# Patient Record
Sex: FEMALE | Race: WHITE | NOT HISPANIC OR LATINO | Employment: OTHER | URBAN - METROPOLITAN AREA
[De-identification: names, ages, dates, MRNs, and addresses within clinical notes are randomized per-mention and may not be internally consistent; named-entity substitution may affect disease eponyms.]

---

## 2014-05-16 LAB — PAP-ABSTRACT: NORMAL

## 2017-04-10 ENCOUNTER — TRANSFERRED RECORDS (OUTPATIENT)
Dept: HEALTH INFORMATION MANAGEMENT | Facility: CLINIC | Age: 72
End: 2017-04-10

## 2018-02-22 ENCOUNTER — TRANSFERRED RECORDS (OUTPATIENT)
Dept: HEALTH INFORMATION MANAGEMENT | Facility: CLINIC | Age: 73
End: 2018-02-22

## 2018-02-22 LAB
CHOLEST SERPL-MCNC: 182 MG/DL (ref 100–199)
CREAT SERPL-MCNC: 1.25 MG/DL (ref 0.57–1.11)
GFR SERPL CREATININE-BSD FRML MDRD: 42 ML/MIN/1.73M2
GLUCOSE SERPL-MCNC: 155 MG/DL (ref 65–100)
HDLC SERPL-MCNC: 51 MG/DL
LDLC SERPL CALC-MCNC: 100 MG/DL
NONHDLC SERPL-MCNC: 131 MG/DL
POTASSIUM SERPL-SCNC: 3.4 MMOL/L (ref 3.5–5)
TRIGL SERPL-MCNC: 157 MG/DL

## 2018-03-12 ENCOUNTER — TELEPHONE (OUTPATIENT)
Dept: FAMILY MEDICINE | Facility: CLINIC | Age: 73
End: 2018-03-12

## 2018-03-12 NOTE — TELEPHONE ENCOUNTER
Pt is calling requesting to be seen by a MD at [facility]. Did schedule pt with Diana MAYORGA 3/16/18.  Pt said her Provider at [facility] didn't want to listen to her. Pt has had 4 strokes, 4 hip surgery's ( last hip surgery was 2010) on Warfarin for Clots. Pt's last clot was 2016.   Jessica [place] [facility]

## 2018-03-16 ENCOUNTER — OFFICE VISIT (OUTPATIENT)
Dept: FAMILY MEDICINE | Facility: CLINIC | Age: 73
End: 2018-03-16
Payer: COMMERCIAL

## 2018-03-16 VITALS
HEIGHT: 66 IN | TEMPERATURE: 97 F | BODY MASS INDEX: 24.91 KG/M2 | OXYGEN SATURATION: 99 % | DIASTOLIC BLOOD PRESSURE: 78 MMHG | HEART RATE: 70 BPM | RESPIRATION RATE: 14 BRPM | SYSTOLIC BLOOD PRESSURE: 160 MMHG | WEIGHT: 155 LBS

## 2018-03-16 DIAGNOSIS — R09.89 BRUIT OF RIGHT CAROTID ARTERY: ICD-10-CM

## 2018-03-16 DIAGNOSIS — I10 HTN, GOAL BELOW 140/90: ICD-10-CM

## 2018-03-16 DIAGNOSIS — I63.20 CEREBROVASCULAR ACCIDENT (CVA) DUE TO OCCLUSION OF PRECEREBRAL ARTERY (H): ICD-10-CM

## 2018-03-16 DIAGNOSIS — J44.1 COPD EXACERBATION (H): Primary | ICD-10-CM

## 2018-03-16 DIAGNOSIS — J01.90 ACUTE SINUSITIS WITH SYMPTOMS > 10 DAYS: ICD-10-CM

## 2018-03-16 LAB
ALBUMIN SERPL-MCNC: 3.9 G/DL (ref 3.4–5)
ALP SERPL-CCNC: 56 U/L (ref 40–150)
ALT SERPL W P-5'-P-CCNC: 31 U/L (ref 0–50)
ANION GAP SERPL CALCULATED.3IONS-SCNC: 9 MMOL/L (ref 3–14)
AST SERPL W P-5'-P-CCNC: 28 U/L (ref 0–45)
BASOPHILS # BLD AUTO: 0 10E9/L (ref 0–0.2)
BASOPHILS NFR BLD AUTO: 0.3 %
BILIRUB SERPL-MCNC: 0.4 MG/DL (ref 0.2–1.3)
BUN SERPL-MCNC: 19 MG/DL (ref 7–30)
CALCIUM SERPL-MCNC: 8.9 MG/DL (ref 8.5–10.1)
CHLORIDE SERPL-SCNC: 99 MMOL/L (ref 94–109)
CHOLEST SERPL-MCNC: 135 MG/DL
CO2 SERPL-SCNC: 26 MMOL/L (ref 20–32)
CREAT SERPL-MCNC: 0.96 MG/DL (ref 0.52–1.04)
DIFFERENTIAL METHOD BLD: NORMAL
EOSINOPHIL # BLD AUTO: 0.1 10E9/L (ref 0–0.7)
EOSINOPHIL NFR BLD AUTO: 1.1 %
ERYTHROCYTE [DISTWIDTH] IN BLOOD BY AUTOMATED COUNT: 12.7 % (ref 10–15)
GFR SERPL CREATININE-BSD FRML MDRD: 57 ML/MIN/1.7M2
GLUCOSE SERPL-MCNC: 91 MG/DL (ref 70–99)
HCT VFR BLD AUTO: 39.5 % (ref 35–47)
HDLC SERPL-MCNC: 53 MG/DL
HGB BLD-MCNC: 13.6 G/DL (ref 11.7–15.7)
INR PPP: 2.03 (ref 0.86–1.14)
LDLC SERPL CALC-MCNC: 54 MG/DL
LYMPHOCYTES # BLD AUTO: 2.1 10E9/L (ref 0.8–5.3)
LYMPHOCYTES NFR BLD AUTO: 28.2 %
MCH RBC QN AUTO: 31.1 PG (ref 26.5–33)
MCHC RBC AUTO-ENTMCNC: 34.4 G/DL (ref 31.5–36.5)
MCV RBC AUTO: 90 FL (ref 78–100)
MONOCYTES # BLD AUTO: 0.7 10E9/L (ref 0–1.3)
MONOCYTES NFR BLD AUTO: 8.8 %
NEUTROPHILS # BLD AUTO: 4.6 10E9/L (ref 1.6–8.3)
NEUTROPHILS NFR BLD AUTO: 61.6 %
NONHDLC SERPL-MCNC: 82 MG/DL
PLATELET # BLD AUTO: 249 10E9/L (ref 150–450)
POTASSIUM SERPL-SCNC: 3.7 MMOL/L (ref 3.4–5.3)
PROT SERPL-MCNC: 7.3 G/DL (ref 6.8–8.8)
RBC # BLD AUTO: 4.38 10E12/L (ref 3.8–5.2)
SODIUM SERPL-SCNC: 134 MMOL/L (ref 133–144)
TRIGL SERPL-MCNC: 142 MG/DL
TSH SERPL DL<=0.005 MIU/L-ACNC: 0.92 MU/L (ref 0.4–4)
WBC # BLD AUTO: 7.4 10E9/L (ref 4–11)

## 2018-03-16 PROCEDURE — 99204 OFFICE O/P NEW MOD 45 MIN: CPT | Performed by: NURSE PRACTITIONER

## 2018-03-16 PROCEDURE — 85025 COMPLETE CBC W/AUTO DIFF WBC: CPT | Performed by: NURSE PRACTITIONER

## 2018-03-16 PROCEDURE — 80053 COMPREHEN METABOLIC PANEL: CPT | Performed by: NURSE PRACTITIONER

## 2018-03-16 PROCEDURE — 84443 ASSAY THYROID STIM HORMONE: CPT | Performed by: NURSE PRACTITIONER

## 2018-03-16 PROCEDURE — 36415 COLL VENOUS BLD VENIPUNCTURE: CPT | Performed by: NURSE PRACTITIONER

## 2018-03-16 PROCEDURE — 80061 LIPID PANEL: CPT | Performed by: NURSE PRACTITIONER

## 2018-03-16 PROCEDURE — 85610 PROTHROMBIN TIME: CPT | Performed by: NURSE PRACTITIONER

## 2018-03-16 RX ORDER — ATORVASTATIN CALCIUM 40 MG/1
40 TABLET, FILM COATED ORAL
COMMUNITY
Start: 2018-02-22 | End: 2018-09-07

## 2018-03-16 RX ORDER — LANOLIN ALCOHOL/MO/W.PET/CERES
1000 CREAM (GRAM) TOPICAL
COMMUNITY
Start: 2017-04-10 | End: 2019-04-04

## 2018-03-16 RX ORDER — AMOXICILLIN 500 MG/1
500 CAPSULE ORAL
COMMUNITY
Start: 2016-09-09 | End: 2018-03-16

## 2018-03-16 RX ORDER — HYDROCHLOROTHIAZIDE 25 MG/1
12.5 TABLET ORAL
COMMUNITY
Start: 2018-02-25 | End: 2018-11-29

## 2018-03-16 RX ORDER — METOPROLOL SUCCINATE 100 MG/1
100 TABLET, EXTENDED RELEASE ORAL DAILY
Qty: 90 TABLET | Refills: 1 | Status: SHIPPED | OUTPATIENT
Start: 2018-03-16 | End: 2018-05-01 | Stop reason: DRUGHIGH

## 2018-03-16 RX ORDER — LANOLIN ALCOHOL/MO/W.PET/CERES
100 CREAM (GRAM) TOPICAL
COMMUNITY
Start: 2016-01-27 | End: 2018-09-07

## 2018-03-16 RX ORDER — METOPROLOL SUCCINATE 50 MG/1
50 TABLET, EXTENDED RELEASE ORAL
COMMUNITY
Start: 2018-02-22 | End: 2018-03-16 | Stop reason: DRUGHIGH

## 2018-03-16 RX ORDER — DIPHENOXYLATE HYDROCHLORIDE AND ATROPINE SULFATE 2.5; .025 MG/1; MG/1
1 TABLET ORAL
COMMUNITY
Start: 2016-11-22 | End: 2019-04-04

## 2018-03-16 RX ORDER — WARFARIN SODIUM 5 MG/1
TABLET ORAL
COMMUNITY
Start: 2018-01-25 | End: 2018-05-16

## 2018-03-16 ASSESSMENT — ANXIETY QUESTIONNAIRES
3. WORRYING TOO MUCH ABOUT DIFFERENT THINGS: NOT AT ALL
IF YOU CHECKED OFF ANY PROBLEMS ON THIS QUESTIONNAIRE, HOW DIFFICULT HAVE THESE PROBLEMS MADE IT FOR YOU TO DO YOUR WORK, TAKE CARE OF THINGS AT HOME, OR GET ALONG WITH OTHER PEOPLE: NOT DIFFICULT AT ALL
5. BEING SO RESTLESS THAT IT IS HARD TO SIT STILL: NOT AT ALL
6. BECOMING EASILY ANNOYED OR IRRITABLE: NOT AT ALL
GAD7 TOTAL SCORE: 0
1. FEELING NERVOUS, ANXIOUS, OR ON EDGE: NOT AT ALL
7. FEELING AFRAID AS IF SOMETHING AWFUL MIGHT HAPPEN: NOT AT ALL
2. NOT BEING ABLE TO STOP OR CONTROL WORRYING: NOT AT ALL

## 2018-03-16 ASSESSMENT — PATIENT HEALTH QUESTIONNAIRE - PHQ9: 5. POOR APPETITE OR OVEREATING: NOT AT ALL

## 2018-03-16 NOTE — MR AVS SNAPSHOT
After Visit Summary   3/16/2018    Bebe Alfonso    MRN: [id number]           Patient Information     Date Of Birth          1945        Visit Information        Provider Department      3/16/2018 9:00 AM Diana Ely APRN [facility]        Today's Diagnoses     COPD exacerbation (H)    -  1    HTN, goal below 140/90        Cerebrovascular accident (CVA) due to occlusion of precerebral artery (H)        Bruit of right carotid artery        Acute sinusitis with symptoms > 10 days          Care Instructions    Increase Toprol  mg daily  Start augmentin 875 mg twice daily for 10 days  Do additional INR next week and have them call me if concerns.   PFT after infection clears to check lung function  US of the neck arteries to look at the right carotid artery due to bruit.           Chronic Lung Disease: Preventing Lung Infections  Chronic lung diseases include chronic obstructive pulmonary disease (COPD), which includes chronic bronchitis and emphysema. Other chronic lung diseases include pulmonary fibrosis, sarcoidosis, and other conditions. When you have chronic lung diseases, it's very important to protect yourself from respiratory infections, like colds, the flu, and lung infections. Infections may cause your lung condition to worsen. Although you can't completely avoid them, there are things you can do to lessen the chance of infections.    Take precautions  Taking the following precautions can help you avoid illness:    Remember to keep your hands away from your nose and mouth. Germs on your hands get into your respiratory system this way.    Wash your hands often. When you wash them:    Use soap and warm water.    Rub your hands together well for at least 20 seconds.    Make sure to rinse them well.    Dry your hands on clean towels or air-dry them.    Use hand  containing alcohol, if you are unable to wash your hands. Use the  after  touching doorknobs, handles, and supermarket carts, for example, since lots of people touch them. Then wash your hands as soon as you can.    To help prevent the flu, get a flu vaccination every year. This may be given at your healthcare provider's office, a drugstore, or pharmacy, or at work. Get your flu shot as soon as the vaccines are available in your area. This is usually around September each year.    To help prevent pneumococcal pneumonia, get pneumonia vaccinations. Talk with your healthcare provider about which pneumococcal vaccinations you need.    Try to stay away from people with respiratory infections, such as colds or the flu. Stay away from crowded places, like shopping centers or movie theatres during cold and flu season.    If you smoke, think about quitting. In addition to causing or worsening many lung conditions, the lung damage from smoking increases your risk of infections. Stay away from others who smoke, too. This is also harmful and increases your chance of infections.  Date Last Reviewed: 4/14/2016 2000-2017 [employer]. [address] [place] [address]. All rights reserved. This information is not intended as a substitute for professional medical care. Always follow your healthcare professional's instructions.        Uncontrolled High Blood Pressure (Established)    Your blood pressure was unusually high today. This can occur if you ve missed doses of your blood pressure medicine. Or it can happen if you are taking other medicines. These include some asthma inhalers, decongestants, diet pills, and street drugs like cocaine and amphetamine.  Other causes include:    Weight gain    More salt in your diet    Smoking    Caffeine  Your blood pressure can also rise if you are emotionally upset or in intense pain. It may go back to normal after a period of rest.  A blood pressure reading is made up of 2 numbers. There is a top number over a bottom number. The top number  is the systolic pressure. The bottom number is the diastolic pressure. A normal blood pressure is a systolic pressure of less than 120 over a diastolic pressure of less than 80. High blood pressure (hypertension) is when the top number is 140 or higher. Or it is when the bottom number is 90 or higher. You will see your blood pressure readings written together. For example, a person with a systolic pressure of 118 and a diastolic pressure of 78 will have 118/78 written in the medical record. To be high blood pressure, the numbers must be higher when tested over a period of time. The blood pressures between normal and hypertension are called prehypertension. Prehypertension is a warning sign. The information gives you a chance to make lifestyle changes (weight loss, more exercise) that can keep your blood pressure from going higher.  Home care  It s important to take steps to lower your blood pressure. If you are taking blood pressure medicine, the guidelines below may help you need less or no medicines in the future.    Begin a weight-loss program if you are overweight.    Cut back on the amount of salt in your diet:    Avoid high-salt foods like olives, pickles, smoked meats, and salted potato chips.    Don t add salt to your food at the table.    Use only small amounts of salt when cooking.    Begin an exercise program. Talk with your health care provider about what exercise program is best for you. It doesn t have to be difficult. Even brisk walking for 20 minutes 3 times a week is a good form of exercise.    Avoid medicines that stimulates the heart. This includes many over-the-counter cold and sinus decongestant pills and sprays, as well as diet pills. Check the warnings about hypertension on the label. Before purchasing any over-the-counter medicines or supplements, always ask the pharmacist about the product's potential interaction with your high blood pressure and your medicines.    Stimulants such as  amphetamine or cocaine could be lethal for someone with hypertension. Never take these.    Limit how much caffeine you drink. Or switch to noncaffeinated beverages.    Stop smoking. If you are a long-time smoker, this can be hard. Enroll in a stop-smoking program to make it more likely that you will succeed. Talk with your provider about ways to quit.    Learn how to handle stress better. This is an important part of any program to lower blood pressure. Learn ways to relax. These include meditation, yoga, and biofeedback.    If medicines were prescribed, take them exactly as directed. Missing doses may cause your blood pressure to get out of control.    If you miss a dose or doses of your medicines, check with your healthcare provider or pharmacist about what to do.    Consider buying an automatic blood pressure machine. Your provider may recommend a certain type. You can get one of these at most pharmacies. Measure your blood pressure twice a day, in the morning, and in the late afternoon. Keep a written record of your home blood pressure readings and take the record to your medical appointments.  Here are some additional guidelines on home blood pressure monitoring from the American Heart Association.    Don't smoke or drink coffee for 30 minutes    Go to the bathroom before the test.    Relax for 5 minutes before taking the measurement.    Sit correctly. Be sure your back is supported. Don't sit on a couch or soft chair. Uncross your feet and place them flat on the floor. Place your arm on a solid, flat surface like a table with the upper arm at heart level. Make certain the middle of the cuff is directly above the eye of the elbow. Check the monitor's instruction manual for an illustration.    Take multiple readings. When you measure, take 2 or 3 readings one minute apart and record all of the results.    Take your blood pressure at the same time every day, or as your healthcare provider recommends.    Record  the date, time, and blood pressure reading.    Take the record with you to your next appointment. If your blood pressure monitor has a built-in memory, simply take the monitor with you to your next appointment.    Call your provider if you have several high readings. Don't be frightened by a single high reading, but if you get several high readings, check in with your healthcare provider.    Note: When blood pressure reaches a systolic (top number) of 180 or higher or a diastolic (bottom number) of 110 or higher, emergency medical treatment is required. Call your healthcare provider immediately.  Follow-up care  Regular visits to your own healthcare provider for blood pressure and medicine checks are an important part of your care. Make a follow-up appointment as directed. Bring the record of your home blood pressure readings to the appointment.  When to seek medical advice  Call your healthcare provider right away if any of these occur:    Blood pressure reaches a systolic (top number) of 180 or higher or diastolic (bottom number) of 110 or higher, emergency medical treatment is required.    Chest, arm, shoulder, neck, or upper back pain    Shortness of breath    Severe headache    Throbbing or rushing sound in the ears    Nosebleed    Extreme drowsiness, confusion, or fainting    Dizziness or dizziness with spinning sensation (vertigo)    Weakness in an arm or leg or on one side of the face    Trouble speaking or seeing   Date Last Reviewed: 1/1/2017 2000-2017 [employer]. [address] [place], [place] [address]. All rights reserved. This information is not intended as a substitute for professional medical care. Always follow your healthcare professional's instructions.                Follow-ups after your visit        Future tests that were ordered for you today     Open Future Orders        Priority Expected Expires Ordered    US Carotid Bilateral Routine  3/16/2019 3/16/2018    General PFT  "Lab (Please always keep checked) Routine  3/16/2019 3/16/2018    Pulmonary Function Test Routine  3/16/2019 3/16/2018            Who to contact     If you have questions or need follow up information about today's clinic visit or your schedule please contact [facility] directly at [phone].  Normal or non-critical lab and imaging results will be communicated to you by MyChart, letter or phone within 4 business days after the clinic has received the results. If you do not hear from us within 7 days, please contact the clinic through [employer] or phone. If you have a critical or abnormal lab result, we will notify you by phone as soon as possible.  Submit refill requests through [employer] or call your pharmacy and they will forward the refill request to us. Please allow 3 business days for your refill to be completed.          Additional Information About Your Visit        MyChart Information     [employer] lets you send messages to your doctor, view your test results, renew your prescriptions, schedule appointments and more. To sign up, go to [email or web address] . Click on \"Log in\" on the left side of the screen, which will take you to the Welcome page. Then click on \"Sign up Now\" on the right side of the page.     You will be asked to enter the access code listed below, as well as some personal information. Please follow the directions to create your username and password.     Your access code is: [email or web address]  Expires: 2018 10:27 AM     Your access code will  in 90 days. If you need help or a new code, please call your [facility] clinic or [phone].        Care EveryWhere ID     This is your Care EveryWhere ID. This could be used by other organizations to access your [facility] medical records  [id number]        Your Vitals Were     Pulse Temperature Respirations Height Pulse Oximetry BMI (Body Mass Index)    70 97  F (36.1  C) (Tympanic) 14 5' 5.5\" (1.664 m) 99% 25.4 kg/m2       Blood " Pressure from Last 3 Encounters:   03/16/18 160/78    Weight from Last 3 Encounters:   03/16/18 155 lb (70.3 kg)              We Performed the Following     CBC with platelets differential     Comprehensive metabolic panel     INR     Lipid panel reflex to direct LDL Fasting     TSH with free T4 reflex          Today's Medication Changes          These changes are accurate as of 3/16/18 10:28 AM.  If you have any questions, ask your nurse or doctor.               Start taking these medicines.        Dose/Directions    amoxicillin-clavulanate 875-125 MG per tablet   Commonly known as:  AUGMENTIN   Used for:  Acute sinusitis with symptoms > 10 days, COPD exacerbation (H)   Started by:  Diana Ely APRN CNP        Dose:  1 tablet   Take 1 tablet by mouth 2 times daily   Quantity:  20 tablet   Refills:  0         These medicines have changed or have updated prescriptions.        Dose/Directions    metoprolol succinate 100 MG 24 hr tablet   Commonly known as:  TOPROL-XL   This may have changed:    - medication strength  - how much to take  - when to take this   Used for:  HTN, goal below 140/90   Changed by:  Diana Ely APRN CNP        Dose:  100 mg   Take 1 tablet (100 mg) by mouth daily   Quantity:  90 tablet   Refills:  1         Stop taking these medicines if you haven't already. Please contact your care team if you have questions.     amoxicillin 500 MG capsule   Commonly known as:  AMOXIL   Stopped by:  Diana Ely APRN CNP                Where to get your medicines      These medications were sent to [facility] [address] [place] [address] - [address] [facility]  [address] [facility] [address]     Phone:  [phone]     amoxicillin-clavulanate 875-125 MG per tablet    metoprolol succinate 100 MG 24 hr tablet                Primary Care Provider Office Phone # Fax #    SELWYN Dennis [phone]       [address] [place] [id number]         Equal Access to Services     [facility] : Hadii aad ku hadrolandoimchelle Teresitaanil, wadeclanda luqadaha, qaybta kaagustíncriss mckinney. So [facility] [phone].    ATENCIÓN: Si habla español, tiene a ruiz disposición servicios gratuitos de asistencia lingüística. Pabloame al [phone].    We comply with applicable federal civil rights laws and [place] laws. We do not discriminate on the basis of race, color, national origin, age, disability, sex, sexual orientation, or gender identity.            Thank you!     Thank you for choosing [facility]  for your care. Our goal is always to provide you with excellent care. Hearing back from our patients is one way we can continue to improve our services. Please take a few minutes to complete the written survey that you may receive in the mail after your visit with us. Thank you!             Your Updated Medication List - Protect others around you: Learn how to safely use, store and throw away your medicines at www.disposemymeds.org.          This list is accurate as of 3/16/18 10:28 AM.  Always use your most recent med list.                   Brand Name Dispense Instructions for use Diagnosis    amoxicillin-clavulanate 875-125 MG per tablet    AUGMENTIN    20 tablet    Take 1 tablet by mouth 2 times daily    Acute sinusitis with symptoms > 10 days, COPD exacerbation (H)       atorvastatin 40 MG tablet    LIPITOR     Take 40 mg by mouth        cyanocobalamin 1000 MCG tablet    vitamin  B-12     Take 1,000 mcg by mouth        hydrochlorothiazide 25 MG tablet    HYDRODIURIL     Take 12.5 mg by mouth        MAGNESIUM PO      Take 250 mg by mouth        metoprolol succinate 100 MG 24 hr tablet    TOPROL-XL    90 tablet    Take 1 tablet (100 mg) by mouth daily    HTN, goal below 140/90       MULTI-VITAMINS Tabs      Take 1 tablet by mouth        thiamine 100 MG tablet      Take 100 mg by mouth        warfarin 5 MG tablet    COUMADIN      Take by mouth 5 mg (5 mg x 1) on Sun, Thu; 7.5 mg (5 mg x 1.5) all other days or as directed by anticoagulation clinic RN
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
## 2018-03-16 NOTE — Clinical Note
Please abstract from care everywhere   Mammogram  Colon screening  Pap  And all other information for quality information   Thanks

## 2018-03-16 NOTE — PATIENT INSTRUCTIONS
Increase Toprol  mg daily  Start augmentin 875 mg twice daily for 10 days  Do additional INR next week and have them call me if concerns.   PFT after infection clears to check lung function  US of the neck arteries to look at the right carotid artery due to bruit.           Chronic Lung Disease: Preventing Lung Infections  Chronic lung diseases include chronic obstructive pulmonary disease (COPD), which includes chronic bronchitis and emphysema. Other chronic lung diseases include pulmonary fibrosis, sarcoidosis, and other conditions. When you have chronic lung diseases, it's very important to protect yourself from respiratory infections, like colds, the flu, and lung infections. Infections may cause your lung condition to worsen. Although you can't completely avoid them, there are things you can do to lessen the chance of infections.    Take precautions  Taking the following precautions can help you avoid illness:    Remember to keep your hands away from your nose and mouth. Germs on your hands get into your respiratory system this way.    Wash your hands often. When you wash them:    Use soap and warm water.    Rub your hands together well for at least 20 seconds.    Make sure to rinse them well.    Dry your hands on clean towels or air-dry them.    Use hand  containing alcohol, if you are unable to wash your hands. Use the  after touching doorknobs, handles, and supermarket carts, for example, since lots of people touch them. Then wash your hands as soon as you can.    To help prevent the flu, get a flu vaccination every year. This may be given at your healthcare provider's office, a drugstore, or pharmacy, or at work. Get your flu shot as soon as the vaccines are available in your area. This is usually around September each year.    To help prevent pneumococcal pneumonia, get pneumonia vaccinations. Talk with your healthcare provider about which pneumococcal vaccinations you need.    Try  to stay away from people with respiratory infections, such as colds or the flu. Stay away from crowded places, like shopping centers or movie theatres during cold and flu season.    If you smoke, think about quitting. In addition to causing or worsening many lung conditions, the lung damage from smoking increases your risk of infections. Stay away from others who smoke, too. This is also harmful and increases your chance of infections.  Date Last Reviewed: 4/14/2016 2000-2017 [employer]. [address] [place] [address], [place] [address]. All rights reserved. This information is not intended as a substitute for professional medical care. Always follow your healthcare professional's instructions.        Uncontrolled High Blood Pressure (Established)    Your blood pressure was unusually high today. This can occur if you ve missed doses of your blood pressure medicine. Or it can happen if you are taking other medicines. These include some asthma inhalers, decongestants, diet pills, and street drugs like cocaine and amphetamine.  Other causes include:    Weight gain    More salt in your diet    Smoking    Caffeine  Your blood pressure can also rise if you are emotionally upset or in intense pain. It may go back to normal after a period of rest.  A blood pressure reading is made up of 2 numbers. There is a top number over a bottom number. The top number is the systolic pressure. The bottom number is the diastolic pressure. A normal blood pressure is a systolic pressure of less than 120 over a diastolic pressure of less than 80. High blood pressure (hypertension) is when the top number is 140 or higher. Or it is when the bottom number is 90 or higher. You will see your blood pressure readings written together. For example, a person with a systolic pressure of 118 and a diastolic pressure of 78 will have 118/78 written in the medical record. To be high blood pressure, the numbers must be higher when tested over a  period of time. The blood pressures between normal and hypertension are called prehypertension. Prehypertension is a warning sign. The information gives you a chance to make lifestyle changes (weight loss, more exercise) that can keep your blood pressure from going higher.  Home care  It s important to take steps to lower your blood pressure. If you are taking blood pressure medicine, the guidelines below may help you need less or no medicines in the future.    Begin a weight-loss program if you are overweight.    Cut back on the amount of salt in your diet:    Avoid high-salt foods like olives, pickles, smoked meats, and salted potato chips.    Don t add salt to your food at the table.    Use only small amounts of salt when cooking.    Begin an exercise program. Talk with your health care provider about what exercise program is best for you. It doesn t have to be difficult. Even brisk walking for 20 minutes 3 times a week is a good form of exercise.    Avoid medicines that stimulates the heart. This includes many over-the-counter cold and sinus decongestant pills and sprays, as well as diet pills. Check the warnings about hypertension on the label. Before purchasing any over-the-counter medicines or supplements, always ask the pharmacist about the product's potential interaction with your high blood pressure and your medicines.    Stimulants such as amphetamine or cocaine could be lethal for someone with hypertension. Never take these.    Limit how much caffeine you drink. Or switch to noncaffeinated beverages.    Stop smoking. If you are a long-time smoker, this can be hard. Enroll in a stop-smoking program to make it more likely that you will succeed. Talk with your provider about ways to quit.    Learn how to handle stress better. This is an important part of any program to lower blood pressure. Learn ways to relax. These include meditation, yoga, and biofeedback.    If medicines were prescribed, take them  exactly as directed. Missing doses may cause your blood pressure to get out of control.    If you miss a dose or doses of your medicines, check with your healthcare provider or pharmacist about what to do.    Consider buying an automatic blood pressure machine. Your provider may recommend a certain type. You can get one of these at most pharmacies. Measure your blood pressure twice a day, in the morning, and in the late afternoon. Keep a written record of your home blood pressure readings and take the record to your medical appointments.  Here are some additional guidelines on home blood pressure monitoring from the American [employer] Association.    Don't smoke or drink coffee for 30 minutes    Go to the bathroom before the test.    Relax for 5 minutes before taking the measurement.    Sit correctly. Be sure your back is supported. Don't sit on a couch or soft chair. Uncross your feet and place them flat on the floor. Place your arm on a solid, flat surface like a table with the upper arm at heart level. Make certain the middle of the cuff is directly above the eye of the elbow. Check the monitor's instruction manual for an illustration.    Take multiple readings. When you measure, take 2 or 3 readings one minute apart and record all of the results.    Take your blood pressure at the same time every day, or as your healthcare provider recommends.    Record the date, time, and blood pressure reading.    Take the record with you to your next appointment. If your blood pressure monitor has a built-in memory, simply take the monitor with you to your next appointment.    Call your provider if you have several high readings. Don't be frightened by a single high reading, but if you get several high readings, check in with your healthcare provider.    Note: When blood pressure reaches a systolic (top number) of 180 or higher or a diastolic (bottom number) of 110 or higher, emergency medical treatment is required. Call your  healthcare provider immediately.  Follow-up care  Regular visits to your own healthcare provider for blood pressure and medicine checks are an important part of your care. Make a follow-up appointment as directed. Bring the record of your home blood pressure readings to the appointment.  When to seek medical advice  Call your healthcare provider right away if any of these occur:    Blood pressure reaches a systolic (top number) of 180 or higher or diastolic (bottom number) of 110 or higher, emergency medical treatment is required.    Chest, arm, shoulder, neck, or upper back pain    Shortness of breath    Severe headache    Throbbing or rushing sound in the ears    Nosebleed    Extreme drowsiness, confusion, or fainting    Dizziness or dizziness with spinning sensation (vertigo)    Weakness in an arm or leg or on one side of the face    Trouble speaking or seeing   Date Last Reviewed: 1/1/2017 2000-2017 [employer]. [address] [place] [address]. All rights reserved. This information is not intended as a substitute for professional medical care. Always follow your healthcare professional's instructions.

## 2018-03-17 ASSESSMENT — ANXIETY QUESTIONNAIRES: GAD7 TOTAL SCORE: 0

## 2018-03-17 ASSESSMENT — PATIENT HEALTH QUESTIONNAIRE - PHQ9: SUM OF ALL RESPONSES TO PHQ QUESTIONS 1-9: 0

## 2018-03-19 ENCOUNTER — RADIANT APPOINTMENT (OUTPATIENT)
Dept: ULTRASOUND IMAGING | Facility: CLINIC | Age: 73
End: 2018-03-19
Attending: NURSE PRACTITIONER
Payer: COMMERCIAL

## 2018-03-19 ENCOUNTER — TRANSFERRED RECORDS (OUTPATIENT)
Dept: HEALTH INFORMATION MANAGEMENT | Facility: CLINIC | Age: 73
End: 2018-03-19

## 2018-03-19 ENCOUNTER — MYC MEDICAL ADVICE (OUTPATIENT)
Dept: FAMILY MEDICINE | Facility: CLINIC | Age: 73
End: 2018-03-19

## 2018-03-19 DIAGNOSIS — R09.89 BRUIT OF RIGHT CAROTID ARTERY: ICD-10-CM

## 2018-03-19 DIAGNOSIS — Z79.01 ANTICOAGULATION MONITORING, INR RANGE 2-3: ICD-10-CM

## 2018-03-19 DIAGNOSIS — Z86.73 HISTORY OF CVA (CEREBROVASCULAR ACCIDENT): Primary | ICD-10-CM

## 2018-03-19 PROCEDURE — 93880 EXTRACRANIAL BILAT STUDY: CPT

## 2018-03-20 DIAGNOSIS — Z86.73 HISTORY OF CVA (CEREBROVASCULAR ACCIDENT): Primary | ICD-10-CM

## 2018-03-20 DIAGNOSIS — Z79.01 LONG TERM CURRENT USE OF ANTICOAGULANT THERAPY: ICD-10-CM

## 2018-03-27 ENCOUNTER — MYC MEDICAL ADVICE (OUTPATIENT)
Dept: FAMILY MEDICINE | Facility: CLINIC | Age: 73
End: 2018-03-27

## 2018-04-05 ENCOUNTER — ANTICOAGULATION THERAPY VISIT (OUTPATIENT)
Dept: ANTICOAGULATION | Facility: CLINIC | Age: 73
End: 2018-04-05
Payer: COMMERCIAL

## 2018-04-05 DIAGNOSIS — Z86.73 HISTORY OF CVA (CEREBROVASCULAR ACCIDENT): ICD-10-CM

## 2018-04-05 DIAGNOSIS — Z79.01 LONG TERM CURRENT USE OF ANTICOAGULANT THERAPY: ICD-10-CM

## 2018-04-05 LAB — INR POINT OF CARE: 2.6 (ref 0.86–1.14)

## 2018-04-05 PROCEDURE — 36416 COLLJ CAPILLARY BLOOD SPEC: CPT

## 2018-04-05 PROCEDURE — 85610 PROTHROMBIN TIME: CPT | Mod: QW

## 2018-04-05 PROCEDURE — 99207 ZZC NO CHARGE NURSE ONLY: CPT

## 2018-04-05 NOTE — MR AVS SNAPSHOT
Bebeileana Alfonso   4/5/2018 9:45 AM   Anticoagulation Therapy Visit    Description:  72 year old female   Provider:  [facility] COAGRACE   Department:  [facility]           INR as of 4/5/2018     Today's INR 2.6      Anticoagulation Summary as of 4/5/2018     INR goal 2.0-3.0   Today's INR 2.6   Full instructions 5 mg on Mon, Thu; 7.5 mg all other days   Next INR check 5/3/2018    Indications   History of CVA (cerebrovascular accident) [Z86.73]  Long term current use of anticoagulant therapy [Z79.01]         Your next Anticoagulation Clinic appointment(s)     May 03, 2018 10:15 AM CDT   [facility]    [address] [place] [id number] [phone]              Contact Numbers     Please call [phone] with any problems or questions regarding your therapy.    If you need to cancel and/or reschedule your appointment please call one of the following numbers:  [facility] [phone]  [facility] [phone]  [facility] [phone]  [facility] [phone]  [place] - [phone]            April 2018 Details    Sun Mon Tue Wed Thu Fri Sat     1               2               3               4               5      5 mg   See details      6      7.5 mg         7      7.5 mg           8      7.5 mg         9      5 mg         10      7.5 mg         11      7.5 mg         12      5 mg         13      7.5 mg         14      7.5 mg           15      7.5 mg         16      5 mg         17      7.5 mg         18      7.5 mg         19      5 mg         20      7.5 mg         21      7.5 mg           22      7.5 mg         23      5 mg         24      7.5 mg         25      7.5 mg         26      5 mg         27      7.5 mg         28      7.5 mg           29      7.5 mg         30      5 mg               Date Details   04/05 This INR check               How to take your warfarin dose     To take:  5 mg Take 1 of the  5 mg tablets.    To take:  7.5 mg Take 1.5 of the 5 mg tablets.           May 2018 Details    Sun Mon Tue Wed Thu Fri Sat       1      7.5 mg         2      7.5 mg         3            4               5                 6               7               8               9               10               11               12                 13               14               15               16               17               18               19                 20               21               22               23               24               25               26                 27               28               29               30               31                  Date Details   No additional details    Date of next INR:  5/3/2018         How to take your warfarin dose     To take:  5 mg Take 1 of the 5 mg tablets.    To take:  7.5 mg Take 1.5 of the 5 mg tablets.

## 2018-04-05 NOTE — PROGRESS NOTES
ANTICOAGULATION FOLLOW-UP CLINIC VISIT    Patient Name:  Bebe Alfonso  Date:  4/5/2018  Contact Type:  Face to Face    SUBJECTIVE:     Patient Findings     Positives Change in diet/appetite (had 1/2 cup of broccoli dish at [facility])    Comments Patient would like a new education session with [facility]. She says her INR was up and down with [facility] and she never received education on warfarin. She admits to not being consistent with greens so will try to avoid the foods high in vitamin K. No other changes or concerns.            OBJECTIVE    INR Protime   Date Value Ref Range Status   04/05/2018 2.6 (A) 0.86 - 1.14 Final       ASSESSMENT / PLAN  INR assessment THER    Recheck INR In: 4 WEEKS    INR Location Clinic      Anticoagulation Summary as of 4/5/2018     INR goal 2.0-3.0   Today's INR 2.6   Maintenance plan 5 mg (5 mg x 1) on Mon, Thu; 7.5 mg (5 mg x 1.5) all other days   Full instructions 5 mg on Mon, Thu; 7.5 mg all other days   Weekly total 47.5 mg   Plan last modified Natalie Sorto RN (4/5/2018)   Next INR check 5/3/2018   Target end date Indefinite    Indications   History of CVA (cerebrovascular accident) [Z86.73]  Long term current use of anticoagulant therapy [Z79.01]         Anticoagulation Episode Summary     INR check location     Preferred lab     Send INR reminders to [facility] [place]    Comments * This is an [facility] transfer. Not new to warfarin. SELWYN Gibson will be primary now. Pt states she has had 4 strokes (some were following hip surgeries).      Anticoagulation Care Providers     Provider Role Specialty Phone number    Diana Ely APRN [facility] [phone]            See the Encounter Report to view Anticoagulation Flowsheet and Dosing Calendar (Go to Encounters tab in chart review, and find the Anticoagulation Therapy Visit)        Natalie Sorto RN

## 2018-04-11 ENCOUNTER — HOSPITAL ENCOUNTER (OUTPATIENT)
Dept: RESPIRATORY THERAPY | Facility: CLINIC | Age: 73
Discharge: HOME | End: 2018-04-11
Attending: NURSE PRACTITIONER | Admitting: NURSE PRACTITIONER
Payer: MEDICARE

## 2018-04-11 DIAGNOSIS — J44.1 COPD EXACERBATION (H): ICD-10-CM

## 2018-04-11 PROCEDURE — 94729 DIFFUSING CAPACITY: CPT | Mod: 26 | Performed by: INTERNAL MEDICINE

## 2018-04-11 PROCEDURE — 94010 BREATHING CAPACITY TEST: CPT | Mod: 26 | Performed by: INTERNAL MEDICINE

## 2018-04-11 PROCEDURE — 94729 DIFFUSING CAPACITY: CPT

## 2018-04-11 PROCEDURE — 94375 RESPIRATORY FLOW VOLUME LOOP: CPT

## 2018-04-11 PROCEDURE — 94726 PLETHYSMOGRAPHY LUNG VOLUMES: CPT | Mod: 26 | Performed by: INTERNAL MEDICINE

## 2018-04-11 PROCEDURE — 94726 PLETHYSMOGRAPHY LUNG VOLUMES: CPT

## 2018-04-17 LAB
DLCOCOR-%PRED-PRE: 73 %
DLCOCOR-PRE: 15.56 ML/MIN/MMHG
DLCOUNC-%PRED-PRE: 73 %
DLCOUNC-PRE: 15.66 ML/MIN/MMHG
DLCOUNC-PRED: 21.17 ML/MIN/MMHG
ERV-%PRED-PRE: 58 %
ERV-PRE: 0.44 L
ERV-PRED: 0.76 L
EXPTIME-PRE: 7.58 SEC
FEF2575-%PRED-PRE: 43 %
FEF2575-PRE: 0.82 L/SEC
FEF2575-PRED: 1.9 L/SEC
FEFMAX-%PRED-PRE: 78 %
FEFMAX-PRE: 4.6 L/SEC
FEFMAX-PRED: 5.85 L/SEC
FEV1-%PRED-PRE: 63 %
FEV1-PRE: 1.48 L
FEV1FEV6-PRE: 67 %
FEV1FEV6-PRED: 79 %
FEV1FVC-PRE: 67 %
FEV1FVC-PRED: 76 %
FEV1SVC-PRE: 60 %
FEV1SVC-PRED: 70 %
FIFMAX-PRE: 3.83 L/SEC
FRCPLETH-%PRED-PRE: 159 %
FRCPLETH-PRE: 4.53 L
FRCPLETH-PRED: 2.83 L
FVC-%PRED-PRE: 73 %
FVC-PRE: 2.2 L
FVC-PRED: 3.01 L
IC-%PRED-PRE: 80 %
IC-PRE: 2.04 L
IC-PRED: 2.53 L
RVPLETH-%PRED-PRE: 187 %
RVPLETH-PRE: 4.08 L
RVPLETH-PRED: 2.18 L
TLCPLETH-%PRED-PRE: 124 %
TLCPLETH-PRE: 6.56 L
TLCPLETH-PRED: 5.27 L
VA-%PRED-PRE: 80 %
VA-PRE: 4.36 L
VC-%PRED-PRE: 75 %
VC-PRE: 2.48 L
VC-PRED: 3.29 L

## 2018-05-01 ENCOUNTER — OFFICE VISIT (OUTPATIENT)
Dept: FAMILY MEDICINE | Facility: CLINIC | Age: 73
End: 2018-05-01
Payer: COMMERCIAL

## 2018-05-01 VITALS
SYSTOLIC BLOOD PRESSURE: 160 MMHG | OXYGEN SATURATION: 97 % | WEIGHT: 153.4 LBS | BODY MASS INDEX: 24.65 KG/M2 | HEIGHT: 66 IN | HEART RATE: 84 BPM | DIASTOLIC BLOOD PRESSURE: 74 MMHG | TEMPERATURE: 97.5 F

## 2018-05-01 DIAGNOSIS — Z79.01 ANTICOAGULATION MONITORING, INR RANGE 2-3: ICD-10-CM

## 2018-05-01 DIAGNOSIS — E04.1 THYROID NODULE: ICD-10-CM

## 2018-05-01 DIAGNOSIS — J44.9 CHRONIC OBSTRUCTIVE PULMONARY DISEASE, UNSPECIFIED COPD TYPE (H): ICD-10-CM

## 2018-05-01 DIAGNOSIS — R09.89 CAROTID BRUIT, UNSPECIFIED LATERALITY: ICD-10-CM

## 2018-05-01 DIAGNOSIS — K21.00 GASTROESOPHAGEAL REFLUX DISEASE WITH ESOPHAGITIS: ICD-10-CM

## 2018-05-01 DIAGNOSIS — Z86.73 HISTORY OF CVA (CEREBROVASCULAR ACCIDENT): ICD-10-CM

## 2018-05-01 DIAGNOSIS — M62.830 BACK MUSCLE SPASM: Primary | ICD-10-CM

## 2018-05-01 DIAGNOSIS — Z79.01 LONG TERM CURRENT USE OF ANTICOAGULANT THERAPY: ICD-10-CM

## 2018-05-01 DIAGNOSIS — I67.9 CEREBROVASCULAR DISEASE: ICD-10-CM

## 2018-05-01 DIAGNOSIS — F33.1 MAJOR DEPRESSIVE DISORDER, RECURRENT EPISODE, MODERATE (H): ICD-10-CM

## 2018-05-01 DIAGNOSIS — M16.9 OSTEOARTHROSIS, HIP: ICD-10-CM

## 2018-05-01 PROCEDURE — 99214 OFFICE O/P EST MOD 30 MIN: CPT | Performed by: NURSE PRACTITIONER

## 2018-05-01 RX ORDER — METOPROLOL SUCCINATE 50 MG/1
50 TABLET, EXTENDED RELEASE ORAL DAILY
Qty: 90 TABLET | Refills: 1 | COMMUNITY
Start: 2018-05-01 | End: 2018-10-08

## 2018-05-01 RX ORDER — HYDROCODONE BITARTRATE AND ACETAMINOPHEN 5; 325 MG/1; MG/1
1 TABLET ORAL EVERY 6 HOURS PRN
Qty: 12 TABLET | Refills: 0 | Status: SHIPPED | OUTPATIENT
Start: 2018-05-01 | End: 2018-11-20

## 2018-05-01 RX ORDER — CYCLOBENZAPRINE HCL 10 MG
5-10 TABLET ORAL 3 TIMES DAILY PRN
Qty: 30 TABLET | Refills: 1 | Status: SHIPPED | OUTPATIENT
Start: 2018-05-01 | End: 2018-09-11

## 2018-05-01 NOTE — MR AVS SNAPSHOT
After Visit Summary   5/1/2018    Bebe Alfonso    MRN: [id number]           Patient Information     Date Of Birth          1945        Visit Information        Provider Department      5/1/2018 8:40 AM Diana Ely APRN [facility]        Today's Diagnoses     Back muscle spasm    -  1      Care Instructions      RICE     Rest an injury, elevate it, and use ice and compression as directed.   RICE stands for rest, ice, compression, and elevation. These can limit pain and swelling after an injury. RICE may be recommended to help treat fractures, sprains, strains, and bruises or bumps.   Home care  The following explain the details of RICE:    Rest. Limit the use of the injured body part. This helps prevent further damage to the body part and gives it time to heal. In some cases, you may need a sling, brace, splint, or cast to help keep the body part still until it has healed.    Ice. Applying ice right after an injury helps relieve pain and swelling. Wrap a bag of ice in a thin towel. Then, place it over the injured area. Do this for 10 to 15 minutes every 3 to 4 hours. Continue for the next 1 to 3 days or until your symptoms improve. Never put ice directly on your skin or ice an area longer than 15 minutes at a time.    Compression. Putting pressure on an injury helps reduce swelling and provides support. Wrap the injured area firmly with an elastic bandage/wrap. Make sure not to wrap the bandage too tightly or you will cut off blood flow to the injured area. If your bandage loosens, rewrap it.    Elevation. Keeping an injury raised above the level of your heart reduces swelling, pain, and throbbing. For instance, if you have a broken leg, it may help to rest your leg on several pillows when sitting or lying down. Try to keep the injured area elevated for at least 2 to 3 hours per day.  Follow-up care  Follow up with your healthcare provider, or as advised.  When  to seek medical advice  Call your healthcare provider right away if any of these occur:    Fever of 100.4 F (38 C) or higher, or as directed by your healthcare provider    Increased pain or swelling in the injured body part    Injured body part becomes cold, blue, numb, or tingly    Signs of infection. These include warmth in the skin, redness, drainage, or bad smell coming from the injured body part.  Date Last Reviewed: 1/18/2016 2000-2017 [employer]. [address] [place] [address]. All rights reserved. This information is not intended as a substitute for professional medical care. Always follow your healthcare professional's instructions.        Back Spasm (No Trauma)    Spasm of the back muscles can occur after a sudden forceful twisting or bending force (such as in a car accident), after a simple awkward movement, or after lifting something heavy with poor body positioning. In any case, muscle spasm adds to the pain. Sleeping in an awkward position or on a poor quality mattress can also cause this. Some people respond to emotional stress by tensing the muscles of their back.  Pain that continues may need further evaluation or other types of treatment such as physical therapy.  You don't always need X-rays for the initial evaluation of back pain, unless you had a physical injury such as from a car accident or fall. If your pain continues and doesn't respond to medical treatment, X-rays and other tests may then be done.   Home care    As soon as possible, start sitting or walking again to avoid problems from prolonged bed rest (muscle weakness, worsening back stiffness and pain, blood clots in the legs).    When in bed, try to find a position of comfort. A firm mattress is best. Try lying flat on your back with pillows under your knees. You can also try lying on your side with your knees bent up toward your chest and a pillow between your knees.    Avoid prolonged sitting, long car  rides, or travel. This puts more stress on the lower back than standing or walking.     During the first 24 to 72 hours after an injury or flare-up, apply an ice pack to the painful area for 20 minutes, then remove it for 20 minutes. Do this over a period of 60 to 90 minutes or several times a day. This will reduce swelling and pain. Always wrap ice packs in a thin towel.    You can start with ice, then switch to heat. Heat (hot shower, hot bath, or heating pad) reduces pain, and works well for muscle spasms. Apply heat to the painful area for 20 minutes, then remove it for 20 minutes. Do this over a period of 60 to 90 minutes or several times a day. Do not sleep on a heating pad as it can burn or damage skin.    Alternate ice and heat therapies.    Be aware of safe lifting methods and do not lift anything over 15 pounds until all the pain is gone.  Gentle stretching will help your back heal faster. Do this simple routine 2 to 3 times a day until your back is feeling better.    Lie on your back with your knees bent and both feet on the ground    Slowly raise your left knee to your chest as you flatten your lower back against the floor. Hold for 20 to 30 seconds.    Relax and repeat the exercise with your right knee.    Do 2 to 3 of these exercises for each leg.    Repeat, hugging both knees to your chest at the same time.    Do not bounce, but use a gentle pull.  Medicines  Talk to your doctor before using medicine, especially if you have other medical problems or are taking other medicines.  You may use acetaminophen or ibuprofen to control pain, unless your healthcare provider prescribed another pain medicine. If you have a chronic condition such as diabetes, liver or kidney disease, stomach ulcer, or gastrointestinal bleeding, or are taking blood thinners, talk with your healthcare provider before taking any medicines.  Be careful if you are given prescription pain medicine, narcotics, or medicine for muscle  spasm. They can cause drowsiness, affect your coordination, reflexes, or judgment. Do not drive or operate heavy machinery when taking these medicines. Take pain medicine only as prescribed by your healthcare provider.  Follow-up care  Follow up with your doctor, or as advised. Physical therapy or further tests may be needed.  If X-rays were taken, they may be reviewed by a radiologist. You will be notified of any new findings that may affect your care.  Call 911  Call 911 if any of these occur:    Trouble breathing    Confusion    Drowsiness or trouble awakening    Fainting or loss of consciousness    Rapid or very slow heart rate    Loss of bowel or bladder control  When to seek medical advice  Call your healthcare provider right away if any of these occur:    Pain becomes worse or spreads to your legs    Weakness or numbness in one or both legs    Numbness in the groin or genital area    Fever of 100.4 F (38 C) or higher, or as directed by your healthcare provider    Burning or pain when passing urine  Date Last Reviewed: 6/1/2016 2000-2017 [employer]. [address] [place] [address]. All rights reserved. This information is not intended as a substitute for professional medical care. Always follow your healthcare professional's instructions.                Follow-ups after your visit        Your next 10 appointments already scheduled     May 03, 2018 10:15 AM CDT   Anticoagulation Visit with NB ANTI COAG   [facility]    [address] [place] [id number] [phone]              Who to contact     If you have questions or need follow up information about today's clinic visit or your schedule please contact [facility] directly at [phone].  Normal or non-critical lab and imaging results will be communicated to you by MyChart, letter or phone within 4 business days after the clinic has received the  "results. If you do not hear from us within 7 days, please contact the clinic through [facility] or phone. If you have a critical or abnormal lab result, we will notify you by phone as soon as possible.  Submit refill requests through [facility] or call your pharmacy and they will forward the refill request to us. Please allow 3 business days for your refill to be completed.          Additional Information About Your Visit        [facility] Information     [facility] gives you secure access to your electronic health record. If you see a primary care provider, you can also send messages to your care team and make appointments. If you have questions, please call your primary care clinic.  If you do not have a primary care provider, please call [phone] and they will assist you.        Care EveryWhere ID     This is your Care EveryWhere ID. This could be used by other organizations to access your [place] medical records  [id number]        Your Vitals Were     Pulse Temperature Height Pulse Oximetry BMI (Body Mass Index)       84 97.5  F (36.4  C) (Tympanic) 5' 5.5\" (1.664 m) 97% 25.14 kg/m2        Blood Pressure from Last 3 Encounters:   05/01/18 160/74   03/16/18 160/78    Weight from Last 3 Encounters:   05/01/18 153 lb 6.4 oz (69.6 kg)   03/16/18 155 lb (70.3 kg)              Today, you had the following     No orders found for display         Today's Medication Changes          These changes are accurate as of 5/1/18  9:41 AM.  If you have any questions, ask your nurse or doctor.               Start taking these medicines.        Dose/Directions    cyclobenzaprine 10 MG tablet   Commonly known as:  FLEXERIL   Used for:  Back muscle spasm   Started by:  Diana Ely APRN CNP        Dose:  5-10 mg   Take 0.5-1 tablets (5-10 mg) by mouth 3 times daily as needed for muscle spasms   Quantity:  30 tablet   Refills:  1       HYDROcodone-acetaminophen 5-325 MG per tablet   Commonly known as:  NORCO   Used for:  Back " muscle spasm   Started by:  Diana Ely APRN CNP        Dose:  1 tablet   Take 1 tablet by mouth every 6 hours as needed for severe pain maximum 6 tablet(s) per day   Quantity:  12 tablet   Refills:  0         These medicines have changed or have updated prescriptions.        Dose/Directions    metoprolol succinate 50 MG 24 hr tablet   Commonly known as:  TOPROL-XL   This may have changed:  Another medication with the same name was removed. Continue taking this medication, and follow the directions you see here.   Changed by:  Diana Ely APRN CNP        Dose:  50 mg   Take 1 tablet (50 mg) by mouth daily   Quantity:  90 tablet   Refills:  1            Where to get your medicines      These medications were sent to [facility] [address] [place] [address] [address] [facility]  [address] [facility] [address]     Phone:  [phone]     cyclobenzaprine 10 MG tablet         Some of these will need a paper prescription and others can be bought over the counter.  Ask your nurse if you have questions.     Bring a paper prescription for each of these medications     HYDROcodone-acetaminophen 5-325 MG per tablet               Information about OPIOIDS     PRESCRIPTION OPIOIDS: WHAT YOU NEED TO KNOW   You have a prescription for an opioid (narcotic) pain medicine. Opioids can cause addiction. If you have a history of chemical dependency of any type, you are at a higher risk of becoming addicted to opioids. Only take this medicine after all other options have been tried. Take it for as short a time and as few doses as possible.     Do not:    Drive. If you drive while taking these medicines, you could be arrested for driving under the influence (DUI).    Operate heavy machinery    Do any other dangerous activities while taking these medicines.     Drink any alcohol while taking these medicines.      Take with any other medicines that contain acetaminophen. Read all labels carefully. Look  for the word  acetaminophen  or  Tylenol.  Ask your pharmacist if you have questions or are unsure.    Store your pills in a secure place, locked if possible. We will not replace any lost or stolen medicine. If you don t finish your medicine, please throw away (dispose) as directed by your pharmacist. The [place] Pollution [employer] has more information about safe disposal: https://www.pca.[facility].mn.us/living-green/managing-unwanted-medications    All opioids tend to cause constipation. Drink plenty of water and eat foods that have a lot of fiber, such as fruits, vegetables, prune juice, apple juice and high-fiber cereal. Take a laxative (Miralax, milk of magnesia, Colace, Senna) if you don t move your bowels at least every other day.          Primary Care Provider Office Phone # Fax #    Diana FloresSELWYN Galindo [facility] [phone]       [address] [place] [address]        Equal Access to Services     POLINA CUEVAS : Hadii jamari clifton hadasho Soomaali, waaxda luqadaha, qaybta kaalmada adeegyada, criss moya . So [facility] [phone].    ATENCIÓN: Si habla español, tiene a ruiz disposición servicios gratuitos de asistencia lingüística. Llame al [phone].    We comply with applicable federal civil rights laws and [place] laws. We do not discriminate on the basis of race, color, national origin, age, disability, sex, sexual orientation, or gender identity.            Thank you!     Thank you for choosing [facility]  for your care. Our goal is always to provide you with excellent care. Hearing back from our patients is one way we can continue to improve our services. Please take a few minutes to complete the written survey that you may receive in the mail after your visit with us. Thank you!             Your Updated Medication List - Protect others around you: Learn how to safely use, store and throw away your medicines at www.disposemymeds.org.           This list is accurate as of 5/1/18  9:41 AM.  Always use your most recent med list.                   Brand Name Dispense Instructions for use Diagnosis    atorvastatin 40 MG tablet    LIPITOR     Take 40 mg by mouth        cyanocobalamin 1000 MCG tablet    vitamin  B-12     Take 1,000 mcg by mouth        cyclobenzaprine 10 MG tablet    FLEXERIL    30 tablet    Take 0.5-1 tablets (5-10 mg) by mouth 3 times daily as needed for muscle spasms    Back muscle spasm       hydrochlorothiazide 25 MG tablet    HYDRODIURIL     Take 12.5 mg by mouth        HYDROcodone-acetaminophen 5-325 MG per tablet    NORCO    12 tablet    Take 1 tablet by mouth every 6 hours as needed for severe pain maximum 6 tablet(s) per day    Back muscle spasm       MAGNESIUM PO      Take 250 mg by mouth        metoprolol succinate 50 MG 24 hr tablet    TOPROL-XL    90 tablet    Take 1 tablet (50 mg) by mouth daily        MULTI-VITAMINS Tabs      Take 1 tablet by mouth        thiamine 100 MG tablet      Take 100 mg by mouth        warfarin 5 MG tablet    COUMADIN     Take by mouth 5 mg (5 mg x 1) on Sun, Thu; 7.5 mg (5 mg x 1.5) all other days or as directed by anticoagulation clinic RN

## 2018-05-01 NOTE — PATIENT INSTRUCTIONS
RICE     Rest an injury, elevate it, and use ice and compression as directed.   RICE stands for rest, ice, compression, and elevation. These can limit pain and swelling after an injury. RICE may be recommended to help treat fractures, sprains, strains, and bruises or bumps.   Home care  The following explain the details of RICE:    Rest. Limit the use of the injured body part. This helps prevent further damage to the body part and gives it time to heal. In some cases, you may need a sling, brace, splint, or cast to help keep the body part still until it has healed.    Ice. Applying ice right after an injury helps relieve pain and swelling. Wrap a bag of ice in a thin towel. Then, place it over the injured area. Do this for 10 to 15 minutes every 3 to 4 hours. Continue for the next 1 to 3 days or until your symptoms improve. Never put ice directly on your skin or ice an area longer than 15 minutes at a time.    Compression. Putting pressure on an injury helps reduce swelling and provides support. Wrap the injured area firmly with an elastic bandage/wrap. Make sure not to wrap the bandage too tightly or you will cut off blood flow to the injured area. If your bandage loosens, rewrap it.    Elevation. Keeping an injury raised above the level of your heart reduces swelling, pain, and throbbing. For instance, if you have a broken leg, it may help to rest your leg on several pillows when sitting or lying down. Try to keep the injured area elevated for at least 2 to 3 hours per day.  Follow-up care  Follow up with your healthcare provider, or as advised.  When to seek medical advice  Call your healthcare provider right away if any of these occur:    Fever of 100.4 F (38 C) or higher, or as directed by your healthcare provider    Increased pain or swelling in the injured body part    Injured body part becomes cold, blue, numb, or tingly    Signs of infection. These include warmth in the skin, redness, drainage, or bad  smell coming from the injured body part.  Date Last Reviewed: 1/18/2016 2000-2017 [employer]. [address] [facility], [place] [address]. All rights reserved. This information is not intended as a substitute for professional medical care. Always follow your healthcare professional's instructions.        Back Spasm (No Trauma)    Spasm of the back muscles can occur after a sudden forceful twisting or bending force (such as in a car accident), after a simple awkward movement, or after lifting something heavy with poor body positioning. In any case, muscle spasm adds to the pain. Sleeping in an awkward position or on a poor quality mattress can also cause this. Some people respond to emotional stress by tensing the muscles of their back.  Pain that continues may need further evaluation or other types of treatment such as physical therapy.  You don't always need X-rays for the initial evaluation of back pain, unless you had a physical injury such as from a car accident or fall. If your pain continues and doesn't respond to medical treatment, X-rays and other tests may then be done.   Home care    As soon as possible, start sitting or walking again to avoid problems from prolonged bed rest (muscle weakness, worsening back stiffness and pain, blood clots in the legs).    When in bed, try to find a position of comfort. A firm mattress is best. Try lying flat on your back with pillows under your knees. You can also try lying on your side with your knees bent up toward your chest and a pillow between your knees.    Avoid prolonged sitting, long car rides, or travel. This puts more stress on the lower back than standing or walking.     During the first 24 to 72 hours after an injury or flare-up, apply an ice pack to the painful area for 20 minutes, then remove it for 20 minutes. Do this over a period of 60 to 90 minutes or several times a day. This will reduce swelling and pain. Always wrap ice packs in a  thin towel.    You can start with ice, then switch to heat. Heat (hot shower, hot bath, or heating pad) reduces pain, and works well for muscle spasms. Apply heat to the painful area for 20 minutes, then remove it for 20 minutes. Do this over a period of 60 to 90 minutes or several times a day. Do not sleep on a heating pad as it can burn or damage skin.    Alternate ice and heat therapies.    Be aware of safe lifting methods and do not lift anything over 15 pounds until all the pain is gone.  Gentle stretching will help your back heal faster. Do this simple routine 2 to 3 times a day until your back is feeling better.    Lie on your back with your knees bent and both feet on the ground    Slowly raise your left knee to your chest as you flatten your lower back against the floor. Hold for 20 to 30 seconds.    Relax and repeat the exercise with your right knee.    Do 2 to 3 of these exercises for each leg.    Repeat, hugging both knees to your chest at the same time.    Do not bounce, but use a gentle pull.  Medicines  Talk to your doctor before using medicine, especially if you have other medical problems or are taking other medicines.  You may use acetaminophen or ibuprofen to control pain, unless your healthcare provider prescribed another pain medicine. If you have a chronic condition such as diabetes, liver or kidney disease, stomach ulcer, or gastrointestinal bleeding, or are taking blood thinners, talk with your healthcare provider before taking any medicines.  Be careful if you are given prescription pain medicine, narcotics, or medicine for muscle spasm. They can cause drowsiness, affect your coordination, reflexes, or judgment. Do not drive or operate heavy machinery when taking these medicines. Take pain medicine only as prescribed by your healthcare provider.  Follow-up care  Follow up with your doctor, or as advised. Physical therapy or further tests may be needed.  If X-rays were taken, they may be  reviewed by a radiologist. You will be notified of any new findings that may affect your care.  Call 911  Call 911 if any of these occur:    Trouble breathing    Confusion    Drowsiness or trouble awakening    Fainting or loss of consciousness    Rapid or very slow heart rate    Loss of bowel or bladder control  When to seek medical advice  Call your healthcare provider right away if any of these occur:    Pain becomes worse or spreads to your legs    Weakness or numbness in one or both legs    Numbness in the groin or genital area    Fever of 100.4 F (38 C) or higher, or as directed by your healthcare provider    Burning or pain when passing urine  Date Last Reviewed: 6/1/2016 2000-2017 [employer]. [address] [place] [address], [place] [address]. All rights reserved. This information is not intended as a substitute for professional medical care. Always follow your healthcare professional's instructions.

## 2018-05-01 NOTE — NURSING NOTE
"Chief Complaint   Patient presents with     Hypertension     Pt. wants to discuss changing the dose of Toprol.       Initial There were no vitals taken for this visit. Estimated body mass index is 25.4 kg/(m^2) as calculated from the following:    Height as of 3/16/18: 5' 5.5\" (1.664 m).    Weight as of 3/16/18: 155 lb (70.3 kg).  Medication Reconciliation: complete   Natalie Gonzalez CMA      "

## 2018-05-01 NOTE — PROGRESS NOTES
SUBJECTIVE:   Bebe Alfonso is a 72 year old female who presents to clinic today for the following health issues:    Hypertension Follow-up      Outpatient blood pressures are being checked at home.  Results are 160's.    Low Salt Diet: low salt      Amount of exercise or physical activity: None    Problems taking medications regularly: No    Medication side effects: lightheadedness and tiredness and pain on top of head    Diet: low salt    BP Readings from Last 3 Encounters:   05/01/18 160/74   03/16/18 160/78     Patients speech seems a little slurred.  She was having problems finding the right words, but did say she is extremely tired.  Worse when tired or excited.   Did not tolerate the metoprolol xl 100 mg - very tired with this.   Doing YOGA felt like she pulled muscles in her neck and right shoulder.     Past Medical History:   Diagnosis Date     Anticoagulation monitoring, INR range 2-3 10/22/2015     Benign essential hypertension 9/15/2016     Carotid bruit 11/12/2012    Overview:  12/6/10 Carotid US  Elevated velocities throughout the carotid arteries bilaterally. There is a focal area of increased velocity in the mid left internal carotid artery with a plaque in this region on the color flow images. This corresponds to 50 - 70 % diameter reduction. There is an area of elevated velocity in the left external carotid artery consistent with stenosis. There is no focal area of significant stenosis in the right carotid arteries in the neck. Plaque in the carotid arteries bilaterally.      Chronic obstructive pulmonary disease (H) 1/19/2016    The FVC, FEV1 and FEV1/FVC ratio are reduced.  The inspiratory flow rates are within normal limits.  The FVC is reduced relative to the SVC indicating air trapping.  While the TLC is within normal limits, the FRC, RV and RV/TLC ratio are increased.  The  diffusing capacity is mildly reduced. IMPRESSION: Mild-moderate Airflow Obstruction with air trapping  "____________________________________________M.DClarke Orellana  April 2018     Esophageal reflux 4/8/2008    Overview:  Omeprazole PRN, occasional symptoms such as chest burning and knife twisted to stomach.      History of CVA (cerebrovascular accident) 10/20/2015     Long term current use of anticoagulant therapy 3/20/2018     Major depressive disorder, recurrent episode, moderate (H) 10/29/2008    Overview:  She is not taking any medication at this time.     Osteoarthrosis, hip 10/19/2010    Overview:  Pt scheduled for right  total hip arthroplasty on 6/14/13 with Dr. Addison HEREDIA hip xray (11/2012) Severe degenerative changes seen of the right hip with near bone-on-bone appearance of the joint and several subchondral cysts forming.     Thyroid nodule 5/22/2013    Overview:  Thyroid US (2012) Stable nodule left lobe of thyroid TSH- (2/19/12) normal (2.26)         Problem list and histories reviewed & adjusted, as indicated.  Additional history: as documented    BP Readings from Last 3 Encounters:   05/01/18 160/74   03/16/18 160/78    Wt Readings from Last 3 Encounters:   05/01/18 153 lb 6.4 oz (69.6 kg)   03/16/18 155 lb (70.3 kg)                  Labs reviewed in EPIC    Reviewed and updated as needed this visit by clinical staff  Tobacco  Allergies  Meds  Med Hx  Surg Hx  Fam Hx  Soc Hx      Reviewed and updated as needed this visit by Provider         ROS:   ROS: 10 point ROS neg other than the symptoms noted above in the HPI.      OBJECTIVE:                                                    /74  Pulse 84  Temp 97.5  F (36.4  C) (Tympanic)  Ht 5' 5.5\" (1.664 m)  Wt 153 lb 6.4 oz (69.6 kg)  SpO2 97%  BMI 25.14 kg/m2  Body mass index is 25.14 kg/(m^2).   GENERAL: healthy, alert, well nourished, well hydrated, no distress  HENT: ear canals- normal; TMs- normal; Nose- normal; Mouth- no ulcers, no lesions  NECK: no tenderness, no adenopathy, no asymmetry, no masses, no stiffness; thyroid- normal to " palpation  RESP: lungs clear to auscultation - no rales, no rhonchi, no wheezes  CV: regular rates and rhythm, normal S1 S2, no S3 or S4 and no murmur, no click or rub -  ABDOMEN: soft, no tenderness, no  hepatosplenomegaly, no masses, normal bowel sounds  MS: extremities- no gross deformities noted, no edema   Pain with flexion extension of the cervical spine pain with palpation over the right trapezius    Diagnostic test results:  none      ASSESSMENT/PLAN:                                                    1. Back muscle spasm  Symptomatic care strategies are reviewed  - cyclobenzaprine (FLEXERIL) 10 MG tablet; Take 0.5-1 tablets (5-10 mg) by mouth 3 times daily as needed for muscle spasms  Dispense: 30 tablet; Refill: 1  - HYDROcodone-acetaminophen (NORCO) 5-325 MG per tablet; Take 1 tablet by mouth every 6 hours as needed for severe pain maximum 6 tablet(s) per day  Dispense: 12 tablet; Refill: 0  Will not plan to refill the opiate    2. Cerebrovascular disease  Continue warfarin.  Continue blood pressure control.  Continue lipid-lowering agent    3. Chronic obstructive pulmonary disease, unspecified COPD type (H)  Pulmonary function tests recommended    4. Gastroesophageal reflux disease with esophagitis  Trial omeprazole 20 mg daily over-the-counter.  2 extra strength Tums up to 4 times daily as needed.    5. Thyroid nodule  Thyroid ultrasound    6. Carotid bruit, unspecified laterality  Yearly carotid ultrasound    7. Anticoagulation monitoring, INR range 2-3  Follow-up with anticoagulation clinic    8. History of CVA (cerebrovascular accident)  Continue warfarin    9. Long term current use of anticoagulant therapy  Continue warfarin    10. Major depressive disorder, recurrent episode, moderate (H)  Stable declines suicidal or homicidal ideation    11. Osteoarthrosis, hip  Consider physical therapy.  Extra strength Tylenol up to 3500 mg total for the day that includes the 325 mg in the current Norco  prescription      Follow up with Provider - Call or return to the clinic with any worsening of symptoms or no resolution. Patient/Parent verbalized understanding and is in agreement. Medication side effects reviewed.   Current Outpatient Prescriptions   Medication Sig Dispense Refill     atorvastatin (LIPITOR) 40 MG tablet Take 40 mg by mouth       cyanocobalamin (VITAMIN  B-12) 1000 MCG tablet Take 1,000 mcg by mouth       cyclobenzaprine (FLEXERIL) 10 MG tablet Take 0.5-1 tablets (5-10 mg) by mouth 3 times daily as needed for muscle spasms 30 tablet 1     hydrochlorothiazide (HYDRODIURIL) 25 MG tablet Take 12.5 mg by mouth       HYDROcodone-acetaminophen (NORCO) 5-325 MG per tablet Take 1 tablet by mouth every 6 hours as needed for severe pain maximum 6 tablet(s) per day 12 tablet 0     MAGNESIUM PO Take 250 mg by mouth       metoprolol succinate (TOPROL-XL) 50 MG 24 hr tablet Take 1 tablet (50 mg) by mouth daily 90 tablet 1     Multiple Vitamin (MULTI-VITAMINS) TABS Take 1 tablet by mouth       thiamine 100 MG tablet Take 100 mg by mouth       warfarin (COUMADIN) 5 MG tablet Take 5 mg on Sun, Thu; 7.5 mg all other days or as directed by Anticoagulation Clinic 110 tablet 0        See Patient Instructions    [facility] declined.   Switch the dosing to bedtime   Diuretic in the morning and metoprolol XL at bedtime due to fatigue

## 2018-05-03 ENCOUNTER — ANTICOAGULATION THERAPY VISIT (OUTPATIENT)
Dept: ANTICOAGULATION | Facility: CLINIC | Age: 73
End: 2018-05-03
Payer: COMMERCIAL

## 2018-05-03 DIAGNOSIS — Z86.73 HISTORY OF CVA (CEREBROVASCULAR ACCIDENT): ICD-10-CM

## 2018-05-03 DIAGNOSIS — Z79.01 LONG TERM CURRENT USE OF ANTICOAGULANT THERAPY: ICD-10-CM

## 2018-05-03 LAB — INR POINT OF CARE: 2.3 (ref 0.86–1.14)

## 2018-05-03 PROCEDURE — 36416 COLLJ CAPILLARY BLOOD SPEC: CPT

## 2018-05-03 PROCEDURE — 85610 PROTHROMBIN TIME: CPT | Mod: QW

## 2018-05-03 NOTE — MR AVS SNAPSHOT
Bebe Kaden   5/3/2018 10:15 AM   Anticoagulation Therapy Visit    Description:  72 year old female   Provider:  [facility] ANTI COAGRACE   Department:  [facility]           INR as of 5/3/2018     Today's INR 2.3      Anticoagulation Summary as of 5/3/2018     INR goal 2.0-3.0   Today's INR 2.3   Full instructions 5 mg on Mon, Thu; 7.5 mg all other days   Next INR check 5/31/2018    Indications   History of CVA (cerebrovascular accident) [Z86.73]  Long term current use of anticoagulant therapy [Z79.01]         Your next Anticoagulation Clinic appointment(s)     May 31, 2018  9:45 AM CDT   Anticoagulation Visit with [facility] AN[facility] COAG   [facility] ([facility])    [address] [place] [id number] [phone]              Contact Numbers     Please call [phone] with any problems or questions regarding your therapy.    If you need to cancel and/or reschedule your appointment please call one of the following numbers:  [facility] [phone]  [facility] [phone]  [facility] [phone]  [facility] [phone]  [place] - [phone]            May 2018 Details    Sun Mon Tue Wed Thu Fri Sat       1               2               3      5 mg   See details      4      7.5 mg         5      7.5 mg           6      7.5 mg         7      5 mg         8      7.5 mg         9      7.5 mg         10      5 mg         11      7.5 mg         12      7.5 mg           13      7.5 mg         14      5 mg         15      7.5 mg         16      7.5 mg         17      5 mg         18      7.5 mg         19      7.5 mg           20      7.5 mg         21      5 mg         22      7.5 mg         23      7.5 mg         24      5 mg         25      7.5 mg         26      7.5 mg           27      7.5 mg         28      5 mg         29      7.5 mg         30      7.5 mg         31               Date Details   05/03 This INR check       Date of next INR:  5/31/2018         How to  take your warfarin dose     To take:  5 mg Take 1 of the 5 mg tablets.    To take:  7.5 mg Take 1.5 of the 5 mg tablets.

## 2018-05-03 NOTE — PROGRESS NOTES
Addendum: Writer faxed completed [facility] physician form and face sheet to [facility] with confirmation received today.    Natalie Sorto RN, BSN, PHN  5-  ANTICOAGULATION INITIAL CLINIC VISIT    Patient Name:  Bebe Alfonso  Date:  5/3/2018  Referred by: PCP  Contact Type:  Face to Face    SUBJECTIVE:  Coumadin education was completed today.  Topics covered include:  -Introduction to coumadin  -Proper Administration  -INR Testing (patient would like home meter)  -Sign/Symptoms of Bleeding  -Signs/Symptoms of Clot Formation or Stroke  -Dietary Intake of Vitamin K  -Drug Interactions  -Anticoagulation Identification (bracelet, necklace or wallet card)  -Future Surgery  -Effects of Alcohol, Tobacco, and Exercise on Coumadin    Coumadin Education Booklet and Coumadin Identification Wallet Card were given to the patient.       Patient Findings     Positives Change in medications (taking norco for pulled muscle)    Comments Patient would like to sign up for a home meter. She spends frias in [place]. Writer will send PCP [facility] home meter form to sign and return. Pt has been on warfarin for one year. Reviewed all new patient education material today per patient request. No changes in diet, activity level, or health. No missed doses of warfarin. Patient took dosing as prescribed. No signs of clots or bleeding concerns. Patient will continue maintenance warfarin dosing.            OBJECTIVE    INR Protime   Date Value Ref Range Status   05/03/2018 2.3 (A) 0.86 - 1.14 Final       ASSESSMENT / PLAN  INR assessment THER    Recheck INR In: 4 WEEKS    INR Location Clinic      Anticoagulation Summary as of 5/3/2018     INR goal 2.0-3.0   Today's INR 2.3   Maintenance plan 5 mg (5 mg x 1) on Mon, Thu; 7.5 mg (5 mg x 1.5) all other days   Full instructions 5 mg on Mon, Thu; 7.5 mg all other days   Weekly total 47.5 mg   No change documented Natalie Sorto, RN   Plan last modified Natalie Sorto RN (4/5/2018)   Next INR check  5/31/2018   Target end date Indefinite    Indications   History of CVA (cerebrovascular accident) [Z86.73]  Long term current use of anticoagulant therapy [Z79.01]         Anticoagulation Episode Summary     INR check location     Preferred lab     Send INR reminders to [facility] CLINIC POOL    Comments * This is an [facility] transfer. Not new to warfarin. SELWYN Gibson will be primary now. Pt states she has had 4 strokes (some were following hip surgeries).      Anticoagulation Care Providers     Provider Role Specialty Phone number    Diana Ely APRN [facility] [phone]            See the Encounter Report to view Anticoagulation Flowsheet and Dosing Calendar (Go to Encounters tab in chart review, and find the Anticoagulation Therapy Visit)    Dosage adjustment made based on physician directed care plan.    Natalie Sorto RN

## 2018-05-07 ENCOUNTER — TELEPHONE (OUTPATIENT)
Dept: FAMILY MEDICINE | Facility: CLINIC | Age: 73
End: 2018-05-07

## 2018-05-07 NOTE — TELEPHONE ENCOUNTER
Reason for Call:  Form, our goal is to have forms completed with 72 hours, however, some forms may require a visit or additional information.    Type of letter, form or note: [facility] INR Monitoring Form     Who is the form from?: Home care    Where did the form come from: form was faxed in    What clinic location was the form placed at?: [place]    Where the form was placed: [facility]      Call taken on 5/7/2018 at 12:40 PM by Jessica Hoffmann

## 2018-05-16 DIAGNOSIS — I63.9 CEREBROVASCULAR ACCIDENT (H): Primary | ICD-10-CM

## 2018-05-16 RX ORDER — WARFARIN SODIUM 5 MG/1
TABLET ORAL
Qty: 110 TABLET | Refills: 0 | Status: SHIPPED | OUTPATIENT
Start: 2018-05-16 | End: 2018-05-17

## 2018-05-17 ENCOUNTER — ANTICOAGULATION THERAPY VISIT (OUTPATIENT)
Dept: ANTICOAGULATION | Facility: CLINIC | Age: 73
End: 2018-05-17
Payer: COMMERCIAL

## 2018-05-17 DIAGNOSIS — I63.9 CEREBROVASCULAR ACCIDENT (H): ICD-10-CM

## 2018-05-17 DIAGNOSIS — Z86.73 HISTORY OF CVA (CEREBROVASCULAR ACCIDENT): ICD-10-CM

## 2018-05-17 DIAGNOSIS — Z79.01 LONG TERM CURRENT USE OF ANTICOAGULANT THERAPY: ICD-10-CM

## 2018-05-17 LAB — INR PPP: 2

## 2018-05-17 PROCEDURE — 99207 ZZC NO CHARGE NURSE ONLY: CPT

## 2018-05-17 RX ORDER — WARFARIN SODIUM 5 MG/1
TABLET ORAL
Qty: 110 TABLET | Refills: 0 | COMMUNITY
Start: 2018-05-17 | End: 2018-08-30

## 2018-05-17 NOTE — PROGRESS NOTES
ANTICOAGULATION FOLLOW-UP CLINIC VISIT    Patient Name:  Bebe Alfonso  Date:  5/17/2018  Contact Type:  Fax from [facility] / telephone    SUBJECTIVE:     Patient Findings     Positives No Problem Findings    Comments Patient receive her home meter today, everything went well and she feels comfortable using it. She will check her INR every 2 weeks on Thursdays. Patient agreed to continue same warfarin dose if her INR is in range, no need to call. Patient prefers we call her home phone and it is okay to leave a voicemail.     Writer verified her warfarin dose, no missed doses. No other changes noted with medications, diet, or activity.            OBJECTIVE    INR   Date Value Ref Range Status   05/17/2018 2.0  Final       ASSESSMENT / PLAN  No question data found.  Anticoagulation Summary as of 5/17/2018     INR goal 2.0-3.0   Today's INR 2.0   Maintenance plan 5 mg (5 mg x 1) on Sun, Thu; 7.5 mg (5 mg x 1.5) all other days   Full instructions 5 mg on Sun, Thu; 7.5 mg all other days   Weekly total 47.5 mg   Plan last modified Polina Ryan RN (5/17/2018)   Next INR check 5/31/2018   Priority INR   Target end date Indefinite    Indications   History of CVA (cerebrovascular accident) [Z86.73]  Long term current use of anticoagulant therapy [Z79.01]         Anticoagulation Episode Summary     INR check location     Preferred lab     Send INR reminders to [place] PHONE HETAL POOL    Comments * Pt states she has had 4 strokes (some after hip surgeries) // [facility] home meter, okay to call only if out of range, okay to leave  [phone]      Anticoagulation Care Providers     Provider Role Specialty Phone number    Diana Ely APRN CNP Responsible Family Practice [phone]            See the Encounter Report to view Anticoagulation Flowsheet and Dosing Calendar (Go to Encounters tab in chart review, and find the Anticoagulation Therapy Visit)        Polina Ryan RN, CACP

## 2018-05-17 NOTE — MR AVS SNAPSHOT
Bebe Alfonso   5/17/2018   Anticoagulation Therapy Visit    Description:  72 year old female   Provider:  Polina Ryan RN   Department:  [facility]           INR as of 5/17/2018     Today's INR 2.0      Anticoagulation Summary as of 5/17/2018     INR goal 2.0-3.0   Today's INR 2.0   Full instructions 5 mg on Sun, Thu; 7.5 mg all other days   Next INR check 5/31/2018    Indications   History of CVA (cerebrovascular accident) [Z86.73]  Long term current use of anticoagulant therapy [Z79.01]         May 2018 Details    Sun Mon Tue Wed Thu Fri Sat       1               2               3               4               5                 6               7               8               9               10               11               12                 13               14               15               16               17      5 mg   See details      18      7.5 mg         19      7.5 mg           20      5 mg         21      7.5 mg         22      7.5 mg         23      7.5 mg         24      5 mg         25      7.5 mg         26      7.5 mg           27      5 mg         28      7.5 mg         29      7.5 mg         30      7.5 mg         31               Date Details   05/17 This INR check       Date of next INR:  5/31/2018         How to take your warfarin dose     To take:  5 mg Take 1 of the 5 mg tablets.    To take:  7.5 mg Take 1.5 of the 5 mg tablets.

## 2018-05-31 ENCOUNTER — ANTICOAGULATION THERAPY VISIT (OUTPATIENT)
Dept: ANTICOAGULATION | Facility: CLINIC | Age: 73
End: 2018-05-31

## 2018-05-31 DIAGNOSIS — Z86.73 HISTORY OF CVA (CEREBROVASCULAR ACCIDENT): ICD-10-CM

## 2018-05-31 DIAGNOSIS — Z79.01 LONG TERM CURRENT USE OF ANTICOAGULANT THERAPY: ICD-10-CM

## 2018-05-31 LAB — INR PPP: 1.7

## 2018-05-31 PROCEDURE — 99207 ZZC NO CHARGE NURSE ONLY: CPT

## 2018-05-31 NOTE — PROGRESS NOTES
"  ANTICOAGULATION FOLLOW-UP CLINIC VISIT    Patient Name:  Bebe Alfonso  Date:  5/31/2018  Contact Type:  Fax from [facility] / telephone    SUBJECTIVE:     Patient Findings     Positives Change in diet/appetite (Had more greens (vitamin K rich foods) in the past few days. She ate \"a good amount of parsley\" yesterday)    Comments Writer asked patient if she planned on continuing to eat the same amount of greens, she stated no. She will take an increased dose of warfarin today only, then resume her usual dose. Recheck INR in 8 days.     No other changes in medications noted, denies missed warfarin doses. No signs/symptoms of venous thromboembolism or stroke.           OBJECTIVE    INR   Date Value Ref Range Status   05/31/2018 1.7  Final       ASSESSMENT / PLAN  INR assessment SUB      Anticoagulation Summary as of 5/31/2018     INR goal 2.0-3.0   Today's INR 1.7!   Warfarin maintenance plan 5 mg (5 mg x 1) on Sun, Thu; 7.5 mg (5 mg x 1.5) all other days   Full warfarin instructions 5/31: 12.5 mg; Otherwise 5 mg on Sun, Thu; 7.5 mg all other days   Weekly warfarin total 47.5 mg   Plan last modified Polina Ryan RN (5/17/2018)   Next INR check 6/8/2018   Priority INR   Target end date Indefinite    Indications   History of CVA (cerebrovascular accident) [Z86.73]  Long term current use of anticoagulant therapy [Z79.01]         Anticoagulation Episode Summary     INR check location     Preferred lab     Send INR reminders to [place] PHONE HETAL POOL    Comments * Pt states she has had 4 strokes (some after hip surgeries) // [facility] home meter, okay to call only if out of range, okay to leave  [phone]      Anticoagulation Care Providers     Provider Role Specialty Phone number    Diana Ely APRN CNP Responsible Family Practice [phone]            See the Encounter Report to view Anticoagulation Flowsheet and Dosing Calendar (Go to Encounters tab in chart review, and find the Anticoagulation " Therapy Visit)        Polina Ryan RN, CACP

## 2018-05-31 NOTE — MR AVS SNAPSHOT
Bebeileana Alfonso   5/31/2018   Anticoagulation Therapy Visit    Description:  72 year old female   Provider:  Polina Ryan RN   Department:  [facility]           INR as of 5/31/2018     Today's INR 1.7!      Anticoagulation Summary as of 5/31/2018     INR goal 2.0-3.0   Today's INR 1.7!   Full warfarin instructions 5/31: 12.5 mg; Otherwise 5 mg on Sun, Thu; 7.5 mg all other days   Next INR check 6/8/2018    Indications   History of CVA (cerebrovascular accident) [Z86.73]  Long term current use of anticoagulant therapy [Z79.01]         May 2018 Details    Sun Mon Tue Wed Thu Fri Sat       1               2               3               4               5                 6               7               8               9               10               11               12                 13               14               15               16               17               18               19                 20               21               22               23               24               25               26                 27               28               29               30               31      12.5 mg   See details         Date Details   05/31 This INR check               How to take your warfarin dose     To take:  12.5 mg Take 2.5 of the 5 mg tablets.           June 2018 Details    Sun Mon Tue Wed Thu Fri Sat          1      7.5 mg         2      7.5 mg           3      5 mg         4      7.5 mg         5      7.5 mg         6      7.5 mg         7      5 mg         8            9                 10               11               12               13               14               15               16                 17               18               19               20               21               22               23                 24               25               26               27               28               29               30                Date Details   No additional details    Date of  next INR:  6/8/2018         How to take your warfarin dose     To take:  5 mg Take 1 of the 5 mg tablets.    To take:  7.5 mg Take 1.5 of the 5 mg tablets.

## 2018-06-08 ENCOUNTER — ANTICOAGULATION THERAPY VISIT (OUTPATIENT)
Dept: ANTICOAGULATION | Facility: CLINIC | Age: 73
End: 2018-06-08
Payer: COMMERCIAL

## 2018-06-08 DIAGNOSIS — Z86.73 HISTORY OF CVA (CEREBROVASCULAR ACCIDENT): ICD-10-CM

## 2018-06-08 DIAGNOSIS — Z79.01 LONG TERM CURRENT USE OF ANTICOAGULANT THERAPY: ICD-10-CM

## 2018-06-08 LAB — INR PPP: 1.4

## 2018-06-08 PROCEDURE — 99207 ZZC NO CHARGE NURSE ONLY: CPT | Performed by: REGISTERED NURSE

## 2018-06-08 NOTE — MR AVS SNAPSHOT
Bebe Alfonso   6/8/2018   Anticoagulation Therapy Visit    Description:  72 year old female   Provider:  Luann Moise, RN   Department:  [facility]           INR as of 6/8/2018     Today's INR 1.4!      Anticoagulation Summary as of 6/8/2018     INR goal 2.0-3.0   Today's INR 1.4!   Full warfarin instructions 6/8: 15 mg; Otherwise 5 mg on Sun, Thu; 7.5 mg all other days   Next INR check 6/15/2018    Indications   History of CVA (cerebrovascular accident) [Z86.73]  Long term current use of anticoagulant therapy [Z79.01]         June 2018 Details    Sun Mon Tue Wed Thu Fri Sat          1               2                 3               4               5               6               7               8      15 mg   See details      9      7.5 mg           10      5 mg         11      7.5 mg         12      7.5 mg         13      7.5 mg         14      5 mg         15            16                 17               18               19               20               21               22               23                 24               25               26               27               28               29               30                Date Details   06/08 This INR check       Date of next INR:  6/15/2018         How to take your warfarin dose     To take:  5 mg Take 1 of the 5 mg tablets.    To take:  7.5 mg Take 1.5 of the 5 mg tablets.    To take:  15 mg Take 3 of the 5 mg tablets.

## 2018-06-08 NOTE — PROGRESS NOTES
ANTICOAGULATION FOLLOW-UP CLINIC VISIT    Patient Name:  Bebe Alfonso  Date:  6/8/2018  Contact Type:  Telephone/ Bebe    SUBJECTIVE:     Patient Findings     Positives Missed doses (Monday)    Comments Elif states she missed Monday's dose and she didn't notice until today when she checked her med box writer will have her take 15mg today then resume her current maintenance dose. Patient verbalizes understanding and agrees to plan. No further questions or concerns.           OBJECTIVE    INR   Date Value Ref Range Status   06/08/2018 1.4  Final       ASSESSMENT / PLAN  No question data found.  Anticoagulation Summary as of 6/8/2018     INR goal 2.0-3.0   Today's INR 1.4!   Warfarin maintenance plan 5 mg (5 mg x 1) on Sun, Thu; 7.5 mg (5 mg x 1.5) all other days   Full warfarin instructions 6/8: 15 mg; Otherwise 5 mg on Sun, Thu; 7.5 mg all other days   Weekly warfarin total 47.5 mg   Plan last modified Polina Ryan RN (5/17/2018)   Next INR check    Priority INR   Target end date Indefinite    Indications   History of CVA (cerebrovascular accident) [Z86.73]  Long term current use of anticoagulant therapy [Z79.01]         Anticoagulation Episode Summary     INR check location     Preferred lab     Send INR reminders to [place] PH[facility]TICO[facility] POOL    Comments * Pt states she has had 4 strokes (some after hip surgeries) // [facility] home meter, okay to call only if out of range, okay to leave  [phone]      Anticoagulation Care Providers     Provider Role Specialty Phone number    Diana Ely APRN CNP Responsible Family Practice [phone]            See the Encounter Report to view Anticoagulation Flowsheet and Dosing Calendar (Go to Encounters tab in chart review, and find the Anticoagulation Therapy Visit)        Luann Moise RN

## 2018-06-15 ENCOUNTER — ANTICOAGULATION THERAPY VISIT (OUTPATIENT)
Dept: ANTICOAGULATION | Facility: CLINIC | Age: 73
End: 2018-06-15
Payer: COMMERCIAL

## 2018-06-15 DIAGNOSIS — Z86.73 HISTORY OF CVA (CEREBROVASCULAR ACCIDENT): ICD-10-CM

## 2018-06-15 DIAGNOSIS — Z79.01 LONG TERM CURRENT USE OF ANTICOAGULANT THERAPY: ICD-10-CM

## 2018-06-15 LAB — INR PPP: 1.6

## 2018-06-15 PROCEDURE — 99207 ZZC NO CHARGE NURSE ONLY: CPT

## 2018-06-15 NOTE — MR AVS SNAPSHOT
Bebeileana Alfonso   6/15/2018   Anticoagulation Therapy Visit    Description:  72 year old female   Provider:  Deneen Pretty, RN   Department:  [facility]           INR as of 6/15/2018     Today's INR 1.6!      Anticoagulation Summary as of 6/15/2018     INR goal 2.0-3.0   Today's INR 1.6!   Full warfarin instructions 6/15: 15 mg; Otherwise 5 mg on Sun, Thu; 7.5 mg all other days   Next INR check 6/22/2018    Indications   History of CVA (cerebrovascular accident) [Z86.73]  Long term current use of anticoagulant therapy [Z79.01]         June 2018 Details    Sun Mon Tue Wed Thu Fri Sat          1               2                 3               4               5               6               7               8               9                 10               11               12               13               14               15      15 mg   See details      16      7.5 mg           17      5 mg         18      7.5 mg         19      7.5 mg         20      7.5 mg         21      5 mg         22            23                 24               25               26               27               28               29               30                Date Details   06/15 This INR check       Date of next INR:  6/22/2018         How to take your warfarin dose     To take:  5 mg Take 1 of the 5 mg tablets.    To take:  7.5 mg Take 1.5 of the 5 mg tablets.    To take:  15 mg Take 3 of the 5 mg tablets.

## 2018-06-15 NOTE — PROGRESS NOTES
ANTICOAGULATION FOLLOW-UP CLINIC VISIT    Patient Name:  Bebe Alfonso  Date:  6/15/2018  Contact Type:  Telephone/ Left detailed VM - INR remains low, left detailed VM as okayed per previous agreement. Confirmed last dosage, and if no differences or changes pt may resume that same dose again - 15 mg today and then resume maintenance dose for the rest of the week. Recheck in one week. Call w/ changes or concerns.    SUBJECTIVE:     Patient Findings     Comments INR remains low, left detailed VM as okayed per previous agreement. Confirmed last dosage, and if no differences or changes pt may resume that same dose again - 15 mg today and then resume maintenance dose for the rest of the week. Recheck in one week. Call w/ changes or concerns.           OBJECTIVE    INR   Date Value Ref Range Status   06/15/2018 1.6  Final       ASSESSMENT / PLAN  INR assessment SUB    Recheck INR In: 1 WEEK    INR Location Home [facility]R [facility]     Anticoagulation Summary as of 6/15/2018     INR goal 2.0-3.0   Today's INR 1.6!   Warfarin maintenance plan 5 mg (5 mg x 1) on Sun, Thu; 7.5 mg (5 mg x 1.5) all other days   Full warfarin instructions 6/15: 15 mg; Otherwise 5 mg on Sun, Thu; 7.5 mg all other days   Weekly warfarin total 47.5 mg   Plan last modified oPlina Ryan RN (5/17/2018)   Next INR check 6/22/2018   Priority INR   Target end date Indefinite    Indications   History of CVA (cerebrovascular accident) [Z86.73]  Long term current use of anticoagulant therapy [Z79.01]         Anticoagulation Episode Summary     INR check location     Preferred lab     Send INR reminders to [place] [facility]    Comments * Pt states she has had 4 strokes (some after hip surgeries) // [facility] home meter, okay to call only if out of range, okay to leave vm [phone]      Anticoagulation Care Providers     Provider Role Specialty Phone number    Diana Ely APRN CNP Responsible Family Practice [phone]            See the  Encounter Report to view Anticoagulation Flowsheet and Dosing Calendar (Go to Encounters tab in chart review, and find the Anticoagulation Therapy Visit)      Deneen Pretty RN

## 2018-06-19 ENCOUNTER — ANTICOAGULATION THERAPY VISIT (OUTPATIENT)
Dept: ANTICOAGULATION | Facility: CLINIC | Age: 73
End: 2018-06-19
Payer: COMMERCIAL

## 2018-06-19 DIAGNOSIS — Z79.01 LONG TERM CURRENT USE OF ANTICOAGULANT THERAPY: ICD-10-CM

## 2018-06-19 DIAGNOSIS — Z86.73 HISTORY OF CVA (CEREBROVASCULAR ACCIDENT): ICD-10-CM

## 2018-06-19 LAB — INR PPP: 2

## 2018-06-19 PROCEDURE — G0250 MD INR TEST REVIE INTER MGMT: HCPCS

## 2018-06-19 NOTE — MR AVS SNAPSHOT
Bebe Alfonso   6/19/2018   Anticoagulation Therapy Visit    Description:  72 year old female   Provider:  Soraya Ng RN   Department:  [facility]           INR as of 6/19/2018     Today's INR 2.0      Anticoagulation Summary as of 6/19/2018     INR goal 2.0-3.0   Today's INR 2.0   Full warfarin instructions 5 mg on Sun, Thu; 7.5 mg all other days   Next INR check 6/22/2018    Indications   History of CVA (cerebrovascular accident) [Z86.73]  Long term current use of anticoagulant therapy [Z79.01]         June 2018 Details    Sun Mon Tue Wed Thu Fri Sat          1               2                 3               4               5               6               7               8               9                 10               11               12               13               14               15               16                 17               18               19      7.5 mg   See details      20      7.5 mg         21      5 mg         22            23                 24               25               26               27               28               29               30                Date Details   06/19 This INR check       Date of next INR:  6/22/2018         How to take your warfarin dose     To take:  5 mg Take 1 of the 5 mg tablets.    To take:  7.5 mg Take 1.5 of the 5 mg tablets.

## 2018-06-19 NOTE — PROGRESS NOTES
ANTICOAGULATION FOLLOW-UP CLINIC VISIT    Patient Name:  Bebe Alfonso  Date:  6/19/2018  Contact Type:  Telephone: spoke with pt//FAX from [facility].    SUBJECTIVE:     Patient Findings     Positives Activity level change (Pt has increased activity, is walking about a half mile/day, )    Comments Pt reports she checked INR today with same strips she has been using the past couple weeks and it was low, so she opened a new bottle and INR was 2.0. She reported this to [facility] and they told her the strips could be bad and had her dispose of the bottle that seems bad and will send her new ones.      Pt denies changes in medications, diet or health, reports taking warfarin as directed. Writer instructed pt to continue on maintenance dose and recheck INR on Friday 6/22. Pt voiced understanding.             OBJECTIVE    INR   Date Value Ref Range Status   06/19/2018 2.0  Final       ASSESSMENT / PLAN  No question data found.  Anticoagulation Summary as of 6/19/2018     INR goal 2.0-3.0   Today's INR 2.0   Warfarin maintenance plan 5 mg (5 mg x 1) on Sun, Thu; 7.5 mg (5 mg x 1.5) all other days   Full warfarin instructions 5 mg on Sun, Thu; 7.5 mg all other days   Weekly warfarin total 47.5 mg   Plan last modified Polina Ryan RN (5/17/2018)   Next INR check 6/22/2018   Priority INR   Target end date Indefinite    Indications   History of CVA (cerebrovascular accident) [Z86.73]  Long term current use of anticoagulant therapy [Z79.01]         Anticoagulation Episode Summary     INR check location     Preferred lab     Send INR reminders to [place] [facility]    Comments * Pt states she has had 4 strokes (some after hip surgeries) // [facility] home meter, okay to call only if out of range, okay to leave  [phone]      Anticoagulation Care Providers     Provider Role Specialty Phone number    Diana Ely APRN CNP Responsible Family Practice [phone]            See the Encounter Report to view  Anticoagulation Flowsheet and Dosing Calendar (Go to Encounters tab in chart review, and find the Anticoagulation Therapy Visit)    Dosage adjustment made based on physician directed care plan.    Soraya Ng RN

## 2018-06-22 ENCOUNTER — ANTICOAGULATION THERAPY VISIT (OUTPATIENT)
Dept: ANTICOAGULATION | Facility: CLINIC | Age: 73
End: 2018-06-22
Payer: COMMERCIAL

## 2018-06-22 DIAGNOSIS — Z86.73 HISTORY OF CVA (CEREBROVASCULAR ACCIDENT): ICD-10-CM

## 2018-06-22 DIAGNOSIS — Z79.01 LONG TERM CURRENT USE OF ANTICOAGULANT THERAPY: ICD-10-CM

## 2018-06-22 LAB — INR PPP: 2

## 2018-06-22 PROCEDURE — 99207 ZZC NO CHARGE NURSE ONLY: CPT

## 2018-06-22 NOTE — PROGRESS NOTES
ANTICOAGULATION FOLLOW-UP CLINIC VISIT    Patient Name:  Bebe Alfonso  Date:  6/22/2018  Contact Type:  Per previous agreement, pt will continue maintenance warfarin plan when INR is Therapeutic. Fax received from [facility].    SUBJECTIVE:     Patient Findings     Positives No Problem Findings    Comments Per previous agreement, pt will continue maintenance warfarin plan when INR is Therapeutic. Fax received from [facility].           OBJECTIVE    INR   Date Value Ref Range Status   06/22/2018 2.0  Final       ASSESSMENT / PLAN  INR assessment THER    Recheck INR In: 1 WEEK    INR Location Home [facility]   Billed home INR No      Anticoagulation Summary as of 6/22/2018     INR goal 2.0-3.0   Today's INR 2.0   Warfarin maintenance plan 5 mg (5 mg x 1) on Sun, Thu; 7.5 mg (5 mg x 1.5) all other days   Full warfarin instructions 5 mg on Sun, Thu; 7.5 mg all other days   Weekly warfarin total 47.5 mg   No change documented Deneen Pretty RN   Plan last modified Polina Ryan RN (5/17/2018)   Next INR check 6/29/2018   Priority INR   Target end date Indefinite    Indications   History of CVA (cerebrovascular accident) [Z86.73]  Long term current use of anticoagulant therapy [Z79.01]         Anticoagulation Episode Summary     INR check location     Preferred lab     Send INR reminders to [place] [facility]    Comments * Pt states she has had 4 strokes (some after hip surgeries) // [facility] home meter, okay to call only if out of range, okay to leave  [phone]      Anticoagulation Care Providers     Provider Role Specialty Phone number    Diana Ely APRN CNP Responsible Family Practice [phone]            See the Encounter Report to view Anticoagulation Flowsheet and Dosing Calendar (Go to Encounters tab in chart review, and find the Anticoagulation Therapy Visit)        Deneen Pretty, YAW

## 2018-06-22 NOTE — MR AVS SNAPSHOT
Bebe Alfonso   6/22/2018   Anticoagulation Therapy Visit    Description:  72 year old female   Provider:  Deneen Pretty, RN   Department:  [facility]           INR as of 6/22/2018     Today's INR 2.0      Anticoagulation Summary as of 6/22/2018     INR goal 2.0-3.0   Today's INR 2.0   Full warfarin instructions 5 mg on Sun, Thu; 7.5 mg all other days   Next INR check 6/29/2018    Indications   History of CVA (cerebrovascular accident) [Z86.73]  Long term current use of anticoagulant therapy [Z79.01]         Your next Anticoagulation Clinic appointment(s)     Jul 02, 2018  9:15 AM CDT   Anticoagulation [facility] with [facility]TI [facility]AG   [facility]    [address] [place] [id number] [phone]              June 2018 Details    Sun Mon Tue Wed Thu Fri Sat          1               2                 3               4               5               6               7               8               9                 10               11               12               13               14               15               16                 17               18               19               20               21               22      7.5 mg   See details      23      7.5 mg           24      5 mg         25      7.5 mg         26      7.5 mg         27      7.5 mg         28      5 mg         29            30                Date Details   06/22 This INR check       Date of next INR:  6/29/2018         How to take your warfarin dose     To take:  5 mg Take 1 of the 5 mg tablets.    To take:  7.5 mg Take 1.5 of the 5 mg tablets.

## 2018-07-02 ENCOUNTER — ANTICOAGULATION THERAPY VISIT (OUTPATIENT)
Dept: ANTICOAGULATION | Facility: CLINIC | Age: 73
End: 2018-07-02
Payer: COMMERCIAL

## 2018-07-02 DIAGNOSIS — Z86.73 HISTORY OF CVA (CEREBROVASCULAR ACCIDENT): ICD-10-CM

## 2018-07-02 DIAGNOSIS — Z79.01 LONG TERM CURRENT USE OF ANTICOAGULANT THERAPY: ICD-10-CM

## 2018-07-02 LAB — INR POINT OF CARE: 1.8 (ref 0.86–1.14)

## 2018-07-02 PROCEDURE — 99207 ZZC NO CHARGE NURSE ONLY: CPT

## 2018-07-02 PROCEDURE — 85610 PROTHROMBIN TIME: CPT | Mod: QW

## 2018-07-02 PROCEDURE — 36416 COLLJ CAPILLARY BLOOD SPEC: CPT

## 2018-07-02 NOTE — PROGRESS NOTES
ANTICOAGULATION FOLLOW-UP CLINIC VISIT    Patient Name:  Bebe Alfonso  Date:  7/2/2018  Contact Type:  Face to Face    SUBJECTIVE:     Patient Findings     Positives Unexplained INR or factor level change    Comments Patient denies any missed doses. She denies any changes in health, diet, medication, or activity. She does state she is frustrated with her home meter and is going to send it back and return to the clinic for her INR's. I increased her maintenance dose over the next 2 weeks from 47.5 to 50 mg for a 5.3% increase. Recheck in 2 weeks.            OBJECTIVE    INR Protime   Date Value Ref Range Status   07/02/2018 1.8 (A) 0.86 - 1.14 Final       ASSESSMENT / PLAN  INR assessment SUB    Recheck INR In: 2 WEEKS    INR Location Clinic      Anticoagulation Summary as of 7/2/2018     INR goal 2.0-3.0   Today's INR 1.8!   Warfarin maintenance plan 5 mg (5 mg x 1) on Sun, Thu; 7.5 mg (5 mg x 1.5) all other days   Full warfarin instructions 7/5: 7.5 mg; 7/12: 7.5 mg; Otherwise 5 mg on Sun, Thu; 7.5 mg all other days   Weekly warfarin total 47.5 mg   Plan last modified Polina Ryan RN (5/17/2018)   Next INR check 7/16/2018   Priority INR   Target end date Indefinite    Indications   History of CVA (cerebrovascular accident) [Z86.73]  Long term current use of anticoagulant therapy [Z79.01]         Anticoagulation Episode Summary     INR check location     Preferred lab     Send INR reminders to [place] [facility]    Comments * Pt states she has had 4 strokes (some after hip surgeries) // [facility] home meter, okay to call only if out of range, okay to leave  [phone]      Anticoagulation Care Providers     Provider Role Specialty Phone number    Diana Ely APRN [facility] [phone]            See the Encounter Report to view Anticoagulation Flowsheet and Dosing Calendar (Go to Encounters tab in chart review, and find the Anticoagulation Therapy  Visit)        Justin Naidu RN

## 2018-07-02 NOTE — MR AVS SNAPSHOT
Bebe Kaden   7/2/2018 9:15 AM   Anticoagulation Therapy Visit    Description:  72 year old female   Provider:  [facility] ANTI [facility]AG   Department:  [facility]           INR as of 7/2/2018     Today's INR 1.8!      Anticoagulation Summary as of 7/2/2018     INR goal 2.0-3.0   Today's INR 1.8!   Full warfarin instructions 7/5: 7.5 mg; 7/12: 7.5 mg; Otherwise 5 mg on Sun, Thu; 7.5 mg all other days   Next INR check 7/16/2018    Indications   History of CVA (cerebrovascular accident) [Z86.73]  Long term current use of anticoagulant therapy [Z79.01]         Your next Anticoagulation Clinic appointment(s)     Jul 16, 2018  8:45 AM CDT   [facility] with [facility]   [facility]    [address] [place] [address] [phone]              Contact Numbers     Please call [phone] with any problems or questions regarding your therapy.    If you need to cancel and/or reschedule your appointment please call one of the following numbers:  [facility] - [phone]  [place] - [phone]  [facility] [phone]  [facility] [phone]  [place] - [phone]            July 2018 Details    Sun Mon Tue Wed Thu Fri Sat     1               2      7.5 mg   See details      3      7.5 mg         4      7.5 mg         5      7.5 mg         6      7.5 mg         7      7.5 mg           8      5 mg         9      7.5 mg         10      7.5 mg         11      7.5 mg         12      7.5 mg         13      7.5 mg         14      7.5 mg           15      5 mg         16            17               18               19               20               21                 22               23               24               25               26               27               28                 29               30               31                    Date Details   07/02 This INR check       Date of next INR:  7/16/2018         How to take your warfarin dose      To take:  5 mg Take 1 of the 5 mg tablets.    To take:  7.5 mg Take 1.5 of the 5 mg tablets.

## 2018-07-16 ENCOUNTER — ANTICOAGULATION THERAPY VISIT (OUTPATIENT)
Dept: ANTICOAGULATION | Facility: CLINIC | Age: 73
End: 2018-07-16
Payer: COMMERCIAL

## 2018-07-16 DIAGNOSIS — Z86.73 HISTORY OF CVA (CEREBROVASCULAR ACCIDENT): ICD-10-CM

## 2018-07-16 DIAGNOSIS — Z79.01 LONG TERM CURRENT USE OF ANTICOAGULANT THERAPY: ICD-10-CM

## 2018-07-16 LAB — INR POINT OF CARE: 1.8 (ref 0.86–1.14)

## 2018-07-16 PROCEDURE — 85610 PROTHROMBIN TIME: CPT | Mod: QW

## 2018-07-16 PROCEDURE — 99207 ZZC NO CHARGE NURSE ONLY: CPT

## 2018-07-16 PROCEDURE — 36416 COLLJ CAPILLARY BLOOD SPEC: CPT

## 2018-07-16 NOTE — MR AVS SNAPSHOT
Bebe Kaden   7/16/2018 8:45 AM   Anticoagulation Therapy Visit    Description:  72 year old female   Provider:  NB ANTI COAG   Department:  [facility]           INR as of 7/16/2018     Today's INR 1.8!      Anticoagulation Summary as of 7/16/2018     INR goal 2.0-3.0   Today's INR 1.8!   Full warfarin instructions 7/16: 10 mg; Otherwise 7.5 mg every day   Next INR check 7/30/2018    Indications   History of CVA (cerebrovascular accident) [Z86.73]  Long term current use of anticoagulant therapy [Z79.01]         Your next Anticoagulation Clinic appointment(s)     Jul 30, 2018  1:00 PM CDT   Anticoagulation Visit with NB ANTI COAG   [facility]    [address] [place] [address] [phone]              Contact Numbers     Please call [phone] with any problems or questions regarding your therapy.    If you need to cancel and/or reschedule your appointment please call one of the following numbers:  [facility] [phone]  [facility] [phone]  [facility] [phone]  [place] - [phone]  [place] - [phone]            July 2018 Details    Sun Mon Tue Wed Thu Fri Sat     1               2               3               4               5               6               7                 8               9               10               11               12               13               14                 15               16      10 mg   See details      17      7.5 mg         18      7.5 mg         19      7.5 mg         20      7.5 mg         21      7.5 mg           22      7.5 mg         23      7.5 mg         24      7.5 mg         25      7.5 mg         26      7.5 mg         27      7.5 mg         28      7.5 mg           29      7.5 mg         30            31                    Date Details   07/16 This INR check       Date of next INR:  7/30/2018         How to take your warfarin dose     To take:  7.5 mg Take 1.5 of  the 5 mg tablets.    To take:  10 mg Take 2 of the 5 mg tablets.

## 2018-07-16 NOTE — PROGRESS NOTES
ANTICOAGULATION FOLLOW-UP CLINIC VISIT    Patient Name:  Bebe Alfonso  Date:  7/16/2018  Contact Type:  Face to Face    SUBJECTIVE:     Patient Findings     Positives Unexplained INR or factor level change    Comments Patient denies missed doses of warfarin. Her warfarin dose was increased by 5.3% her last visit and her INR remains sub-therapeutic. Writer increased her warfarin dose another 5.2% today and instructed her to take 10mg. She will recheck in two weeks. Patient verbalizes understanding and agrees to plan. No further questions or concerns.             OBJECTIVE    INR Protime   Date Value Ref Range Status   07/16/2018 1.8 (A) 0.86 - 1.14 Final       ASSESSMENT / PLAN  INR assessment SUB    Recheck INR In: 2 WEEKS    INR Location Clinic      Anticoagulation Summary as of 7/16/2018     INR goal 2.0-3.0   Today's INR 1.8!   Warfarin maintenance plan 7.5 mg (5 mg x 1.5) every day   Full warfarin instructions 7/16: 10 mg; Otherwise 7.5 mg every day   Weekly warfarin total 52.5 mg   Plan last modified Luann Moise RN (7/16/2018)   Next INR check 7/30/2018   Priority INR   Target end date Indefinite    Indications   History of CVA (cerebrovascular accident) [Z86.73]  Long term current use of anticoagulant therapy [Z79.01]         Anticoagulation Episode Summary     INR check location     Preferred lab     Send INR reminders to [place] [facility]    Comments * Pt states she has had 4 strokes (some after hip surgeries) // [facility] home meter, okay to call only if out of range, okay to leave  [phone]      Anticoagulation Care Providers     Provider Role Specialty Phone number    Diana Ely APRN CNP Responsible Family Practice [phone]            See the Encounter Report to view Anticoagulation Flowsheet and Dosing Calendar (Go to Encounters tab in chart review, and find the Anticoagulation Therapy Visit)        Luann Moise RN

## 2018-07-17 DIAGNOSIS — I63.9 CEREBROVASCULAR ACCIDENT (H): ICD-10-CM

## 2018-07-17 RX ORDER — WARFARIN SODIUM 5 MG/1
TABLET ORAL
Qty: 110 TABLET | Refills: 0 | Status: SHIPPED | OUTPATIENT
Start: 2018-07-17 | End: 2018-08-30

## 2018-07-17 NOTE — TELEPHONE ENCOUNTER
"Requested Prescriptions   Pending Prescriptions Disp Refills     warfarin (COUMADIN) 5 MG tablet [Pharmacy Med Name: WARFARIN 5MG        TAB] 110 tablet 0     Sig: TAKE 5MG (1 TABLET) MONDAY AND THURSDAY AND 7.5 MG  (1 & 1/2 TABLET) THE REST OF THE WEEK OR AS DIRECTED    Vitamin K Antagonists Failed    7/17/2018 11:04 AM       Failed - INR is within goal in the past 6 weeks    Confirm INR is within goal in the past 6 weeks.     Recent Labs   Lab Test 07/16/18   INR  1.8*                      Passed - Recent (12 mo) or future (30 days) visit within the authorizing provider's specialty    Patient had office visit in the last 12 months or has a visit in the next 30 days with authorizing provider or within the authorizing provider's specialty.  See \"Patient Info\" tab in inbasket, or \"Choose Columns\" in Meds & Orders section of the refill encounter.           Passed - Patient is 18 years of age or older       Passed - Patient is not pregnant       Passed - No positive pregnancy on file in past 12 months        Last Written Prescription Date:  5/17/18  Last Fill Quantity: 110,  # refills: 0   Last office visit: 5/1/2018 with prescribing provider:     Future Office Visit:      "

## 2018-07-30 ENCOUNTER — ANTICOAGULATION THERAPY VISIT (OUTPATIENT)
Dept: ANTICOAGULATION | Facility: CLINIC | Age: 73
End: 2018-07-30
Payer: COMMERCIAL

## 2018-07-30 DIAGNOSIS — Z79.01 LONG TERM CURRENT USE OF ANTICOAGULANT THERAPY: ICD-10-CM

## 2018-07-30 DIAGNOSIS — Z86.73 HISTORY OF CVA (CEREBROVASCULAR ACCIDENT): ICD-10-CM

## 2018-07-30 LAB — INR POINT OF CARE: 2.4 (ref 0.86–1.14)

## 2018-07-30 PROCEDURE — 36416 COLLJ CAPILLARY BLOOD SPEC: CPT

## 2018-07-30 PROCEDURE — 85610 PROTHROMBIN TIME: CPT | Mod: QW

## 2018-07-30 PROCEDURE — 99207 ZZC NO CHARGE NURSE ONLY: CPT

## 2018-07-30 NOTE — MR AVS SNAPSHOT
Bebe Kaden   7/30/2018 1:00 PM   Anticoagulation Therapy Visit    Description:  72 year old female   Provider:  [facility] ANTI COAG   Department:  [facility]           INR as of 7/30/2018     Today's INR 2.4      Anticoagulation Summary as of 7/30/2018     INR goal 2.0-3.0   Today's INR 2.4   Full warfarin instructions 7.5 mg every day   Next INR check 8/30/2018    Indications   History of CVA (cerebrovascular accident) [Z86.73]  Long term current use of anticoagulant therapy [Z79.01]         Your next Anticoagulation Clinic appointment(s)     Aug 30, 2018  8:45 AM CDT   Anticoagulation Visit with NB ANTI COAG   [facility]    [address] [place] [address] [phone]              Contact Numbers     Please call [phone] with any problems or questions regarding your therapy.    If you need to cancel and/or reschedule your appointment please call one of the following numbers:  [facility] [phone]  [facility] [phone]  [facility] [phone]  [facility] [phone]  [place] - [phone]            July 2018 Details    Sun Mon Tue Wed Thu Fri Sat     1               2               3               4               5               6               7                 8               9               10               11               12               13               14                 15               16               17               18               19               20               21                 22               23               24               25               26               27               28                 29               30      7.5 mg   See details      31      7.5 mg              Date Details   07/30 This INR check               How to take your warfarin dose     To take:  7.5 mg Take 1.5 of the 5 mg tablets.           August 2018 Details    Sun Mon Tue Wed Thu Fri Sat        1      7.5 mg         2      7.5 mg          3      7.5 mg         4      7.5 mg           5      7.5 mg         6      7.5 mg         7      7.5 mg         8      7.5 mg         9      7.5 mg         10      7.5 mg         11      7.5 mg           12      7.5 mg         13      7.5 mg         14      7.5 mg         15      7.5 mg         16      7.5 mg         17      7.5 mg         18      7.5 mg           19      7.5 mg         20      7.5 mg         21      7.5 mg         22      7.5 mg         23      7.5 mg         24      7.5 mg         25      7.5 mg           26      7.5 mg         27      7.5 mg         28      7.5 mg         29      7.5 mg         30            31                 Date Details   No additional details    Date of next INR:  8/30/2018         How to take your warfarin dose     To take:  7.5 mg Take 1.5 of the 5 mg tablets.

## 2018-07-30 NOTE — PROGRESS NOTES
ANTICOAGULATION FOLLOW-UP CLINIC VISIT    Patient Name:  Bebe Alfonso  Date:  7/30/2018  Contact Type:  Face to Face    SUBJECTIVE:     Patient Findings     Positives No Problem Findings    Comments Pt sent home meter back to [facility], she didn't like doing it herself. Patient denies missed warfarin doses as well as changes to diet, activity or medications.   Patient advised to continue the same warfarin maintenance dose, INR therapeutic.   Patient verbalizes understanding and agrees to plan. No further questions or concerns.             OBJECTIVE    INR Protime   Date Value Ref Range Status   07/30/2018 2.4 (A) 0.86 - 1.14 Final       ASSESSMENT / PLAN  INR assessment THER    Recheck INR In: 4 WEEKS    INR Location Clinic      Anticoagulation Summary as of 7/30/2018     INR goal 2.0-3.0   Today's INR 2.4   Warfarin maintenance plan 7.5 mg (5 mg x 1.5) every day   Full warfarin instructions 7.5 mg every day   Weekly warfarin total 52.5 mg   Plan last modified Luann Moise RN (7/16/2018)   Next INR check 8/30/2018   Priority INR   Target end date Indefinite    Indications   History of CVA (cerebrovascular accident) [Z86.73]  Long term current use of anticoagulant therapy [Z79.01]         Anticoagulation Episode Summary     INR check location     Preferred lab     Send INR reminders to [place] [facility]    Comments * Pt states she has had 4 strokes (some after hip surgeries)       Anticoagulation Care Providers     Provider Role Specialty Phone number    Diana Ely APRN [facility] [phone]            See the Encounter Report to view Anticoagulation Flowsheet and Dosing Calendar (Go to Encounters tab in chart review, and find the Anticoagulation Therapy Visit)        Luann Moise RN

## 2018-08-30 ENCOUNTER — ANTICOAGULATION THERAPY VISIT (OUTPATIENT)
Dept: ANTICOAGULATION | Facility: CLINIC | Age: 73
End: 2018-08-30
Payer: COMMERCIAL

## 2018-08-30 DIAGNOSIS — Z79.01 LONG TERM CURRENT USE OF ANTICOAGULANT THERAPY: ICD-10-CM

## 2018-08-30 DIAGNOSIS — Z86.73 HISTORY OF CVA (CEREBROVASCULAR ACCIDENT): ICD-10-CM

## 2018-08-30 LAB — INR POINT OF CARE: 2.6 (ref 0.86–1.14)

## 2018-08-30 PROCEDURE — 85610 PROTHROMBIN TIME: CPT | Mod: QW

## 2018-08-30 PROCEDURE — 36416 COLLJ CAPILLARY BLOOD SPEC: CPT

## 2018-08-30 PROCEDURE — 99207 ZZC NO CHARGE NURSE ONLY: CPT

## 2018-08-30 RX ORDER — WARFARIN SODIUM 5 MG/1
TABLET ORAL
Qty: 110 TABLET | Refills: 0 | COMMUNITY
Start: 2018-08-30 | End: 2018-10-25

## 2018-08-30 NOTE — MR AVS SNAPSHOT
Bebe Kaden   8/30/2018 8:45 AM   Anticoagulation Therapy Visit    Description:  72 year old female   Provider:  NB ANTI COAG   Department:  [facility]           INR as of 8/30/2018     Today's INR 2.6      Anticoagulation Summary as of 8/30/2018     INR goal 2.0-3.0   Today's INR 2.6   Full warfarin instructions 7.5 mg every day   Next INR check 9/27/2018    Indications   History of CVA (cerebrovascular accident) [Z86.73]  Long term current use of anticoagulant therapy [Z79.01]         Your next Anticoagulation Clinic appointment(s)     Sep 20, 2018 10:45 AM CDT   [facility] with [facility]   [facility]    [address] [place] [id number] [phone]              Contact Numbers     Please call [phone] with any problems or questions regarding your therapy.    If you need to cancel and/or reschedule your appointment please call one of the following numbers:  [facility] [phone]  [facility] [phone]  [facility] [phone]  \[facility] [place] [facility]\"" [phone]  [place] - [phone]            August 2018 Details    Sun Mon Tue Wed Thu Fri Sat        1               2               3               4                 5               6               7               8               9               10               11                 12               13               14               15               16               17               18                 19               20               21               22               23               24               25                 26               27               28               29               30      7.5 mg   See details      31      7.5 mg           Date Details   08/30 This INR check               How to take your warfarin dose     To take:  7.5 mg Take 1.5 of the 5 mg tablets.           September 2018 Details    Sun Mon Tue Wed Thu Fri Sat           1      7.5 mg           2       7.5 mg         3      7.5 mg         4      7.5 mg         5      7.5 mg         6      7.5 mg         7      7.5 mg         8      7.5 mg           9      7.5 mg         10      7.5 mg         11      7.5 mg         12      7.5 mg         13      7.5 mg         14      7.5 mg         15      7.5 mg           16      7.5 mg         17      7.5 mg         18      7.5 mg         19      7.5 mg         20      7.5 mg         21      7.5 mg         22      7.5 mg           23      7.5 mg         24      7.5 mg         25      7.5 mg         26      7.5 mg         27            28               29                 30                      Date Details   No additional details    Date of next INR:  9/27/2018         How to take your warfarin dose     To take:  7.5 mg Take 1.5 of the 5 mg tablets.

## 2018-08-30 NOTE — PROGRESS NOTES
ANTICOAGULATION FOLLOW-UP CLINIC VISIT    Patient Name:  Bebe Alfonso  Date:  8/30/2018  Contact Type:  Face to Face    SUBJECTIVE:     Patient Findings     Positives No Problem Findings    Comments No changes in diet, activity level, medications (including over the counter), or health. No missed doses of warfarin. Patient took dosing as prescribed. No signs of clots or bleeding concerns. Patient will continue maintenance warfarin dosing.    Patient will be traveling to [place] by car at the end of Sept. She will try to check INR before leaving and [facility] can give her car exercise instructions.            OBJECTIVE    INR Protime   Date Value Ref Range Status   08/30/2018 2.6 (A) 0.86 - 1.14 Final       ASSESSMENT / PLAN  INR assessment THER    Recheck INR In: 3 WEEKS    INR Location Clinic      Anticoagulation Summary as of 8/30/2018     INR goal 2.0-3.0   Today's INR 2.6   Warfarin maintenance plan 7.5 mg (5 mg x 1.5) every day   Full warfarin instructions 7.5 mg every day   Weekly warfarin total 52.5 mg   No change documented Natalie Sorto RN   Plan last modified Luann Moise RN (7/16/2018)   Next INR check 9/27/2018   Priority INR   Target end date Indefinite    Indications   History of CVA (cerebrovascular accident) [Z86.73]  Long term current use of anticoagulant therapy [Z79.01]         Anticoagulation Episode Summary     INR check location     Preferred lab     Send INR reminders to [facility]    Comments * Pt states she has had 4 strokes (some after hip surgeries)       Anticoagulation Care Providers     Provider Role Specialty Phone number    Diana Ely APRN [facility] [phone]            See the Encounter Report to view Anticoagulation Flowsheet and Dosing Calendar (Go to Encounters tab in chart review, and find the Anticoagulation Therapy Visit)        Natalie Sorto RN

## 2018-09-07 ENCOUNTER — RADIANT APPOINTMENT (OUTPATIENT)
Dept: GENERAL RADIOLOGY | Facility: CLINIC | Age: 73
End: 2018-09-07
Attending: NURSE PRACTITIONER
Payer: COMMERCIAL

## 2018-09-07 ENCOUNTER — OFFICE VISIT (OUTPATIENT)
Dept: FAMILY MEDICINE | Facility: CLINIC | Age: 73
End: 2018-09-07
Payer: COMMERCIAL

## 2018-09-07 VITALS
RESPIRATION RATE: 20 BRPM | SYSTOLIC BLOOD PRESSURE: 150 MMHG | HEART RATE: 76 BPM | WEIGHT: 148 LBS | BODY MASS INDEX: 24.25 KG/M2 | DIASTOLIC BLOOD PRESSURE: 68 MMHG | TEMPERATURE: 98.3 F

## 2018-09-07 DIAGNOSIS — M54.2 CERVICALGIA: ICD-10-CM

## 2018-09-07 DIAGNOSIS — G89.29 CHRONIC BILATERAL THORACIC BACK PAIN: ICD-10-CM

## 2018-09-07 DIAGNOSIS — S29.019D STRAIN OF THORACIC REGION, SUBSEQUENT ENCOUNTER: ICD-10-CM

## 2018-09-07 DIAGNOSIS — G89.29 CHRONIC BILATERAL LOW BACK PAIN WITHOUT SCIATICA: ICD-10-CM

## 2018-09-07 DIAGNOSIS — S29.019D STRAIN OF THORACIC REGION, SUBSEQUENT ENCOUNTER: Primary | ICD-10-CM

## 2018-09-07 DIAGNOSIS — M54.50 CHRONIC BILATERAL LOW BACK PAIN WITHOUT SCIATICA: ICD-10-CM

## 2018-09-07 DIAGNOSIS — M54.6 CHRONIC BILATERAL THORACIC BACK PAIN: ICD-10-CM

## 2018-09-07 DIAGNOSIS — E78.5 HYPERLIPIDEMIA LDL GOAL <70: ICD-10-CM

## 2018-09-07 DIAGNOSIS — E51.9 THIAMINE DEFICIENCY: ICD-10-CM

## 2018-09-07 PROCEDURE — 72040 X-RAY EXAM NECK SPINE 2-3 VW: CPT | Mod: FY

## 2018-09-07 PROCEDURE — 99214 OFFICE O/P EST MOD 30 MIN: CPT | Performed by: NURSE PRACTITIONER

## 2018-09-07 PROCEDURE — 72100 X-RAY EXAM L-S SPINE 2/3 VWS: CPT | Mod: FY

## 2018-09-07 PROCEDURE — 72070 X-RAY EXAM THORAC SPINE 2VWS: CPT | Mod: FY

## 2018-09-07 RX ORDER — ATORVASTATIN CALCIUM 40 MG/1
40 TABLET, FILM COATED ORAL DAILY
Qty: 90 TABLET | Refills: 3 | Status: SHIPPED | OUTPATIENT
Start: 2018-09-07 | End: 2019-03-18

## 2018-09-07 RX ORDER — ASCORBIC ACID 250 MG
250 TABLET,CHEWABLE ORAL DAILY
COMMUNITY
End: 2019-04-04

## 2018-09-07 ASSESSMENT — PAIN SCALES - GENERAL: PAINLEVEL: MILD PAIN (2)

## 2018-09-07 ASSESSMENT — ANXIETY QUESTIONNAIRES
2. NOT BEING ABLE TO STOP OR CONTROL WORRYING: SEVERAL DAYS
1. FEELING NERVOUS, ANXIOUS, OR ON EDGE: SEVERAL DAYS
3. WORRYING TOO MUCH ABOUT DIFFERENT THINGS: SEVERAL DAYS
IF YOU CHECKED OFF ANY PROBLEMS ON THIS QUESTIONNAIRE, HOW DIFFICULT HAVE THESE PROBLEMS MADE IT FOR YOU TO DO YOUR WORK, TAKE CARE OF THINGS AT HOME, OR GET ALONG WITH OTHER PEOPLE: NOT DIFFICULT AT ALL
6. BECOMING EASILY ANNOYED OR IRRITABLE: NOT AT ALL
5. BEING SO RESTLESS THAT IT IS HARD TO SIT STILL: NOT AT ALL
GAD7 TOTAL SCORE: 4
7. FEELING AFRAID AS IF SOMETHING AWFUL MIGHT HAPPEN: NOT AT ALL

## 2018-09-07 ASSESSMENT — PATIENT HEALTH QUESTIONNAIRE - PHQ9: 5. POOR APPETITE OR OVEREATING: SEVERAL DAYS

## 2018-09-07 NOTE — NURSING NOTE
"Chief Complaint   Patient presents with     Back Pain       Initial /68 (BP Location: Right arm, Patient Position: Sitting, Cuff Size: Adult Regular)  Pulse 76  Temp 98.3  F (36.8  C) (Tympanic)  Resp 20  Wt 148 lb (67.1 kg)  BMI 24.25 kg/m2 Estimated body mass index is 24.25 kg/(m^2) as calculated from the following:    Height as of 5/1/18: 5' 5.5\" (1.664 m).    Weight as of this encounter: 148 lb (67.1 kg).      Health Maintenance that is potentially due pending provider review:  Colonoscopy sent to Abstracting    n/a    Is there anyone who you would like to be able to receive your results? No  If yes have patient fill out FARZANEH    Dottie Anaya MA Student      "

## 2018-09-07 NOTE — PROGRESS NOTES
SUBJECTIVE:   Bebe Alfonso is a 72 year old female who presents to clinic today for the following health issues:      Back Pain       Duration: May 2018        Specific cause: Pt. States she thinks it has to do with her posture. Would like to try different stretches and PT type exercises to help relieve the pain.    Description:   Location of pain: upper back both  Character of pain: sharp, burning and fullness  Pain radiation:Upper arms  New numbness or weakness in legs, not attributed to pain:  States has muscle weakness    Intensity: Depends on day, sometimes mild, sometimes severe    History:   Pain interferes with job: Not applicable,   History of back problems: no prior back problems  Sees a specialist for back pain:  No  Therapies tried without relief: chiropractor and stretch    Alleviating factors:   Improved by: massage      Precipitating factors:  Worsened by: Nothing      CVA last year some residual speech and balance issues.   Smoking again  Quit for 3 yrs.   Patch gave blisters. Gum hard on her teeth.   Not ready yet to quit again.      -------------------------------------    Problem list and histories reviewed & adjusted, as indicated.  Additional history: as documented    Labs reviewed in EPIC    Reviewed and updated as needed this visit by clinical staff       Reviewed and updated as needed this visit by Provider         ROS:   ROS: 10 point ROS neg other than the symptoms noted above in the HPI.      OBJECTIVE:                                                    /68 (BP Location: Right arm, Patient Position: Sitting, Cuff Size: Adult Regular)  Pulse 76  Temp 98.3  F (36.8  C) (Tympanic)  Resp 20  Wt 148 lb (67.1 kg)  BMI 24.25 kg/m2  Body mass index is 24.25 kg/(m^2).   GENERAL: healthy, alert, well nourished, well hydrated, no distress  HENT: ear canals- normal; TMs- normal; Nose- normal; Mouth- no ulcers, no lesions  NECK: no tenderness, no adenopathy, no asymmetry, no masses, no  stiffness; thyroid- normal to palpation  RESP: distant breath sounds   CV: regular rates and rhythm, normal S1 S2, no S3 or S4 and no murmur, no click or rub -  ABDOMEN: soft, no tenderness, no  hepatosplenomegaly, no masses, normal bowel sounds  MS: extremities- no gross deformities noted, no edema  BACK: no CVA tenderness, no paralumbar tenderness mid thoracic tenderness. Limited range of motion to C spine due to discomfort with rotation and flexion. Trapezius tenderness left and mid thoracic spine.   CMS to UE intact. Radial pulses 2+     Diagnostic test results:  Results for orders placed or performed in visit on 08/30/18   INR point of care   Result Value Ref Range    INR Protime 2.6 (A) 0.86 - 1.14          ASSESSMENT/PLAN:                                                    1. Strain of thoracic region, subsequent encounter  Rest, Ice topical agents. Make appt to see PT  - XR Thoracic Spine 2 Views; Future  - PHYSICAL THERAPY REFERRAL    2. Cervicalgia  Films today.   Begin PT  - XR Cervical Spine 2/3 Views; Future  - PHYSICAL THERAPY REFERRAL    3. Hyperlipidemia LDL goal <70  LDL Cholesterol Calculated   Date Value Ref Range Status   03/16/2018 54 <100 mg/dL Final     Comment:     Desirable:       <100 mg/dl     Continue statin  - atorvastatin (LIPITOR) 40 MG tablet; Take 1 tablet (40 mg) by mouth daily  Dispense: 90 tablet; Refill: 3    4. Chronic bilateral thoracic back pain  Smoker menopausal   - DX Hip/Pelvis/Spine; Future    5. Chronic bilateral low back pain without sciatica  - XR Lumbar Spine 2/3 Views; Future    6. Thiamine deficiency  - Vitamin B1 whole blood; Future      Follow up with Provider - Call or return to the clinic with any worsening of symptoms or no resolution. Patient/Parent verbalized understanding and is in agreement. Medication side effects reviewed.   Current Outpatient Prescriptions   Medication Sig Dispense Refill     ascorbic acid (VITAMIN C) 250 MG CHEW chewable tablet Take 250  mg by mouth daily       atorvastatin (LIPITOR) 40 MG tablet Take 1 tablet (40 mg) by mouth daily 90 tablet 3     cyanocobalamin (VITAMIN  B-12) 1000 MCG tablet Take 1,000 mcg by mouth       hydrochlorothiazide (HYDRODIURIL) 25 MG tablet Take 12.5 mg by mouth       HYDROcodone-acetaminophen (NORCO) 5-325 MG per tablet Take 1 tablet by mouth every 6 hours as needed for severe pain maximum 6 tablet(s) per day 12 tablet 0     MAGNESIUM PO Take 250 mg by mouth       metoprolol succinate (TOPROL-XL) 50 MG 24 hr tablet Take 1 tablet (50 mg) by mouth daily 90 tablet 1     Multiple Vitamin (MULTI-VITAMINS) TABS Take 1 tablet by mouth       thiamine 100 MG tablet Take 1 tablet (100 mg) by mouth daily       warfarin (COUMADIN) 5 MG tablet 7.5 mg daily or as instructed by anticoagulation clinic 110 tablet 0        See Patient Instructions    SELWYN Dennis [facility]

## 2018-09-07 NOTE — PATIENT INSTRUCTIONS
General Neck and Back Pain    Both neck and back pain are usually caused by injury to the muscles or ligaments of the spine. Sometimes the disks that separate each bone of the spine may cause pain by pressing on a nearby nerve. Back and neck pain may appear after a sudden twisting or bending force (such as in a car accident), or sometimes after a simple awkward movement. In either case, muscle spasm is often present and adds to the pain.  Acute neck and back pain usually gets better in 1 to 2 weeks. Pain related to disk disease, arthritis in the spinal joints or spinal stenosis (narrowing of the spinal canal) can become chronic and last for months or years.  Back and neck pain are common problems. Most people feel better in 1 or 2 weeks, and most of the rest in 1 to 2 months. Most people can remain active.  People have and describe pain differently.    Pain can be sharp, stabbing, shooting, aching, cramping, or burning    Movement, standing, bending, lifting, sitting, or walking may worsen the pain    Pain can be localized to one spot or area, or it can be more generalized    Pain can spread or radiate upwards, downwards, to the front, or go down your arms    Muscle spasm may occur.  Most of the time mechanical problems with the muscles or spine cause the pain. it is usually caused by an injury, whether known or not, to the muscles or ligaments. While illnesses can cause back pain, it is usually not caused by a serious illness. Pain is usually related to physical activity, whether sports, exercise, work, or normal activity. Sometimes it can occur without an identifiable cause. This can happen simply by stretching or moving wrong, without noting pain at the time. Other causes include:    Overexertion, lifting, pushing, pulling incorrectly or too aggressively.    Sudden twisting, bending or stretching from an accident (car or fall), or accidental movement.    Poor posture    Poor conditioning, lack of regular  exercise    Spinal disc disease or arthritis    Stress    Pregnancy, or illness like appendicitis, bladder or kidney infection, pelvic infections   Home care    For neck pain: Use a comfortable pillow that supports the head and keeps the spine in a neutral position. The position of the head should not be tilted forward or backward.    When in bed, try to find a position of comfort. A firm mattress is best. Try lying flat on your back with pillows under your knees. You can also try lying on your side with your knees bent up towards your chest and a pillow between your knees.    At first, do not try to stretch out the sore spots. If there is a strain, it is not like the good soreness you get after exercising without an injury. In this case, stretching may make it worse.    Don't sit for long periods, as in long car rides or other travel. This puts more stress on the lower back than standing or walking.    During the first 24 to 72 hours after an injury, apply an ice pack to the painful area for 20 minutes and then remove it for 20 minutes over a period of 60 to 90 minutes or several times a day.     You can alternate ice and heat therapies. Talk with your healthcare provider about the best treatment for your back or neck pain. As a safety precaution, do not use a heating pad at bedtime. Sleeping with a heating pad can lead to skin burns or tissue damage.    Therapeutic massage can help relax the back and neck muscles without stretching them.    Be aware of safe lifting methods and do not lift anything over 15 pounds until all the pain is gone.  Medicines  Talk to your healthcare provider before using medicine, especially if you have other medical problems or are taking other medicines.    You may use over-the-counter medicine to control pain, unless another pain medicine was prescribed. If you have chronic conditions like diabetes, liver or kidney disease, stomach ulcers,  gastrointestinal bleeding, or are taking  blood thinner medicines.    Be careful if you are given pain medicines, narcotics, or medicine for muscle spasm. They can cause drowsiness, and can affect your coordination, reflexes, and judgment. Do not drive or operate heavy machinery.  Follow-up care  Follow up with your healthcare provider, or as advised. Physical therapy or further tests may be needed.  If X-rays were taken, you will be notified of any new findings that may affect your care.  Call 911  Call 911 if any of the following occur:    Trouble breathing    Confusion    Very drowsy or trouble awakening    Fainting or loss of consciousness    Rapid or very slow heart rate    Loss of bowel or bladder control  When to seek medical advice  Call your healthcare provider right away if any of these occur:    Pain becomes worse or spreads into your arms or legs    Weakness, numbness or pain in one or both arms or legs    Numbness in the groin area    Difficulty walking    Fever of 100.4 F (38 C) or higher, or as directed by your healthcare provider  Date Last Reviewed: 7/1/2016 2000-2017 [employer]. [address] [place] [address]. All rights reserved. This information is not intended as a substitute for professional medical care. Always follow your healthcare professional's instructions.        Back Care Tips    Caring for your back  These are things you can do to prevent a recurrence of acute back pain and to reduce symptoms from chronic back pain:    Maintain a healthy weight. If you are overweight, losing weight will help most types of back pain.    Exercise is an important part of recovery from most types of back pain. The muscles behind and in front of the spine support the back. This means strengthening both the back muscles and the abdominal muscles will provide better support for your spine.     Swimming and brisk walking are good overall exercises to improve your fitness level.    Practice safe lifting methods (below).     Practice good posture when sitting, standing and walking. Avoid prolonged sitting. This puts more stress on the lower back than standing or walking.    Wear quality shoes with sufficient arch support. Foot and ankle alignment can affect back symptoms. Women should avoid wearing high heels.    Therapeutic massage can help relax the back muscles without stretching them.    During the first 24 to 72 hours after an acute injury or flare-up of chronic back pain, apply an ice pack to the painful area for 20 minutes and then remove it for 20 minutes, over a period of 60 to 90 minutes, or several times a day. As a safety precaution, do not use a heating pad at bedtime. Sleeping on a heating pad can lead to skin burns or tissue damage.    You can alternate ice and heat therapies.  Medicines  Talk to your healthcare provider before using medicines, especially if you have other medical problems or are taking other medicines.    You may use acetaminophen or ibuprofen to control pain, unless your healthcare provider prescribed other pain medicine. If you have chronic conditions like diabetes, liver or kidney disease, stomach ulcers, or gastrointestinal bleeding, or are taking blood thinners, talk with your healthcare provider before taking any medicines.    Be careful if you are given prescription pain medicines, narcotics, or medicine for muscle spasm. They can cause drowsiness, affect your coordination, reflexes, and judgment. Do not drive or operate heavy machinery while taking these types of medicines. Take prescription pain medicine only as prescribed by your healthcare provider.  Lumbar stretch  Here is a simple stretching exercise that will help relax muscle spasm and keep your back more limber. If exercise makes your back pain worse, don t do it.    Lie on your back with your knees bent and both feet on the ground.    Slowly raise your left knee to your chest as you flatten your lower back against the floor. Hold for 5  seconds.    Relax and repeat the exercise with your right knee.    Do 10 of these exercises for each leg.  Safe lifting method    Don t bend over at the waist to lift an object off the floor.  Instead, bend your knees and hips in a squat.     Keep your back and head upright    Hold the object close to your body, directly in front of you.    Straighten your legs to lift the object.     Lower the object to the floor in the reverse fashion.    If you must slide something across the floor, push it.  Posture tips  Sitting  Sit in chairs with straight backs or low-back support. Keep your knees lower than your hips, with your feet flat on the floor.  When driving, sit up straight. Adjust the seat forward so you are not leaning toward the steering wheel.  A small pillow or rolled towel behind your lower back may help if you are driving long distances.   Standing  When standing for long periods, shift most of your weight to one leg at a time. Alternate legs every few minutes.   Sleeping  The best way to sleep is on your side with your knees bent. Put a low pillow under your head to support your neck in a neutral spine position. Avoid thick pillows that bend your neck to one side. Put a pillow between your legs to further relax your lower back. If you sleep on your back, put pillows under your knees to support your legs in a slightly flexed position. Use a firm mattress. If your mattress sags, replace it, or use a 1/2-inch plywood board under the mattress to add support.  Follow-up care  Follow up with your healthcare provider, or as advised.  If X-rays, a CT scan or an MRI scan were taken, they will be reviewed by a radiologist. You will be notified of any new findings that may affect your care.  Call 911  Call 911 if any of the following occur:    Trouble breathing    Confusion    Very drowsy    Fainting or loss of consciousness    Rapid or very slow heart rate    Loss of  bowel or bladder control  When to seek medical  advice  Call your healthcare provider right away if any of the following occur:    Pain becomes worse or spreads to your arms or legs    Weakness or numbness in one or both arms or legs    Numbness in the groin area  Date Last Reviewed: 6/1/2016 2000-2017 [employer]. [address] [place] [address]. All rights reserved. This information is not intended as a substitute for professional medical care. Always follow your healthcare professional's instructions.        Back Safety: Basics of Good Posture  Good posture helps protect you from injury. It also increases your comfort. Aim for good posture throughout the day.    Check your posture  The human body works best when it is properly aligned. To improve your standing posture, follow these steps:    Take a moment to close your eyes and feel your body. Then breathe deeply and relax your shoulders, hips, and knees.    Now, from the very top of your head, lift up just a bit. Think of a line linking your ears, shoulders, hips, and ankles. Adjust your body to follow the line. You may need to relax your hips and tuck your buttocks under a bit.    Next, take a look at yourself in a mirror. Is one ear, shoulder, or hip higher than the other? They should be level.  Check how you sit  When you sit properly, pressure on your back is reduced. Try these steps:    Sit so that the curve of your lower back fits easily against the chair. Keep your gaze level.    Support your feet. They should be flat on the floor or on a footrest. Your knees should be level with your hips.    Adjust the chair height as needed. Sit so your forearms are level with the work surface.  Proper posture helps  When your back is aligned, it s more likely to stay safe throughout the day.    Standing in place. Rest one foot on a stool or low box to ease pressure on your lower back. Switch feet often. If you can, adjust the height of your work surface so your neck and shoulders aren t  under strain.    Driving. Sit close enough to the steering wheel to keep your knees slightly bent. For comfort, your knees should be level with your hips or just a bit lower. Sit as straight as you can. The curve of your lower back should be fully supported.    Walking. Stand tall and walk with your head up. Let your arms swing while you walk. This helps relax muscles. Wear shoes that fit and support your feet. If you will be standing or walking for a long time, don t wear high heels.    Sitting and sleeping. Choose your furniture with care. Make sure it s not causing or increasing your back pain. Chairs should allow for comfortable, correct sitting posture. Use pillows for added support if needed. Your bed should support your back s natural curves without being too hard or too soft.  Date Last Reviewed: 10/18/2015    9068-9655 [employer]. [address] [place] [address]. All rights reserved. This information is not intended as a substitute for professional medical care. Always follow your healthcare professional's instructions.        Relieving Tension in Your Back  Being relaxed helps keep your mind healthy and your back ready to move. Take short breaks often. Walk around. Stretch. Switch tasks. Also give the following a try.  Make time to relax. Start by setting aside 5 minutes daily.   Deep breathing    Deep breathing is a simple way to reduce stress. You can do it almost any time you need to relax.    Inhale slowly through your nose. Let your lungs and stomach expand.    Hold your breath for 2 to 3 seconds.    Exhale slowly through your mouth until your lungs feel empty. Repeat 3 to 4 times.  Relieve tension  Muscle tension can create tender spots called trigger points. The tips below may help relieve muscle tension.    Press the trigger point if you can reach it. If not, lie on a soft tennis ball, or ask a friend to press the spot. Use steady pressure for 10 to 15 seconds. Breathe  deeply. Repeat a few times.    Massage trigger points with ice for 2 to 5 minutes. Press lightly at first. Slowly increase firmness.  Date Last Reviewed: 10/18/2015    6222-3311 [employer]. [address] [place] [address], [place] [address]. All rights reserved. This information is not intended as a substitute for professional medical care. Always follow your healthcare professional's instructions.        Understanding Your Cholesterol Numbers  The higher your blood cholesterol, the greater your risk for heart attack (acute myocardial infarction), cardiovascular disease, or stroke. High cholesterol can cause any artery in your body to become narrow. That s why you need to know your cholesterol level. If it s high, you can take steps to bring it down. Eating the right foods and getting enough exercise can help. Some people also need medicine to control their cholesterol. Your healthcare provider can help you get started on a plan to control your cholesterol.        Blood flows easily when arteries are clear.      Less blood flows when cholesterol builds up in artery walls.   Checking your cholesterol  Your cholesterol is checked with a simple blood test. The results tell you how much cholesterol you have in your blood. Get checked as often as your healthcare provider suggests. If you have diabetes or high cholesterol, you may need your blood tested as often as every 3 months. As you work to lower your cholesterol, your numbers will change slowly. But they will change. Be patient and stay on track. Discuss your numbers with your healthcare provider.   Your total cholesterol number  A blood test will give you a number for the total amount of cholesterol in your blood. The higher this number, the more likely it is that cholesterol will build up in your blood vessels. Even if your cholesterol is just slightly high, you are at increased risk for health problems.  My total cholesterol is: ________________  Your lipid  numbers  Total cholesterol is just one part of the story. Cholesterol is made up of different kinds of fats (lipids). If your total cholesterol is high, knowing your lipid profile is important. The 2 most important lipids are HDL and LDL. Lipids are checked after you have fasted. This means you don t eat for a certain amount of time before the test is done. Along with cholesterol, triglyceride can also lead to blocked arteries. Triglycerides are another type of fat. Knowing your HDL, LDL, and triglyceride numbers, as well as your total cholesterol gives you a more complete picture of your cholesterol level:    HDL is called the  good  cholesterol. It moves out of the bloodstream and does not block your blood vessels. HDL levels are affected by how much you exercise and what you eat.My HDL cholesterol is: ________________    LDL is called the  bad  cholesterol. This is because it can stick to your artery walls and block blood flow. LDL levels are most affected by what you eat and by your genes.My LDL cholesterol is: ________________    Triglyceride is a type of fat the body uses to store energy. Too much triglyceride can increase your risk for heart disease. Triglyceride levels should be under 150.My triglyceride is:  ________________  Based on your other health issues and risk factors, your healthcare provider may have specific goals for treating your cholesterol. You may need to use different kinds of medicines that work on the different types of cholesterol. Work with your provider to know what your goals of treatment are. Stick with your treatment plan to reach the goals you set.  High-risk groups  Some people may need to take medicines called statins to control their cholesterol. This is in addition to eating a healthy diet and getting regular exercise.  Statins can help you stay healthy. They can also help prevent a heart attack or stroke. You may need to take a statin if you are in one of these groups:     Adults who have had a heart attack or stroke. Or adults who have had peripheral vascular disease, a ministroke (transient ischemic attack), or stable or unstable angina. This group also includes people who have had a procedure to restore blood flow through a blocked artery. These procedures include percutaneous coronary intervention, angioplasty, stent, and open-heart bypass surgery.    Adults who have diabetes. Or adults who are at higher risk of having a heart attack or stroke and have an LDL cholesterol level of 70 to 189 mg/dL    Adults who are 21 years old or older and have an LDL cholesterol level of 190 mg/dL or higher.  If you are in a high-risk group, talk with your healthcare provider about your treatment goals. Make sure you understand why these goals are important, based on your own health history and your family history of heart disease or high cholesterol.  Make a plan to have regular cholesterol checks. You may need to fast before getting this test. Also ask your provider about any side effects your medicines may cause. Let your provider know about any side effects you have. You may need to take more than one medicine to reach the cholesterol goals that you and your provider decide on.  Date Last Reviewed: 10/1/2016    1692-0352 [employer]. [address] [place] [address]. All rights reserved. This information is not intended as a substitute for professional medical care. Always follow your healthcare professional's instructions.        Reach and Hold Exercise       Do this exercise on your hands and knees. Keep your knees under your hips and your hands under your shoulders. Keep your spine in a neutral position (not arched or sagging). Keep your ears in line with your shoulders. Hold for a few seconds before starting the exercise:  1. Tighten your belly muscles and raise one arm straight in front of you, palm down. Hold for 5 seconds, then lower. Repeat 5 times.  2. Do the  exercise again, this time lifting your arm to the side. Repeat 5 times.  3. Do the exercise again, this time lifting your arm backward, palm up. Repeat 5 times.  Switch sides and do each exercise with the other arm.  Date Last Reviewed: 11/1/2017 2000-2017 [employer]. [address] [place] [address]. All rights reserved. This information is not intended as a substitute for professional medical care. Always follow your healthcare professional's instructions.        Shoulder and Upper Back Stretch  To start, stand tall with your ears, shoulders, and hips in line. Your feet should be slightly apart, positioned just under your hips. Focus your eyes directly in front of you.  this position for a few seconds before starting your exercise. This helps increase your awareness of proper posture.          Reach overhead and slightly back with both arms. Keep your shoulders and neck aligned and your elbows behind your shoulders:    With your palms facing the ceiling, turn your fingers inward.    Take a deep breath. Breathe out, and lower your elbows toward your buttocks. Hold for 5 seconds, then return to starting position.    Repeat 3 times.  Date Last Reviewed: 11/1/2017 2000-2017 [employer]. [address] [place] [address]. All rights reserved. This information is not intended as a substitute for professional medical care. Always follow your healthcare professional's instructions.        Shoulder Clock Exercise    To start, stand tall with your ears, shoulders, and hips in line. Your feet should be slightly apart, positioned just under your hips. Focus your eyes directly in front of you.  this position for a few seconds before starting your exercise. This helps increase your awareness of proper posture.    Imagine that your right shoulder is the center of a clock. With the outer point of your shoulder, roll it around to slowly trace the outer edge of the  clock.    Move clockwise first, then counterclockwise.    Repeat 3 to 5 times. Switch shoulders.   Date Last Reviewed: 10/2/2015    9208-1330 [employer]. [address] [place] [address]. All rights reserved. This information is not intended as a substitute for professional medical care. Always follow your healthcare professional's instructions.        Shoulder Shrug Exercise    To start, sit in a chair with your feet flat on the floor. Shift your weight slightly forward to avoid rounding your back. Relax. Keep your ears, shoulders, and hips aligned:    Raise both of your shoulders as high as you can, as if you were trying to touch them to your ears. Keep your head and neck still and relaxed.    Hold for a count of 10. Release.    Repeat 5 times.  For your safety, check with your healthcare provider before starting an exercise program.   Date Last Reviewed: 11/1/2017 2000-2017 [employer]. [address] [place] [address]. All rights reserved. This information is not intended as a substitute for professional medical care. Always follow your healthcare professional's instructions.        Back Exercises: Seated Rotation    To start, sit in a chair with your feet flat on the floor. Shift your weight slightly forward to avoid rounding your back. Relax, and keep your ears, shoulders, and hips aligned:    Fold your arms and elbows just below shoulder height.    Turn from the waist with hips forward. Turn your head last. Do not push through the pain.    Hold for a count of 10 to 30. Return to starting position.    Repeat 3 to 5 times on one side. Then switch sides.  Date Last Reviewed: 10/11/2015    [id number] [employer]. [address] [place] [address]. All rights reserved. This information is not intended as a substitute for professional medical care. Always follow your healthcare professional's instructions.        Common Spine and Disk Problems   The most common serious back problems happen when disks tear, bulge, or rupture. In such cases, an injured disk can no longer cushion the vertebrae and absorb shock. As a result, the rest of your spine may also weaken. This can lead to pain, stiffness, and other symptoms.     Torn annulus      Contained herniated disk      Extruded herniated disk     Torn annulus. A sudden movement may cause a tiny tear in an annulus. Nearby ligaments may stretch.    Contained herniated disk. As a disk wears out, the nucleus may bulge into the annulus and press on nerves.    Extruded herniated disk. When a disk ruptures, its nucleus can squeeze out and irritate a nerve.     Arthritis      Instability      Spondylolisthesis     Arthritis. As disks wear out over time, bone spurs form. These growths can irritate nerves and inflame facets.    Instability. As a disk stretches, the vertebrae slip back and forth. This can put pressure on the annulus.    Spondylolisthesis. Listhesis is a condition in which one vertebra has moved forward or backward, in relation to the one above or below it. This causes a crack (stress fracture) in the areas that link the vertebrae together. This may put pressure on the annulus, stretch the disk, and irritate nerves.  Date Last Reviewed: 10/18/2015    4105-5374 [employer]. [address] [place] [address], [place] [address]. All rights reserved. This information is not intended as a substitute for professional medical care. Always follow your healthcare professional's instructions.        Anatomy of a Normal Spine  The spinal column is a stack of bones (vertebrae) that are  by soft pads of tissue (disks). Each of these bones has a canal that runs top to bottom. Together these canals form a tunnel called the spinal canal. Running through this canal is a bundle of nerves and nerve cells called the spinal cord. These nerves carry signals between the brain and body. The spinal cord is surrounded by  the cerebrospinal fluid and protective layers called meninges, just like the brain.     The spine has three natural curves: the cervical, the thoracic, and the lumbar.   The parts of the spine  The spine is made up of the following parts:    The vertebrae (not including the sacrum and coccyx) are the 24 bones that connect like puzzle pieces to make up the spine.    The lamina of each vertebra forms the back of the spinal canal.    A foramen is a small opening. This is where a nerve, on each side of the spinal cord, leaves the spinal canal.    The transverse process is the wing of bone on either side of each vertebra.    The spinous process is the back part of each vertebra you can feel through your skin.    A disk lies between each of the vertebrae and acts as a cushion.     Two vertebrae with a disk between them   Date Last Reviewed: 10/5/2015    [id number] [employer]. [address] [place] [id number]. All rights reserved. This information is not intended as a substitute for professional medical care. Always follow your healthcare professional's instructions.

## 2018-09-07 NOTE — MR AVS SNAPSHOT
After Visit Summary   9/7/2018    Bebe Alfonso    MRN: [id number]           Patient Information     Date Of Birth          1945        Visit Information        Provider Department      9/7/2018 9:00 AM Diana Ely APRN [facility]        Today's Diagnoses     Strain of thoracic region, subsequent encounter    -  1    Cervicalgia        Hyperlipidemia LDL goal <70        Chronic bilateral thoracic back pain        Chronic bilateral low back pain without sciatica          Care Instructions      General Neck and Back Pain    Both neck and back pain are usually caused by injury to the muscles or ligaments of the spine. Sometimes the disks that separate each bone of the spine may cause pain by pressing on a nearby nerve. Back and neck pain may appear after a sudden twisting or bending force (such as in a car accident), or sometimes after a simple awkward movement. In either case, muscle spasm is often present and adds to the pain.  Acute neck and back pain usually gets better in 1 to 2 weeks. Pain related to disk disease, arthritis in the spinal joints or spinal stenosis (narrowing of the spinal canal) can become chronic and last for months or years.  Back and neck pain are common problems. Most people feel better in 1 or 2 weeks, and most of the rest in 1 to 2 months. Most people can remain active.  People have and describe pain differently.    Pain can be sharp, stabbing, shooting, aching, cramping, or burning    Movement, standing, bending, lifting, sitting, or walking may worsen the pain    Pain can be localized to one spot or area, or it can be more generalized    Pain can spread or radiate upwards, downwards, to the front, or go down your arms    Muscle spasm may occur.  Most of the time mechanical problems with the muscles or spine cause the pain. it is usually caused by an injury, whether known or not, to the muscles or ligaments. While illnesses can cause  back pain, it is usually not caused by a serious illness. Pain is usually related to physical activity, whether sports, exercise, work, or normal activity. Sometimes it can occur without an identifiable cause. This can happen simply by stretching or moving wrong, without noting pain at the time. Other causes include:    Overexertion, lifting, pushing, pulling incorrectly or too aggressively.    Sudden twisting, bending or stretching from an accident (car or fall), or accidental movement.    Poor posture    Poor conditioning, lack of regular exercise    Spinal disc disease or arthritis    Stress    Pregnancy, or illness like appendicitis, bladder or kidney infection, pelvic infections   Home care    For neck pain: Use a comfortable pillow that supports the head and keeps the spine in a neutral position. The position of the head should not be tilted forward or backward.    When in bed, try to find a position of comfort. A firm mattress is best. Try lying flat on your back with pillows under your knees. You can also try lying on your side with your knees bent up towards your chest and a pillow between your knees.    At first, do not try to stretch out the sore spots. If there is a strain, it is not like the good soreness you get after exercising without an injury. In this case, stretching may make it worse.    Don't sit for long periods, as in long car rides or other travel. This puts more stress on the lower back than standing or walking.    During the first 24 to 72 hours after an injury, apply an ice pack to the painful area for 20 minutes and then remove it for 20 minutes over a period of 60 to 90 minutes or several times a day.     You can alternate ice and heat therapies. Talk with your healthcare provider about the best treatment for your back or neck pain. As a safety precaution, do not use a heating pad at bedtime. Sleeping with a heating pad can lead to skin burns or tissue damage.    Therapeutic massage can  help relax the back and neck muscles without stretching them.    Be aware of safe lifting methods and do not lift anything over 15 pounds until all the pain is gone.  Medicines  Talk to your healthcare provider before using medicine, especially if you have other medical problems or are taking other medicines.    You may use over-the-counter medicine to control pain, unless another pain medicine was prescribed. If you have chronic conditions like diabetes, liver or kidney disease, stomach ulcers,  gastrointestinal bleeding, or are taking blood thinner medicines.    Be careful if you are given pain medicines, narcotics, or medicine for muscle spasm. They can cause drowsiness, and can affect your coordination, reflexes, and judgment. Do not drive or operate heavy machinery.  Follow-up care  Follow up with your healthcare provider, or as advised. Physical therapy or further tests may be needed.  If X-rays were taken, you will be notified of any new findings that may affect your care.  Call 911  Call 911 if any of the following occur:    Trouble breathing    Confusion    Very drowsy or trouble awakening    Fainting or loss of consciousness    Rapid or very slow heart rate    Loss of bowel or bladder control  When to seek medical advice  Call your healthcare provider right away if any of these occur:    Pain becomes worse or spreads into your arms or legs    Weakness, numbness or pain in one or both arms or legs    Numbness in the groin area    Difficulty walking    Fever of 100.4 F (38 C) or higher, or as directed by your healthcare provider  Date Last Reviewed: 7/1/2016 2000-2017 [employer]. [address] [place] [address]. All rights reserved. This information is not intended as a substitute for professional medical care. Always follow your healthcare professional's instructions.        Back Care Tips    Caring for your back  These are things you can do to prevent a recurrence of acute back  pain and to reduce symptoms from chronic back pain:    Maintain a healthy weight. If you are overweight, losing weight will help most types of back pain.    Exercise is an important part of recovery from most types of back pain. The muscles behind and in front of the spine support the back. This means strengthening both the back muscles and the abdominal muscles will provide better support for your spine.     Swimming and brisk walking are good overall exercises to improve your fitness level.    Practice safe lifting methods (below).    Practice good posture when sitting, standing and walking. Avoid prolonged sitting. This puts more stress on the lower back than standing or walking.    Wear quality shoes with sufficient arch support. Foot and ankle alignment can affect back symptoms. Women should avoid wearing high heels.    Therapeutic massage can help relax the back muscles without stretching them.    During the first 24 to 72 hours after an acute injury or flare-up of chronic back pain, apply an ice pack to the painful area for 20 minutes and then remove it for 20 minutes, over a period of 60 to 90 minutes, or several times a day. As a safety precaution, do not use a heating pad at bedtime. Sleeping on a heating pad can lead to skin burns or tissue damage.    You can alternate ice and heat therapies.  Medicines  Talk to your healthcare provider before using medicines, especially if you have other medical problems or are taking other medicines.    You may use acetaminophen or ibuprofen to control pain, unless your healthcare provider prescribed other pain medicine. If you have chronic conditions like diabetes, liver or kidney disease, stomach ulcers, or gastrointestinal bleeding, or are taking blood thinners, talk with your healthcare provider before taking any medicines.    Be careful if you are given prescription pain medicines, narcotics, or medicine for muscle spasm. They can cause drowsiness, affect your  coordination, reflexes, and judgment. Do not drive or operate heavy machinery while taking these types of medicines. Take prescription pain medicine only as prescribed by your healthcare provider.  Lumbar stretch  Here is a simple stretching exercise that will help relax muscle spasm and keep your back more limber. If exercise makes your back pain worse, don t do it.    Lie on your back with your knees bent and both feet on the ground.    Slowly raise your left knee to your chest as you flatten your lower back against the floor. Hold for 5 seconds.    Relax and repeat the exercise with your right knee.    Do 10 of these exercises for each leg.  Safe lifting method    Don t bend over at the waist to lift an object off the floor.  Instead, bend your knees and hips in a squat.     Keep your back and head upright    Hold the object close to your body, directly in front of you.    Straighten your legs to lift the object.     Lower the object to the floor in the reverse fashion.    If you must slide something across the floor, push it.  Posture tips  Sitting  Sit in chairs with straight backs or low-back support. Keep your knees lower than your hips, with your feet flat on the floor.  When driving, sit up straight. Adjust the seat forward so you are not leaning toward the steering wheel.  A small pillow or rolled towel behind your lower back may help if you are driving long distances.   Standing  When standing for long periods, shift most of your weight to one leg at a time. Alternate legs every few minutes.   Sleeping  The best way to sleep is on your side with your knees bent. Put a low pillow under your head to support your neck in a neutral spine position. Avoid thick pillows that bend your neck to one side. Put a pillow between your legs to further relax your lower back. If you sleep on your back, put pillows under your knees to support your legs in a slightly flexed position. Use a firm mattress. If your mattress  sags, replace it, or use a 1/2-inch plywood board under the mattress to add support.  Follow-up care  Follow up with your healthcare provider, or as advised.  If X-rays, a CT scan or an MRI scan were taken, they will be reviewed by a radiologist. You will be notified of any new findings that may affect your care.  Call 911  Call 911 if any of the following occur:    Trouble breathing    Confusion    Very drowsy    Fainting or loss of consciousness    Rapid or very slow heart rate    Loss of  bowel or bladder control  When to seek medical advice  Call your healthcare provider right away if any of the following occur:    Pain becomes worse or spreads to your arms or legs    Weakness or numbness in one or both arms or legs    Numbness in the groin area  Date Last Reviewed: 6/1/2016 2000-2017 [employer]. [address] [place], [place] [id number]. All rights reserved. This information is not intended as a substitute for professional medical care. Always follow your healthcare professional's instructions.        Back Safety: Basics of Good Posture  Good posture helps protect you from injury. It also increases your comfort. Aim for good posture throughout the day.    Check your posture  The human body works best when it is properly aligned. To improve your standing posture, follow these steps:    Take a moment to close your eyes and feel your body. Then breathe deeply and relax your shoulders, hips, and knees.    Now, from the very top of your head, lift up just a bit. Think of a line linking your ears, shoulders, hips, and ankles. Adjust your body to follow the line. You may need to relax your hips and tuck your buttocks under a bit.    Next, take a look at yourself in a mirror. Is one ear, shoulder, or hip higher than the other? They should be level.  Check how you sit  When you sit properly, pressure on your back is reduced. Try these steps:    Sit so that the curve of your lower back fits easily  against the chair. Keep your gaze level.    Support your feet. They should be flat on the floor or on a footrest. Your knees should be level with your hips.    Adjust the chair height as needed. Sit so your forearms are level with the work surface.  Proper posture helps  When your back is aligned, it s more likely to stay safe throughout the day.    Standing in place. Rest one foot on a stool or low box to ease pressure on your lower back. Switch feet often. If you can, adjust the height of your work surface so your neck and shoulders aren t under strain.    Driving. Sit close enough to the steering wheel to keep your knees slightly bent. For comfort, your knees should be level with your hips or just a bit lower. Sit as straight as you can. The curve of your lower back should be fully supported.    Walking. Stand tall and walk with your head up. Let your arms swing while you walk. This helps relax muscles. Wear shoes that fit and support your feet. If you will be standing or walking for a long time, don t wear high heels.    Sitting and sleeping. Choose your furniture with care. Make sure it s not causing or increasing your back pain. Chairs should allow for comfortable, correct sitting posture. Use pillows for added support if needed. Your bed should support your back s natural curves without being too hard or too soft.  Date Last Reviewed: 10/18/2015    0387-9588 [employer]. [address] [place] [address]. All rights reserved. This information is not intended as a substitute for professional medical care. Always follow your healthcare professional's instructions.        Relieving Tension in Your Back  Being relaxed helps keep your mind healthy and your back ready to move. Take short breaks often. Walk around. Stretch. Switch tasks. Also give the following a try.  Make time to relax. Start by setting aside 5 minutes daily.   Deep breathing    Deep breathing is a simple way to reduce  stress. You can do it almost any time you need to relax.    Inhale slowly through your nose. Let your lungs and stomach expand.    Hold your breath for 2 to 3 seconds.    Exhale slowly through your mouth until your lungs feel empty. Repeat 3 to 4 times.  Relieve tension  Muscle tension can create tender spots called trigger points. The tips below may help relieve muscle tension.    Press the trigger point if you can reach it. If not, lie on a soft tennis ball, or ask a friend to press the spot. Use steady pressure for 10 to 15 seconds. Breathe deeply. Repeat a few times.    Massage trigger points with ice for 2 to 5 minutes. Press lightly at first. Slowly increase firmness.  Date Last Reviewed: 10/18/2015    7246-4060 [employer]. [address] [place] [address], [place] [address]. All rights reserved. This information is not intended as a substitute for professional medical care. Always follow your healthcare professional's instructions.        Understanding Your Cholesterol Numbers  The higher your blood cholesterol, the greater your risk for heart attack (acute myocardial infarction), cardiovascular disease, or stroke. High cholesterol can cause any artery in your body to become narrow. That s why you need to know your cholesterol level. If it s high, you can take steps to bring it down. Eating the right foods and getting enough exercise can help. Some people also need medicine to control their cholesterol. Your healthcare provider can help you get started on a plan to control your cholesterol.        Blood flows easily when arteries are clear.      Less blood flows when cholesterol builds up in artery walls.   Checking your cholesterol  Your cholesterol is checked with a simple blood test. The results tell you how much cholesterol you have in your blood. Get checked as often as your healthcare provider suggests. If you have diabetes or high cholesterol, you may need your blood tested as often as every 3  months. As you work to lower your cholesterol, your numbers will change slowly. But they will change. Be patient and stay on track. Discuss your numbers with your healthcare provider.   Your total cholesterol number  A blood test will give you a number for the total amount of cholesterol in your blood. The higher this number, the more likely it is that cholesterol will build up in your blood vessels. Even if your cholesterol is just slightly high, you are at increased risk for health problems.  My total cholesterol is: ________________  Your lipid numbers  Total cholesterol is just one part of the story. Cholesterol is made up of different kinds of fats (lipids). If your total cholesterol is high, knowing your lipid profile is important. The 2 most important lipids are HDL and LDL. Lipids are checked after you have fasted. This means you don t eat for a certain amount of time before the test is done. Along with cholesterol, triglyceride can also lead to blocked arteries. Triglycerides are another type of fat. Knowing your HDL, LDL, and triglyceride numbers, as well as your total cholesterol gives you a more complete picture of your cholesterol level:    HDL is called the  good  cholesterol. It moves out of the bloodstream and does not block your blood vessels. HDL levels are affected by how much you exercise and what you eat.My HDL cholesterol is: ________________    LDL is called the  bad  cholesterol. This is because it can stick to your artery walls and block blood flow. LDL levels are most affected by what you eat and by your genes.My LDL cholesterol is: ________________    Triglyceride is a type of fat the body uses to store energy. Too much triglyceride can increase your risk for heart disease. Triglyceride levels should be under 150.My triglyceride is:  ________________  Based on your other health issues and risk factors, your healthcare provider may have specific goals for treating your cholesterol. You may  need to use different kinds of medicines that work on the different types of cholesterol. Work with your provider to know what your goals of treatment are. Stick with your treatment plan to reach the goals you set.  High-risk groups  Some people may need to take medicines called statins to control their cholesterol. This is in addition to eating a healthy diet and getting regular exercise.  Statins can help you stay healthy. They can also help prevent a heart attack or stroke. You may need to take a statin if you are in one of these groups:    Adults who have had a heart attack or stroke. Or adults who have had peripheral vascular disease, a ministroke (transient ischemic attack), or stable or unstable angina. This group also includes people who have had a procedure to restore blood flow through a blocked artery. These procedures include percutaneous coronary intervention, angioplasty, stent, and open-heart bypass surgery.    Adults who have diabetes. Or adults who are at higher risk of having a heart attack or stroke and have an LDL cholesterol level of 70 to 189 mg/dL    Adults who are 21 years old or older and have an LDL cholesterol level of 190 mg/dL or higher.  If you are in a high-risk group, talk with your healthcare provider about your treatment goals. Make sure you understand why these goals are important, based on your own health history and your family history of heart disease or high cholesterol.  Make a plan to have regular cholesterol checks. You may need to fast before getting this test. Also ask your provider about any side effects your medicines may cause. Let your provider know about any side effects you have. You may need to take more than one medicine to reach the cholesterol goals that you and your provider decide on.  Date Last Reviewed: 10/1/2016    0974-4644 [employer]. [address] [place] [address], [place] [address]. All rights reserved. This information is not intended as a  substitute for professional medical care. Always follow your healthcare professional's instructions.        Reach and Hold Exercise       Do this exercise on your hands and knees. Keep your knees under your hips and your hands under your shoulders. Keep your spine in a neutral position (not arched or sagging). Keep your ears in line with your shoulders. Hold for a few seconds before starting the exercise:  1. Tighten your belly muscles and raise one arm straight in front of you, palm down. Hold for 5 seconds, then lower. Repeat 5 times.  2. Do the exercise again, this time lifting your arm to the side. Repeat 5 times.  3. Do the exercise again, this time lifting your arm backward, palm up. Repeat 5 times.  Switch sides and do each exercise with the other arm.  Date Last Reviewed: 11/1/2017 2000-2017 [employer]. [address] [place] [address]. All rights reserved. This information is not intended as a substitute for professional medical care. Always follow your healthcare professional's instructions.        Shoulder and Upper Back Stretch  To start, stand tall with your ears, shoulders, and hips in line. Your feet should be slightly apart, positioned just under your hips. Focus your eyes directly in front of you.  this position for a few seconds before starting your exercise. This helps increase your awareness of proper posture.          Reach overhead and slightly back with both arms. Keep your shoulders and neck aligned and your elbows behind your shoulders:    With your palms facing the ceiling, turn your fingers inward.    Take a deep breath. Breathe out, and lower your elbows toward your buttocks. Hold for 5 seconds, then return to starting position.    Repeat 3 times.  Date Last Reviewed: 11/1/2017 2000-2017 [employer]. [address] [place] [address]. All rights reserved. This information is not intended as a substitute for professional medical  care. Always follow your healthcare professional's instructions.        Shoulder Clock Exercise    To start, stand tall with your ears, shoulders, and hips in line. Your feet should be slightly apart, positioned just under your hips. Focus your eyes directly in front of you.  this position for a few seconds before starting your exercise. This helps increase your awareness of proper posture.    Imagine that your right shoulder is the center of a clock. With the outer point of your shoulder, roll it around to slowly trace the outer edge of the clock.    Move clockwise first, then counterclockwise.    Repeat 3 to 5 times. Switch shoulders.   Date Last Reviewed: 10/2/2015    [id number] [employer]. [address] [place] [address]. All rights reserved. This information is not intended as a substitute for professional medical care. Always follow your healthcare professional's instructions.        Shoulder Shrug Exercise    To start, sit in a chair with your feet flat on the floor. Shift your weight slightly forward to avoid rounding your back. Relax. Keep your ears, shoulders, and hips aligned:    Raise both of your shoulders as high as you can, as if you were trying to touch them to your ears. Keep your head and neck still and relaxed.    Hold for a count of 10. Release.    Repeat 5 times.  For your safety, check with your healthcare provider before starting an exercise program.   Date Last Reviewed: 11/1/2017 2000-2017 [employer]. [address] [place] [address]. All rights reserved. This information is not intended as a substitute for professional medical care. Always follow your healthcare professional's instructions.        Back Exercises: Seated Rotation    To start, sit in a chair with your feet flat on the floor. Shift your weight slightly forward to avoid rounding your back. Relax, and keep your ears, shoulders, and hips aligned:    Fold your arms and  elbows just below shoulder height.    Turn from the waist with hips forward. Turn your head last. Do not push through the pain.    Hold for a count of 10 to 30. Return to starting position.    Repeat 3 to 5 times on one side. Then switch sides.  Date Last Reviewed: 10/11/2015    [id number] [employer]. [address] [place] [address]. All rights reserved. This information is not intended as a substitute for professional medical care. Always follow your healthcare professional's instructions.        Common Spine and Disk Problems  The most common serious back problems happen when disks tear, bulge, or rupture. In such cases, an injured disk can no longer cushion the vertebrae and absorb shock. As a result, the rest of your spine may also weaken. This can lead to pain, stiffness, and other symptoms.     Torn annulus      Contained herniated disk      Extruded herniated disk     Torn annulus. A sudden movement may cause a tiny tear in an annulus. Nearby ligaments may stretch.    Contained herniated disk. As a disk wears out, the nucleus may bulge into the annulus and press on nerves.    Extruded herniated disk. When a disk ruptures, its nucleus can squeeze out and irritate a nerve.     Arthritis      Instability      Spondylolisthesis     Arthritis. As disks wear out over time, bone spurs form. These growths can irritate nerves and inflame facets.    Instability. As a disk stretches, the vertebrae slip back and forth. This can put pressure on the annulus.    Spondylolisthesis. Listhesis is a condition in which one vertebra has moved forward or backward, in relation to the one above or below it. This causes a crack (stress fracture) in the areas that link the vertebrae together. This may put pressure on the annulus, stretch the disk, and irritate nerves.  Date Last Reviewed: 10/18/2015    1802-4041 [employer]. [address] [place] [address]69. All rights reserved. This  information is not intended as a substitute for professional medical care. Always follow your healthcare professional's instructions.        Anatomy of a Normal Spine  The spinal column is a stack of bones (vertebrae) that are  by soft pads of tissue (disks). Each of these bones has a canal that runs top to bottom. Together these canals form a tunnel called the spinal canal. Running through this canal is a bundle of nerves and nerve cells called the spinal cord. These nerves carry signals between the brain and body. The spinal cord is surrounded by the cerebrospinal fluid and protective layers called meninges, just like the brain.     The spine has three natural curves: the cervical, the thoracic, and the lumbar.   The parts of the spine  The spine is made up of the following parts:    The vertebrae (not including the sacrum and coccyx) are the 24 bones that connect like puzzle pieces to make up the spine.    The lamina of each vertebra forms the back of the spinal canal.    A foramen is a small opening. This is where a nerve, on each side of the spinal cord, leaves the spinal canal.    The transverse process is the wing of bone on either side of each vertebra.    The spinous process is the back part of each vertebra you can feel through your skin.    A disk lies between each of the vertebrae and acts as a cushion.     Two vertebrae with a disk between them   Date Last Reviewed: 10/5/2015    6130-1706 [employer]. [address] [place] [address] 7[address]65. All rights reserved. This information is not intended as a substitute for professional medical care. Always follow your healthcare professional's instructions.                Follow-ups after your visit        Additional Services     PHYSICAL THERAPY REFERRAL       *This order will print in the [facility] Central Scheduling Office*    [facility] provides Physical Therapy evaluation and treatment  and many specialty services across the [facility] system.  If requesting a specialty program, please choose from the list below.    Call one number to schedule at any [facility] location   [phone].    Treatment: Evaluation & Treatment  Special Instructions/Modalities:   Special Programs: None    Please be aware that coverage of these services is subject to the terms and limitations of your health insurance plan.  Call member services at your health plan with any benefit or coverage questions.      **Note to Provider** To refer patients to therapy outside of the location list, change the order class to External Referral in the order composer.                  Your next 10 appointments already scheduled     Sep 20, 2018 10:45 AM CDT   Anticoagulation Visit with NB ANTI COAG   [facility]    [address] [place] [id number] [phone]              Future tests that were ordered for you today     Open Future Orders        Priority Expected Expires Ordered    DX Hip/Pelvis/Spine Routine  9/7/2019 9/7/2018    XR Lumbar Spine 2/3 Views Routine 9/7/2018 9/7/2019 9/7/2018    XR Cervical Spine 2/3 Views Routine 9/7/2018 9/7/2019 9/7/2018    XR Thoracic Spine 2 Views Routine 9/7/2018 9/7/2019 9/7/2018            Who to contact     If you have questions or need follow up information about today's clinic visit or your schedule please contact [facility] directly at [phone].  Normal or non-critical lab and imaging results will be communicated to you by MyChart, letter or phone within 4 business days after the clinic has received the results. If you do not hear from us within 7 days, please contact the clinic through MyChart or phone. If you have a critical or abnormal lab result, we will notify you by phone as soon as possible.  Submit refill requests through [employer] or call your pharmacy and they will forward the  refill request to us. Please allow 3 business days for your refill to be completed.          Additional Information About Your Visit        Bi[employer] Information     [employer] Mobile App gives you secure access to your electronic health record. If you see a primary care provider, you can also send messages to your care team and make appointments. If you have questions, please call your primary care clinic.  If you do not have a primary care provider, please call [phone] and they will assist you.        Care EveryWhere ID     This is your Care EveryWhere ID. This could be used by other organizations to access your [facility] medical records  [id number]        Your Vitals Were     Pulse Temperature Respirations BMI (Body Mass Index)          76 98.3  F (36.8  C) (Tympanic) 20 24.25 kg/m2         Blood Pressure from Last 3 Encounters:   09/07/18 150/68   05/01/18 160/74   03/16/18 160/78    Weight from Last 3 Encounters:   09/07/18 148 lb (67.1 kg)   05/01/18 153 lb 6.4 oz (69.6 kg)   03/16/18 155 lb (70.3 kg)              We Performed the Following     PHYSICAL THERAPY REFERRAL          Today's Medication Changes          These changes are accurate as of 9/7/18 10:11 AM.  If you have any questions, ask your nurse or doctor.               These medicines have changed or have updated prescriptions.        Dose/Directions    atorvastatin 40 MG tablet   Commonly known as:  LIPITOR   This may have changed:  when to take this   Used for:  Hyperlipidemia LDL goal <70   Changed by:  Diana Ely APRN CNP        Dose:  40 mg   Take 1 tablet (40 mg) by mouth daily   Quantity:  90 tablet   Refills:  3         Stop taking these medicines if you haven't already. Please contact your care team if you have questions.     thiamine 100 MG tablet   Stopped by:  Diana Ely APRN CNP                Where to get your medicines      These medications were sent to [facility] [address] [place] [address]   [address] [facility] [address]     Phone:  [phone]     atorvastatin 40 MG tablet                Primary Care Provider Office Phone # Fax #    Diana Ely, SELWYN [facility] [phone] [address] [place] [address]        Equal Access to Services     POLINA CUEVAS : Hadii jamari ku hadasho Soomaali, waaxda luqadaha, qaybta kaalmada adeegyada, waxay chinain hayaan erika blancaboni arcos. So [facility] [phone].    ATENCIÓN: Si habla español, tiene a ruiz disposición servicios gratuitos de asistencia lingüística. [facility] [phone].    We comply with applicable federal civil rights laws and [place] laws. We do not discriminate on the basis of race, color, national origin, age, disability, sex, sexual orientation, or gender identity.            Thank you!     Thank you for choosing [facility]  for your care. Our goal is always to provide you with excellent care. Hearing back from our patients is one way we can continue to improve our services. Please take a few minutes to complete the written survey that you may receive in the mail after your visit with us. Thank you!             Your Updated Medication List - Protect others around you: Learn how to safely use, store and throw away your medicines at www.disposemymeds.org.          This list is accurate as of 9/7/18 10:11 AM.  Always use your most recent med list.                   Brand Name Dispense Instructions for use Diagnosis    ascorbic acid 250 MG Chew chewable tablet    vitamin C     Take 250 mg by mouth daily        atorvastatin 40 MG tablet    LIPITOR    90 tablet    Take 1 tablet (40 mg) by mouth daily    Hyperlipidemia LDL goal <70       cyanocobalamin 1000 MCG tablet    vitamin  B-12     Take 1,000 mcg by mouth        cyclobenzaprine 10 MG tablet    FLEXERIL    30 tablet    Take 0.5-1 tablets (5-10 mg) by mouth 3 times daily as needed for muscle spasms    Back muscle spasm       hydrochlorothiazide 25  MG tablet    HYDRODIURIL     Take 12.5 mg by mouth        HYDROcodone-acetaminophen 5-325 MG per tablet    NORCO    12 tablet    Take 1 tablet by mouth every 6 hours as needed for severe pain maximum 6 tablet(s) per day    Back muscle spasm       MAGNESIUM PO      Take 250 mg by mouth        metoprolol succinate 50 MG 24 hr tablet    TOPROL-XL    90 tablet    Take 1 tablet (50 mg) by mouth daily        MULTI-VITAMINS Tabs      Take 1 tablet by mouth        warfarin 5 MG tablet    COUMADIN    110 tablet    7.5 mg daily or as instructed by anticoagulation clinic

## 2018-09-08 ASSESSMENT — ANXIETY QUESTIONNAIRES: GAD7 TOTAL SCORE: 4

## 2018-09-08 ASSESSMENT — PATIENT HEALTH QUESTIONNAIRE - PHQ9: SUM OF ALL RESPONSES TO PHQ QUESTIONS 1-9: 2

## 2018-09-11 ENCOUNTER — HOSPITAL ENCOUNTER (OUTPATIENT)
Dept: PHYSICAL THERAPY | Facility: CLINIC | Age: 73
Setting detail: THERAPIES SERIES
End: 2018-09-11
Attending: NURSE PRACTITIONER
Payer: MEDICARE

## 2018-09-11 ENCOUNTER — TELEPHONE (OUTPATIENT)
Dept: FAMILY MEDICINE | Facility: CLINIC | Age: 73
End: 2018-09-11

## 2018-09-11 PROCEDURE — 97110 THERAPEUTIC EXERCISES: CPT | Mod: GP | Performed by: PHYSICAL MEDICINE & REHABILITATION

## 2018-09-11 PROCEDURE — 97161 PT EVAL LOW COMPLEX 20 MIN: CPT | Mod: GP | Performed by: PHYSICAL MEDICINE & REHABILITATION

## 2018-09-11 PROCEDURE — 97140 MANUAL THERAPY 1/> REGIONS: CPT | Mod: GP | Performed by: PHYSICAL MEDICINE & REHABILITATION

## 2018-09-11 PROCEDURE — G8981 BODY POS CURRENT STATUS: HCPCS | Mod: GP,CJ | Performed by: PHYSICAL MEDICINE & REHABILITATION

## 2018-09-11 PROCEDURE — G8982 BODY POS GOAL STATUS: HCPCS | Mod: GP,CI | Performed by: PHYSICAL MEDICINE & REHABILITATION

## 2018-09-11 PROCEDURE — 40000718 ZZHC STATISTIC PT DEPARTMENT ORTHO VISIT: Performed by: PHYSICAL MEDICINE & REHABILITATION

## 2018-09-11 RX ORDER — LANOLIN ALCOHOL/MO/W.PET/CERES
100 CREAM (GRAM) TOPICAL DAILY
COMMUNITY
Start: 2018-09-11 | End: 2019-04-04

## 2018-09-11 NOTE — PROGRESS NOTES
" 09/11/18 1400   General Information   Type of Visit Initial OP Ortho PT Evaluation   Start of Care Date 09/11/18   Referring Physician Diana Ely APRN CNP   Patient/Family Goals Statement decrease pain in B shoulders and neck, weakness in B UE   Orders Evaluate and Treat   Date of Order 09/07/18   Insurance Type Medicare;Other   Insurance Comments/Visits Authorized MEDICARE FOR HB SUPP;  MEDICA PRIME: G-Codes are required, NO IONTOPHORESIS, NO GROUP THERAPY COVERAGE   Medical Diagnosis Strain of thoracic region, subsequent encounter; Cervicalgia   Surgical/Medical history reviewed Yes   Precautions/Limitations no known precautions/limitations   General Information Comments PMHx per pt report: 4 strokes, high blood pressure, smoking   Body Part(s)   Body Part(s) Cervical Spine   Presentation and Etiology   Pertinent history of current problem (include personal factors and/or comorbidities that impact the POC) Per CNP via EPIC: \"Started May 2018. Pt states she thinks it has to do with her posture. Would like to try different stretches and PT type exercises to help relieve the pain. CVA last year. Smoking again.\" Pt notes to PT pain started about a week and a half ago. Pain in primarily in low neck/upper back but can also span down into low back. Pt notes she has had stiff neck since grade school.   Impairments A. Pain;E. Decreased flexibility   Functional Limitations perform activities of daily living   Symptom Location low neck/upper back primarily but does span down into low back and B shoulder    How/Where did it occur Other  (while performing yoga at home)   Onset date of current episode/exacerbation 08/31/18  (week and a half ago)   Chronicity New   Pain rating (0-10 point scale) Best (/10);Worst (/10)   Best (/10) 2   Worst (/10) 10   Pain quality B. Dull;C. Aching;D. Burning   Frequency of pain/symptoms A. Constant   Pain/symptoms are: The same all the time  (Pt says pain varies day vs. night) " "  Pain/symptoms exacerbated by M. Other   Pain exacerbation comment Pt notes pain \"just comes up\"; no precurssor   Pain/symptoms eased by I. OTC medication(s)   Progression of symptoms since onset: Unchanged   Current / Previous Interventions   Diagnostic Tests: X-ray   X-ray Results Results   X-ray results XR cervical spine 9/7/2018: Slight reversal of the normal cervical lordosis centered at C4. Anterior posterior alignment of the cervical spine appears to be within normal limits. No significant prevertebral soft tissue swelling or loss of vertebral body height. Marked degenerative changes throughout the cervical spine with loss of disc height and degenerative endplate changes particularly at C4-C5, C5-C6, and C6-C7. Marked facet arthropathy throughout the cervical spine best seen on the frontal view. XR thoracic spine 9/7/2018: Mild multilevel degenerative disc disease. Vertebral body heights maintained. No listhesis. Pedicles are intact. XR lumbar spine 9/7/2018: Alignment of the lumbar spine is within normal limits. Indeterminate lucent lesion within the superior aspect of the L3 vertebral body best seen on the lateral view. Consider further evaluation with lumbar spine MRI. No significant loss of vertebral body height. Mild loss of intervertebral disc space with degenerative endplate changes throughout the lumbar spine. Osteophytic spurring anteriorly and on the right at the L2-L3 level. There appears to be facet arthropathy in the lower lumbar spine. Marked arterial atherosclerotic calcifications.   Prior Level of Function   Prior Level of Function-Mobility independent   Prior Level of Function-ADLs independent   Current Level of Function   Current Community Support Family/friend caregiver   Patient role/employment history F. Retired   [facility]   Home/community accessibility stairs up and downstairs   Current equipment-Gait/Locomotion None   Fall Risk Screen   Fall screen completed by " PT   Have you fallen 2 or more times in the past year? No   Have you fallen and had an injury in the past year? No   Is patient a fall risk? No   Functional Scales   Functional Scales Other   Other Scales  NDI: score=3 (pt notes answers to questions on NDI apply to prior stroke sx or allergies, don't apply to current condition, therefore minimal application of NDI)   Cervical Spine   Integumentary  unremarkable   Posture B rounded shoulders, forwared head. Pt looks at floor while amb   Cervical Flexion ROM chin to chest   Cervical Extension ROM 20* (pt reports tightness felt in posterior neck)   Cervical Right Side Bending ROM 25*    Cervical Left Side Bending ROM 20* (pain in upper thoracic spine on R)   Cervical Right Rotation ROM 48*    Cervical Left Rotation ROM 22* (pain in upper thoracic spine on R)   Thoracic Flexion ROM wnl   Thoracic Extension ROM wfl   Thoracic Right Side Bending ROM wfl   Thoracic Left Sidebending ROM  wfl   Thoracic Right Rotation wfl   Thoracic Left Rotation wfl   Shoulder AROM Screen All motions WNL, pt reports feeling stiffness with shoulder elevation   Shoulder Shrug (C2-C4) Strength 5/5 B   Shoulder Abd (C5) Strength 5/5 B (min pain on R UE)   Shoulder Add (C7) Strength 5/5 B   Shoulder ER (C5, C6) Strength 4+/5 B   Shoulder IR (C5, C6) Strength 4+/5 B   Elbow Flexion (C5, C6) Strength 5/5 B   Elbow Extension (C7) Strength 5/5 B   Wrist Extension (C6) Strength 5/5 B   Wrist Flexion (C7) Strength 5/5 B   Thumb Abd (C8) Strength 5/5 B   5th Finger Add (T1) Strength 5/5 B   Upper Trapezius Flexibility limited B   Pectoralis Minor Flexibility limited B   Cervical Flexibility Comments tight SCM B   Vertebral Artery Test normal    Alar Ligament Test normal   Transverse Ligament Test normal   Spurling Test positive on L   Cervical Distraction Test no change in sx   Neck Flexor Endurance Test (normal 39 sec) poor, unable   Neer Impingement Test neg   [place] Impingement Test neg    Relocation Test neg   Sulcus Sign neg   Segmental Mobility-Cervical limited segmental mobility along all cervical spine in upglide and downglide (2/6 hypomobility dr apple scale of mobility)   Segmental Mobility-Thoracic limited P/A mobility throughout thoracic spine (2/6 dr apple scale of mobility)   Palpation no pain with mod palpation   Dermatome/Sensory Testing decreased sensation with dull touch along dermatomes C5-T1 on R UE compared to L UE   Neurological Testing Comments Negative ULTT B   Thoracic Outlet Syndrome TOS Tests   Elsi' positive on R   Planned Therapy Interventions   Planned Therapy Interventions IADL retraining;ADL retraining;balance training;gait training;joint mobilization;manual therapy;motor coordination training;neuromuscular re-education;ROM;strengthening;stretching   Planned Modality Interventions   Planned Modality Interventions Biofeedback;Ultrasound;Traction;TENS;Shortwave diathermy;Cryotherapy;Electrical stimulation;Hot packs   Clinical Impression   Criteria for Skilled Therapeutic Interventions Met yes, treatment indicated   PT Diagnosis Neck pain   Influenced by the following impairments decreased ROM, decreased strength, impaired posture, pain   Functional limitations due to impairments lifting, carrying   Clinical Presentation Stable/Uncomplicated   Clinical Presentation Rationale PLOF, high motivation, stable comorbidies   Clinical Decision Making (Complexity) Low complexity   Therapy Frequency 2 times/Week   Predicted Duration of Therapy Intervention (days/wks) 8 weeks   Risk & Benefits of therapy have been explained Yes   Patient, Family & other staff in agreement with plan of care Yes   Clinical Impression Comments Pt is a 72 y.o. female who presented to the PT clinic today with a rehab diagnosis neck pain as evidenced by decreased ROM, decreased strength, impaired posture and pain. Pt is appropriate for skilled PT to address previously listed impairements and return pt to  carrying and lifting with less pain.   Education Assessment   Preferred Learning Style Listening;Reading;Demonstration;Pictures/video   Barriers to Learning No barriers   ORTHO GOALS   PT Ortho Eval Goals 2;1;3   Ortho Goal 1   Goal Identifier 1   Goal Description Pt will be able to lift and carry 10# without increase in sx to decrease pain with ADLs.   Target Date 10/23/18   Ortho Goal 2   Goal Identifier 2   Goal Description Pt will be able sit throughout therapy session with good posture and without cues in order to decrease pain.   Target Date 11/06/18   Ortho Goal 3   Goal Identifier 3   Goal Description Pt will be independent with HEP in order to self manage symptoms.   Target Date 11/06/18   Total Evaluation Time   Total Evaluation Time 35   Therapy Certification   Certification date from 09/11/18   Certification date to 11/06/18   Medical Diagnosis Strain of thoracic region, subsequent encounter; Cervicalgia       Please contact me with any questions or concerns.    Thank you for your referral,     Rina Jauregui, PT, DPT  Physical Therapist  [facility] - [address] [address] [phone]

## 2018-09-11 NOTE — PROGRESS NOTES
[facility]          OUTPATIENT PHYSICAL THERAPY ORTHOPEDIC EVALUATION  PLAN OF TREATMENT FOR OUTPATIENT REHABILITATION  (COMPLETE FOR INITIAL CLAIMS ONLY)  Patient's Last Name, First Name, M.I.  YOB: 1945  Bebe Alfonso       Provider s Name:  [facility]   Medical Record No.  [id number]   Start of Care Date:  09/11/18   Onset Date:  08/31/18 (week and a half ago)   Type:     _X__PT   ___OT   ___SLP Medical Diagnosis:  Strain of thoracic region, subsequent encounter; Cervicalgia     PT Diagnosis:  Neck pain   Visits from SOC:  1      _________________________________________________________________________________  Plan of Treatment/Functional Goals:  IADL retraining, ADL retraining, balance training, gait training, joint mobilization, manual therapy, motor coordination training, neuromuscular re-education, ROM, strengthening, stretching     Biofeedback, Ultrasound, Traction, TENS, Shortwave diathermy, Cryotherapy, Electrical stimulation, Hot packs     Goals  Goal Identifier: 1  Goal Description: Pt will be able to lift and carry 10# without increase in sx to decrease pain with ADLs.  Target Date: 10/23/18    Goal Identifier: 2  Goal Description: Pt will be able sit throughout therapy session with good posture and without cues in order to decrease pain.  Target Date: 11/06/18    Goal Identifier: 3  Goal Description: Pt will be independent with HEP in order to self manage symptoms.  Target Date: 11/06/18       Therapy Frequency:  2 times/Week  Predicted Duration of Therapy Intervention:  8 weeks    Rina Jauregui PT                 I CERTIFY THE NEED FOR THESE SERVICES FURNISHED UNDER        THIS PLAN OF TREATMENT AND WHILE UNDER MY CARE     (Physician co-signature of this document indicates review and certification of the therapy plan).                       Certification Date From:  09/11/18   Certification Date To:  11/06/18    Referring Provider:   Diana Ely, SELWYN CNP    Initial Assessment        See Epic Evaluation Start of Care Date: 09/11/18

## 2018-09-11 NOTE — TELEPHONE ENCOUNTER
Reason for Call:  Other     Detailed comments: Patient was looking thru her AVS and has a question regarding some of her medications    Phone Number Patient can be reached at: Home number on file [phone] (home)    Best Time:     Can we leave a detailed message on this number? YES    Call taken on 9/11/2018 at 11:46 AM by Esthela Mendoza

## 2018-09-12 NOTE — TELEPHONE ENCOUNTER
Discussed this with Diana and it is OK to continue with the thiamine if she likes.   I talked with her and she does not want to take it  Humaira Bryan RN

## 2018-09-17 ENCOUNTER — HOSPITAL ENCOUNTER (OUTPATIENT)
Dept: PHYSICAL THERAPY | Facility: CLINIC | Age: 73
Setting detail: THERAPIES SERIES
End: 2018-09-17
Attending: RADIOLOGY
Payer: MEDICARE

## 2018-09-17 PROCEDURE — 97110 THERAPEUTIC EXERCISES: CPT | Mod: GP | Performed by: PHYSICAL MEDICINE & REHABILITATION

## 2018-09-17 PROCEDURE — 40000718 ZZHC STATISTIC PT DEPARTMENT ORTHO VISIT: Performed by: PHYSICAL MEDICINE & REHABILITATION

## 2018-09-17 PROCEDURE — 97140 MANUAL THERAPY 1/> REGIONS: CPT | Mod: GP | Performed by: PHYSICAL MEDICINE & REHABILITATION

## 2018-09-20 ENCOUNTER — ANTICOAGULATION THERAPY VISIT (OUTPATIENT)
Dept: ANTICOAGULATION | Facility: CLINIC | Age: 73
End: 2018-09-20
Payer: COMMERCIAL

## 2018-09-20 DIAGNOSIS — Z79.01 LONG TERM CURRENT USE OF ANTICOAGULANT THERAPY: ICD-10-CM

## 2018-09-20 DIAGNOSIS — Z86.73 HISTORY OF CVA (CEREBROVASCULAR ACCIDENT): ICD-10-CM

## 2018-09-20 LAB — INR POINT OF CARE: 2.3 (ref 0.86–1.14)

## 2018-09-20 PROCEDURE — 85610 PROTHROMBIN TIME: CPT | Mod: QW

## 2018-09-20 PROCEDURE — 99207 ZZC NO CHARGE NURSE ONLY: CPT

## 2018-09-20 PROCEDURE — 36416 COLLJ CAPILLARY BLOOD SPEC: CPT

## 2018-09-20 NOTE — MR AVS SNAPSHOT
Bebe Alfonso   9/20/2018 10:45 AM   Anticoagulation Therapy Visit    Description:  72 year old female   Provider:  NB ANTI COAG   Department:  [facility]           INR as of 9/20/2018     Today's INR 2.3      Anticoagulation Summary as of 9/20/2018     INR goal 2.0-3.0   Today's INR 2.3   Full warfarin instructions 7.5 mg every day   Next INR check 10/25/2018    Indications   History of CVA (cerebrovascular accident) [Z86.73]  Long term current use of anticoagulant therapy [Z79.01]         Instructions    Keep your body moving when traveling by airplane or vehicle.    Seated Exercises:    Ankle Circles: Lift your feet off the floor and twirl your feet as if you re drawing circles with your toes. Continue this for 15 seconds, then reverse direction. Repeat as desired.    Foot Pumps: Keep your heels on the floor and lift the front of your feet toward you as high as possible. Hold for a second or two, then flatten your feet and lift your heels as high as possible, keeping the balls of your feet on the floor. Continue for 30 seconds, and repeat as desired.    Knee Lifts: Keeping your leg bent, lift your knee up to your chest. Bring back to normal position and repeat with your other leg. Repeat 20 to 30 times for each leg.      General Tips:    If you have an opportunity to move around the cabin, walk to the restroom and back.    Drink plenty of fluids, preferably water, to avoid dehydration.    Walk for 30 minutes before boarding the plane.               Your next Anticoagulation Clinic appointment(s)     Oct 25, 2018  9:00 AM CDT   Anticoagulation Visit with NB ANTI COAG   [facility] ([facility])    [address] [place] [address] [phone]              Contact Numbers     Please call [phone] with any problems or questions regarding your therapy.    If you need to cancel and/or reschedule your appointment please call one of the following  numbers:  [facility] - [phone]  [place] - [phone]  [place] - [phone]  [place] - [phone]  [place] - [phone]            September 2018 Details    Sun Mon Tue Wed Thu Fri Sat           1                 2               3               4               5               6               7               8                 9               10               11               12               13               14               15                 16               17               18               19               20      7.5 mg   See details      21      7.5 mg         22      7.5 mg           23      7.5 mg         24      7.5 mg         25      7.5 mg         26      7.5 mg         27      7.5 mg         28      7.5 mg         29      7.5 mg           30      7.5 mg                Date Details   09/20 This INR check               How to take your warfarin dose     To take:  7.5 mg Take 1.5 of the 5 mg tablets.           October 2018 Details    Sun Mon Tue Wed Thu Fri Sat      1      7.5 mg         2      7.5 mg         3      7.5 mg         4      7.5 mg         5      7.5 mg         6      7.5 mg           7      7.5 mg         8      7.5 mg         9      7.5 mg         10      7.5 mg         11      7.5 mg         12      7.5 mg         13      7.5 mg           14      7.5 mg         15      7.5 mg         16      7.5 mg         17      7.5 mg         18      7.5 mg         19      7.5 mg         20      7.5 mg           21      7.5 mg         22      7.5 mg         23      7.5 mg         24      7.5 mg         25            26               27                 28               29               30               31                   Date Details   No additional details    Date of next INR:  10/25/2018         How to take your warfarin dose     To take:  7.5 mg Take 1.5 of the 5 mg tablets.

## 2018-09-20 NOTE — PATIENT INSTRUCTIONS
Keep your body moving when traveling by airplane or vehicle.    Seated Exercises:    Ankle Circles: Lift your feet off the floor and twirl your feet as if you re drawing circles with your toes. Continue this for 15 seconds, then reverse direction. Repeat as desired.    Foot Pumps: Keep your heels on the floor and lift the front of your feet toward you as high as possible. Hold for a second or two, then flatten your feet and lift your heels as high as possible, keeping the balls of your feet on the floor. Continue for 30 seconds, and repeat as desired.    Knee Lifts: Keeping your leg bent, lift your knee up to your chest. Bring back to normal position and repeat with your other leg. Repeat 20 to 30 times for each leg.      General Tips:    If you have an opportunity to move around the cabin, walk to the restroom and back.    Drink plenty of fluids, preferably water, to avoid dehydration.    Walk for 30 minutes before boarding the plane.

## 2018-09-20 NOTE — PROGRESS NOTES
ANTICOAGULATION FOLLOW-UP CLINIC VISIT    Patient Name:  Bebe Alfonso  Date:  9/20/2018  Contact Type:  Face to Face    SUBJECTIVE:     Patient Findings     Positives No Problem Findings    Comments No changes in diet, activity level, medications (including over the counter), or health. No missed doses of warfarin. Patient took dosing as prescribed. No signs of clots or bleeding concerns. Patient will continue maintenance warfarin dosing.    Patient is traveling to [place] this Saturday. Writer printed travel exercises and gave her a list of foods high in vitamin K.            OBJECTIVE    INR Protime   Date Value Ref Range Status   09/20/2018 2.3 (A) 0.86 - 1.14 Final       ASSESSMENT / PLAN  INR assessment THER    Recheck INR In: 5 WEEKS    INR Location Clinic      Anticoagulation Summary as of 9/20/2018     INR goal 2.0-3.0   Today's INR 2.3   Warfarin maintenance plan 7.5 mg (5 mg x 1.5) every day   Full warfarin instructions 7.5 mg every day   Weekly warfarin total 52.5 mg   No change documented Natalie Sorto RN   Plan last modified Luann Moise RN (7/16/2018)   Next INR check 10/25/2018   Priority INR   Target end date Indefinite    Indications   History of CVA (cerebrovascular accident) [Z86.73]  Long term current use of anticoagulant therapy [Z79.01]         Anticoagulation Episode Summary     INR check location     Preferred lab     Send INR reminders to [facility]    Comments * Pt states she has had 4 strokes (some after hip surgeries)       Anticoagulation Care Providers     Provider Role Specialty Phone number    Diana Ely APRN [facility] [phone]            See the Encounter Report to view Anticoagulation Flowsheet and Dosing Calendar (Go to Encounters tab in chart review, and find the Anticoagulation Therapy Visit)        Natalie Sorto RN

## 2018-10-05 ENCOUNTER — HOSPITAL ENCOUNTER (OUTPATIENT)
Dept: PHYSICAL THERAPY | Facility: CLINIC | Age: 73
Setting detail: THERAPIES SERIES
End: 2018-10-05
Attending: NURSE PRACTITIONER
Payer: MEDICARE

## 2018-10-05 PROCEDURE — 97110 THERAPEUTIC EXERCISES: CPT | Mod: GP | Performed by: PHYSICAL MEDICINE & REHABILITATION

## 2018-10-05 PROCEDURE — 40000718 ZZHC STATISTIC PT DEPARTMENT ORTHO VISIT: Performed by: PHYSICAL MEDICINE & REHABILITATION

## 2018-10-05 PROCEDURE — 97140 MANUAL THERAPY 1/> REGIONS: CPT | Mod: GP | Performed by: PHYSICAL MEDICINE & REHABILITATION

## 2018-10-06 ENCOUNTER — TRANSFERRED RECORDS (OUTPATIENT)
Dept: HEALTH INFORMATION MANAGEMENT | Facility: CLINIC | Age: 73
End: 2018-10-06

## 2018-10-07 ENCOUNTER — NURSE TRIAGE (OUTPATIENT)
Dept: NURSING | Facility: CLINIC | Age: 73
End: 2018-10-07

## 2018-10-07 DIAGNOSIS — I63.9 CEREBROVASCULAR ACCIDENT (H): ICD-10-CM

## 2018-10-07 NOTE — TELEPHONE ENCOUNTER
"Caller requesting  Refill of coumadin; has enough for a week; switched from [facility] to [facility] since last refill; concerned that bottle be labelledwith current dosage   Advised that  Coumadin directions vary and often is  ordered  \"as directed\"   Advised that  Provider will follow usual protocol when ordering   Advised to have pharmacy contact new clinic with refill request   Advised to keep written instructions for use of coumadin as directed by anticoag team   Understands and will comply   Joelle Mina RN  FNA    Reason for Disposition    Caller requesting a NON-URGENT new prescription or refill and triager unable to refill per unit policy    Protocols used: MEDICATION QUESTION CALL-ADULT-    "

## 2018-10-08 DIAGNOSIS — I10 BENIGN ESSENTIAL HYPERTENSION: Primary | ICD-10-CM

## 2018-10-08 RX ORDER — WARFARIN SODIUM 5 MG/1
TABLET ORAL
Qty: 110 TABLET | Refills: 0 | Status: SHIPPED | OUTPATIENT
Start: 2018-10-08 | End: 2018-10-25

## 2018-10-08 RX ORDER — METOPROLOL SUCCINATE 50 MG/1
50 TABLET, EXTENDED RELEASE ORAL DAILY
Qty: 90 TABLET | Refills: 0 | Status: SHIPPED | OUTPATIENT
Start: 2018-10-08 | End: 2018-11-29

## 2018-10-08 NOTE — TELEPHONE ENCOUNTER
"Requested Prescriptions   Pending Prescriptions Disp Refills     warfarin (COUMADIN) 5 MG tablet [Pharmacy Med Name: WARFARIN 5MG        TAB] 110 tablet 0     Sig: TAKE 5MG (1 TABLET) MONDAY AND THURSDAY. TAKE 7.5MG (1 AND 1/2 TABLETS) THE REST OF THE WEEK OR AS DIRECTED    Vitamin K Antagonists Failed    10/7/2018 10:14 AM       Failed - INR is within goal in the past 6 weeks    Confirm INR is within goal in the past 6 weeks.     Recent Labs   Lab Test 09/20/18   INR  2.3*                      Passed - Recent (12 mo) or future (30 days) visit within the authorizing provider's specialty    Patient had office visit in the last 12 months or has a visit in the next 30 days with authorizing provider or within the authorizing provider's specialty.  See \"Patient Info\" tab in inbasket, or \"Choose Columns\" in Meds & Orders section of the refill encounter.           Passed - Patient is 18 years of age or older       Passed - Patient is not pregnant       Passed - No positive pregnancy on file in past 12 months        Last Written Prescription Date:  8/30/18  Last Fill Quantity: 110,  # refills: 0   Last office visit: 9/7/2018 with prescribing provider:     Future Office Visit:      "

## 2018-10-08 NOTE — TELEPHONE ENCOUNTER
"Requested Prescriptions   Pending Prescriptions Disp Refills     metoprolol succinate (TOPROL-XL) 50 MG 24 hr tablet 90 tablet 1     Sig: Take 1 tablet (50 mg) by mouth daily    Beta-Blockers Protocol Failed    10/8/2018  1:58 PM       Failed - Blood pressure under 140/90 in past 12 months    BP Readings from Last 3 Encounters:   09/07/18 150/68   05/01/18 160/74   03/16/18 160/78                Passed - Patient is age 6 or older       Passed - Recent (12 mo) or future (30 days) visit within the authorizing provider's specialty    Patient had office visit in the last 12 months or has a visit in the next 30 days with authorizing provider or within the authorizing provider's specialty.  See \"Patient Info\" tab in inbasket, or \"Choose Columns\" in Meds & Orders section of the refill encounter.          Last Written Prescription Date:  05/01/2018  Last Fill Quantity: 90,  # refills: 1   Last office visit: 9/7/2018 with prescribing provider:  [facility]   Future Office Visit:        "

## 2018-10-12 ENCOUNTER — HOSPITAL ENCOUNTER (OUTPATIENT)
Dept: PHYSICAL THERAPY | Facility: CLINIC | Age: 73
Setting detail: THERAPIES SERIES
End: 2018-10-12
Attending: NURSE PRACTITIONER
Payer: MEDICARE

## 2018-10-12 PROCEDURE — 97140 MANUAL THERAPY 1/> REGIONS: CPT | Mod: GP | Performed by: PHYSICAL MEDICINE & REHABILITATION

## 2018-10-12 PROCEDURE — 97110 THERAPEUTIC EXERCISES: CPT | Mod: GP | Performed by: PHYSICAL MEDICINE & REHABILITATION

## 2018-10-12 PROCEDURE — 40000718 ZZHC STATISTIC PT DEPARTMENT ORTHO VISIT: Performed by: PHYSICAL MEDICINE & REHABILITATION

## 2018-10-19 ENCOUNTER — HOSPITAL ENCOUNTER (OUTPATIENT)
Dept: PHYSICAL THERAPY | Facility: CLINIC | Age: 73
Setting detail: THERAPIES SERIES
End: 2018-10-19
Attending: NURSE PRACTITIONER
Payer: MEDICARE

## 2018-10-19 PROCEDURE — 40000718 ZZHC STATISTIC PT DEPARTMENT ORTHO VISIT: Performed by: PHYSICAL MEDICINE & REHABILITATION

## 2018-10-19 PROCEDURE — 97110 THERAPEUTIC EXERCISES: CPT | Mod: GP | Performed by: PHYSICAL MEDICINE & REHABILITATION

## 2018-10-19 PROCEDURE — G8982 BODY POS GOAL STATUS: HCPCS | Mod: GP,CI | Performed by: PHYSICAL MEDICINE & REHABILITATION

## 2018-10-19 PROCEDURE — G8983 BODY POS D/C STATUS: HCPCS | Mod: GP,CI | Performed by: PHYSICAL MEDICINE & REHABILITATION

## 2018-10-25 ENCOUNTER — ANTICOAGULATION THERAPY VISIT (OUTPATIENT)
Dept: ANTICOAGULATION | Facility: CLINIC | Age: 73
End: 2018-10-25
Payer: COMMERCIAL

## 2018-10-25 DIAGNOSIS — Z86.73 HISTORY OF CVA (CEREBROVASCULAR ACCIDENT): ICD-10-CM

## 2018-10-25 DIAGNOSIS — Z79.01 LONG TERM CURRENT USE OF ANTICOAGULANT THERAPY: ICD-10-CM

## 2018-10-25 LAB — INR POINT OF CARE: 2.3 (ref 0.86–1.14)

## 2018-10-25 PROCEDURE — 99207 ZZC NO CHARGE NURSE ONLY: CPT

## 2018-10-25 PROCEDURE — 36416 COLLJ CAPILLARY BLOOD SPEC: CPT

## 2018-10-25 PROCEDURE — 85610 PROTHROMBIN TIME: CPT | Mod: QW

## 2018-10-25 RX ORDER — WARFARIN SODIUM 5 MG/1
TABLET ORAL
Qty: 110 TABLET | Refills: 0 | COMMUNITY
Start: 2018-10-25 | End: 2018-12-20

## 2018-10-25 NOTE — MR AVS SNAPSHOT
Bebe Kaden   10/25/2018 9:00 AM   Anticoagulation Therapy Visit    Description:  72 year old female   Provider:  NB ANTI COAG   Department:  [facility]           INR as of 10/25/2018     Today's INR 2.3      Anticoagulation Summary as of 10/25/2018     INR goal 2.0-3.0   Today's INR 2.3   Full warfarin instructions 7.5 mg every day   Next INR check 11/29/2018    Indications   History of CVA (cerebrovascular accident) [Z86.73]  Long term current use of anticoagulant therapy [Z79.01]         Your next Anticoagulation Clinic appointment(s)     Nov 29, 2018 10:30 AM CST   Anticoagulation Visit with NB ANTI COAG   [facility]    [address] [place] [id number] [phone]              Contact Numbers     Please call [phone] with any problems or questions regarding your therapy.    If you need to cancel and/or reschedule your appointment please call one of the following numbers:  [facility] [phone]  [facility] [phone]  [place] - [phone]  [facility] [phone]  [place] - [phone]            October 2018 Details    Sun Mon Tue Wed Thu Fri Sat      1               2               3               4               5               6                 7               8               9               10               11               12               13                 14               15               16               17               18               19               20                 21               22               23               24               25      7.5 mg   See details      26      7.5 mg         27      7.5 mg           28      7.5 mg         29      7.5 mg         30      7.5 mg         31      7.5 mg             Date Details   10/25 This INR check               How to take your warfarin dose     To take:  7.5 mg Take 1.5 of the 5 mg tablets.           November 2018 Details    Sun Mon Tue Wed Thu Fri Sat          1      7.5 mg         2      7.5 mg         3      7.5 mg           4      7.5 mg         5      7.5 mg         6      7.5 mg         7      7.5 mg         8      7.5 mg         9      7.5 mg         10      7.5 mg           11      7.5 mg         12      7.5 mg         13      7.5 mg         14      7.5 mg         15      7.5 mg         16      7.5 mg         17      7.5 mg           18      7.5 mg         19      7.5 mg         20      7.5 mg         21      7.5 mg         22      7.5 mg         23      7.5 mg         24      7.5 mg           25      7.5 mg         26      7.5 mg         27      7.5 mg         28      7.5 mg         29            30                 Date Details   No additional details    Date of next INR:  11/29/2018         How to take your warfarin dose     To take:  7.5 mg Take 1.5 of the 5 mg tablets.

## 2018-10-25 NOTE — PROGRESS NOTES
ANTICOAGULATION FOLLOW-UP CLINIC VISIT    Patient Name:  Bebe Alfonso  Date:  10/25/2018  Contact Type:  Face to Face    SUBJECTIVE:     Patient Findings     Positives Other complaints (has post nasal drip and a productive cough), OTC meds (taking tylenol for allergy type symptoms)    Comments Patient has a productive cough today which she states she gets with seasonal changes. She also complains of a post nasal drip.     No changes in diet, activity level, medications (including over the counter), or health. No missed doses of warfarin. Patient took dosing as prescribed. No signs of clots or bleeding concerns. Patient will continue maintenance warfarin dosing.             OBJECTIVE    INR Protime   Date Value Ref Range Status   10/25/2018 2.3 (A) 0.86 - 1.14 Final       ASSESSMENT / PLAN  INR assessment THER    Recheck INR In: 5 WEEKS    INR Location Clinic      Anticoagulation Summary as of 10/25/2018     INR goal 2.0-3.0   Today's INR 2.3   Warfarin maintenance plan 7.5 mg (5 mg x 1.5) every day   Full warfarin instructions 7.5 mg every day   Weekly warfarin total 52.5 mg   No change documented Natalie Sorto, RN   Plan last modified Luann Moise, RN (7/16/2018)   Next INR check 11/29/2018   Priority INR   Target end date Indefinite    Indications   History of CVA (cerebrovascular accident) [Z86.73]  Long term current use of anticoagulant therapy [Z79.01]         Anticoagulation Episode Summary     INR check location     Preferred lab     Send INR reminders to [facility] [place]    Comments * Pt states she has had 4 strokes (some after hip surgeries)       Anticoagulation Care Providers     Provider Role Specialty Phone number    Diana Ely APRN [facility] [phone]            See the Encounter Report to view Anticoagulation Flowsheet and Dosing Calendar (Go to Encounters tab in chart review, and find the Anticoagulation Therapy Visit)        Natalie Sorto  RN

## 2018-11-20 ENCOUNTER — OFFICE VISIT (OUTPATIENT)
Dept: FAMILY MEDICINE | Facility: CLINIC | Age: 73
End: 2018-11-20
Payer: COMMERCIAL

## 2018-11-20 ENCOUNTER — ANTICOAGULATION THERAPY VISIT (OUTPATIENT)
Dept: ANTICOAGULATION | Facility: CLINIC | Age: 73
End: 2018-11-20

## 2018-11-20 VITALS
DIASTOLIC BLOOD PRESSURE: 80 MMHG | BODY MASS INDEX: 23.76 KG/M2 | WEIGHT: 145 LBS | RESPIRATION RATE: 14 BRPM | SYSTOLIC BLOOD PRESSURE: 148 MMHG | TEMPERATURE: 97.3 F | HEART RATE: 72 BPM | OXYGEN SATURATION: 96 %

## 2018-11-20 DIAGNOSIS — J44.1 COPD EXACERBATION (H): Primary | ICD-10-CM

## 2018-11-20 DIAGNOSIS — Z79.01 LONG TERM CURRENT USE OF ANTICOAGULANT THERAPY: ICD-10-CM

## 2018-11-20 DIAGNOSIS — Z11.59 NEED FOR HEPATITIS C SCREENING TEST: ICD-10-CM

## 2018-11-20 DIAGNOSIS — Z79.01 ANTICOAGULATION MONITORING, INR RANGE 2-3: ICD-10-CM

## 2018-11-20 DIAGNOSIS — Z86.73 HISTORY OF CVA (CEREBROVASCULAR ACCIDENT): ICD-10-CM

## 2018-11-20 DIAGNOSIS — Z71.6 ENCOUNTER FOR SMOKING CESSATION COUNSELING: ICD-10-CM

## 2018-11-20 LAB
ANION GAP SERPL CALCULATED.3IONS-SCNC: 9 MMOL/L (ref 3–14)
BASOPHILS # BLD AUTO: 0 10E9/L (ref 0–0.2)
BASOPHILS NFR BLD AUTO: 0.3 %
BUN SERPL-MCNC: 28 MG/DL (ref 7–30)
CALCIUM SERPL-MCNC: 9.4 MG/DL (ref 8.5–10.1)
CHLORIDE SERPL-SCNC: 99 MMOL/L (ref 94–109)
CO2 SERPL-SCNC: 27 MMOL/L (ref 20–32)
CREAT SERPL-MCNC: 1.11 MG/DL (ref 0.52–1.04)
DIFFERENTIAL METHOD BLD: NORMAL
EOSINOPHIL # BLD AUTO: 0.1 10E9/L (ref 0–0.7)
EOSINOPHIL NFR BLD AUTO: 0.8 %
ERYTHROCYTE [DISTWIDTH] IN BLOOD BY AUTOMATED COUNT: 12.6 % (ref 10–15)
GFR SERPL CREATININE-BSD FRML MDRD: 48 ML/MIN/1.7M2
GLUCOSE SERPL-MCNC: 92 MG/DL (ref 70–99)
HCT VFR BLD AUTO: 41.3 % (ref 35–47)
HGB BLD-MCNC: 14 G/DL (ref 11.7–15.7)
INR PPP: 2.19 (ref 0.86–1.14)
LYMPHOCYTES # BLD AUTO: 2.4 10E9/L (ref 0.8–5.3)
LYMPHOCYTES NFR BLD AUTO: 23.6 %
MCH RBC QN AUTO: 31 PG (ref 26.5–33)
MCHC RBC AUTO-ENTMCNC: 33.9 G/DL (ref 31.5–36.5)
MCV RBC AUTO: 91 FL (ref 78–100)
MONOCYTES # BLD AUTO: 0.8 10E9/L (ref 0–1.3)
MONOCYTES NFR BLD AUTO: 7.8 %
NEUTROPHILS # BLD AUTO: 7 10E9/L (ref 1.6–8.3)
NEUTROPHILS NFR BLD AUTO: 67.5 %
PLATELET # BLD AUTO: 250 10E9/L (ref 150–450)
POTASSIUM SERPL-SCNC: 3.5 MMOL/L (ref 3.4–5.3)
RBC # BLD AUTO: 4.52 10E12/L (ref 3.8–5.2)
SODIUM SERPL-SCNC: 135 MMOL/L (ref 133–144)
WBC # BLD AUTO: 10.4 10E9/L (ref 4–11)

## 2018-11-20 PROCEDURE — 99207 ZZC NO CHARGE NURSE ONLY: CPT

## 2018-11-20 PROCEDURE — 85610 PROTHROMBIN TIME: CPT | Performed by: NURSE PRACTITIONER

## 2018-11-20 PROCEDURE — 36415 COLL VENOUS BLD VENIPUNCTURE: CPT | Performed by: NURSE PRACTITIONER

## 2018-11-20 PROCEDURE — 85025 COMPLETE CBC W/AUTO DIFF WBC: CPT | Performed by: NURSE PRACTITIONER

## 2018-11-20 PROCEDURE — 80048 BASIC METABOLIC PNL TOTAL CA: CPT | Performed by: NURSE PRACTITIONER

## 2018-11-20 PROCEDURE — 99214 OFFICE O/P EST MOD 30 MIN: CPT | Performed by: NURSE PRACTITIONER

## 2018-11-20 RX ORDER — BUPROPION HYDROCHLORIDE 150 MG/1
TABLET, EXTENDED RELEASE ORAL
Qty: 60 TABLET | Refills: 2 | Status: SHIPPED | OUTPATIENT
Start: 2018-11-20 | End: 2019-03-22 | Stop reason: SINTOL

## 2018-11-20 RX ORDER — PREDNISONE 20 MG/1
40 TABLET ORAL DAILY
Qty: 10 TABLET | Refills: 0 | Status: SHIPPED | OUTPATIENT
Start: 2018-11-20 | End: 2019-03-22

## 2018-11-20 RX ORDER — DOXYCYCLINE 100 MG/1
100 CAPSULE ORAL 2 TIMES DAILY
Qty: 20 CAPSULE | Refills: 0 | Status: SHIPPED | OUTPATIENT
Start: 2018-11-20 | End: 2018-11-29

## 2018-11-20 ASSESSMENT — PAIN SCALES - GENERAL: PAINLEVEL: NO PAIN (0)

## 2018-11-20 NOTE — MR AVS SNAPSHOT
Bebe Alfonso   11/20/2018   Anticoagulation Therapy Visit    Description:  73 year old female   Provider:  Guillermina Sandoval, RN   Department:  [place] [facility]           INR as of 11/20/2018     Today's INR 2.19      Anticoagulation Summary as of 11/20/2018     INR goal 2.0-3.0   Today's INR 2.19   Full warfarin instructions 7.5 mg every day   Next INR check 11/29/2018    Indications   History of CVA (cerebrovascular accident) [Z86.73]  Long term current use of anticoagulant therapy [Z79.01]         Your next Anticoagulation Clinic appointment(s)     Nov 29, 2018 10:30 AM CST   Anticoagulation Visit with NB ANTI COAG   [facility]    [address] [place] [id number]   [phone]              November 2018 Details    Sun Mon Tue Wed Thu Fri Sat         1               2               3                 4               5               6               7               8               9               10                 11               12               13               14               15               16               17                 18               19               20      7.5 mg   See details      21      7.5 mg         22      7.5 mg         23      7.5 mg         24      7.5 mg           25      7.5 mg         26      7.5 mg         27      7.5 mg         28      7.5 mg         29            30                 Date Details   11/20 This INR check       Date of next INR:  11/29/2018         How to take your warfarin dose     To take:  7.5 mg Take 1.5 of the 5 mg tablets.

## 2018-11-20 NOTE — PROGRESS NOTES
ANTICOAGULATION FOLLOW-UP CLINIC VISIT    Patient Name:  Bebe Alfonso  Date:  11/20/2018  Contact Type:  Face to Face    SUBJECTIVE:     Patient Findings     Positives No Problem Findings    Comments Patient started new medications prednisone and doxycycline, will need closer monitoring while taking.  No changes in medications, diet, or activity. No concerns with bleeding or bruising. Took warfarin as prescribed.   Continue maintenance warfarin dose. Will recheck INR in 1 week.   Patient verbalizes understanding and agrees with plan. No further questions at this time.              OBJECTIVE    INR   Date Value Ref Range Status   11/20/2018 2.19 (H) 0.86 - 1.14 Final       ASSESSMENT / PLAN  INR assessment THER    Recheck INR In: 1 WEEK    INR Location Outside lab      Anticoagulation Summary as of 11/20/2018     INR goal 2.0-3.0   Today's INR 2.19   Warfarin maintenance plan 7.5 mg (5 mg x 1.5) every day   Full warfarin instructions 7.5 mg every day   Weekly warfarin total 52.5 mg   No change documented Guillermina Sandoval RN   Plan last modified Luann Moise RN (7/16/2018)   Next INR check 11/29/2018   Priority INR   Target end date Indefinite    Indications   History of CVA (cerebrovascular accident) [Z86.73]  Long term current use of anticoagulant therapy [Z79.01]         Anticoagulation Episode Summary     INR check location     Preferred lab     Send INR reminders to [facility]    Comments * Pt states she has had 4 strokes (some after hip surgeries)       Anticoagulation Care Providers     Provider Role Specialty Phone number    Diana Ely APRN CNP Responsible Family Practice [phone]            See the Encounter Report to view Anticoagulation Flowsheet and Dosing Calendar (Go to Encounters tab in chart review, and find the Anticoagulation Therapy Visit)        Guillermina Sandoval, YWA

## 2018-11-20 NOTE — MR AVS SNAPSHOT
After Visit Summary   11/20/2018    Bebe Alfonso    MRN: [id number]           Patient Information     Date Of Birth          1945        Visit Information        Provider Department      11/20/2018 10:20 AM Diaan Ely APRN [facility]        Today's Diagnoses     COPD exacerbation (H)    -  1    Anticoagulation monitoring, INR range 2-3        Need for hepatitis C screening test        Encounter for smoking cessation counseling          Care Instructions      Discharge Instructions: COPD  You have been diagnosed with chronic obstructive pulmonary disease (COPD). This is a name given to a group of diseases that limit the flow of air in and out of your lungs. This makes it harder to breathe. With COPD, you are also more likely to get lung infections. COPD includes chronic bronchitis and emphysema. COPD is most often caused by heavy, long-term cigarette smoking.  Home care  Quit smoking    If you smoke, quit. It is the best thing you can do for your COPD and your overall health.    Join a stop-smoking program. There are even telephone, text message, and Internet programs to help you quit.    Ask your healthcare provider about medicines or other methods to help you quit.    Ask family members to quit smoking as well.    Don't allow people to smoke in your home, in your car, or when they are around you.  Protect yourself from infection    Wash your hands often. Do your best to keep your hands away from your face. Most germs are spread from your hands to your mouth.    Get a flu shot every year. Also ask your provider about pneumonia vaccines.    Avoid crowds. It's especially important to do this in the winter when more people have colds and flu.    To stay healthy, get enough sleep, exercise regularly, and eat a balanced diet. You should:  ? Get about 8 hours of sleep every night.  ? Try to exercise for at least 30 minutes on most days.  ? Have healthy foods  including fruits and vegetables, 100% whole grains, lean meats and fish, and low-fat dairy products. Try to stay away from foods high in fats and sugar.  Take your medicines  Take your medicines exactly as directed. Don't skip doses.  Manage your stress  Stress can make COPD worse. Use this stress management technique:    Find a quiet place and sit or lie in a comfortable position.    Close your eyes and perform breathing exercises for several minutes. Ask your provider about the best way to breathe.  Pulmonary rehabilitation    Pulmonary rehab can help you feel better. These programs include exercise, breathing techniques, information about COPD, counseling, and help for smokers.    Ask your provider or your local hospital about programs in your area.  When to call your healthcare provider  Call your provider immediately if you have any of the following:    Shortness of breath, wheezing, or coughing    Increased mucus    Yellow, green, bloody, or smelly mucus    Fever or chills    Tightness in your chest that does not go away with rest or medicine    An irregular heartbeat or a feeling that your heart is beating very fast    Swollen ankles   Date Last Reviewed: 5/1/2016 2000-2018 [employer]. [address] [place] [address]. All rights reserved. This information is not intended as a substitute for professional medical care. Always follow your healthcare professional's instructions.        COPD Flare    You have had a flare-up of your COPD.  COPD, or chronic obstructive pulmonary disease, is a common lung disease. It causes your airways to become irritated and narrower. This makes it harder for you to breathe. Emphysema and chronic bronchitis are both types of COPD. This is a chronic condition, which means you always have it. Sometimes it gets worse. When this happens, it is called a flare-up.  Symptoms of COPD  People with COPD may have symptoms most of the time. In a flare-up, your  symptoms get worse. These symptoms may mean you are having a flare-up:    Shortness of breath, shallow or rapid breathing, or wheezing that gets worse    Lung infection    Cough that gets worse    More mucus, thicker mucus or mucus of a different color    Tiredness, decreased energy, or trouble doing your usual activities    Fever    Chest tightness    Your symptoms don t get better even when you use your usual medicines, inhalers, and nebulizer    Trouble talking    You feel confused  Causes of flare-ups  Unfortunately, a flare-up can happen even though you did everything right, and you followed your doctor s instructions. Some causes of flare-ups are:    Smoking or secondhand smoke    Colds, the flu, or respiratory infections    Air pollution    Sudden change in the weather    Dust, irritating chemicals, or strong fumes    Not taking your medicines as prescribed  Home care  Here are some things you can do at home to treat a flare-up:    Try not to panic. This makes it harder to breathe, and keeps you from doing the right things.    Don t smoke or be around others who are smoking.    Try to drink more fluids than usual during a flare-up, unless your doctor has told you not to because of heart and kidney problems. More fluids can help loosen the mucus.    Use your inhalers and nebulizer, if you have one, as you have been told to.    If you were given antibiotics, take them until they are used up or your doctor tells you to stop. It s important to finish the antibiotics, even though you feel better. This will make sure the infection has cleared.    If you were given prednisone or another steroid, finish it even if you feel better.  Preventing a flare-up  Even though flare-ups happen, the best way to treat one is to prevent it before it starts. Here are some pointers:    Don t smoke or be around others who are smoking.    Take your medicines as you have been told.    Talk with your doctor about getting a flu shot  every year. Also find out if you need a pneumonia shot.    If there is a weather advisory warning to stay indoors, try to stay inside when possible.    Try to eat healthy and get plenty of sleep.    Try to avoid things that usually set you off, like dust, chemical fumes, hairsprays, or strong perfumes.  Follow-up care  Follow up with your healthcare provider, or as advised.  If a culture was done, you will be told if your treatment needs to be changed. You can call as directed for the results.  If X-rays were done, you will be notified of any new findings that may affect your care.  Call 911  Call 911 if any of these occur:    You have trouble breathing    You feel confused or it s difficult to wake you up    You faint or lose consciousness    You have a rapid heart rate    You have new pain in your chest, arm, shoulder, neck or upper back  When to seek medical advice  Call your healthcare provider right away if any of these occur:    Wheezing or shortness of breath gets worse    You need to use your inhalers more often than usual without relief    Fever of 100.4 F (38 C) or higher, or as directed by your healthcare provider    Coughing up lots of dark-colored or bloody mucus (sputum)    Chest pain with each breath    You do not start to get better within 24 hours    Swelling of your ankles gets worse    Dizziness or weakness  Date Last Reviewed: 9/1/2016 2000-2018 [employer]. [address] [place] [address]. All rights reserved. This information is not intended as a substitute for professional medical care. Always follow your healthcare professional's instructions.        Good Nutrition for Chronic Lung Disease    Good nutrition helps keep you healthy. Your weight and the foods you eat relate directly to how much energy you have. But shortness of breath during meals can keep you from getting the nutrition your body needs. A dietitian or other healthcare provider can work with you to set  up a healthy meal plan that includes foods you like.  What you should eat  Try to maintain a balanced diet that includes a variety of these foods:    Protein, such as meat, beans, and soy products, helps build muscle mass.    Dairy products, such as milk, cheese, and yogurt, help keep bones and teeth strong. Dairy is also high in protein.    Fruits and vegetables give you the vitamins you need to stay healthy.    Breads and starches (carbs) help you sustain energy. Carbs that are also high in fiber, such as whole-grain breads, may have longer-lasting effects than other carbs.    Fluids keep you hydrated. Drinking fluids may also thin mucus. It s good to drink 6 to 8 glasses of water a day (unless told otherwise by your health care provider).  If you re having trouble eating  The stomach sits right under the diaphragm (a muscle that helps you breathe). A full stomach makes it harder for the diaphragm to move down. This can make breathing more difficult, causing shortness of breath during and after meals. These tips can help:    Eat smaller meals throughout the day. This way your stomach doesn t get as full and your lungs have more room to expand.    Chew slowly with your mouth closed. This helps you avoid swallowing air.    Try to avoid or limit foods that cause gas. Gas makes the stomach swell and press on the diaphragm. These foods can include onions and cabbage. Not all foods have the same effects on all people. Keep track of the ones that cause problems for you.  Vitamins and supplements  If you re not getting enough vitamins and other nutrients, you may be told to take them in pill form. Supplement drinks can also help you get the nutrients you need without getting too full. Make sure to talk to your healthcare provider before trying any over-the-counter vitamins or supplements.  If you have acid reflux  A lot of people with chronic lung disease have problems with acid reflux. This can cause symptoms such as  coughing, heartburn, and upset stomach. Here are some things you can do:    Limit foods that increase acid in the stomach. These include spicy foods, caffeinated drinks, and alcohol.    Avoid lying flat for 2 hours after eating. At night, prop yourself up on pillows.    Talk to your healthcare provider or a dietitian about developing a special diet to avoid acid reflux. Also ask your healthcare provider about medicines that may help.  Date Last Reviewed: 5/1/2016 2000-2018 [employer]. [address] [place] [id number]. All rights reserved. This information is not intended as a substitute for professional medical care. Always follow your healthcare professional's instructions.        Exercising with Chronic Lung Disease: Using the Dyspnea Scale    The Dyspnea Scale measures shortness of breath. While you exercise, think about how short of breath you feel. Notice how hard you are working to breathe. Then pick the number and words on the scale that best reflects how you feel at your current level of effort. For instance, if your shortness of breath is very slight, you re at level 1. If you feel severely short of breath, you re at level 5. If you can t breathe at all, you re at level 10. Use the Dyspnea Scale to help pace your workout. Unless your healthcare provider or pulmonary rehab team advises otherwise, try to keep your effort level around 3 to 5 on the scale.  Signs of overexertion  Stop if you notice any of the signs below during exercise. If you re at the pulmonary rehabilitation facility, tell a team member how you re feeling. If you re exercising on your own, call your pulmonary rehab team or your healthcare provider. Stay alert for these signs:    Unusual or increasing shortness of breath    Chest pain or discomfort    Burning, tightness, heaviness, or pressure in your chest    Unusual pain in your shoulders, arm, neck, jaw, or back    A racing or skipping  heartbeat    Lightheadedness, dizziness, or nausea    Feeling much more tired than usual    Unusual joint pain  Dyspnea Scale adapted by permission from American Association of Cardiovascular and Pulmonary Rehabilitation, 2004, Guidelines for Cardiac Rehabilitation and Secondary Prevention Programs, 4th ed. ([place], [place]: [employer]), 81.        Checking your heart rate  You may be told to monitor your heart rate during exercise. Press two fingers (not your thumb) on the inside of your wrist. Count the number of beats you feel for 10 seconds. Multiply the number of beats by 6. This is your heart rate (the number of times your heart beats each minute). You ll be told what the rate should be when you exercise. This number is your target heart rate.  My target heart rate: ___________________   Date Last Reviewed: 5/1/2016 2000-2018 [employer]. [address] [place] [address]. All rights reserved. This information is not intended as a substitute for professional medical care. Always follow your healthcare professional's instructions.                Follow-ups after your visit        Your next 10 appointments already scheduled     Nov 29, 2018 10:30 AM CST   Anticoagulation Visit with NB ANTI COAG   [facility]    [address] [place] [id number] [phone]              Future tests that were ordered for you today     Open Future Orders        Priority Expected Expires Ordered    **Hepatitis C Screen Reflex to RNA FUTURE anytime Routine 11/20/2018 11/20/2019 11/20/2018    INR Routine  11/20/2019 11/20/2018            Who to contact     If you have questions or need follow up information about today's clinic visit or your schedule please contact [facility] directly at [phone].  Normal or non-critical lab and imaging results will be communicated to you by MyChart, letter or phone within 4 business  days after the clinic has received the results. If you do not hear from us within 7 days, please contact the clinic through [facility] [employer] or phone. If you have a critical or abnormal lab result, we will notify you by phone as soon as possible.  Submit refill requests through [employer] or call your pharmacy and they will forward the refill request to us. Please allow 3 business days for your refill to be completed.          Additional Information About Your Visit        [employer] Information     [facility] [employer] gives you secure access to your electronic health record. If you see a primary care provider, you can also send messages to your care team and make appointments. If you have questions, please call your primary care clinic.  If you do not have a primary care provider, please call [phone] and they will assist you.        Care EveryWhere ID     This is your Care EveryWhere ID. This could be used by other organizations to access your [facility] medical records  [id number]        Your Vitals Were     Pulse Temperature Respirations Pulse Oximetry BMI (Body Mass Index)       72 97.3  F (36.3  C) (Tympanic) 14 96% 23.76 kg/m2        Blood Pressure from Last 3 Encounters:   11/20/18 148/80   09/07/18 150/68   05/01/18 160/74    Weight from Last 3 Encounters:   11/20/18 145 lb (65.8 kg)   09/07/18 148 lb (67.1 kg)   05/01/18 153 lb 6.4 oz (69.6 kg)              We Performed the Following     Basic metabolic panel  (Ca, Cl, CO2, Creat, Gluc, K, Na, BUN)     CBC with platelets differential     INR          Today's Medication Changes          These changes are accurate as of 11/20/18 11:07 AM.  If you have any questions, ask your nurse or doctor.               Start taking these medicines.        Dose/Directions    buPROPion 150 MG 12 hr tablet   Commonly known as:  WELLBUTRIN SR   Used for:  Encounter for smoking cessation counseling   Started by:  Diana Ely APRN CNP        Take 1 tablet once daily and increase to 1  tablet twice daily after 4 to 7 days   Quantity:  60 tablet   Refills:  2       doxycycline 100 MG capsule   Commonly known as:  VIBRAMYCIN   Used for:  COPD exacerbation (H)   Started by:  Diana Ely APRN CNP        Dose:  100 mg   Take 1 capsule (100 mg) by mouth 2 times daily   Quantity:  20 capsule   Refills:  0       predniSONE 20 MG tablet   Commonly known as:  DELTASONE   Used for:  COPD exacerbation (H)   Started by:  Diana Ely APRN CNP        Dose:  40 mg   Take 2 tablets (40 mg) by mouth daily for 5 days Start 11/21/2018   Quantity:  10 tablet   Refills:  0         Stop taking these medicines if you haven't already. Please contact your care team if you have questions.     HYDROcodone-acetaminophen 5-325 MG per tablet   Commonly known as:  NORCO   Stopped by:  Diana Ely APRN CNP                Where to get your medicines      These medications were sent to [facility] [address] [place] [address] [address] [facility]  [address] [facility] [address]     Phone:  [phone]     buPROPion 150 MG 12 hr tablet    doxycycline 100 MG capsule    predniSONE 20 MG tablet                Primary Care Provider Office Phone # Fax #    SELWYN Dennis [phone]       [address] [place] [address]        Equal Access to Services     POLINA CUEVAS : Hadii jamari clifton hadasho Soomaali, waaxda luqadaha, qaybta kaalmada adeegyada, criss arcos. So [facility] [phone].    ATENCIÓN: Si habla español, tiene a ruiz disposición servicios gratuitos de asistencia lingüística. Llame al [phone].    We comply with applicable federal civil rights laws and [place] laws. We do not discriminate on the basis of race, color, national origin, age, disability, sex, sexual orientation, or gender identity.            Thank you!     Thank you for choosing [facility]  for your care. Our goal is always to provide you  with excellent care. Hearing back from our patients is one way we can continue to improve our services. Please take a few minutes to complete the written survey that you may receive in the mail after your visit with us. Thank you!             Your Updated Medication List - Protect others around you: Learn how to safely use, store and throw away your medicines at www.disposemymeds.org.          This list is accurate as of 11/20/18 11:07 AM.  Always use your most recent med list.                   Brand Name Dispense Instructions for use Diagnosis    ascorbic acid 250 MG Chew chewable tablet    vitamin C     Take 250 mg by mouth daily        atorvastatin 40 MG tablet    LIPITOR    90 tablet    Take 1 tablet (40 mg) by mouth daily    Hyperlipidemia LDL goal <70       buPROPion 150 MG 12 hr tablet    WELLBUTRIN SR    60 tablet    Take 1 tablet once daily and increase to 1 tablet twice daily after 4 to 7 days    Encounter for smoking cessation counseling       cyanocobalamin 1000 MCG tablet    vitamin  B-12     Take 1,000 mcg by mouth        doxycycline 100 MG capsule    VIBRAMYCIN    20 capsule    Take 1 capsule (100 mg) by mouth 2 times daily    COPD exacerbation (H)       hydrochlorothiazide 25 MG tablet    HYDRODIURIL     Take 12.5 mg by mouth        MAGNESIUM PO      Take 250 mg by mouth        metoprolol succinate 50 MG 24 hr tablet    TOPROL-XL    90 tablet    Take 1 tablet (50 mg) by mouth daily DUE FOR BLOOD PRESSURE RECHECK. NO FURTHER REFILLS    Benign essential hypertension       MULTI-VITAMINS Tabs      Take 1 tablet by mouth        predniSONE 20 MG tablet    DELTASONE    10 tablet    Take 2 tablets (40 mg) by mouth daily for 5 days Start 11/21/2018    COPD exacerbation (H)       thiamine 100 MG tablet      Take 1 tablet (100 mg) by mouth daily        warfarin 5 MG tablet    COUMADIN    110 tablet    7.5 mg daily OR AS DIRECTED

## 2018-11-20 NOTE — PROGRESS NOTES
SUBJECTIVE:   Bebe Alfonso is a 73 year old female who presents to clinic today for the following health issues:      RESPIRATORY SYMPTOMS      Duration: 5 weeks     Description  nasal congestion, rhinorrhea, facial pain/pressure, cough, chills, headache and fatigue/malaise    Severity: moderate    Accompanying signs and symptoms: None    History (predisposing factors):  tobacco abuse and exposure to smoke    Precipitating or alleviating factors: None    Therapies tried and outcome:  rest and fluids oral decongestant acetaminophen      Strain of thoracic region, subsequent encounter [S29.019D]  - Primary       Cervicalgia [M54.2]         Physical Therapy went well was feeling good in the neck with the therapy  Would like to quit smoking  Had problems with name brand Zyban in the past but has taken the generic and has had no problems with this in the past.  Taking coumadin.   Will do INR today and repeat on Friday.   No previous Hep C testing done. Ok to do this today.         -------------------------------------    Problem list and histories reviewed & adjusted, as indicated.  Additional history: as documented    BP Readings from Last 3 Encounters:   11/20/18 148/80   09/07/18 150/68   05/01/18 160/74    Wt Readings from Last 3 Encounters:   11/20/18 145 lb (65.8 kg)   09/07/18 148 lb (67.1 kg)   05/01/18 153 lb 6.4 oz (69.6 kg)                  Labs reviewed in EPIC    Reviewed and updated as needed this visit by clinical staff  Allergies  Meds       Reviewed and updated as needed this visit by Provider         ROS:   ROS: 10 point ROS neg other than the symptoms noted above in the HPI.      OBJECTIVE:                                                    /80  Pulse 72  Temp 97.3  F (36.3  C) (Tympanic)  Resp 14  Wt 145 lb (65.8 kg)  SpO2 96%  BMI 23.76 kg/m2  Body mass index is 23.76 kg/(m^2).   GENERAL: healthy, alert, well nourished, well hydrated, mild cough.  Difficulty with word finding.   This is not changed today this is from her previous stroke  HENT: ear canals- normal; TMs- normal; Nose-red boggy and engorged mouth- no ulcers, no lesions  NECK: no tenderness, no adenopathy, no asymmetry, no masses, no stiffness; thyroid- normal to palpation  RESP: lungs wheeze left lower distant breath sounds.  CV: regular rates and rhythm, normal S1 S2, no S3 or S4 and no murmur, no click or rub -  ABDOMEN: soft, no tenderness, no  hepatosplenomegaly, no masses, normal bowel sounds    Diagnostic test results:  Results for orders placed or performed in visit on 11/20/18   INR   Result Value Ref Range    INR 2.19 (H) 0.86 - 1.14   CBC with platelets differential   Result Value Ref Range    WBC 10.4 4.0 - 11.0 10e9/L    RBC Count 4.52 3.8 - 5.2 10e12/L    Hemoglobin 14.0 11.7 - 15.7 g/dL    Hematocrit 41.3 35.0 - 47.0 %    MCV 91 78 - 100 fl    MCH 31.0 26.5 - 33.0 pg    MCHC 33.9 31.5 - 36.5 g/dL    RDW 12.6 10.0 - 15.0 %    Platelet Count 250 150 - 450 10e9/L    % Neutrophils 67.5 %    % Lymphocytes 23.6 %    % Monocytes 7.8 %    % Eosinophils 0.8 %    % Basophils 0.3 %    Absolute Neutrophil 7.0 1.6 - 8.3 10e9/L    Absolute Lymphocytes 2.4 0.8 - 5.3 10e9/L    Absolute Monocytes 0.8 0.0 - 1.3 10e9/L    Absolute Eosinophils 0.1 0.0 - 0.7 10e9/L    Absolute Basophils 0.0 0.0 - 0.2 10e9/L    Diff Method Automated Method         ASSESSMENT/PLAN:                                                    1. COPD exacerbation (H)  Exacerbation in a smoker.  Begin oral antibiotics and steroids  - doxycycline (VIBRAMYCIN) 100 MG capsule; Take 1 capsule (100 mg) by mouth 2 times daily  Dispense: 20 capsule; Refill: 0  - predniSONE (DELTASONE) 20 MG tablet; Take 2 tablets (40 mg) by mouth daily for 5 days Start 11/21/2018  Dispense: 10 tablet; Refill: 0  - CBC with platelets differential  - Basic metabolic panel  (Ca, Cl, CO2, Creat, Gluc, K, Na, BUN)    2. Anticoagulation monitoring, INR range 2-3  Today and again next week  -  INR; Future  - INR    3. Need for hepatitis C screening test  Labs done today  - **Hepatitis C Screen Reflex to RNA FUTURE anytime; Future    4. Encounter for smoking cessation counseling  Smoking cessation strategies reviewed  Restart Wellbutrin  - buPROPion (WELLBUTRIN SR) 150 MG 12 hr tablet; Take 1 tablet once daily and increase to 1 tablet twice daily after 4 to 7 days  Dispense: 60 tablet; Refill: 2      Follow up with Provider -2 weeks as needed  Call or return to the clinic with any worsening of symptoms or no resolution. Patient/Parent verbalized understanding and is in agreement. Medication side effects reviewed.   Current Outpatient Prescriptions   Medication Sig Dispense Refill     ascorbic acid (VITAMIN C) 250 MG CHEW chewable tablet Take 250 mg by mouth daily       atorvastatin (LIPITOR) 40 MG tablet Take 1 tablet (40 mg) by mouth daily 90 tablet 3     buPROPion (WELLBUTRIN SR) 150 MG 12 hr tablet Take 1 tablet once daily and increase to 1 tablet twice daily after 4 to 7 days 60 tablet 2     cyanocobalamin (VITAMIN  B-12) 1000 MCG tablet Take 1,000 mcg by mouth       doxycycline (VIBRAMYCIN) 100 MG capsule Take 1 capsule (100 mg) by mouth 2 times daily 20 capsule 0     hydrochlorothiazide (HYDRODIURIL) 25 MG tablet Take 12.5 mg by mouth       MAGNESIUM PO Take 250 mg by mouth       metoprolol succinate (TOPROL-XL) 50 MG 24 hr tablet Take 1 tablet (50 mg) by mouth daily DUE FOR BLOOD PRESSURE RECHECK. NO FURTHER REFILLS 90 tablet 0     Multiple Vitamin (MULTI-VITAMINS) TABS Take 1 tablet by mouth       predniSONE (DELTASONE) 20 MG tablet Take 2 tablets (40 mg) by mouth daily for 5 days Start 11/21/2018 10 tablet 0     thiamine 100 MG tablet Take 1 tablet (100 mg) by mouth daily       warfarin (COUMADIN) 5 MG tablet 7.5 mg daily OR AS DIRECTED 110 tablet 0       See Patient Instructions    SELWYN Dennis [facility]

## 2018-11-20 NOTE — PATIENT INSTRUCTIONS
Discharge Instructions: COPD  You have been diagnosed with chronic obstructive pulmonary disease (COPD). This is a name given to a group of diseases that limit the flow of air in and out of your lungs. This makes it harder to breathe. With COPD, you are also more likely to get lung infections. COPD includes chronic bronchitis and emphysema. COPD is most often caused by heavy, long-term cigarette smoking.  Home care  Quit smoking    If you smoke, quit. It is the best thing you can do for your COPD and your overall health.    Join a stop-smoking program. There are even telephone, text message, and Internet programs to help you quit.    Ask your healthcare provider about medicines or other methods to help you quit.    Ask family members to quit smoking as well.    Don't allow people to smoke in your home, in your car, or when they are around you.  Protect yourself from infection    Wash your hands often. Do your best to keep your hands away from your face. Most germs are spread from your hands to your mouth.    Get a flu shot every year. Also ask your provider about pneumonia vaccines.    Avoid crowds. It's especially important to do this in the winter when more people have colds and flu.    To stay healthy, get enough sleep, exercise regularly, and eat a balanced diet. You should:  ? Get about 8 hours of sleep every night.  ? Try to exercise for at least 30 minutes on most days.  ? Have healthy foods including fruits and vegetables, 100% whole grains, lean meats and fish, and low-fat dairy products. Try to stay away from foods high in fats and sugar.  Take your medicines  Take your medicines exactly as directed. Don't skip doses.  Manage your stress  Stress can make COPD worse. Use this stress management technique:    Find a quiet place and sit or lie in a comfortable position.    Close your eyes and perform breathing exercises for several minutes. Ask your provider about the best way to breathe.  Pulmonary  rehabilitation    Pulmonary rehab can help you feel better. These programs include exercise, breathing techniques, information about COPD, counseling, and help for smokers.    Ask your provider or your local hospital about programs in your area.  When to call your healthcare provider  Call your provider immediately if you have any of the following:    Shortness of breath, wheezing, or coughing    Increased mucus    Yellow, green, bloody, or smelly mucus    Fever or chills    Tightness in your chest that does not go away with rest or medicine    An irregular heartbeat or a feeling that your heart is beating very fast    Swollen ankles   Date Last Reviewed: 5/1/2016 2000-2018 [employer]. [address] [place] [address]. All rights reserved. This information is not intended as a substitute for professional medical care. Always follow your healthcare professional's instructions.        COPD Flare    You have had a flare-up of your COPD.  COPD, or chronic obstructive pulmonary disease, is a common lung disease. It causes your airways to become irritated and narrower. This makes it harder for you to breathe. Emphysema and chronic bronchitis are both types of COPD. This is a chronic condition, which means you always have it. Sometimes it gets worse. When this happens, it is called a flare-up.  Symptoms of COPD  People with COPD may have symptoms most of the time. In a flare-up, your symptoms get worse. These symptoms may mean you are having a flare-up:    Shortness of breath, shallow or rapid breathing, or wheezing that gets worse    Lung infection    Cough that gets worse    More mucus, thicker mucus or mucus of a different color    Tiredness, decreased energy, or trouble doing your usual activities    Fever    Chest tightness    Your symptoms don t get better even when you use your usual medicines, inhalers, and nebulizer    Trouble talking    You feel confused  Causes of  flare-ups  Unfortunately, a flare-up can happen even though you did everything right, and you followed your doctor s instructions. Some causes of flare-ups are:    Smoking or secondhand smoke    Colds, the flu, or respiratory infections    Air pollution    Sudden change in the weather    Dust, irritating chemicals, or strong fumes    Not taking your medicines as prescribed  Home care  Here are some things you can do at home to treat a flare-up:    Try not to panic. This makes it harder to breathe, and keeps you from doing the right things.    Don t smoke or be around others who are smoking.    Try to drink more fluids than usual during a flare-up, unless your doctor has told you not to because of heart and kidney problems. More fluids can help loosen the mucus.    Use your inhalers and nebulizer, if you have one, as you have been told to.    If you were given antibiotics, take them until they are used up or your doctor tells you to stop. It s important to finish the antibiotics, even though you feel better. This will make sure the infection has cleared.    If you were given prednisone or another steroid, finish it even if you feel better.  Preventing a flare-up  Even though flare-ups happen, the best way to treat one is to prevent it before it starts. Here are some pointers:    Don t smoke or be around others who are smoking.    Take your medicines as you have been told.    Talk with your doctor about getting a flu shot every year. Also find out if you need a pneumonia shot.    If there is a weather advisory warning to stay indoors, try to stay inside when possible.    Try to eat healthy and get plenty of sleep.    Try to avoid things that usually set you off, like dust, chemical fumes, hairsprays, or strong perfumes.  Follow-up care  Follow up with your healthcare provider, or as advised.  If a culture was done, you will be told if your treatment needs to be changed. You can call as directed for the results.  If  X-rays were done, you will be notified of any new findings that may affect your care.  Call 911  Call 911 if any of these occur:    You have trouble breathing    You feel confused or it s difficult to wake you up    You faint or lose consciousness    You have a rapid heart rate    You have new pain in your chest, arm, shoulder, neck or upper back  When to seek medical advice  Call your healthcare provider right away if any of these occur:    Wheezing or shortness of breath gets worse    You need to use your inhalers more often than usual without relief    Fever of 100.4 F (38 C) or higher, or as directed by your healthcare provider    Coughing up lots of dark-colored or bloody mucus (sputum)    Chest pain with each breath    You do not start to get better within 24 hours    Swelling of your ankles gets worse    Dizziness or weakness  Date Last Reviewed: 9/1/2016 2000-2018 [employer]. [address] [place] [address]. All rights reserved. This information is not intended as a substitute for professional medical care. Always follow your healthcare professional's instructions.        Good Nutrition for Chronic Lung Disease    Good nutrition helps keep you healthy. Your weight and the foods you eat relate directly to how much energy you have. But shortness of breath during meals can keep you from getting the nutrition your body needs. A dietitian or other healthcare provider can work with you to set up a healthy meal plan that includes foods you like.  What you should eat  Try to maintain a balanced diet that includes a variety of these foods:    Protein, such as meat, beans, and soy products, helps build muscle mass.    Dairy products, such as milk, cheese, and yogurt, help keep bones and teeth strong. Dairy is also high in protein.    Fruits and vegetables give you the vitamins you need to stay healthy.    Breads and starches (carbs) help you sustain energy. Carbs that are also high in  fiber, such as whole-grain breads, may have longer-lasting effects than other carbs.    Fluids keep you hydrated. Drinking fluids may also thin mucus. It s good to drink 6 to 8 glasses of water a day (unless told otherwise by your health care provider).  If you re having trouble eating  The stomach sits right under the diaphragm (a muscle that helps you breathe). A full stomach makes it harder for the diaphragm to move down. This can make breathing more difficult, causing shortness of breath during and after meals. These tips can help:    Eat smaller meals throughout the day. This way your stomach doesn t get as full and your lungs have more room to expand.    Chew slowly with your mouth closed. This helps you avoid swallowing air.    Try to avoid or limit foods that cause gas. Gas makes the stomach swell and press on the diaphragm. These foods can include onions and cabbage. Not all foods have the same effects on all people. Keep track of the ones that cause problems for you.  Vitamins and supplements  If you re not getting enough vitamins and other nutrients, you may be told to take them in pill form. Supplement drinks can also help you get the nutrients you need without getting too full. Make sure to talk to your healthcare provider before trying any over-the-counter vitamins or supplements.  If you have acid reflux  A lot of people with chronic lung disease have problems with acid reflux. This can cause symptoms such as coughing, heartburn, and upset stomach. Here are some things you can do:    Limit foods that increase acid in the stomach. These include spicy foods, caffeinated drinks, and alcohol.    Avoid lying flat for 2 hours after eating. At night, prop yourself up on pillows.    Talk to your healthcare provider or a dietitian about developing a special diet to avoid acid reflux. Also ask your healthcare provider about medicines that may help.  Date Last Reviewed: 5/1/2016 2000-2018 [facility]  [employer]. [address] [place] [address], [place] [address]. All rights reserved. This information is not intended as a substitute for professional medical care. Always follow your healthcare professional's instructions.        Exercising with Chronic Lung Disease: Using the Dyspnea Scale    The Dyspnea Scale measures shortness of breath. While you exercise, think about how short of breath you feel. Notice how hard you are working to breathe. Then pick the number and words on the scale that best reflects how you feel at your current level of effort. For instance, if your shortness of breath is very slight, you re at level 1. If you feel severely short of breath, you re at level 5. If you can t breathe at all, you re at level 10. Use the Dyspnea Scale to help pace your workout. Unless your healthcare provider or pulmonary rehab team advises otherwise, try to keep your effort level around 3 to 5 on the scale.  Signs of overexertion  Stop if you notice any of the signs below during exercise. If you re at the pulmonary rehabilitation facility, tell a team member how you re feeling. If you re exercising on your own, call your pulmonary rehab team or your healthcare provider. Stay alert for these signs:    Unusual or increasing shortness of breath    Chest pain or discomfort    Burning, tightness, heaviness, or pressure in your chest    Unusual pain in your shoulders, arm, neck, jaw, or back    A racing or skipping heartbeat    Lightheadedness, dizziness, or nausea    Feeling much more tired than usual    Unusual joint pain  Dyspnea Scale adapted by permission from American Association of Cardiovascular and Pulmonary Rehabilitation, 2004, Guidelines for Cardiac Rehabilitation and Secondary Prevention Programs, 4th ed. ([place], [place]: [employer]), 81.        Checking your heart rate  You may be told to monitor your heart rate during exercise. Press two fingers (not your thumb) on the inside of your wrist. Count the number  of beats you feel for 10 seconds. Multiply the number of beats by 6. This is your heart rate (the number of times your heart beats each minute). You ll be told what the rate should be when you exercise. This number is your target heart rate.  My target heart rate: ___________________   Date Last Reviewed: 5/1/2016 2000-2018 [employer]. [address] [place] [address], [place] [address]. All rights reserved. This information is not intended as a substitute for professional medical care. Always follow your healthcare professional's instructions.

## 2018-11-23 ENCOUNTER — ANTICOAGULATION THERAPY VISIT (OUTPATIENT)
Dept: ANTICOAGULATION | Facility: CLINIC | Age: 73
End: 2018-11-23

## 2018-11-23 DIAGNOSIS — Z79.01 LONG TERM CURRENT USE OF ANTICOAGULANT THERAPY: ICD-10-CM

## 2018-11-23 DIAGNOSIS — Z79.01 ANTICOAGULATION MONITORING, INR RANGE 2-3: ICD-10-CM

## 2018-11-23 DIAGNOSIS — Z86.73 HISTORY OF CVA (CEREBROVASCULAR ACCIDENT): ICD-10-CM

## 2018-11-23 DIAGNOSIS — E51.9 THIAMINE DEFICIENCY: ICD-10-CM

## 2018-11-23 DIAGNOSIS — Z11.59 NEED FOR HEPATITIS C SCREENING TEST: ICD-10-CM

## 2018-11-23 LAB
HCV AB SERPL QL IA: NONREACTIVE
INR PPP: 2.33 (ref 0.86–1.14)

## 2018-11-23 PROCEDURE — 84425 ASSAY OF VITAMIN B-1: CPT | Mod: 90 | Performed by: NURSE PRACTITIONER

## 2018-11-23 PROCEDURE — 86803 HEPATITIS C AB TEST: CPT | Performed by: NURSE PRACTITIONER

## 2018-11-23 PROCEDURE — 99000 SPECIMEN HANDLING OFFICE-LAB: CPT | Performed by: NURSE PRACTITIONER

## 2018-11-23 PROCEDURE — 36415 COLL VENOUS BLD VENIPUNCTURE: CPT | Performed by: NURSE PRACTITIONER

## 2018-11-23 PROCEDURE — 85610 PROTHROMBIN TIME: CPT | Performed by: NURSE PRACTITIONER

## 2018-11-23 NOTE — MR AVS SNAPSHOT
Bebe Alfonso   11/23/2018   Anticoagulation Therapy Visit    Description:  73 year old female   Provider:  Justin Naidu, RN   Department:  [facility]           INR as of 11/23/2018         The patient was not given dosing instructions in this encounter.      Anticoagulation Summary as of 11/23/2018         The patient was not given dosing instructions in this encounter.      Your next Anticoagulation Clinic appointment(s)     Nov 29, 2018 10:30 AM CST   [facility]agulation [facility] with [facility]   [facility]    [address] [place] [id number]   [phone]              November 2018 Details    Sun Mon Tue Wed Thu Fri Sat         1               2               3                 4               5               6               7               8               9               10                 11               12               13               14               15               16               17                 18               19               20      7.5 mg   See details      21      7.5 mg         22      7.5 mg         23      7.5 mg         24      7.5 mg           25      7.5 mg         26      7.5 mg         27      7.5 mg         28      7.5 mg         29            30                 Date Details   11/20 This INR check       Date of next INR:  11/29/2018         How to take your warfarin dose     To take:  7.5 mg Take 1.5 of the 5 mg tablets.

## 2018-11-23 NOTE — PROGRESS NOTES
11/23/18    Left  requesting call back to [facility] for INR and dosing instructions.     Justin Naidu RN, BSN, PHN  Anticoagulation Clinic   [phone]

## 2018-11-26 LAB — VIT B1 BLD-MCNC: 202 NMOL/L (ref 70–180)

## 2018-11-28 ENCOUNTER — TELEPHONE (OUTPATIENT)
Dept: FAMILY MEDICINE | Facility: CLINIC | Age: 73
End: 2018-11-28

## 2018-11-28 NOTE — TELEPHONE ENCOUNTER
Last Filed Vital Signs  - in this encounter  Vital Sign Reading Time Taken   Blood Pressure 182/70 11/28/2018 4:00 PM CST   Pulse 71 11/28/2018 4:00      Patients neurologist wants her on a better BP medication

## 2018-11-29 ENCOUNTER — OFFICE VISIT (OUTPATIENT)
Dept: FAMILY MEDICINE | Facility: CLINIC | Age: 73
End: 2018-11-29
Payer: COMMERCIAL

## 2018-11-29 ENCOUNTER — ANTICOAGULATION THERAPY VISIT (OUTPATIENT)
Dept: ANTICOAGULATION | Facility: CLINIC | Age: 73
End: 2018-11-29
Payer: COMMERCIAL

## 2018-11-29 VITALS
RESPIRATION RATE: 24 BRPM | HEART RATE: 87 BPM | OXYGEN SATURATION: 95 % | SYSTOLIC BLOOD PRESSURE: 166 MMHG | TEMPERATURE: 98.5 F | BODY MASS INDEX: 23.99 KG/M2 | DIASTOLIC BLOOD PRESSURE: 68 MMHG | WEIGHT: 146.4 LBS

## 2018-11-29 DIAGNOSIS — Z79.01 LONG TERM CURRENT USE OF ANTICOAGULANT THERAPY: ICD-10-CM

## 2018-11-29 DIAGNOSIS — Z86.73 HISTORY OF CVA (CEREBROVASCULAR ACCIDENT): ICD-10-CM

## 2018-11-29 DIAGNOSIS — J44.1 COPD EXACERBATION (H): ICD-10-CM

## 2018-11-29 DIAGNOSIS — I10 BENIGN ESSENTIAL HYPERTENSION: ICD-10-CM

## 2018-11-29 LAB — INR POINT OF CARE: 2.4 (ref 0.86–1.14)

## 2018-11-29 PROCEDURE — 99214 OFFICE O/P EST MOD 30 MIN: CPT | Performed by: NURSE PRACTITIONER

## 2018-11-29 PROCEDURE — 99207 ZZC NO CHARGE NURSE ONLY: CPT

## 2018-11-29 PROCEDURE — 36416 COLLJ CAPILLARY BLOOD SPEC: CPT

## 2018-11-29 PROCEDURE — 85610 PROTHROMBIN TIME: CPT | Mod: QW

## 2018-11-29 RX ORDER — DOXYCYCLINE 100 MG/1
100 CAPSULE ORAL 2 TIMES DAILY
Qty: 20 CAPSULE | Refills: 0 | Status: SHIPPED | OUTPATIENT
Start: 2018-11-29 | End: 2019-03-22

## 2018-11-29 RX ORDER — METOPROLOL SUCCINATE 50 MG/1
50 TABLET, EXTENDED RELEASE ORAL DAILY
Qty: 90 TABLET | Refills: 1 | Status: CANCELLED | OUTPATIENT
Start: 2018-11-29

## 2018-11-29 RX ORDER — HYDROCHLOROTHIAZIDE 25 MG/1
25 TABLET ORAL DAILY
Qty: 90 TABLET | Refills: 1 | Status: SHIPPED | OUTPATIENT
Start: 2018-11-29 | End: 2018-12-17

## 2018-11-29 RX ORDER — AMLODIPINE BESYLATE 5 MG/1
5 TABLET ORAL DAILY
Qty: 90 TABLET | Refills: 1 | Status: SHIPPED | OUTPATIENT
Start: 2018-11-29 | End: 2019-02-12

## 2018-11-29 ASSESSMENT — PAIN SCALES - GENERAL: PAINLEVEL: MILD PAIN (2)

## 2018-11-29 NOTE — MR AVS SNAPSHOT
After Visit Summary   11/29/2018    Bebe Alfonso    MRN: [id number]           Patient Information     Date Of Birth          1945        Visit Information        Provider Department      11/29/2018 11:40 AM Diana Ely APRN [facility]        Today's Diagnoses     Benign essential hypertension        COPD exacerbation (H)          Care Instructions    Stop metoprolol due to SE  Begin amlodipine 5 mg daily  Continue hydrochlorothiazide 25 mg daily.  Continue doxycycline for another 10 days.     Discharge Instructions: COPD  You have been diagnosed with chronic obstructive pulmonary disease (COPD). This is a name given to a group of diseases that limit the flow of air in and out of your lungs. This makes it harder to breathe. With COPD, you are also more likely to get lung infections. COPD includes chronic bronchitis and emphysema. COPD is most often caused by heavy, long-term cigarette smoking.  Home care  Quit smoking    If you smoke, quit. It is the best thing you can do for your COPD and your overall health.    Join a stop-smoking program. There are even telephone, text message, and Internet programs to help you quit.    Ask your healthcare provider about medicines or other methods to help you quit.    Ask family members to quit smoking as well.    Don't allow people to smoke in your home, in your car, or when they are around you.  Protect yourself from infection    Wash your hands often. Do your best to keep your hands away from your face. Most germs are spread from your hands to your mouth.    Get a flu shot every year. Also ask your provider about pneumonia vaccines.    Avoid crowds. It's especially important to do this in the winter when more people have colds and flu.    To stay healthy, get enough sleep, exercise regularly, and eat a balanced diet. You should:  ? Get about 8 hours of sleep every night.  ? Try to exercise for at least 30 minutes on most  days.  ? Have healthy foods including fruits and vegetables, 100% whole grains, lean meats and fish, and low-fat dairy products. Try to stay away from foods high in fats and sugar.  Take your medicines  Take your medicines exactly as directed. Don't skip doses.  Manage your stress  Stress can make COPD worse. Use this stress management technique:    Find a quiet place and sit or lie in a comfortable position.    Close your eyes and perform breathing exercises for several minutes. Ask your provider about the best way to breathe.  Pulmonary rehabilitation    Pulmonary rehab can help you feel better. These programs include exercise, breathing techniques, information about COPD, counseling, and help for smokers.    Ask your provider or your local hospital about programs in your area.  When to call your healthcare provider  Call your provider immediately if you have any of the following:    Shortness of breath, wheezing, or coughing    Increased mucus    Yellow, green, bloody, or smelly mucus    Fever or chills    Tightness in your chest that does not go away with rest or medicine    An irregular heartbeat or a feeling that your heart is beating very fast    Swollen ankles   Date Last Reviewed: 5/1/2016 2000-2018 [employer]. [address] [place] [address]. All rights reserved. This information is not intended as a substitute for professional medical care. Always follow your healthcare professional's instructions.        COPD Flare    You have had a flare-up of your COPD.  COPD, or chronic obstructive pulmonary disease, is a common lung disease. It causes your airways to become irritated and narrower. This makes it harder for you to breathe. Emphysema and chronic bronchitis are both types of COPD. This is a chronic condition, which means you always have it. Sometimes it gets worse. When this happens, it is called a flare-up.  Symptoms of COPD  People with COPD may have symptoms most of the  time. In a flare-up, your symptoms get worse. These symptoms may mean you are having a flare-up:    Shortness of breath, shallow or rapid breathing, or wheezing that gets worse    Lung infection    Cough that gets worse    More mucus, thicker mucus or mucus of a different color    Tiredness, decreased energy, or trouble doing your usual activities    Fever    Chest tightness    Your symptoms don t get better even when you use your usual medicines, inhalers, and nebulizer    Trouble talking    You feel confused  Causes of flare-ups  Unfortunately, a flare-up can happen even though you did everything right, and you followed your doctor s instructions. Some causes of flare-ups are:    Smoking or secondhand smoke    Colds, the flu, or respiratory infections    Air pollution    Sudden change in the weather    Dust, irritating chemicals, or strong fumes    Not taking your medicines as prescribed  Home care  Here are some things you can do at home to treat a flare-up:    Try not to panic. This makes it harder to breathe, and keeps you from doing the right things.    Don t smoke or be around others who are smoking.    Try to drink more fluids than usual during a flare-up, unless your doctor has told you not to because of heart and kidney problems. More fluids can help loosen the mucus.    Use your inhalers and nebulizer, if you have one, as you have been told to.    If you were given antibiotics, take them until they are used up or your doctor tells you to stop. It s important to finish the antibiotics, even though you feel better. This will make sure the infection has cleared.    If you were given prednisone or another steroid, finish it even if you feel better.  Preventing a flare-up  Even though flare-ups happen, the best way to treat one is to prevent it before it starts. Here are some pointers:    Don t smoke or be around others who are smoking.    Take your medicines as you have been told.    Talk with your doctor  about getting a flu shot every year. Also find out if you need a pneumonia shot.    If there is a weather advisory warning to stay indoors, try to stay inside when possible.    Try to eat healthy and get plenty of sleep.    Try to avoid things that usually set you off, like dust, chemical fumes, hairsprays, or strong perfumes.  Follow-up care  Follow up with your healthcare provider, or as advised.  If a culture was done, you will be told if your treatment needs to be changed. You can call as directed for the results.  If X-rays were done, you will be notified of any new findings that may affect your care.  Call 911  Call 911 if any of these occur:    You have trouble breathing    You feel confused or it s difficult to wake you up    You faint or lose consciousness    You have a rapid heart rate    You have new pain in your chest, arm, shoulder, neck or upper back  When to seek medical advice  Call your healthcare provider right away if any of these occur:    Wheezing or shortness of breath gets worse    You need to use your inhalers more often than usual without relief    Fever of 100.4 F (38 C) or higher, or as directed by your healthcare provider    Coughing up lots of dark-colored or bloody mucus (sputum)    Chest pain with each breath    You do not start to get better within 24 hours    Swelling of your ankles gets worse    Dizziness or weakness  Date Last Reviewed: 9/1/2016 2000-2018 [employer]. [address] [place] [address]. All rights reserved. This information is not intended as a substitute for professional medical care. Always follow your healthcare professional's instructions.        Uncontrolled High Blood Pressure (Established)    Your blood pressure was unusually high today. This can occur if you ve missed doses of your blood pressure medicine. Or it can happen if you are taking other medicines. These include some asthma inhalers, decongestants, diet pills, and street  drugs like cocaine and amphetamine.  Other causes include:    Weight gain    More salt in your diet    Smoking    Caffeine  Your blood pressure can also rise if you are emotionally upset or in intense pain. It may go back to normal after a period of rest.  Blood pressure measurements are given as 2 numbers. Systolic blood pressure is the upper number. This is the pressure when the heart contracts. Diastolic blood pressure is the lower number. This is the pressure when the heart relaxes between beats. You will see your blood pressure readings written together. For example, a person with a systolic pressure of 118 and a diastolic pressure of 78 will have 118/78 written in the medical record. To be high blood pressure, the numbers must be higher when tested over a period of time.  Blood pressure is categorized as normal, elevated, or stage 1 or stage 2 high blood pressure:    Normal blood pressure is systolic of less than 120 and diastolic of less than 80 (120/80)    Elevated blood pressure is systolic of 120 to 129 and diastolic less than 80    Stage 1 high blood pressure is systolic is 130 to 139 or diastolic between 80 to 89    Stage 2 high blood pressure is when systolic is 140 or higher or the diastolic is 90 or higher  Uncontrolled high blood pressure can cause serious health problems. It raises your risk for heart attack, stroke, and heart failure. In general, if you have high blood pressure, keeping your blood pressure below 130/80 mmHg may help prevent these problems. Your healthcare provider may prescribe medicine to help control blood pressure if lifestyle changes are not enough.  Home care  It s important to take steps to lower your blood pressure. If you are taking blood pressure medicine, the guidelines below may help you need less or no medicines in the future.    Start a weight-loss program if you are overweight.    Cut back on the amount of salt in your diet:  ? Avoid high-salt foods like olives,  pickles, smoked meats, and salted potato chips.  ? Don t add salt to your food at the table.  ? Use only small amounts of salt when cooking.    Start an exercise program. Talk with your healthcare provider about what exercise program is best for you. It doesn t have to be difficult. Even brisk walking for 20 minutes 3 times a week is a good form of exercise.    Avoid medicines that stimulates the heart. This includes many over-the-counter cold and sinus decongestant pills and sprays, as well as diet pills. Check the warnings about high blood pressure on the label. Before purchasing any over-the-counter medicines or supplements, always ask the pharmacist about the product's potential interaction with your high blood pressure and your medicines.    Stimulants such as amphetamine or cocaine could be lethal for someone with high blood pressure. Never take these.    Limit how much caffeine you drink. Or switch to noncaffeinated beverages.    Stop smoking. If you are a long-time smoker, this can be hard. Enroll in a stop-smoking program to make it more likely that you will succeed. Talk with your provider about ways to quit.    Learn how to handle stress better. This is an important part of any program to lower blood pressure. Learn ways to relax. These include meditation, yoga, and biofeedback.    If medicines were prescribed, take them exactly as directed. Missing doses may cause your blood pressure to get out of control.    If you miss a dose or doses of your medicines, check with your healthcare provider or pharmacist about what to do.    Consider buying an automatic blood pressure machine. Your provider may recommend a certain type. You can get one of these at most pharmacies. Measure your blood pressure twice a day, in the morning, and in the late afternoon. Keep a written record of your home blood pressure readings and take the record to your medical appointments.  Here are some additional guidelines on home blood  pressure monitoring from the American Heart Association.    Don't smoke or drink coffee for 30 minutes    Go to the bathroom before the test.    Relax for 5 minutes before taking the measurement.    Sit correctly. Be sure your back is supported. Don't sit on a couch or soft chair. Uncross your feet and place them flat on the floor. Place your arm on a solid, flat surface like a table with the upper arm at heart level. Make certain the middle of the cuff is directly above the bend of the elbow. Check the monitor's instruction manual for an illustration.    Take multiple readings. When you measure, take 2 or 3 readings one minute apart and record all of the results.    Take your blood pressure at the same time every day, or as your healthcare provider recommends.    Record the date, time, and blood pressure reading.    Take the record with you to your next appointment. If your blood pressure monitor has a built-in memory, simply take the monitor with you to your next appointment.    Call your provider if you have several high readings. Don't be frightened by a single high reading, but if you get several high readings, check in with your healthcare provider.    Note: When blood pressure reaches a systolic (top number) of 180 or higher or a diastolic (bottom number) of 110 or higher, emergency medical treatment is required. Call your healthcare provider immediately.  Follow-up care  Regular visits to your own healthcare provider for blood pressure and medicine checks are an important part of your care. Make a follow-up appointment as directed. Bring the record of your home blood pressure readings to the appointment.  When to seek medical advice  Call your healthcare provider right away if any of these occur:    Blood pressure reaches a systolic (top number) of 180 or higher or diastolic (bottom number) of 110 or higher, emergency medical treatment is required.    Chest, arm, shoulder, neck, or upper back  pain    Shortness of breath    Severe headache    Throbbing or rushing sound in the ears    Nosebleed    Extreme drowsiness, confusion, or fainting    Dizziness or dizziness with spinning sensation (vertigo)    Weakness in an arm or leg or on one side of the face    Trouble speaking or seeing   Date Last Reviewed: 1/1/2017 2000-2018 [employer]. [address] [place] [address]. All rights reserved. This information is not intended as a substitute for professional medical care. Always follow your healthcare professional's instructions.                Follow-ups after your visit        Your next 10 appointments already scheduled     Dec 20, 2018  9:45 AM CST   Anticoagulation Visit with NB ANTI COAG   [facility]    [address] [place] [id number] [phone]              Future tests that were ordered for you today     Open Standing Orders        Priority Remaining Interval Expires Ordered    INR Routine 52/52 11/29/2019 11/29/2018            Who to contact     If you have questions or need follow up information about today's clinic visit or your schedule please contact [facility] directly at [phone].  Normal or non-critical lab and imaging results will be communicated to you by [employer], letter or phone within 4 business days after the clinic has received the results. If you do not hear from us within 7 days, please contact the clinic through [employer] or phone. If you have a critical or abnormal lab result, we will notify you by phone as soon as possible.  Submit refill requests through [employer] or call your pharmacy and they will forward the refill request to us. Please allow 3 business days for your refill to be completed.          Additional Information About Your Visit        [employer] Information     [employer] gives you secure access to your electronic health record. If you see a primary care  provider, you can also send messages to your care team and make appointments. If you have questions, please call your primary care clinic.  If you do not have a primary care provider, please call [phone] and they will assist you.        Care EveryWhere ID     This is your Care EveryWhere ID. This could be used by other organizations to access your [facility] medical records  [id number]        Your Vitals Were     Pulse Temperature Respirations Pulse Oximetry Breastfeeding? BMI (Body Mass Index)    87 98.5  F (36.9  C) 24 95% No 23.99 kg/m2       Blood Pressure from Last 3 Encounters:   11/29/18 166/68   11/20/18 148/80   09/07/18 150/68    Weight from Last 3 Encounters:   11/29/18 146 lb 6.4 oz (66.4 kg)   11/20/18 145 lb (65.8 kg)   09/07/18 148 lb (67.1 kg)              Today, you had the following     No orders found for display         Today's Medication Changes          These changes are accurate as of 11/29/18 12:35 PM.  If you have any questions, ask your nurse or doctor.               Start taking these medicines.        Dose/Directions    amLODIPine 5 MG tablet   Commonly known as:  NORVASC   Used for:  COPD exacerbation (H)   Started by:  iDana Ely APRN CNP        Dose:  5 mg   Take 1 tablet (5 mg) by mouth daily   Quantity:  90 tablet   Refills:  1         These medicines have changed or have updated prescriptions.        Dose/Directions    hydrochlorothiazide 25 MG tablet   Commonly known as:  HYDRODIURIL   This may have changed:    - how much to take  - when to take this   Used for:  Benign essential hypertension   Changed by:  Diana Ely APRN CNP        Dose:  25 mg   Take 1 tablet (25 mg) by mouth daily   Quantity:  90 tablet   Refills:  1         Stop taking these medicines if you haven't already. Please contact your care team if you have questions.     metoprolol succinate ER 50 MG 24 hr tablet   Commonly known as:  TOPROL-XL   Stopped by:  Diana Ely  SELWYN CNP                Where to get your medicines      These medications were sent to [id number] - [place], [place] - [address]  [address], [facility] [address]     Phone:  [phone]     amLODIPine 5 MG tablet    doxycycline hyclate 100 MG capsule    hydrochlorothiazide 25 MG tablet                Primary Care Provider Office Phone # Fax #    Diana Ely, APRTRICIA KING [phone]       [address] [place] [address]        Equal Access to Services     POLINA CUEVAS : Hadii aad ku hadasho Soomaali, waaxda luqadaha, qaybta kaalmada adeegyada, waxay idiin hayaan adeeg kharash laharriett . So [facility] [phone].    ATENCIÓN: Si habla español, tiene a ruiz disposición servicios gratuitos de asistencia lingüística. [facility] [phone].    We comply with applicable federal civil rights laws and [place] laws. We do not discriminate on the basis of race, color, national origin, age, disability, sex, sexual orientation, or gender identity.            Thank you!     Thank you for choosing [facility]  for your care. Our goal is always to provide you with excellent care. Hearing back from our patients is one way we can continue to improve our services. Please take a few minutes to complete the written survey that you may receive in the mail after your visit with us. Thank you!             Your Updated Medication List - Protect others around you: Learn how to safely use, store and throw away your medicines at www.disposemymeds.org.          This list is accurate as of 11/29/18 12:35 PM.  Always use your most recent med list.                   Brand Name Dispense Instructions for use Diagnosis    amLODIPine 5 MG tablet    NORVASC    90 tablet    Take 1 tablet (5 mg) by mouth daily    COPD exacerbation (H)       ascorbic acid 250 MG Chew chewable tablet    vitamin C     Take 250 mg by mouth daily        atorvastatin 40 MG tablet    LIPITOR    90 tablet    Take  1 tablet (40 mg) by mouth daily    Hyperlipidemia LDL goal <70       buPROPion 150 MG 12 hr tablet    WELLBUTRIN SR    60 tablet    Take 1 tablet once daily and increase to 1 tablet twice daily after 4 to 7 days    Encounter for smoking cessation counseling       doxycycline hyclate 100 MG capsule    VIBRAMYCIN    20 capsule    Take 1 capsule (100 mg) by mouth 2 times daily    COPD exacerbation (H)       hydrochlorothiazide 25 MG tablet    HYDRODIURIL    90 tablet    Take 1 tablet (25 mg) by mouth daily    Benign essential hypertension       MAGNESIUM PO      Take 250 mg by mouth        MULTI-VITAMINS Tabs      Take 1 tablet by mouth        vitamin B-12 1000 MCG tablet    CYANOCOBALAMIN     Take 1,000 mcg by mouth        vitamin B1 100 MG tablet    THIAMINE     Take 1 tablet (100 mg) by mouth daily        warfarin 5 MG tablet    COUMADIN    110 tablet    7.5 mg daily OR AS DIRECTED

## 2018-11-29 NOTE — NURSING NOTE
"Chief Complaint   Patient presents with     Hypertension       Initial Breastfeeding? No Estimated body mass index is 23.76 kg/(m^2) as calculated from the following:    Height as of 5/1/18: 5' 5.5\" (1.664 m).    Weight as of 11/20/18: 145 lb (65.8 kg).    Patient presents to the clinic using No DME    Health Maintenance that is potentially due pending provider review:  NONE    n/a    Is there anyone who you would like to be able to receive your results? not asked  If yes have patient fill out FARZANEH    "

## 2018-11-29 NOTE — PATIENT INSTRUCTIONS
Stop metoprolol due to SE  Begin amlodipine 5 mg daily  Continue hydrochlorothiazide 25 mg daily.  Continue doxycycline for another 10 days.     Discharge Instructions: COPD  You have been diagnosed with chronic obstructive pulmonary disease (COPD). This is a name given to a group of diseases that limit the flow of air in and out of your lungs. This makes it harder to breathe. With COPD, you are also more likely to get lung infections. COPD includes chronic bronchitis and emphysema. COPD is most often caused by heavy, long-term cigarette smoking.  Home care  Quit smoking    If you smoke, quit. It is the best thing you can do for your COPD and your overall health.    Join a stop-smoking program. There are even telephone, text message, and Internet programs to help you quit.    Ask your healthcare provider about medicines or other methods to help you quit.    Ask family members to quit smoking as well.    Don't allow people to smoke in your home, in your car, or when they are around you.  Protect yourself from infection    Wash your hands often. Do your best to keep your hands away from your face. Most germs are spread from your hands to your mouth.    Get a flu shot every year. Also ask your provider about pneumonia vaccines.    Avoid crowds. It's especially important to do this in the winter when more people have colds and flu.    To stay healthy, get enough sleep, exercise regularly, and eat a balanced diet. You should:  ? Get about 8 hours of sleep every night.  ? Try to exercise for at least 30 minutes on most days.  ? Have healthy foods including fruits and vegetables, 100% whole grains, lean meats and fish, and low-fat dairy products. Try to stay away from foods high in fats and sugar.  Take your medicines  Take your medicines exactly as directed. Don't skip doses.  Manage your stress  Stress can make COPD worse. Use this stress management technique:    Find a quiet place and sit or lie in a comfortable  position.    Close your eyes and perform breathing exercises for several minutes. Ask your provider about the best way to breathe.  Pulmonary rehabilitation    Pulmonary rehab can help you feel better. These programs include exercise, breathing techniques, information about COPD, counseling, and help for smokers.    Ask your provider or your local hospital about programs in your area.  When to call your healthcare provider  Call your provider immediately if you have any of the following:    Shortness of breath, wheezing, or coughing    Increased mucus    Yellow, green, bloody, or smelly mucus    Fever or chills    Tightness in your chest that does not go away with rest or medicine    An irregular heartbeat or a feeling that your heart is beating very fast    Swollen ankles   Date Last Reviewed: 5/1/2016 2000-2018 [employer]. [address] [place] [address]. All rights reserved. This information is not intended as a substitute for professional medical care. Always follow your healthcare professional's instructions.        COPD Flare    You have had a flare-up of your COPD.  COPD, or chronic obstructive pulmonary disease, is a common lung disease. It causes your airways to become irritated and narrower. This makes it harder for you to breathe. Emphysema and chronic bronchitis are both types of COPD. This is a chronic condition, which means you always have it. Sometimes it gets worse. When this happens, it is called a flare-up.  Symptoms of COPD  People with COPD may have symptoms most of the time. In a flare-up, your symptoms get worse. These symptoms may mean you are having a flare-up:    Shortness of breath, shallow or rapid breathing, or wheezing that gets worse    Lung infection    Cough that gets worse    More mucus, thicker mucus or mucus of a different color    Tiredness, decreased energy, or trouble doing your usual activities    Fever    Chest tightness    Your symptoms don t get  better even when you use your usual medicines, inhalers, and nebulizer    Trouble talking    You feel confused  Causes of flare-ups  Unfortunately, a flare-up can happen even though you did everything right, and you followed your doctor s instructions. Some causes of flare-ups are:    Smoking or secondhand smoke    Colds, the flu, or respiratory infections    Air pollution    Sudden change in the weather    Dust, irritating chemicals, or strong fumes    Not taking your medicines as prescribed  Home care  Here are some things you can do at home to treat a flare-up:    Try not to panic. This makes it harder to breathe, and keeps you from doing the right things.    Don t smoke or be around others who are smoking.    Try to drink more fluids than usual during a flare-up, unless your doctor has told you not to because of heart and kidney problems. More fluids can help loosen the mucus.    Use your inhalers and nebulizer, if you have one, as you have been told to.    If you were given antibiotics, take them until they are used up or your doctor tells you to stop. It s important to finish the antibiotics, even though you feel better. This will make sure the infection has cleared.    If you were given prednisone or another steroid, finish it even if you feel better.  Preventing a flare-up  Even though flare-ups happen, the best way to treat one is to prevent it before it starts. Here are some pointers:    Don t smoke or be around others who are smoking.    Take your medicines as you have been told.    Talk with your doctor about getting a flu shot every year. Also find out if you need a pneumonia shot.    If there is a weather advisory warning to stay indoors, try to stay inside when possible.    Try to eat healthy and get plenty of sleep.    Try to avoid things that usually set you off, like dust, chemical fumes, hairsprays, or strong perfumes.  Follow-up care  Follow up with your healthcare provider, or as advised.  If a  culture was done, you will be told if your treatment needs to be changed. You can call as directed for the results.  If X-rays were done, you will be notified of any new findings that may affect your care.  Call 911  Call 911 if any of these occur:    You have trouble breathing    You feel confused or it s difficult to wake you up    You faint or lose consciousness    You have a rapid heart rate    You have new pain in your chest, arm, shoulder, neck or upper back  When to seek medical advice  Call your healthcare provider right away if any of these occur:    Wheezing or shortness of breath gets worse    You need to use your inhalers more often than usual without relief    Fever of 100.4 F (38 C) or higher, or as directed by your healthcare provider    Coughing up lots of dark-colored or bloody mucus (sputum)    Chest pain with each breath    You do not start to get better within 24 hours    Swelling of your ankles gets worse    Dizziness or weakness  Date Last Reviewed: 9/1/2016 2000-2018 [employer]. [address] [place] [address]. All rights reserved. This information is not intended as a substitute for professional medical care. Always follow your healthcare professional's instructions.        Uncontrolled High Blood Pressure (Established)    Your blood pressure was unusually high today. This can occur if you ve missed doses of your blood pressure medicine. Or it can happen if you are taking other medicines. These include some asthma inhalers, decongestants, diet pills, and street drugs like cocaine and amphetamine.  Other causes include:    Weight gain    More salt in your diet    Smoking    Caffeine  Your blood pressure can also rise if you are emotionally upset or in intense pain. It may go back to normal after a period of rest.  Blood pressure measurements are given as 2 numbers. Systolic blood pressure is the upper number. This is the pressure when the heart contracts. Diastolic  blood pressure is the lower number. This is the pressure when the heart relaxes between beats. You will see your blood pressure readings written together. For example, a person with a systolic pressure of 118 and a diastolic pressure of 78 will have 118/78 written in the medical record. To be high blood pressure, the numbers must be higher when tested over a period of time.  Blood pressure is categorized as normal, elevated, or stage 1 or stage 2 high blood pressure:    Normal blood pressure is systolic of less than 120 and diastolic of less than 80 (120/80)    Elevated blood pressure is systolic of 120 to 129 and diastolic less than 80    Stage 1 high blood pressure is systolic is 130 to 139 or diastolic between 80 to 89    Stage 2 high blood pressure is when systolic is 140 or higher or the diastolic is 90 or higher  Uncontrolled high blood pressure can cause serious health problems. It raises your risk for heart attack, stroke, and heart failure. In general, if you have high blood pressure, keeping your blood pressure below 130/80 mmHg may help prevent these problems. Your healthcare provider may prescribe medicine to help control blood pressure if lifestyle changes are not enough.  Home care  It s important to take steps to lower your blood pressure. If you are taking blood pressure medicine, the guidelines below may help you need less or no medicines in the future.    Start a weight-loss program if you are overweight.    Cut back on the amount of salt in your diet:  ? Avoid high-salt foods like olives, pickles, smoked meats, and salted potato chips.  ? Don t add salt to your food at the table.  ? Use only small amounts of salt when cooking.    Start an exercise program. Talk with your healthcare provider about what exercise program is best for you. It doesn t have to be difficult. Even brisk walking for 20 minutes 3 times a week is a good form of exercise.    Avoid medicines that stimulates the heart. This  includes many over-the-counter cold and sinus decongestant pills and sprays, as well as diet pills. Check the warnings about high blood pressure on the label. Before purchasing any over-the-counter medicines or supplements, always ask the pharmacist about the product's potential interaction with your high blood pressure and your medicines.    Stimulants such as amphetamine or cocaine could be lethal for someone with high blood pressure. Never take these.    Limit how much caffeine you drink. Or switch to noncaffeinated beverages.    Stop smoking. If you are a long-time smoker, this can be hard. Enroll in a stop-smoking program to make it more likely that you will succeed. Talk with your provider about ways to quit.    Learn how to handle stress better. This is an important part of any program to lower blood pressure. Learn ways to relax. These include meditation, yoga, and biofeedback.    If medicines were prescribed, take them exactly as directed. Missing doses may cause your blood pressure to get out of control.    If you miss a dose or doses of your medicines, check with your healthcare provider or pharmacist about what to do.    Consider buying an automatic blood pressure machine. Your provider may recommend a certain type. You can get one of these at most pharmacies. Measure your blood pressure twice a day, in the morning, and in the late afternoon. Keep a written record of your home blood pressure readings and take the record to your medical appointments.  Here are some additional guidelines on home blood pressure monitoring from the American [employer] Association.    Don't smoke or drink coffee for 30 minutes    Go to the bathroom before the test.    Relax for 5 minutes before taking the measurement.    Sit correctly. Be sure your back is supported. Don't sit on a couch or soft chair. Uncross your feet and place them flat on the floor. Place your arm on a solid, flat surface like a table with the upper arm at  heart level. Make certain the middle of the cuff is directly above the bend of the elbow. Check the monitor's instruction manual for an illustration.    Take multiple readings. When you measure, take 2 or 3 readings one minute apart and record all of the results.    Take your blood pressure at the same time every day, or as your healthcare provider recommends.    Record the date, time, and blood pressure reading.    Take the record with you to your next appointment. If your blood pressure monitor has a built-in memory, simply take the monitor with you to your next appointment.    Call your provider if you have several high readings. Don't be frightened by a single high reading, but if you get several high readings, check in with your healthcare provider.    Note: When blood pressure reaches a systolic (top number) of 180 or higher or a diastolic (bottom number) of 110 or higher, emergency medical treatment is required. Call your healthcare provider immediately.  Follow-up care  Regular visits to your own healthcare provider for blood pressure and medicine checks are an important part of your care. Make a follow-up appointment as directed. Bring the record of your home blood pressure readings to the appointment.  When to seek medical advice  Call your healthcare provider right away if any of these occur:    Blood pressure reaches a systolic (top number) of 180 or higher or diastolic (bottom number) of 110 or higher, emergency medical treatment is required.    Chest, arm, shoulder, neck, or upper back pain    Shortness of breath    Severe headache    Throbbing or rushing sound in the ears    Nosebleed    Extreme drowsiness, confusion, or fainting    Dizziness or dizziness with spinning sensation (vertigo)    Weakness in an arm or leg or on one side of the face    Trouble speaking or seeing   Date Last Reviewed: 1/1/2017 2000-2018 [email or web address]. [address] [place] [address], [place] [address]. All rights  reserved. This information is not intended as a substitute for professional medical care. Always follow your healthcare professional's instructions.

## 2018-11-29 NOTE — PROGRESS NOTES
ANTICOAGULATION FOLLOW-UP CLINIC VISIT    Patient Name:  Bebe Alfonso  Date:  11/29/2018  Contact Type:  Face to Face    SUBJECTIVE:     Patient Findings     Positives Antibiotic use or infection (on tail end of doxycycline for COPD)    Comments The patient is seeing PCP today to follow up on her hypertension. BP was quite elevated when she saw her neurologist recently per patient. She will be leaving for 3 months in [place] right after rinku. Pt was given a standing lab order to bring with her. She will need travel exercises for reducing clot risk printed at next visit.     No changes in diet, activity level, medications (including over the counter), or health. No missed doses of warfarin. Patient took dosing as prescribed. No signs of clots or bleeding concerns. Patient will continue maintenance warfarin dosing.             OBJECTIVE    INR Protime   Date Value Ref Range Status   11/29/2018 2.4 (A) 0.86 - 1.14 Final       ASSESSMENT / PLAN  INR assessment THER    Recheck INR In: 3 WEEKS    INR Location Clinic      Anticoagulation Summary as of 11/29/2018     INR goal 2.0-3.0   Today's INR 2.4   Warfarin maintenance plan 7.5 mg (5 mg x 1.5) every day   Full warfarin instructions 7.5 mg every day   Weekly warfarin total 52.5 mg   No change documented Natalie Sorto, RN   Plan last modified Luann Moise, RN (7/16/2018)   Next INR check 12/20/2018   Priority INR   Target end date Indefinite    Indications   History of CVA (cerebrovascular accident) [Z86.73]  Long term current use of anticoagulant therapy [Z79.01]         Anticoagulation Episode Summary     INR check location     Preferred lab     Send INR reminders to [facility]    Comments * Pt states she has had 4 strokes (some after hip surgeries)       Anticoagulation Care Providers     Provider Role Specialty Phone number    Diana Ely APRN CNP Responsible Family Practice [phone]            See the Encounter Report to view  Anticoagulation Flowsheet and Dosing Calendar (Go to Encounters tab in chart review, and find the Anticoagulation Therapy Visit)        Natalie Sorto RN

## 2018-11-29 NOTE — MR AVS SNAPSHOT
Bebe Kaden   11/29/2018 10:30 AM   Anticoagulation Therapy Visit    Description:  73 year old female   Provider:  [facility] ANTI COAG   Department:  [facility]           INR as of 11/29/2018     Today's INR 2.4      Anticoagulation Summary as of 11/29/2018     INR goal 2.0-3.0   Today's INR 2.4   Full warfarin instructions 7.5 mg every day   Next INR check 12/20/2018    Indications   History of CVA (cerebrovascular accident) [Z86.73]  Long term current use of anticoagulant therapy [Z79.01]         Your next Anticoagulation Clinic appointment(s)     Dec 20, 2018  9:45 AM CST   [facility]    [address] [place] [id number] [phone]              Contact Numbers     Please call [phone] with any problems or questions regarding your therapy.    If you need to cancel and/or reschedule your appointment please call one of the following numbers:  [facility] [phone]  [facility] [phone]  [facility] [phone]  [facility] [phone]  [place] - [phone]            November 2018 Details    Sun Mon Tue Wed Thu Fri Sat         1               2               3                 4               5               6               7               8               9               10                 11               12               13               14               15               16               17                 18               19               20               21               22               23               24                 25               26               27               28               29      7.5 mg   See details      30      7.5 mg           Date Details   11/29 This INR check               How to take your warfarin dose     To take:  7.5 mg Take 1.5 of the 5 mg tablets.           December 2018 Details    Sun Mon Tue Wed Thu Fri Sat           1      7.5 mg           2      7.5 mg          3      7.5 mg         4      7.5 mg         5      7.5 mg         6      7.5 mg         7      7.5 mg         8      7.5 mg           9      7.5 mg         10      7.5 mg         11      7.5 mg         12      7.5 mg         13      7.5 mg         14      7.5 mg         15      7.5 mg           16      7.5 mg         17      7.5 mg         18      7.5 mg         19      7.5 mg         20            21               22                 23               24               25               26               27               28               29                 30               31                     Date Details   No additional details    Date of next INR:  12/20/2018         How to take your warfarin dose     To take:  7.5 mg Take 1.5 of the 5 mg tablets.

## 2018-11-29 NOTE — PROGRESS NOTES
"  SUBJECTIVE:   Bebe Alfonso is a 73 year old female who presents to clinic today for the following health issues:  Chief Complaint   Patient presents with     Hypertension     Medication Reconciliation     did not take medication today         Hypertension Follow-up      Outpatient blood pressures are not being checked.    Low Salt Diet: not monitoring salt      Amount of exercise or physical activity: none, stretching per PT    Problems taking medications regularly: Yes,  Patient did not take medication today, because yesterday at neurologist BP was elevated and patient thinks needs different medication    Medication side effects: per patient feels not her self and feels \"ishy\" because of medication    Diet: regular (no restrictions)    Metoprolol did not feel good on that medication.     Stopped taking it today.     BP  Was really high even while she was on it.     Increase in the dose made the BP go higher.    hydrochlorothiazide taking 25 mg daily    No meds yet today.         Continues to smoke.  History of CVA with speech  Delay.  History of COPD.  Productive cough.      -------------------------------------    Problem list and histories reviewed & adjusted, as indicated.  Additional history: as documented    BP Readings from Last 3 Encounters:   11/29/18 166/68   11/20/18 148/80   09/07/18 150/68    Wt Readings from Last 3 Encounters:   11/29/18 146 lb 6.4 oz (66.4 kg)   11/20/18 145 lb (65.8 kg)   09/07/18 148 lb (67.1 kg)                  Labs reviewed in EPIC    Reviewed and updated as needed this visit by clinical staff  Tobacco  Allergies  Meds  Surg Hx       Reviewed and updated as needed this visit by Provider           ROS:  CONSTITUTIONAL: NEGATIVE for fever, chills, change in weight  ENT/MOUTH: NEGATIVE for ear, mouth and throat problems  RESP: ++ for significant cough or SOB  CV: NEGATIVE for chest pain, palpitations or peripheral edema   ROS: 10 point ROS neg other than the symptoms noted " above in the HPI.      OBJECTIVE:                                                    /68 (BP Location: Left arm)  Pulse 87  Temp 98.5  F (36.9  C)  Resp 24  Wt 146 lb 6.4 oz (66.4 kg)  SpO2 95%  Breastfeeding? No  BMI 23.99 kg/m2  Body mass index is 23.99 kg/(m^2).   GENERAL: alert, well nourished, well hydrated, no distress  HENT: ear canals- normal; TMs- normal; Nose- normal; Mouth- no ulcers, no lesions  NECK: no tenderness, no adenopathy, no asymmetry, no masses, no stiffness; thyroid- normal to palpation  RESP: lungs rhonchi anterior upper with expiratory wheezes  CV: regular rates and rhythm, normal S1 S2, no S3 or S4 and no murmur, no click or rub -  ABDOMEN: soft, no tenderness, no  hepatosplenomegaly, no masses, normal bowel sounds    Diagnostic test results:  Results for orders placed or performed in visit on 11/29/18   INR point of care   Result Value Ref Range    INR Protime 2.4 (A) 0.86 - 1.14        ASSESSMENT/PLAN:                                                    1. Benign essential hypertension  Better blood pressure control as needed.  DASH diet encouraged.  Smoking cessation encouraged.  Increase hydrochlorothiazide to 25 mg daily.  Discontinue metoprolol due to side effects.  Add in amlodipine 5 mg daily recheck blood pressure again in 2 weeks  - hydrochlorothiazide (HYDRODIURIL) 25 MG tablet; Take 1 tablet (25 mg) by mouth daily  Dispense: 90 tablet; Refill: 1    2. COPD exacerbation (H)  Recent exacerbation.  Continue oral antibiotic  - doxycycline hyclate (VIBRAMYCIN) 100 MG capsule; Take 1 capsule (100 mg) by mouth 2 times daily  Dispense: 20 capsule; Refill: 0      Follow up with Provider -recheck 2 weeks  Call or return to the clinic with any worsening of symptoms or no resolution. Patient/Parent verbalized understanding and is in agreement. Medication side effects reviewed.   Current Outpatient Prescriptions   Medication Sig Dispense Refill     amLODIPine (NORVASC) 5 MG  tablet Take 1 tablet (5 mg) by mouth daily 90 tablet 1     ascorbic acid (VITAMIN C) 250 MG CHEW chewable tablet Take 250 mg by mouth daily       atorvastatin (LIPITOR) 40 MG tablet Take 1 tablet (40 mg) by mouth daily 90 tablet 3     buPROPion (WELLBUTRIN SR) 150 MG 12 hr tablet Take 1 tablet once daily and increase to 1 tablet twice daily after 4 to 7 days 60 tablet 2     cyanocobalamin (VITAMIN  B-12) 1000 MCG tablet Take 1,000 mcg by mouth       doxycycline hyclate (VIBRAMYCIN) 100 MG capsule Take 1 capsule (100 mg) by mouth 2 times daily 20 capsule 0     hydrochlorothiazide (HYDRODIURIL) 25 MG tablet Take 1 tablet (25 mg) by mouth daily 90 tablet 1     MAGNESIUM PO Take 250 mg by mouth       Multiple Vitamin (MULTI-VITAMINS) TABS Take 1 tablet by mouth       thiamine 100 MG tablet Take 1 tablet (100 mg) by mouth daily       warfarin (COUMADIN) 5 MG tablet 7.5 mg daily OR AS DIRECTED 110 tablet 0       See Patient Instructions    SELWYN Dennis [facility]

## 2018-12-07 ENCOUNTER — E-VISIT (OUTPATIENT)
Dept: FAMILY MEDICINE | Facility: CLINIC | Age: 73
End: 2018-12-07
Payer: COMMERCIAL

## 2018-12-07 DIAGNOSIS — I10 BENIGN ESSENTIAL HYPERTENSION: Primary | ICD-10-CM

## 2018-12-07 PROCEDURE — 99207 ZZC NO BILLABLE SERVICE THIS VISIT: CPT | Performed by: NURSE PRACTITIONER

## 2018-12-17 ENCOUNTER — MYC REFILL (OUTPATIENT)
Dept: FAMILY MEDICINE | Facility: CLINIC | Age: 73
End: 2018-12-17

## 2018-12-17 DIAGNOSIS — I10 BENIGN ESSENTIAL HYPERTENSION: ICD-10-CM

## 2018-12-17 RX ORDER — HYDROCHLOROTHIAZIDE 25 MG/1
25 TABLET ORAL DAILY
Qty: 90 TABLET | Refills: 0 | Status: SHIPPED | OUTPATIENT
Start: 2018-12-17 | End: 2019-03-18

## 2018-12-17 NOTE — TELEPHONE ENCOUNTER
"Requested Prescriptions   Pending Prescriptions Disp Refills     hydrochlorothiazide (HYDRODIURIL) 25 MG tablet 90 tablet 1     Sig: Take 1 tablet (25 mg) by mouth daily    Diuretics (Including Combos) Protocol Failed - 12/17/2018  9:32 AM       Failed - Blood pressure under 140/90 in past 12 months    BP Readings from Last 3 Encounters:   11/29/18 166/68   11/20/18 148/80   09/07/18 150/68                Failed - Normal serum creatinine on file in past 12 months    Recent Labs   Lab Test 11/20/18  1109   CR 1.11*             Passed - Recent (12 mo) or future (30 days) visit within the authorizing provider's specialty    Patient had office visit in the last 12 months or has a visit in the next 30 days with authorizing provider or within the authorizing provider's specialty.  See \"Patient Info\" tab in inbasket, or \"Choose Columns\" in Meds & Orders section of the refill encounter.             Passed - Patient is age 18 or older       Passed - No active pregancy on record       Passed - Normal serum potassium on file in past 12 months    Recent Labs   Lab Test 11/20/18  1109   POTASSIUM 3.5                   Passed - Normal serum sodium on file in past 12 months    Recent Labs   Lab Test 11/20/18  1109                Passed - No positive pregnancy test in past 12 months        Last Written Prescription Date:  11/29/18  Last Fill Quantity: 90,  # refills: 1   Last office visit: 11/29/2018 with prescribing provider:  11/29/18 Diana SAMANO-CNP    Future Office Visit:      "

## 2018-12-18 PROBLEM — I63.20 CEREBROVASCULAR ACCIDENT (CVA) DUE TO OCCLUSION OF PRECEREBRAL ARTERY (H): Status: ACTIVE | Noted: 2018-12-18

## 2018-12-20 ENCOUNTER — ANTICOAGULATION THERAPY VISIT (OUTPATIENT)
Dept: ANTICOAGULATION | Facility: CLINIC | Age: 73
End: 2018-12-20
Payer: COMMERCIAL

## 2018-12-20 DIAGNOSIS — Z79.01 LONG TERM CURRENT USE OF ANTICOAGULANT THERAPY: ICD-10-CM

## 2018-12-20 DIAGNOSIS — Z86.73 HISTORY OF CVA (CEREBROVASCULAR ACCIDENT): ICD-10-CM

## 2018-12-20 LAB — INR POINT OF CARE: 2.1 (ref 0.86–1.14)

## 2018-12-20 PROCEDURE — 99207 ZZC NO CHARGE NURSE ONLY: CPT

## 2018-12-20 PROCEDURE — 36416 COLLJ CAPILLARY BLOOD SPEC: CPT

## 2018-12-20 PROCEDURE — 85610 PROTHROMBIN TIME: CPT | Mod: QW

## 2018-12-20 RX ORDER — WARFARIN SODIUM 5 MG/1
TABLET ORAL
Qty: 135 TABLET | Refills: 0 | Status: SHIPPED | OUTPATIENT
Start: 2018-12-20 | End: 2019-03-21

## 2018-12-20 NOTE — PATIENT INSTRUCTIONS
Keep your body moving when traveling by airplane or car.    Seated Exercises:    Ankle Circles: Lift your feet off the floor and twirl your feet as if you re drawing circles with your toes. Continue this for 15 seconds, then reverse direction. Repeat as desired.    Foot Pumps: Keep your heels on the floor and lift the front of your feet toward you as high as possible. Hold for a second or two, then flatten your feet and lift your heels as high as possible, keeping the balls of your feet on the floor. Continue for 30 seconds, and repeat as desired.    Knee Lifts: Keeping your leg bent, lift your knee up to your chest. Bring back to normal position and repeat with your other leg. Repeat 20 to 30 times for each leg.      General Tips:    If you have an opportunity to move around the cabin, walk to the restroom and back.    Drink plenty of fluids, preferably water, to avoid dehydration.    Walk for 30 minutes before boarding the plane.

## 2018-12-20 NOTE — PROGRESS NOTES
ANTICOAGULATION FOLLOW-UP CLINIC VISIT    Patient Name:  Bebe Alfonso  Date:  2018  Contact Type:  Face to Face    SUBJECTIVE:     Patient Findings     Positives:   Change in medications (Increase hydrochlorothiazide to 25 mg daily. Discontinue metoprolol. start amlodipine 5 mg daily)    Comments:   Pt will be leaving for [place] next week. She has a standing lab order already and writer printed travel exercises for the patient to use. She will be driving but plans to get out often to walk and use the bathroom. No other changes or concerns. Recheck in 6 weeks.            OBJECTIVE    INR Protime   Date Value Ref Range Status   2018 2.1 (A) 0.86 - 1.14 Final       ASSESSMENT / PLAN  INR assessment THER    Recheck INR In: 6 WEEKS    INR Location Clinic      Anticoagulation Summary  As of 2018    INR goal:   2.0-3.0   TTR:   76.2 % (8.6 mo)   INR used for dosin.1 (2018)   Warfarin maintenance plan:   7.5 mg (5 mg x 1.5) every day   Full warfarin instructions:   7.5 mg every day   Weekly warfarin total:   52.5 mg   No change documented:   Natalie Sorto, RN   Plan last modified:   Luann Moise, RN (2018)   Next INR check:   2019   Priority:   INR   Target end date:   Indefinite    Indications    History of CVA (cerebrovascular accident) [Z86.73]  Long term current use of anticoagulant therapy [Z79.01]             Anticoagulation Episode Summary     INR check location:       Preferred lab:       Send INR reminders to:   [facility]    Comments:   * Pt states she has had 4 strokes (some after hip surgeries). 19 to [place] for 3 months. [facility] to monitor from outside lab result      Anticoagulation Care Providers     Provider Role Specialty Phone number    Diana Ely APRN CNP Responsible Family Practice [phone]            See the Encounter Report to view Anticoagulation Flowsheet and Dosing Calendar (Go to Encounters tab in chart review, and find the  Anticoagulation Therapy Visit)        Natalie Sorto RN

## 2018-12-21 ENCOUNTER — TELEPHONE (OUTPATIENT)
Dept: FAMILY MEDICINE | Facility: CLINIC | Age: 73
End: 2018-12-21

## 2018-12-21 NOTE — TELEPHONE ENCOUNTER
Reason for Call:  Other call back    Detailed comments: Pt was told to stop taking Norco due to med changes. She twisted her spine again and wants to know if she can take it. She is going on a 3 day road trip for the holidays and is concerned. She will be away for 3 months.  She has the medication.    Phone Number Patient can be reached at: Cell number on file:    Telephone Information:   Mobile [phone]       Best Time: any    Can we leave a detailed message on this number? YES    Call taken on 12/21/2018 at 10:25 AM by Terra Hall

## 2018-12-21 NOTE — TELEPHONE ENCOUNTER
I do not have Norco on her current or past medication list.   If she is needing Narcotics for pain control most likely needs to be seen.  I do not see any contraindications to taking this with her current medications however.   Thanks Diana Ely [facility]

## 2019-01-21 ENCOUNTER — MYC MEDICAL ADVICE (OUTPATIENT)
Dept: FAMILY MEDICINE | Facility: CLINIC | Age: 74
End: 2019-01-21

## 2019-01-31 ENCOUNTER — ANTICOAGULATION THERAPY VISIT (OUTPATIENT)
Dept: ANTICOAGULATION | Facility: CLINIC | Age: 74
End: 2019-01-31

## 2019-01-31 DIAGNOSIS — Z86.73 HISTORY OF CVA (CEREBROVASCULAR ACCIDENT): ICD-10-CM

## 2019-01-31 DIAGNOSIS — Z79.01 LONG TERM CURRENT USE OF ANTICOAGULANT THERAPY: ICD-10-CM

## 2019-01-31 LAB — INR PPP: 1.7

## 2019-01-31 PROCEDURE — 99207 ZZC NO CHARGE NURSE ONLY: CPT

## 2019-02-01 NOTE — PROGRESS NOTES
02/01/19    Left VM on home phone number requesting call back to [facility]. No dosing instructions given.    Justin Naidu RN, BSN, PHN  Anticoagulation Clinic   [phone]

## 2019-02-03 ENCOUNTER — MYC MEDICAL ADVICE (OUTPATIENT)
Dept: FAMILY MEDICINE | Facility: CLINIC | Age: 74
End: 2019-02-03

## 2019-02-04 NOTE — ADDENDUM NOTE
Addended by: DEMARCUS MANZANO on: 2/4/2019 09:45 AM     Modules accepted: Level of Service, SmartSet

## 2019-02-04 NOTE — PROGRESS NOTES
ANTICOAGULATION FOLLOW-UP CLINIC VISIT    Patient Name:  Bebe Alfonso  Date:  2019  Contact Type:  My Chart    SUBJECTIVE:     Patient Findings     Positives:   Unexplained INR or factor level change    Comments:   Write instructed patient to take 10 mg today then to resume maintenance dose.   Recheck in 2 weeks.   My Chart message sent.            OBJECTIVE    INR   Date Value Ref Range Status   2019 1.7  Final       ASSESSMENT / PLAN  INR assessment SUB    Recheck INR In: 2 WEEKS    INR Location Outside lab      Anticoagulation Summary  As of 2019    INR goal:   2.0-3.0   TTR:   69.0 % (10 mo)   INR used for dosin.7! (2019)   Warfarin maintenance plan:   7.5 mg (5 mg x 1.5) every day   Full warfarin instructions:   : 10 mg; Otherwise 7.5 mg every day   Weekly warfarin total:   52.5 mg   Plan last modified:   Luann Moise RN (2018)   Next INR check:   2019   Priority:   INR   Target end date:       Indications    History of CVA (cerebrovascular accident) [Z86.73]  Long term current use of anticoagulant therapy [Z79.01]             Anticoagulation Episode Summary     INR check location:       Preferred lab:       Send INR reminders to:   [facility] [employer] [facility]    Comments:   * Pt states she has had 4 strokes (some after hip surgeries). 19 to [place] for 3 months. [facility] to monitor from outside lab result      Anticoagulation Care Providers     Provider Role Specialty Phone number    Diana Ely APRN CNP Responsible [facility] [phone]            See the Encounter Report to view Anticoagulation Flowsheet and Dosing Calendar (Go to Encounters tab in chart review, and find the Anticoagulation Therapy Visit)        Justin Naidu RN

## 2019-02-12 ENCOUNTER — MYC REFILL (OUTPATIENT)
Dept: FAMILY MEDICINE | Facility: CLINIC | Age: 74
End: 2019-02-12

## 2019-02-12 DIAGNOSIS — J44.1 COPD EXACERBATION (H): ICD-10-CM

## 2019-02-13 RX ORDER — AMLODIPINE BESYLATE 5 MG/1
5 TABLET ORAL DAILY
Qty: 90 TABLET | Refills: 0 | Status: SHIPPED | OUTPATIENT
Start: 2019-02-13 | End: 2019-05-23

## 2019-02-14 ENCOUNTER — ANTICOAGULATION THERAPY VISIT (OUTPATIENT)
Dept: ANTICOAGULATION | Facility: CLINIC | Age: 74
End: 2019-02-14

## 2019-02-14 DIAGNOSIS — Z79.01 LONG TERM CURRENT USE OF ANTICOAGULANT THERAPY: ICD-10-CM

## 2019-02-14 DIAGNOSIS — Z86.73 HISTORY OF CVA (CEREBROVASCULAR ACCIDENT): ICD-10-CM

## 2019-02-14 LAB — INR PPP: 1.9 (ref 0.86–1.14)

## 2019-02-14 PROCEDURE — 99207 ZZC NO CHARGE NURSE ONLY: CPT

## 2019-02-14 NOTE — PROGRESS NOTES
ANTICOAGULATION FOLLOW-UP CLINIC VISIT    Patient Name:  Bebe Alfonso  Date:  2019  Contact Type:  My Chart    SUBJECTIVE:     Patient Findings     Positives:   No Problem Findings    Comments:   Patient will continue weekly maintenance dose. INR is therapeutic.   Recheck in 3 weeks.   My Chart message sent.             OBJECTIVE    INR   Date Value Ref Range Status   2019 1.9 (A) 0.86 - 1.14 Final       ASSESSMENT / PLAN  INR assessment THER +/-1   Recheck INR In: 3 WEEKS    INR Location Outside lab      Anticoagulation Summary  As of 2019    INR goal:   2.0-3.0   TTR:   66.0 % (10.5 mo)   INR used for dosin.9! (2019)   Warfarin maintenance plan:   7.5 mg (5 mg x 1.5) every day   Full warfarin instructions:   7.5 mg every day   Weekly warfarin total:   52.5 mg   No change documented:   Justin Naidu RN   Plan last modified:   Luann Moise RN (2018)   Next INR check:   3/7/2019   Priority:   INR   Target end date:   Indefinite    Indications    History of CVA (cerebrovascular accident) [Z86.73]  Long term current use of anticoagulant therapy [Z79.01]             Anticoagulation Episode Summary     INR check location:       Preferred lab:       Send INR reminders to:   [place] [facility]    Comments:   * Pt states she has had 4 strokes (some after hip surgeries). 19 to [place] for 3 months. [facility] to monitor from outside lab result      Anticoagulation Care Providers     Provider Role Specialty Phone number    Diana Ely APRN [facility] Practice [phone]            See the Encounter Report to view Anticoagulation Flowsheet and Dosing Calendar (Go to Encounters tab in chart review, and find the Anticoagulation Therapy Visit)        Justin Naidu RN

## 2019-03-14 ENCOUNTER — MYC MEDICAL ADVICE (OUTPATIENT)
Dept: ANTICOAGULATION | Facility: CLINIC | Age: 74
End: 2019-03-14

## 2019-03-18 ENCOUNTER — MYC REFILL (OUTPATIENT)
Dept: FAMILY MEDICINE | Facility: CLINIC | Age: 74
End: 2019-03-18

## 2019-03-18 DIAGNOSIS — I10 BENIGN ESSENTIAL HYPERTENSION: ICD-10-CM

## 2019-03-18 DIAGNOSIS — E78.5 HYPERLIPIDEMIA LDL GOAL <70: ICD-10-CM

## 2019-03-18 RX ORDER — ATORVASTATIN CALCIUM 40 MG/1
40 TABLET, FILM COATED ORAL DAILY
Qty: 90 TABLET | Refills: 0 | Status: SHIPPED | OUTPATIENT
Start: 2019-03-18 | End: 2019-06-20

## 2019-03-18 RX ORDER — HYDROCHLOROTHIAZIDE 25 MG/1
25 TABLET ORAL DAILY
Qty: 90 TABLET | Refills: 0 | Status: SHIPPED | OUTPATIENT
Start: 2019-03-18 | End: 2019-07-02

## 2019-03-18 NOTE — TELEPHONE ENCOUNTER
"Requested Prescriptions   Pending Prescriptions Disp Refills     atorvastatin (LIPITOR) 40 MG tablet 90 tablet 3     Sig: Take 1 tablet (40 mg) by mouth daily    Statins Protocol Failed - 3/18/2019  1:40 PM       Failed - LDL on file in past 12 months    Recent Labs   Lab Test 03/16/18  1032   LDL 54            Passed - No abnormal creatine kinase in past 12 months    No lab results found.            Passed - Recent (12 mo) or future (30 days) visit within the authorizing provider's specialty    Patient had office visit in the last 12 months or has a visit in the next 30 days with authorizing provider or within the authorizing provider's specialty.  See \"Patient Info\" tab in inbasket, or \"Choose Columns\" in Meds & Orders section of the refill encounter.             Passed - Medication is active on med list       Passed - Patient is age 18 or older       Passed - No active pregnancy on record       Passed - No positive pregnancy test in past 12 months        hydrochlorothiazide (HYDRODIURIL) 25 MG tablet 90 tablet 0     Sig: Take 1 tablet (25 mg) by mouth daily    Diuretics (Including Combos) Protocol Failed - 3/18/2019  1:40 PM       Failed - Blood pressure under 140/90 in past 12 months    BP Readings from Last 3 Encounters:   11/29/18 166/68   11/20/18 148/80   09/07/18 150/68                Failed - Normal serum creatinine on file in past 12 months    Recent Labs   Lab Test 11/20/18  1109   CR 1.11*             Passed - Recent (12 mo) or future (30 days) visit within the authorizing provider's specialty    Patient had office visit in the last 12 months or has a visit in the next 30 days with authorizing provider or within the authorizing provider's specialty.  See \"Patient Info\" tab in inbasket, or \"Choose Columns\" in Meds & Orders section of the refill encounter.             Passed - Medication is active on med list       Passed - Patient is age 18 or older       Passed - No active pregancy on record       " Passed - Normal serum potassium on file in past 12 months    Recent Labs   Lab Test 11/20/18  1109   POTASSIUM 3.5                   Passed - Normal serum sodium on file in past 12 months    Recent Labs   Lab Test 11/20/18  1109                Passed - No positive pregnancy test in past 12 months        Atorvastatin 40 mg      Last Written Prescription Date:  9/7/18  Last Fill Quantity: 90,   # refills: 3  Last Office Visit: 11/29/18  Future Office visit:    Next 5 appointments (look out 90 days)    Mar 22, 2019 10:20 AM RICKT  MyChart Long with SELWYN Dennis CNP  [facility] [address] [place] [id number]  [phone]         hydrochlorothiazide 25 mg      Last Written Prescription Date:  12/17/18  Last Fill Quantity: 90,   # refills: 0  Last Office Visit: 11/29/18  Future Office visit:    Next 5 appointments (look out 90 days)    Mar 22, 2019 10:20 AM CDT  MyChart Long with SELWYN Dennis CNP  [facility] [address] [place] [id number]  [phone]

## 2019-03-21 ENCOUNTER — ANTICOAGULATION THERAPY VISIT (OUTPATIENT)
Dept: ANTICOAGULATION | Facility: CLINIC | Age: 74
End: 2019-03-21
Payer: COMMERCIAL

## 2019-03-21 DIAGNOSIS — Z86.73 HISTORY OF CVA (CEREBROVASCULAR ACCIDENT): ICD-10-CM

## 2019-03-21 DIAGNOSIS — Z79.01 LONG TERM CURRENT USE OF ANTICOAGULANT THERAPY: ICD-10-CM

## 2019-03-21 LAB — INR POINT OF CARE: 1.9 (ref 0.86–1.14)

## 2019-03-21 PROCEDURE — 85610 PROTHROMBIN TIME: CPT | Mod: QW

## 2019-03-21 PROCEDURE — 99207 ZZC NO CHARGE NURSE ONLY: CPT

## 2019-03-21 PROCEDURE — 36416 COLLJ CAPILLARY BLOOD SPEC: CPT

## 2019-03-21 RX ORDER — WARFARIN SODIUM 5 MG/1
TABLET ORAL
Qty: 135 TABLET | Refills: 0
Start: 2019-03-21 | End: 2019-04-04

## 2019-03-21 NOTE — PROGRESS NOTES
ANTICOAGULATION FOLLOW-UP CLINIC VISIT    Patient Name:  Bebe Alfonso  Date:  3/21/2019  Contact Type:  Face to Face    SUBJECTIVE:     Patient Findings     Positives:   Change in medications    Comments:   Patient was in [place] until recently. She went to the ED there because her legs were both swollen. Her left leg was swollen x 3 the normal size, red and hot. The right leg was swollen x 2. Patient states US was negative for DVT. They thought maybe it was a side effect of her amlodipine, which she will need to discuss with PCP tomorrow. She was told to start a potassium supplement (potassium lab was low) and to follow up with PCP. Patient reports her legs returned to usual size about 2 days after leaving [place]. She is back to baseline now. She reportedly had an INR done on 3-7-19 but this was not faxed to [facility].   INR has been sub therapeutic the last 3 times. Will increased maintenance dose of warfarin by 4.8% and recheck in two weeks. No other changes or concerns.            OBJECTIVE    INR Protime   Date Value Ref Range Status   2019 1.9 (A) 0.86 - 1.14 Final       ASSESSMENT / PLAN  INR assessment THER    Recheck INR In: 2 WEEKS    INR Location Clinic      Anticoagulation Summary  As of 3/21/2019    INR goal:   2.0-3.0   TTR:   59.4 % (11.7 mo)   INR used for dosin.9! (3/21/2019)   Warfarin maintenance plan:   10 mg (5 mg x 2) every Thu; 7.5 mg (5 mg x 1.5) all other days   Full warfarin instructions:   10 mg every Thu; 7.5 mg all other days   Weekly warfarin total:   55 mg   Plan last modified:   Natalie Sorto RN (3/21/2019)   Next INR check:   2019   Priority:   INR   Target end date:   Indefinite    Indications    History of CVA (cerebrovascular accident) [Z86.73]  Long term current use of anticoagulant therapy [Z79.01]             Anticoagulation Episode Summary     INR check location:       Preferred lab:       Send INR reminders to:   [facility]    Comments:   * Pt states  she has had 4 strokes (some after hip surgeries).       Anticoagulation Care Providers     Provider Role Specialty Phone number    Diana Ely APRN [facility] [phone]            See the Encounter Report to view Anticoagulation Flowsheet and Dosing Calendar (Go to Encounters tab in chart review, and find the Anticoagulation Therapy Visit)        Natalie Sorto RN

## 2019-03-22 ENCOUNTER — OFFICE VISIT (OUTPATIENT)
Dept: FAMILY MEDICINE | Facility: CLINIC | Age: 74
End: 2019-03-22
Payer: COMMERCIAL

## 2019-03-22 VITALS
HEART RATE: 88 BPM | WEIGHT: 147 LBS | TEMPERATURE: 97.1 F | BODY MASS INDEX: 24.49 KG/M2 | RESPIRATION RATE: 24 BRPM | SYSTOLIC BLOOD PRESSURE: 182 MMHG | OXYGEN SATURATION: 98 % | HEIGHT: 65 IN | DIASTOLIC BLOOD PRESSURE: 68 MMHG

## 2019-03-22 DIAGNOSIS — N30.00 ACUTE CYSTITIS WITHOUT HEMATURIA: ICD-10-CM

## 2019-03-22 DIAGNOSIS — R73.09 ELEVATED GLUCOSE: Primary | ICD-10-CM

## 2019-03-22 DIAGNOSIS — Z79.891 USE OF OPIATES FOR THERAPEUTIC PURPOSES: ICD-10-CM

## 2019-03-22 DIAGNOSIS — Z12.31 ENCOUNTER FOR SCREENING MAMMOGRAM FOR BREAST CANCER: Primary | ICD-10-CM

## 2019-03-22 DIAGNOSIS — R82.90 NONSPECIFIC FINDING ON EXAMINATION OF URINE: ICD-10-CM

## 2019-03-22 DIAGNOSIS — Z79.01 LONG TERM CURRENT USE OF ANTICOAGULANT THERAPY: ICD-10-CM

## 2019-03-22 DIAGNOSIS — E87.6 HYPOKALEMIA: ICD-10-CM

## 2019-03-22 DIAGNOSIS — E87.6 POTASSIUM (K) DEFICIENCY: Primary | ICD-10-CM

## 2019-03-22 DIAGNOSIS — Z87.448 HISTORY OF CHANGES URINE OUTPUT CHANGES: ICD-10-CM

## 2019-03-22 DIAGNOSIS — Z86.73 HISTORY OF CVA (CEREBROVASCULAR ACCIDENT): ICD-10-CM

## 2019-03-22 DIAGNOSIS — R73.09 ELEVATED GLUCOSE: ICD-10-CM

## 2019-03-22 DIAGNOSIS — I10 ESSENTIAL HYPERTENSION: ICD-10-CM

## 2019-03-22 LAB
ALBUMIN SERPL-MCNC: 3.4 G/DL (ref 3.4–5)
ALBUMIN UR-MCNC: 100 MG/DL
ALP SERPL-CCNC: 62 U/L (ref 40–150)
ALT SERPL W P-5'-P-CCNC: 33 U/L (ref 0–50)
AMPHETAMINES UR QL: NOT DETECTED NG/ML
ANION GAP SERPL CALCULATED.3IONS-SCNC: 6 MMOL/L (ref 3–14)
APPEARANCE UR: ABNORMAL
AST SERPL W P-5'-P-CCNC: 22 U/L (ref 0–45)
BACTERIA #/AREA URNS HPF: ABNORMAL /HPF
BARBITURATES UR QL SCN: NOT DETECTED NG/ML
BASOPHILS # BLD AUTO: 0 10E9/L (ref 0–0.2)
BASOPHILS NFR BLD AUTO: 0.3 %
BENZODIAZ UR QL SCN: NOT DETECTED NG/ML
BILIRUB SERPL-MCNC: 0.3 MG/DL (ref 0.2–1.3)
BILIRUB UR QL STRIP: NEGATIVE
BUN SERPL-MCNC: 26 MG/DL (ref 7–30)
BUPRENORPHINE UR QL: NOT DETECTED NG/ML
CALCIUM SERPL-MCNC: 9.2 MG/DL (ref 8.5–10.1)
CANNABINOIDS UR QL: NOT DETECTED NG/ML
CHLORIDE SERPL-SCNC: 92 MMOL/L (ref 94–109)
CO2 SERPL-SCNC: 32 MMOL/L (ref 20–32)
COCAINE UR QL SCN: NOT DETECTED NG/ML
COLOR UR AUTO: YELLOW
CREAT SERPL-MCNC: 1.19 MG/DL (ref 0.52–1.04)
D-METHAMPHET UR QL: NOT DETECTED NG/ML
DIFFERENTIAL METHOD BLD: NORMAL
EOSINOPHIL # BLD AUTO: 0.1 10E9/L (ref 0–0.7)
EOSINOPHIL NFR BLD AUTO: 0.7 %
ERYTHROCYTE [DISTWIDTH] IN BLOOD BY AUTOMATED COUNT: 12.6 % (ref 10–15)
GFR SERPL CREATININE-BSD FRML MDRD: 45 ML/MIN/{1.73_M2}
GLUCOSE SERPL-MCNC: 163 MG/DL (ref 70–99)
GLUCOSE UR STRIP-MCNC: NEGATIVE MG/DL
HBA1C MFR BLD: 6.1 % (ref 0–5.6)
HCT VFR BLD AUTO: 36.7 % (ref 35–47)
HGB BLD-MCNC: 12.7 G/DL (ref 11.7–15.7)
HGB UR QL STRIP: ABNORMAL
KETONES UR STRIP-MCNC: NEGATIVE MG/DL
LEUKOCYTE ESTERASE UR QL STRIP: ABNORMAL
LYMPHOCYTES # BLD AUTO: 2.2 10E9/L (ref 0.8–5.3)
LYMPHOCYTES NFR BLD AUTO: 21.6 %
MCH RBC QN AUTO: 30.3 PG (ref 26.5–33)
MCHC RBC AUTO-ENTMCNC: 34.6 G/DL (ref 31.5–36.5)
MCV RBC AUTO: 88 FL (ref 78–100)
METHADONE UR QL SCN: NOT DETECTED NG/ML
MONOCYTES # BLD AUTO: 0.8 10E9/L (ref 0–1.3)
MONOCYTES NFR BLD AUTO: 7.3 %
NEUTROPHILS # BLD AUTO: 7.2 10E9/L (ref 1.6–8.3)
NEUTROPHILS NFR BLD AUTO: 70.1 %
NITRATE UR QL: POSITIVE
OPIATES UR QL SCN: NOT DETECTED NG/ML
OXYCODONE UR QL SCN: NOT DETECTED NG/ML
PCP UR QL SCN: NOT DETECTED NG/ML
PH UR STRIP: 7 PH (ref 5–7)
PLATELET # BLD AUTO: 293 10E9/L (ref 150–450)
POTASSIUM SERPL-SCNC: 3 MMOL/L (ref 3.4–5.3)
PROPOXYPH UR QL: NOT DETECTED NG/ML
PROT SERPL-MCNC: 7.3 G/DL (ref 6.8–8.8)
RBC # BLD AUTO: 4.19 10E12/L (ref 3.8–5.2)
RBC #/AREA URNS AUTO: ABNORMAL /HPF
SODIUM SERPL-SCNC: 130 MMOL/L (ref 133–144)
SOURCE: ABNORMAL
SP GR UR STRIP: 1.01 (ref 1–1.03)
TRICYCLICS UR QL SCN: NOT DETECTED NG/ML
UROBILINOGEN UR STRIP-ACNC: 0.2 EU/DL (ref 0.2–1)
WBC # BLD AUTO: 10.2 10E9/L (ref 4–11)
WBC #/AREA URNS AUTO: ABNORMAL /HPF
WBC CLUMPS #/AREA URNS HPF: PRESENT /HPF

## 2019-03-22 PROCEDURE — 85025 COMPLETE CBC W/AUTO DIFF WBC: CPT | Performed by: NURSE PRACTITIONER

## 2019-03-22 PROCEDURE — 87088 URINE BACTERIA CULTURE: CPT | Performed by: NURSE PRACTITIONER

## 2019-03-22 PROCEDURE — 36415 COLL VENOUS BLD VENIPUNCTURE: CPT | Performed by: NURSE PRACTITIONER

## 2019-03-22 PROCEDURE — 80053 COMPREHEN METABOLIC PANEL: CPT | Performed by: NURSE PRACTITIONER

## 2019-03-22 PROCEDURE — 80306 DRUG TEST PRSMV INSTRMNT: CPT | Performed by: NURSE PRACTITIONER

## 2019-03-22 PROCEDURE — 81001 URINALYSIS AUTO W/SCOPE: CPT | Mod: 59 | Performed by: NURSE PRACTITIONER

## 2019-03-22 PROCEDURE — 99215 OFFICE O/P EST HI 40 MIN: CPT | Performed by: NURSE PRACTITIONER

## 2019-03-22 PROCEDURE — 87086 URINE CULTURE/COLONY COUNT: CPT | Performed by: NURSE PRACTITIONER

## 2019-03-22 PROCEDURE — 87186 SC STD MICRODIL/AGAR DIL: CPT | Performed by: NURSE PRACTITIONER

## 2019-03-22 PROCEDURE — 83036 HEMOGLOBIN GLYCOSYLATED A1C: CPT | Performed by: NURSE PRACTITIONER

## 2019-03-22 RX ORDER — ENALAPRIL MALEATE 5 MG/1
5 TABLET ORAL DAILY
Qty: 31 TABLET | Refills: 5 | Status: SHIPPED | OUTPATIENT
Start: 2019-03-22 | End: 2019-04-18

## 2019-03-22 RX ORDER — NITROFURANTOIN 25; 75 MG/1; MG/1
100 CAPSULE ORAL 2 TIMES DAILY
Qty: 20 CAPSULE | Refills: 0 | Status: SHIPPED | OUTPATIENT
Start: 2019-03-22 | End: 2019-04-04

## 2019-03-22 RX ORDER — METOPROLOL SUCCINATE 50 MG/1
50 TABLET, EXTENDED RELEASE ORAL DAILY
Qty: 90 TABLET | Refills: 1 | Status: SHIPPED | OUTPATIENT
Start: 2019-03-22 | End: 2019-03-22

## 2019-03-22 RX ORDER — POTASSIUM CHLORIDE 1500 MG/1
20 TABLET, EXTENDED RELEASE ORAL 2 TIMES DAILY
Qty: 60 TABLET | Refills: 2 | Status: SHIPPED | OUTPATIENT
Start: 2019-03-22 | End: 2019-06-20

## 2019-03-22 ASSESSMENT — ANXIETY QUESTIONNAIRES

## 2019-03-22 ASSESSMENT — MIFFLIN-ST. JEOR: SCORE: 1181.63

## 2019-03-22 ASSESSMENT — PATIENT HEALTH QUESTIONNAIRE - PHQ9
SUM OF ALL RESPONSES TO PHQ QUESTIONS 1-9: 0
5. POOR APPETITE OR OVEREATING: NOT AT ALL

## 2019-03-22 NOTE — PATIENT INSTRUCTIONS
Patient Education     Tips for Quitting Smoking (Cardiovascular)  Quitting smoking is a gift to yourself, one of the best things you can do to keep your heart disease from getting worse. Smoking reduces oxygen flow to your heart by speeding the buildup of plaque and changing the health of your blood vessels. This increases your risk for heart attack, also known as acute myocardial infarction, or AMI. Quitting helps reduce smoking's harmful effects. You may have tried to quit before, but don t give up. Try again. Many smokers try 4 or 5 times before they succeed. It is never too early to benefit from smoking cessation, especially if you already have chronic conditions such as high blood pressure and high cholesterol that put you at increased risk for cardiovascular disease.     You ll have the best chance of success if you join a stop-smoking group and have the support of your doctor, family and friends.     Line up help    Ask for the support of your family and friends.    Join a smoking cessation class, or ask your healthcare provider for a referral to a psychologist who specializes in helping people quit smoking.     Ask your healthcare provider about nicotine replacement products and prescription medicines that can help you quit.  Set a quit date    Choose a date within the next 2 to 4 weeks.    After picking a day, claude it in bold letters on a calendar.     Your quit list  Ideas to stop smoking include:  1. Start by giving up cigarettes at the times you least need them.  2. Keeping a piece of fruit close by at the times you are most vulnerable to reach for a cigarette.  3. Using a nicotine replacement product instead of a cigarette.  Write down a few more ideas.     Set limits    Limit where you can smoke. Pick one room or a porch, and smoke only in that place.    Make smoking outdoors a house rule. Other smokers won t tempt you as much.    Speak to smokers around you about your intent to stop smoking so they  can show consideration for you and limit their smoking around you.    Hang a list of   quit benefits  in the spot where you smoke. Put one on the refrigerator and one on your car dashboard.     For more information    smokefree.gov/qmbq-na-am-expert    National Cancer [place] Smoking Quitline: 877-44U-QUIT (902-850-6036)      Date Last Reviewed: 3/26/2016    2102-3435 [employer]. [address] [place] [address], [place] [address]. All rights reserved. This information is not intended as a substitute for professional medical care. Always follow your healthcare professional's instructions.           Patient Education     Uncontrolled High Blood Pressure (Established)    Your blood pressure was unusually high today. This can occur if you ve missed doses of your blood pressure medicine. Or it can happen if you are taking other medicines. These include some asthma inhalers, decongestants, diet pills, and street drugs like cocaine and amphetamine.  Other causes include:    Weight gain    More salt in your diet    Smoking    Caffeine  Your blood pressure can also rise if you are emotionally upset or in intense pain. It may go back to normal after a period of rest.  Blood pressure measurements are given as 2 numbers. Systolic blood pressure is the upper number. This is the pressure when the heart contracts. Diastolic blood pressure is the lower number. This is the pressure when the heart relaxes between beats. You will see your blood pressure readings written together. For example, a person with a systolic pressure of 118 and a diastolic pressure of 78 will have 118/78 written in the medical record. To be high blood pressure, the numbers must be higher when tested over a period of time.  Blood pressure is categorized as normal, elevated, or stage 1 or stage 2 high blood pressure:    Normal blood pressure is systolic of less than 120 and diastolic of less than 80 (120/80)    Elevated blood pressure is systolic of 120  to 129 and diastolic less than 80    Stage 1 high blood pressure is systolic is 130 to 139 or diastolic between 80 to 89    Stage 2 high blood pressure is when systolic is 140 or higher or the diastolic is 90 or higher  Uncontrolled high blood pressure can cause serious health problems. It raises your risk for heart attack, stroke, and heart failure. In general, if you have high blood pressure, keeping your blood pressure below 130/80 mmHg may help prevent these problems. Your healthcare provider may prescribe medicine to help control blood pressure if lifestyle changes are not enough.  Home care  It s important to take steps to lower your blood pressure. If you are taking blood pressure medicine, the guidelines below may help you need less or no medicines in the future.    Start a weight-loss program if you are overweight.    Cut back on the amount of salt in your diet:  ? Avoid high-salt foods like olives, pickles, smoked meats, and salted potato chips.  ? Don t add salt to your food at the table.  ? Use only small amounts of salt when cooking.    Start an exercise program. Talk with your healthcare provider about what exercise program is best for you. It doesn t have to be difficult. Even brisk walking for 20 minutes 3 times a week is a good form of exercise.    Avoid medicines that stimulates the heart. This includes many over-the-counter cold and sinus decongestant pills and sprays, as well as diet pills. Check the warnings about high blood pressure on the label. Before purchasing any over-the-counter medicines or supplements, always ask the pharmacist about the product's potential interaction with your high blood pressure and your medicines.    Stimulants such as amphetamine or cocaine could be lethal for someone with high blood pressure. Never take these.    Limit how much caffeine you drink. Or switch to noncaffeinated beverages.    Stop smoking. If you are a long-time smoker, this can be hard. Enroll in a  stop-smoking program to make it more likely that you will succeed. Talk with your provider about ways to quit.    Learn how to handle stress better. This is an important part of any program to lower blood pressure. Learn ways to relax. These include meditation, yoga, and biofeedback.    If medicines were prescribed, take them exactly as directed. Missing doses may cause your blood pressure to get out of control.    If you miss a dose or doses of your medicines, check with your healthcare provider or pharmacist about what to do.    Consider buying an automatic blood pressure machine. Your provider may recommend a certain type. You can get one of these at most pharmacies. Measure your blood pressure twice a day, in the morning, and in the late afternoon. Keep a written record of your home blood pressure readings and take the record to your medical appointments.  Here are some additional guidelines on home blood pressure monitoring from the American Heart Association.    Don't smoke or drink coffee for 30 minutes    Go to the bathroom before the test.    Relax for 5 minutes before taking the measurement.    Sit correctly. Be sure your back is supported. Don't sit on a couch or soft chair. Uncross your feet and place them flat on the floor. Place your arm on a solid, flat surface like a table with the upper arm at heart level. Make certain the middle of the cuff is directly above the bend of the elbow. Check the monitor's instruction manual for an illustration.    Take multiple readings. When you measure, take 2 or 3 readings one minute apart and record all of the results.    Take your blood pressure at the same time every day, or as your healthcare provider recommends.    Record the date, time, and blood pressure reading.    Take the record with you to your next appointment. If your blood pressure monitor has a built-in memory, simply take the monitor with you to your next appointment.    Call your provider if you have  several high readings. Don't be frightened by a single high reading, but if you get several high readings, check in with your healthcare provider.    Note: When blood pressure reaches a systolic (top number) of 180 or higher or a diastolic (bottom number) of 110 or higher, emergency medical treatment is required. Call your healthcare provider immediately.  Follow-up care  Regular visits to your own healthcare provider for blood pressure and medicine checks are an important part of your care. Make a follow-up appointment as directed. Bring the record of your home blood pressure readings to the appointment.  When to seek medical advice  Call your healthcare provider right away if any of these occur:    Blood pressure reaches a systolic (top number) of 180 or higher or diastolic (bottom number) of 110 or higher, emergency medical treatment is required.    Chest, arm, shoulder, neck, or upper back pain    Shortness of breath    Severe headache    Throbbing or rushing sound in the ears    Nosebleed    Extreme drowsiness, confusion, or fainting    Dizziness or dizziness with spinning sensation (vertigo)    Weakness in an arm or leg or on one side of the face    Trouble speaking or seeing   Date Last Reviewed: 1/1/2017 2000-2018 [employer]. [address] [place] [address]. All rights reserved. This information is not intended as a substitute for professional medical care. Always follow your healthcare professional's instructions.           Patient Education     What is COPD?  COPD stands for chronic obstructive pulmonary disease. It means the airways in your lungs are blocked (obstructed). Because of this, it is hard to breathe. You may have trouble with daily activities because of shortness of breath. Over time the shortness of breath usually worsens making it more and more difficult to take care of yourself and take part in activities. Chronic bronchitis and emphysema are two common types of  COPD.  What happens in chronic bronchitis?    The cells in the airways make more mucus than normal. The mucus builds up, narrowing the airways. This means less air travels into and out of the lungs. The lining of the airways may also become inflamed (swollen) and causes the airways to narrow even more.        What happens in emphysema?    The small airways are damaged and lose their stretchiness. The airways collapse when you exhale, causing air to get trapped in the air sacs. This means that less oxygen enters the blood vessels and less oxygen is delivered to all of the cells of your body. This makes it hard to breathe.     Damage to cilia    Cilia are small hairs that line and protect the airways. Smoking damages the cilia. Damaged cilia can t sweep mucus and particles away. Some of the cilia are destroyed. This damage worsens COPD.  How did I get COPD?  Most people get COPD from smoking. Cigarette smoke damages lungs, which can develop into COPD over many years.  How COPD affects you  COPD makes you work harder to breathe. Air may get trapped in the lungs, which prevents your lungs from filling completely with fresh oxygen-filled air when you inhale (breathe in). It's harder to take deep breaths especially when you are active and start breathing faster. Over time, your lungs may become enlarged filling the lung with air that does not transfer oxygen into the blood. These problems cause you to have shortness of breath (also called dyspnea). Wheezing (hoarse, whistling breathing), chronic cough, and fatigue (feeling tired and worn out) are also common.   Date Last Reviewed: 5/1/2016 2000-2018 [employer]. [address] [place] [address], [place] [address]. All rights reserved. This information is not intended as a substitute for professional medical care. Always follow your healthcare professional's instructions.         ADD IN ENALAPRIL FOR BLOOD PRESSURE CONTROL  CONTINUE TO USE CLONIDINE FOR ELEVATED  BLOOD PRESSURES  RETURN TO HAVE BP RECHECKED IN 2 weeks.

## 2019-03-22 NOTE — PROGRESS NOTES
SUBJECTIVE:   Bebe Alfonso is a 73 year old female who presents to clinic today for the following health issues:      COPD Follow-Up    Symptoms are currently: stable    Current fatigue or dyspnea with ambulation: none    Shortness of breath: stable    Increased or change in Cough/Sputum: No    Fever(s): No    Baseline ambulation without stopping to rest: Breathing okay today    Continues to smoke. Not willing at this time to quit.     Any ER/UC or hospital admissions since your last visit? Yes - ER/UC [facility]     History   Smoking Status     Current Every Day Smoker     Packs/day: 1.50     Types: Cigarettes     Start date: 2016   Smokeless Tobacco     Never Used     No results found for: FEV1, [id number]    Amount of exercise or physical activity: 2-3 days/week for an average of 15-30 minutes    Problems taking medications regularly: No    Medication side effects: YES MANY DIFFERENT MEDS     Diet: low salt      ED/UC Followup:    Facility:  [facility] ER   Date of visit: 2/28/19  Reason for visit: Edema in both legs,  Hypertension  Current Status: leg swelling is better, Having very little swelling in both lower legs      BP Readings from Last 3 Encounters:   03/22/19 182/68   11/29/18 166/68   11/20/18 148/80       Urine cloudy and odorous  Minimal water intake    Previous problems with multiple medications for hypertension  Fatigue severe with metoprolol  Lisinopril cause cough  Losartan caused shortness of breath with palpitations and swelling  -------------------------------------    Problem list and histories reviewed & adjusted, as indicated.  Additional history: as documented    BP Readings from Last 3 Encounters:   03/22/19 182/68   11/29/18 166/68   11/20/18 148/80    Wt Readings from Last 3 Encounters:   03/22/19 66.7 kg (147 lb)   11/29/18 66.4 kg (146 lb 6.4 oz)   11/20/18 65.8 kg (145 lb)               Labs reviewed in EPIC    Reviewed and updated as needed  "this visit by clinical staff  Tobacco  Allergies  Meds       Reviewed and updated as needed this visit by Provider         ROS:   ROS: 10 point ROS neg other than the symptoms noted above in the HPI.      OBJECTIVE:                                                    /68 (BP Location: Right arm, Patient Position: Chair, Cuff Size: Adult Regular)   Pulse 88   Temp 97.1  F (36.2  C) (Tympanic)   Resp 24   Ht 1.657 m (5' 5.25\")   Wt 66.7 kg (147 lb)   SpO2 98%   BMI 24.27 kg/m    Body mass index is 24.27 kg/m .   GENERAL: healthy, alert, well nourished, well hydrated, mild distress  HENT: ear canals- normal; TMs- normal; Nose- normal; Mouth- no ulcers, no lesions  NECK: no tenderness, no adenopathy, no asymmetry, no masses, no stiffness; thyroid- normal to palpation  RESP: lungs cler  CV: regular rates and rhythm, normal S1 S2, no S3 or S4 and no murmur, no click or rub -  ABDOMEN: soft, no tenderness, no  hepatosplenomegaly, no masses, normal bowel sounds  Speech delay s/p CVA    Diagnostic test results:  Results for orders placed or performed in visit on 03/22/19   CBC with platelets and differential   Result Value Ref [facility]    WBC 10.2 4.0 - 11.0 10e9/L    RBC Count 4.19 3.8 - 5.2 10e12/L    Hemoglobin 12.7 11.7 - 15.7 g/dL    Hematocrit 36.7 35.0 - 47.0 %    MCV 88 78 - 100 fl    MCH 30.3 26.5 - 33.0 pg    MCHC 34.6 31.5 - 36.5 g/dL    RDW 12.6 10.0 - 15.0 %    Platelet Count 293 150 - 450 10e9/L    % Neutrophils 70.1 %    % Lymphocytes 21.6 %    % Monocytes 7.3 %    % Eosinophils 0.7 %    % Basophils 0.3 %    Absolute Neutrophil 7.2 1.6 - 8.3 10e9/L    Absolute Lymphocytes 2.2 0.8 - 5.3 10e9/L    Absolute Monocytes 0.8 0.0 - 1.3 10e9/L    Absolute Eosinophils 0.1 0.0 - 0.7 10e9/L    Absolute Basophils 0.0 0.0 - 0.2 10e9/L    Diff Method Automated Method    *UA reflex to Microscopic and Culture ([facility] (except [place] and [place])   Result Value Ref [facility]    Color Urine Yellow "     Appearance Urine Cloudy     Glucose Urine Negative NEG^Negative mg/dL    Bilirubin Urine Negative NEG^Negative    Ketones Urine Negative NEG^Negative mg/dL    Specific Gravity Urine 1.010 1.003 - 1.035    Blood Urine Moderate (A) NEG^Negative    pH Urine 7.0 5.0 - 7.0 pH    Protein Albumin Urine 100 (A) NEG^Negative mg/dL    Urobilinogen Urine 0.2 0.2 - 1.0 EU/dL    Nitrite Urine Positive (A) NEG^Negative    Leukocyte Esterase Urine Moderate (A) NEG^Negative    Source Midstream Urine    Drug Abuse Screen Panel 13, Urine (Pain Care Package)   Result Value Ref [facility]    Cannabinoids (40-vrv-5-carboxy-9-THC) Not Detected NDET^Not Detected ng/mL    Phencyclidine (Phencyclidine) Not Detected NDET^Not Detected ng/mL    Cocaine (Benzoylecgonine) Not Detected NDET^Not Detected ng/mL    Methamphetamine (d-Methamphetamine) Not Detected NDET^Not Detected ng/mL    Opiates (Morphine) Not Detected NDET^Not Detected ng/mL    Amphetamine (d-Amphetamine) Not Detected NDET^Not Detected ng/mL    Benzodiazepines (Nordiazepam) Not Detected NDET^Not Detected ng/mL    Tricyclic Antidepressants (Desipramine) Not Detected NDET^Not Detected ng/mL    Methadone (Methadone) Not Detected NDET^Not Detected ng/mL    Barbiturates (Butalbital) Not Detected NDET^Not Detected ng/mL    Oxycodone (Oxycodone) Not Detected NDET^Not Detected ng/mL    Propoxyphene (Norpropoxyphene) Not Detected NDET^Not Detected ng/mL    Buprenorphine (Buprenorphine) Not Detected NDET^Not Detected ng/mL   Urine Microscopic   Result Value Ref [facility]    WBC Urine 10-25 (A) OTO5^0 - 5 /HPF    RBC Urine 2-5 (A) OTO2^O - 2 /HPF    WBC Clumps Present (A) NEG^Negative /HPF    Bacteria Urine Many (A) NEG^Negative /HPF        ASSESSMENT/PLAN:                                                    ASSESSMENT / PLAN:  (Z12.31) Encounter for screening mammogram for breast cancer  (primary encounter diagnosis)  Comment: to call and schedule mammogram  Plan: *MA Screening Digital  Bilateral, Urine         Microscopic            (I10) Essential hypertension  Comment: Poor control.  Multiple medication trials with side effects  Patient's emergency department visit from [place] reviewed  Labs today to look at potassium and hemoglobin  Plan: CBC with platelets and differential,         Comprehensive metabolic panel,   Trial new medication-enalapril         (VASOTEC) 5 MG tablet watch for side effect of cough,   Recommend she have a US Renal Complete w Doppler Complete, to look for renal artery stenosis  Clonidine as needed            (Z79.01) Long term current use of anticoagulant therapy  Comment: Continue warfarin  Plan: Refill prescription as needed.  Follow-up with anticoagulation clinic as previously directed    (Z86.73) History of CVA (cerebrovascular accident)  Comment: Smoker with hypertension  Plan:Better blood pressure control needed.  Smoking cessation strongly encouraged.    (Z87.448) History of changes urine output changes  Comment: Labs today look for UTI  Plan: *UA reflex to Microscopic and Culture ([facility] (except [place] and         [place]), Urine Culture Aerobic Bacterial       (Z79.891) Use of opiates for therapeutic purposes  Comment:      Done per yearly recommendations  Plan: Drug Abuse Screen Panel 13, Urine (Pain Care         Package)            (R82.90) Nonspecific finding on examination of urine  Comment: UTI present culture pending  Plan: Urine Culture Aerobic Bacterial            (N30.00) Acute cystitis without hematuria  Comment: New problem  Plan: nitroFURantoin macrocrystal-monohydrate         (MACROBID) 100 MG capsule        Twice daily for 10 days prescription sent to the pharmacy increase fluids.        Follow up with Provider - Call or return to the clinic with any worsening of symptoms or no resolution. Patient/Parent verbalized understanding and is in agreement. Medication side effects reviewed.   Current Outpatient Medications    Medication Sig Dispense Refill     amLODIPine (NORVASC) 5 MG tablet Take 1 tablet (5 mg) by mouth daily 90 tablet 0     ascorbic acid (VITAMIN C) 250 MG CHEW chewable tablet Take 250 mg by mouth daily       atorvastatin (LIPITOR) 40 MG tablet Take 1 tablet (40 mg) by mouth daily 90 tablet 0     cyanocobalamin (VITAMIN  B-12) 1000 MCG tablet Take 1,000 mcg by mouth       enalapril (VASOTEC) 5 MG tablet Take 1 tablet (5 mg) by mouth daily 31 tablet 5     hydrochlorothiazide (HYDRODIURIL) 25 MG tablet Take 1 tablet (25 mg) by mouth daily 90 tablet 0     MAGNESIUM PO Take 250 mg by mouth       Multiple Vitamin (MULTI-VITAMINS) TABS Take 1 tablet by mouth       nitroFURantoin macrocrystal-monohydrate (MACROBID) 100 MG capsule Take 1 capsule (100 mg) by mouth 2 times daily for 10 days 20 capsule 0     thiamine 100 MG tablet Take 1 tablet (100 mg) by mouth daily       warfarin (COUMADIN) 5 MG tablet 10 mg Thursdays; 7.5 mg all other days OR AS DIRECTED 135 tablet 0        See Patient Instructions    Chart documentation with Dragon Voice recognition Software. Although reviewed after completion, some words and grammatical errors may remain.  SELWYN Dennis [facility]

## 2019-03-23 LAB
BACTERIA SPEC CULT: ABNORMAL
Lab: ABNORMAL
SPECIMEN SOURCE: ABNORMAL

## 2019-03-23 ASSESSMENT — ANXIETY QUESTIONNAIRES: GAD7 TOTAL SCORE: 0

## 2019-03-24 ENCOUNTER — NURSE TRIAGE (OUTPATIENT)
Dept: NURSING | Facility: CLINIC | Age: 74
End: 2019-03-24

## 2019-03-24 NOTE — TELEPHONE ENCOUNTER
Bebe was seen on Friday and was told to have a lab done in a week for a potassium.  Bebe is calling to see if lab order was put in system.  Bebe will phone back on Monday March 25th.

## 2019-03-29 DIAGNOSIS — E87.6 POTASSIUM (K) DEFICIENCY: ICD-10-CM

## 2019-03-29 LAB
ANION GAP SERPL CALCULATED.3IONS-SCNC: 7 MMOL/L (ref 3–14)
BUN SERPL-MCNC: 31 MG/DL (ref 7–30)
CALCIUM SERPL-MCNC: 9.3 MG/DL (ref 8.5–10.1)
CHLORIDE SERPL-SCNC: 101 MMOL/L (ref 94–109)
CO2 SERPL-SCNC: 27 MMOL/L (ref 20–32)
CREAT SERPL-MCNC: 1.55 MG/DL (ref 0.52–1.04)
GFR SERPL CREATININE-BSD FRML MDRD: 33 ML/MIN/{1.73_M2}
GLUCOSE SERPL-MCNC: 119 MG/DL (ref 70–99)
POTASSIUM SERPL-SCNC: 4.2 MMOL/L (ref 3.4–5.3)
SODIUM SERPL-SCNC: 135 MMOL/L (ref 133–144)

## 2019-03-29 PROCEDURE — 36415 COLL VENOUS BLD VENIPUNCTURE: CPT | Performed by: NURSE PRACTITIONER

## 2019-03-29 PROCEDURE — 80048 BASIC METABOLIC PNL TOTAL CA: CPT | Performed by: NURSE PRACTITIONER

## 2019-04-03 ENCOUNTER — HOSPITAL ENCOUNTER (OUTPATIENT)
Dept: MAMMOGRAPHY | Facility: CLINIC | Age: 74
Discharge: HOME | End: 2019-04-03
Attending: NURSE PRACTITIONER | Admitting: NURSE PRACTITIONER
Payer: COMMERCIAL

## 2019-04-03 DIAGNOSIS — Z12.31 ENCOUNTER FOR SCREENING MAMMOGRAM FOR BREAST CANCER: ICD-10-CM

## 2019-04-03 PROCEDURE — 77063 BREAST TOMOSYNTHESIS BI: CPT

## 2019-04-04 ENCOUNTER — ANTICOAGULATION THERAPY VISIT (OUTPATIENT)
Dept: ANTICOAGULATION | Facility: CLINIC | Age: 74
End: 2019-04-04
Payer: COMMERCIAL

## 2019-04-04 ENCOUNTER — ALLIED HEALTH/NURSE VISIT (OUTPATIENT)
Dept: FAMILY MEDICINE | Facility: CLINIC | Age: 74
End: 2019-04-04
Payer: COMMERCIAL

## 2019-04-04 VITALS — HEART RATE: 82 BPM | DIASTOLIC BLOOD PRESSURE: 84 MMHG | RESPIRATION RATE: 16 BRPM | SYSTOLIC BLOOD PRESSURE: 164 MMHG

## 2019-04-04 DIAGNOSIS — Z01.30 BP CHECK: Primary | ICD-10-CM

## 2019-04-04 DIAGNOSIS — Z79.01 LONG TERM CURRENT USE OF ANTICOAGULANT THERAPY: ICD-10-CM

## 2019-04-04 DIAGNOSIS — Z86.73 HISTORY OF CVA (CEREBROVASCULAR ACCIDENT): ICD-10-CM

## 2019-04-04 LAB — INR POINT OF CARE: 2.1 (ref 0.86–1.14)

## 2019-04-04 PROCEDURE — 36416 COLLJ CAPILLARY BLOOD SPEC: CPT

## 2019-04-04 PROCEDURE — 99207 ZZC NO CHARGE NURSE ONLY: CPT

## 2019-04-04 PROCEDURE — 85610 PROTHROMBIN TIME: CPT | Mod: QW

## 2019-04-04 RX ORDER — WARFARIN SODIUM 5 MG/1
TABLET ORAL
Qty: 135 TABLET | Refills: 0
Start: 2019-04-04 | End: 2019-04-25

## 2019-04-04 NOTE — PROGRESS NOTES
Bebe Alfonso is a 73 year old year old patient who comes in today for a Blood Pressure check because of new medication and ongoing blood pressure monitoring.  Vital Signs as repeated by /84 and 164/70  Patient is taking medication as prescribed.   Has not taken the enalapril for two days she says, makes her dizzy  Patient is not tolerating medications well.  Patient is monitoring Blood Pressure at home, but infrequently. States last time took BP at home she recalls was around 130/80.    Current complaints: none  Disposition:  huddled with provider (Clayton Ely).  Told patient to continue to hold the enalapril.  Made appointment to see Diana Ely and lab in two weeks.  Humaira Bryan RN

## 2019-04-04 NOTE — PROGRESS NOTES
ANTICOAGULATION FOLLOW-UP CLINIC VISIT    Patient Name:  Bebe Alfonso  Date:  2019  Contact Type:  Face to Face    SUBJECTIVE:     Patient Findings     Positives:   Change in medications (potassium supplement added)    Comments:   Patient was on macrobid for UTI. She finished this 3 days ago. She was prescribed Enalapril at last office visit (no known interaction per lexicomp) but states it caused extreme dizziness so she stopped taking it. Patient states she is on metoprolol instead, although writer does not see this on med list. Will send a chart copy to PCP to review.   INR did increase slightly on new dosing but writer would like to have patient more mid goal range due to history of multiple strokes. Will increase another 4.5% and recheck in 3 weeks. No other changes or concerns.           OBJECTIVE    INR Protime   Date Value Ref [facility] Status   2019 2.1 (A) 0.86 - 1.14 Final       ASSESSMENT / PLAN  INR assessment THER    Recheck INR In: 3 WEEKS    INR Location Clinic      Anticoagulation Summary  As of 2019    INR goal:   2.0-3.0   TTR:   59.0 % (1 y)   INR used for dosin.1 (2019)   Warfarin maintenance plan:   10 mg (5 mg x 2) every Mon, Thu; 7.5 mg (5 mg x 1.5) all other days   Full warfarin instructions:   10 mg every Mon, Thu; 7.5 mg all other days   Weekly warfarin total:   57.5 mg   Plan last modified:   Natalie Sorto RN (2019)   Next INR check:   2019   Priority:   INR   Target end date:   Indefinite    Indications    History of CVA (cerebrovascular accident) [Z86.73]  Long term current use of anticoagulant therapy [Z79.01]             Anticoagulation Episode Summary     INR check location:       Preferred lab:       Send INR reminders to:   [facility] POOL    Comments:   * Pt states she has had 4 strokes (some after hip surgeries).       Anticoagulation Care Providers     Provider Role Specialty Phone number    Diana Ely APRN CNP Responsible  Family Practice [phone]            See the Encounter Report to view Anticoagulation Flowsheet and Dosing Calendar (Go to Encounters tab in chart review, and find the Anticoagulation Therapy Visit)        Natalie Sorto RN

## 2019-04-04 NOTE — Clinical Note
FYI- The patient is not taking enalapril due to side effects of dizziness. She states she is taking metoprolol (not on med list)?

## 2019-04-10 ENCOUNTER — HOSPITAL ENCOUNTER (OUTPATIENT)
Dept: ULTRASOUND IMAGING | Facility: CLINIC | Age: 74
Discharge: HOME | End: 2019-04-10
Attending: NURSE PRACTITIONER | Admitting: NURSE PRACTITIONER
Payer: COMMERCIAL

## 2019-04-10 DIAGNOSIS — I10 ESSENTIAL HYPERTENSION: ICD-10-CM

## 2019-04-10 PROCEDURE — 93975 VASCULAR STUDY: CPT

## 2019-04-16 DIAGNOSIS — I73.9 PVD (PERIPHERAL VASCULAR DISEASE) (H): Primary | ICD-10-CM

## 2019-04-16 DIAGNOSIS — I70.1 RIGHT RENAL ARTERY STENOSIS (H): ICD-10-CM

## 2019-04-18 ENCOUNTER — OFFICE VISIT (OUTPATIENT)
Dept: FAMILY MEDICINE | Facility: CLINIC | Age: 74
End: 2019-04-18
Payer: COMMERCIAL

## 2019-04-18 ENCOUNTER — HOSPITAL ENCOUNTER (OUTPATIENT)
Dept: CT IMAGING | Facility: CLINIC | Age: 74
Discharge: HOME | End: 2019-04-18
Attending: NURSE PRACTITIONER | Admitting: NURSE PRACTITIONER
Payer: COMMERCIAL

## 2019-04-18 VITALS
RESPIRATION RATE: 14 BRPM | HEIGHT: 66 IN | SYSTOLIC BLOOD PRESSURE: 188 MMHG | DIASTOLIC BLOOD PRESSURE: 80 MMHG | HEART RATE: 85 BPM | OXYGEN SATURATION: 96 % | WEIGHT: 144 LBS | BODY MASS INDEX: 23.14 KG/M2 | TEMPERATURE: 97.3 F

## 2019-04-18 DIAGNOSIS — Z79.01 CHRONIC ANTICOAGULATION: ICD-10-CM

## 2019-04-18 DIAGNOSIS — Z78.0 MENOPAUSE: ICD-10-CM

## 2019-04-18 DIAGNOSIS — I73.9 PVD (PERIPHERAL VASCULAR DISEASE) (H): ICD-10-CM

## 2019-04-18 DIAGNOSIS — I10 ESSENTIAL HYPERTENSION: ICD-10-CM

## 2019-04-18 DIAGNOSIS — I70.1 RIGHT RENAL ARTERY STENOSIS (H): ICD-10-CM

## 2019-04-18 DIAGNOSIS — I70.1 RENAL ARTERY STENOSIS (H): ICD-10-CM

## 2019-04-18 DIAGNOSIS — L85.3 DRY SKIN DERMATITIS: ICD-10-CM

## 2019-04-18 DIAGNOSIS — Z00.00 ENCOUNTER FOR MEDICARE ANNUAL WELLNESS EXAM: Primary | ICD-10-CM

## 2019-04-18 DIAGNOSIS — N18.30 CHRONIC KIDNEY DISEASE, STAGE 3 (MODERATE): ICD-10-CM

## 2019-04-18 LAB
ALBUMIN SERPL-MCNC: 3.7 G/DL (ref 3.4–5)
ANION GAP SERPL CALCULATED.3IONS-SCNC: 4 MMOL/L (ref 3–14)
BUN SERPL-MCNC: 24 MG/DL (ref 7–30)
CALCIUM SERPL-MCNC: 9.3 MG/DL (ref 8.5–10.1)
CHLORIDE SERPL-SCNC: 103 MMOL/L (ref 94–109)
CHOLEST SERPL-MCNC: 180 MG/DL
CO2 SERPL-SCNC: 28 MMOL/L (ref 20–32)
CREAT BLD-MCNC: 1.1 MG/DL (ref 0.52–1.04)
CREAT SERPL-MCNC: 1.12 MG/DL (ref 0.52–1.04)
GFR SERPL CREATININE-BSD FRML MDRD: 49 ML/MIN/{1.73_M2}
GFR SERPL CREATININE-BSD FRML MDRD: 49 ML/MIN/{1.73_M2}
GLUCOSE SERPL-MCNC: 96 MG/DL (ref 70–99)
HDLC SERPL-MCNC: 72 MG/DL
LDLC SERPL CALC-MCNC: 83 MG/DL
NONHDLC SERPL-MCNC: 108 MG/DL
PHOSPHATE SERPL-MCNC: 3.5 MG/DL (ref 2.5–4.5)
POTASSIUM SERPL-SCNC: 4.3 MMOL/L (ref 3.4–5.3)
SODIUM SERPL-SCNC: 135 MMOL/L (ref 133–144)
TRIGL SERPL-MCNC: 125 MG/DL

## 2019-04-18 PROCEDURE — 25000125 ZZHC RX 250: Performed by: RADIOLOGY

## 2019-04-18 PROCEDURE — 25000128 H RX IP 250 OP 636: Performed by: RADIOLOGY

## 2019-04-18 PROCEDURE — 99214 OFFICE O/P EST MOD 30 MIN: CPT | Mod: 25 | Performed by: NURSE PRACTITIONER

## 2019-04-18 PROCEDURE — 80061 LIPID PANEL: CPT | Performed by: NURSE PRACTITIONER

## 2019-04-18 PROCEDURE — 36415 COLL VENOUS BLD VENIPUNCTURE: CPT | Performed by: NURSE PRACTITIONER

## 2019-04-18 PROCEDURE — G0438 PPPS, INITIAL VISIT: HCPCS | Performed by: NURSE PRACTITIONER

## 2019-04-18 PROCEDURE — 74174 CTA ABD&PLVS W/CONTRAST: CPT

## 2019-04-18 PROCEDURE — 80069 RENAL FUNCTION PANEL: CPT | Performed by: NURSE PRACTITIONER

## 2019-04-18 PROCEDURE — 82565 ASSAY OF CREATININE: CPT

## 2019-04-18 RX ORDER — ENALAPRIL MALEATE 5 MG/1
2.5 TABLET ORAL DAILY
Qty: 31 TABLET | Refills: 5 | COMMUNITY
Start: 2019-04-18 | End: 2019-05-23

## 2019-04-18 RX ORDER — UREA 40 %
CREAM (GRAM) TOPICAL DAILY
Qty: 85 G | Refills: 11 | Status: SHIPPED | OUTPATIENT
Start: 2019-04-18 | End: 2022-05-05

## 2019-04-18 RX ORDER — IOPAMIDOL 755 MG/ML
80 INJECTION, SOLUTION INTRAVASCULAR ONCE
Status: COMPLETED | OUTPATIENT
Start: 2019-04-18 | End: 2019-04-18

## 2019-04-18 RX ADMIN — SODIUM CHLORIDE 100 ML: 9 INJECTION, SOLUTION INTRAVENOUS at 10:24

## 2019-04-18 RX ADMIN — IOPAMIDOL 80 ML: 755 INJECTION, SOLUTION INTRAVENOUS at 10:23

## 2019-04-18 ASSESSMENT — ENCOUNTER SYMPTOMS
MYALGIAS: 0
JOINT SWELLING: 0
ABDOMINAL PAIN: 0
DIZZINESS: 0
COUGH: 0
EYE PAIN: 0
HEADACHES: 0
FREQUENCY: 1
HEARTBURN: 0
SORE THROAT: 0
FEVER: 0
SHORTNESS OF BREATH: 0
WEAKNESS: 0
BREAST MASS: 0
DYSURIA: 0
NAUSEA: 0
CONSTIPATION: 0
PALPITATIONS: 0
PARESTHESIAS: 0
NERVOUS/ANXIOUS: 1
ARTHRALGIAS: 0
HEMATURIA: 0
CHILLS: 1
DIARRHEA: 0
HEMATOCHEZIA: 0

## 2019-04-18 ASSESSMENT — ACTIVITIES OF DAILY LIVING (ADL): CURRENT_FUNCTION: TRANSPORTATION REQUIRES ASSISTANCE

## 2019-04-18 ASSESSMENT — PAIN SCALES - GENERAL: PAINLEVEL: NO PAIN (0)

## 2019-04-18 ASSESSMENT — MIFFLIN-ST. JEOR: SCORE: 1179.93

## 2019-04-18 NOTE — PROGRESS NOTES
"SUBJECTIVE:   Bebe Alfonso is a 73 year old female who presents for Preventive Visit.  Are you in the first 12 months of your Medicare coverage?  No    Healthy Habits:     In general, how would you rate your overall health?  Good    Frequency of exercise:  4-5 days/week    Duration of exercise:  30-45 minutes    Do you usually eat at least 4 servings of fruit and vegetables a day, include whole grains    & fiber and avoid regularly eating high fat or \"junk\" foods?  No    Taking medications regularly:  Yes    Medication side effects:  None    Ability to successfully perform activities of daily living:  Transportation requires assistance    Home Safety:  No safety concerns identified    Hearing Impairment:  Need to ask people to speak up or repeat themselves and difficulty understanding speech on the telephone    In the past 6 months, have you been bothered by leaking of urine?  No    In general, how would you rate your overall mental or emotional health?  Good      PHQ-2 Total Score: 0    Additional concerns today:  No    Do you feel safe in your environment? Yes    Do you have a Health Care Directive? Yes: Advance Directive has been received and scanned.    Fall risk  Fallen 2 or more times in the past year?: No  Any fall with injury in the past year?: No  click delete button to remove this line now  Cognitive Screening   1) Repeat 3 items (Leader, Season, Table)    2) Clock draw: NORMAL  3) 3 item recall: Recalls 3 objects  Results: NORMAL clock, 1-2 items recalled: COGNITIVE IMPAIRMENT LESS LIKELY  Does have expressive aphasia from previous CVA    Mini-CogTM Copyright CLINTON Simmons. Licensed by the author for use in [facility]; reprinted with permission (eh@.[facility]). All rights reserved.      Do you have sleep apnea, excessive snoring or daytime drowsiness?: no    Reviewed and updated as needed this visit by clinical staff  Tobacco  Allergies  Meds  Med Hx  Surg Hx  Fam Hx  Soc Hx    "     Reviewed and updated as needed this visit by Provider        Social History     Tobacco Use     Smoking status: Current Every Day Smoker     Packs/day: 1.50     Types: Cigarettes     Start date: 2016     Smokeless tobacco: Never Used   Substance Use Topics     Alcohol use: No       Enalapril at 5 mg daily was causing her to feel dizzy after taking it.  So she stopped taking  Working out 1 hour a day.  Drinking 2 quarts of water a day.   No meds yet today due for fasting labs.   Recent imaging showed stenosis of her right renal artery.  She has further imaging scheduled for later today.  CTA abdomen pelvis with contrast  Renal ultrasound April 10, 2019  IMPRESSION:  1. Significantly elevated velocities in the abdominal aorta up to 511  cm/s. The aorta is small in caliber with atherosclerotic disease.  Consider CTA abdomen and pelvis for further evaluation.  2. Elevated velocities at the right renal artery origin, suggesting  greater than 60% stenosis. This can be evaluated on patient's CTA of  the abdomen and pelvis for the aorta.  3. No left renal artery stenosis.  4. Renal artery to aortic ratios were not calculated secondary to  significantly elevated velocities in the abdominal aorta.    If you drink alcohol do you typically have >3 drinks per day or >7 drinks per week? No    Alcohol Use 4/18/2019   Prescreen: >3 drinks/day or >7 drinks/week? No   Prescreen: >3 drinks/day or >7 drinks/week? -   No flowsheet data found.        Hypertension Follow-up      Outpatient blood pressures are being checked at home.  Results are 140/80.    Low Salt Diet: no added salt      Current providers sharing in care for this patient include:   Patient Care Team:  Diana Ely APRN CNP as PCP - General (Family Practice)  Diana Ely APRN CNP as Assigned PCP  Gregoria Mcneal MD as MD (Nephrology)    The following health maintenance items are reviewed in Epic and correct as of today:  Health  Maintenance   Topic Date Due     COPD ACTION PLAN Q1 YR  1945     DEPRESSION ACTION PLAN  11/12/1964     MEDICARE ANNUAL WELLNESS VISIT  11/12/2011     DEXA SCAN SCREENING (SYSTEM ASSIGNED)  11/12/2011     PNEUMOCOCCAL IMMUNIZATION 65+ LOW/MEDIUM RISK (1 of 2 - PCV13) 11/12/2011     ZOSTER IMMUNIZATION (2 of 3) 04/21/2014     ADVANCE DIRECTIVE PLANNING Q5 YRS  06/15/2018     DTAP/TDAP/TD IMMUNIZATION (2 - Td) 04/20/2019     PHQ-9 Q6 MONTHS  09/22/2019     FALL RISK ASSESSMENT  11/29/2019     URINE DRUG SCREEN Q1 YR  03/22/2020     MAMMO SCREEN Q2 YR (SYSTEM ASSIGNED)  04/03/2021     COLON CANCER SCREEN (SYSTEM ASSIGNED)  04/13/2021     LIPID SCREEN Q5 YR FEMALE (SYSTEM ASSIGNED)  04/18/2024     SPIROMETRY ONETIME  Completed     INFLUENZA VACCINE  Completed     HEPATITIS C SCREENING  Completed     IPV IMMUNIZATION  Aged Out     MENINGITIS IMMUNIZATION  Aged Out     BP Readings from Last 3 Encounters:   04/18/19 188/80   04/04/19 164/84   03/22/19 182/68    Wt Readings from Last 3 Encounters:   04/18/19 65.3 kg (144 lb)   03/22/19 66.7 kg (147 lb)   11/29/18 66.4 kg (146 lb 6.4 oz)                  Pneumonia Vaccine: Patient declined influenza and pneumococcal vaccine  Mammogram Screening: Mammogram Screening: Patient over age 50, mutual decision to screen reflected in health maintenance.  Last 3 Pap and HPV Results:     No results found for: PAP      Review of Systems   Constitutional: Positive for chills. Negative for fever.   HENT: Positive for congestion. Negative for ear pain, hearing loss and sore throat.    Eyes: Negative for pain and visual disturbance.   Respiratory: Negative for cough and shortness of breath.    Cardiovascular: Negative for chest pain, palpitations and peripheral edema.   Gastrointestinal: Negative for abdominal pain, constipation, diarrhea, heartburn, hematochezia and nausea.   Breasts:  Negative for tenderness, breast mass and discharge.   Genitourinary: Positive for frequency.  "Negative for dysuria, genital sores, hematuria, pelvic pain, urgency, vaginal bleeding and vaginal discharge.   Musculoskeletal: Negative for arthralgias, joint swelling and myalgias.   Skin: Negative for rash.   Neurological: Negative for dizziness, weakness, headaches and paresthesias.   Psychiatric/Behavioral: Negative for mood changes. The patient is nervous/anxious.          OBJECTIVE:   /80   Pulse 85   Temp 97.3  F (36.3  C) (Tympanic)   Resp 14   Ht 1.676 m (5' 6\")   Wt 65.3 kg (144 lb)   SpO2 96%   BMI 23.24 kg/m   Estimated body mass index is 23.24 kg/m  as calculated from the following:    Height as of this encounter: 1.676 m (5' 6\").    Weight as of this encounter: 65.3 kg (144 lb).  Physical Exam  GENERAL: healthy, alert and no distress expressive aphasia  EYES: Eyes grossly normal to inspection, PERRL and conjunctivae and sclerae normal  HENT: ear canals and TM's normal, nose and mouth without ulcers or lesions  NECK: no adenopathy, no asymmetry, masses, or scars and thyroid normal to palpation  RESP: lungs rhonchi clears with cough.  No wheeze  BREAST: normal without masses, tenderness or nipple discharge and no palpable axillary masses or adenopathy  CV: regular rate and rhythm, normal S1 S2, no S3 or S4, no murmur, click or rub, no peripheral edema and peripheral pulses strong  ABDOMEN: soft, nontender, no hepatosplenomegaly, no masses and bowel sounds normal  MS: no gross musculoskeletal defects noted, no edema  SKIN:  dry scaling cracking feet bilaterally  NEURO: Normal strength and tone, mentation intact and speech normal  PSYCH: mentation appears normal, affect normal/bright    Results for orders placed or performed in visit on 04/18/19   Lipid panel reflex to direct LDL Fasting   Result Value Ref Range    Cholesterol 180 <200 mg/dL    Triglycerides 125 <150 mg/dL    HDL Cholesterol 72 >49 mg/dL    LDL Cholesterol Calculated 83 <100 mg/dL    Non HDL Cholesterol 108 <130 mg/dL "   Renal panel (Alb, BUN, Ca, Cl, CO2, Creat, Gluc, Phos, K, Na)   Result Value Ref Range    Sodium 135 133 - 144 mmol/L    Potassium 4.3 3.4 - 5.3 mmol/L    Chloride 103 94 - 109 mmol/L    Carbon Dioxide 28 20 - 32 mmol/L    Anion Gap 4 3 - 14 mmol/L    Glucose 96 70 - 99 mg/dL    Urea Nitrogen 24 7 - 30 mg/dL    Creatinine 1.12 (H) 0.52 - 1.04 mg/dL    GFR Estimate 49 (L) >60 mL/min/[1.73_m2]    GFR Estimate If Black 57 (L) >60 mL/min/[1.73_m2]    Calcium 9.3 8.5 - 10.1 mg/dL    Phosphorus 3.5 2.5 - 4.5 mg/dL    Albumin 3.7 3.4 - 5.0 g/dL       ASSESSMENT / PLAN:   Bebe was seen today for physical.    Diagnoses and all orders for this visit:    Dry skin dermatitis  -     Urea 40 % CREA; Externally apply topically daily    Essential hypertension  -     Lipid panel reflex to direct LDL Fasting  -     Renal panel (Alb, BUN, Ca, Cl, CO2, Creat, Gluc, Phos, K, Na)    Menopause  -     DX Hip/Pelvis/Spine; Future    Chronic kidney disease, stage 3 (moderate) (H)  -     NEPHROLOGY ADULT REFERRAL    Renal artery stenosis (H)  -     NEPHROLOGY ADULT REFERRAL    PVD (peripheral vascular disease) (H)    Chronic anticoagulation    Encounter for Medicare annual wellness exam    Take medications as directed.  Decrease enalapril to 2.5 mg daily  Recheck blood pressure 2 weeks  [facility] nephrology consultation recommended  Follow-up with vascular after imaging due to renal artery stenosis  Smoking cessation again strongly recommended  Continue atorvastatin  Continue Coumadin  Call and schedule DEXA scan    End of Life Planning:  Patient currently has an advanced directive: No.  I have verified the patient's ablity to prepare an advanced directive/make health care decisions.  Literature was provided to assist patient in preparing an advanced directive.    COUNSELING:  Reviewed preventive health counseling, as reflected in patient instructions    BP Readings from Last 1 Encounters:   04/18/19 188/80     Estimated  "body mass index is 23.24 kg/m  as calculated from the following:    Height as of this encounter: 1.676 m (5' 6\").    Weight as of this encounter: 65.3 kg (144 lb).           reports that she has been smoking cigarettes.  She started smoking about 3 years ago. She has been smoking about 1.50 packs per day. She has never used smokeless tobacco.  Tobacco Cessation Action Plan: Information offered: Patient not interested at this time  Self help information given to patient    Appropriate preventive services were discussed with this patient, including applicable screening as appropriate for cardiovascular disease, diabetes, osteopenia/osteoporosis, and glaucoma.  As appropriate for age/gender, discussed screening for colorectal cancer, prostate cancer, breast cancer, and cervical cancer. Checklist reviewing preventive services available has been given to the patient.    Reviewed patients plan of care and provided an AVS. The due to her previous CVA, COPD, CKD, uncontrolled hypertension, ongoing smoking, depression I feel that she qualifies for a complex Care Plan (for patients with higher acuity and needing more deliberate coordination of services) for Bebe meets the Care Plan requirement. This Care Plan has been established and reviewed with the Patient.    Counseling Resources:  ATP IV Guidelines  Pooled Cohorts Equation Calculator  Breast Cancer Risk Calculator  FRAX Risk Assessment  ICSI Preventive Guidelines  Dietary Guidelines for Americans, 2010  USDA's MyPlate  ASA Prophylaxis  Lung CA Screening  Call or return to the clinic with any worsening of symptoms or no resolution. Patient/Parent verbalized understanding and is in agreement. Medication side effects reviewed.   Current Outpatient Medications   Medication Sig Dispense Refill     amLODIPine (NORVASC) 5 MG tablet Take 1 tablet (5 mg) by mouth daily 90 tablet 0     ASPIRIN NOT PRESCRIBED (INTENTIONAL) Please choose reason not prescribed, below       " atorvastatin (LIPITOR) 40 MG tablet Take 1 tablet (40 mg) by mouth daily 90 tablet 0     enalapril (VASOTEC) 5 MG tablet Take 0.5 tablets (2.5 mg) by mouth daily 31 tablet 5     hydrochlorothiazide (HYDRODIURIL) 25 MG tablet Take 1 tablet (25 mg) by mouth daily 90 tablet 0     potassium chloride ER (K-DUR/KLOR-CON M) 20 MEQ CR tablet Take 1 tablet (20 mEq) by mouth 2 times daily 60 tablet 2     Urea 40 % CREA Externally apply topically daily 85 g 11     warfarin (COUMADIN) 5 MG tablet 10 mg (5 mg x 2) every Mon/Thu; 7.5 mg (5 mg x 1.5) all other days or as directed by the Anticoagulation Clinic. 135 tablet 0     Chart documentation with Dragon Voice recognition Software. Although reviewed after completion, some words and grammatical errors may remain.      SELWYN Dennis [facility]    Identified Health Risks:The patient was counseled and encouraged to consider modifying their diet and eating habits. She was provided with information on recommended healthy diet options.The patient was provided with written information regarding signs of hearing loss.    The patient reports that she has difficulty with activities of daily living. I have asked that the patient make a follow up appointment in 2 weeks where this issue will be further evaluated and addressed.

## 2019-04-18 NOTE — PATIENT INSTRUCTIONS
Cut back the enalapril to 2.5 mg daily if that doesn't seem effective then can   Increase the amlodipine to 10 mg daily       Patient Education     Uncontrolled High Blood Pressure (Established)    Your blood pressure was unusually high today. This can occur if you ve missed doses of your blood pressure medicine. Or it can happen if you are taking other medicines. These include some asthma inhalers, decongestants, diet pills, and street drugs like cocaine and amphetamine.  Other causes include:    Weight gain    More salt in your diet    Smoking    Caffeine  Your blood pressure can also rise if you are emotionally upset or in intense pain. It may go back to normal after a period of rest.  Blood pressure measurements are given as 2 numbers. Systolic blood pressure is the upper number. This is the pressure when the heart contracts. Diastolic blood pressure is the lower number. This is the pressure when the heart relaxes between beats. You will see your blood pressure readings written together. For example, a person with a systolic pressure of 118 and a diastolic pressure of 78 will have 118/78 written in the medical record. To be high blood pressure, the numbers must be higher when tested over a period of time.  Blood pressure is categorized as normal, elevated, or stage 1 or stage 2 high blood pressure:    Normal blood pressure is systolic of less than 120 and diastolic of less than 80 (120/80)    Elevated blood pressure is systolic of 120 to 129 and diastolic less than 80    Stage 1 high blood pressure is systolic is 130 to 139 or diastolic between 80 to 89    Stage 2 high blood pressure is when systolic is 140 or higher or the diastolic is 90 or higher  Uncontrolled high blood pressure can cause serious health problems. It raises your risk for heart attack, stroke, and heart failure. In general, if you have high blood pressure, keeping your blood pressure below 130/80 mmHg may help prevent these problems. Your  healthcare provider may prescribe medicine to help control blood pressure if lifestyle changes are not enough.  Home care  It s important to take steps to lower your blood pressure. If you are taking blood pressure medicine, the guidelines below may help you need less or no medicines in the future.    Start a weight-loss program if you are overweight.    Cut back on the amount of salt in your diet:  ? Avoid high-salt foods like olives, pickles, smoked meats, and salted potato chips.  ? Don t add salt to your food at the table.  ? Use only small amounts of salt when cooking.    Start an exercise program. Talk with your healthcare provider about what exercise program is best for you. It doesn t have to be difficult. Even brisk walking for 20 minutes 3 times a week is a good form of exercise.    Avoid medicines that stimulates the heart. This includes many over-the-counter cold and sinus decongestant pills and sprays, as well as diet pills. Check the warnings about high blood pressure on the label. Before purchasing any over-the-counter medicines or supplements, always ask the pharmacist about the product's potential interaction with your high blood pressure and your medicines.    Stimulants such as amphetamine or cocaine could be lethal for someone with high blood pressure. Never take these.    Limit how much caffeine you drink. Or switch to noncaffeinated beverages.    Stop smoking. If you are a long-time smoker, this can be hard. Enroll in a stop-smoking program to make it more likely that you will succeed. Talk with your provider about ways to quit.    Learn how to handle stress better. This is an important part of any program to lower blood pressure. Learn ways to relax. These include meditation, yoga, and biofeedback.    If medicines were prescribed, take them exactly as directed. Missing doses may cause your blood pressure to get out of control.    If you miss a dose or doses of your medicines, check with your  healthcare provider or pharmacist about what to do.    Consider buying an automatic blood pressure machine. Your provider may recommend a certain type. You can get one of these at most pharmacies. Measure your blood pressure twice a day, in the morning, and in the late afternoon. Keep a written record of your home blood pressure readings and take the record to your medical appointments.  Here are some additional guidelines on home blood pressure monitoring from the American Heart Association.    Don't smoke or drink coffee for 30 minutes    Go to the bathroom before the test.    Relax for 5 minutes before taking the measurement.    Sit correctly. Be sure your back is supported. Don't sit on a couch or soft chair. Uncross your feet and place them flat on the floor. Place your arm on a solid, flat surface like a table with the upper arm at heart level. Make certain the middle of the cuff is directly above the bend of the elbow. Check the monitor's instruction manual for an illustration.    Take multiple readings. When you measure, take 2 or 3 readings one minute apart and record all of the results.    Take your blood pressure at the same time every day, or as your healthcare provider recommends.    Record the date, time, and blood pressure reading.    Take the record with you to your next appointment. If your blood pressure monitor has a built-in memory, simply take the monitor with you to your next appointment.    Call your provider if you have several high readings. Don't be frightened by a single high reading, but if you get several high readings, check in with your healthcare provider.    Note: When blood pressure reaches a systolic (top number) of 180 or higher or a diastolic (bottom number) of 110 or higher, emergency medical treatment is required. Call your healthcare provider immediately.  Follow-up care  Regular visits to your own healthcare provider for blood pressure and medicine checks are an important  part of your care. Make a follow-up appointment as directed. Bring the record of your home blood pressure readings to the appointment.  When to seek medical advice  Call your healthcare provider right away if any of these occur:    Blood pressure reaches a systolic (top number) of 180 or higher or diastolic (bottom number) of 110 or higher, emergency medical treatment is required.    Chest, arm, shoulder, neck, or upper back pain    Shortness of breath    Severe headache    Throbbing or rushing sound in the ears    Nosebleed    Extreme drowsiness, confusion, or fainting    Dizziness or dizziness with spinning sensation (vertigo)    Weakness in an arm or leg or on one side of the face    Trouble speaking or seeing   Date Last Reviewed: 1/1/2017 2000-2018 [employer]. [address] [place] [address]. All rights reserved. This information is not intended as a substitute for professional medical care. Always follow your healthcare professional's instructions.           Patient Education   Personalized Prevention Plan  You are due for the preventive services outlined below.  Your care team is available to assist you in scheduling these services.  If you have already completed any of these items, please share that information with your care team to update in your medical record.  Health Maintenance Due   Topic Date Due     COPD ACTION PLAN Q1 YR  1945     Depression Action Plan Review  11/12/1964     Annual Wellness Visit  11/12/2011     Bone Density Screening (Dexa)  11/12/2011     Pneumococcal Vaccine (1 of 2 - PCV13) 11/12/2011     Zoster (Shingles) Vaccine (2 of 3) 04/21/2014     Discuss Advance Directive Planning  06/15/2018     Diptheria Tetanus Pertussis (DTAP/TDAP/TD) Vaccine (2 - Td) 04/20/2019       Understanding USDA MyPlate  The USDA (U.S. Department of Agriculture) has guidelines to help you make healthy food choices. These are called MyPlate. MyPlate shows the food groups  that make up healthy meals using the image of a place setting. Before you eat, think about the healthiest choices for what to put onto your plate or into your cup or bowl. To learn more about building a healthy plate, visit www.choosemyplate.gov.    The food groups    Fruits. Any fruit or 100% fruit juice counts as part of the Fruit Group. Fruits may be fresh, canned, frozen, or dried, and may be whole, cut-up, or pureed. Make half your plate fruits and vegetables.    Vegetables. Any vegetable or 100% vegetable juice counts as a member of the Vegetable Group. Vegetables may be fresh, frozen, canned, or dried. They can be served raw or cooked and may be whole, cut-up, or mashed. Make half your plate fruits and vegetables.    Grains. All foods made from grains are part of the Grains Group. These include wheat, rice, oats, cornmeal, and barley such as bread, pasta, oatmeal, cereal, tortillas, and grits. Grains should be no more than a quarter of your plate. At least half of your grains should be whole grains.    Protein. This group includes meat, poultry, seafood, beans and peas, eggs, processed soy products (like tofu), nuts (including nut butters), and seeds. Make protein choices no more than a quarter of your plate. Meat and poultry choices should be lean or low fat.    Dairy. All fluid milk products and foods made from milk that contain calcium, like yogurt and cheese, are part of the Dairy Group. (Foods that have little calcium, such as cream, butter, and cream cheese, are not part of the group.) Most dairy choices should be low-fat or fat-free.    Oils. These are fats that are liquid at room temperature. They include canola, corn, olive, soybean, and sunflower oil. Foods that are mainly oil include mayonnaise, certain salad dressings, and soft margarines. You should have only 5 to 7 teaspoons of oils a day. You probably already get this much from the food you eat.  Use [employer] to help build your meals  The  [employer] can help you plan and track your meals and activity. You can look up individual foods to see or compare their nutritional value. You can get guidelines for what and how much you should eat. You can compare your food choices. And you can assess personal physical activities and see ways you can improve. Go to www.[place].[employer]vert/FOODSCROOGEcker/. For more eating tips and nutritional information, go to www.[place].gov/ten-tips.  Date Last Reviewed: 8/1/2017 2000-2018 [employer]. [address] [place] [id number]. All rights reserved. This information is not intended as a substitute for professional medical care. Always follow your healthcare professional's instructions.        Activities of Daily Living    Your Health Risk Assessment indicates you have difficulties with activities of daily living such as housework, bathing, preparing meals, taking medication, etc. please continue to use your home resources as discussed and let us know if further services are needed    Signs of Hearing Loss     Hearing much better with one ear can be a sign of hearing loss.     Hearing loss is a problem shared by many people. In fact, it is one of the most common health conditions, particularly as people age. Most people over age 65 have some hearing loss, and by age 80, almost everyone does. Because hearing loss usually occurs slowly over the years, you may not realize your hearing ability has gotten worse.  Have your hearing checked  Contact your healthcare provider if you:    Have to strain to hear normal conversation    Have to watch other people s faces very carefully to follow what they re saying    Need to ask people to repeat what they ve said    Often misunderstand what people are saying    Turn the volume of the television or radio up so high that others complain    Feel that people are mumbling when they re talking to you    Find that the effort to hear leaves you feeling tired and  irritated    Notice, when using the phone, that you hear better with one ear than the other  Date Last Reviewed: 12/1/2016 2000-2018 [employer]. [address] [place] [address], [place] [address]. All rights reserved. This information is not intended as a substitute for professional medical care. Always follow your healthcare professional's instructions.

## 2019-04-22 DIAGNOSIS — I15.0 RENOVASCULAR HYPERTENSION: Primary | ICD-10-CM

## 2019-04-22 DIAGNOSIS — I70.1 RIGHT RENAL ARTERY STENOSIS (H): ICD-10-CM

## 2019-04-23 ENCOUNTER — TELEPHONE (OUTPATIENT)
Dept: OTHER | Facility: CLINIC | Age: 74
End: 2019-04-23

## 2019-04-23 NOTE — TELEPHONE ENCOUNTER
Pt referred to [facility] by Diana Ely APRN CNP for right renal artery stenosis, renovascular hypertension.    4/18/19 CTA abdomen/pelvis in Epic.    Pt needs to be scheduled for consult with Interventional Radiology or Vascular Surgery.  Will route to scheduling to coordinate an appointment at next available.    JAVIER RosenbergN RN

## 2019-04-25 ENCOUNTER — ANTICOAGULATION THERAPY VISIT (OUTPATIENT)
Dept: ANTICOAGULATION | Facility: CLINIC | Age: 74
End: 2019-04-25
Payer: COMMERCIAL

## 2019-04-25 DIAGNOSIS — Z86.73 HISTORY OF CVA (CEREBROVASCULAR ACCIDENT): ICD-10-CM

## 2019-04-25 DIAGNOSIS — Z79.01 LONG TERM CURRENT USE OF ANTICOAGULANT THERAPY: ICD-10-CM

## 2019-04-25 LAB — INR POINT OF CARE: 1.6 (ref 0.86–1.14)

## 2019-04-25 PROCEDURE — 85610 PROTHROMBIN TIME: CPT | Mod: QW | Performed by: FAMILY MEDICINE

## 2019-04-25 PROCEDURE — 36416 COLLJ CAPILLARY BLOOD SPEC: CPT | Performed by: FAMILY MEDICINE

## 2019-04-25 PROCEDURE — 99207 ZZC NO CHARGE NURSE ONLY: CPT | Performed by: FAMILY MEDICINE

## 2019-04-25 RX ORDER — WARFARIN SODIUM 5 MG/1
TABLET ORAL
Qty: 135 TABLET | Refills: 0
Start: 2019-04-25 | End: 2019-05-09

## 2019-04-25 NOTE — PATIENT INSTRUCTIONS
Take 15 mg (3 tabs) of warfarin today. Then increase to 10 mg MWF (2 tabs) and 7.5 mg all other days (1.5 tabs).     Some signs and symptoms of clots include: pain or tenderness in arm or leg, swelling in arm or leg, changes in skin color, or area is warm to touch, shortness or breath, trouble breathing.  Numbness or weakness especially on 1 side of the body, sudden trouble speaking or swallowing, sudden trouble seeing, sudden confusion, dizzy spells or headache.  If you have these please call 911 or seek medical care immediately.

## 2019-04-25 NOTE — PROGRESS NOTES
ANTICOAGULATION FOLLOW-UP CLINIC VISIT    Patient Name:  Bebe Alfonso  Date:  2019  Contact Type:  Face to Face    SUBJECTIVE:     Patient Findings     Positives:   Change in activity (aerobic exercising daily for one hour), Change in medications (enalapril resumed but at a decreased dose), Change in diet/appetite (had broccoli, pickles on Easter)    Comments:   Maintenance dose of warfarin was increased at last visit but INR went down. This is likely due to increased activity. The patient started working out for an hour every day, which is new for her. She also ate more greens than usual on . Since she plans to continue working out, will increase maintenance dose again by another 4.3% with a one time increased dose today. Patient denies any s/s of clot or stroke. No other changes. Will recheck INR in 2 weeks.           OBJECTIVE    INR Protime   Date Value Ref Range Status   2019 1.6 (A) 0.86 - 1.14 Final       ASSESSMENT / PLAN  INR assessment SUB increased activity   Recheck INR In: 2 WEEKS    INR Location Clinic      Anticoagulation Summary  As of 2019    INR goal:   2.0-3.0   TTR:   56.9 % (1.1 y)   INR used for dosin.6! (2019)   Warfarin maintenance plan:   10 mg (5 mg x 2) every Mon, Wed, Fri; 7.5 mg (5 mg x 1.5) all other days   Full warfarin instructions:   : 15 mg; Otherwise 10 mg every Mon, Wed, Fri; 7.5 mg all other days   Weekly warfarin total:   60 mg   Plan last modified:   Natalie Sorto RN (2019)   Next INR check:   2019   Priority:   INR   Target end date:   Indefinite    Indications    History of CVA (cerebrovascular accident) [Z86.73]  Long term current use of anticoagulant therapy [Z79.01]             Anticoagulation Episode Summary     INR check location:       Preferred lab:       [facility]    Comments:   * Pt states she has had 4 strokes (some after hip surgeries).       Anticoagulation Care Providers      Provider Role Specialty Phone number    Diana Ely APRN CNP Responsible Family Practice [phone]            See the Encounter Report to view Anticoagulation Flowsheet and Dosing Calendar (Go to Encounters tab in chart review, and find the Anticoagulation Therapy Visit)        Natalie Sorto RN

## 2019-05-06 ENCOUNTER — OFFICE VISIT (OUTPATIENT)
Dept: NEPHROLOGY | Facility: CLINIC | Age: 74
End: 2019-05-06
Payer: COMMERCIAL

## 2019-05-06 VITALS
HEART RATE: 84 BPM | SYSTOLIC BLOOD PRESSURE: 150 MMHG | BODY MASS INDEX: 23.4 KG/M2 | DIASTOLIC BLOOD PRESSURE: 72 MMHG | WEIGHT: 145 LBS

## 2019-05-06 DIAGNOSIS — I10 ESSENTIAL HYPERTENSION: ICD-10-CM

## 2019-05-06 DIAGNOSIS — I70.1 RENAL ARTERY STENOSIS (H): ICD-10-CM

## 2019-05-06 DIAGNOSIS — N18.30 CKD (CHRONIC KIDNEY DISEASE) STAGE 3, GFR 30-59 ML/MIN (H): Primary | ICD-10-CM

## 2019-05-06 DIAGNOSIS — E87.6 HYPOKALEMIA: ICD-10-CM

## 2019-05-06 NOTE — PATIENT INSTRUCTIONS
Patient Education     Low-Salt Diet  This diet removes foods that are high in salt. It also limits the amount of salt you use when cooking. It is most often used for people with high blood pressure, edema (fluid retention), and kidney, liver, or heart disease.  Table salt contains the mineral sodium. Your body needs sodium to work normally. But too much sodium can make your health problems worse. Your healthcare provider is recommending a low-salt (also called low-sodium) diet for you. Your total daily allowance of salt is 1,500 to 2,300 milligrams (mg). It is less than 1 teaspoon of table salt. This means you can have only about 500 to 700 mg of sodium at each meal. People with certain health problems should limit salt intake to the lower end of the recommended range.    When you cook, don t add much salt. If you can cook without using salt, even better. Don t add salt to your food at the table.  When shopping, read food labels. Salt is often called sodium on the label. Choose foods that are salt-free, low salt, or very low salt. Note that foods with reduced salt may not lower your salt intake enough.    Beans, potatoes, and pasta  Ok: Dry beans, split peas, lentils, potatoes, rice, macaroni, pasta, spaghetti without added salt  Avoid: Potato chips, tortilla chips, and similar products  Breads and cereals  Ok: Low-sodium breads, rolls, cereals, and cakes; low-salt crackers, matzo crackers  Avoid: Salted crackers, pretzels, popcorn, Gabonese toast, pancakes, muffins  Dairy  Ok: Milk, chocolate milk, hot chocolate mix, low-salt cheeses, and yogurt  Avoid: Processed cheese and cheese spreads; Roquefort, Camembert, and cottage cheese; buttermilk, instant breakfast drink  Desserts  Ok: Ice cream, frozen yogurt, juice bars, gelatin, cookies and pies, sugar, honey, jelly, hard candy  Avoid: Most pies, cakes and cookies prepared or processed with salt; instant pudding  Drinks  Ok: Tea, coffee, fizzy (carbonated) drinks,  juices  Avoid: Flavored coffees, electrolyte replacement drinks, sports drinks  Meats  Ok: All fresh meat, fish, poultry, low-salt tuna, eggs, egg substitute  Avoid: Smoked, pickled, brine-cured, or salted meats and fish. This includes antony, chipped beef, corned beef, hot dogs, deli meats, ham, kosher meats, salt pork, sausage, canned tuna, salted codfish, smoked salmon, herring, sardines, or anchovies.  Seasonings and spices  Ok: Most seasonings are okay. Good substitutes for salt include: fresh herb blends, hot sauce, lemon, garlic, patel, vinegar, dry mustard, parsley, cilantro, horseradish, tomato paste, regular margarine, mayonnaise, unsalted butter, cream cheese, vegetable oil, cream, low-salt salad dressing and gravy.  Avoid: Regular ketchup, relishes, pickles, soy sauce, teriyaki sauce, Worcestershire sauce, BBQ sauce, tartar sauce, meat tenderizer, chili sauce, regular gravy, regular salad dressing, salted butter  Soups  Ok: Low-salt soups and broths made with allowed foods  Avoid: Bouillon cubes, soups with smoked or salted meats, regular soup and broth  Vegetables  Ok: Most vegetables are okay; also low-salt tomato and vegetable juices  Avoid: Sauerkraut and other brine-soaked vegetables; pickles and other pickled vegetables; tomato juice, olives  Date Last Reviewed: 8/1/2016 2000-2018 [employer]. [address] [place] [address]. All rights reserved. This information is not intended as a substitute for professional medical care. Always follow your healthcare professional's instructions.         blood pressure readings within 2 weeks

## 2019-05-06 NOTE — PROGRESS NOTES
Assessment and Plan:  73 year old female with history of multiple CVAs, hypertension, smoking who presents for evaluation. Her Scr is 1-1.2 mg/dL, she has moderate renal artery stenosis, and has ?component of white coat hypertension. She is a smoker   # CKD:  Scr 1-1.2, eGFR 45-50 ml/min. Her right kidney is smaller by 2cm, thus likely has renovascular disease and may have component of renovascular hypertension    - monitor renal function closely on ACE/ ARB     - check UA and assess proteinuria   - no systemic processes suspected. No serology sent today   # Hypertension: not at goal, though she states her blood pressure at home is regularly 120-130s , including today before coming here   - not sure if she has white coat hypertension; she has a wrist cuff at home   - will order 24 hour ambulatory monitor and go from there  - goal /80, given her stroke history  - currently on enalapril 1/2 pill and amlodipine     - low threshold to restart hydrochlorothiazide if BP >140/90     - with MARQUEZ, will need to see how BP and renal function do on ACE inhibitor.    - check labs monthly for now  -check renin/ jackie  # Anemia in chronic renal disease:   - Hgb: Stable  - Iron studies: Not checked recently  # Electrolytes:   - has had low potassium on hydrochlorothiazide; she states she is holding hydrochlorothiazide per her PCP's recommendation    - would have low threshold to restart this if BP is higher than goal  - repeat labs off hydrochlorothiazide    - also had low sodium on recent labs, repeat normal- monitor  # Mineral Bone Disorder: normal calcium and phos  Check baseline PTH     Assessment and plan was discussed with patient and she voiced her understanding and agreement.    Consult:  Bebe Alfonso was seen in consultation at the request of Diana Ely for hypertension.    Reason for Visit:  Bebe Alfonso is a 73 year old female with CKD from hypertension, renovascular disease, who presents for  evaluation.    HPI:  She is a pleasant female here for evaluation of renal function and hypertension. She has had high blood pressure for many years, but she seems to indicate that it has been worse in the last couple years. Review of [facility] records show that she had been on hydrochlorothiazide and metoprolol for a while. She did not like metoprolol thus it was stopped and she is now on amlodipine. Last month , she saw her PCP and her BP was quite elevated. She was started on enalapril. She had labs thereafter which showed a SCr up to 1.55 The enalapril dose was cut down in half. She is not taking hydrochlorothiazide currently. She had sodium of 130 and K of 3.0 in March at which time it was stopped.   She has been checking blood pressure, in fact for a while she was checking multiple times a day and it is generally 120-130s  She hydrates well especially if she exercises.   She does not drink alcohol.   She smokes 1/2 ppd to 1 ppd, had quit for 3 years but unfortunately restarted. \  She is accompanied by her  who seems supportive. She has been monitoring salt intake and picked up some Mrs Delmar waldrop.   She tries to exercise regularly, has a video that she does. She has had bilateral hip replacements.   She has cataracts and sees ophtho yearly. She is not diabetic, recent A1c was 6.1  She denies skin issues other than dry itchy skin on knee area for which she uses cortisone. She had bacteriuria on urine culture in March but it was not treated as it was not symptomatic.   She is on coumadin for her stroke history and she has an implanted loop recorder placed at [facility], not sure if she is to get it off or not.  She does not use NSAIDs, only tylenol, given her coumadin.   Family history of breast cancer and lung cancer.   Renal History:   Hypertension  CKD    ROS:   A comprehensive review of systems was obtained and negative, except as noted in the HPI or PMH.    Active Medical Problems:  Patient Active Problem  List   Diagnosis     ACP (advance care planning)     Anticoagulation monitoring, INR range 2-3     Benign essential hypertension     Carotid bruit     Cerebrovascular disease     Chronic obstructive pulmonary disease (H)     Esophageal reflux     History of CVA (cerebrovascular accident)     Major depressive disorder, recurrent episode, moderate (H)     Osteoarthrosis, hip     Pain medication agreement     Thyroid nodule     Long term current use of anticoagulant therapy     Hyperlipidemia LDL goal <70     Cerebrovascular accident (CVA) due to occlusion of precerebral artery (H)     Chronic kidney disease, stage 3 (moderate) (H)     Renal artery stenosis (H)     PMH:   Medical record was reviewed and PMH was discussed with patient and noted below.  Past Medical History:   Diagnosis Date     Anticoagulation monitoring, INR range 2-3 10/22/2015     Benign essential hypertension 9/15/2016     Carotid bruit 11/12/2012    Overview:  12/6/10 Carotid US  Elevated velocities throughout the carotid arteries bilaterally. There is a focal area of increased velocity in the mid left internal carotid artery with a plaque in this region on the color flow images. This corresponds to 50 - 70 % diameter reduction. There is an area of elevated velocity in the left external carotid artery consistent with stenosis. There is no focal area of significant stenosis in the right carotid arteries in the neck. Plaque in the carotid arteries bilaterally.      Chronic obstructive pulmonary disease (H) 1/19/2016    The FVC, FEV1 and FEV1/FVC ratio are reduced.  The inspiratory flow rates are within normal limits.  The FVC is reduced relative to the SVC indicating air trapping.  While the TLC is within normal limits, the FRC, RV and RV/TLC ratio are increased.  The  diffusing capacity is mildly reduced. IMPRESSION: Mild-moderate Airflow Obstruction with air trapping ____________________________________________Clarke Iverson  April 2018      Esophageal reflux 4/8/2008    Overview:  Omeprazole PRN, occasional symptoms such as chest burning and knife twisted to stomach.      History of CVA (cerebrovascular accident) 10/20/2015     Long term current use of anticoagulant therapy 3/20/2018     Major depressive disorder, recurrent episode, moderate (H) 10/29/2008    Overview:  She is not taking any medication at this time.     Osteoarthrosis, hip 10/19/2010    Overview:  Pt scheduled for right  total hip arthroplasty on 6/14/13 with Dr. Addison HEREDIA hip xray (11/2012) Severe degenerative changes seen of the right hip with near bone-on-bone appearance of the joint and several subchondral cysts forming.     Thyroid nodule 5/22/2013    Overview:  Thyroid US (2012) Stable nodule left lobe of thyroid TSH- (2/19/12) normal (2.26)     PSH:   No past surgical history on file.    Family Hx:   No family history on file.  Personal Hx:   Social History     Tobacco Use     Smoking status: Current Every Day Smoker     Packs/day: 1.50     Types: Cigarettes     Start date: 2016     Smokeless tobacco: Never Used   Substance Use Topics     Alcohol use: No       Allergies:  Allergies   Allergen Reactions     Losartan Swelling, Nausea, Shortness Of Breath and Palpitations     Gabapentin GI Disturbance and Unknown     Double vision  flatulence     Lisinopril Cough     Oxycodone Other (See Comments)     But has tolerated hydrocodone     Tramadol Other (See Comments)     Augmentin [Amoxicillin-Pot Clavulanate] Rash     Bacitracin Rash     blisters     Bupropion Rash     Allergic to brand not generic       Medications:  Current Outpatient Medications   Medication Sig     amLODIPine (NORVASC) 5 MG tablet Take 1 tablet (5 mg) by mouth daily     ASPIRIN NOT PRESCRIBED (INTENTIONAL) Please choose reason not prescribed, below     atorvastatin (LIPITOR) 40 MG tablet Take 1 tablet (40 mg) by mouth daily     enalapril (VASOTEC) 5 MG tablet Take 0.5 tablets (2.5 mg) by mouth daily      hydrochlorothiazide (HYDRODIURIL) 25 MG tablet Take 1 tablet (25 mg) by mouth daily     potassium chloride ER (K-DUR/KLOR-CON M) 20 MEQ CR tablet Take 1 tablet (20 mEq) by mouth 2 times daily     Urea 40 % CREA Externally apply topically daily     warfarin (COUMADIN) 5 MG tablet 10 mg (5 mg x 2) every MWF; 7.5 mg (5 mg x 1.5) all other days or as directed by the Anticoagulation Clinic.     No current facility-administered medications for this visit.       Vitals:  /72 (BP Location: Right arm)   Pulse 86   Wt 65.8 kg (145 lb)   BMI 23.40 kg/m      Exam:  GENERAL APPEARANCE: alert and no distress  HENT: mouth without ulcers or lesions  LYMPHATICS: no cervical or supraclavicular nodes  RESP: lungs clear to auscultation - no rales, rhonchi or wheezes  CV: regular rhythm, normal rate, no rub, no murmur  EDEMA: trace LE edema bilaterally  ABDOMEN: soft, nondistended, nontender, bowel sounds normal  MS: extremities normal - no gross deformities noted, no evidence of inflammation in joints, no muscle tenderness  SKIN: no rash    LABS:   CMP  Recent Labs   Lab Test 04/18/19  1024 04/18/19  0924 03/29/19  0755 03/22/19  1058 11/20/18  1109   NA  --  135 135 130* 135   POTASSIUM  --  4.3 4.2 3.0* 3.5   CHLORIDE  --  103 101 92* 99   CO2  --  28 27 32 27   ANIONGAP  --  4 7 6 9   GLC  --  96 119* 163* 92   BUN  --  24 31* 26 28   CR  --  1.12* 1.55* 1.19* 1.11*   GFRESTIMATED 49* 49* 33* 45* 48*   GFRESTBLACK 59* 57* 38* 53* 58*   LEXUS  --  9.3 9.3 9.2 9.4     Recent Labs   Lab Test 03/22/19  1058 03/16/18  1032   BILITOTAL 0.3 0.4   ALKPHOS 62 56   ALT 33 31   AST 22 28     CBC  Recent Labs   Lab Test 03/22/19  1058 11/20/18  1109 03/16/18  1032   HGB 12.7 14.0 13.6   WBC 10.2 10.4 7.4   RBC 4.19 4.52 4.38   HCT 36.7 41.3 39.5   MCV 88 91 90   MCH 30.3 31.0 31.1   MCHC 34.6 33.9 34.4   RDW 12.6 12.6 12.7    250 249     URINE STUDIES  Recent Labs   Lab Test 03/22/19  1115   COLOR Yellow   APPEARANCE Cloudy    URINEGLC Negative   URINEBILI Negative   URINEKETONE Negative   SG 1.010   UBLD Moderate*   URINEPH 7.0   PROTEIN 100*   UROBILINOGEN 0.2   NITRITE Positive*   LEUKEST Moderate*   RBCU 2-5*   WBCU 10-25*     No lab results found.  PTH  No lab results found.  IRON STUDIES  No lab results found.      Gregoria Mcneal MD

## 2019-05-06 NOTE — LETTER
5/6/2019       RE: Bebe Alfonso  [address] [place] [address]     Dear Colleague,    Thank you for referring your patient, Bebe Alfonso, [facility]. Please see a copy of my visit note below.    Assessment and Plan:  73 year old female with history of multiple CVAs, hypertension, smoking who presents for evaluation. Her Scr is 1-1.2 mg/dL, she has moderate renal artery stenosis, and has ?component of white coat hypertension. She is a smoker   # CKD:  Scr 1-1.2, eGFR 45-50 ml/min. Her right kidney is smaller by 2cm, thus likely has renovascular disease and may have component of renovascular hypertension    - monitor renal function closely on ACE/ ARB     - check UA and assess proteinuria   - no systemic processes suspected. No serology sent today   # Hypertension: not at goal, though she states her blood pressure at home is regularly 120-130s , including today before coming here   - not sure if she has white coat hypertension; she has a wrist cuff at home   - will order 24 hour ambulatory monitor and go from there  - goal /80, given her stroke history  - currently on enalapril 1/2 pill and amlodipine     - low threshold to restart hydrochlorothiazide if BP >140/90     - with MARQUEZ, will need to see how BP and renal function do on ACE inhibitor.    - check labs monthly for now  -check renin/ jackie  # Anemia in chronic renal disease:   - Hgb: Stable  - Iron studies: Not checked recently  # Electrolytes:   - has had low potassium on hydrochlorothiazide; she states she is holding hydrochlorothiazide per her PCP's recommendation    - would have low threshold to restart this if BP is higher than goal  - repeat labs off hydrochlorothiazide    - also had low sodium on recent labs, repeat normal- monitor  # Mineral Bone Disorder: normal calcium and phos  Check baseline PTH     Assessment and plan was discussed with patient and she voiced her  understanding and agreement.    Consult:  Bebe Alfonso was seen in consultation at the request of Diana Ely for hypertension.    Reason for Visit:  Bebe Alfonso is a 73 year old female with CKD from hypertension, renovascular disease, who presents for evaluation.    HPI:  She is a pleasant female here for evaluation of renal function and hypertension. She has had high blood pressure for many years, but she seems to indicate that it has been worse in the last couple years. Review of [facility] records show that she had been on hydrochlorothiazide and metoprolol for a while. She did not like metoprolol thus it was stopped and she is now on amlodipine. Last month , she saw her PCP and her BP was quite elevated. She was started on enalapril. She had labs thereafter which showed a SCr up to 1.55 The enalapril dose was cut down in half. She is not taking hydrochlorothiazide currently. She had sodium of 130 and K of 3.0 in March at which time it was stopped.   She has been checking blood pressure, in fact for a while she was checking multiple times a day and it is generally 120-130s  She hydrates well especially if she exercises.   She does not drink alcohol.   She smokes 1/2 ppd to 1 ppd, had quit for 3 years but unfortunately restarted. \  She is accompanied by her  who seems supportive. She has been monitoring salt intake and picked up some Mrs Delmar even.   She tries to exercise regularly, has a video that she does. She has had bilateral hip replacements.   She has cataracts and sees ophtho yearly. She is not diabetic, recent A1c was 6.1  She denies skin issues other than dry itchy skin on knee area for which she uses cortisone. She had bacteriuria on urine culture in March but it was not treated as it was not symptomatic.   She is on coumadin for her stroke history and she has an implanted loop recorder placed at [facility], not sure if she is to get it off or not.  She does not use NSAIDs, only tylenol,  given her coumadin.   Family history of breast cancer and lung cancer.   Renal History:   Hypertension  CKD    ROS:   A comprehensive review of systems was obtained and negative, except as noted in the HPI or PMH.    Active Medical Problems:  Patient Active Problem List   Diagnosis     ACP (advance care planning)     Anticoagulation monitoring, INR range 2-3     Benign essential hypertension     Carotid bruit     Cerebrovascular disease     Chronic obstructive pulmonary disease (H)     Esophageal reflux     History of CVA (cerebrovascular accident)     Major depressive disorder, recurrent episode, moderate (H)     Osteoarthrosis, hip     Pain medication agreement     Thyroid nodule     Long term current use of anticoagulant therapy     Hyperlipidemia LDL goal <70     Cerebrovascular accident (CVA) due to occlusion of precerebral artery (H)     Chronic kidney disease, stage 3 (moderate) (H)     Renal artery stenosis (H)     PMH:   Medical record was reviewed and PMH was discussed with patient and noted below.  Past Medical History:   Diagnosis Date     Anticoagulation monitoring, INR range 2-3 10/22/2015     Benign essential hypertension 9/15/2016     Carotid bruit 11/12/2012    Overview:  12/6/10 Carotid US  Elevated velocities throughout the carotid arteries bilaterally. There is a focal area of increased velocity in the mid left internal carotid artery with a plaque in this region on the color flow images. This corresponds to 50 - 70 % diameter reduction. There is an area of elevated velocity in the left external carotid artery consistent with stenosis. There is no focal area of significant stenosis in the right carotid arteries in the neck. Plaque in the carotid arteries bilaterally.      Chronic obstructive pulmonary disease (H) 1/19/2016    The FVC, FEV1 and FEV1/FVC ratio are reduced.  The inspiratory flow rates are within normal limits.  The FVC is reduced relative to the SVC indicating air trapping.  While  the TLC is within normal limits, the FRC, RV and RV/TLC ratio are increased.  The  diffusing capacity is mildly reduced. IMPRESSION: Mild-moderate Airflow Obstruction with air trapping ____________________________________________Clarke Iverson  April 2018     Esophageal reflux 4/8/2008    Overview:  Omeprazole PRN, occasional symptoms such as chest burning and knife twisted to stomach.      History of CVA (cerebrovascular accident) 10/20/2015     Long term current use of anticoagulant therapy 3/20/2018     Major depressive disorder, recurrent episode, moderate (H) 10/29/2008    Overview:  She is not taking any medication at this time.     Osteoarthrosis, hip 10/19/2010    Overview:  Pt scheduled for right  total hip arthroplasty on 6/14/13 with Dr. Addison HEREDIA hip xray (11/2012) Severe degenerative changes seen of the right hip with near bone-on-bone appearance of the joint and several subchondral cysts forming.     Thyroid nodule 5/22/2013    Overview:  Thyroid US (2012) Stable nodule left lobe of thyroid TSH- (2/19/12) normal (2.26)     PSH:   No past surgical history on file.    Family Hx:   No family history on file.  Personal Hx:   Social History     Tobacco Use     Smoking status: Current Every Day Smoker     Packs/day: 1.50     Types: Cigarettes     Start date: 2016     Smokeless tobacco: Never Used   Substance Use Topics     Alcohol use: No       Allergies:  Allergies   Allergen Reactions     Losartan Swelling, Nausea, Shortness Of Breath and Palpitations     Gabapentin GI Disturbance and Unknown     Double vision  flatulence     Lisinopril Cough     Oxycodone Other (See Comments)     But has tolerated hydrocodone     Tramadol Other (See Comments)     Augmentin [Amoxicillin-Pot Clavulanate] Rash     Bacitracin Rash     blisters     Bupropion Rash     Allergic to brand not generic       Medications:  Current Outpatient Medications   Medication Sig     amLODIPine (NORVASC) 5 MG tablet Take 1 tablet (5 mg) by  mouth daily     ASPIRIN NOT PRESCRIBED (INTENTIONAL) Please choose reason not prescribed, below     atorvastatin (LIPITOR) 40 MG tablet Take 1 tablet (40 mg) by mouth daily     enalapril (VASOTEC) 5 MG tablet Take 0.5 tablets (2.5 mg) by mouth daily     hydrochlorothiazide (HYDRODIURIL) 25 MG tablet Take 1 tablet (25 mg) by mouth daily     potassium chloride ER (K-DUR/KLOR-CON M) 20 MEQ CR tablet Take 1 tablet (20 mEq) by mouth 2 times daily     Urea 40 % CREA Externally apply topically daily     warfarin (COUMADIN) 5 MG tablet 10 mg (5 mg x 2) every MWF; 7.5 mg (5 mg x 1.5) all other days or as directed by the Anticoagulation Clinic.     No current facility-administered medications for this visit.       Vitals:  /72 (BP Location: Right arm)   Pulse 86   Wt 65.8 kg (145 lb)   BMI 23.40 kg/m       Exam:  GENERAL APPEARANCE: alert and no distress  HENT: mouth without ulcers or lesions  LYMPHATICS: no cervical or supraclavicular nodes  RESP: lungs clear to auscultation - no rales, rhonchi or wheezes  CV: regular rhythm, normal rate, no rub, no murmur  EDEMA: trace LE edema bilaterally  ABDOMEN: soft, nondistended, nontender, bowel sounds normal  MS: extremities normal - no gross deformities noted, no evidence of inflammation in joints, no muscle tenderness  SKIN: no rash    LABS:   CMP  Recent Labs   Lab Test 04/18/19  1024 04/18/19  0924 03/29/19  0755 03/22/19  1058 11/20/18  1109   NA  --  135 135 130* 135   POTASSIUM  --  4.3 4.2 3.0* 3.5   CHLORIDE  --  103 101 92* 99   CO2  --  28 27 32 27   ANIONGAP  --  4 7 6 9   GLC  --  96 119* 163* 92   BUN  --  24 31* 26 28   CR  --  1.12* 1.55* 1.19* 1.11*   GFRESTIMATED 49* 49* 33* 45* 48*   GFRESTBLACK 59* 57* 38* 53* 58*   LEXUS  --  9.3 9.3 9.2 9.4     Recent Labs   Lab Test 03/22/19  1058 03/16/18  1032   BILITOTAL 0.3 0.4   ALKPHOS 62 56   ALT 33 31   AST 22 28     CBC  Recent Labs   Lab Test 03/22/19  1058 11/20/18  1109 03/16/18  1032   HGB 12.7 14.0 13.6    WBC 10.2 10.4 7.4   RBC 4.19 4.52 4.38   HCT 36.7 41.3 39.5   MCV 88 91 90   MCH 30.3 31.0 31.1   MCHC 34.6 33.9 34.4   RDW 12.6 12.6 12.7    250 249     URINE STUDIES  Recent Labs   Lab Test 03/22/19  1115   COLOR Yellow   APPEARANCE Cloudy   URINEGLC Negative   URINEBILI Negative   URINEKETONE Negative   SG 1.010   UBLD Moderate*   URINEPH 7.0   PROTEIN 100*   UROBILINOGEN 0.2   NITRITE Positive*   LEUKEST Moderate*   RBCU 2-5*   WBCU 10-25*     No lab results found.  PTH  No lab results found.  IRON STUDIES  No lab results found.      Gregoria Mcneal MD

## 2019-05-07 ENCOUNTER — OFFICE VISIT (OUTPATIENT)
Dept: VASCULAR SURGERY | Facility: CLINIC | Age: 74
End: 2019-05-07
Attending: NURSE PRACTITIONER
Payer: COMMERCIAL

## 2019-05-07 VITALS
WEIGHT: 145 LBS | RESPIRATION RATE: 14 BRPM | BODY MASS INDEX: 23.3 KG/M2 | HEART RATE: 80 BPM | SYSTOLIC BLOOD PRESSURE: 173 MMHG | HEIGHT: 66 IN | DIASTOLIC BLOOD PRESSURE: 72 MMHG

## 2019-05-07 DIAGNOSIS — N18.30 CHRONIC KIDNEY DISEASE, STAGE III (MODERATE) (H): Primary | ICD-10-CM

## 2019-05-07 DIAGNOSIS — I70.1 RIGHT RENAL ARTERY STENOSIS (H): ICD-10-CM

## 2019-05-07 DIAGNOSIS — I10 HTN (HYPERTENSION): ICD-10-CM

## 2019-05-07 DIAGNOSIS — I63.9 CEREBROVASCULAR ACCIDENT (H): ICD-10-CM

## 2019-05-07 DIAGNOSIS — I15.0 RENOVASCULAR HYPERTENSION: ICD-10-CM

## 2019-05-07 PROCEDURE — 99203 OFFICE O/P NEW LOW 30 MIN: CPT | Performed by: SURGERY

## 2019-05-07 ASSESSMENT — MIFFLIN-ST. JEOR: SCORE: 1184.47

## 2019-05-07 NOTE — LETTER
May 7, 2019       Re: Bebe Alfonso - 1945    I had the pleasure of seeing Mrs. Alfonso in the vascular surgery clinic today.  This is a kind consultation from Diana Ely, one of our advanced providers.  Mrs. Alfonso is 73 years old.  She tells me that she has had problems with hypertension.  Currently she is on an ACE inhibitor and amlodipine.  She has recently stopped taking hydrochlorothiazide due to hypokalemia.  She also has clonidine as a as needed medication should her systolic blood pressure go above 170.     During her work-up she underwent a duplex sonography of the abdominal aorta and renal arteries. This was followed up with a CT angiogram of the abdomen and pelvis.     Past medical history is notable for hypertension, cerebrovascular disease, chronic obstructive pulmonary disease, depression, anxiety, hyperlipidemia and renal artery stenosis.     She has not had any major surgeries.     She smokes about 1-1/2 packs of cigarettes per day.     On examination: She appears comfortable and she is in no acute distress.  Blood pressure today is 173 x 72 mmHg left arm.  Heart rate is 80 bpm.  She does appear quite anxious.  She also tells me that the blood pressure is always elevated when she is visiting with her doctor.  HEENT: Head is atraumatic and normocephalic, mucosa pink.  Eyes: Extraocular motions are intact, sclerae are anicteric.  Mental: Alert and oriented x4, judgment insight are good.  Cardiac: Regular rate and rhythm, S1 plus S2 plus 2 out of 6 systolic murmur.  Chest: Clear to auscultation bilaterally.  Abdomen: Soft and nontender.  Vascular: Bilateral carotid bruits are noted.  3+ bilateral radial pulses.  Loud abdominal bruit is noted. 3+ bilateral femoral pulses.     Imaging data: Duplex sonography shows that the right kidney measures 9.2 x 3.7 x 4.1 cm.  The peak systolic velocity at the origin of the right renal artery is 220 cm/s.  The resistive indices are 0.7-0.72. On  the left side the kidney measures 11.8 x 5.8 x 5.7.  The velocity at the origin is 129 cm/s.  The resistive indices are 0.792 0.84.     This was followed up with a CT angiogram of the abdomen and pelvis.  This was reviewed by myself. The abdominal and paravesical aorta has significant atherosclerotic and calcified.  There is moderate stenosis of the right renal artery origin.  Mild on the left.  Right kidney is smaller than the left.     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     Plan: I explained the findings to the patient.  Also explained that the role of renal arterial intervention in combined ischemic nephropathy and renovascular hypertension is quite limited.  There is unlikely to be any benefit from surgical or endovascular intervention.  Aggressive medical management and risk factor modification was advised.     I would also favor addressing her severe anxiety but I will leave that to the expertise of my colleague Diana Ely.      LEX DILLARD MD

## 2019-05-07 NOTE — PROGRESS NOTES
I had the pleasure of seeing Mrs. Alfonso in the vascular surgery clinic today.  This is a kind consultation from Diana Ely, one of our advanced providers.  Mrs. Alfonso is 73 years old.  She tells me that she has had problems with hypertension.  Currently she is on an ACE inhibitor and amlodipine.  She has recently stopped taking hydrochlorothiazide due to hypokalemia.  She also has clonidine as a as needed medication should her systolic blood pressure go above 170.    During her work-up she underwent a duplex sonography of the abdominal aorta and renal arteries.  This was followed up with a CT angiogram of the abdomen and pelvis.    Past medical history is notable for hypertension, cerebrovascular disease, chronic obstructive pulmonary disease, depression, anxiety, hyperlipidemia and renal artery stenosis.    She has not had any major surgeries.    She smokes about 1-1/2 packs of cigarettes per day.    On examination: She appears comfortable and she is in no acute distress.  Blood pressure today is 173 x 72 mmHg left arm.  Heart rate is 80 bpm.  She does appear quite anxious.  She also tells me that the blood pressure is always elevated when she is visiting with her doctor.  HEENT: Head is atraumatic and normocephalic, mucosa pink.  Eyes: Extraocular motions are intact, sclerae are anicteric.  Mental: Alert and oriented x4, judgment insight are good.  Cardiac: Regular rate and rhythm, S1 plus S2 plus 2 out of 6 systolic murmur.  Chest: Clear to auscultation bilaterally.  Abdomen: Soft and nontender.  Vascular: Bilateral carotid bruits are noted.  3+ bilateral radial pulses.  Loud abdominal bruit is noted.  3+ bilateral femoral pulses.    Imaging data: Duplex sonography shows that the right kidney measures 9.2 x 3.7 x 4.1 cm.  The peak systolic velocity at the origin of the right renal artery is 220 cm/s.  The resistive indices are 0.7-0.72.  On the left side the kidney measures 11.8 x 5.8 x 5.7.  The  velocity at the origin is 129 cm/s.  The resistive indices are 0.792 0.84.    This was followed up with a CT angiogram of the abdomen and pelvis.  This was reviewed by myself.  The abdominal and paravesical aorta has significant atherosclerotic and calcified.  There is moderate stenosis of the right renal artery origin.  Mild on the left.  Right kidney is smaller than the left.    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    Plan: I explained the findings to the patient.  Also explained that the role of renal arterial intervention in combined ischemic nephropathy and renovascular hypertension is quite limited.  There is unlikely to be any benefit from surgical or endovascular intervention.  Aggressive medical management and risk factor modification was advised.    I would also favor addressing her severe anxiety but I will leave that to the expertise of my colleague Diana Ely.

## 2019-05-07 NOTE — NURSING NOTE
"Chief Complaint   Patient presents with     Consult     renal artery stenosis       Initial /72 (BP Location: Left arm, Patient Position: Chair, Cuff Size: Adult Regular)   Pulse 80   Resp 14   Ht 1.676 m (5' 6\")   Wt 65.8 kg (145 lb)   BMI 23.40 kg/m   Estimated body mass index is 23.4 kg/m  as calculated from the following:    Height as of this encounter: 1.676 m (5' 6\").    Weight as of this encounter: 65.8 kg (145 lb).  Medications and allergies reviewed.    Michelle JOHNSON CMA    "

## 2019-05-09 ENCOUNTER — ANTICOAGULATION THERAPY VISIT (OUTPATIENT)
Dept: ANTICOAGULATION | Facility: CLINIC | Age: 74
End: 2019-05-09
Payer: COMMERCIAL

## 2019-05-09 DIAGNOSIS — Z79.01 LONG TERM CURRENT USE OF ANTICOAGULANT THERAPY: ICD-10-CM

## 2019-05-09 DIAGNOSIS — I63.9 CEREBROVASCULAR ACCIDENT (H): ICD-10-CM

## 2019-05-09 DIAGNOSIS — N18.30 CHRONIC KIDNEY DISEASE, STAGE III (MODERATE) (H): ICD-10-CM

## 2019-05-09 DIAGNOSIS — Z86.73 HISTORY OF CVA (CEREBROVASCULAR ACCIDENT): ICD-10-CM

## 2019-05-09 DIAGNOSIS — I10 HTN (HYPERTENSION): ICD-10-CM

## 2019-05-09 LAB
ALBUMIN SERPL-MCNC: 3.8 G/DL (ref 3.4–5)
ALBUMIN UR-MCNC: 30 MG/DL
ANION GAP SERPL CALCULATED.3IONS-SCNC: 5 MMOL/L (ref 3–14)
APPEARANCE UR: CLEAR
BILIRUB UR QL STRIP: NEGATIVE
BUN SERPL-MCNC: 23 MG/DL (ref 7–30)
CALCIUM SERPL-MCNC: 9.5 MG/DL (ref 8.5–10.1)
CHLORIDE SERPL-SCNC: 104 MMOL/L (ref 94–109)
CO2 SERPL-SCNC: 24 MMOL/L (ref 20–32)
COLOR UR AUTO: YELLOW
CREAT SERPL-MCNC: 1.38 MG/DL (ref 0.52–1.04)
CREAT UR-MCNC: 21 MG/DL
ERYTHROCYTE [DISTWIDTH] IN BLOOD BY AUTOMATED COUNT: 13.1 % (ref 10–15)
GFR SERPL CREATININE-BSD FRML MDRD: 38 ML/MIN/{1.73_M2}
GLUCOSE SERPL-MCNC: 80 MG/DL (ref 70–99)
GLUCOSE UR STRIP-MCNC: NEGATIVE MG/DL
HCT VFR BLD AUTO: 39 % (ref 35–47)
HGB BLD-MCNC: 13 G/DL (ref 11.7–15.7)
HGB UR QL STRIP: ABNORMAL
INR POINT OF CARE: 2.7 (ref 0.86–1.14)
KETONES UR STRIP-MCNC: NEGATIVE MG/DL
LEUKOCYTE ESTERASE UR QL STRIP: NEGATIVE
MAGNESIUM SERPL-MCNC: 2.4 MG/DL (ref 1.6–2.3)
MCH RBC QN AUTO: 30.2 PG (ref 26.5–33)
MCHC RBC AUTO-ENTMCNC: 33.3 G/DL (ref 31.5–36.5)
MCV RBC AUTO: 91 FL (ref 78–100)
MICROALBUMIN UR-MCNC: 145 MG/L
MICROALBUMIN/CREAT UR: 680.75 MG/G CR (ref 0–25)
NITRATE UR QL: NEGATIVE
PH UR STRIP: 6 PH (ref 5–7)
PHOSPHATE SERPL-MCNC: 3.6 MG/DL (ref 2.5–4.5)
PLATELET # BLD AUTO: 241 10E9/L (ref 150–450)
POTASSIUM SERPL-SCNC: 4.3 MMOL/L (ref 3.4–5.3)
PROT UR-MCNC: 0.19 G/L
PROT/CREAT 24H UR: 0.87 G/G CR (ref 0–0.2)
RBC # BLD AUTO: 4.3 10E12/L (ref 3.8–5.2)
RBC #/AREA URNS AUTO: ABNORMAL /HPF
SODIUM SERPL-SCNC: 133 MMOL/L (ref 133–144)
SOURCE: ABNORMAL
SP GR UR STRIP: <=1.005 (ref 1–1.03)
URATE SERPL-MCNC: 4.3 MG/DL (ref 2.6–6)
UROBILINOGEN UR STRIP-ACNC: 0.2 EU/DL (ref 0.2–1)
WBC # BLD AUTO: 7.6 10E9/L (ref 4–11)
WBC #/AREA URNS AUTO: ABNORMAL /HPF

## 2019-05-09 PROCEDURE — 85027 COMPLETE CBC AUTOMATED: CPT | Performed by: INTERNAL MEDICINE

## 2019-05-09 PROCEDURE — 84156 ASSAY OF PROTEIN URINE: CPT | Performed by: INTERNAL MEDICINE

## 2019-05-09 PROCEDURE — 84550 ASSAY OF BLOOD/URIC ACID: CPT | Performed by: INTERNAL MEDICINE

## 2019-05-09 PROCEDURE — 82043 UR ALBUMIN QUANTITATIVE: CPT | Performed by: INTERNAL MEDICINE

## 2019-05-09 PROCEDURE — 82088 ASSAY OF ALDOSTERONE: CPT | Performed by: INTERNAL MEDICINE

## 2019-05-09 PROCEDURE — 80069 RENAL FUNCTION PANEL: CPT | Performed by: INTERNAL MEDICINE

## 2019-05-09 PROCEDURE — 81001 URINALYSIS AUTO W/SCOPE: CPT | Performed by: INTERNAL MEDICINE

## 2019-05-09 PROCEDURE — 99207 ZZC NO CHARGE NURSE ONLY: CPT

## 2019-05-09 PROCEDURE — 85610 PROTHROMBIN TIME: CPT | Mod: QW

## 2019-05-09 PROCEDURE — 36416 COLLJ CAPILLARY BLOOD SPEC: CPT

## 2019-05-09 PROCEDURE — 83970 ASSAY OF PARATHORMONE: CPT | Performed by: INTERNAL MEDICINE

## 2019-05-09 PROCEDURE — 83735 ASSAY OF MAGNESIUM: CPT | Performed by: INTERNAL MEDICINE

## 2019-05-09 RX ORDER — WARFARIN SODIUM 5 MG/1
TABLET ORAL
Qty: 155 TABLET | Refills: 0 | Status: SHIPPED | OUTPATIENT
Start: 2019-05-09 | End: 2019-07-25

## 2019-05-09 NOTE — PROGRESS NOTES
ANTICOAGULATION FOLLOW-UP CLINIC VISIT    Patient Name:  Bebe Alfonso  Date:  2019  Contact Type:  Face to Face    SUBJECTIVE:  Patient Findings     Positives:   Change in activity    Comments:   No changes in diet, medications (including over the counter), or health. Patient has not exercised the last 2 days due to abdominal discomfort from a recent exam. She also fell recently and hurt her right knee. No obvious swelling. No missed doses of warfarin. Patient took dosing as prescribed. No signs of clots or bleeding concerns. Patient will continue maintenance warfarin dosing. She requests a 4 week recheck as she wants a specific recheck time.          Clinical Outcomes     Comments:   No changes in diet, medications (including over the counter), or health. Patient has not exercised the last 2 days due to abdominal discomfort from a recent exam. She also fell recently and hurt her right knee. No obvious swelling. No missed doses of warfarin. Patient took dosing as prescribed. No signs of clots or bleeding concerns. Patient will continue maintenance warfarin dosing. She requests a 4 week recheck as she wants a specific recheck time.             OBJECTIVE    INR Protime   Date Value Ref Range Status   2019 2.7 (A) 0.86 - 1.14 Final       ASSESSMENT / PLAN  INR assessment THER    Recheck INR In: 4 WEEKS    INR Location Clinic      Anticoagulation Summary  As of 2019    INR goal:   2.0-3.0   TTR:   57.1 % (1.1 y)   INR used for dosin.7 (2019)   Warfarin maintenance plan:   10 mg (5 mg x 2) every Mon, Wed, Fri; 7.5 mg (5 mg x 1.5) all other days   Full warfarin instructions:   10 mg every Mon, Wed, Fri; 7.5 mg all other days   Weekly warfarin total:   60 mg   No change documented:   Natalie Sorto, RN   Plan last modified:   Natalie Sorto RN (2019)   Next INR check:   2019   Priority:   INR   Target end date:   Indefinite    Indications    History of CVA (cerebrovascular accident)  [Z86.73]  Long term current use of anticoagulant therapy [Z79.01]             Anticoagulation Episode Summary     INR check location:       Preferred lab:       Send INR reminders to:   [facility]    Comments:   * Pt states she has had 4 strokes (some after hip surgeries).       Anticoagulation Care Providers     Provider Role Specialty Phone number    Diana Ely APRN [facility] [phone]            See the Encounter Report to view Anticoagulation Flowsheet and Dosing Calendar (Go to Encounters tab in chart review, and find the Anticoagulation Therapy Visit)        Natalie Sorto RN

## 2019-05-10 LAB
ALDOST SERPL-MCNC: 17.2 NG/DL (ref 0–31)
PTH-INTACT SERPL-MCNC: 24 PG/ML (ref 18–80)

## 2019-05-19 DIAGNOSIS — I63.9 CEREBROVASCULAR ACCIDENT (CVA), UNSPECIFIED MECHANISM (H): ICD-10-CM

## 2019-05-19 DIAGNOSIS — I10 ESSENTIAL HYPERTENSION: ICD-10-CM

## 2019-05-19 DIAGNOSIS — N18.30 CHRONIC KIDNEY DISEASE, STAGE III (MODERATE) (H): Primary | ICD-10-CM

## 2019-05-23 ENCOUNTER — MYC REFILL (OUTPATIENT)
Dept: FAMILY MEDICINE | Facility: CLINIC | Age: 74
End: 2019-05-23

## 2019-05-23 DIAGNOSIS — I10 ESSENTIAL HYPERTENSION: ICD-10-CM

## 2019-05-23 RX ORDER — ENALAPRIL MALEATE 5 MG/1
2.5 TABLET ORAL DAILY
Qty: 45 TABLET | Refills: 0 | Status: SHIPPED | OUTPATIENT
Start: 2019-05-23 | End: 2019-08-06

## 2019-05-24 DIAGNOSIS — I10 ESSENTIAL HYPERTENSION: Primary | ICD-10-CM

## 2019-05-24 DIAGNOSIS — I10 BENIGN ESSENTIAL HYPERTENSION: ICD-10-CM

## 2019-05-24 DIAGNOSIS — R03.0 ELEVATED BLOOD PRESSURE READING WITHOUT DIAGNOSIS OF HYPERTENSION: ICD-10-CM

## 2019-05-24 NOTE — PROGRESS NOTES
Per Dr. Mcneal, 24 ABPM order placed. Patient preferring to get done at [facility]. Message sent to that pool, they will contact patient.  Katherine Grant LPN  Nephrology  [phone]

## 2019-05-30 ENCOUNTER — HOSPITAL ENCOUNTER (OUTPATIENT)
Dept: CARDIOLOGY | Facility: CLINIC | Age: 74
Discharge: HOME | End: 2019-05-30
Attending: INTERNAL MEDICINE | Admitting: INTERNAL MEDICINE
Payer: COMMERCIAL

## 2019-05-30 ENCOUNTER — HOSPITAL ENCOUNTER (OUTPATIENT)
Dept: NUTRITION | Facility: CLINIC | Age: 74
Discharge: HOME | End: 2019-05-30
Attending: NURSE PRACTITIONER | Admitting: NURSE PRACTITIONER
Payer: COMMERCIAL

## 2019-05-30 DIAGNOSIS — R03.0 ELEVATED BLOOD PRESSURE READING WITHOUT DIAGNOSIS OF HYPERTENSION: ICD-10-CM

## 2019-05-30 DIAGNOSIS — I10 BENIGN ESSENTIAL HYPERTENSION: ICD-10-CM

## 2019-05-30 PROCEDURE — 97802 MEDICAL NUTRITION INDIV IN: CPT | Performed by: DIETITIAN, REGISTERED

## 2019-05-30 PROCEDURE — 93790 AMBL BP MNTR W/SW I&R: CPT | Performed by: INTERNAL MEDICINE

## 2019-05-30 PROCEDURE — 93788 AMBL BP MNTR W/SW A/R: CPT

## 2019-05-31 NOTE — PROGRESS NOTES
"[facility]  Medical Nutrition Therapy    Visit Type: Initial Assessment    Bebe Alfonso referred by Diana Ely, APRN, CNP for MNT related to CKD stage III, HTN, and CVA    Nutrition Assessment:  Anthropometrics  Height: 5' 6\"      Weight: 65.8 kg/144.8 lb      BMI: 23.3  (healthy range)     Wt Readings from Last 5 Encounters:   05/07/19 65.8 kg (145 lb)   05/06/19 65.8 kg (145 lb)   04/18/19 65.3 kg (144 lb)   03/22/19 66.7 kg (147 lb)   11/29/18 66.4 kg (146 lb 6.4 oz)   -Per chart above, pt has been maintaining wt over past 6 months    Nutrition History  -Pt has dx pre-diabetes (A1c 6.1%)- 3/22/19  -Hx of CKD stage III, renal labs WNL: Phos 3.6, K 4.3, albumin 3.8, Ca 9.5  -GFR 38 (L), Creat 1.38 (H)  -Current daily smoker of 1.5 pk/day, trying to quit. Pt quit smoking 2 years ago but started up again. Doesn't drink alcohol.  -Pt concerned about her vitamin K intake, relating to INR. She has been monitoring her intake of foods that she believes are high in K (ex thinks salmon is high in K). She has not received nutrition education on a diabetic or kidney friendly diet. Her daughter is a nurse though, and told her not to eat whole grains. She also recently started using  Delmar, but still uses some table salt as well.     Physical Activity  -Exercises daily following a workout video for about 20 min- aerobic and weight bearing exercises      Nutrition Prescription  Energy: 7898-1693 kcal/day  (25-30 kcal/kg current BW)   Protein: 66-79 g/day  (1-1.2 g/kg current BW)    Fluid: 1238-5882 mL/day  (1 mL/kcal)     Carbohydrate: 3-5 CHO choices at meals and 1-2 CHO choices at snacks   Fiber: 21g/day  (NCM Guidelines)                    Food Record  B: 1 piece of burnt toast w/unsalted butter or eggs w/salt, water, 2-3 cups coffee  L:  Leftovers from dinner or a meat & cheese sandwich with unsalted butter, water  D: Hamburger or chicken and fried potatoes w/salt or mashed potatoes and " gravy, water  -Pt uses table salt on potatoes and eggs  -Usual dietary intake appears adequate in overall kcal and protein, inadequate in fruit and veggies, adequate in fluids, excessive in sodium     Nutrition Diagnosis:  PES: Excessive mineral intake (Na) r/t food and nutrition knowledge deficit aeb use of table salt, no prior exposure to nutrition education, dx CKD III, HTN, and pre-diabetes     Nutrition Intervention:  Educated pt on nutrition recommendations for pre-diabetes and CKD:  -Limiting portion sizes of all protein foods  -Reducing sodium intake to <2.4g/day (don't use table salt or salt sub., limit sauces, gravies, condiments, salty snacks, fried food, processed meats/cheeses, canned foods etc.)  -Reducing phosphorus intake (limit dark cola, WGs, dairy, nuts, legumes, baking mixes, ingredients containing/the word 'phos')  -Suggested using . Dash instead of table salt; pt likes Mrs. Dash   -Reducing potassium intake (limit banana, potatoes, tomatoes, oranges, prunes, melons, and other high potassium foods from list)  -Plan meals & snacks to be at the same time every day and consume consistent CHO throughout the day   -Discussed CHO counting and suggested 3-5 CHO choices at meals and 1-2 choices at snacks- provided pt w/Carb Counting Handbook  -Limit sweetened beverages and desserts, choose SF options  -Increase PA gradually with ultimate goal of 60 min/day   -Provided handout: MNT for Diabetes and CKD    Nutrition Goals:  1) Follow a Diabetic/CKD diet  2) Stop using table salt and switch to using MrsDestiny Jacobsen     Nutrition Follow Up / Monitoring:  Weight, PO intake of food and fluids, PA     Nutrition Recommendations:  Patient to follow-up with RD in 4 weeks.  Patient has RD contact information to call/email if needed.    Start Time: 11:10  End Time: 12:11    Annie Chapa RD, LD  Clinical Dietitian  [facility]: [phone]  [facility]: [phone]

## 2019-06-06 ENCOUNTER — ANTICOAGULATION THERAPY VISIT (OUTPATIENT)
Dept: ANTICOAGULATION | Facility: CLINIC | Age: 74
End: 2019-06-06
Payer: COMMERCIAL

## 2019-06-06 DIAGNOSIS — Z86.73 HISTORY OF CVA (CEREBROVASCULAR ACCIDENT): ICD-10-CM

## 2019-06-06 DIAGNOSIS — Z79.01 LONG TERM CURRENT USE OF ANTICOAGULANT THERAPY: ICD-10-CM

## 2019-06-06 LAB — INR POINT OF CARE: 2.5 (ref 0.86–1.14)

## 2019-06-06 PROCEDURE — 85610 PROTHROMBIN TIME: CPT | Mod: QW

## 2019-06-06 PROCEDURE — 36416 COLLJ CAPILLARY BLOOD SPEC: CPT

## 2019-06-06 PROCEDURE — 99207 ZZC NO CHARGE NURSE ONLY: CPT

## 2019-06-06 NOTE — PROGRESS NOTES
ANTICOAGULATION FOLLOW-UP CLINIC VISIT    Patient Name:  Bebe Alfonso  Date:  2019  Contact Type:  Face to Face    SUBJECTIVE:  Patient Findings     Positives:   Change in activity (Works out 6 days a week but may cut back.), Change in diet/appetite (Pre-DB so would like to eat healthier)    Comments:   No changes in medications, activity, health, or diet noted. No bleeding or increased bruising noted. Took warfarin as prescribed.  Patient does eat one salad a day with minimal dark greens. She would like to eat healthier due to pre-diabetes. Writer informed her she could eat more greens if she keeps it consistent.   Patient will continue weekly maintenance dose. INR is therapeutic.   Recheck in 5 weeks.   Patient verbalizes understanding and agrees to plan. No further questions or concerns.          Clinical Outcomes     Comments:   No changes in medications, activity, health, or diet noted. No bleeding or increased bruising noted. Took warfarin as prescribed.  Patient does eat one salad a day with minimal dark greens. She would like to eat healthier due to pre-diabetes. Writer informed her she could eat more greens if she keeps it consistent.   Patient will continue weekly maintenance dose. INR is therapeutic.   Recheck in 5 weeks.   Patient verbalizes understanding and agrees to plan. No further questions or concerns.             OBJECTIVE    INR Protime   Date Value Ref Range Status   2019 2.5 (A) 0.86 - 1.14 Final       ASSESSMENT / PLAN  INR assessment THER    Recheck INR In: 5 WEEKS    INR Location Clinic      Anticoagulation Summary  As of 2019    INR goal:   2.0-3.0   TTR:   59.9 % (1.2 y)   INR used for dosin.5 (2019)   Warfarin maintenance plan:   10 mg (5 mg x 2) every Mon, Wed, Fri; 7.5 mg (5 mg x 1.5) all other days   Full warfarin instructions:   10 mg every Mon, Wed, Fri; 7.5 mg all other days   Weekly warfarin total:   60 mg   No change documented:   Justin Naidu  RN   Plan last modified:   Natalie Sorto RN (4/25/2019)   Next INR check:   7/11/2019   Priority:   INR   Target end date:   Indefinite    Indications    History of CVA (cerebrovascular accident) [Z86.73]  Long term current use of anticoagulant therapy [Z79.01]             Anticoagulation Episode Summary     INR check location:       Preferred lab:       Send INR reminders to:   [facility]    Comments:   * Pt states she has had 4 strokes (some after hip surgeries).       Anticoagulation Care Providers     Provider Role Specialty Phone number    Diana Ely APRN [facility] [phone]            See the Encounter Report to view Anticoagulation Flowsheet and Dosing Calendar (Go to Encounters tab in chart review, and find the Anticoagulation Therapy Visit)        Justin Naidu RN

## 2019-06-20 ENCOUNTER — MYC REFILL (OUTPATIENT)
Dept: FAMILY MEDICINE | Facility: CLINIC | Age: 74
End: 2019-06-20

## 2019-06-20 DIAGNOSIS — E78.5 HYPERLIPIDEMIA LDL GOAL <70: ICD-10-CM

## 2019-06-20 DIAGNOSIS — E87.6 HYPOKALEMIA: ICD-10-CM

## 2019-06-20 RX ORDER — ATORVASTATIN CALCIUM 40 MG/1
40 TABLET, FILM COATED ORAL DAILY
Qty: 90 TABLET | Refills: 2 | Status: SHIPPED | OUTPATIENT
Start: 2019-06-20 | End: 2019-12-12

## 2019-06-20 RX ORDER — POTASSIUM CHLORIDE 1500 MG/1
20 TABLET, EXTENDED RELEASE ORAL 2 TIMES DAILY
Qty: 180 TABLET | Refills: 2 | Status: SHIPPED | OUTPATIENT
Start: 2019-06-20 | End: 2021-07-13

## 2019-06-20 NOTE — TELEPHONE ENCOUNTER
Prescriptions approved per [facility] Refill Protocol.    Pallavi UNDERWOOD RN

## 2019-06-20 NOTE — TELEPHONE ENCOUNTER
"Requested Prescriptions   Pending Prescriptions Disp Refills     atorvastatin (LIPITOR) 40 MG tablet 90 tablet 0     Sig: Take 1 tablet (40 mg) by mouth daily       Statins Protocol Passed - 6/20/2019  8:08 AM        Passed - LDL on file in past 12 months     Recent Labs   Lab Test 04/18/19  0924   LDL 83             Passed - No abnormal creatine kinase in past 12 months     No lab results found.             Passed - Recent (12 mo) or future (30 days) visit within the authorizing provider's specialty     Patient had office visit in the last 12 months or has a visit in the next 30 days with authorizing provider or within the authorizing provider's specialty.  See \"Patient Info\" tab in inbasket, or \"Choose Columns\" in Meds & Orders section of the refill encounter.              Passed - Medication is active on med list        Passed - Patient is age 18 or older        Passed - No active pregnancy on record        Passed - No positive pregnancy test in past 12 months        potassium chloride ER (K-DUR/KLOR-CON M) 20 MEQ CR tablet 60 tablet 2     Sig: Take 1 tablet (20 mEq) by mouth 2 times daily       Potassium Supplements Protocol Passed - 6/20/2019  8:08 AM        Passed - Recent (12 mo) or future (30 days) visit within the authorizing provider's specialty     Patient had office visit in the last 12 months or has a visit in the next 30 days with authorizing provider or within the authorizing provider's specialty.  See \"Patient Info\" tab in inbasket, or \"Choose Columns\" in Meds & Orders section of the refill encounter.              Passed - Medication is active on med list        Passed - Patient is age 18 or older        Passed - Normal serum potassium in past 12 months     Recent Labs   Lab Test 05/09/19  1129   POTASSIUM 4.3                    Atorvastatin 40 mg      Last Written Prescription Date:  3/18/19  Last Fill Quantity: 90,   # refills: 0  Last Office Visit: 4/18/19  [facility]  Future Office visit:   "     Potassium Chloride 20 meq      Last Written Prescription Date:  3/22/19  Last Fill Quantity: 60,   # refills: 2  Last Office Visit: 4/18/19  [facility]  Future Office visit:

## 2019-07-02 ENCOUNTER — OFFICE VISIT (OUTPATIENT)
Dept: FAMILY MEDICINE | Facility: CLINIC | Age: 74
End: 2019-07-02
Payer: COMMERCIAL

## 2019-07-02 VITALS
OXYGEN SATURATION: 100 % | RESPIRATION RATE: 18 BRPM | HEART RATE: 70 BPM | TEMPERATURE: 97.8 F | BODY MASS INDEX: 21.86 KG/M2 | WEIGHT: 136 LBS | SYSTOLIC BLOOD PRESSURE: 130 MMHG | DIASTOLIC BLOOD PRESSURE: 78 MMHG | HEIGHT: 66 IN

## 2019-07-02 DIAGNOSIS — J44.9 CHRONIC OBSTRUCTIVE PULMONARY DISEASE, UNSPECIFIED COPD TYPE (H): ICD-10-CM

## 2019-07-02 DIAGNOSIS — N18.30 CKD (CHRONIC KIDNEY DISEASE) STAGE 3, GFR 30-59 ML/MIN (H): Primary | ICD-10-CM

## 2019-07-02 DIAGNOSIS — I10 HYPERTENSION, UNSPECIFIED TYPE: ICD-10-CM

## 2019-07-02 DIAGNOSIS — B96.89 ACUTE BACTERIAL RHINOSINUSITIS: ICD-10-CM

## 2019-07-02 DIAGNOSIS — I63.9 CEREBROVASCULAR ACCIDENT (CVA), UNSPECIFIED MECHANISM (H): ICD-10-CM

## 2019-07-02 DIAGNOSIS — Z23 NEED FOR VACCINATION: ICD-10-CM

## 2019-07-02 DIAGNOSIS — J01.90 ACUTE BACTERIAL RHINOSINUSITIS: ICD-10-CM

## 2019-07-02 LAB
ALBUMIN SERPL-MCNC: 3.9 G/DL (ref 3.4–5)
ANION GAP SERPL CALCULATED.3IONS-SCNC: 5 MMOL/L (ref 3–14)
BUN SERPL-MCNC: 21 MG/DL (ref 7–30)
CALCIUM SERPL-MCNC: 9.5 MG/DL (ref 8.5–10.1)
CHLORIDE SERPL-SCNC: 102 MMOL/L (ref 94–109)
CO2 SERPL-SCNC: 26 MMOL/L (ref 20–32)
CREAT SERPL-MCNC: 1.39 MG/DL (ref 0.52–1.04)
GFR SERPL CREATININE-BSD FRML MDRD: 38 ML/MIN/{1.73_M2}
GLUCOSE SERPL-MCNC: 86 MG/DL (ref 70–99)
PHOSPHATE SERPL-MCNC: 3.2 MG/DL (ref 2.5–4.5)
POTASSIUM SERPL-SCNC: 4.3 MMOL/L (ref 3.4–5.3)
SODIUM SERPL-SCNC: 133 MMOL/L (ref 133–144)

## 2019-07-02 PROCEDURE — 99000 SPECIMEN HANDLING OFFICE-LAB: CPT | Performed by: NURSE PRACTITIONER

## 2019-07-02 PROCEDURE — 36415 COLL VENOUS BLD VENIPUNCTURE: CPT | Performed by: NURSE PRACTITIONER

## 2019-07-02 PROCEDURE — 99214 OFFICE O/P EST MOD 30 MIN: CPT | Mod: 25 | Performed by: NURSE PRACTITIONER

## 2019-07-02 PROCEDURE — 90670 PCV13 VACCINE IM: CPT | Performed by: NURSE PRACTITIONER

## 2019-07-02 PROCEDURE — 80069 RENAL FUNCTION PANEL: CPT | Performed by: NURSE PRACTITIONER

## 2019-07-02 PROCEDURE — 84244 ASSAY OF RENIN: CPT | Mod: 90 | Performed by: NURSE PRACTITIONER

## 2019-07-02 PROCEDURE — G0009 ADMIN PNEUMOCOCCAL VACCINE: HCPCS | Performed by: NURSE PRACTITIONER

## 2019-07-02 RX ORDER — HYDROCODONE BITARTRATE AND ACETAMINOPHEN 5; 325 MG/1; MG/1
1 TABLET ORAL EVERY 6 HOURS PRN
COMMUNITY
End: 2021-07-13

## 2019-07-02 RX ORDER — DOXYCYCLINE 100 MG/1
100 TABLET ORAL 2 TIMES DAILY
Qty: 20 TABLET | Refills: 0 | Status: SHIPPED | OUTPATIENT
Start: 2019-07-02 | End: 2019-12-12

## 2019-07-02 ASSESSMENT — MIFFLIN-ST. JEOR: SCORE: 1143.64

## 2019-07-02 NOTE — NURSING NOTE
Screening Questionnaire for Adult Immunization    Are you sick today?   No   Do you have allergies to medications, food, a vaccine component or latex?   No   Have you ever had a serious reaction after receiving a vaccination?   No   Do you have a long-term health problem with heart disease, lung disease, asthma, kidney disease, metabolic disease (e.g. diabetes), anemia, or other blood disorder?   No   Do you have cancer, leukemia, HIV/AIDS, or any other immune system problem?   No   In the past 3 months, have you taken medications that affect  your immune system, such as prednisone, other steroids, or anticancer drugs; drugs for the treatment of rheumatoid arthritis, Crohn s disease, or psoriasis; or have you had radiation treatments?   No   Have you had a seizure, or a brain or other nervous system problem?   No   During the past year, have you received a transfusion of blood or blood     products, or been given immune (gamma) globulin or antiviral drug?   No   For women: Are you pregnant or is there a chance you could become        pregnant during the next month?   No   Have you received any vaccinations in the past 4 weeks?   No     Immunization questionnaire answers were all negative.         injection of Prevnar given by Diana Jauregui. Patient instructed to remain in clinic for 15 minutes afterwards, and to report any adverse reaction to me immediately.       Screening performed by Diana Jauregui on 7/2/2019 at 10:49 AM.

## 2019-07-02 NOTE — NURSING NOTE
"Chief Complaint   Patient presents with     Sinus Problem     /78 (Cuff Size: Adult Regular)   Pulse 70   Temp 97.8  F (36.6  C) (Tympanic)   Resp 18   Ht 1.676 m (5' 6\")   Wt 61.7 kg (136 lb)   SpO2 100%   Breastfeeding? No   BMI 21.95 kg/m   Estimated body mass index is 21.95 kg/m  as calculated from the following:    Height as of this encounter: 1.676 m (5' 6\").    Weight as of this encounter: 61.7 kg (136 lb).  bp completed using cuff size: regular      Health Maintenance that is potentially due pending provider review:    Health Maintenance Due   Topic Date Due     DEXA  1945     COPD ACTION PLAN  1945     PNEUMOCOCCAL IMMUNIZATION 65+ LOW/MEDIUM RISK (1 of 2 - PCV13) 11/12/2011     ZOSTER IMMUNIZATION (2 of 3) 04/21/2014     ADVANCE CARE PLANNING  06/15/2018     DTAP/TDAP/TD IMMUNIZATION (2 - Td) 04/20/2019        n/a        "

## 2019-07-02 NOTE — PROGRESS NOTES
Subjective     Bebe Alfonso is a 73 year old female who presents to clinic today for the following health issues:    HPI   RESPIRATORY/SINUS SYMPTOMS      Duration: re-occurring since February     Description  nasal congestion, rhinorrhea, facial pain/pressure, ear pain left, headache and fatigue/malaise    Severity: moderate    Accompanying signs and symptoms: Will get pain in the left side of her face at times. Pain will start in nasal and go up behind the left eye and into the ear.     History (predisposing factors):  COPD and tobacco abuse    Precipitating or alleviating factors: Dizziness- was exercising everyday- but legs felt like noodles so had to cut back- didn't help much so she's stopped doing her video right now    Therapies tried and outcome:  rest and fluids, tylenol     Balance is off. cva x 4   Working on dietary changes.   BP Readings from Last 3 Encounters:   07/02/19 130/78   05/07/19 173/72   05/06/19 150/72       Still smoking. Not ready to quit yet.  Not taking the diuretic.     PHQ-9 SCORE 3/16/2018 9/7/2018 3/22/2019   PHQ-9 Total Score 0 2 0         -------------------------------------    Patient Active Problem List   Diagnosis     ACP (advance care planning)     Anticoagulation monitoring, INR range 2-3     Benign essential hypertension     Carotid bruit     Cerebrovascular disease     Chronic obstructive pulmonary disease (H)     Esophageal reflux     History of CVA (cerebrovascular accident)     Major depressive disorder, recurrent episode, moderate (H)     Osteoarthrosis, hip     Pain medication agreement     Thyroid nodule     Long term current use of anticoagulant therapy     Hyperlipidemia LDL goal <70     Cerebrovascular accident (CVA) due to occlusion of precerebral artery (H)     Chronic kidney disease, stage 3 (moderate) (H)     Renal artery stenosis (H)     No past surgical history on file.    Social History     Tobacco Use     Smoking status: Current Every Day Smoker      "Packs/day: 1.50     Types: Cigarettes     Start date: 2016     Smokeless tobacco: Never Used   Substance Use Topics     Alcohol use: No     No family history on file.        -------------------------------------  Reviewed and updated as needed this visit by Provider         Review of Systems    ROS: 10 point ROS neg other than the symptoms noted above in the HPI.        Objective    /78 (Cuff Size: Adult Regular)   Pulse 70   Temp 97.8  F (36.6  C) (Tympanic)   Resp 18   Ht 1.676 m (5' 6\")   Wt 61.7 kg (136 lb)   SpO2 100%   Breastfeeding? No   BMI 21.95 kg/m    Body mass index is 21.95 kg/m .  Physical Exam   HEENT: Head is atraumatic and normocephalic, mucosa pink.  Turbinates red and engorged. Pain left maxillary sinus with infection   Eyes: Extraocular motions are intact, sclerae are anicteric.  Mental: Alert and oriented x4, judgment insight are good.  Cardiac: Regular rate and rhythm, S1 plus S2 plus 2 out of 6 systolic murmur.  Chest: Clear to auscultation bilaterally.  Abdomen: Soft and nontender.  Vascular: Bilateral carotid bruits are noted.  3+ bilateral radial pulses.  Loud abdominal bruit is noted.  3+ bilateral femoral pulses.    Diagnostic Test Results:  Results for orders placed or performed in visit on 07/02/19   Renal panel (Alb, BUN, Ca, Cl, CO2, Creat, Gluc, Phos, K, Na)   Result Value Ref Range    Sodium 133 133 - 144 mmol/L    Potassium 4.3 3.4 - 5.3 mmol/L    Chloride 102 94 - 109 mmol/L    Carbon Dioxide 26 20 - 32 mmol/L    Anion Gap 5 3 - 14 mmol/L    Glucose 86 70 - 99 mg/dL    Urea Nitrogen 21 7 - 30 mg/dL    Creatinine 1.39 (H) 0.52 - 1.04 mg/dL    GFR Estimate 38 (L) >60 mL/min/[1.73_m2]    GFR Estimate If Black 44 (L) >60 mL/min/[1.73_m2]    Calcium 9.5 8.5 - 10.1 mg/dL    Phosphorus 3.2 2.5 - 4.5 mg/dL    Albumin 3.9 3.4 - 5.0 g/dL           Assessment & Plan       ICD-10-CM    1. CKD (chronic kidney disease) stage 3, GFR 30-59 ml/min (H) N18.3 Renal panel (Alb, BUN, Ca, " Cl, CO2, Creat, Gluc, Phos, K, Na)   2. Acute bacterial rhinosinusitis J01.90 doxycycline monohydrate (ADOXA) 100 MG tablet    B96.89    3. Chronic kidney disease, stage III (moderate) (H) N18.3 Renin activity     ADMIN 1st VACCINE   4. HTN (hypertension) I10 Renin activity   5. Cerebrovascular accident (H) I63.9 Renin activity   6. Need for vaccination Z23 ADMIN 1st VACCINE     Labs today  Begin doxy bid x 10 days,]  Continue to monitor renal function  Stop smoking  Follow up with vascular and nephrology as planned.     Tobacco Cessation:   reports that she has been smoking cigarettes.  She started smoking about 3 years ago. She has been smoking about 1.50 packs per day. She has never used smokeless tobacco.  Tobacco Cessation Action Plan: Self help information given to patient        3 months.  Call or return to the clinic with any worsening of symptoms or no resolution. Patient/Parent verbalized understanding and is in agreement. Medication side effects reviewed.   Current Outpatient Medications   Medication Sig Dispense Refill     amLODIPine (NORVASC) 5 MG tablet Take 1 tablet (5 mg) by mouth daily 90 tablet 0     ASPIRIN NOT PRESCRIBED (INTENTIONAL) Please choose reason not prescribed, below       atorvastatin (LIPITOR) 40 MG tablet Take 1 tablet (40 mg) by mouth daily 90 tablet 2     doxycycline monohydrate (ADOXA) 100 MG tablet Take 1 tablet (100 mg) by mouth 2 times daily for 10 days 20 tablet 0     enalapril (VASOTEC) 5 MG tablet Take 0.5 tablets (2.5 mg) by mouth daily 45 tablet 0     HYDROcodone-acetaminophen (NORCO) 5-325 MG tablet Take 1 tablet by mouth every 6 hours as needed for severe pain       potassium chloride ER (K-DUR/KLOR-CON M) 20 MEQ CR tablet Take 1 tablet (20 mEq) by mouth 2 times daily 180 tablet 2     Urea 40 % CREA Externally apply topically daily 85 g 11     warfarin (COUMADIN) 5 MG tablet 10 mg (5 mg x 2) every MWF; 7.5 mg (5 mg x 1.5) all other days or as directed by the  Anticoagulation Clinic. 155 tablet 0       See Patient Instructions    Return in about 3 months (around 10/2/2019).    SELWYN Dennis [facility]

## 2019-07-03 LAB — RENIN PLAS-CCNC: 9.1 NG/ML/HR

## 2019-07-09 ENCOUNTER — TELEPHONE (OUTPATIENT)
Dept: FAMILY MEDICINE | Facility: CLINIC | Age: 74
End: 2019-07-09

## 2019-07-09 DIAGNOSIS — E87.6 HYPOKALEMIA: Primary | ICD-10-CM

## 2019-07-09 NOTE — TELEPHONE ENCOUNTER
She is in the normal range now. No diuretic. Is she taking the 20 meq twice a day now ? She could try dietary potassium for the next two weeks and check a potassium level and see if she is able to maintain her potassium level with just diet.   Thanks Diana Ely [facility]

## 2019-07-09 NOTE — TELEPHONE ENCOUNTER
Potassium   Date Value Ref Range Status   07/02/2019 4.3 3.4 - 5.3 mmol/L Final     I talked with Bebe and she is asking if can stop taking the potassium pills and eat fruit every day.  She tells me she eats strawberries, peaches, tomatoes and melons.    I told her probably not enough in the fruit to keep potassium stable, but wanted me to ask Diana    She takes K-Dur 20 mEq BID.  Humaira Bryan RN

## 2019-07-09 NOTE — TELEPHONE ENCOUNTER
Reason for call:  Patient reporting a symptom    Symptom or request: Pt is calling regarding Potassium. Pt has questions regarding Potassium dose and eat fruit to keep potassium where it is.    Phone Number patient can be reached at:  Home number on file [phone] (home)    Best Time:  Any Time      Can we leave a detailed message on this number:  YES    Call taken on 7/9/2019 at 11:38 AM by Jessica Hoffmann

## 2019-07-10 NOTE — TELEPHONE ENCOUNTER
Pt notified. She is taking 20 meq of potassium bid. Advised on potassium rich foods. She will check lab in 2 weeks. Lab ordered. Zuly Chacon RN

## 2019-07-11 ENCOUNTER — ANTICOAGULATION THERAPY VISIT (OUTPATIENT)
Dept: ANTICOAGULATION | Facility: CLINIC | Age: 74
End: 2019-07-11
Payer: COMMERCIAL

## 2019-07-11 DIAGNOSIS — Z86.73 HISTORY OF CVA (CEREBROVASCULAR ACCIDENT): ICD-10-CM

## 2019-07-11 DIAGNOSIS — Z79.01 LONG TERM CURRENT USE OF ANTICOAGULANT THERAPY: ICD-10-CM

## 2019-07-11 LAB — INR POINT OF CARE: 3.2 (ref 0.86–1.14)

## 2019-07-11 PROCEDURE — 85610 PROTHROMBIN TIME: CPT | Mod: QW

## 2019-07-11 PROCEDURE — 99207 ZZC NO CHARGE NURSE ONLY: CPT

## 2019-07-11 PROCEDURE — 36416 COLLJ CAPILLARY BLOOD SPEC: CPT

## 2019-07-11 NOTE — PATIENT INSTRUCTIONS
Increase your dark greens until you are back to your usual activity level. Continue current dosing of warfarin.

## 2019-07-11 NOTE — PROGRESS NOTES
ANTICOAGULATION FOLLOW-UP CLINIC VISIT    Patient Name:  Bebe Alfonso  Date:  7/11/2019  Contact Type:  Face to Face    SUBJECTIVE:  Patient Findings     Positives:   Change in activity (not working out for about a month), Change in medications (hydrochlorithiazide and potassium supplement on hold. Pt on doxy for bacterial rhinosinusitis. )    Comments:   Patient states she has 2 days left of her doxy for nasal congestion/pressure. She states this helped at first with her symptoms but as soon as she went back on her diuretic and potassium, she became dehydrated and the pressure returned. Writer informed patient that according to last notes in Epic, she is supposed to be holding her hydrochlorothiazide and potassium, eating potassium rich foods and rechecking labs in 2 weeks. Patient was confused and not aware of these instructions.     She will start this hold now, increase her potassium in diet and recheck labs on 7-25-19 prior to next ACC visit. She will also increase her greens until she resumes working out, which has been on hold for the last month or so.     The doxy and lack of activity may be causing a temporary increase in INR. Will increase greens and recheck INR in 2 weeks. No other changes or concerns per patient.         Clinical Outcomes     Comments:   Patient states she has 2 days left of her doxy for nasal congestion/pressure. She states this helped at first with her symptoms but as soon as she went back on her diuretic and potassium, she became dehydrated and the pressure returned. Writer informed patient that according to last notes in Epic, she is supposed to be holding her hydrochlorothiazide and potassium, eating potassium rich foods and rechecking labs in 2 weeks. Patient was confused and not aware of these instructions.     She will start this hold now, increase her potassium in diet and recheck labs on 7-25-19 prior to next ACC visit. She will also increase her greens until she resumes  working out, which has been on hold for the last month or so.     The doxy and lack of activity may be causing a temporary increase in INR. Will increase greens and recheck INR in 2 weeks. No other changes or concerns per patient.            OBJECTIVE    INR Protime   Date Value Ref Range Status   07/11/2019 3.2 (A) 0.86 - 1.14 Final       ASSESSMENT / PLAN  INR assessment SUPRA    Recheck INR In: 2 WEEKS    INR Location Clinic      Anticoagulation Summary  As of 7/11/2019    INR goal:   2.0-3.0   TTR:   60.8 % (1.3 y)   INR used for dosing:   3.2! (7/11/2019)   Warfarin maintenance plan:   10 mg (5 mg x 2) every Mon, Wed, Fri; 7.5 mg (5 mg x 1.5) all other days   Full warfarin instructions:   10 mg every Mon, Wed, Fri; 7.5 mg all other days   Weekly warfarin total:   60 mg   No change documented:   Natalie Sorto RN   Plan last modified:   Natalie Sorto RN (4/25/2019)   Next INR check:   7/25/2019   Priority:   INR   Target end date:   Indefinite    Indications    History of CVA (cerebrovascular accident) [Z86.73]  Long term current use of anticoagulant therapy [Z79.01]             Anticoagulation Episode Summary     INR check location:       Preferred lab:       Send INR reminders to:   [facility]    Comments:   * Pt states she has had 4 strokes (some after hip surgeries).       Anticoagulation Care Providers     Provider Role Specialty Phone number    Diana Ely APRN [facility] [phone]            See the Encounter Report to view Anticoagulation Flowsheet and Dosing Calendar (Go to Encounters tab in chart review, and find the Anticoagulation Therapy Visit)        Natalie Sorto RN

## 2019-07-16 ENCOUNTER — HOSPITAL ENCOUNTER (OUTPATIENT)
Dept: BONE DENSITY | Facility: CLINIC | Age: 74
Discharge: HOME | End: 2019-07-16
Attending: NURSE PRACTITIONER | Admitting: NURSE PRACTITIONER
Payer: COMMERCIAL

## 2019-07-16 DIAGNOSIS — Z78.0 MENOPAUSE: ICD-10-CM

## 2019-07-16 PROCEDURE — 77080 DXA BONE DENSITY AXIAL: CPT

## 2019-07-25 ENCOUNTER — ANTICOAGULATION THERAPY VISIT (OUTPATIENT)
Dept: ANTICOAGULATION | Facility: CLINIC | Age: 74
End: 2019-07-25
Payer: COMMERCIAL

## 2019-07-25 DIAGNOSIS — Z79.01 LONG TERM CURRENT USE OF ANTICOAGULANT THERAPY: ICD-10-CM

## 2019-07-25 DIAGNOSIS — Z86.73 HISTORY OF CVA (CEREBROVASCULAR ACCIDENT): ICD-10-CM

## 2019-07-25 DIAGNOSIS — E87.6 HYPOKALEMIA: ICD-10-CM

## 2019-07-25 LAB
INR POINT OF CARE: 2.5 (ref 0.86–1.14)
POTASSIUM SERPL-SCNC: 4.1 MMOL/L (ref 3.4–5.3)

## 2019-07-25 PROCEDURE — 99207 ZZC NO CHARGE NURSE ONLY: CPT

## 2019-07-25 PROCEDURE — 84132 ASSAY OF SERUM POTASSIUM: CPT | Performed by: NURSE PRACTITIONER

## 2019-07-25 PROCEDURE — 36416 COLLJ CAPILLARY BLOOD SPEC: CPT

## 2019-07-25 PROCEDURE — 85610 PROTHROMBIN TIME: CPT | Mod: QW

## 2019-07-25 RX ORDER — WARFARIN SODIUM 5 MG/1
TABLET ORAL
Qty: 155 TABLET | Refills: 0
Start: 2019-07-25 | End: 2019-09-03

## 2019-07-25 NOTE — PROGRESS NOTES
ANTICOAGULATION FOLLOW-UP CLINIC VISIT    Patient Name:  Bebe Alfonso  Date:  2019  Contact Type:  Face to Face    SUBJECTIVE:  Patient Findings     Positives:   Missed doses    Comments:   Patient states she has been eating greens since last ACC visit. She is walking on her property but still not working out. This has not changed.  She is no longer on doxy but still has some nasal pressure. This sounds like it is really only bad when the weather is humid. INR was elevated at last visit and is in range today, although patient missed a dose of warfarin 3 days ago. INR likely would have been high if she had not missed that larger dose. She also had a large bruise on her upper thigh that was getting bigger until she missed her dose. It improved this week. Patient denies any other bleeding or bruising. Will decrease weekly dose by 4.2% and recheck INR in 2 weeks.         Clinical Outcomes     Comments:   Patient states she has been eating greens since last ACC visit. She is walking on her property but still not working out. This has not changed.  She is no longer on doxy but still has some nasal pressure. This sounds like it is really only bad when the weather is humid. INR was elevated at last visit and is in range today, although patient missed a dose of warfarin 3 days ago. INR likely would have been high if she had not missed that larger dose. She also had a large bruise on her upper thigh that was getting bigger until she missed her dose. It improved this week. Patient denies any other bleeding or bruising. Will decrease weekly dose by 4.2% and recheck INR in 2 weeks.            OBJECTIVE    INR Protime   Date Value Ref Range Status   2019 2.5 (A) 0.86 - 1.14 Final       ASSESSMENT / PLAN  INR assessment THER    Recheck INR In: 2 WEEKS    INR Location Clinic      Anticoagulation Summary  As of 2019    INR goal:   2.0-3.0   TTR:   61.1 % (1.3 y)   INR used for dosin.5 (2019)   Warfarin  maintenance plan:   10 mg (5 mg x 2) every Mon, Thu; 7.5 mg (5 mg x 1.5) all other days   Full warfarin instructions:   7/25: 7.5 mg; Otherwise 10 mg every Mon, Thu; 7.5 mg all other days   Weekly warfarin total:   57.5 mg   Plan last modified:   Natalie Sorto RN (7/25/2019)   Next INR check:   8/8/2019   Priority:   INR   Target end date:   Indefinite    Indications    History of CVA (cerebrovascular accident) [Z86.73]  Long term current use of anticoagulant therapy [Z79.01]             Anticoagulation Episode Summary     INR check location:       Preferred lab:       Send INR reminders to:   [facility]    Comments:   * Pt states she has had 4 strokes (some after hip surgeries).       Anticoagulation Care Providers     Provider Role Specialty Phone number    Diana Ely APRN [facility] [phone]            See the Encounter Report to view Anticoagulation Flowsheet and Dosing Calendar (Go to Encounters tab in chart review, and find the Anticoagulation Therapy Visit)        Natalie Sorto RN

## 2019-08-06 ENCOUNTER — MYC REFILL (OUTPATIENT)
Dept: FAMILY MEDICINE | Facility: CLINIC | Age: 74
End: 2019-08-06

## 2019-08-06 DIAGNOSIS — J44.1 COPD EXACERBATION (H): ICD-10-CM

## 2019-08-06 DIAGNOSIS — I10 ESSENTIAL HYPERTENSION: ICD-10-CM

## 2019-08-06 RX ORDER — ENALAPRIL MALEATE 5 MG/1
2.5 TABLET ORAL DAILY
Qty: 45 TABLET | Refills: 0 | Status: SHIPPED | OUTPATIENT
Start: 2019-08-06 | End: 2019-11-17

## 2019-08-06 RX ORDER — AMLODIPINE BESYLATE 5 MG/1
5 TABLET ORAL DAILY
Qty: 90 TABLET | Refills: 0 | Status: SHIPPED | OUTPATIENT
Start: 2019-08-06 | End: 2019-11-17

## 2019-08-06 NOTE — TELEPHONE ENCOUNTER
BP Readings from Last 6 Encounters:   07/02/19 130/78   05/07/19 173/72   05/06/19 150/72   04/18/19 188/80   04/04/19 164/84   03/22/19 182/68

## 2019-08-08 ENCOUNTER — ANTICOAGULATION THERAPY VISIT (OUTPATIENT)
Dept: ANTICOAGULATION | Facility: CLINIC | Age: 74
End: 2019-08-08
Payer: COMMERCIAL

## 2019-08-08 DIAGNOSIS — Z79.01 LONG TERM CURRENT USE OF ANTICOAGULANT THERAPY: ICD-10-CM

## 2019-08-08 DIAGNOSIS — Z86.73 HISTORY OF CVA (CEREBROVASCULAR ACCIDENT): ICD-10-CM

## 2019-08-08 LAB — INR POINT OF CARE: 2.3 (ref 0.86–1.14)

## 2019-08-08 PROCEDURE — 85610 PROTHROMBIN TIME: CPT | Mod: QW

## 2019-08-08 PROCEDURE — 36416 COLLJ CAPILLARY BLOOD SPEC: CPT

## 2019-08-08 PROCEDURE — 99207 ZZC NO CHARGE NURSE ONLY: CPT

## 2019-08-08 NOTE — PROGRESS NOTES
ANTICOAGULATION FOLLOW-UP CLINIC VISIT    Patient Name:  Bebe Alfonso  Date:  2019  Contact Type:  Face to Face    SUBJECTIVE:  Patient Findings     Positives:   Change in medications (taking wellbutrin for 6 days now)    Comments:   Patient is trying to quit smoking. She was up to 1.5 ppd and wants to cut back. She recently started the wellbutrin prescribed to her in Nov to help with this. Per lexicomp, no interaction with wellbutrin and warfarin but cutting back on her smoking may increase her INR eventually. Will continue current maintenance dose and recheck in 3 weeks. No other changes or concerns.         Clinical Outcomes     Comments:   Patient is trying to quit smoking. She was up to 1.5 ppd and wants to cut back. She recently started the wellbutrin prescribed to her in Nov to help with this. Per lexicomp, no interaction with wellbutrin and warfarin but cutting back on her smoking may increase her INR eventually. Will continue current maintenance dose and recheck in 3 weeks. No other changes or concerns.            OBJECTIVE    INR Protime   Date Value Ref Range Status   2019 2.3 (A) 0.86 - 1.14 Final       ASSESSMENT / PLAN  INR assessment THER    Recheck INR In: 3 WEEKS    INR Location Clinic      Anticoagulation Summary  As of 2019    INR goal:   2.0-3.0   TTR:   62.2 % (1.3 y)   INR used for dosin.3 (2019)   Warfarin maintenance plan:   10 mg (5 mg x 2) every Mon, Thu; 7.5 mg (5 mg x 1.5) all other days   Full warfarin instructions:   10 mg every Mon, Thu; 7.5 mg all other days   Weekly warfarin total:   57.5 mg   No change documented:   Natalie Sorto RN   Plan last modified:   Natalie Sorto RN (2019)   Next INR check:   2019   Priority:   INR   Target end date:   Indefinite    Indications    History of CVA (cerebrovascular accident) [Z86.73]  Long term current use of anticoagulant therapy [Z79.01]             Anticoagulation Episode Summary     INR check  location:       Preferred lab:       Send INR reminders to:   [facility]    Comments:   * Pt states she has had 4 strokes (some after hip surgeries).       Anticoagulation Care Providers     Provider Role Specialty Phone number    Diana Ely APRN [facility] [phone]            See the Encounter Report to view Anticoagulation Flowsheet and Dosing Calendar (Go to Encounters tab in chart review, and find the Anticoagulation Therapy Visit)        Natalie Sorto RN

## 2019-08-29 ENCOUNTER — ANTICOAGULATION THERAPY VISIT (OUTPATIENT)
Dept: ANTICOAGULATION | Facility: CLINIC | Age: 74
End: 2019-08-29
Payer: COMMERCIAL

## 2019-08-29 DIAGNOSIS — Z86.73 HISTORY OF CVA (CEREBROVASCULAR ACCIDENT): ICD-10-CM

## 2019-08-29 DIAGNOSIS — Z79.01 LONG TERM CURRENT USE OF ANTICOAGULANT THERAPY: ICD-10-CM

## 2019-08-29 LAB — INR POINT OF CARE: 3.1 (ref 0.86–1.14)

## 2019-08-29 PROCEDURE — 85610 PROTHROMBIN TIME: CPT | Mod: QW

## 2019-08-29 PROCEDURE — 36416 COLLJ CAPILLARY BLOOD SPEC: CPT

## 2019-08-29 PROCEDURE — 99207 ZZC NO CHARGE NURSE ONLY: CPT

## 2019-08-29 NOTE — PROGRESS NOTES
ANTICOAGULATION FOLLOW-UP CLINIC VISIT    Patient Name:  Bbee Alfonso  Date:  8/29/2019  Contact Type:  Face to Face    SUBJECTIVE:  Patient Findings     Comments:   Patient took wellbutrin to help with smoking cessation for only 4 days. She developed hives from this so stopped taking it. She is currently smoking about 1/2 ppd (occasionally she smokes 3/4 ppd). No other changes or concerns. Patient may need a decrease in maintenance dose due to smoking changes. Will recheck INR in 2 weeks to further evaluate. She has hx of multiple strokes so do not want to decrease dosing prematurely. Last INR was in range.         Clinical Outcomes     Comments:   Patient took wellbutrin to help with smoking cessation for only 4 days. She developed hives from this so stopped taking it. She is currently smoking about 1/2 ppd (occasionally she smokes 3/4 ppd). No other changes or concerns. Patient may need a decrease in maintenance dose due to smoking changes. Will recheck INR in 2 weeks to further evaluate. She has hx of multiple strokes so do not want to decrease dosing prematurely. Last INR was in range.            OBJECTIVE    INR Protime   Date Value Ref Range Status   08/29/2019 3.1 (A) 0.86 - 1.14 Final       ASSESSMENT / PLAN  INR assessment SUPRA    Recheck INR In: 3 WEEKS    INR Location Clinic      Anticoagulation Summary  As of 8/29/2019    INR goal:   2.0-3.0   TTR:   63.3 % (1.4 y)   INR used for dosing:   3.1! (8/29/2019)   Warfarin maintenance plan:   10 mg (5 mg x 2) every Mon, Thu; 7.5 mg (5 mg x 1.5) all other days   Full warfarin instructions:   10 mg every Mon, Thu; 7.5 mg all other days   Weekly warfarin total:   57.5 mg   No change documented:   Natalie Sorto RN   Plan last modified:   Natalie Sorto RN (7/25/2019)   Next INR check:   9/19/2019   Priority:   INR   Target end date:   Indefinite    Indications    History of CVA (cerebrovascular accident) [Z86.73]  Long term current use of anticoagulant  therapy [Z79.01]             Anticoagulation Episode Summary     INR check location:       Preferred lab:       Send INR reminders to:   [facility]    Comments:   * Pt states she has had 4 strokes (some after hip surgeries).       Anticoagulation Care Providers     Provider Role Specialty Phone number    Diana Ely APRN [facility] [phone]            See the Encounter Report to view Anticoagulation Flowsheet and Dosing Calendar (Go to Encounters tab in chart review, and find the Anticoagulation Therapy Visit)        Natalie Sorto RN

## 2019-09-03 DIAGNOSIS — Z79.01 LONG TERM CURRENT USE OF ANTICOAGULANT THERAPY: ICD-10-CM

## 2019-09-03 DIAGNOSIS — Z86.73 HISTORY OF CVA (CEREBROVASCULAR ACCIDENT): ICD-10-CM

## 2019-09-03 RX ORDER — WARFARIN SODIUM 5 MG/1
TABLET ORAL
Qty: 145 TABLET | Refills: 0 | Status: SHIPPED | OUTPATIENT
Start: 2019-09-03 | End: 2019-11-14

## 2019-09-19 ENCOUNTER — ANTICOAGULATION THERAPY VISIT (OUTPATIENT)
Dept: ANTICOAGULATION | Facility: CLINIC | Age: 74
End: 2019-09-19
Payer: COMMERCIAL

## 2019-09-19 DIAGNOSIS — Z86.73 HISTORY OF CVA (CEREBROVASCULAR ACCIDENT): ICD-10-CM

## 2019-09-19 DIAGNOSIS — Z79.01 LONG TERM CURRENT USE OF ANTICOAGULANT THERAPY: ICD-10-CM

## 2019-09-19 LAB — INR POINT OF CARE: 2.2 (ref 0.86–1.14)

## 2019-09-19 PROCEDURE — 99207 ZZC NO CHARGE NURSE ONLY: CPT

## 2019-09-19 PROCEDURE — 85610 PROTHROMBIN TIME: CPT | Mod: QW

## 2019-09-19 PROCEDURE — 36416 COLLJ CAPILLARY BLOOD SPEC: CPT

## 2019-09-19 NOTE — PROGRESS NOTES
ANTICOAGULATION FOLLOW-UP CLINIC VISIT    Patient Name:  Bebe Alfonso  Date:  2019  Contact Type:  Face to Face    SUBJECTIVE:  Patient Findings     Positives:   Change in diet/appetite (eating more greens, salads with kale, etc.)    Comments:   No changes in activity level, medications (including over the counter), or health. No missed doses of warfarin. Patient took dosing as prescribed. No signs of clots or bleeding concerns. Patient will continue maintenance warfarin dosing.          Clinical Outcomes     Comments:   No changes in activity level, medications (including over the counter), or health. No missed doses of warfarin. Patient took dosing as prescribed. No signs of clots or bleeding concerns. Patient will continue maintenance warfarin dosing.             OBJECTIVE    INR Protime   Date Value Ref Range Status   2019 2.2 (A) 0.86 - 1.14 Final       ASSESSMENT / PLAN  INR assessment THER    Recheck INR In: 4 WEEKS    INR Location Clinic      Anticoagulation Summary  As of 2019    INR goal:   2.0-3.0   TTR:   64.3 % (1.5 y)   INR used for dosin.2 (2019)   Warfarin maintenance plan:   10 mg (5 mg x 2) every Mon, Thu; 7.5 mg (5 mg x 1.5) all other days   Full warfarin instructions:   10 mg every Mon, Thu; 7.5 mg all other days   Weekly warfarin total:   57.5 mg   No change documented:   Natalie Sorto RN   Plan last modified:   Natalie Sorto RN (2019)   Next INR check:   10/17/2019   Priority:   INR   Target end date:   Indefinite    Indications    History of CVA (cerebrovascular accident) [Z86.73]  Long term current use of anticoagulant therapy [Z79.01]             Anticoagulation Episode Summary     INR check location:       Preferred lab:       Send INR reminders to:   [facility]    Comments:   * Pt states she has had 4 strokes (some after hip surgeries).       Anticoagulation Care Providers     Provider Role Specialty Phone number    Diana Ely  SELWYN [facility] [phone]            See the Encounter Report to view Anticoagulation Flowsheet and Dosing Calendar (Go to Encounters tab in chart review, and find the Anticoagulation Therapy Visit)        Natalie Sorto RN

## 2019-10-17 ENCOUNTER — ANTICOAGULATION THERAPY VISIT (OUTPATIENT)
Dept: ANTICOAGULATION | Facility: CLINIC | Age: 74
End: 2019-10-17
Payer: COMMERCIAL

## 2019-10-17 DIAGNOSIS — Z86.73 HISTORY OF CVA (CEREBROVASCULAR ACCIDENT): ICD-10-CM

## 2019-10-17 DIAGNOSIS — Z79.01 LONG TERM CURRENT USE OF ANTICOAGULANT THERAPY: ICD-10-CM

## 2019-10-17 LAB — INR POINT OF CARE: 2.9 (ref 0.86–1.14)

## 2019-10-17 PROCEDURE — 36416 COLLJ CAPILLARY BLOOD SPEC: CPT

## 2019-10-17 PROCEDURE — 85610 PROTHROMBIN TIME: CPT | Mod: QW

## 2019-10-17 PROCEDURE — 99207 ZZC NO CHARGE NURSE ONLY: CPT

## 2019-10-17 NOTE — PROGRESS NOTES
ANTICOAGULATION FOLLOW-UP CLINIC VISIT    Patient Name:  Bebe Alfonso  Date:  10/17/2019  Contact Type:  Face to Face    SUBJECTIVE:  Patient Findings     Comments:   No changes in medications, activity, or diet noted. No concerns with clotting, bleeding, or increased bruising noted. Took warfarin as prescribed.  Patient is to continue maintenance warfarin plan, and check INR in 4 weeks.  Patient verbalizes understanding and agrees to plan. No further questions or concerns.        Clinical Outcomes     Negatives:   Major bleeding event, Thromboembolic event, Anticoagulation-related hospital admission, Anticoagulation-related ED visit, Anticoagulation-related fatality    Comments:   No changes in medications, activity, or diet noted. No concerns with clotting, bleeding, or increased bruising noted. Took warfarin as prescribed.  Patient is to continue maintenance warfarin plan, and check INR in 4 weeks.  Patient verbalizes understanding and agrees to plan. No further questions or concerns.           OBJECTIVE    INR Protime   Date Value Ref Range Status   10/17/2019 2.9 (A) 0.86 - 1.14 Final       ASSESSMENT / PLAN  INR assessment THER    Recheck INR In: 4 WEEKS    INR Location Clinic      Anticoagulation Summary  As of 10/17/2019    INR goal:   2.0-3.0   TTR:   66.1 % (1.5 y)   INR used for dosin.9 (10/17/2019)   Warfarin maintenance plan:   10 mg (5 mg x 2) every Mon, Thu; 7.5 mg (5 mg x 1.5) all other days   Full warfarin instructions:   10 mg every Mon, Thu; 7.5 mg all other days   Weekly warfarin total:   57.5 mg   No change documented:   Deneen Pretty RN   Plan last modified:   Natalie Sorto RN (2019)   Next INR check:   2019   Priority:   INR   Target end date:   Indefinite    Indications    History of CVA (cerebrovascular accident) [Z86.73]  Long term current use of anticoagulant therapy [Z79.01]             Anticoagulation Episode Summary     INR check location:       Preferred lab:        Send INR reminders to:   [facility]    Comments:   * Pt states she has had 4 strokes (some after hip surgeries).       Anticoagulation Care Providers     Provider Role Specialty Phone number    Diana Ely APRN [facility] [phone]            See the Encounter Report to view Anticoagulation Flowsheet and Dosing Calendar (Go to Encounters tab in chart review, and find the Anticoagulation Therapy Visit)        Deneen Pretty RN

## 2019-11-06 ENCOUNTER — HEALTH MAINTENANCE LETTER (OUTPATIENT)
Age: 74
End: 2019-11-06

## 2019-11-14 ENCOUNTER — ANTICOAGULATION THERAPY VISIT (OUTPATIENT)
Dept: ANTICOAGULATION | Facility: CLINIC | Age: 74
End: 2019-11-14
Payer: COMMERCIAL

## 2019-11-14 DIAGNOSIS — Z79.01 LONG TERM CURRENT USE OF ANTICOAGULANT THERAPY: ICD-10-CM

## 2019-11-14 DIAGNOSIS — Z86.73 HISTORY OF CVA (CEREBROVASCULAR ACCIDENT): ICD-10-CM

## 2019-11-14 LAB — INR POINT OF CARE: 2.6 (ref 0.86–1.14)

## 2019-11-14 PROCEDURE — 99207 ZZC NO CHARGE NURSE ONLY: CPT

## 2019-11-14 PROCEDURE — 36416 COLLJ CAPILLARY BLOOD SPEC: CPT

## 2019-11-14 PROCEDURE — 85610 PROTHROMBIN TIME: CPT | Mod: QW

## 2019-11-14 RX ORDER — WARFARIN SODIUM 5 MG/1
TABLET ORAL
Qty: 145 TABLET | Refills: 0 | Status: SHIPPED | OUTPATIENT
Start: 2019-11-14 | End: 2019-12-12

## 2019-11-14 NOTE — PROGRESS NOTES
ANTICOAGULATION FOLLOW-UP CLINIC VISIT    Patient Name:  Bebe Alfonso  Date:  2019  Contact Type:  Face to Face    SUBJECTIVE:  Patient Findings     Comments:   No changes in diet, activity level, medications (including over the counter), or health. No missed doses of warfarin. Patient took dosing as prescribed. No signs of clots or bleeding concerns. Patient will continue maintenance warfarin dosing.          Clinical Outcomes     Negatives:   Major bleeding event, Thromboembolic event, Anticoagulation-related hospital admission, Anticoagulation-related ED visit, Anticoagulation-related fatality    Comments:   No changes in diet, activity level, medications (including over the counter), or health. No missed doses of warfarin. Patient took dosing as prescribed. No signs of clots or bleeding concerns. Patient will continue maintenance warfarin dosing.             OBJECTIVE    INR Protime   Date Value Ref Range Status   2019 2.6 (A) 0.86 - 1.14 Final       ASSESSMENT / PLAN  INR assessment THER    Recheck INR In: 4 WEEKS    INR Location Clinic      Anticoagulation Summary  As of 2019    INR goal:   2.0-3.0   TTR:   67.7 % (1.6 y)   INR used for dosin.6 (2019)   Warfarin maintenance plan:   10 mg (5 mg x 2) every Mon, Thu; 7.5 mg (5 mg x 1.5) all other days   Full warfarin instructions:   10 mg every Mon, Thu; 7.5 mg all other days   Weekly warfarin total:   57.5 mg   No change documented:   Natalie Sorto RN   Plan last modified:   Natalie Sorto RN (2019)   Next INR check:   2019   Priority:   INR   Target end date:   Indefinite    Indications    History of CVA (cerebrovascular accident) [Z86.73]  Long term current use of anticoagulant therapy [Z79.01]             Anticoagulation Episode Summary     INR check location:       Preferred lab:       Send INR reminders to:   [facility]    Comments:   * Pt states she has had 4 strokes (some after hip surgeries).        Anticoagulation Care Providers     Provider Role Specialty Phone number    Diana Ely APRN CNP Responsible Family Practice [phone]            See the Encounter Report to view Anticoagulation Flowsheet and Dosing Calendar (Go to Encounters tab in chart review, and find the Anticoagulation Therapy Visit)        Natalie Sorto RN

## 2019-12-12 ENCOUNTER — OFFICE VISIT (OUTPATIENT)
Dept: FAMILY MEDICINE | Facility: CLINIC | Age: 74
End: 2019-12-12
Payer: COMMERCIAL

## 2019-12-12 ENCOUNTER — ANTICOAGULATION THERAPY VISIT (OUTPATIENT)
Dept: ANTICOAGULATION | Facility: CLINIC | Age: 74
End: 2019-12-12
Payer: COMMERCIAL

## 2019-12-12 VITALS
BODY MASS INDEX: 21.05 KG/M2 | DIASTOLIC BLOOD PRESSURE: 70 MMHG | OXYGEN SATURATION: 99 % | WEIGHT: 131 LBS | SYSTOLIC BLOOD PRESSURE: 132 MMHG | HEART RATE: 90 BPM | HEIGHT: 66 IN | RESPIRATION RATE: 14 BRPM | TEMPERATURE: 96.9 F

## 2019-12-12 DIAGNOSIS — Z23 NEED FOR PROPHYLACTIC VACCINATION AND INOCULATION AGAINST INFLUENZA: Primary | ICD-10-CM

## 2019-12-12 DIAGNOSIS — Z86.73 HISTORY OF CVA (CEREBROVASCULAR ACCIDENT): ICD-10-CM

## 2019-12-12 DIAGNOSIS — Z79.01 LONG TERM CURRENT USE OF ANTICOAGULANT THERAPY: ICD-10-CM

## 2019-12-12 DIAGNOSIS — N18.30 CKD (CHRONIC KIDNEY DISEASE) STAGE 3, GFR 30-59 ML/MIN (H): ICD-10-CM

## 2019-12-12 DIAGNOSIS — J44.1 COPD EXACERBATION (H): ICD-10-CM

## 2019-12-12 DIAGNOSIS — E78.5 HYPERLIPIDEMIA LDL GOAL <70: ICD-10-CM

## 2019-12-12 DIAGNOSIS — I10 ESSENTIAL HYPERTENSION: ICD-10-CM

## 2019-12-12 LAB
ALBUMIN SERPL-MCNC: 3.6 G/DL (ref 3.4–5)
ANION GAP SERPL CALCULATED.3IONS-SCNC: 5 MMOL/L (ref 3–14)
BUN SERPL-MCNC: 25 MG/DL (ref 7–30)
CALCIUM SERPL-MCNC: 9.2 MG/DL (ref 8.5–10.1)
CHLORIDE SERPL-SCNC: 104 MMOL/L (ref 94–109)
CO2 SERPL-SCNC: 28 MMOL/L (ref 20–32)
CREAT SERPL-MCNC: 1.48 MG/DL (ref 0.52–1.04)
ERYTHROCYTE [DISTWIDTH] IN BLOOD BY AUTOMATED COUNT: 12.6 % (ref 10–15)
GFR SERPL CREATININE-BSD FRML MDRD: 35 ML/MIN/{1.73_M2}
GLUCOSE SERPL-MCNC: 94 MG/DL (ref 70–99)
HCT VFR BLD AUTO: 37.3 % (ref 35–47)
HGB BLD-MCNC: 12.4 G/DL (ref 11.7–15.7)
INR POINT OF CARE: 1.8 (ref 0.86–1.14)
MCH RBC QN AUTO: 30.2 PG (ref 26.5–33)
MCHC RBC AUTO-ENTMCNC: 33.2 G/DL (ref 31.5–36.5)
MCV RBC AUTO: 91 FL (ref 78–100)
PHOSPHATE SERPL-MCNC: 3.4 MG/DL (ref 2.5–4.5)
PLATELET # BLD AUTO: 302 10E9/L (ref 150–450)
POTASSIUM SERPL-SCNC: 4.4 MMOL/L (ref 3.4–5.3)
RBC # BLD AUTO: 4.1 10E12/L (ref 3.8–5.2)
SODIUM SERPL-SCNC: 137 MMOL/L (ref 133–144)
WBC # BLD AUTO: 10.1 10E9/L (ref 4–11)

## 2019-12-12 PROCEDURE — 99214 OFFICE O/P EST MOD 30 MIN: CPT | Mod: 25 | Performed by: NURSE PRACTITIONER

## 2019-12-12 PROCEDURE — 90662 IIV NO PRSV INCREASED AG IM: CPT | Performed by: NURSE PRACTITIONER

## 2019-12-12 PROCEDURE — G0008 ADMIN INFLUENZA VIRUS VAC: HCPCS | Performed by: NURSE PRACTITIONER

## 2019-12-12 PROCEDURE — 80069 RENAL FUNCTION PANEL: CPT | Performed by: NURSE PRACTITIONER

## 2019-12-12 PROCEDURE — 85610 PROTHROMBIN TIME: CPT | Mod: QW

## 2019-12-12 PROCEDURE — 99207 ZZC NO CHARGE NURSE ONLY: CPT

## 2019-12-12 PROCEDURE — 85027 COMPLETE CBC AUTOMATED: CPT | Performed by: NURSE PRACTITIONER

## 2019-12-12 PROCEDURE — 36416 COLLJ CAPILLARY BLOOD SPEC: CPT

## 2019-12-12 PROCEDURE — 99207 C PAF COMPLETED  NO CHARGE: CPT | Performed by: NURSE PRACTITIONER

## 2019-12-12 RX ORDER — WARFARIN SODIUM 5 MG/1
TABLET ORAL
Qty: 145 TABLET | Refills: 0 | Status: SHIPPED | OUTPATIENT
Start: 2019-12-12 | End: 2020-01-23

## 2019-12-12 RX ORDER — ENALAPRIL MALEATE 5 MG/1
2.5 TABLET ORAL DAILY
Qty: 45 TABLET | Refills: 1 | Status: SHIPPED | OUTPATIENT
Start: 2019-12-12 | End: 2020-08-07

## 2019-12-12 RX ORDER — ATORVASTATIN CALCIUM 40 MG/1
40 TABLET, FILM COATED ORAL DAILY
Qty: 90 TABLET | Refills: 2 | Status: SHIPPED | OUTPATIENT
Start: 2019-12-12 | End: 2020-12-16

## 2019-12-12 RX ORDER — AMLODIPINE BESYLATE 5 MG/1
5 TABLET ORAL DAILY
Qty: 90 TABLET | Refills: 1 | Status: SHIPPED | OUTPATIENT
Start: 2019-12-12 | End: 2020-09-08

## 2019-12-12 ASSESSMENT — PATIENT HEALTH QUESTIONNAIRE - PHQ9: SUM OF ALL RESPONSES TO PHQ QUESTIONS 1-9: 0

## 2019-12-12 ASSESSMENT — MIFFLIN-ST. JEOR: SCORE: 1115.96

## 2019-12-12 NOTE — PROGRESS NOTES
Subjective     Bebe Alfonso is a 74 year old female who presents to clinic today for the following health issues:    HPI   Hypertension Follow-up      Do you check your blood pressure regularly outside of the clinic? No     Are you following a low salt diet? No    Are your blood pressures ever more than 140 on the top number (systolic) OR more   than 90 on the bottom number (diastolic), for example 140/90? No    Anticoagulation therapy. Medication refills needed.  Head cold improving    -------------------------------------    Patient Active Problem List   Diagnosis     ACP (advance care planning)     Anticoagulation monitoring, INR range 2-3     Benign essential hypertension     Carotid bruit     Cerebrovascular disease     Chronic obstructive pulmonary disease (H)     Esophageal reflux     History of CVA (cerebrovascular accident)     Major depressive disorder, recurrent episode, moderate (H)     Osteoarthrosis, hip     Pain medication agreement     Thyroid nodule     Long term current use of anticoagulant therapy     Hyperlipidemia LDL goal <70     Cerebrovascular accident (CVA) due to occlusion of precerebral artery (H)     Chronic kidney disease, stage 3 (moderate) (H)     Renal artery stenosis (H)     Past Surgical History:   Procedure Laterality Date     BIOPSY  appox in 1980's     BREAST SURGERY  last time in the 1990's    non cancer       Social History     Tobacco Use     Smoking status: Current Every Day Smoker     Packs/day: 1.00     Years: 50.00     Pack years: 50.00     Types: Cigarettes     Start date: 2016     Smokeless tobacco: Never Used   Substance Use Topics     Alcohol use: No     Family History   Problem Relation Age of Onset     Diabetes Maternal Grandfather      Diabetes Sister         developed after cancer treatment     Breast Cancer Sister      Obesity Sister      Hypertension Mother      Hyperlipidemia Mother      Osteoporosis Mother      Breast Cancer Sister      Osteoporosis Sister   "    Breast Cancer Daughter      Other Cancer Sister         throught out body     Substance Abuse Sister         acol     Obesity Sister      Substance Abuse Sister         acol     Obesity Sister            -------------------------------------  Reviewed and updated as needed this visit by Provider         Review of Systems   ROS COMP: Constitutional, HEENT, cardiovascular, pulmonary, GI, , musculoskeletal, neuro, skin, endocrine and psych systems are negative, except as otherwise noted.      Objective    /70   Pulse 90   Temp 96.9  F (36.1  C) (Tympanic)   Resp 14   Ht 1.676 m (5' 6\")   Wt 59.4 kg (131 lb)   SpO2 99%   BMI 21.14 kg/m    Body mass index is 21.14 kg/m .  Physical Exam   GENERAL: healthy, alert and no distress  EYES: Eyes grossly normal to inspection, PERRL and conjunctivae and sclerae normal  HENT: ear canals and TM's normal, nose and mouth without ulcers or lesions  HENT: normal cephalic/atraumatic, ear canals and TM's normal, nasal mucosa edematous , rhinorrhea clear, oropharynx clear and oral mucous membranes moist  NECK: no adenopathy, no asymmetry, masses, or scars and thyroid normal to palpation  RESP: lungs clear to auscultation - no rales, rhonchi or wheezes  CV: regular rate and rhythm, normal S1 S2, no S3 or S4, no murmur, click or rub, no peripheral edema and peripheral pulses strong  ABDOMEN: soft, nontender, no hepatosplenomegaly, no masses and bowel sounds normal  MS: no gross musculoskeletal defects noted, no edema  SKIN: no suspicious lesions or rashes  NEURO: mentation intact and speech abnormal  PSYCH: mentation appears normal, affect normal/bright    Diagnostic Test Results:  Labs reviewed in Epic  Results for orders placed or performed in visit on 12/12/19   Renal panel (Alb, BUN, Ca, Cl, CO2, Creat, Gluc, Phos, K, Na)     Status: Abnormal   Result Value Ref Range    Sodium 137 133 - 144 mmol/L    Potassium 4.4 3.4 - 5.3 mmol/L    Chloride 104 94 - 109 mmol/L    " Carbon Dioxide 28 20 - 32 mmol/L    Anion Gap 5 3 - 14 mmol/L    Glucose 94 70 - 99 mg/dL    Urea Nitrogen 25 7 - 30 mg/dL    Creatinine 1.48 (H) 0.52 - 1.04 mg/dL    GFR Estimate 35 (L) >60 mL/min/[1.73_m2]    GFR Estimate If Black 40 (L) >60 mL/min/[1.73_m2]    Calcium 9.2 8.5 - 10.1 mg/dL    Phosphorus 3.4 2.5 - 4.5 mg/dL    Albumin 3.6 3.4 - 5.0 g/dL   CBC with platelets     Status: None   Result Value Ref Range    WBC 10.1 4.0 - 11.0 10e9/L    RBC Count 4.10 3.8 - 5.2 10e12/L    Hemoglobin 12.4 11.7 - 15.7 g/dL    Hematocrit 37.3 35.0 - 47.0 %    MCV 91 78 - 100 fl    MCH 30.2 26.5 - 33.0 pg    MCHC 33.2 31.5 - 36.5 g/dL    RDW 12.6 10.0 - 15.0 %    Platelet Count 302 150 - 450 10e9/L   Results for orders placed or performed in visit on 12/12/19   INR point of care     Status: Abnormal   Result Value Ref Range    INR Protime 1.8 (A) 0.86 - 1.14           Assessment & Plan     Bebe was seen today for hypertension and imm/inj.    Diagnoses and all orders for this visit:    Need for prophylactic vaccination and inoculation against influenza  -     INFLUENZA (HIGH DOSE) 3 VALENT VACCINE [98800]  -     Vaccine Administration, Initial [43379]    Long term current use of anticoagulant therapy  -     warfarin ANTICOAGULANT (COUMADIN) 5 MG tablet; 10 mg (5 mg x 2) every Mon/Thurs; 7.5 mg (5 mg x 1.5) all other days or as directed by the Anticoagulation Clinic.    History of CVA (cerebrovascular accident)  -     warfarin ANTICOAGULANT (COUMADIN) 5 MG tablet; 10 mg (5 mg x 2) every Mon/Thurs; 7.5 mg (5 mg x 1.5) all other days or as directed by the Anticoagulation Clinic.    Essential hypertension  -     enalapril (VASOTEC) 5 MG tablet; Take 0.5 tablets (2.5 mg) by mouth daily  -     Renal panel (Alb, BUN, Ca, Cl, CO2, Creat, Gluc, Phos, K, Na)  -     CBC with platelets    Hyperlipidemia LDL goal <70  -     atorvastatin (LIPITOR) 40 MG tablet; Take 1 tablet (40 mg) by mouth daily    COPD exacerbation (H)  -      amLODIPine (NORVASC) 5 MG tablet; Take 1 tablet (5 mg) by mouth daily    CKD (chronic kidney disease) stage 3, GFR 30-59 ml/min (H)  -     Renal panel (Alb, BUN, Ca, Cl, CO2, Creat, Gluc, Phos, K, Na)  -     CBC with platelets    Other orders  -     PAF COMPLETED    Symptomatic care strategies for URI reviewed  Smoking cessation strongly encouraged  Continue anticoagulation indefinitely  Atorvastatin 40 mg daily  Amlodipine 5 mg daily  Labs due.  Flu vaccine given   Follow-up with neurology as planned  Follow-up with pulmonology was chronic ongoing COPD symptomology  Low-fat diet recommended.  Renal diet recommended.      Call or return to the clinic with any worsening of symptoms or no resolution. Patient/Parent verbalized understanding and is in agreement. Medication side effects reviewed.   Current Outpatient Medications   Medication Sig Dispense Refill     amLODIPine (NORVASC) 5 MG tablet Take 1 tablet (5 mg) by mouth daily 90 tablet 1     ASPIRIN NOT PRESCRIBED (INTENTIONAL) Please choose reason not prescribed, below       atorvastatin (LIPITOR) 40 MG tablet Take 1 tablet (40 mg) by mouth daily 90 tablet 2     enalapril (VASOTEC) 5 MG tablet Take 0.5 tablets (2.5 mg) by mouth daily 45 tablet 1     HYDROcodone-acetaminophen (NORCO) 5-325 MG tablet Take 1 tablet by mouth every 6 hours as needed for severe pain       Urea 40 % CREA Externally apply topically daily 85 g 11     warfarin ANTICOAGULANT (COUMADIN) 5 MG tablet 10 mg (5 mg x 2) every Mon/Thurs; 7.5 mg (5 mg x 1.5) all other days or as directed by the Anticoagulation Clinic. 145 tablet 0     doxycycline hyclate (VIBRA-TABS) 100 MG tablet Take 1 tablet (100 mg) by mouth 2 times daily 20 tablet 0     potassium chloride ER (K-DUR/KLOR-CON M) 20 MEQ CR tablet Take 1 tablet (20 mEq) by mouth 2 times daily (Patient not taking: Reported on 12/26/2019) 180 tablet 2     See Patient Instructions    Return in about 1 month (around 1/12/2020) for BP/PULSE  Zayda.    SELWYN Dennis [facility]

## 2019-12-12 NOTE — PROGRESS NOTES
ANTICOAGULATION FOLLOW-UP CLINIC VISIT    Patient Name:  Bebe Alfonso  Date:  2019  Contact Type:  Face to Face    SUBJECTIVE:  Patient Findings     Positives:   Missed doses    Comments:   No changes in diet, activity level, medications (including over the counter), or health. Patient did miss her dose last night but took it already this morning. Her INR will likely recover in the next day or so. She will take her usual dose today right before bed. No signs of clots or bleeding concerns. Patient will continue maintenance warfarin dosing.          Clinical Outcomes     Comments:   No changes in diet, activity level, medications (including over the counter), or health. Patient did miss her dose last night but took it already this morning. Her INR will likely recover in the next day or so. She will take her usual dose today right before bed. No signs of clots or bleeding concerns. Patient will continue maintenance warfarin dosing.             OBJECTIVE    INR Protime   Date Value Ref Range Status   2019 1.8 (A) 0.86 - 1.14 Final       ASSESSMENT / PLAN  INR assessment SUB    Recheck INR In: 2 WEEKS    INR Location Clinic      Anticoagulation Summary  As of 2019    INR goal:   2.0-3.0   TTR:   62.9 % (1 y)   INR used for dosin.8! (2019)   Warfarin maintenance plan:   10 mg (5 mg x 2) every Mon, Thu; 7.5 mg (5 mg x 1.5) all other days   Full warfarin instructions:   : 17.5 mg; Otherwise 10 mg every Mon, Thu; 7.5 mg all other days   Weekly warfarin total:   57.5 mg   Plan last modified:   Natalie Sorto RN (2019)   Next INR check:   2019   Priority:   High   Target end date:   Indefinite    Indications    History of CVA (cerebrovascular accident) [Z86.73]  Long term current use of anticoagulant therapy [Z79.01]             Anticoagulation Episode Summary     INR check location:       Preferred lab:       Send INR reminders to:   [facility]    Comments:   * Pt  states she has had 4 strokes (some after hip surgeries).       Anticoagulation Care Providers     Provider Role Specialty Phone number    Diana Ely APRN [facility] [phone]            See the Encounter Report to view Anticoagulation Flowsheet and Dosing Calendar (Go to Encounters tab in chart review, and find the Anticoagulation Therapy Visit)        Natalie Sorto RN

## 2019-12-12 NOTE — LETTER
My Depression Action Plan  Name: Bebe Alfonso   Date of Birth 1945  Date: 12/12/2019    My doctor: Diana Ely   My clinic: [address] [id number] [phone]          GREEN    ZONE   Good Control    What it looks like:     Things are going generally well. You have normal up s and down s. You may even feel depressed from time to time, but bad moods usually last less than a day.   What you need to do:  1. Continue to care for yourself (see self care plan)  2. Check your depression survival kit and update it as needed  3. Follow your physician s recommendations including any medication.  4. Do not stop taking medication unless you consult with your physician first.           YELLOW         ZONE Getting Worse    What it looks like:     Depression is starting to interfere with your life.     It may be hard to get out of bed; you may be starting to isolate yourself from others.    Symptoms of depression are starting to last most all day and this has happened for several days.     You may have suicidal thoughts but they are not constant.   What you need to do:     1. Call your care team, your response to treatment will improve if you keep your care team informed of your progress. Yellow periods are signs an adjustment may need to be made.     2. Continue your self-care, even if you have to fake it!    3. Talk to someone in your support network    4. Open up your depression survival kit           RED    ZONE Medical Alert - Get Help    What it looks like:     Depression is seriously interfering with your life.     You may experience these or other symptoms: You can t get out of bed most days, can t work or engage in other necessary activities, you have trouble taking care of basic hygiene, or basic responsibilities, thoughts of suicide or death that will not go away, self-injurious behavior.     What you need to do:  1. Call your care  team and request a same-day appointment. If they are not available (weekends or after hours) call your local crisis line, emergency room or 911.            Depression Self Care Plan / Survival Kit    Self-Care for Depression  Here s the deal. Your body and mind are really not as separate as most people think.  What you do and think affects how you feel and how you feel influences what you do and think. This means if you do things that people who feel good do, it will help you feel better.  Sometimes this is all it takes.  There is also a place for medication and therapy depending on how severe your depression is, so be sure to consult with your medical provider and/ or Behavioral Health Consultant if your symptoms are worsening or not improving.     In order to better manage my stress, I will:    Exercise  Get some form of exercise, every day. This will help reduce pain and release endorphins, the  feel good  chemicals in your brain. This is almost as good as taking antidepressants!  This is not the same as joining a gym and then never going! (they count on that by the way ) It can be as simple as just going for a walk or doing some gardening, anything that will get you moving.      Hygiene   Maintain good hygiene (Get out of bed in the morning, Make your bed, Brush your teeth, Take a shower, and Get dressed like you were going to work, even if you are unemployed).  If your clothes don't fit try to get ones that do.    Diet  I will strive to eat foods that are good for me, drink plenty of water, and avoid excessive sugar, caffeine, alcohol, and other mood-altering substances.  Some foods that are helpful in depression are: complex carbohydrates, B vitamins, flaxseed, fish or fish oil, fresh fruits and vegetables.    Psychotherapy  I agree to participate in Individual Therapy (if recommended).    Medication  If prescribed medications, I agree to take them.  Missing doses can result in serious side effects.  I  understand that drinking alcohol, or other illicit drug use, may cause potential side effects.  I will not stop my medication abruptly without first discussing it with my provider.    Staying Connected With Others  I will stay in touch with my friends, family members, and my primary care provider/team.    Use your imagination  Be creative.  We all have a creative side; it doesn t matter if it s oil painting, sand castles, or mud pies! This will also kick up the endorphins.    Witness Beauty  (AKA stop and smell the roses) Take a look outside, even in mid-winter. Notice colors, textures. Watch the squirrels and birds.     Service to others  Be of service to others.  There is always someone else in need.  By helping others we can  get out of ourselves  and remember the really important things.  This also provides opportunities for practicing all the other parts of the program.    Humor  Laugh and be silly!  Adjust your TV habits for less news and crime-drama and more comedy.    Control your stress  Try breathing deep, massage therapy, biofeedback, and meditation. Find time to relax each day.     My support system    Clinic Contact:  Phone number:    Contact 1:  Phone number:    Contact 2:  Phone number:    Anglican/:  Phone number:    Therapist:  Phone number:    Local crisis center:    Phone number:    Other community support:  Phone number:

## 2019-12-12 NOTE — PATIENT INSTRUCTIONS
Patient Education     Diet for Chronic Kidney Disease  Following a special diet when you have kidney disease can help you stay as healthy as possible. Your healthcare provider or dietitian should make a special diet plan just for you.    Eating right  Here are some good eating rules to follow:    Protein. Eating protein is important for your body. But too much protein can put a strain on your kidneys. Eating less protein may slow the progression of chronic kidney disease. Foods high in protein include meat, fish, eggs, cheese, and other dairy products. A registered dietitian can help you plan a diet that has the right amount of protein for you.    Sodium. Having too much salt in your diet can make your body hold onto (retain) water. Ask your provider or dietitian how much sodium per day you are allowed. This will help you avoid fluid buildup in your body (fluid retention). It can also help control high blood pressure. Learn to read food labels to know how much sodium is in one serving. Foods high in salt include processed meats, canned and boxed foods, sauces, salted chips and snacks, pickled foods, frozen dinners, and restaurant and fast food.    Fluids. If you have advanced kidney disease, you will need to limit the water and fluids you drink. If you don t, then too much water will build up in your body. The exact amount of fluid you can drink depends on how well your kidneys are working. Ask your provider how much water you can safely drink each day.    Potassium. In advanced kidney disease, your potassium level can go dangerously high. This affects your heart. It can cause an irregular heartbeat (arrhythmia). Ask your provider or dietitian if you should limit potassium in your diet. Foods high in potassium include dairy products (milk, yogurt, cheese), dried beans, bananas, oranges, potatoes, tomatoes, spinach, cantaloupe, honeydew melon, dried fruits, and nuts.     Calcium. Calcium is important to build  strong bones. But foods high in calcium are also high in phosphorus, which can take calcium from your bones. Limiting foods high in phosphorus will help keep calcium in your bones. Ask your provider how much calcium you should get each day.    Phosphorus. In advanced kidney disease, your phosphorus level can go dangerously high. This affects many systems in the body and can damage your heart. Limit your intake of phosphorus-rich foods. These include dried beans and peas, nuts, peanut butter, cocoa, beer, cola drinks, and dairy products.  Date Last Reviewed: 8/1/2016 2000-2018 [employer]. [address] [place] [id number]. All rights reserved. This information is not intended as a substitute for professional medical care. Always follow your healthcare professional's instructions.           Patient Education     Kidney Disease: Choosing the Right Protein for Your Body  Choosing the right type and quantity of proteins you eat is important when you have chronic kidney disease. This can affect your overall health and kidney function we well.    Choosing the right amount of protein  If you have kidney disease but are not yet on dialysis, you will most likely need a low-protein diet. This is important in slowing down the speed at which your kidneys are failing. The amount of protein you can eat daily will be calculated by the dietitian in the kidney clinic. It is based on your body weight, the degree of kidney failure, and your daily activities.  Eat your daily protein  The amount of protein that you can eat each day may change with time. Your healthcare provider determines your protein intake according to the stage of your kidney disease.  Your body weight is also a factor. If your protein intake is decreased, you may need to eat more calories from other types of food. Carbohydrates, such as bread and pasta, make good choices.    I can eat _____ grams of protein each day.    I should eat a total  of _____ calories each day to maintain my body weight and muscle mass.  Choosing the right type of protein  People with kidney failure have to limit the amount of phosphorus in the diet. Unfortunately, many proteins contain high amounts of phosphorus and may have to be taken in limited amounts. Milk products are a good example because they have a lot of phosphorus. The choices of protein may also be limited because of cultural, Jain, and social values. Vegetarian, vegan, kosher, and halal diets are all good examples of diets with limited protein choices. Dietitians in the clinic can work out the protein types and amounts to suit your needs.  Date Last Reviewed: 1/1/2017 2000-2018 [employer]. [address] [place] [address], [place] [address]. All rights reserved. This information is not intended as a substitute for professional medical care. Always follow your healthcare professional's instructions.

## 2019-12-26 ENCOUNTER — OFFICE VISIT (OUTPATIENT)
Dept: FAMILY MEDICINE | Facility: CLINIC | Age: 74
End: 2019-12-26
Payer: COMMERCIAL

## 2019-12-26 ENCOUNTER — ANTICOAGULATION THERAPY VISIT (OUTPATIENT)
Dept: ANTICOAGULATION | Facility: CLINIC | Age: 74
End: 2019-12-26
Payer: COMMERCIAL

## 2019-12-26 VITALS
OXYGEN SATURATION: 100 % | BODY MASS INDEX: 21.89 KG/M2 | HEIGHT: 66 IN | TEMPERATURE: 98.1 F | RESPIRATION RATE: 16 BRPM | HEART RATE: 72 BPM | WEIGHT: 136.2 LBS | DIASTOLIC BLOOD PRESSURE: 70 MMHG | SYSTOLIC BLOOD PRESSURE: 156 MMHG

## 2019-12-26 DIAGNOSIS — Z86.73 HISTORY OF CVA (CEREBROVASCULAR ACCIDENT): ICD-10-CM

## 2019-12-26 DIAGNOSIS — Z79.01 LONG TERM CURRENT USE OF ANTICOAGULANT THERAPY: ICD-10-CM

## 2019-12-26 DIAGNOSIS — J01.90 ACUTE SINUSITIS WITH SYMPTOMS > 10 DAYS: Primary | ICD-10-CM

## 2019-12-26 LAB — INR POINT OF CARE: 2.6 (ref 0.86–1.14)

## 2019-12-26 PROCEDURE — 85610 PROTHROMBIN TIME: CPT | Mod: QW

## 2019-12-26 PROCEDURE — 99207 ZZC NO CHARGE NURSE ONLY: CPT

## 2019-12-26 PROCEDURE — 99214 OFFICE O/P EST MOD 30 MIN: CPT | Performed by: NURSE PRACTITIONER

## 2019-12-26 PROCEDURE — 36416 COLLJ CAPILLARY BLOOD SPEC: CPT

## 2019-12-26 RX ORDER — DOXYCYCLINE HYCLATE 100 MG
100 TABLET ORAL 2 TIMES DAILY
Qty: 20 TABLET | Refills: 0 | Status: SHIPPED | OUTPATIENT
Start: 2019-12-26 | End: 2020-01-24

## 2019-12-26 ASSESSMENT — MIFFLIN-ST. JEOR: SCORE: 1139.55

## 2019-12-26 NOTE — PROGRESS NOTES
ANTICOAGULATION FOLLOW-UP CLINIC VISIT    Patient Name:  Bebe Alfonso  Date:  2019  Contact Type:  Face to Face    SUBJECTIVE:  Patient Findings     Comments:   No changes in diet, activity level, medications (including over the counter), or health. No missed doses of warfarin. Patient took dosing as prescribed. No signs of clots or bleeding concerns. Patient will continue maintenance warfarin dosing.          Clinical Outcomes     Negatives:   Major bleeding event, Thromboembolic event, Anticoagulation-related hospital admission, Anticoagulation-related ED visit, Anticoagulation-related fatality    Comments:   No changes in diet, activity level, medications (including over the counter), or health. No missed doses of warfarin. Patient took dosing as prescribed. No signs of clots or bleeding concerns. Patient will continue maintenance warfarin dosing.             OBJECTIVE    INR Protime   Date Value Ref Range Status   2019 2.6 (A) 0.86 - 1.14 Final       ASSESSMENT / PLAN  INR assessment THER    Recheck INR In: 4 WEEKS    INR Location Clinic      Anticoagulation Summary  As of 2019    INR goal:   2.0-3.0   TTR:   62.0 % (1 y)   INR used for dosin.6 (2019)   Warfarin maintenance plan:   10 mg (5 mg x 2) every Mon, Thu; 7.5 mg (5 mg x 1.5) all other days   Full warfarin instructions:   10 mg every Mon, Thu; 7.5 mg all other days   Weekly warfarin total:   57.5 mg   No change documented:   Natalie Sorto RN   Plan last modified:   Natalie Sorto RN (2019)   Next INR check:   2020   Priority:   High   Target end date:   Indefinite    Indications    History of CVA (cerebrovascular accident) [Z86.73]  Long term current use of anticoagulant therapy [Z79.01]             Anticoagulation Episode Summary     INR check location:       Preferred lab:       Send INR reminders to:   [facility]    Comments:   * Pt states she has had 4 strokes (some after hip surgeries).        Anticoagulation Care Providers     Provider Role Specialty Phone number    Diana Ely APRN CNP Responsible Family Practice [phone]            See the Encounter Report to view Anticoagulation Flowsheet and Dosing Calendar (Go to Encounters tab in chart review, and find the Anticoagulation Therapy Visit)        Natalie Sorto RN

## 2019-12-26 NOTE — PROGRESS NOTES
Subjective     Bebe Alfonso is a 74 year old female who presents to clinic today for the following health issues:    HPI   Chief Complaint   Patient presents with     Derm Problem     discoloration and flattening on left side of face     Headache     Patient has had pain through sinus cavity since hitting her head 9/23/2019     Weight Problem     Patients daughter has concerns about her mothers diet concerns.       BP Readings from Last 3 Encounters:   12/26/19 (!) 156/70   12/12/19 132/70   07/02/19 130/78       -------------------------------------    Patient Active Problem List   Diagnosis     ACP (advance care planning)     Anticoagulation monitoring, INR range 2-3     Benign essential hypertension     Carotid bruit     Cerebrovascular disease     Chronic obstructive pulmonary disease (H)     Esophageal reflux     History of CVA (cerebrovascular accident)     Major depressive disorder, recurrent episode, moderate (H)     Osteoarthrosis, hip     Pain medication agreement     Thyroid nodule     Long term current use of anticoagulant therapy     Hyperlipidemia LDL goal <70     Cerebrovascular accident (CVA) due to occlusion of precerebral artery (H)     Chronic kidney disease, stage 3 (moderate) (H)     Renal artery stenosis (H)     Past Surgical History:   Procedure Laterality Date     BIOPSY  appox in 1980's     BREAST SURGERY  last time in the 1990's    non cancer       Social History     Tobacco Use     Smoking status: Current Every Day Smoker     Packs/day: 1.00     Years: 50.00     Pack years: 50.00     Types: Cigarettes     Start date: 2016     Smokeless tobacco: Never Used   Substance Use Topics     Alcohol use: No     Family History   Problem Relation Age of Onset     Diabetes Maternal Grandfather      Diabetes Sister         developed after cancer treatment     Breast Cancer Sister      Obesity Sister      Hypertension Mother      Hyperlipidemia Mother      Osteoporosis Mother      Breast Cancer Sister  "     Osteoporosis Sister      Breast Cancer Daughter      Other Cancer Sister         throught out body     Substance Abuse Sister         acol     Obesity Sister      Substance Abuse Sister         acol     Obesity Sister            -------------------------------------  Reviewed and updated as needed this visit by Provider         Review of Systems   ROS COMP: Constitutional, HEENT, cardiovascular, pulmonary, GI, , musculoskeletal, neuro, skin, endocrine and psych systems are negative, except as otherwise noted.      Objective    BP (!) 156/70   Pulse 72   Temp 98.1  F (36.7  C) (Tympanic)   Resp 16   Ht 1.676 m (5' 6\")   Wt 61.8 kg (136 lb 3.2 oz)   SpO2 100%   BMI 21.98 kg/m    Body mass index is 21.98 kg/m .  Physical Exam   GENERAL: healthy, alert and no distress  EYES: Eyes grossly normal to inspection, PERRL and conjunctivae and sclerae normal  HENT: ear canals and TM's normal,  nasal turbinates are red boggy and engorged pain with palpation over the left frontal sinus and mouth without ulcers or lesions  NECK: no adenopathy, no asymmetry, masses, or scars and thyroid normal to palpation  RESP: rhonchi throughout  CV: regular rate and rhythm, normal S1 S2, no S3 or S4, no murmur, click or rub, no peripheral edema and peripheral pulses strong  ABDOMEN: soft, nontender, no hepatosplenomegaly, no masses and bowel sounds normal  MS: no gross musculoskeletal defects noted, no edema  SKIN: no suspicious lesions or rashes  NEURO: Expressive aphasia  unchanged from previous  PSYCH: mentation appears normal, affect normal/bright    Diagnostic Test Results:  Labs reviewed in Epic  Results for orders placed or performed in visit on 12/26/19   INR point of care     Status: Abnormal   Result Value Ref Range    INR Protime 2.6 (A) 0.86 - 1.14           Assessment & Plan     Bebe was seen today for derm problem, headache and weight problem.    Diagnoses and all orders for this visit:    Acute sinusitis with " symptoms > 10 days  -     doxycycline hyclate (VIBRA-TABS) 100 MG tablet; Take 1 tablet (100 mg) by mouth 2 times daily      Symptomatic care strategies are reviewed  Smoking cessation again strongly encouraged  Increase fluids.  Rest.  Saline nasal irrigation as needed  Begin doxycycline 100 mg twice daily for 10 days  Blood pressure elevated today.  Discussed changing/increasing medications.  Patient declined.  Should continue to monitor it at home and call if greater than 140/90 on 3 occasions    Call or return to the clinic with any worsening of symptoms or no resolution. Patient/Parent verbalized understanding and is in agreement. Medication side effects reviewed.   Current Outpatient Medications   Medication Sig Dispense Refill     amLODIPine (NORVASC) 5 MG tablet Take 1 tablet (5 mg) by mouth daily 90 tablet 1     ASPIRIN NOT PRESCRIBED (INTENTIONAL) Please choose reason not prescribed, below       atorvastatin (LIPITOR) 40 MG tablet Take 1 tablet (40 mg) by mouth daily 90 tablet 2     doxycycline hyclate (VIBRA-TABS) 100 MG tablet Take 1 tablet (100 mg) by mouth 2 times daily 20 tablet 0     enalapril (VASOTEC) 5 MG tablet Take 0.5 tablets (2.5 mg) by mouth daily 45 tablet 1     HYDROcodone-acetaminophen (NORCO) 5-325 MG tablet Take 1 tablet by mouth every 6 hours as needed for severe pain       Urea 40 % CREA Externally apply topically daily 85 g 11     warfarin ANTICOAGULANT (COUMADIN) 5 MG tablet 10 mg (5 mg x 2) every Mon/Thurs; 7.5 mg (5 mg x 1.5) all other days or as directed by the Anticoagulation Clinic. 145 tablet 0     potassium chloride ER (K-DUR/KLOR-CON M) 20 MEQ CR tablet Take 1 tablet (20 mEq) by mouth 2 times daily (Patient not taking: Reported on 12/26/2019) 180 tablet 2     Chart documentation with Dragon Voice recognition Software. Although reviewed after completion, some words and grammatical errors may remain.  See Patient Instructions    Return in about 4 weeks (around 1/23/2020) for  ENT consult with ongoing symptoms.    SELWYN Dennis [facility]

## 2019-12-26 NOTE — PATIENT INSTRUCTIONS
Patient Education     Understanding Sinus Problems    You don t often think about your sinuses until there s a problem. One day you realize you can t smell dinner cooking. Or you find you often have headaches or problems breathing through your nose.  Symptoms of sinus problems  Sinus problems can cause uncomfortable symptoms. Your nose may run constantly. You might have trouble sleeping at night. You may even lose your sense of smell. Other symptoms can include:    Nasal congestion    Fullness in ears    Green, yellow, or bloody drainage from the nose    Trouble tasting food    Frequent headaches    Facial pain    Cough  When sinuses are blocked  If something blocks the passages in the nose or sinuses, mucus can t drain. Mucus-filled sinuses often become infected.    Colds cause the lining of the nose and sinuses to swell and make extra mucus. A buildup of mucus can lead to a more serious infection.    Allergies irritate turbinates and other tissues. This causes swelling, which can cause a blockage. Over time, this irritation can also lead to sacs of swollen tissue (polyps).    Polyps may form in both the sinuses and nose. Polyps can grow large enough to clog nasal passages and block drainage.    A crooked (deviated) septum may block nasal passages. This is often the result of an injury.  Date Last Reviewed: 11/1/2016 2000-2018 [employer]. [address] [place] [address], [place] [address]. All rights reserved. This information is not intended as a substitute for professional medical care. Always follow your healthcare professional's instructions.

## 2020-01-02 ENCOUNTER — ANTICOAGULATION THERAPY VISIT (OUTPATIENT)
Dept: ANTICOAGULATION | Facility: CLINIC | Age: 75
End: 2020-01-02
Payer: COMMERCIAL

## 2020-01-02 DIAGNOSIS — Z79.01 LONG TERM CURRENT USE OF ANTICOAGULANT THERAPY: ICD-10-CM

## 2020-01-02 DIAGNOSIS — Z86.73 HISTORY OF CVA (CEREBROVASCULAR ACCIDENT): ICD-10-CM

## 2020-01-02 LAB — INR POINT OF CARE: 3.3 (ref 0.86–1.14)

## 2020-01-02 PROCEDURE — 36416 COLLJ CAPILLARY BLOOD SPEC: CPT

## 2020-01-02 PROCEDURE — 85610 PROTHROMBIN TIME: CPT | Mod: QW

## 2020-01-02 PROCEDURE — 99207 ZZC NO CHARGE NURSE ONLY: CPT

## 2020-01-02 NOTE — PROGRESS NOTES
ANTICOAGULATION FOLLOW-UP CLINIC VISIT    Patient Name:  Bebe Alfonso  Date:  1/2/2020  Contact Type:  Face to Face    SUBJECTIVE:  Patient Findings     Positives:   Change in medications (doxy until Sat for acute sinusitis)    Comments:   Patient is on doxy for 2 more days for a sinus infection. She states she is feeling better from this. INR likely elevated due to abx use. She also took her dose 9 hours earlier than usual on Tues by mistake. No other changes or bleeding concerns. Will reduce warfarin today and recheck in 3 weeks per patient request.        Clinical Outcomes     Comments:   Patient is on doxy for 2 more days for a sinus infection. She states she is feeling better from this. INR likely elevated due to abx use. She also took her dose 9 hours earlier than usual on Tues by mistake. No other changes or bleeding concerns. Will reduce warfarin today and recheck in 3 weeks per patient request.           OBJECTIVE    INR Protime   Date Value Ref Range Status   01/02/2020 3.3 (A) 0.86 - 1.14 Final       ASSESSMENT / PLAN  INR assessment SUPRA    Recheck INR In: 3 WEEKS    INR Location Clinic      Anticoagulation Summary  As of 1/2/2020    INR goal:   2.0-3.0   TTR:   61.8 % (1 y)   INR used for dosing:   3.3! (1/2/2020)   Warfarin maintenance plan:   10 mg (5 mg x 2) every Mon, Thu; 7.5 mg (5 mg x 1.5) all other days   Full warfarin instructions:   1/2: 7.5 mg; Otherwise 10 mg every Mon, Thu; 7.5 mg all other days   Weekly warfarin total:   57.5 mg   Plan last modified:   Natalie Sorto RN (7/25/2019)   Next INR check:   1/23/2020   Priority:   High   Target end date:   Indefinite    Indications    History of CVA (cerebrovascular accident) [Z86.73]  Long term current use of anticoagulant therapy [Z79.01]             Anticoagulation Episode Summary     INR check location:       Preferred lab:       Send INR reminders to:   [facility]    Comments:   * Pt states she has had 4 strokes (some after  hip surgeries).       Anticoagulation Care Providers     Provider Role Specialty Phone number    Diana Ely APRN [facility] [phone]            See the Encounter Report to view Anticoagulation Flowsheet and Dosing Calendar (Go to Encounters tab in chart review, and find the Anticoagulation Therapy Visit)        Natalie Sorto RN

## 2020-01-23 ENCOUNTER — ANTICOAGULATION THERAPY VISIT (OUTPATIENT)
Dept: ANTICOAGULATION | Facility: CLINIC | Age: 75
End: 2020-01-23
Payer: COMMERCIAL

## 2020-01-23 DIAGNOSIS — Z86.73 HISTORY OF CVA (CEREBROVASCULAR ACCIDENT): ICD-10-CM

## 2020-01-23 DIAGNOSIS — Z79.01 LONG TERM CURRENT USE OF ANTICOAGULANT THERAPY: ICD-10-CM

## 2020-01-23 LAB — INR POINT OF CARE: 3 (ref 0.86–1.14)

## 2020-01-23 PROCEDURE — 85610 PROTHROMBIN TIME: CPT | Mod: QW

## 2020-01-23 PROCEDURE — 36416 COLLJ CAPILLARY BLOOD SPEC: CPT

## 2020-01-23 PROCEDURE — 99207 ZZC NO CHARGE NURSE ONLY: CPT

## 2020-01-23 RX ORDER — WARFARIN SODIUM 5 MG/1
TABLET ORAL
Qty: 145 TABLET | Refills: 0
Start: 2020-01-23 | End: 2020-03-06

## 2020-01-23 NOTE — PROGRESS NOTES
01/24/20     Patient was placed on Doxycycline for sinus issues. Writer would request the patient get an INR next Mon or Thursday at the latest. Writer was unable to reach the patient or leave a message.    Justin Naidu RN, BSN, PHN  Anticoagulation Clinic   [phone]        ANTICOAGULATION FOLLOW-UP CLINIC VISIT    Patient Name:  Bebe Alfonso  Date:  1/23/2020  Contact Type:  Face to Face    SUBJECTIVE:  Patient Findings     Positives:   Change in health (recurring sinus symptoms), Missed doses    Comments:   Patient states her sinus infection symptoms have returned. She finished her abx and then the next day her symptoms were back. She is seeing PCP tomorrow to discuss and will call [facility] if additional antibiotics are prescribed. She missed a dose 5 days ago and INR at upper end of goal range still. She was slightly supra therapeutic at last visit. Will decrease warfarin dose by 4.3% and recheck in 2 weeks. She may need a further reduction at that time, depending on her sinus issues. Patient will continue to eat greens.         Clinical Outcomes     Comments:   Patient states her sinus infection symptoms have returned. She finished her abx and then the next day her symptoms were back. She is seeing PCP tomorrow to discuss and will call [facility] if additional antibiotics are prescribed. She missed a dose 5 days ago and INR at upper end of goal range still. She was slightly supra therapeutic at last visit. Will decrease warfarin dose by 4.3% and recheck in 2 weeks. She may need a further reduction at that time, depending on her sinus issues. Patient will continue to eat greens.            OBJECTIVE    INR Protime   Date Value Ref Range Status   01/23/2020 3.0 (A) 0.86 - 1.14 Final       ASSESSMENT / PLAN  INR assessment THER    Recheck INR In: 2 WEEKS    INR Location Clinic      Anticoagulation Summary  As of 1/23/2020    INR goal:   2.0-3.0   TTR:   61.8 % (1 y)   INR used for dosing:   3.0 (1/23/2020)   Warfarin  maintenance plan:   10 mg (5 mg x 2) every Mon; 7.5 mg (5 mg x 1.5) all other days   Full warfarin instructions:   10 mg every Mon; 7.5 mg all other days   Weekly warfarin total:   55 mg   Plan last modified:   Natalie Sorto RN (1/23/2020)   Next INR check:   2/6/2020   Priority:   High   Target end date:   Indefinite    Indications    History of CVA (cerebrovascular accident) [Z86.73]  Long term current use of anticoagulant therapy [Z79.01]             Anticoagulation Episode Summary     INR check location:       Preferred lab:       Send INR reminders to:   [facility]    Comments:   * Pt states she has had 4 strokes (some after hip surgeries).       Anticoagulation Care Providers     Provider Role Specialty Phone number    Diana Ely APRN [facility] [phone]            See the Encounter Report to view Anticoagulation Flowsheet and Dosing Calendar (Go to Encounters tab in chart review, and find the Anticoagulation Therapy Visit)        Natalie Sorto RN

## 2020-01-24 ENCOUNTER — OFFICE VISIT (OUTPATIENT)
Dept: FAMILY MEDICINE | Facility: CLINIC | Age: 75
End: 2020-01-24
Payer: COMMERCIAL

## 2020-01-24 VITALS
OXYGEN SATURATION: 99 % | DIASTOLIC BLOOD PRESSURE: 84 MMHG | TEMPERATURE: 97 F | WEIGHT: 134 LBS | HEART RATE: 74 BPM | BODY MASS INDEX: 21.53 KG/M2 | RESPIRATION RATE: 14 BRPM | HEIGHT: 66 IN | SYSTOLIC BLOOD PRESSURE: 148 MMHG

## 2020-01-24 DIAGNOSIS — G44.52 NEW DAILY PERSISTENT HEADACHE: ICD-10-CM

## 2020-01-24 DIAGNOSIS — J01.11 ACUTE RECURRENT FRONTAL SINUSITIS: ICD-10-CM

## 2020-01-24 DIAGNOSIS — Z86.73 HISTORY OF CVA (CEREBROVASCULAR ACCIDENT): Primary | ICD-10-CM

## 2020-01-24 DIAGNOSIS — J01.90 ACUTE SINUSITIS WITH SYMPTOMS > 10 DAYS: ICD-10-CM

## 2020-01-24 PROCEDURE — 99214 OFFICE O/P EST MOD 30 MIN: CPT | Performed by: NURSE PRACTITIONER

## 2020-01-24 RX ORDER — DOXYCYCLINE HYCLATE 100 MG
100 TABLET ORAL 2 TIMES DAILY
Qty: 20 TABLET | Refills: 0 | Status: SHIPPED | OUTPATIENT
Start: 2020-01-24 | End: 2020-03-12

## 2020-01-24 ASSESSMENT — PAIN SCALES - GENERAL: PAINLEVEL: NO PAIN (0)

## 2020-01-24 ASSESSMENT — MIFFLIN-ST. JEOR: SCORE: 1129.57

## 2020-01-24 NOTE — PROGRESS NOTES
Subjective     Bebe Alfonso is a 74 year old female who presents to clinic today for the following health issues:    HPI   SINUS SYMPTOMS      Duration: 1 months off and on     Treated for sinus infection.  Symptoms improved.  Returned again    Description  nasal congestion, rhinorrhea, facial pain/pressure, headache and fatigue/malaise    Severity: moderate    Accompanying signs and symptoms: None    History (predisposing factors):  Sinus infection recently     Precipitating or alleviating factors: None    Therapies tried and outcome:  rest and fluids antihistamine    On meds for one day and symptoms gone left frontal headache.     Bending over severe pain left after about a week and a half again.    Aching left frontal sinus radiates back to the temple region.    Eyes tired but no blurring or double vision.    Wears her cheaters for up close reading.    Started after trauma to her head on the left side.   Hit her head with a door back in September  On anticoagulation therapy secondary to previous CVA  Continues to smoke    -------------------------------------       has a past medical history of Anticoagulation monitoring, INR range 2-3 (10/22/2015), Benign essential hypertension (9/15/2016), Carotid bruit (11/12/2012), Chronic obstructive pulmonary disease (H) (1/19/2016), Esophageal reflux (4/8/2008), Heart disease (born with it), History of blood transfusion (70 years ago), History of CVA (cerebrovascular accident) (10/20/2015), Long term current use of anticoagulant therapy (3/20/2018), Major depressive disorder, recurrent episode, moderate (H) (10/29/2008), Osteoarthrosis, hip (10/19/2010), and Thyroid nodule (5/22/2013).    Reviewed and updated as needed this visit by Provider         Review of Systems   ROS COMP: Constitutional, HEENT, cardiovascular, pulmonary, GI, , musculoskeletal, neuro, skin, endocrine and psych systems are negative, except as otherwise noted.      Objective    BP (!) 148/84    "Pulse 74   Temp 97  F (36.1  C) (Tympanic)   Resp 14   Ht 1.676 m (5' 6\")   Wt 60.8 kg (134 lb)   SpO2 99%   BMI 21.63 kg/m    Body mass index is 21.63 kg/m .  Physical Exam   GENERAL: alert, no distress and expressive aphasia present  EYES: Eyes grossly normal to inspection, PERRL and conjunctivae and sclerae normal  HENT: ear canals and TM's normal, nose left nasal turbinate red boggy and engorged.  Pain with palpation of the left frontal sinus and mouth without ulcers or lesions  NECK: no adenopathy, no asymmetry, masses, or scars and thyroid normal to palpation  RESP: Decreased breath sounds bilaterally no wheeze on nasal flare  CV: regular rate and rhythm, normal S1 S2, no S3 or S4, no murmur, click or rub, no peripheral edema and peripheral pulses strong  ABDOMEN: soft, nontender, no hepatosplenomegaly, no masses and bowel sounds normal  MS: no gross musculoskeletal defects noted, no edema  SKIN: no suspicious lesions or rashes  NEURO: Normal strength and tone, mentation intact and speech normal  PSYCH: mentation appears normal, affect normal/bright    Diagnostic Test Results:  Labs reviewed in Epic  No results found for any visits on 01/24/20.        Assessment & Plan     Bebe was seen today for sinus problem.    Diagnoses and all orders for this visit:    History of CVA (cerebrovascular accident)  -     CT Head w/o Contrast; Future    New daily persistent headache  -     CT Head w/o Contrast; Future    Acute recurrent frontal sinusitis    Acute sinusitis with symptoms > 10 days  -     CT Sinus w/o Contrast; Future  -     doxycycline hyclate (VIBRA-TABS) 100 MG tablet; Take 1 tablet (100 mg) by mouth 2 times daily         Stop smoking.  Doxycycline 100 mg twice daily for the next 10 days  Due to previous trauma and CVA CT head  CT sinuses secondary to recurrent sinusitis symptomology  Call or return to the clinic with any worsening of symptoms or no resolution. Patient/Parent verbalized understanding " and is in agreement. Medication side effects reviewed.   Current Outpatient Medications   Medication Sig Dispense Refill     amLODIPine (NORVASC) 5 MG tablet Take 1 tablet (5 mg) by mouth daily 90 tablet 1     ASPIRIN NOT PRESCRIBED (INTENTIONAL) Please choose reason not prescribed, below       atorvastatin (LIPITOR) 40 MG tablet Take 1 tablet (40 mg) by mouth daily 90 tablet 2     doxycycline hyclate (VIBRA-TABS) 100 MG tablet Take 1 tablet (100 mg) by mouth 2 times daily 20 tablet 0     enalapril (VASOTEC) 5 MG tablet Take 0.5 tablets (2.5 mg) by mouth daily 45 tablet 1     HYDROcodone-acetaminophen (NORCO) 5-325 MG tablet Take 1 tablet by mouth every 6 hours as needed for severe pain       potassium chloride ER (K-DUR/KLOR-CON M) 20 MEQ CR tablet Take 1 tablet (20 mEq) by mouth 2 times daily 180 tablet 2     Urea 40 % CREA Externally apply topically daily 85 g 11     warfarin ANTICOAGULANT (COUMADIN) 5 MG tablet 10 mg (5 mg x 2) every Monday; 7.5 mg (5 mg x 1.5) all other days or as directed by the Anticoagulation Clinic. 145 tablet 0     Chart documentation with Dragon Voice recognition Software. Although reviewed after completion, some words and grammatical errors may remain.    See Patient Instructions      SELWYN Dennis [facility]

## 2020-01-29 ENCOUNTER — HOSPITAL ENCOUNTER (OUTPATIENT)
Dept: CT IMAGING | Facility: CLINIC | Age: 75
End: 2020-01-29
Attending: NURSE PRACTITIONER
Payer: COMMERCIAL

## 2020-01-29 ENCOUNTER — HOSPITAL ENCOUNTER (OUTPATIENT)
Dept: CT IMAGING | Facility: CLINIC | Age: 75
Discharge: HOME | End: 2020-01-29
Attending: NURSE PRACTITIONER | Admitting: NURSE PRACTITIONER
Payer: COMMERCIAL

## 2020-01-29 DIAGNOSIS — J01.90 ACUTE SINUSITIS WITH SYMPTOMS > 10 DAYS: ICD-10-CM

## 2020-01-29 DIAGNOSIS — G44.52 NEW DAILY PERSISTENT HEADACHE: ICD-10-CM

## 2020-01-29 DIAGNOSIS — Z86.73 HISTORY OF CVA (CEREBROVASCULAR ACCIDENT): ICD-10-CM

## 2020-01-29 PROCEDURE — 70450 CT HEAD/BRAIN W/O DYE: CPT

## 2020-01-29 PROCEDURE — 70486 CT MAXILLOFACIAL W/O DYE: CPT

## 2020-01-30 ENCOUNTER — ANTICOAGULATION THERAPY VISIT (OUTPATIENT)
Dept: ANTICOAGULATION | Facility: CLINIC | Age: 75
End: 2020-01-30
Payer: COMMERCIAL

## 2020-01-30 DIAGNOSIS — Z79.01 LONG TERM CURRENT USE OF ANTICOAGULANT THERAPY: ICD-10-CM

## 2020-01-30 DIAGNOSIS — Z86.73 HISTORY OF CVA (CEREBROVASCULAR ACCIDENT): ICD-10-CM

## 2020-01-30 LAB — INR POINT OF CARE: 2.6 (ref 0.86–1.14)

## 2020-01-30 PROCEDURE — 99207 ZZC NO CHARGE NURSE ONLY: CPT

## 2020-01-30 PROCEDURE — 85610 PROTHROMBIN TIME: CPT | Mod: QW

## 2020-01-30 PROCEDURE — 36416 COLLJ CAPILLARY BLOOD SPEC: CPT

## 2020-01-30 NOTE — PROGRESS NOTES
ANTICOAGULATION FOLLOW-UP CLINIC VISIT    Patient Name:  Bebe Alfonso  Date:  2020  Contact Type:  Face to Face    SUBJECTIVE:  Patient Findings     Positives:   Change in medications (doxycycline)    Comments:   Patient is on doxy for sinusitis through Monday. She states her sinus symptoms have improved. INR in range today but that is on a lowered warfarin dose and abx. Will recheck INR again in 7 days when she is off abx to make sure INR is still in range on this new dose. No other changes or concerns.         Clinical Outcomes     Comments:   Patient is on doxy for sinusitis through Monday. She states her sinus symptoms have improved. INR in range today but that is on a lowered warfarin dose and abx. Will recheck INR again in 7 days when she is off abx to make sure INR is still in range on this new dose. No other changes or concerns.            OBJECTIVE    INR Protime   Date Value Ref Range Status   2020 2.6 (A) 0.86 - 1.14 Final       ASSESSMENT / PLAN  INR assessment THER    Recheck INR In: 1 WEEK    INR Location Clinic      Anticoagulation Summary  As of 2020    INR goal:   2.0-3.0   TTR:   63.7 % (1 y)   INR used for dosin.6 (2020)   Warfarin maintenance plan:   10 mg (5 mg x 2) every Mon; 7.5 mg (5 mg x 1.5) all other days   Full warfarin instructions:   10 mg every Mon; 7.5 mg all other days   Weekly warfarin total:   55 mg   No change documented:   Natalie Sorto RN   Plan last modified:   Natalie Sorto RN (2020)   Next INR check:   2020   Priority:   High   Target end date:   Indefinite    Indications    History of CVA (cerebrovascular accident) [Z86.73]  Long term current use of anticoagulant therapy [Z79.01]             Anticoagulation Episode Summary     INR check location:       Preferred lab:       Send INR reminders to:   [facility]    Comments:   * Pt states she has had 4 strokes (some after hip surgeries).       Anticoagulation Care Providers      Provider Role Specialty Phone number    Diana Ely APRN CNP Responsible Family Practice [phone]            See the Encounter Report to view Anticoagulation Flowsheet and Dosing Calendar (Go to Encounters tab in chart review, and find the Anticoagulation Therapy Visit)        Natalie Sorto RN

## 2020-02-04 ENCOUNTER — TELEPHONE (OUTPATIENT)
Dept: FAMILY MEDICINE | Facility: CLINIC | Age: 75
End: 2020-02-04

## 2020-02-04 NOTE — TELEPHONE ENCOUNTER
Reason for Call:  Other     Detailed comments: Please call patient to explain the CT results.     Phone Number Patient can be reached at: Home number on file [phone] (home)    Best Time:     Can we leave a detailed message on this number? YES    Call taken on 2/4/2020 at 2:15 PM by Esthela Mendoza

## 2020-02-05 NOTE — TELEPHONE ENCOUNTER
Will have Zuly PONCE call to discuss with her.   Results have been reviewed already and sent in MyChart to patient.  Who has reviewed this.  Thanks Diana BURGER-BC

## 2020-02-06 ENCOUNTER — ANTICOAGULATION THERAPY VISIT (OUTPATIENT)
Dept: ANTICOAGULATION | Facility: CLINIC | Age: 75
End: 2020-02-06
Payer: COMMERCIAL

## 2020-02-06 DIAGNOSIS — Z86.73 HISTORY OF CVA (CEREBROVASCULAR ACCIDENT): ICD-10-CM

## 2020-02-06 DIAGNOSIS — Z79.01 LONG TERM CURRENT USE OF ANTICOAGULANT THERAPY: ICD-10-CM

## 2020-02-06 LAB — INR POINT OF CARE: 2.5 (ref 0.86–1.14)

## 2020-02-06 PROCEDURE — 85610 PROTHROMBIN TIME: CPT | Mod: QW

## 2020-02-06 PROCEDURE — 36416 COLLJ CAPILLARY BLOOD SPEC: CPT

## 2020-02-06 PROCEDURE — 99207 ZZC NO CHARGE NURSE ONLY: CPT

## 2020-02-06 NOTE — PROGRESS NOTES
ANTICOAGULATION FOLLOW-UP CLINIC VISIT    Patient Name:  Bebe Alfonso  Date:  2020  Contact Type:  Face to Face    SUBJECTIVE:  Patient Findings     Positives:   Change in medications (finished abx 3 days ago)    Comments:   No changes in diet, activity level, or health. No missed doses of warfarin. Patient is finished with her doxycycline now. She says her sinus issues are still present and have not fully cleared up. She has a deviated septum, which might be the cause. She plans to see neurology per PCP recommendation. Patient took dosing as prescribed. No signs of clots or bleeding concerns. Patient will continue maintenance warfarin dosing.          Clinical Outcomes     Negatives:   Major bleeding event, Thromboembolic event, Anticoagulation-related hospital admission, Anticoagulation-related ED visit, Anticoagulation-related fatality    Comments:   No changes in diet, activity level, or health. No missed doses of warfarin. Patient is finished with her doxycycline now. She says her sinus issues are still present and have not fully cleared up. She has a deviated septum, which might be the cause. She plans to see neurology per PCP recommendation. Patient took dosing as prescribed. No signs of clots or bleeding concerns. Patient will continue maintenance warfarin dosing.             OBJECTIVE    INR Protime   Date Value Ref Range Status   2020 2.5 (A) 0.86 - 1.14 Final       ASSESSMENT / PLAN  INR assessment THER    Recheck INR In: 2 WEEKS    INR Location Clinic      Anticoagulation Summary  As of 2020    INR goal:   2.0-3.0   TTR:   65.7 % (1 y)   INR used for dosin.5 (2020)   Warfarin maintenance plan:   10 mg (5 mg x 2) every Mon; 7.5 mg (5 mg x 1.5) all other days   Full warfarin instructions:   10 mg every Mon; 7.5 mg all other days   Weekly warfarin total:   55 mg   No change documented:   Natalie Sorto RN   Plan last modified:   Natalie Sorto RN (2020)   Next INR check:    2/20/2020   Priority:   Maintenance   Target end date:   Indefinite    Indications    History of CVA (cerebrovascular accident) [Z86.73]  Long term current use of anticoagulant therapy [Z79.01]             Anticoagulation Episode Summary     INR check location:       Preferred lab:       Send INR reminders to:   [facility]    Comments:   * Pt states she has had 4 strokes (some after hip surgeries).       Anticoagulation Care Providers     Provider Role Specialty Phone number    Diana Ely APRN [facility] [phone]            See the Encounter Report to view Anticoagulation Flowsheet and Dosing Calendar (Go to Encounters tab in chart review, and find the Anticoagulation Therapy Visit)        Natalie Sorto RN

## 2020-02-20 ENCOUNTER — ANTICOAGULATION THERAPY VISIT (OUTPATIENT)
Dept: ANTICOAGULATION | Facility: CLINIC | Age: 75
End: 2020-02-20
Payer: COMMERCIAL

## 2020-02-20 DIAGNOSIS — Z86.73 HISTORY OF CVA (CEREBROVASCULAR ACCIDENT): ICD-10-CM

## 2020-02-20 DIAGNOSIS — Z79.01 LONG TERM CURRENT USE OF ANTICOAGULANT THERAPY: ICD-10-CM

## 2020-02-20 LAB — INR POINT OF CARE: 3 (ref 0.86–1.14)

## 2020-02-20 PROCEDURE — 85610 PROTHROMBIN TIME: CPT | Mod: QW

## 2020-02-20 PROCEDURE — 99207 ZZC NO CHARGE NURSE ONLY: CPT

## 2020-02-20 PROCEDURE — 36416 COLLJ CAPILLARY BLOOD SPEC: CPT

## 2020-02-20 NOTE — PROGRESS NOTES
ANTICOAGULATION FOLLOW-UP CLINIC VISIT    Patient Name:  Bebe Alfonso  Date:  2/20/2020  Contact Type:  Face to Face    SUBJECTIVE:  Patient Findings     Comments:   No changes in diet, activity level, medications (including over the counter), or health. No missed doses of warfarin. Patient took dosing as prescribed. No signs of clots or bleeding concerns. Patient will continue maintenance warfarin dosing.          Clinical Outcomes     Negatives:   Major bleeding event, Thromboembolic event, Anticoagulation-related hospital admission, Anticoagulation-related ED visit, Anticoagulation-related fatality    Comments:   No changes in diet, activity level, medications (including over the counter), or health. No missed doses of warfarin. Patient took dosing as prescribed. No signs of clots or bleeding concerns. Patient will continue maintenance warfarin dosing.             OBJECTIVE    INR Protime   Date Value Ref Range Status   02/20/2020 3.0 (A) 0.86 - 1.14 Final       ASSESSMENT / PLAN  INR assessment THER    Recheck INR In: 3 WEEKS    INR Location Clinic      Anticoagulation Summary  As of 2/20/2020    INR goal:   2.0-3.0   TTR:   69.5 % (1 y)   INR used for dosing:   3.0 (2/20/2020)   Warfarin maintenance plan:   10 mg (5 mg x 2) every Mon; 7.5 mg (5 mg x 1.5) all other days   Full warfarin instructions:   10 mg every Mon; 7.5 mg all other days   Weekly warfarin total:   55 mg   No change documented:   Natalie Sorto RN   Plan last modified:   Natalie Sorto RN (1/23/2020)   Next INR check:   3/12/2020   Priority:   Maintenance   Target end date:   Indefinite    Indications    History of CVA (cerebrovascular accident) [Z86.73]  Long term current use of anticoagulant therapy [Z79.01]             Anticoagulation Episode Summary     INR check location:       Preferred lab:       Send INR reminders to:   [facility]    Comments:   * Pt states she has had 4 strokes (some after hip surgeries).        Anticoagulation Care Providers     Provider Role Specialty Phone number    Diana Ely APRN CNP Responsible Family Practice [phone]            See the Encounter Report to view Anticoagulation Flowsheet and Dosing Calendar (Go to Encounters tab in chart review, and find the Anticoagulation Therapy Visit)        Natalie Sorto RN

## 2020-03-06 DIAGNOSIS — Z86.73 HISTORY OF CVA (CEREBROVASCULAR ACCIDENT): ICD-10-CM

## 2020-03-06 DIAGNOSIS — Z79.01 LONG TERM CURRENT USE OF ANTICOAGULANT THERAPY: ICD-10-CM

## 2020-03-06 RX ORDER — WARFARIN SODIUM 5 MG/1
TABLET ORAL
Qty: 145 TABLET | Refills: 0 | Status: SHIPPED | OUTPATIENT
Start: 2020-03-06 | End: 2020-06-05

## 2020-03-12 ENCOUNTER — ANTICOAGULATION THERAPY VISIT (OUTPATIENT)
Dept: ANTICOAGULATION | Facility: CLINIC | Age: 75
End: 2020-03-12
Payer: COMMERCIAL

## 2020-03-12 DIAGNOSIS — Z79.01 LONG TERM CURRENT USE OF ANTICOAGULANT THERAPY: ICD-10-CM

## 2020-03-12 DIAGNOSIS — Z86.73 HISTORY OF CVA (CEREBROVASCULAR ACCIDENT): ICD-10-CM

## 2020-03-12 LAB — INR POINT OF CARE: 2.7 (ref 0.86–1.14)

## 2020-03-12 PROCEDURE — 99207 ZZC NO CHARGE NURSE ONLY: CPT

## 2020-03-12 PROCEDURE — 85610 PROTHROMBIN TIME: CPT | Mod: QW

## 2020-03-12 PROCEDURE — 36416 COLLJ CAPILLARY BLOOD SPEC: CPT

## 2020-03-12 NOTE — PROGRESS NOTES
ANTICOAGULATION FOLLOW-UP CLINIC VISIT    Patient Name:  Bebe Alfonso  Date:  3/12/2020  Contact Type:  Face to Face    SUBJECTIVE:  Patient Findings     Positives:   Change in diet/appetite    Comments:   No changes in activity level, medications (including over the counter), or health. Patient has been eating more greens and will continue this. No missed doses of warfarin. Patient took dosing as prescribed. No signs of clots or bleeding concerns. Patient will continue maintenance warfarin dosing.          Clinical Outcomes     Negatives:   Major bleeding event, Thromboembolic event, Anticoagulation-related hospital admission, Anticoagulation-related ED visit, Anticoagulation-related fatality    Comments:   No changes in activity level, medications (including over the counter), or health. Patient has been eating more greens and will continue this. No missed doses of warfarin. Patient took dosing as prescribed. No signs of clots or bleeding concerns. Patient will continue maintenance warfarin dosing.             OBJECTIVE    INR Protime   Date Value Ref Range Status   2020 2.7 (A) 0.86 - 1.14 Final       ASSESSMENT / PLAN  INR assessment THER    Recheck INR In: 5 WEEKS    INR Location Clinic      Anticoagulation Summary  As of 3/12/2020    INR goal:   2.0-3.0   TTR:   75.2 % (1 y)   INR used for dosin.7 (3/12/2020)   Warfarin maintenance plan:   10 mg (5 mg x 2) every Mon; 7.5 mg (5 mg x 1.5) all other days   Full warfarin instructions:   10 mg every Mon; 7.5 mg all other days   Weekly warfarin total:   55 mg   No change documented:   Natalie Sorto RN   Plan last modified:   Natalie Sorto RN (2020)   Next INR check:   2020   Priority:   Maintenance   Target end date:   Indefinite    Indications    History of CVA (cerebrovascular accident) [Z86.73]  Long term current use of anticoagulant therapy [Z79.01]             Anticoagulation Episode Summary     INR check location:       Preferred  lab:       Send INR reminders to:   [facility]    Comments:   * Pt states she has had 4 strokes (some after hip surgeries).       Anticoagulation Care Providers     Provider Role Specialty Phone number    Diana Ely APRN [facility] [phone]            See the Encounter Report to view Anticoagulation Flowsheet and Dosing Calendar (Go to Encounters tab in chart review, and find the Anticoagulation Therapy Visit)        Natalie Sorto RN

## 2020-03-18 ENCOUNTER — TELEPHONE (OUTPATIENT)
Dept: FAMILY MEDICINE | Facility: CLINIC | Age: 75
End: 2020-03-18

## 2020-03-18 ASSESSMENT — ANXIETY QUESTIONNAIRES
GAD7 TOTAL SCORE: 0
5. BEING SO RESTLESS THAT IT IS HARD TO SIT STILL: NOT AT ALL
7. FEELING AFRAID AS IF SOMETHING AWFUL MIGHT HAPPEN: NOT AT ALL
3. WORRYING TOO MUCH ABOUT DIFFERENT THINGS: NOT AT ALL
1. FEELING NERVOUS, ANXIOUS, OR ON EDGE: NOT AT ALL
2. NOT BEING ABLE TO STOP OR CONTROL WORRYING: NOT AT ALL
6. BECOMING EASILY ANNOYED OR IRRITABLE: NOT AT ALL

## 2020-03-18 ASSESSMENT — PATIENT HEALTH QUESTIONNAIRE - PHQ9
5. POOR APPETITE OR OVEREATING: NOT AT ALL
SUM OF ALL RESPONSES TO PHQ QUESTIONS 1-9: 0

## 2020-03-18 NOTE — TELEPHONE ENCOUNTER
When Elif had lab in December, Diana wanted to repeat in 3 months. Please put in orders.     Patient Result Comments     Viewed by Bebe Alfonso on 1/15/2020  9:47 AM   Written by Diana Ely APRN CNP on 12/17/2019 10:11 PM   Normal electrolytes   Normal nonfasting blood sugar   Chronic kidney disease present   normal red and white blood cell count   Repeat kidney function in 3 months to monitor.   Take CareDiana MSN,FNP-[address] [address] [phone]

## 2020-03-18 NOTE — TELEPHONE ENCOUNTER
Patient returned call to the clinic.   Completed phq 9 and Varinder over the phone.   Patient states that she also scheduled her mammogram.   Sandee Barnes CMA

## 2020-03-19 ASSESSMENT — ANXIETY QUESTIONNAIRES: GAD7 TOTAL SCORE: 0

## 2020-03-20 DIAGNOSIS — Z86.73 HISTORY OF CVA (CEREBROVASCULAR ACCIDENT): Primary | ICD-10-CM

## 2020-04-16 ENCOUNTER — ANTICOAGULATION THERAPY VISIT (OUTPATIENT)
Dept: ANTICOAGULATION | Facility: CLINIC | Age: 75
End: 2020-04-16

## 2020-04-16 DIAGNOSIS — Z79.01 LONG TERM CURRENT USE OF ANTICOAGULANT THERAPY: ICD-10-CM

## 2020-04-16 DIAGNOSIS — Z86.73 HISTORY OF CVA (CEREBROVASCULAR ACCIDENT): ICD-10-CM

## 2020-04-16 LAB
CAPILLARY BLOOD COLLECTION: NORMAL
INR PPP: 1.9 (ref 0.86–1.14)

## 2020-04-16 PROCEDURE — 99207 ZZC NO CHARGE NURSE ONLY: CPT

## 2020-04-16 PROCEDURE — 85610 PROTHROMBIN TIME: CPT | Performed by: NURSE PRACTITIONER

## 2020-04-16 PROCEDURE — 36416 COLLJ CAPILLARY BLOOD SPEC: CPT | Performed by: NURSE PRACTITIONER

## 2020-04-16 NOTE — PROGRESS NOTES
ANTICOAGULATION FOLLOW-UP CLINIC VISIT    Patient Name:  Bebe Alfonso  Date:  2020  Contact Type:  Telephone    SUBJECTIVE:  Patient Findings     Positives:   Change in diet/appetite (Reports eating a lot of neal --tuna salad every day for a week, pasta salad [[place] has 81mcg of vitamin K per 7 tbs])    Comments:   No other changes noted per patient report, medications are the same and she took her warfarin as prescribed. Patient denies any missed doses.     Will increase dose today by 2.5mg, then resume prior maintenance dose. Patient will recheck her INR in 2 weeks.     Denies any concerns for a clot / bleeding.         Clinical Outcomes     Comments:   No other changes noted per patient report, medications are the same and she took her warfarin as prescribed. Patient denies any missed doses.     Will increase dose today by 2.5mg, then resume prior maintenance dose. Patient will recheck her INR in 2 weeks.     Denies any concerns for a clot / bleeding.            OBJECTIVE    INR   Date Value Ref Range Status   2020 1.90 (H) 0.86 - 1.14 Final     Comment:     This test is intended for monitoring Coumadin therapy.  Results are not   accurate in patients with prolonged INR due to factor deficiency.         ASSESSMENT / PLAN  No question data found.  Anticoagulation Summary  As of 2020    INR goal:   2.0-3.0   TTR:   80.6 % (1 y)   INR used for dosin.90! (2020)   Warfarin maintenance plan:   10 mg (5 mg x 2) every Mon; 7.5 mg (5 mg x 1.5) all other days   Full warfarin instructions:   : 10 mg; Otherwise 10 mg every Mon; 7.5 mg all other days   Weekly warfarin total:   55 mg   Plan last modified:   Natalie Sorto RN (2020)   Next INR check:   2020   Priority:   Maintenance   Target end date:   Indefinite    Indications    History of CVA (cerebrovascular accident) [Z86.73]  Long term current use of anticoagulant therapy [Z79.01]             Anticoagulation Episode Summary      INR check location:       Preferred lab:       Send [facility] reminders to:   [facility]    Comments:   * Pt states she has had 4 strokes (some after hip surgeries).       Anticoagulation Care Providers     Provider Role Specialty Phone number    Diana Ely APRN [facility] [phone]            See the Encounter Report to view Anticoagulation Flowsheet and Dosing Calendar (Go to Encounters tab in chart review, and find the Anticoagulation Therapy Visit)        Polina Ryan RN CACP

## 2020-04-20 ENCOUNTER — OFFICE VISIT (OUTPATIENT)
Dept: FAMILY MEDICINE | Facility: CLINIC | Age: 75
End: 2020-04-20
Payer: COMMERCIAL

## 2020-04-20 VITALS
RESPIRATION RATE: 16 BRPM | DIASTOLIC BLOOD PRESSURE: 60 MMHG | HEART RATE: 83 BPM | TEMPERATURE: 98.3 F | HEIGHT: 66 IN | OXYGEN SATURATION: 97 % | WEIGHT: 136.4 LBS | SYSTOLIC BLOOD PRESSURE: 162 MMHG | BODY MASS INDEX: 21.92 KG/M2

## 2020-04-20 DIAGNOSIS — L25.9 CONTACT DERMATITIS, UNSPECIFIED CONTACT DERMATITIS TYPE, UNSPECIFIED TRIGGER: Primary | ICD-10-CM

## 2020-04-20 PROBLEM — I63.20 CEREBROVASCULAR ACCIDENT (CVA) DUE TO OCCLUSION OF PRECEREBRAL ARTERY (H): Chronic | Status: ACTIVE | Noted: 2018-12-18

## 2020-04-20 PROBLEM — E78.5 HYPERLIPIDEMIA LDL GOAL <70: Chronic | Status: ACTIVE | Noted: 2018-09-07

## 2020-04-20 PROCEDURE — 99213 OFFICE O/P EST LOW 20 MIN: CPT | Performed by: NURSE PRACTITIONER

## 2020-04-20 RX ORDER — TRIAMCINOLONE ACETONIDE 1 MG/G
OINTMENT TOPICAL 2 TIMES DAILY
Qty: 30 G | Refills: 1 | Status: SHIPPED | OUTPATIENT
Start: 2020-04-20 | End: 2021-07-13

## 2020-04-20 ASSESSMENT — MIFFLIN-ST. JEOR: SCORE: 1140.46

## 2020-04-20 NOTE — PROGRESS NOTES
SUBJECTIVE   Bebe Alfonso is a  female who presents to clinic today for the following health issue(s):       Rash    Duration: Since Wednesday     Description  Location: Chest   Itching: severe    Intensity:  moderate    Accompanying signs and symptoms: None    History (similar episodes/previous evaluation): None    Precipitating or alleviating factors:  New exposures:  None  Recent travel: no      Therapies tried and outcome: none    Started using Dial with oil    History of skin dermatitis 4/18/2019  Rx Urea cream    Hypertension  -labile  BP Readings from Last 6 Encounters:   04/20/20 (!) 162/60   01/24/20 (!) 148/84   12/26/19 (!) 156/70   12/12/19 132/70   07/02/19 130/78   05/07/19 173/72       Patient requested removal of Depression diagnosis  PHQ-9 score:    PHQ 3/18/2020   PHQ-9 Total Score 0   Q9: Thoughts of better off dead/self-harm past 2 weeks Not at all               Health Maintenance        Health Maintenance Due   Topic Date Due     COPD ACTION PLAN  1945     ZOSTER IMMUNIZATION (2 of 3) 04/21/2014     ADVANCE CARE PLANNING  06/15/2018     DTAP/TDAP/TD IMMUNIZATION (2 - Td) 04/20/2019     URINE DRUG SCREEN  03/22/2020     FALL RISK ASSESSMENT  04/18/2020     MEDICARE ANNUAL WELLNESS VISIT  04/18/2020       PCP   SELWYN Dennis -[phone]    PROBLEM LIST        Patient Active Problem List   Diagnosis     ACP (advance care planning)     Anticoagulation monitoring, INR range 2-3     Benign essential hypertension     Carotid bruit     Cerebrovascular disease     Chronic obstructive pulmonary disease (H)     Esophageal reflux     Osteoarthrosis, hip     Pain medication agreement     Thyroid nodule     Long term current use of anticoagulant therapy     Hyperlipidemia LDL goal <70     Cerebrovascular accident (CVA) due to occlusion of precerebral artery (H)     Chronic kidney disease, stage 3 (moderate) (H)     Renal artery stenosis (H)     Tobacco dependence  "      MEDICATIONS        Current Outpatient Medications   Medication     amLODIPine (NORVASC) 5 MG tablet     ASPIRIN NOT PRESCRIBED (INTENTIONAL)     atorvastatin (LIPITOR) 40 MG tablet     enalapril (VASOTEC) 5 MG tablet     HYDROcodone-acetaminophen (NORCO) 5-325 MG tablet     potassium chloride ER (K-DUR/KLOR-CON M) 20 MEQ CR tablet     triamcinolone (KENALOG) 0.1 % external ointment     Urea 40 % CREA     warfarin ANTICOAGULANT (COUMADIN) 5 MG tablet     No current facility-administered medications for this visit.        Reviewed and updated as needed this visit by Provider:  Tobacco  Allergies  Meds  Med Hx  Surg Hx  Fam Hx  Soc Hx     ROS      Constitutional, HEENT, cardiovascular, pulmonary, gi and gu systems are negative, except as otherwise noted.    PHYSICAL EXAM   BP (!) 162/60 (BP Location: Right arm, Patient Position: Sitting, Cuff Size: Adult Regular)   Pulse 83   Temp 98.3  F (36.8  C) (Tympanic)   Resp 16   Ht 1.676 m (5' 6\")   Wt 61.9 kg (136 lb 6.4 oz)   SpO2 97%   BMI 22.02 kg/m    Body mass index is 22.02 kg/m .  GENERAL APPEARANCE: healthy, alert and no distress  RESP: lungs clear to auscultation - no rales, rhonchi or wheezes  CV: regular rates and rhythm, normal S1 S2, no S3 or S4 and no murmur, click or rub  MS: extremities normal- no gross deformities noted  SKIN: erythemic red raised rash with signs of excoriation, no discharge  PSYCH: mentation appears normal and affect normal/bright          ASSESSMENT & PLAN     1. Contact dermatitis, unspecified contact dermatitis type, unspecified trigger  Acute, stable  - triamcinolone (KENALOG) 0.1 % external ointment; Apply topically 2 times daily X 10 days  Dispense: 30 g; Refill: 1      Use twice daily for 10 days, use Vaseline on top of this    PROPER SKIN CARE REGIMEN:    Eliminate harsh soaps (Dial, Zest, Carmen Spring,  or anything fragrant)    Use mild soaps  (Cetaphil, Eucerin, Dove Sensitive Skin)    Avoid hot or cold " showers    Take quick showers (10 minutes)    After showering, lightly pat skin to dry off.     Within 10 minutes of showering, apply a cream moisturizer (Vanicream, Cetaphil, Aquafor, or Cerave)     We recommend using a tub that needs to be scooped out, not a pump. This has more of an oil base. It will hold moisture in your skin much better than a water base moisturizer. The ones recommended are non- pore clogging.        Risks, benefits, side effects and rationale for treatment plan fully discussed with the patient and understanding expressed.  Kari Vaughan, TAO-BC  [facility]

## 2020-04-20 NOTE — NURSING NOTE
"Chief Complaint   Patient presents with     Derm Problem       Initial BP (!) 162/60 (BP Location: Right arm, Patient Position: Sitting, Cuff Size: Adult Regular)   Pulse 83   Temp 98.3  F (36.8  C) (Tympanic)   Resp 16   Ht 1.676 m (5' 6\")   Wt 61.9 kg (136 lb 6.4 oz)   SpO2 97%   BMI 22.02 kg/m   Estimated body mass index is 22.02 kg/m  as calculated from the following:    Height as of this encounter: 1.676 m (5' 6\").    Weight as of this encounter: 61.9 kg (136 lb 6.4 oz).    Patient presents to the clinic using No DME    Health Maintenance that is potentially due pending provider review:  NONE    n/a    Is there anyone who you would like to be able to receive your results? No  If yes have patient fill out FARZANEH    Sandee Barnes CMA    "

## 2020-04-20 NOTE — PATIENT INSTRUCTIONS
1. Contact dermatitis, unspecified contact dermatitis type, unspecified trigger  Acute, stable  - triamcinolone (KENALOG) 0.1 % external ointment; Apply topically 2 times daily X 10 days  Dispense: 30 g; Refill: 1      Use twice daily for 10 days, use Vaseline on top of this    PROPER SKIN CARE REGIMEN:    Eliminate harsh soaps (Dial, Zest, Carmen Spring,  or anything fragrant)    Use mild soaps  (Cetaphil, Eucerin, Dove Sensitive Skin)    Avoid hot or cold showers    Take quick showers (10 minutes)    After showering, lightly pat skin to dry off.     Within 10 minutes of showering, apply a cream moisturizer (Vanicream, Cetaphil, Aquafor, or Cerave)     We recommend using a tub that needs to be scooped out, not a pump. This has more of an oil base. It will hold moisture in your skin much better than a water base moisturizer. The ones recommended are non- pore clogging.

## 2020-04-30 ENCOUNTER — ANTICOAGULATION THERAPY VISIT (OUTPATIENT)
Dept: ANTICOAGULATION | Facility: CLINIC | Age: 75
End: 2020-04-30
Payer: COMMERCIAL

## 2020-04-30 DIAGNOSIS — Z79.01 LONG TERM CURRENT USE OF ANTICOAGULANT THERAPY: ICD-10-CM

## 2020-04-30 DIAGNOSIS — Z86.73 HISTORY OF CVA (CEREBROVASCULAR ACCIDENT): ICD-10-CM

## 2020-04-30 LAB
CAPILLARY BLOOD COLLECTION: NORMAL
INR PPP: 2.2 (ref 0.86–1.14)

## 2020-04-30 PROCEDURE — 36416 COLLJ CAPILLARY BLOOD SPEC: CPT | Performed by: NURSE PRACTITIONER

## 2020-04-30 PROCEDURE — 85610 PROTHROMBIN TIME: CPT | Performed by: NURSE PRACTITIONER

## 2020-04-30 PROCEDURE — 99207 ZZC NO CHARGE NURSE ONLY: CPT

## 2020-04-30 NOTE — PROGRESS NOTES
ANTICOAGULATION FOLLOW-UP CLINIC VISIT    Patient Name:  Bebe Alfonso  Date:  2020  Contact Type:  Telephone    SUBJECTIVE:  Patient Findings     Comments:   Patient reports no changes in medication, activity, or diet. Patient reports no changes in health and no recent illness. Patient reports warfarin was taken as instructed, no missed or extra doses. Patient reports no increased bruising or bleeding and no signs or symptoms of a blood clot.   INR is therapeutic. Patient will continue maintenance dose of warfarin and recheck INR in 4 weeks. Patient to call [facility] with any changes or concerns. Patient verbalized understanding of all instructions, denies questions or concerns at this time.             Clinical Outcomes     Negatives:   Major bleeding event, Thromboembolic event, Anticoagulation-related hospital admission, Anticoagulation-related ED visit, Anticoagulation-related fatality    Comments:   Patient reports no changes in medication, activity, or diet. Patient reports no changes in health and no recent illness. Patient reports warfarin was taken as instructed, no missed or extra doses. Patient reports no increased bruising or bleeding and no signs or symptoms of a blood clot.   INR is therapeutic. Patient will continue maintenance dose of warfarin and recheck INR in 4 weeks. Patient to call [facility] with any changes or concerns. Patient verbalized understanding of all instructions, denies questions or concerns at this time.                OBJECTIVE    INR   Date Value Ref Range Status   2020 2.20 (H) 0.86 - 1.14 Final     Comment:     This test is intended for monitoring Coumadin therapy.  Results are not   accurate in patients with prolonged INR due to factor deficiency.         ASSESSMENT / PLAN  INR assessment THER    Recheck INR In: 4 WEEKS    INR Location Outside lab      Anticoagulation Summary  As of 2020    INR goal:   2.0-3.0   TTR:   82.9 % (1 y)   INR used for dosin.20  (4/30/2020)   Warfarin maintenance plan:   10 mg (5 mg x 2) every Mon; 7.5 mg (5 mg x 1.5) all other days   Full warfarin instructions:   10 mg every Mon; 7.5 mg all other days   Weekly warfarin total:   55 mg   No change documented:   Annie Adames RN   Plan last modified:   Natalie Sorto RN (1/23/2020)   Next INR check:   5/28/2020   Priority:   Maintenance   Target end date:   Indefinite    Indications    History of CVA (cerebrovascular accident) (Resolved) [Z86.73]  Long term current use of anticoagulant therapy [Z79.01]             Anticoagulation Episode Summary     INR check location:       Preferred lab:       Send INR reminders to:   [facility]    Comments:   * Pt states she has had 4 strokes (some after hip surgeries).       Anticoagulation Care Providers     Provider Role Specialty Phone number    Diana Ely APRN CNP Referring [facility] [phone]            See the Encounter Report to view Anticoagulation Flowsheet and Dosing Calendar (Go to Encounters tab in chart review, and find the Anticoagulation Therapy Visit)      Annie Adames, RN

## 2020-05-19 ENCOUNTER — TELEPHONE (OUTPATIENT)
Dept: FAMILY MEDICINE | Facility: CLINIC | Age: 75
End: 2020-05-19

## 2020-05-19 DIAGNOSIS — N18.30 CKD (CHRONIC KIDNEY DISEASE) STAGE 3, GFR 30-59 ML/MIN (H): Primary | ICD-10-CM

## 2020-05-19 NOTE — TELEPHONE ENCOUNTER
Reason for Call: Request for an order or referral:    Order or referral being requested: Pt is wondering if she should have labs done for her Kidney's. To F/U on the Lab work.    Date needed: as soon as possible    Has the patient been seen by the PCP for this problem? YES    Phone number Patient can be reached at:  Home number on file [phone] (home)    Best Time:  Any Time      Can we leave a detailed message on this number?  YES    Call taken on 5/19/2020 at 11:13 AM by Jessica Hoffmann

## 2020-05-28 ENCOUNTER — ANTICOAGULATION THERAPY VISIT (OUTPATIENT)
Dept: ANTICOAGULATION | Facility: CLINIC | Age: 75
End: 2020-05-28
Payer: COMMERCIAL

## 2020-05-28 DIAGNOSIS — N18.30 CKD (CHRONIC KIDNEY DISEASE) STAGE 3, GFR 30-59 ML/MIN (H): ICD-10-CM

## 2020-05-28 DIAGNOSIS — Z86.73 HISTORY OF CVA (CEREBROVASCULAR ACCIDENT): ICD-10-CM

## 2020-05-28 DIAGNOSIS — Z79.01 LONG TERM CURRENT USE OF ANTICOAGULANT THERAPY: ICD-10-CM

## 2020-05-28 LAB
ALBUMIN SERPL-MCNC: 3.3 G/DL (ref 3.4–5)
ANION GAP SERPL CALCULATED.3IONS-SCNC: 5 MMOL/L (ref 3–14)
BUN SERPL-MCNC: 28 MG/DL (ref 7–30)
CALCIUM SERPL-MCNC: 8.6 MG/DL (ref 8.5–10.1)
CHLORIDE SERPL-SCNC: 99 MMOL/L (ref 94–109)
CO2 SERPL-SCNC: 27 MMOL/L (ref 20–32)
CREAT SERPL-MCNC: 1.52 MG/DL (ref 0.52–1.04)
GFR SERPL CREATININE-BSD FRML MDRD: 34 ML/MIN/{1.73_M2}
GLUCOSE SERPL-MCNC: 120 MG/DL (ref 70–99)
INR PPP: 2.3 (ref 0.86–1.14)
PHOSPHATE SERPL-MCNC: 3.4 MG/DL (ref 2.5–4.5)
POTASSIUM SERPL-SCNC: 4.1 MMOL/L (ref 3.4–5.3)
SODIUM SERPL-SCNC: 131 MMOL/L (ref 133–144)

## 2020-05-28 PROCEDURE — 80069 RENAL FUNCTION PANEL: CPT | Performed by: NURSE PRACTITIONER

## 2020-05-28 PROCEDURE — 99207 ZZC NO CHARGE NURSE ONLY: CPT

## 2020-05-28 PROCEDURE — 85610 PROTHROMBIN TIME: CPT | Performed by: NURSE PRACTITIONER

## 2020-05-28 PROCEDURE — 36415 COLL VENOUS BLD VENIPUNCTURE: CPT | Performed by: NURSE PRACTITIONER

## 2020-05-28 NOTE — PROGRESS NOTES
ANTICOAGULATION FOLLOW-UP CLINIC VISIT    Patient Name:  Bebe Alfonso  Date:  2020  Contact Type:  Telephone    SUBJECTIVE:  Patient Findings     Comments:   No changes in diet, activity level, medications (including over the counter), or health. No missed doses of warfarin. Patient took dosing as prescribed. No signs of clots or bleeding concerns. Patient will continue maintenance warfarin dosing.          Clinical Outcomes     Negatives:   Major bleeding event, Thromboembolic event, Anticoagulation-related hospital admission, Anticoagulation-related ED visit, Anticoagulation-related fatality    Comments:   No changes in diet, activity level, medications (including over the counter), or health. No missed doses of warfarin. Patient took dosing as prescribed. No signs of clots or bleeding concerns. Patient will continue maintenance warfarin dosing.             OBJECTIVE    Recent labs: (last 7 days)     20  0923   INR 2.30*       ASSESSMENT / PLAN  INR assessment THER    Recheck INR In: 5 WEEKS    INR Location Clinic      Anticoagulation Summary  As of 2020    INR goal:   2.0-3.0   TTR:   82.9 % (1 y)   INR used for dosin.30 (2020)   Warfarin maintenance plan:   10 mg (5 mg x 2) every Mon; 7.5 mg (5 mg x 1.5) all other days   Full warfarin instructions:   10 mg every Mon; 7.5 mg all other days   Weekly warfarin total:   55 mg   No change documented:   Natalie Sorto RN   Plan last modified:   Natalie Sorto RN (2020)   Next INR check:   2020   Priority:   Maintenance   Target end date:   Indefinite    Indications    History of CVA (cerebrovascular accident) (Resolved) [Z86.73]  Long term current use of anticoagulant therapy [Z79.01]             Anticoagulation Episode Summary     INR check location:       Preferred lab:       Send INR reminders to:   [facility]    Comments:   * Pt states she has had 4 strokes (some after hip surgeries).       Anticoagulation Care  Providers     Provider Role Specialty Phone number    Diana Ely APRN CNP Referring Family Practice [phone]            See the Encounter Report to view Anticoagulation Flowsheet and Dosing Calendar (Go to Encounters tab in chart review, and find the Anticoagulation Therapy Visit)        Natalie Sorto RN

## 2020-06-05 DIAGNOSIS — Z86.73 HISTORY OF CVA (CEREBROVASCULAR ACCIDENT): ICD-10-CM

## 2020-06-05 DIAGNOSIS — Z79.01 LONG TERM CURRENT USE OF ANTICOAGULANT THERAPY: ICD-10-CM

## 2020-06-05 RX ORDER — WARFARIN SODIUM 5 MG/1
TABLET ORAL
Qty: 145 TABLET | Refills: 0 | Status: SHIPPED | OUTPATIENT
Start: 2020-06-05 | End: 2020-09-04

## 2020-06-15 ENCOUNTER — ANCILLARY PROCEDURE (OUTPATIENT)
Dept: MAMMOGRAPHY | Facility: CLINIC | Age: 75
End: 2020-06-15
Attending: NURSE PRACTITIONER
Payer: COMMERCIAL

## 2020-06-15 DIAGNOSIS — Z12.31 VISIT FOR SCREENING MAMMOGRAM: ICD-10-CM

## 2020-06-15 PROCEDURE — 77067 SCR MAMMO BI INCL CAD: CPT | Mod: TC

## 2020-06-15 PROCEDURE — 77063 BREAST TOMOSYNTHESIS BI: CPT

## 2020-07-02 ENCOUNTER — ANTICOAGULATION THERAPY VISIT (OUTPATIENT)
Dept: ANTICOAGULATION | Facility: CLINIC | Age: 75
End: 2020-07-02
Payer: COMMERCIAL

## 2020-07-02 DIAGNOSIS — Z86.73 HISTORY OF CVA (CEREBROVASCULAR ACCIDENT): ICD-10-CM

## 2020-07-02 DIAGNOSIS — Z79.01 LONG TERM CURRENT USE OF ANTICOAGULANT THERAPY: ICD-10-CM

## 2020-07-02 LAB
CAPILLARY BLOOD COLLECTION: NORMAL
INR PPP: 2.3 (ref 0.86–1.14)

## 2020-07-02 PROCEDURE — 36416 COLLJ CAPILLARY BLOOD SPEC: CPT | Performed by: NURSE PRACTITIONER

## 2020-07-02 PROCEDURE — 99207 ZZC NO CHARGE NURSE ONLY: CPT

## 2020-07-02 PROCEDURE — 85610 PROTHROMBIN TIME: CPT | Performed by: NURSE PRACTITIONER

## 2020-08-07 DIAGNOSIS — I10 ESSENTIAL HYPERTENSION: ICD-10-CM

## 2020-08-07 RX ORDER — ENALAPRIL MALEATE 5 MG/1
TABLET ORAL
Qty: 45 TABLET | Refills: 0 | Status: SHIPPED | OUTPATIENT
Start: 2020-08-07 | End: 2020-11-03

## 2020-08-07 NOTE — TELEPHONE ENCOUNTER
"Routing refill request to provider for review/approval because:  Labs out of range:  Cr  BP not at goal  Requested Prescriptions   Pending Prescriptions Disp Refills     enalapril (VASOTEC) 5 MG tablet [Pharmacy Med Name: Enalapril Maleate 5 MG Oral Tablet] 45 tablet 0     Sig: Take 1/2 (one-half) tablet by mouth once daily       ACE Inhibitors (Including Combos) Protocol Failed - 8/7/2020  5:30 AM        Failed - Blood pressure under 140/90 in past 12 months     BP Readings from Last 3 Encounters:   04/20/20 (!) 162/60   01/24/20 (!) 148/84   12/26/19 (!) 156/70                 Failed - Normal serum creatinine on file in past 12 months     Recent Labs   Lab Test 05/28/20  0923  04/18/19  1024   CR 1.52*   < >  --    CREAT  --   --  1.1*    < > = values in this interval not displayed.       Ok to refill medication if creatinine is low          Passed - Recent (12 mo) or future (30 days) visit within the authorizing provider's specialty     Patient has had an office visit with the authorizing provider or a provider within the authorizing providers department within the previous 12 mos or has a future within next 30 days. See \"Patient Info\" tab in inbasket, or \"Choose Columns\" in Meds & Orders section of the refill encounter.              Passed - Medication is active on med list        Passed - Patient is age 18 or older        Passed - No active pregnancy on record        Passed - Normal serum potassium on file in past 12 months     Recent Labs   Lab Test 05/28/20  0923   POTASSIUM 4.1             Passed - No positive pregnancy test within past 12 months           Pallavi UNDERWOOD, RN, BSN    "

## 2020-08-13 ENCOUNTER — ANTICOAGULATION THERAPY VISIT (OUTPATIENT)
Dept: ANTICOAGULATION | Facility: CLINIC | Age: 75
End: 2020-08-13
Payer: COMMERCIAL

## 2020-08-13 DIAGNOSIS — Z86.73 HISTORY OF CVA (CEREBROVASCULAR ACCIDENT): ICD-10-CM

## 2020-08-13 DIAGNOSIS — Z79.01 LONG TERM CURRENT USE OF ANTICOAGULANT THERAPY: ICD-10-CM

## 2020-08-13 LAB
CAPILLARY BLOOD COLLECTION: NORMAL
INR PPP: 2 (ref 0.86–1.14)

## 2020-08-13 PROCEDURE — 99207 ZZC NO CHARGE NURSE ONLY: CPT

## 2020-08-13 PROCEDURE — 36416 COLLJ CAPILLARY BLOOD SPEC: CPT | Performed by: NURSE PRACTITIONER

## 2020-08-13 PROCEDURE — 85610 PROTHROMBIN TIME: CPT | Performed by: NURSE PRACTITIONER

## 2020-08-13 NOTE — PROGRESS NOTES
Anticoagulation Management    Unable to reach Bebe today.    Today's INR result of 2.00 is therapeutic (goal INR of 2.0-3.0).  Result received from: Clinic Lab    Follow up required to confirm warfarin dose taken and assess for changes    Left message to return call to [facility] once the message was received. Tentative plan is to continue maintenance dose and recheck INR in 4-6 weeks.       Anticoagulation clinic to follow up    Annie Adames RN

## 2020-08-13 NOTE — PROGRESS NOTES
ANTICOAGULATION FOLLOW-UP CLINIC VISIT    Patient Name:  Bebe Alfonso  Date:  8/13/2020  Contact Type:  Telephone    SUBJECTIVE:  Patient Findings     Comments:   Patient reports no changes in medication, activity, or diet (she states she does eat a lot of foods containing mayonnaise but this is not new for her) . Patient reports no changes in health and no recent illness. Patient reports warfarin was taken as instructed, no missed or extra doses. Patient reports no increased bruising or bleeding and no signs or symptoms of a blood clot. INR is therapeutic. Patient will continue maintenance dose of warfarin and recheck INR in 6 weeks, patient aware she can have INR checked sooner. Patient to call [facility] with any changes or concerns. Patient verbalized understanding of all instructions, denies questions or concerns at this time.                 Clinical Outcomes     Negatives:   Major bleeding event, Thromboembolic event, Anticoagulation-related hospital admission, Anticoagulation-related ED visit, Anticoagulation-related fatality    Comments:   Patient reports no changes in medication, activity, or diet (she states she does eat a lot of foods containing mayonnaise but this is not new for her) . Patient reports no changes in health and no recent illness. Patient reports warfarin was taken as instructed, no missed or extra doses. Patient reports no increased bruising or bleeding and no signs or symptoms of a blood clot. INR is therapeutic. Patient will continue maintenance dose of warfarin and recheck INR in 6 weeks, patient aware she can have INR checked sooner. Patient to call [facility] with any changes or concerns. Patient verbalized understanding of all instructions, denies questions or concerns at this time.                    OBJECTIVE    Recent labs: (last 7 days)     08/13/20  0849   INR 2.00*       ASSESSMENT / PLAN  INR assessment THER    Recheck INR In: 6 WEEKS    INR Location Outside lab      Anticoagulation  Summary  As of 2020    INR goal:   2.0-3.0   TTR:   86.7 % (1 y)   INR used for dosin.00 (2020)   Warfarin maintenance plan:   10 mg (5 mg x 2) every Mon; 7.5 mg (5 mg x 1.5) all other days   Full warfarin instructions:   10 mg every Mon; 7.5 mg all other days   Weekly warfarin total:   55 mg   No change documented:   Annie Adames RN   Plan last modified:   Natalie Sorto RN (2020)   Next INR check:   2020   Priority:   Maintenance   Target end date:   Indefinite    Indications    History of CVA (cerebrovascular accident) (Resolved) [Z86.73]  Long term current use of anticoagulant therapy [Z79.01]             Anticoagulation Episode Summary     INR check location:       Preferred lab:       Send INR reminders to:   [facility]    Comments:   * Pt states she has had 4 strokes (some after hip surgeries).       Anticoagulation Care Providers     Provider Role Specialty Phone number    Diana Ely, SELWYN [facility] [phone]            See the Encounter Report to view Anticoagulation Flowsheet and Dosing Calendar (Go to Encounters tab in chart review, and find the Anticoagulation Therapy Visit)      Annie Adames, YAW

## 2020-09-04 DIAGNOSIS — J44.1 COPD EXACERBATION (H): ICD-10-CM

## 2020-09-04 DIAGNOSIS — Z79.01 LONG TERM CURRENT USE OF ANTICOAGULANT THERAPY: ICD-10-CM

## 2020-09-04 DIAGNOSIS — Z86.73 HISTORY OF CVA (CEREBROVASCULAR ACCIDENT): ICD-10-CM

## 2020-09-04 RX ORDER — WARFARIN SODIUM 5 MG/1
TABLET ORAL
Qty: 145 TABLET | Refills: 0 | Status: SHIPPED | OUTPATIENT
Start: 2020-09-04 | End: 2020-09-24

## 2020-09-04 NOTE — TELEPHONE ENCOUNTER
Current warfarin dose:  Warfarin maintenance plan:   10 mg (5 mg x 2) every Mon; 7.5 mg (5 mg x 1.5) all other days   Full warfarin instructions:   10 mg every Mon; 7.5 mg all other days   Weekly warfarin total:   55 mg     Last INR result:  INR   Date Value Ref Range Status   08/13/2020 2.00 (H) 0.86 - 1.14 Final     Comment:     This test is intended for monitoring Coumadin therapy.  Results are not   accurate in patients with prolonged INR due to factor deficiency.       Last office visit: 4/20/20 with Kari August authorized per [facility] protocol.    Golden HART RN, CACP

## 2020-09-04 NOTE — TELEPHONE ENCOUNTER
No recent home BP monitoring.  Scheduled nurse BP visit 09-08-20, RC.  Has 1 wk supply left.  Await BP reading.  TIFFANIE Mann RN

## 2020-09-04 NOTE — TELEPHONE ENCOUNTER
"Routing refill request to provider for review/approval because:  Labs out of range  BP not at goal     Requested Prescriptions   Pending Prescriptions Disp Refills     amLODIPine (NORVASC) 5 MG tablet [Pharmacy Med Name: amLODIPine Besylate 5 MG Oral Tablet] 90 tablet 0     Sig: Take 1 tablet by mouth once daily       Calcium Channel Blockers Protocol  Failed - 9/4/2020  7:42 AM        Failed - Blood pressure under 140/90 in past 12 months     BP Readings from Last 3 Encounters:   04/20/20 (!) 162/60   01/24/20 (!) 148/84   12/26/19 (!) 156/70                 Failed - Normal serum creatinine on file in past 12 months     Recent Labs   Lab Test 05/28/20  0923  04/18/19  1024   CR 1.52*   < >  --    CREAT  --   --  1.1*    < > = values in this interval not displayed.       Ok to refill medication if creatinine is low          Passed - Recent (12 mo) or future (30 days) visit within the authorizing provider's specialty     Patient has had an office visit with the authorizing provider or a provider within the authorizing providers department within the previous 12 mos or has a future within next 30 days. See \"Patient Info\" tab in inbasket, or \"Choose Columns\" in Meds & Orders section of the refill encounter.              Passed - Medication is active on med list        Passed - Patient is age 18 or older        Passed - No active pregnancy on record        Passed - No positive pregnancy test in past 12 months           Pallavi UNDERWOOD RN, BSN        "

## 2020-09-08 ENCOUNTER — ALLIED HEALTH/NURSE VISIT (OUTPATIENT)
Dept: FAMILY MEDICINE | Facility: CLINIC | Age: 75
End: 2020-09-08
Payer: COMMERCIAL

## 2020-09-08 VITALS — SYSTOLIC BLOOD PRESSURE: 135 MMHG | DIASTOLIC BLOOD PRESSURE: 58 MMHG | HEART RATE: 68 BPM

## 2020-09-08 DIAGNOSIS — I10 BENIGN ESSENTIAL HYPERTENSION: Primary | ICD-10-CM

## 2020-09-08 PROCEDURE — 99207 ZZC NO CHARGE NURSE ONLY: CPT

## 2020-09-08 RX ORDER — AMLODIPINE BESYLATE 5 MG/1
TABLET ORAL
Qty: 90 TABLET | Refills: 0 | Status: SHIPPED | OUTPATIENT
Start: 2020-09-08 | End: 2020-12-16

## 2020-09-08 NOTE — TELEPHONE ENCOUNTER
BP Readings from Last 6 Encounters:   09/08/20 135/58   04/20/20 (!) 162/60   01/24/20 (!) 148/84   12/26/19 (!) 156/70   12/12/19 132/70   07/02/19 130/78

## 2020-09-24 ENCOUNTER — ANTICOAGULATION THERAPY VISIT (OUTPATIENT)
Dept: ANTICOAGULATION | Facility: CLINIC | Age: 75
End: 2020-09-24
Payer: COMMERCIAL

## 2020-09-24 DIAGNOSIS — Z86.73 HISTORY OF CVA (CEREBROVASCULAR ACCIDENT): ICD-10-CM

## 2020-09-24 DIAGNOSIS — Z79.01 LONG TERM CURRENT USE OF ANTICOAGULANT THERAPY: ICD-10-CM

## 2020-09-24 LAB
CAPILLARY BLOOD COLLECTION: NORMAL
INR PPP: 1.9 (ref 0.86–1.14)

## 2020-09-24 PROCEDURE — 85610 PROTHROMBIN TIME: CPT | Performed by: NURSE PRACTITIONER

## 2020-09-24 PROCEDURE — 36416 COLLJ CAPILLARY BLOOD SPEC: CPT | Performed by: NURSE PRACTITIONER

## 2020-09-24 PROCEDURE — 99207 ZZC NO CHARGE NURSE ONLY: CPT

## 2020-09-24 RX ORDER — WARFARIN SODIUM 5 MG/1
TABLET ORAL
Qty: 145 TABLET | Refills: 0
Start: 2020-09-24 | End: 2020-12-03

## 2020-09-24 NOTE — PROGRESS NOTES
ANTICOAGULATION FOLLOW-UP CLINIC VISIT    Patient Name:  Bebe Alfonso  Date:  2020  Contact Type:  Telephone    SUBJECTIVE:  Patient Findings     Positives:   Change in medications (tums)    Comments:   No changes in diet, activity level, or health. Patient did not eat extra greens or start any new vitamins/supplements besides TUMS PRN. No missed doses of warfarin. Patient took dosing as prescribed. No signs of clots or bleeding concerns. Since last INR was only 2.0 and she is 1.9 today, will increase maintenance dose by ~5%. Recheck in 2 weeks.        Clinical Outcomes     Negatives:   Major bleeding event, Thromboembolic event, Anticoagulation-related hospital admission, Anticoagulation-related ED visit, Anticoagulation-related fatality    Comments:   No changes in diet, activity level, or health. Patient did not eat extra greens or start any new vitamins/supplements besides TUMS PRN. No missed doses of warfarin. Patient took dosing as prescribed. No signs of clots or bleeding concerns. Since last INR was only 2.0 and she is 1.9 today, will increase maintenance dose by ~5%. Recheck in 2 weeks.           OBJECTIVE    Recent labs: (last 7 days)     20  0838   INR 1.90*       ASSESSMENT / PLAN  INR assessment SUB    Recheck INR In: 2 WEEKS    INR Location Clinic      Anticoagulation Summary  As of 2020    INR goal:   2.0-3.0   TTR:   76.6 % (1 y)   INR used for dosin.90! (2020)   Warfarin maintenance plan:   10 mg (5 mg x 2) every Mon, Thu; 7.5 mg (5 mg x 1.5) all other days   Full warfarin instructions:   10 mg every Mon, Thu; 7.5 mg all other days   Weekly warfarin total:   57.5 mg   Plan last modified:   Natalie Sorto RN (2020)   Next INR check:   10/8/2020   Priority:   High   Target end date:   Indefinite    Indications    History of CVA (cerebrovascular accident) (Resolved) [Z86.73]  Long term current use of anticoagulant therapy [Z79.01]             Anticoagulation Episode  Summary     INR check location:       Preferred lab:       Send INR reminders to:   [facility]    Comments:   * Pt states she has had 4 strokes (some after hip surgeries).       Anticoagulation Care Providers     Provider Role Specialty Phone number    Diana Ely APRN [facility] [phone]            See the Encounter Report to view Anticoagulation Flowsheet and Dosing Calendar (Go to Encounters tab in chart review, and find the Anticoagulation Therapy Visit)        Natalie Sorto RN

## 2020-10-08 ENCOUNTER — ANTICOAGULATION THERAPY VISIT (OUTPATIENT)
Dept: ANTICOAGULATION | Facility: CLINIC | Age: 75
End: 2020-10-08

## 2020-10-08 DIAGNOSIS — Z79.01 LONG TERM CURRENT USE OF ANTICOAGULANT THERAPY: ICD-10-CM

## 2020-10-08 DIAGNOSIS — Z86.73 HISTORY OF CVA (CEREBROVASCULAR ACCIDENT): ICD-10-CM

## 2020-10-08 LAB
CAPILLARY BLOOD COLLECTION: NORMAL
INR PPP: 2.3 (ref 0.86–1.14)

## 2020-10-08 PROCEDURE — 85610 PROTHROMBIN TIME: CPT | Performed by: NURSE PRACTITIONER

## 2020-10-08 PROCEDURE — 36416 COLLJ CAPILLARY BLOOD SPEC: CPT | Performed by: NURSE PRACTITIONER

## 2020-10-08 PROCEDURE — 99207 PR NO CHARGE NURSE ONLY: CPT

## 2020-10-08 NOTE — PROGRESS NOTES
ANTICOAGULATION FOLLOW-UP CLINIC VISIT    Patient Name:  Bebe Alfonso  Date:  10/8/2020  Contact Type:  Telephone/ DVM left    SUBJECTIVE:  Patient Findings     Comments:  Unable to reach patient. DVM left per demo notes okay. Writer asked patient to call [facility] back if any changes, questions or concerns. Recheck in 3 weeks.        Clinical Outcomes     Comments:  Unable to reach patient. DVM left per demo notes okay. Writer asked patient to call [facility] back if any changes, questions or concerns. Recheck in 3 weeks.           OBJECTIVE    Recent labs: (last 7 days)     10/08/20  0806   INR 2.30*       ASSESSMENT / PLAN  INR assessment THER    Recheck INR In: 3 WEEKS    INR Location Clinic      Anticoagulation Summary  As of 10/8/2020    INR goal:  2.0-3.0   TTR:  75.6 % (1 y)   INR used for dosin.30 (10/8/2020)   Warfarin maintenance plan:  10 mg (5 mg x 2) every Mon, Thu; 7.5 mg (5 mg x 1.5) all other days   Full warfarin instructions:  10 mg every Mon, Thu; 7.5 mg all other days   Weekly warfarin total:  57.5 mg   No change documented:  Natalie Sorto RN   Plan last modified:  Natalie Sorto RN (2020)   Next INR check:  10/29/2020   Priority:  Maintenance   Target end date:  Indefinite    Indications    History of CVA (cerebrovascular accident) (Resolved) [Z86.73]  Long term current use of anticoagulant therapy [Z79.01]             Anticoagulation Episode Summary     INR check location:      Preferred lab:      Send INR reminders to:  [facility]    Comments:  * Pt states she has had 4 strokes (some after hip surgeries).       Anticoagulation Care Providers     Provider Role Specialty Phone number    Diana Ely APRN [facility] Referring [facility] [phone]            See the Encounter Report to view Anticoagulation Flowsheet and Dosing Calendar (Go to Encounters tab in chart review, and find the Anticoagulation Therapy Visit)        Natalie Sorto RN

## 2020-10-29 ENCOUNTER — ANTICOAGULATION THERAPY VISIT (OUTPATIENT)
Dept: ANTICOAGULATION | Facility: CLINIC | Age: 75
End: 2020-10-29

## 2020-10-29 DIAGNOSIS — Z86.73 HISTORY OF CVA (CEREBROVASCULAR ACCIDENT): ICD-10-CM

## 2020-10-29 DIAGNOSIS — Z79.01 LONG TERM CURRENT USE OF ANTICOAGULANT THERAPY: ICD-10-CM

## 2020-10-29 LAB
CAPILLARY BLOOD COLLECTION: NORMAL
INR PPP: 2.5 (ref 0.86–1.14)

## 2020-10-29 PROCEDURE — 99207 PR NO CHARGE NURSE ONLY: CPT

## 2020-10-29 PROCEDURE — 85610 PROTHROMBIN TIME: CPT | Performed by: NURSE PRACTITIONER

## 2020-10-29 PROCEDURE — 36416 COLLJ CAPILLARY BLOOD SPEC: CPT | Performed by: NURSE PRACTITIONER

## 2020-10-29 NOTE — PROGRESS NOTES
ANTICOAGULATION FOLLOW-UP CLINIC VISIT    Patient Name:  Bebe Alfonso  Date:  10/29/2020  Contact Type:  Telephone    SUBJECTIVE:  Patient Findings     Positives:  Missed doses (Missed a dose over a week ago.)    Comments:  No changes in medications, activity, or diet noted. No concerns with clotting, bleeding, or increased bruising noted.  Patient is to continue maintenance warfarin plan, and check INR in 5 weeks.  Patient verbalizes understanding and agrees to plan. No further questions or concerns.        Clinical Outcomes     Negatives:  Major bleeding event, Thromboembolic event, Anticoagulation-related hospital admission, Anticoagulation-related ED visit, Anticoagulation-related fatality    Comments:  No changes in medications, activity, or diet noted. No concerns with clotting, bleeding, or increased bruising noted.  Patient is to continue maintenance warfarin plan, and check INR in 5 weeks.  Patient verbalizes understanding and agrees to plan. No further questions or concerns.           OBJECTIVE    Recent labs: (last 7 days)     10/29/20  0843   INR 2.50*       ASSESSMENT / PLAN  INR assessment THER    Recheck INR In: 5 WEEKS    INR Location Clinic      Anticoagulation Summary  As of 10/29/2020    INR goal:  2.0-3.0   TTR:  75.6 % (1 y)   INR used for dosin.50 (10/29/2020)   Warfarin maintenance plan:  10 mg (5 mg x 2) every Mon, Thu; 7.5 mg (5 mg x 1.5) all other days   Full warfarin instructions:  10 mg every Mon, Thu; 7.5 mg all other days   Weekly warfarin total:  57.5 mg   No change documented:  Deneen Pretty RN   Plan last modified:  Natalie Sorto RN (2020)   Next INR check:  12/3/2020   Priority:  Maintenance   Target end date:  Indefinite    Indications    History of CVA (cerebrovascular accident) (Resolved) [Z86.73]  Long term current use of anticoagulant therapy [Z79.01]             Anticoagulation Episode Summary     INR check location:      Preferred lab:      Send INR  reminders to:  [facility]    Comments:  * Pt states she has had 4 strokes (some after hip surgeries).       Anticoagulation Care Providers     Provider Role Specialty Phone number    Diana Ely APRN [facility] Referring Family Medicine [phone]            See the Encounter Report to view Anticoagulation Flowsheet and Dosing Calendar (Go to Encounters tab in chart review, and find the Anticoagulation Therapy Visit)        Deneen Pretty RN

## 2020-11-03 DIAGNOSIS — I10 ESSENTIAL HYPERTENSION: ICD-10-CM

## 2020-11-03 RX ORDER — ENALAPRIL MALEATE 5 MG/1
TABLET ORAL
Qty: 45 TABLET | Refills: 1 | Status: SHIPPED | OUTPATIENT
Start: 2020-11-03 | End: 2021-05-07

## 2020-11-03 NOTE — TELEPHONE ENCOUNTER
Prescription approved per [facility] Refill Protocol.  
COPD exacerbation

## 2020-11-20 ENCOUNTER — TELEPHONE (OUTPATIENT)
Dept: FAMILY MEDICINE | Facility: CLINIC | Age: 75
End: 2020-11-20

## 2020-11-20 NOTE — TELEPHONE ENCOUNTER
Reason for call:  Patient reporting a symptom    Symptom or request: Pt says she was exposed to her daughter on Tuesday who has covid. Bebe has no symptoms at this time but wonders what we recommend she do.       Phone Number patient can be reached at:  Home number on file [phone] (home)    Best Time:  anytime    Can we leave a detailed message on this number:  YES    Call taken on 11/20/2020 at 8:02 AM by Danielle Mayo

## 2020-11-20 NOTE — TELEPHONE ENCOUNTER
Dtr tested for COVID Mon, rec'd pos results Tues.  Pt in close contact with dtr Tues prior to learning of pos results. Pt asymptomatic.  Advised [email or web address] for virtual visit, strict quarantine and follow COVID infection control precautions.  TIFFANIE Mann RN

## 2020-11-29 ENCOUNTER — HEALTH MAINTENANCE LETTER (OUTPATIENT)
Age: 75
End: 2020-11-29

## 2020-12-03 ENCOUNTER — ANTICOAGULATION THERAPY VISIT (OUTPATIENT)
Dept: ANTICOAGULATION | Facility: CLINIC | Age: 75
End: 2020-12-03

## 2020-12-03 DIAGNOSIS — Z79.01 LONG TERM CURRENT USE OF ANTICOAGULANT THERAPY: ICD-10-CM

## 2020-12-03 DIAGNOSIS — Z86.73 HISTORY OF CVA (CEREBROVASCULAR ACCIDENT): ICD-10-CM

## 2020-12-03 LAB
CAPILLARY BLOOD COLLECTION: NORMAL
INR PPP: 3.6 (ref 0.86–1.14)

## 2020-12-03 PROCEDURE — 85610 PROTHROMBIN TIME: CPT | Performed by: NURSE PRACTITIONER

## 2020-12-03 PROCEDURE — 36416 COLLJ CAPILLARY BLOOD SPEC: CPT | Performed by: NURSE PRACTITIONER

## 2020-12-03 RX ORDER — WARFARIN SODIUM 5 MG/1
TABLET ORAL
Qty: 145 TABLET | Refills: 0 | Status: SHIPPED | OUTPATIENT
Start: 2020-12-03 | End: 2021-03-04

## 2020-12-03 NOTE — PROGRESS NOTES
ANTICOAGULATION MANAGEMENT     Patient Name:  Bebe Alfonso  Date:  12/3/2020    ASSESSMENT /SUBJECTIVE:    Today's INR result of 3.60 is supratherapeutic. Goal INR of 2.0-3.0      Warfarin dose taken: Warfarin taken as instructed    Diet: Decreased greens/vitamin K in diet which may be affecting INR; plans to resume previous intake    Medication changes/ interactions: No new medications/supplements affecting INR    Previous INR: Therapeutic     S/S of bleeding or thromboembolism: No    New injury or illness: No    Upcoming surgery, procedure or cardioversion: No    Additional findings: Patient plans to eat greens today (including spinach)      PLAN:    Telephone call with Bebe regarding INR result and instructed:     Warfarin Dosing Instructions: Take 5 mg today then continue your current warfarin dose of 10 mg every Mon, Thu; 7.5 mg all other days    Instructed patient to follow up no later than: 2 weeks  Lab visit scheduled    Education provided: Importance of consistent vitamin K intake, Target INR goal and significance of current INR result, Monitoring for bleeding signs and symptoms and When to seek medical attention/emergency care      Bebe verbalizes understanding and agrees to warfarin dosing plan.    Instructed to call the Anticoagulation Clinic for any changes, questions or concerns. (#[phone])        Annie Adames RN      OBJECTIVE:  Recent labs: (last 7 days)     12/03/20  0800   INR 3.60*         No question data found.  Anticoagulation Summary  As of 12/3/2020    INR goal:  2.0-3.0   TTR:  70.4 % (1 y)   INR used for dosing:  3.60 (12/3/2020)   Warfarin maintenance plan:  10 mg (5 mg x 2) every Mon, Thu; 7.5 mg (5 mg x 1.5) all other days   Full warfarin instructions:  12/3: 5 mg; Otherwise 10 mg every Mon, Thu; 7.5 mg all other days   Weekly warfarin total:  57.5 mg   Plan last modified:  Natalie Sorto RN (9/24/2020)   Next INR check:  12/17/2020   Priority:  Maintenance    Target end date:  Indefinite    Indications    History of CVA (cerebrovascular accident) (Resolved) [Z86.73]  Long term current use of anticoagulant therapy [Z79.01]             Anticoagulation Episode Summary     INR check location:      Preferred lab:      Send INR reminders to:  [facility]    Comments:  * Pt states she has had 4 strokes (some after hip surgeries).       Anticoagulation Care Providers     Provider Role Specialty Phone number    Diana Ely APRN [facility] Referring Family Medicine [phone]

## 2020-12-14 DIAGNOSIS — J44.1 COPD EXACERBATION (H): ICD-10-CM

## 2020-12-14 DIAGNOSIS — E78.5 HYPERLIPIDEMIA LDL GOAL <70: ICD-10-CM

## 2020-12-16 RX ORDER — ATORVASTATIN CALCIUM 40 MG/1
TABLET, FILM COATED ORAL
Qty: 90 TABLET | Refills: 0 | Status: SHIPPED | OUTPATIENT
Start: 2020-12-16 | End: 2021-03-22

## 2020-12-16 RX ORDER — AMLODIPINE BESYLATE 5 MG/1
TABLET ORAL
Qty: 90 TABLET | Refills: 0 | Status: SHIPPED | OUTPATIENT
Start: 2020-12-16 | End: 2021-03-05

## 2020-12-17 ENCOUNTER — ANTICOAGULATION THERAPY VISIT (OUTPATIENT)
Dept: ANTICOAGULATION | Facility: CLINIC | Age: 75
End: 2020-12-17

## 2020-12-17 DIAGNOSIS — Z79.01 LONG TERM CURRENT USE OF ANTICOAGULANT THERAPY: ICD-10-CM

## 2020-12-17 DIAGNOSIS — Z86.73 HISTORY OF CVA (CEREBROVASCULAR ACCIDENT): ICD-10-CM

## 2020-12-17 LAB
CAPILLARY BLOOD COLLECTION: NORMAL
INR PPP: 1.8 (ref 0.86–1.14)

## 2020-12-17 PROCEDURE — 85610 PROTHROMBIN TIME: CPT | Performed by: NURSE PRACTITIONER

## 2020-12-17 PROCEDURE — 36416 COLLJ CAPILLARY BLOOD SPEC: CPT | Performed by: NURSE PRACTITIONER

## 2020-12-17 PROCEDURE — 99207 PR NO CHARGE NURSE ONLY: CPT

## 2020-12-17 NOTE — PROGRESS NOTES
Anticoagulation Management    Unable to reach Bebe today.    Today's INR result of 1.8 is subtherapeutic (goal INR of 2.0-3.0).  Result received from: Clinic Lab    Follow up required to confirm warfarin dose taken and assess for changes    non detailed VM left and kaelt sent to reply or call [facility]      Anticoagulation clinic to follow up    Natalie Sorto RN    ANTICOAGULATION FOLLOW-UP CLINIC VISIT    Patient Name:  Bebe Alfonso  Date:  2020  Contact Type:  Telephone    SUBJECTIVE:  Patient Findings     Positives:  Missed doses    Comments:  Patient called [facility] back. She did miss a dose this week. Writer educated patient about what to do if she misses a dose. Patient is worried she will be supra therapeutic if she resumes current maintenance dose. She would like to recheck INR next week before [place]. Writer double booked on  at [facility] with another INR spot. Will have pt take a small booster dose today as she does have hx of multiple strokes.         Clinical Outcomes     Comments:  Patient called [facility] back. She did miss a dose this week. Writer educated patient about what to do if she misses a dose. Patient is worried she will be supra therapeutic if she resumes current maintenance dose. She would like to recheck INR next week before [place]. Writer double booked on  at [facility] with another INR spot. Will have pt take a small booster dose today as she does have hx of multiple strokes.            OBJECTIVE    Recent labs: (last 7 days)     20  0850   INR 1.80*       ASSESSMENT / PLAN  INR assessment SUB    Recheck INR In: 6 DAYS    INR Location Clinic      Anticoagulation Summary  As of 2020    INR goal:  2.0-3.0   TTR:  71.6 % (1 y)   INR used for dosin.80 (2020)   Warfarin maintenance plan:  10 mg (5 mg x 2) every Mon, Thu; 7.5 mg (5 mg x 1.5) all other days   Full warfarin instructions:  : 15 mg; Otherwise 10 mg every Mon, Thu; 7.5 mg all other days   Weekly  warfarin total:  57.5 mg   Plan last modified:  Natalie Sorto RN (9/24/2020)   Next INR check:  12/23/2020   Priority:  High   Target end date:  Indefinite    Indications    History of CVA (cerebrovascular accident) (Resolved) [Z86.73]  Long term current use of anticoagulant therapy [Z79.01]             Anticoagulation Episode Summary     INR check location:      Preferred lab:      Send INR reminders to:  [facility]    Comments:  * Pt states she has had 4 strokes (some after hip surgeries).       Anticoagulation Care Providers     Provider Role Specialty Phone number    Diana Ely APRN [facility] Referring Family Medicine [phone]            See the Encounter Report to view Anticoagulation Flowsheet and Dosing Calendar (Go to Encounters tab in chart review, and find the Anticoagulation Therapy Visit)        Natalie Sorto RN

## 2020-12-23 ENCOUNTER — ANTICOAGULATION THERAPY VISIT (OUTPATIENT)
Dept: ANTICOAGULATION | Facility: CLINIC | Age: 75
End: 2020-12-23

## 2020-12-23 DIAGNOSIS — Z79.01 LONG TERM CURRENT USE OF ANTICOAGULANT THERAPY: ICD-10-CM

## 2020-12-23 DIAGNOSIS — Z86.73 HISTORY OF CVA (CEREBROVASCULAR ACCIDENT): ICD-10-CM

## 2020-12-23 LAB
CAPILLARY BLOOD COLLECTION: NORMAL
INR PPP: 2.7 (ref 0.86–1.14)

## 2020-12-23 PROCEDURE — 36416 COLLJ CAPILLARY BLOOD SPEC: CPT | Performed by: NURSE PRACTITIONER

## 2020-12-23 PROCEDURE — 85610 PROTHROMBIN TIME: CPT | Performed by: NURSE PRACTITIONER

## 2020-12-23 PROCEDURE — 99207 PR NO CHARGE NURSE ONLY: CPT

## 2020-12-23 NOTE — PROGRESS NOTES
VM left for pt to return call to [facility] [phone]. Dosing instructions not given to pt.  Tentative plan: recheck INR in 2 weeks.  Deneen Pretty RN on 12/23/2020 at 12:40 PM

## 2020-12-23 NOTE — PROGRESS NOTES
ANTICOAGULATION FOLLOW-UP CLINIC VISIT    Patient Name:  Bebe Alfonso  Date:  2020  Contact Type:  Telephone    SUBJECTIVE:  Patient Findings     Positives:  Change in activity (Less activity)    Comments:  No changes in medications or diet noted. No concerns with clotting, bleeding, or increased bruising noted. Took warfarin as prescribed.  Patient is to continue maintenance warfarin plan, and check INR in 2 weeks.  Patient verbalizes understanding and agrees to plan. No further questions or concerns.        Clinical Outcomes     Negatives:  Major bleeding event, Thromboembolic event, Anticoagulation-related hospital admission, Anticoagulation-related ED visit, Anticoagulation-related fatality    Comments:  No changes in medications or diet noted. No concerns with clotting, bleeding, or increased bruising noted. Took warfarin as prescribed.  Patient is to continue maintenance warfarin plan, and check INR in 2 weeks.  Patient verbalizes understanding and agrees to plan. No further questions or concerns.           OBJECTIVE    Recent labs: (last 7 days)     20  1034   INR 2.70*       ASSESSMENT / PLAN  INR assessment THER    Recheck INR In: 2 WEEKS    INR Location Clinic      Anticoagulation Summary  As of 2020    INR goal:  2.0-3.0   TTR:  71.2 % (1 y)   INR used for dosin.70 (2020)   Warfarin maintenance plan:  10 mg (5 mg x 2) every Mon, Thu; 7.5 mg (5 mg x 1.5) all other days   Full warfarin instructions:  10 mg every Mon, Thu; 7.5 mg all other days   Weekly warfarin total:  57.5 mg   No change documented:  Deneen Pretty RN   Plan last modified:  Natalie Sorto RN (2020)   Next INR check:  2021   Priority:  Maintenance   Target end date:  Indefinite    Indications    History of CVA (cerebrovascular accident) (Resolved) [Z86.73]  Long term current use of anticoagulant therapy [Z79.01]             Anticoagulation Episode Summary     INR check location:      Preferred  lab:      Send INR reminders to:  [facility]    Comments:  * Pt states she has had 4 strokes (some after hip surgeries).       Anticoagulation Care Providers     Provider Role Specialty Phone number    Diana Ely APRN [facility] Referring Family Medicine [phone]            See the Encounter Report to view Anticoagulation Flowsheet and Dosing Calendar (Go to Encounters tab in chart review, and find the Anticoagulation Therapy Visit)        Deneen Pretty RN

## 2021-01-08 ENCOUNTER — ANTICOAGULATION THERAPY VISIT (OUTPATIENT)
Dept: NURSING | Facility: CLINIC | Age: 76
End: 2021-01-08

## 2021-01-08 DIAGNOSIS — Z86.73 HISTORY OF CVA (CEREBROVASCULAR ACCIDENT): ICD-10-CM

## 2021-01-08 DIAGNOSIS — Z79.01 LONG TERM CURRENT USE OF ANTICOAGULANT THERAPY: ICD-10-CM

## 2021-01-08 LAB
CAPILLARY BLOOD COLLECTION: NORMAL
INR PPP: 3.1 (ref 0.86–1.14)

## 2021-01-08 PROCEDURE — 85610 PROTHROMBIN TIME: CPT | Performed by: NURSE PRACTITIONER

## 2021-01-08 PROCEDURE — 36416 COLLJ CAPILLARY BLOOD SPEC: CPT | Performed by: NURSE PRACTITIONER

## 2021-01-08 NOTE — PROGRESS NOTES
ANTICOAGULATION MANAGEMENT     Patient Name:  Bebe Alfonso  Date:  1/8/2021    ASSESSMENT /SUBJECTIVE:    Today's INR result of 3.10 is supratherapeutic. Goal INR of 2.0-3.0      Warfarin dose taken: Warfarin taken as instructed    Diet: Decreased greens/vitamin K in diet; plans to resume previous intake    Medication changes/ interactions: No new medications/supplements affecting INR    Previous INR: Therapeutic     S/S of bleeding or thromboembolism: No    New injury or illness: No    Upcoming surgery, procedure or cardioversion: No    Additional findings: None      PLAN:    Telephone call with Bebe regarding INR result and instructed:     Warfarin Dosing Instructions: Continue your current warfarin dose 10 mg Mon, Fri; 7.5 mg all other days    Instructed patient to follow up no later than: 2 weeks  Lab visit scheduled    Education provided: None required      Bebe verbalizes understanding and agrees to warfarin dosing plan.    Instructed to call the Anticoagulation Clinic for any changes, questions or concerns. (#[phone])        Natalie Springer RN      OBJECTIVE:  Recent labs: (last 7 days)     01/08/21  0853   INR 3.10*         INR assessment SUPRA    Recheck INR In: 2 WEEKS    INR Location Clinic      Anticoagulation Summary  As of 1/8/2021    INR goal:  2.0-3.0   TTR:  72.8 % (1 y)   INR used for dosing:  3.10 (1/8/2021)   Warfarin maintenance plan:  10 mg (5 mg x 2) every Mon, Thu; 7.5 mg (5 mg x 1.5) all other days   Full warfarin instructions:  10 mg every Mon, Thu; 7.5 mg all other days   Weekly warfarin total:  57.5 mg   No change documented:  Natalie Springer RN   Plan last modified:  Natalie Sorto RN (9/24/2020)   Next INR check:  1/22/2021   Priority:  Maintenance   Target end date:  Indefinite    Indications    History of CVA (cerebrovascular accident) (Resolved) [Z86.73]  Long term current use of anticoagulant therapy [Z79.01]             Anticoagulation Episode Summary      INR check location:      Preferred lab:      Send INR reminders to:  [facility]    Comments:  * Pt states she has had 4 strokes (some after hip surgeries).       Anticoagulation Care Providers     Provider Role Specialty Phone number    Diana Ely APRN [facility] Referring Family Medicine [phone]

## 2021-01-08 NOTE — PROGRESS NOTES
Anticoagulation Management    Unable to reach Bebe today.    Today's INR result of 3.10 is supratherapeutic (goal INR of 2.0-3.0).  Result received from: Clinic Lab    Follow up required to confirm warfarin dose taken and assess for changes    [facility]      Anticoagulation clinic to follow up    Natalie Springer RN  [phone]

## 2021-01-22 ENCOUNTER — ANTICOAGULATION THERAPY VISIT (OUTPATIENT)
Dept: ANTICOAGULATION | Facility: CLINIC | Age: 76
End: 2021-01-22

## 2021-01-22 DIAGNOSIS — Z86.73 HISTORY OF CVA (CEREBROVASCULAR ACCIDENT): ICD-10-CM

## 2021-01-22 DIAGNOSIS — Z79.01 LONG TERM CURRENT USE OF ANTICOAGULANT THERAPY: ICD-10-CM

## 2021-01-22 LAB
CAPILLARY BLOOD COLLECTION: NORMAL
INR PPP: 2.5 (ref 0.86–1.14)

## 2021-01-22 PROCEDURE — 85610 PROTHROMBIN TIME: CPT | Performed by: NURSE PRACTITIONER

## 2021-01-22 PROCEDURE — 36416 COLLJ CAPILLARY BLOOD SPEC: CPT | Performed by: NURSE PRACTITIONER

## 2021-01-22 NOTE — PROGRESS NOTES
ANTICOAGULATION MANAGEMENT     Patient Name:  Bebe Alfonso  Date:  2021    ASSESSMENT /SUBJECTIVE:    Today's INR result of 2.5 is therapeutic. Goal INR of 2.0-3.0      Warfarin dose taken: Warfarin taken as instructed    Diet: No new diet changes affecting INR    Medication changes/ interactions: No new medications/supplements affecting INR    Previous INR: Supra therapeutic     S/S of bleeding or thromboembolism: No    New injury or illness: No    Upcoming surgery, procedure or cardioversion: No    Additional findings: None      PLAN:    Telephone call with Bebe regarding INR result and instructed:     Warfarin Dosing Instructions: Continue your current warfarin dose 10 mg M TH; 7.5 mg ROW    Instructed patient to follow up no later than: 4 weeks  Lab appointment scheduled    Education provided: None required      Elif verbalizes understanding and agrees to warfarin dosing plan.    Instructed to call the Anticoagulation Clinic for any changes, questions or concerns. (#[phone])        Reyna Olmedo RN      OBJECTIVE:  Recent labs: (last 7 days)     21  0856   INR 2.50*         No question data found.  Anticoagulation Summary  As of 2021    INR goal:  2.0-3.0   TTR:  76.0 % (1 y)   INR used for dosin.50 (2021)   Warfarin maintenance plan:  10 mg (5 mg x 2) every Mon, Thu; 7.5 mg (5 mg x 1.5) all other days   Full warfarin instructions:  10 mg every Mon, Thu; 7.5 mg all other days   Weekly warfarin total:  57.5 mg   No change documented:  Justin Naidu RN   Plan last modified:  Natalie Sorto RN (2020)   Next INR check:  2021   Priority:  Maintenance   Target end date:  Indefinite    Indications    History of CVA (cerebrovascular accident) (Resolved) [Z86.73]  Long term current use of anticoagulant therapy [Z79.01]             Anticoagulation Episode Summary     INR check location:      Preferred lab:      Send INR reminders to:  [facility]     Comments:  * Pt states she has had 4 strokes (some after hip surgeries).       Anticoagulation Care Providers     Provider Role Specialty Phone number    Diana Ely APRN CNP Referring Family Medicine [phone]

## 2021-01-22 NOTE — PROGRESS NOTES
3:21 PM    Patient was taking a nap. Spouse stated he would have her call back. Writer left the [place] # to call back.    Justin Naidu RN, BSN, PHN  Anticoagulation Clinic   [phone]        Anticoagulation Management    Unable to reach Bebe today.    Today's INR result of 2.50 is therapeutic (goal INR of 2.0-3.0).  Result received from: Clinic Lab    Follow up required to confirm warfarin dose taken and assess for changes    Left VM requesting a call back to verify dosing and set up the next INR. Plan to continue on maintenance dose and reschedule for 4 weeks.  May tx to [phone].       Anticoagulation clinic to follow up    Justin Naidu RN

## 2021-02-18 ENCOUNTER — ANTICOAGULATION THERAPY VISIT (OUTPATIENT)
Dept: ANTICOAGULATION | Facility: CLINIC | Age: 76
End: 2021-02-18

## 2021-02-18 DIAGNOSIS — Z86.73 HISTORY OF CVA (CEREBROVASCULAR ACCIDENT): ICD-10-CM

## 2021-02-18 DIAGNOSIS — Z79.01 LONG TERM CURRENT USE OF ANTICOAGULANT THERAPY: ICD-10-CM

## 2021-02-18 LAB
CAPILLARY BLOOD COLLECTION: NORMAL
INR PPP: 2.5 (ref 0.86–1.14)

## 2021-02-18 PROCEDURE — 85610 PROTHROMBIN TIME: CPT | Performed by: NURSE PRACTITIONER

## 2021-02-18 PROCEDURE — 36416 COLLJ CAPILLARY BLOOD SPEC: CPT | Performed by: NURSE PRACTITIONER

## 2021-02-18 NOTE — PROGRESS NOTES
Anticoagulation Management    Unable to reach Bebe today.    Today's INR result of 2.50 is therapeutic (goal INR of 2.0-3.0).  Result received from: Clinic Lab    Follow up required to confirm warfarin dose taken and assess for changes    Left message requesting patient return call to [facility]. No dosing instructions given. If no changes/concerns tentative plan is to continue same dose and recheck INR in 4-5 weeks. [place] message also sent to patient with dosing.       Anticoagulation clinic to follow up    Annie Adames RN

## 2021-02-19 NOTE — PROGRESS NOTES
ANTICOAGULATION MANAGEMENT     Patient Name:  Bebe Alfonso  Date:  2021    ASSESSMENT /SUBJECTIVE:    Today's INR result of 2.50 is therapeutic. Goal INR of 2.0-3.0      Warfarin dose taken: Warfarin taken as instructed    Diet: No new diet changes affecting INR    Medication changes/ interactions: No new medications/supplements affecting INR    Previous INR: Therapeutic 2.50    S/S of bleeding or thromboembolism: No    New injury or illness: No    Upcoming surgery, procedure or cardioversion: No    Additional findings: None      PLAN:    Telephone call with Bebe regarding INR result and instructed:     Warfarin Dosing Instructions: Continue your current warfarin dose 10mg Mon/Thurs; 7.5mg all other days    Instructed patient to follow up no later than: 5 weeks  Lab visit scheduled    Education provided: Importance of notifying clinic for changes in medications or health; a sooner lab recheck maybe needed       Bebe verbalizes understanding and agrees to warfarin dosing plan.    Instructed to call the Anticoagulation Clinic for any changes, questions or concerns. (#[phone])        Polina Ryan RN CACP       OBJECTIVE:  Recent labs: (last 7 days)     21  0940   INR 2.50*         INR assessment THER    Recheck INR In: 5 WEEKS    INR Location Clinic      Anticoagulation Summary  As of 2021    INR goal:  2.0-3.0   TTR:  76.0 % (1 y)   INR used for dosin.50 (2021)   Warfarin maintenance plan:  10 mg (5 mg x 2) every Mon, Thu; 7.5 mg (5 mg x 1.5) all other days   Full warfarin instructions:  10 mg every Mon, Thu; 7.5 mg all other days   Weekly warfarin total:  57.5 mg   No change documented:  Annie Adames RN   Plan last modified:  Natalie Sorto RN (2020)   Next INR check:  3/25/2021   Priority:  Maintenance   Target end date:  Indefinite    Indications    History of CVA (cerebrovascular accident) (Resolved) [Z86.73]  Long term current use of anticoagulant  therapy [Z79.01]             Anticoagulation Episode Summary     INR check location:      Preferred lab:      Send INR reminders to:  [facility]    Comments:  * Pt states she has had 4 strokes (some after hip surgeries).       Anticoagulation Care Providers     Provider Role Specialty Phone number    Diana Ely APRN [facility] Referring Family Medicine [phone]

## 2021-03-04 DIAGNOSIS — Z79.01 LONG TERM CURRENT USE OF ANTICOAGULANT THERAPY: ICD-10-CM

## 2021-03-04 DIAGNOSIS — Z86.73 HISTORY OF CVA (CEREBROVASCULAR ACCIDENT): ICD-10-CM

## 2021-03-04 DIAGNOSIS — J44.1 COPD EXACERBATION (H): ICD-10-CM

## 2021-03-04 RX ORDER — WARFARIN SODIUM 5 MG/1
TABLET ORAL
Qty: 145 TABLET | Refills: 0 | Status: SHIPPED | OUTPATIENT
Start: 2021-03-04 | End: 2021-06-04

## 2021-03-04 RX ORDER — WARFARIN SODIUM 5 MG/1
TABLET ORAL
Qty: 145 TABLET | Refills: 0 | Status: CANCELLED | OUTPATIENT
Start: 2021-03-04

## 2021-03-04 NOTE — TELEPHONE ENCOUNTER
Current warfarin dose:  Warfarin maintenance plan:  10 mg (5 mg x 2) every Mon, Thu; 7.5 mg (5 mg x 1.5) all other days   Full warfarin instructions:  10 mg every Mon, Thu; 7.5 mg all other days   Weekly warfarin total:  57.5 mg         Last office visit: 4/20/2020    Last INR result:  INR   Date Value Ref Range Status   02/18/2021 2.50 (H) 0.86 - 1.14 Final     Comment:     This test is intended for monitoring Coumadin therapy.  Results are not   accurate in patients with prolonged INR due to factor deficiency.         Refill authorized per [facility] protocol.    TIFFANIE Adames RN BSN

## 2021-03-05 RX ORDER — AMLODIPINE BESYLATE 5 MG/1
TABLET ORAL
Qty: 90 TABLET | Refills: 0 | Status: SHIPPED | OUTPATIENT
Start: 2021-03-05 | End: 2021-06-08

## 2021-03-19 DIAGNOSIS — E78.5 HYPERLIPIDEMIA LDL GOAL <70: ICD-10-CM

## 2021-03-22 NOTE — TELEPHONE ENCOUNTER
Routing refill request to provider for review/approval because:  Labs not current:  Lipids   Scheduled for lab appt 3/25/21 for INR  Lab Results   Component Value Date    CHOL 180 04/18/2019     Lab Results   Component Value Date    HDL 72 04/18/2019     Lab Results   Component Value Date    LDL 83 04/18/2019     Lab Results   Component Value Date    TRIG 125 04/18/2019     No results found for: BRANDI UNDERWOOD RN, BSN

## 2021-03-23 RX ORDER — ATORVASTATIN CALCIUM 40 MG/1
TABLET, FILM COATED ORAL
Qty: 90 TABLET | Refills: 0 | Status: SHIPPED | OUTPATIENT
Start: 2021-03-23 | End: 2021-06-16

## 2021-03-25 ENCOUNTER — ANTICOAGULATION THERAPY VISIT (OUTPATIENT)
Dept: ANTICOAGULATION | Facility: CLINIC | Age: 76
End: 2021-03-25

## 2021-03-25 ENCOUNTER — DOCUMENTATION ONLY (OUTPATIENT)
Dept: FAMILY MEDICINE | Facility: CLINIC | Age: 76
End: 2021-03-25

## 2021-03-25 DIAGNOSIS — Z79.01 LONG TERM CURRENT USE OF ANTICOAGULANT THERAPY: ICD-10-CM

## 2021-03-25 DIAGNOSIS — I63.20 CEREBROVASCULAR ACCIDENT (CVA) DUE TO OCCLUSION OF PRECEREBRAL ARTERY (H): Primary | Chronic | ICD-10-CM

## 2021-03-25 DIAGNOSIS — Z86.73 HISTORY OF CVA (CEREBROVASCULAR ACCIDENT): ICD-10-CM

## 2021-03-25 DIAGNOSIS — E78.5 HYPERLIPIDEMIA LDL GOAL <70: ICD-10-CM

## 2021-03-25 LAB
CAPILLARY BLOOD COLLECTION: NORMAL
INR PPP: 2.2 (ref 0.86–1.14)

## 2021-03-25 PROCEDURE — 85610 PROTHROMBIN TIME: CPT | Performed by: NURSE PRACTITIONER

## 2021-03-25 PROCEDURE — 36416 COLLJ CAPILLARY BLOOD SPEC: CPT | Performed by: NURSE PRACTITIONER

## 2021-03-25 NOTE — PROGRESS NOTES
ANTICOAGULATION MANAGEMENT     Patient Name:  Bebe Alfonso  Date:  3/25/2021    ASSESSMENT /SUBJECTIVE:    Today's INR result of 2.20 is therapeutic. Goal INR of 2.0-3.0      Warfarin dose taken: Warfarin taken as instructed    Diet: No new diet changes affecting INR    Medication changes/ interactions: No new medications/supplements affecting INR    Previous INR: Therapeutic 2.50    S/S of bleeding or thromboembolism: No    New injury or illness: No    Upcoming surgery, procedure or cardioversion: No    Additional findings: None      PLAN:    Telephone call with Bebe regarding INR result and instructed:     Warfarin Dosing Instructions: Continue your current warfarin dose 10mg Mon/Thurs; 7.5mg all other days    Instructed patient to follow up no later than: 5 weeks  Lab visit scheduled    Education provided: Importance of notifying clinic for changes in medications or health; a sooner lab recheck maybe needed       Bebe verbalizes understanding and agrees to warfarin dosing plan.    Instructed to call the Anticoagulation Clinic for any changes, questions or concerns. (#[phone])        Polina Ryan RN CACP       OBJECTIVE:  Recent labs: (last 7 days)     21  0859   INR 2.20*         INR assessment THER    Recheck INR In: 5 WEEKS    INR Location Clinic      Anticoagulation Summary  As of 3/25/2021    INR goal:  2.0-3.0   TTR:  76.0 % (1 y)   INR used for dosin.20 (3/25/2021)   Warfarin maintenance plan:  10 mg (5 mg x 2) every Mon, Thu; 7.5 mg (5 mg x 1.5) all other days   Full warfarin instructions:  10 mg every Mon, Thu; 7.5 mg all other days   Weekly warfarin total:  57.5 mg   No change documented:  Polina Ryan RN   Plan last modified:  Natalie Sorto RN (2020)   Next INR check:  2021   Priority:  Maintenance   Target end date:  Indefinite    Indications    History of CVA (cerebrovascular accident) (Resolved) [Z86.73]  Long term current use of anticoagulant  therapy [Z79.01]             Anticoagulation Episode Summary     INR check location:      Preferred lab:      Send INR reminders to:  [facility]    Comments:  * Pt states she has had 4 strokes (some after hip surgeries).       Anticoagulation Care Providers     Provider Role Specialty Phone number    Diana Ely APRN [facility] Referring Family Medicine [phone]

## 2021-03-25 NOTE — PROGRESS NOTES
ANTICOAGULATION MANAGEMENT      Bebe Alfonso due for annual renewal of referral to anticoagulation monitoring. Order pended for your review and signature.      ANTICOAGULATION SUMMARY      Warfarin indication(s)     Hx of VTE    Heart valve present?  NO       Current goal range   INR: 2.0-3.0     Goal appropriate for indication? Yes, INR 2-3 appropriate for hx of DVT, PE, hypercoagulable state, Afib, LVAD, or bileaflet AVR without risk factors     Current duration of therapy Indefinite/long term therapy   Time in Therapeutic Range (TTR)  (Goal > 60%) 76.0 %       Office visit with referring provider's group within last year Saw PCP on 1/24/2020  Last provider visit was 4/20/20 with Kari Ryan RN CACP

## 2021-04-01 ENCOUNTER — IMMUNIZATION (OUTPATIENT)
Dept: FAMILY MEDICINE | Facility: CLINIC | Age: 76
End: 2021-04-01
Payer: COMMERCIAL

## 2021-04-01 PROCEDURE — 0011A PR COVID VAC MODERNA 100 MCG/0.5 ML IM: CPT

## 2021-04-01 PROCEDURE — 91301 PR COVID VAC MODERNA 100 MCG/0.5 ML IM: CPT

## 2021-04-29 ENCOUNTER — IMMUNIZATION (OUTPATIENT)
Dept: FAMILY MEDICINE | Facility: CLINIC | Age: 76
End: 2021-04-29
Attending: FAMILY MEDICINE
Payer: COMMERCIAL

## 2021-04-29 ENCOUNTER — ANTICOAGULATION THERAPY VISIT (OUTPATIENT)
Dept: ANTICOAGULATION | Facility: CLINIC | Age: 76
End: 2021-04-29

## 2021-04-29 DIAGNOSIS — Z79.01 LONG TERM CURRENT USE OF ANTICOAGULANT THERAPY: ICD-10-CM

## 2021-04-29 DIAGNOSIS — I63.20 CEREBROVASCULAR ACCIDENT (CVA) DUE TO OCCLUSION OF PRECEREBRAL ARTERY (H): ICD-10-CM

## 2021-04-29 DIAGNOSIS — I63.20 CEREBROVASCULAR ACCIDENT (CVA) DUE TO OCCLUSION OF PRECEREBRAL ARTERY (H): Chronic | ICD-10-CM

## 2021-04-29 LAB
CAPILLARY BLOOD COLLECTION: NORMAL
INR PPP: 2.9 (ref 0.86–1.14)

## 2021-04-29 PROCEDURE — 85610 PROTHROMBIN TIME: CPT | Performed by: NURSE PRACTITIONER

## 2021-04-29 PROCEDURE — 36416 COLLJ CAPILLARY BLOOD SPEC: CPT | Performed by: NURSE PRACTITIONER

## 2021-04-29 PROCEDURE — 91301 PR COVID VAC MODERNA 100 MCG/0.5 ML IM: CPT

## 2021-04-29 PROCEDURE — 0012A PR COVID VAC MODERNA 100 MCG/0.5 ML IM: CPT

## 2021-04-29 NOTE — PROGRESS NOTES
VM left for pt to return call to [facility] - [phone]. Dosing instructions not given to pt.  Tentative plan: recheck in 6 weeks  Deneen Pretty RN on 4/29/2021 at 11:04 AM

## 2021-04-29 NOTE — PROGRESS NOTES
Patient left a VM returning a call. Please call back when able.  Thank you,  Kajal Franco, RN  Anticoagulation Nurse - [facility]

## 2021-04-29 NOTE — PROGRESS NOTES
ANTICOAGULATION MANAGEMENT     Patient Name:  Bebe Alfonso  Date:  2021    ASSESSMENT /SUBJECTIVE:    Today's INR result of 2.90 is therapeutic. Goal INR of 2.0-3.0      Warfarin dose taken: Warfarin taken as instructed    Diet: No new diet changes affecting INR    Medication changes/ interactions: No new medications/supplements affecting INR    Previous INR: Therapeutic     S/S of bleeding or thromboembolism: No    New injury or illness: No    Upcoming surgery, procedure or cardioversion: No    Additional findings: Pt is starting to walk more - increase in activity      PLAN:    Telephone call with Bebe regarding INR result and instructed:     Warfarin Dosing Instructions: Continue your current warfarin dose 10 mg every Mon, Thur; 7.5 mg all other days    Instructed patient to follow up no later than: 6 weeks  Lab visit scheduled    Education provided: Please call back if any changes to your diet, medications or how you've been taking warfarin      PT verbalizes understanding and agrees to warfarin dosing plan.    Instructed to call the Anticoagulation Clinic for any changes, questions or concerns. (#[phone])        Deneen Pretty RN      OBJECTIVE:  Recent labs: (last 7 days)     21  0838   INR 2.90*         INR assessment THER    Recheck INR In: 6 WEEKS    INR Location Clinic      Anticoagulation Summary  As of 2021    INR goal:  2.0-3.0   TTR:  78.5 % (1 y)   INR used for dosin.90 (2021)   Warfarin maintenance plan:  10 mg (5 mg x 2) every Mon, Thu; 7.5 mg (5 mg x 1.5) all other days   Full warfarin instructions:  10 mg every Mon, Thu; 7.5 mg all other days   Weekly warfarin total:  57.5 mg   No change documented:  Deneen Pretty RN   Plan last modified:  Natalie Sorto RN (2020)   Next INR check:  6/10/2021   Priority:  Maintenance   Target end date:  Indefinite    Indications    History of CVA (cerebrovascular accident) (Resolved) [Z86.73]  Long term current use of  anticoagulant therapy [Z79.01]  Cerebrovascular accident (CVA) due to occlusion of precerebral artery (H) [I63.20]             Anticoagulation Episode Summary     INR check location:      Preferred lab:      Send INR reminders to:  [facility]    Comments:  * Pt states she has had 4 strokes (some after hip surgeries).       Anticoagulation Care Providers     Provider Role Specialty Phone number    Diana Ely APRN [facility] Referring Family Medicine [phone]

## 2021-05-07 DIAGNOSIS — I10 ESSENTIAL HYPERTENSION: ICD-10-CM

## 2021-05-07 RX ORDER — ENALAPRIL MALEATE 5 MG/1
TABLET ORAL
Qty: 45 TABLET | Refills: 0 | Status: SHIPPED | OUTPATIENT
Start: 2021-05-07 | End: 2021-07-13

## 2021-05-07 NOTE — TELEPHONE ENCOUNTER
BP Readings from Last 3 Encounters:   09/08/20 135/58   04/20/20 (!) 162/60   01/24/20 (!) 148/84     Creatinine   Date Value Ref Range Status   05/28/2020 1.52 (H) 0.52 - 1.04 mg/dL Final     Refilled meds due for repeat labs   Thanks Diana Ely FNP-BC

## 2021-05-07 NOTE — TELEPHONE ENCOUNTER
Routing refill request to provider for review/approval because:  Abnormal lab- hellenat  TIFFANIE Mann, RN

## 2021-06-04 ENCOUNTER — TELEPHONE (OUTPATIENT)
Dept: FAMILY MEDICINE | Facility: CLINIC | Age: 76
End: 2021-06-04

## 2021-06-04 DIAGNOSIS — Z79.01 LONG TERM CURRENT USE OF ANTICOAGULANT THERAPY: ICD-10-CM

## 2021-06-04 DIAGNOSIS — J44.1 COPD EXACERBATION (H): ICD-10-CM

## 2021-06-04 DIAGNOSIS — Z86.73 HISTORY OF CVA (CEREBROVASCULAR ACCIDENT): ICD-10-CM

## 2021-06-04 RX ORDER — WARFARIN SODIUM 5 MG/1
TABLET ORAL
Qty: 50 TABLET | Refills: 0 | Status: SHIPPED | OUTPATIENT
Start: 2021-06-04 | End: 2021-06-24

## 2021-06-04 NOTE — TELEPHONE ENCOUNTER
Patient left a  at 758 am asking for a call back regarding refills of medications.  Kajal Franco, RN  Anticoagulation Nurse - [facility]

## 2021-06-04 NOTE — TELEPHONE ENCOUNTER
30 day refill of warfarin granted. Patient is overdue for visit with PCP. Last visit Jan/2020. She is aware a visit is needed for further refills.    Natalie Sorto RN, BSN, PHN

## 2021-06-07 ENCOUNTER — TELEPHONE (OUTPATIENT)
Dept: FAMILY MEDICINE | Facility: CLINIC | Age: 76
End: 2021-06-07

## 2021-06-07 DIAGNOSIS — I10 BENIGN ESSENTIAL HYPERTENSION: Primary | Chronic | ICD-10-CM

## 2021-06-07 DIAGNOSIS — N18.30 STAGE 3 CHRONIC KIDNEY DISEASE (H): Chronic | ICD-10-CM

## 2021-06-07 DIAGNOSIS — E78.5 HYPERLIPIDEMIA LDL GOAL <70: Chronic | ICD-10-CM

## 2021-06-07 NOTE — TELEPHONE ENCOUNTER
Reason for Call:  Other appointment    Detailed comments: pt would like to get her yearly labs done prior to yearly appointment when she has her INR checked Thursday 6/10    Phone Number Patient can be reached at: Home number on file [phone] (home)    Best Time: any    Can we leave a detailed message on this number? YES    Call taken on 6/7/2021 at 8:46 AM by Esthela Seth

## 2021-06-07 NOTE — TELEPHONE ENCOUNTER
06-29-21 physical with Diana.   Requesting pre- appt labs. FLP, BMP pended.  Any additional labs?  TIFFANIE Mann RN

## 2021-06-08 RX ORDER — AMLODIPINE BESYLATE 5 MG/1
TABLET ORAL
Qty: 90 TABLET | Refills: 0 | Status: SHIPPED | OUTPATIENT
Start: 2021-06-08 | End: 2021-09-15

## 2021-06-10 ENCOUNTER — DOCUMENTATION ONLY (OUTPATIENT)
Dept: LAB | Facility: CLINIC | Age: 76
End: 2021-06-10

## 2021-06-10 ENCOUNTER — ANTICOAGULATION THERAPY VISIT (OUTPATIENT)
Dept: ANTICOAGULATION | Facility: CLINIC | Age: 76
End: 2021-06-10

## 2021-06-10 DIAGNOSIS — I63.20 CEREBROVASCULAR ACCIDENT (CVA) DUE TO OCCLUSION OF PRECEREBRAL ARTERY (H): Chronic | ICD-10-CM

## 2021-06-10 DIAGNOSIS — Z79.01 LONG TERM CURRENT USE OF ANTICOAGULANT THERAPY: ICD-10-CM

## 2021-06-10 DIAGNOSIS — E78.5 HYPERLIPIDEMIA LDL GOAL <70: ICD-10-CM

## 2021-06-10 DIAGNOSIS — I63.20 CEREBROVASCULAR ACCIDENT (CVA) DUE TO OCCLUSION OF PRECEREBRAL ARTERY (H): ICD-10-CM

## 2021-06-10 DIAGNOSIS — I10 BENIGN ESSENTIAL HYPERTENSION: Chronic | ICD-10-CM

## 2021-06-10 DIAGNOSIS — N18.30 STAGE 3 CHRONIC KIDNEY DISEASE (H): Chronic | ICD-10-CM

## 2021-06-10 LAB
ANION GAP SERPL CALCULATED.3IONS-SCNC: 5 MMOL/L (ref 3–14)
BUN SERPL-MCNC: 25 MG/DL (ref 7–30)
CALCIUM SERPL-MCNC: 8.6 MG/DL (ref 8.5–10.1)
CAPILLARY BLOOD COLLECTION: NORMAL
CHLORIDE SERPL-SCNC: 103 MMOL/L (ref 94–109)
CHOLEST SERPL-MCNC: 184 MG/DL
CO2 SERPL-SCNC: 25 MMOL/L (ref 20–32)
CREAT SERPL-MCNC: 1.49 MG/DL (ref 0.52–1.04)
GFR SERPL CREATININE-BSD FRML MDRD: 34 ML/MIN/{1.73_M2}
GLUCOSE SERPL-MCNC: 91 MG/DL (ref 70–99)
HDLC SERPL-MCNC: 84 MG/DL
INR PPP: 2.8 (ref 0.86–1.14)
LDLC SERPL CALC-MCNC: 83 MG/DL
NONHDLC SERPL-MCNC: 100 MG/DL
POTASSIUM SERPL-SCNC: 3.9 MMOL/L (ref 3.4–5.3)
SODIUM SERPL-SCNC: 133 MMOL/L (ref 133–144)
TRIGL SERPL-MCNC: 84 MG/DL

## 2021-06-10 PROCEDURE — 36416 COLLJ CAPILLARY BLOOD SPEC: CPT | Performed by: NURSE PRACTITIONER

## 2021-06-10 PROCEDURE — 80061 LIPID PANEL: CPT | Performed by: NURSE PRACTITIONER

## 2021-06-10 PROCEDURE — 80048 BASIC METABOLIC PNL TOTAL CA: CPT | Performed by: NURSE PRACTITIONER

## 2021-06-10 PROCEDURE — 85610 PROTHROMBIN TIME: CPT | Performed by: NURSE PRACTITIONER

## 2021-06-10 NOTE — PROGRESS NOTES
Patient came in today for labs (INR POCT AND LIPIDS), requesting a BMP be added on to blood already drawn. Please place future orders if needed. Thanks.

## 2021-06-10 NOTE — PROGRESS NOTES
ANTICOAGULATION MANAGEMENT     Patient Name:  Bebe Alfonso  Date:  6/10/2021    ASSESSMENT /SUBJECTIVE:    Today's INR result of 2.8 is therapeutic. Goal INR of 2.0-3.0      Warfarin dose taken: pt did miss a dose and expressed that she is concerned about it. Pt is unsure which day she missed but guessed about Sat.    Diet: No new diet changes affecting INR    Medication changes/ interactions: No new medications/supplements affecting INR    Previous INR: Therapeutic     S/S of bleeding or thromboembolism: No    New injury or illness: No    Upcoming surgery, procedure or cardioversion: No    Additional findings: None      PLAN:    Warfarin Dosing Instructions: Continue your current warfarin dose 10 mg every Mon, Thur; 7.5 mg all other days    Instructed patient to follow up no later than: 2 weeks - pt expressed concern about her INR being 2.8 when she missed a dose.  Lab visit scheduled    Education provided: Please call back if any changes to your diet, medications or how you've been taking warfarin    Telephone call with Bebe whom verbalizes understanding and agrees to plan    Instructed to call the Anticoagulation Clinic for any changes, questions or concerns. (#[phone])        Deneen Pretty RN      OBJECTIVE:  Recent labs: (last 7 days)     06/10/21  0822   INR 2.80*         No question data found.  Anticoagulation Summary  As of 6/10/2021    INR goal:  2.0-3.0   TTR:  78.5 % (1 y)   INR used for dosin.80 (6/10/2021)   Warfarin maintenance plan:  10 mg (5 mg x 2) every Mon, Thu; 7.5 mg (5 mg x 1.5) all other days   Full warfarin instructions:  10 mg every Mon, Thu; 7.5 mg all other days   Weekly warfarin total:  57.5 mg   Plan last modified:  Natalie Sorto RN (2020)   Next INR check:     Priority:  Maintenance   Target end date:  Indefinite    Indications    History of CVA (cerebrovascular accident) (Resolved) [Z86.73]  Long term current use of anticoagulant therapy  [Z79.01]  Cerebrovascular accident (CVA) due to occlusion of precerebral artery (H) [I63.20]             Anticoagulation Episode Summary     INR check location:      Preferred lab:      Send INR reminders to:  [facility]    Comments:  * Pt states she has had 4 strokes (some after hip surgeries).       Anticoagulation Care Providers     Provider Role Specialty Phone number    Diana Ely APRN [facility] Referring Family Medicine [phone]

## 2021-06-16 DIAGNOSIS — E78.5 HYPERLIPIDEMIA LDL GOAL <70: ICD-10-CM

## 2021-06-16 RX ORDER — ATORVASTATIN CALCIUM 40 MG/1
TABLET, FILM COATED ORAL
Qty: 90 TABLET | Refills: 3 | Status: SHIPPED | OUTPATIENT
Start: 2021-06-16 | End: 2022-06-21

## 2021-06-24 ENCOUNTER — ANTICOAGULATION THERAPY VISIT (OUTPATIENT)
Dept: ANTICOAGULATION | Facility: CLINIC | Age: 76
End: 2021-06-24

## 2021-06-24 DIAGNOSIS — Z79.01 LONG TERM CURRENT USE OF ANTICOAGULANT THERAPY: ICD-10-CM

## 2021-06-24 DIAGNOSIS — Z86.73 HISTORY OF CVA (CEREBROVASCULAR ACCIDENT): ICD-10-CM

## 2021-06-24 DIAGNOSIS — I63.20 CEREBROVASCULAR ACCIDENT (CVA) DUE TO OCCLUSION OF PRECEREBRAL ARTERY (H): Chronic | ICD-10-CM

## 2021-06-24 DIAGNOSIS — I63.20 CEREBROVASCULAR ACCIDENT (CVA) DUE TO OCCLUSION OF PRECEREBRAL ARTERY (H): ICD-10-CM

## 2021-06-24 LAB
CAPILLARY BLOOD COLLECTION: NORMAL
INR PPP: 2.6 (ref 0.86–1.14)

## 2021-06-24 PROCEDURE — 85610 PROTHROMBIN TIME: CPT | Performed by: NURSE PRACTITIONER

## 2021-06-24 PROCEDURE — 36416 COLLJ CAPILLARY BLOOD SPEC: CPT | Performed by: NURSE PRACTITIONER

## 2021-06-24 RX ORDER — WARFARIN SODIUM 5 MG/1
TABLET ORAL
Qty: 148 TABLET | Refills: 0 | Status: SHIPPED | OUTPATIENT
Start: 2021-06-24 | End: 2021-10-06

## 2021-06-24 NOTE — PROGRESS NOTES
ANTICOAGULATION MANAGEMENT     Bebe Alfonso 75 year old female is on warfarin with therapeutic INR result. (Goal INR 2.0-3.0)    Recent labs: (last 7 days)     06/24/21  0930   INR 2.60*       ASSESSMENT     Source(s): Patient/Caregiver Call       Warfarin doses taken: Warfarin taken as instructed    Diet: No new diet changes identified    New illness, injury, or hospitalization: No    Medication/supplement changes: None noted    Signs or symptoms of bleeding or clotting: No    Previous INR: Therapeutic last 2(+) visits    Additional findings: None     PLAN     Recommended plan for no diet, medication or health factor changes affecting INR     Dosing Instructions: Continue your current warfarin dose with next INR in 4 weeks       Summary  As of 6/24/2021    Full warfarin instructions:  10 mg every Mon, Thu; 7.5 mg all other days   Next INR check:  7/22/2021             Telephone call with Bebe whom verbalizes understanding and agrees to plan    Lab visit scheduled    Education provided: Please call back if any changes to your diet, medications or how you've been taking warfarin    Plan made per [facility] anticoagulation protocol    Deneen Pretty RN  Anticoagulation Clinic  6/24/2021    _______________________________________________________________________     Anticoagulation Episode Summary     Current INR goal:  2.0-3.0   TTR:  78.5 % (1 y)   Target end date:  Indefinite   Send INR reminders to:  [facility]    Indications    History of CVA (cerebrovascular accident) (Resolved) [Z86.73]  Long term current use of anticoagulant therapy [Z79.01]  Cerebrovascular accident (CVA) due to occlusion of precerebral artery (H) [I63.20]           Comments:  * Pt states she has had 4 strokes (some after hip surgeries).          Anticoagulation Care Providers     Provider Role Specialty Phone number    Diana Ely APRN [facility] Referring Family Medicine [phone]

## 2021-07-09 DIAGNOSIS — Z79.01 LONG TERM CURRENT USE OF ANTICOAGULANT THERAPY: ICD-10-CM

## 2021-07-09 DIAGNOSIS — I63.20 CEREBROVASCULAR ACCIDENT (CVA) DUE TO OCCLUSION OF PRECEREBRAL ARTERY (H): Primary | Chronic | ICD-10-CM

## 2021-07-13 ENCOUNTER — OFFICE VISIT (OUTPATIENT)
Dept: FAMILY MEDICINE | Facility: CLINIC | Age: 76
End: 2021-07-13
Payer: COMMERCIAL

## 2021-07-13 VITALS
SYSTOLIC BLOOD PRESSURE: 160 MMHG | HEART RATE: 98 BPM | WEIGHT: 138 LBS | HEIGHT: 66 IN | BODY MASS INDEX: 22.18 KG/M2 | DIASTOLIC BLOOD PRESSURE: 78 MMHG | OXYGEN SATURATION: 98 % | RESPIRATION RATE: 14 BRPM | TEMPERATURE: 97.3 F

## 2021-07-13 DIAGNOSIS — I70.1 RENAL ARTERY STENOSIS (H): ICD-10-CM

## 2021-07-13 DIAGNOSIS — J44.1 COPD EXACERBATION (H): ICD-10-CM

## 2021-07-13 DIAGNOSIS — Z00.01 ENCOUNTER FOR ROUTINE ADULT PHYSICAL EXAM WITH ABNORMAL FINDINGS: Primary | ICD-10-CM

## 2021-07-13 DIAGNOSIS — I10 ESSENTIAL HYPERTENSION: ICD-10-CM

## 2021-07-13 DIAGNOSIS — J31.0 CHRONIC RHINITIS: ICD-10-CM

## 2021-07-13 DIAGNOSIS — N18.31 STAGE 3A CHRONIC KIDNEY DISEASE (H): ICD-10-CM

## 2021-07-13 PROCEDURE — 99214 OFFICE O/P EST MOD 30 MIN: CPT | Mod: 25 | Performed by: NURSE PRACTITIONER

## 2021-07-13 PROCEDURE — 99397 PER PM REEVAL EST PAT 65+ YR: CPT | Performed by: NURSE PRACTITIONER

## 2021-07-13 RX ORDER — FLUTICASONE PROPIONATE 50 MCG
1 SPRAY, SUSPENSION (ML) NASAL DAILY
Qty: 16 G | Refills: 1 | Status: SHIPPED | OUTPATIENT
Start: 2021-07-13 | End: 2022-09-28

## 2021-07-13 RX ORDER — ENALAPRIL MALEATE 5 MG/1
5 TABLET ORAL DAILY
Qty: 90 TABLET | Refills: 1 | Status: SHIPPED | OUTPATIENT
Start: 2021-07-13 | End: 2022-01-13

## 2021-07-13 ASSESSMENT — ACTIVITIES OF DAILY LIVING (ADL): CURRENT_FUNCTION: NO ASSISTANCE NEEDED

## 2021-07-13 ASSESSMENT — ENCOUNTER SYMPTOMS
DIZZINESS: 0
HEMATURIA: 0
CHILLS: 1
MYALGIAS: 1
NAUSEA: 0
PARESTHESIAS: 0
DYSURIA: 0
HEADACHES: 0
SHORTNESS OF BREATH: 0
DIARRHEA: 0
HEMATOCHEZIA: 0
JOINT SWELLING: 0
FEVER: 0
COUGH: 1
CONSTIPATION: 1
PALPITATIONS: 0
FREQUENCY: 1
EYE PAIN: 1
ARTHRALGIAS: 1
HEARTBURN: 1
WEAKNESS: 0
SORE THROAT: 0

## 2021-07-13 ASSESSMENT — PAIN SCALES - GENERAL: PAINLEVEL: NO PAIN (0)

## 2021-07-13 ASSESSMENT — MIFFLIN-ST. JEOR: SCORE: 1142.71

## 2021-07-13 NOTE — PATIENT INSTRUCTIONS
Melatonin 3 mg at bedtime for sleep   Trial the flonase for nasal drainage  Vitamin D 1000 international unit(s) daily    Implantable loop recorder done [address] [facility]   Call to schedule follow up to discuss device removla due to pain.  [phone] or [phone].  Get mammogram done.

## 2021-07-13 NOTE — PROGRESS NOTES
"     SUBJECTIVE:   CC: Bebe Alfonso is an 75 year old woman who presents for preventive health visit.       Patient has been advised of split billing requirements and indicates understanding: Yes  Healthy Habits:     In general, how would you rate your overall health?  Good    Frequency of exercise:  None    Do you usually eat at least 4 servings of fruit and vegetables a day, include whole grains    & fiber and avoid regularly eating high fat or \"junk\" foods?  No    Taking medications regularly:  Yes    Medication side effects:  Muscle aches and Lightheadedness    Ability to successfully perform activities of daily living:  No assistance needed    Home Safety:  No safety concerns identified    Hearing Impairment:  No hearing concerns    In the past 6 months, have you been bothered by leaking of urine? Yes    In general, how would you rate your overall mental or emotional health?  Good      PHQ-2 Total Score: 1    Additional concerns today:  No    Ability to successfully perform activities of daily living: Yes, no assistance needed  Home safety:  none identified   Hearing impairment: n o     Ongoing expressive aphasia from previous CVA  Chronic anticoagulation  COPD history.  Continues to smoke not ready to quit  History of hypertension with carotid bruit and renal artery stenosis  No recent follow-up with vascular team or cardiology  CKD stage III states she has not been following a CKD diet recently.  Was feeling more fatigued when she was following the CKD diet so stopped.  Has not had recent follow-up with dietary team or nephrology.        -------------------------------------    Today's PHQ-2 Score:   PHQ-2 ( 1999 [employer]) 7/13/2021   Q1: Little interest or pleasure in doing things 0   Q2: Feeling down, depressed or hopeless 0   PHQ-2 Score 0   Q1: Little interest or pleasure in doing things Several days   Q2: Feeling down, depressed or hopeless Not at all   PHQ-2 Score 1       Abuse: Current or Past " (Physical, Sexual or Emotional) - No  Do you feel safe in your environment? Yes    Have you ever done Advance Care Planning? (For example, a Health Directive, POLST, or a discussion with a medical provider or your loved ones about your wishes): No, advance care planning information given to patient to review.  Patient declined advance care planning discussion at this time.    Social History     Tobacco Use     Smoking status: Current Every Day Smoker     Packs/day: 1.00     Years: 50.00     Pack years: 50.00     Types: Cigarettes     Start date: 2016     Smokeless tobacco: Never Used   Substance Use Topics     Alcohol use: No         Alcohol Use 7/13/2021   Prescreen: >3 drinks/day or >7 drinks/week? Not Applicable   Prescreen: >3 drinks/day or >7 drinks/week? -       Reviewed orders with patient.  Reviewed health maintenance and updated orders accordingly - Yes  Labs reviewed in EPIC  BP Readings from Last 3 Encounters:   07/13/21 (!) 160/78   09/08/20 135/58   04/20/20 (!) 162/60    Wt Readings from Last 3 Encounters:   07/13/21 62.6 kg (138 lb)   04/20/20 61.9 kg (136 lb 6.4 oz)   01/24/20 60.8 kg (134 lb)                  Patient Active Problem List   Diagnosis     ACP (advance care planning)     Anticoagulation monitoring, INR range 2-3     Benign essential hypertension     Carotid bruit     Cerebrovascular disease     Chronic obstructive pulmonary disease (H)     Esophageal reflux     Osteoarthrosis, hip     Pain medication agreement     Thyroid nodule     Long term current use of anticoagulant therapy     Hyperlipidemia LDL goal <70     Cerebrovascular accident (CVA) due to occlusion of precerebral artery (H)     Chronic kidney disease, stage 3 (moderate)     Renal artery stenosis (H)     Tobacco dependence     Past Surgical History:   Procedure Laterality Date     BIOPSY  appox in 1980's     BREAST SURGERY  last time in the 1990's    non cancer       Social History     Tobacco Use     Smoking status: Current  Every Day Smoker     Packs/day: 1.00     Years: 50.00     Pack years: 50.00     Types: Cigarettes     Start date: 2016     Smokeless tobacco: Never Used   Substance Use Topics     Alcohol use: No     Family History   Problem Relation Age of Onset     Diabetes Maternal Grandfather      Diabetes Sister         developed after cancer treatment     Breast Cancer Sister      Obesity Sister      Hypertension Mother      Hyperlipidemia Mother      Osteoporosis Mother      Breast Cancer Sister      Osteoporosis Sister      Breast Cancer Daughter      Other Cancer Sister         throught out body     Substance Abuse Sister         acol     Obesity Sister      Substance Abuse Sister         acol     Obesity Sister          Current Outpatient Medications   Medication Sig Dispense Refill     amLODIPine (NORVASC) 5 MG tablet Take 1 tablet by mouth once daily 90 tablet 0     enalapril (VASOTEC) 5 MG tablet Take 1 tablet (5 mg) by mouth daily 90 tablet 1     fluticasone (FLONASE) 50 MCG/ACT nasal spray Spray 1 spray into both nostrils daily 16 g 1     warfarin ANTICOAGULANT (COUMADIN) 5 MG tablet 10 mg (5 mg x 2) every Mon/Thurs; 7.5 mg (5 mg x 1.5) all other days or as directed by the Anticoagulation Clinic. See pharm notes 148 tablet 0     ASPIRIN NOT PRESCRIBED (INTENTIONAL) Please choose reason not prescribed, below       atorvastatin (LIPITOR) 40 MG tablet Take 1 tablet by mouth once daily 90 tablet 3     Urea 40 % CREA Externally apply topically daily 85 g 11     Allergies   Allergen Reactions     Losartan Swelling, Nausea, Shortness Of Breath and Palpitations     Gabapentin GI Disturbance and Unknown     Double vision  flatulence     Lisinopril Cough     Oxycodone Other (See Comments)     But has tolerated hydrocodone     Tramadol Other (See Comments)     Augmentin [Amoxicillin-Pot Clavulanate] Rash     Bacitracin Rash     blisters     Bupropion Rash     Allergic to brand not generic       Breast Cancer Screening:    Kidney  diet off and on  Sleep has been poor ... not able to sleep.  Woke herself up kicking around  Waking up after a couple of hours of sleep and wakes up.   doesnt sleep during the day  Continues to smoke .. not read to quit smoking yet. Needs to and want to are two different things.   Quits x 2 and not in a place to do that yet.   A lot of chronic sinus drainage trying to cut back.    History of renal artery stenosis.     Menopausal since early 50's      Has been checking her BP at home every couple of weeks   Running >140/90 consistently    Occasionally missed her coumadin.   Gets her INR done consistently.     Implantable loop recorder done 2015 [facility]       History of abnormal Pap smear: Primary for follow-up, polyp so  Dr. Care mom is letter.  To be here for Maller rash and she definitely had a bull's-eye erythema multiforme definitely with the central clearing and is with very look like her typical rheumatic fever in an older to where it was read that is what is clearing them and lesions to fever factors at the same time this is why he had a ripping sensation and of sudden bulge in his abdomen at work and then he is thinking like strangulating bowel his school abdomen it was likely back pain but ER ongoing his blood pressure and exam and is normal in their and 8 checking in     Reviewed and updated as needed this visit by clinical staff  Tobacco   Meds              Reviewed and updated as needed this visit by Provider               Past Medical History:   Diagnosis Date     Anticoagulation monitoring, INR range 2-3 10/22/2015     Benign essential hypertension 9/15/2016     Carotid bruit 11/12/2012    Overview:  12/6/10 Carotid US  Elevated velocities throughout the carotid arteries bilaterally. There is a focal area of increased velocity in the mid left internal carotid artery with a plaque in this region on the color flow images. This corresponds to 50 - 70 % diameter reduction. There is  an area of elevated velocity in the left external carotid artery consistent with stenosis. There is no focal area of significant stenosis in the right carotid arteries in the neck. Plaque in the carotid arteries bilaterally.      Chronic obstructive pulmonary disease (H) 1/19/2016    The FVC, FEV1 and FEV1/FVC ratio are reduced.  The inspiratory flow rates are within normal limits.  The FVC is reduced relative to the SVC indicating air trapping.  While the TLC is within normal limits, the FRC, RV and RV/TLC ratio are increased.  The  diffusing capacity is mildly reduced. IMPRESSION: Mild-moderate Airflow Obstruction with air trapping ____________________________________________M.DClarke Orellana  April 2018     Esophageal reflux 4/8/2008    Overview:  Omeprazole PRN, occasional symptoms such as chest burning and knife twisted to stomach.      Heart disease born with it     History of blood transfusion 70 years ago     History of CVA (cerebrovascular accident) 10/20/2015     Long term current use of anticoagulant therapy 3/20/2018     Major depressive disorder, recurrent episode, moderate (H) 10/29/2008    Overview:  She is not taking any medication at this time.     Osteoarthrosis, hip 10/19/2010    Overview:  Pt scheduled for right  total hip arthroplasty on 6/14/13 with Dr. Addison HEREDIA hip xray (11/2012) Severe degenerative changes seen of the right hip with near bone-on-bone appearance of the joint and several subchondral cysts forming.     Thyroid nodule 5/22/2013    Overview:  Thyroid US (2012) Stable nodule left lobe of thyroid TSH- (2/19/12) normal (2.26)      Past Surgical History:   Procedure Laterality Date     BIOPSY  appox in 1980's     BREAST SURGERY  last time in the 1990's    non cancer       Review of Systems   Constitutional: Positive for chills. Negative for fever.   HENT: Negative for congestion, ear pain, hearing loss and sore throat.    Eyes: Positive for pain and visual disturbance.   Respiratory:  "Positive for cough. Negative for shortness of breath.    Cardiovascular: Negative for chest pain, palpitations and peripheral edema.   Gastrointestinal: Positive for constipation and heartburn. Negative for diarrhea, hematochezia and nausea.   Genitourinary: Positive for frequency and pelvic pain. Negative for dysuria, genital sores, hematuria, urgency and vaginal bleeding.   Musculoskeletal: Positive for arthralgias and myalgias. Negative for joint swelling.   Skin: Negative for rash.   Neurological: Negative for dizziness, weakness, headaches and paresthesias.   Psychiatric/Behavioral: Negative for mood changes.          OBJECTIVE:   BP (!) 160/78   Pulse 98   Temp 97.3  F (36.3  C) (Tympanic)   Resp 14   Ht 1.676 m (5' 6\")   Wt 62.6 kg (138 lb)   SpO2 98%   BMI 22.27 kg/m    Physical Exam  GENERAL: alert, no distress and expressive aphasia present  EYES: Eyes grossly normal to inspection, PERRL and conjunctivae and sclerae normal  HENT: ear canals and TM's normal, nose and mouth without ulcers or lesions  NECK: no adenopathy and carotid bruit noted:    RESP: lungs clear to auscultation - no rales, rhonchi or wheezes  CV: regular rates and rhythm, normal S1 S2, no S3 or S4, grade 2/6  murmur  peripheral pulses strong and no peripheral edema  ABDOMEN: soft, nontender, no hepatosplenomegaly, no masses and bowel sounds normal  MS: no gross musculoskeletal defects noted, no edema  SKIN: no suspicious lesions or rashes  NEURO: Normal strength and tone, mentation intact and speech normal  PSYCH: mentation appears normal, affect normal/bright    Diagnostic Test Results:  Labs reviewed in Epic  No results found for any visits on 07/13/21.    ASSESSMENT/PLAN:   Bebe was seen today for physical.    Diagnoses and all orders for this visit:  Physical with  Chronic rhinitis  Trial new medication  -     fluticasone (FLONASE) 50 MCG/ACT nasal spray; Spray 1 spray into both nostrils daily    COPD   Ongoing shortness of " "breath with exertion  Recommend pulmonary function testing  Inhaler use declined  O2 sats on room air 98%  Continue to monitor.  Daily physical activity encouraged      Stage 3a chronic kidney disease  Follow-up with nephrology recommended  -     Renal panel (Alb, BUN, Ca, Cl, CO2, Creat, Gluc, Phos, K, Na); Future  -     TSH with free T4 reflex; Future  -     Parathyroid Hormone Intact; Future  -     **Vitamin D Deficiency FUTURE 2mo; Future  -     Magnesium; Future  -     Albumin Random Urine Quantitative with Creat Ratio; Future      Renal artery stenosis (H)  No previous revascularization recommended per vascular 5/2019    Essential hypertension  Poorly controlled.  Increase  -     enalapril (VASOTEC) 5 MG tablet; Take 1 tablet (5 mg) by mouth daily  Labs fasting recommended  -     Renal panel (Alb, BUN, Ca, Cl, CO2, Creat, Gluc, Phos, K, Na); Future  -     TSH with free T4 reflex; Future  -     Parathyroid Hormone Intact; Future  -     **Vitamin D Deficiency FUTURE 2mo; Future  -     Magnesium; Future  -     Albumin Random Urine Quantitative with Creat Ratio; Future    For ongoing issues with sleep trial melatonin 3 mg at bedtime    Follow-up with cardiology recommended  To contact [facility] [place] to discuss having her implantable loop recorder removed    Smoking cessation again strongly encouraged  Patient is not ready to commit to that-vitamin D 1000 international units daily.  Labs in the fall monitor  Encouraged her to call and get her mammogram scheduled    Continue anticoagulation no change to recommendations      Patient has been advised of split billing requirements and indicates understanding: Yes  COUNSELING:  Reviewed preventive health counseling, as reflected in patient instructions    Estimated body mass index is 22.27 kg/m  as calculated from the following:    Height as of this encounter: 1.676 m (5' 6\").    Weight as of this encounter: 62.6 kg (138 lb).    Weight " management plan noted, stable and monitoring    She reports that she has been smoking cigarettes. She started smoking about 5 years ago. She has a 50.00 pack-year smoking history. She has never used smokeless tobacco.  Tobacco Cessation Action Plan:   Information offered: Patient not interested at this time      Counseling Resources:  ATP IV Guidelines  Pooled Cohorts Equation Calculator  Breast Cancer Risk Calculator  BRCA-Related Cancer Risk Assessment: FHS-7 Tool  FRAX Risk Assessment  ICSI Preventive Guidelines  Dietary Guidelines for Americans, 2010  USDA's MyPlate  ASA Prophylaxis  Lung CA Screening    SELWYN Dennis [facility]

## 2021-07-21 DIAGNOSIS — Z12.31 VISIT FOR SCREENING MAMMOGRAM: ICD-10-CM

## 2021-07-21 PROCEDURE — 77067 SCR MAMMO BI INCL CAD: CPT | Mod: TC | Performed by: RADIOLOGY

## 2021-07-21 PROCEDURE — 77063 BREAST TOMOSYNTHESIS BI: CPT | Mod: TC | Performed by: RADIOLOGY

## 2021-07-22 ENCOUNTER — ANTICOAGULATION THERAPY VISIT (OUTPATIENT)
Dept: ANTICOAGULATION | Facility: CLINIC | Age: 76
End: 2021-07-22

## 2021-07-22 ENCOUNTER — LAB (OUTPATIENT)
Dept: LAB | Facility: CLINIC | Age: 76
End: 2021-07-22
Payer: COMMERCIAL

## 2021-07-22 DIAGNOSIS — I63.20 CEREBROVASCULAR ACCIDENT (CVA) DUE TO OCCLUSION OF PRECEREBRAL ARTERY (H): Chronic | ICD-10-CM

## 2021-07-22 DIAGNOSIS — I63.20 CEREBROVASCULAR ACCIDENT (CVA) DUE TO OCCLUSION OF PRECEREBRAL ARTERY (H): ICD-10-CM

## 2021-07-22 DIAGNOSIS — Z79.01 LONG TERM CURRENT USE OF ANTICOAGULANT THERAPY: Primary | ICD-10-CM

## 2021-07-22 DIAGNOSIS — Z79.01 LONG TERM CURRENT USE OF ANTICOAGULANT THERAPY: ICD-10-CM

## 2021-07-22 LAB — INR BLD: 2.9 (ref 0.9–1.1)

## 2021-07-22 PROCEDURE — 36416 COLLJ CAPILLARY BLOOD SPEC: CPT

## 2021-07-22 PROCEDURE — 85610 PROTHROMBIN TIME: CPT

## 2021-07-22 NOTE — PROGRESS NOTES
ANTICOAGULATION MANAGEMENT     Bebe Alfonso 75 year old female is on warfarin with therapeutic INR result. (Goal INR 2.0-3.0)    Recent labs: (last 7 days)     07/22/21  0721   INR 2.9*       ASSESSMENT     Source(s): Patient/Caregiver Call       Warfarin doses taken: Warfarin taken as instructed    Diet: No new diet changes identified    New illness, injury, or hospitalization: No    Medication/supplement changes: enalapril dose increased on 7/13 No interaction anticipated. Melatonin started for sleep - no interactions anticipated.    Signs or symptoms of bleeding or clotting: No    Previous INR: Therapeutic last 2(+) visits    Additional findings: None     PLAN     Recommended plan for no diet, medication or health factor changes affecting INR     Dosing Instructions: Continue your current warfarin dose with next INR in 6 weeks       Summary  As of 7/22/2021    Full warfarin instructions:  10 mg every Mon, Thu; 7.5 mg all other days   Next INR check:               Telephone call with Bebe who verbalizes understanding and agrees to plan    Lab visit scheduled    Education provided: Please call back if any changes to your diet, medications or how you've been taking warfarin    Plan made per [facility] anticoagulation protocol    Deneen Pretty RN  Anticoagulation Clinic  7/22/2021    _______________________________________________________________________     Anticoagulation Episode Summary     Current INR goal:  2.0-3.0   TTR:  78.5 % (1 y)   Target end date:  Indefinite   Send INR reminders to:  [facility]    Indications    History of CVA (cerebrovascular accident) (Resolved) [Z86.73]  Long term current use of anticoagulant therapy [Z79.01]  Cerebrovascular accident (CVA) due to occlusion of precerebral artery (H) [I63.20]           Comments:  * Pt states she has had 4 strokes (some after hip surgeries).          Anticoagulation Care Providers     Provider Role Specialty Phone number    Diana Ely  SELWYN Barker [facility] [phone]

## 2021-09-02 ENCOUNTER — ANTICOAGULATION THERAPY VISIT (OUTPATIENT)
Dept: ANTICOAGULATION | Facility: CLINIC | Age: 76
End: 2021-09-02

## 2021-09-02 ENCOUNTER — LAB (OUTPATIENT)
Dept: LAB | Facility: CLINIC | Age: 76
End: 2021-09-02
Payer: COMMERCIAL

## 2021-09-02 DIAGNOSIS — Z79.01 LONG TERM CURRENT USE OF ANTICOAGULANT THERAPY: Primary | ICD-10-CM

## 2021-09-02 DIAGNOSIS — I63.20 CEREBROVASCULAR ACCIDENT (CVA) DUE TO OCCLUSION OF PRECEREBRAL ARTERY (H): ICD-10-CM

## 2021-09-02 DIAGNOSIS — Z79.01 LONG TERM CURRENT USE OF ANTICOAGULANT THERAPY: ICD-10-CM

## 2021-09-02 DIAGNOSIS — I63.20 CEREBROVASCULAR ACCIDENT (CVA) DUE TO OCCLUSION OF PRECEREBRAL ARTERY (H): Chronic | ICD-10-CM

## 2021-09-02 LAB — INR BLD: 3.6 (ref 0.9–1.1)

## 2021-09-02 PROCEDURE — 36416 COLLJ CAPILLARY BLOOD SPEC: CPT

## 2021-09-02 PROCEDURE — 85610 PROTHROMBIN TIME: CPT

## 2021-09-02 NOTE — PROGRESS NOTES
ANTICOAGULATION MANAGEMENT     Bebe Alfonso 75 year old female is on warfarin with supratherapeutic INR result. (Goal INR 2.0-3.0)    Recent labs: (last 7 days)     09/02/21  0756   INR 3.6*       ASSESSMENT     Source(s): Chart Review and Patient/Caregiver Call       Warfarin doses taken: Warfarin taken as instructed    Diet: eating less salads but her salads were iceberg lettuce    New illness, injury, or hospitalization: No    Medication/supplement changes: None noted    Signs or symptoms of bleeding or clotting: No    Previous INR: Therapeutic last 2(+) visits    Additional findings: patient complains of increased dizziness when walking around. The dizziness is not new but has gotten worse. No other complaints. Pt wonders if it may be related to her BP meds and will discuss with PCP. Patient could also be developing an illness. She states her allergies have been bad.     PLAN     Recommended plan for temporary change(s) affecting INR     Dosing Instructions: Partial hold then continue your current warfarin dose with next INR in 2 weeks       Summary  As of 9/2/2021    Full warfarin instructions:  9/2: 5 mg; Otherwise 10 mg every Mon, Thu; 7.5 mg all other days   Next INR check:               Telephone call with Bebe who verbalizes understanding and agrees to plan    Lab visit scheduled    Education provided: Please call back if any changes to your diet, medications or how you've been taking warfarin, Monitoring for bleeding signs and symptoms, When to seek medical attention/emergency care and Contact [phone]  with any changes, questions or concerns.     Plan made per [facility] anticoagulation protocol    Natalie Sorto RN  Anticoagulation Clinic  9/2/2021    _______________________________________________________________________     Anticoagulation Episode Summary     Current INR goal:  2.0-3.0   TTR:  74.0 % (1 y)   Target end date:  Indefinite   Send INR reminders to:  [facility]     Indications    History of CVA (cerebrovascular accident) (Resolved) [Z86.73]  Long term current use of anticoagulant therapy [Z79.01]  Cerebrovascular accident (CVA) due to occlusion of precerebral artery (H) [I63.20]           Comments:  * Pt states she has had 4 strokes (some after hip surgeries).          Anticoagulation Care Providers     Provider Role Specialty Phone number    Diana Ely APRN [facility] Referring Family Medicine [phone]

## 2021-09-13 DIAGNOSIS — J44.1 COPD EXACERBATION (H): ICD-10-CM

## 2021-09-15 RX ORDER — AMLODIPINE BESYLATE 5 MG/1
5 TABLET ORAL DAILY
Qty: 30 TABLET | Refills: 0 | Status: SHIPPED | OUTPATIENT
Start: 2021-09-15 | End: 2021-10-15

## 2021-09-16 ENCOUNTER — LAB (OUTPATIENT)
Dept: LAB | Facility: CLINIC | Age: 76
End: 2021-09-16
Payer: COMMERCIAL

## 2021-09-16 ENCOUNTER — ANTICOAGULATION THERAPY VISIT (OUTPATIENT)
Dept: ANTICOAGULATION | Facility: CLINIC | Age: 76
End: 2021-09-16

## 2021-09-16 ENCOUNTER — TELEPHONE (OUTPATIENT)
Dept: FAMILY MEDICINE | Facility: CLINIC | Age: 76
End: 2021-09-16

## 2021-09-16 DIAGNOSIS — I63.20 CEREBROVASCULAR ACCIDENT (CVA) DUE TO OCCLUSION OF PRECEREBRAL ARTERY (H): ICD-10-CM

## 2021-09-16 DIAGNOSIS — I63.20 CEREBROVASCULAR ACCIDENT (CVA) DUE TO OCCLUSION OF PRECEREBRAL ARTERY (H): Chronic | ICD-10-CM

## 2021-09-16 DIAGNOSIS — Z79.01 LONG TERM CURRENT USE OF ANTICOAGULANT THERAPY: Primary | ICD-10-CM

## 2021-09-16 DIAGNOSIS — Z79.01 LONG TERM CURRENT USE OF ANTICOAGULANT THERAPY: ICD-10-CM

## 2021-09-16 LAB — INR BLD: 2.2 (ref 0.9–1.1)

## 2021-09-16 PROCEDURE — 85610 PROTHROMBIN TIME: CPT

## 2021-09-16 PROCEDURE — 36416 COLLJ CAPILLARY BLOOD SPEC: CPT

## 2021-09-16 NOTE — TELEPHONE ENCOUNTER
Reason for call:  Patient reporting a symptom    Symptom or request: Left Breast Is Itchy, no redness, Pt said it is just a little more swollen  This started last week.  Pt is wondering if she should be seen?  Please Advise      Phone Number patient can be reached at:  Home number on file [phone]    Best Time:  Any Time      Can we leave a detailed message on this number:  YES    Call taken on 9/16/2021 at 8:10 AM by Jessica Hoffmann

## 2021-09-16 NOTE — PROGRESS NOTES
ANTICOAGULATION MANAGEMENT     Bebe Alfonso 75 year old female is on warfarin with therapeutic INR result. (Goal INR 2.0-3.0)    Recent labs: (last 7 days)     09/16/21  0905   INR 2.2*       ASSESSMENT     Source(s): Chart Review and Patient/Caregiver Call       Warfarin doses taken: Warfarin taken as instructed    Diet: No new diet changes identified    New illness, injury, or hospitalization: No    Medication/supplement changes: None noted    Signs or symptoms of bleeding or clotting: No    Previous INR: Supratherapeutic    Additional findings: None     PLAN     Recommended plan for no diet, medication or health factor changes affecting INR     Dosing Instructions: Continue your current warfarin dose with next INR in 3 weeks       Summary  As of 9/16/2021    Full warfarin instructions:  10 mg every Mon, Thu; 7.5 mg all other days   Next INR check:  10/7/2021             Telephone call with Bebe who verbalizes understanding and agrees to plan    Lab visit scheduled    Education provided: Contact [phone]  with any changes, questions or concerns.     Plan made per [facility] anticoagulation protocol    Polina Ryan RN  Anticoagulation Clinic  9/16/2021    _______________________________________________________________________     Anticoagulation Episode Summary     Current INR goal:  2.0-3.0   TTR:  76.2 % (1 y)   Target end date:  Indefinite   Send INR reminders to:  [facility]    Indications    History of CVA (cerebrovascular accident) (Resolved) [Z86.73]  Long term current use of anticoagulant therapy [Z79.01]  Cerebrovascular accident (CVA) due to occlusion of precerebral artery (H) [I63.20]           Comments:  * Pt states she has had 4 strokes (some after hip surgeries).          Anticoagulation Care Providers     Provider Role Specialty Phone number    Diana Ely APRN [facility] Referring Family Medicine [phone]

## 2021-09-16 NOTE — TELEPHONE ENCOUNTER
Reports lt outer breast itching x1 wk, notes slight swelling to breast. Also reports has had dime size open area lt breast x3 yrs, vaguely describes location but states caused from under wire bra. Denies rashes, redness, pain, s/sx infection, drng.   Has tried OTC hydrocortisone cr with minimal relief.   Advised appt to [facility]. Scheduled with Diana 9/21.  In meantime keep areas clean, dry. Open to air when able. TIFFANIE Mann RN

## 2021-09-19 ENCOUNTER — HEALTH MAINTENANCE LETTER (OUTPATIENT)
Age: 76
End: 2021-09-19

## 2021-09-21 ENCOUNTER — OFFICE VISIT (OUTPATIENT)
Dept: FAMILY MEDICINE | Facility: CLINIC | Age: 76
End: 2021-09-21
Payer: COMMERCIAL

## 2021-09-21 VITALS
WEIGHT: 140 LBS | SYSTOLIC BLOOD PRESSURE: 122 MMHG | RESPIRATION RATE: 14 BRPM | OXYGEN SATURATION: 98 % | TEMPERATURE: 98 F | DIASTOLIC BLOOD PRESSURE: 57 MMHG | BODY MASS INDEX: 22.6 KG/M2 | HEART RATE: 80 BPM

## 2021-09-21 DIAGNOSIS — J44.9 CHRONIC OBSTRUCTIVE PULMONARY DISEASE, UNSPECIFIED COPD TYPE (H): Chronic | ICD-10-CM

## 2021-09-21 DIAGNOSIS — Z23 NEED FOR PROPHYLACTIC VACCINATION AND INOCULATION AGAINST INFLUENZA: ICD-10-CM

## 2021-09-21 DIAGNOSIS — I63.20 CEREBROVASCULAR ACCIDENT (CVA) DUE TO OCCLUSION OF PRECEREBRAL ARTERY (H): Chronic | ICD-10-CM

## 2021-09-21 DIAGNOSIS — Z92.89 HISTORY OF CARDIAC MONITORING: Primary | ICD-10-CM

## 2021-09-21 DIAGNOSIS — Z79.01 ANTICOAGULATION MONITORING, INR RANGE 2-3: Chronic | ICD-10-CM

## 2021-09-21 PROCEDURE — 99214 OFFICE O/P EST MOD 30 MIN: CPT | Performed by: NURSE PRACTITIONER

## 2021-09-21 ASSESSMENT — PAIN SCALES - GENERAL: PAINLEVEL: MILD PAIN (2)

## 2021-09-21 NOTE — PROGRESS NOTES
Assessment & Plan     History of cardiac monitoring  Device removal recommended due to ongoing discomfort in the left breast    Cerebrovascular accident (CVA) due to occlusion of precerebral artery (H)  Continue anticoagulation therapy    Chronic obstructive pulmonary disease, unspecified COPD type (H)  Smoking cessation again encouraged    Anticoagulation monitoring, INR range 2-3  INR as planned.  Follow with anticoagulation clinic.  Continue warfarin lifelong    Need for prophylactic vaccination and inoculation against influenza  Recommended today.  Unfortunately patient left without her influenza vaccine.  Staff will contact to remind      Call or return to the clinic with any worsening of symptoms or no resolution. Patient/Parent verbalized understanding and is in agreement. Medication side effects reviewed.   Current Outpatient Medications   Medication Sig Dispense Refill     amLODIPine (NORVASC) 5 MG tablet Take 1 tablet (5 mg) by mouth daily Needs BP check 30 tablet 0     ASPIRIN NOT PRESCRIBED (INTENTIONAL) Please choose reason not prescribed, below       atorvastatin (LIPITOR) 40 MG tablet Take 1 tablet by mouth once daily 90 tablet 3     enalapril (VASOTEC) 5 MG tablet Take 1 tablet (5 mg) by mouth daily 90 tablet 1     fluticasone (FLONASE) 50 MCG/ACT nasal spray Spray 1 spray into both nostrils daily 16 g 1     Urea 40 % CREA Externally apply topically daily 85 g 11     warfarin ANTICOAGULANT (COUMADIN) 5 MG tablet 10 mg (5 mg x 2) every Mon/Thurs; 7.5 mg (5 mg x 1.5) all other days or as directed by the Anticoagulation Clinic. See pharm notes 148 tablet 0     Chart documentation with Dragon Voice recognition Software. Although reviewed after completion, some words and grammatical errors may remain.    SELWYN [facility]    Jayla Spence is a 75 year old who presents for the following health issues     HPI     LEFT BREAST Pain   Itch   Has  been there for about 4 years   LINQ cardiac monitor implanted 7/13/2015  Left breast tenderness.   [facility] [place] [facility]    [address]    [place] [id number] [phone]       On warfarin since the last stroke approx 5 yrs ago  120-122/57-62  At home weekly   CVA 4 strokes     LINQ Cardiac Monitor no concerns after monitoring for about a year.  Monitor still in place.   Contributing to her left breast pain.  Would like removed.    Mammogram July 21, 2021  No concerns.  Annual routine screening mammogram recommended        Review of Systems   Constitutional, HEENT, cardiovascular, pulmonary, GI, , musculoskeletal, neuro, skin, endocrine and psych systems are negative, except as otherwise noted.      Objective    /57   Pulse 80   Temp 98  F (36.7  C) (Tympanic)   Resp 14   Wt 63.5 kg (140 lb)   SpO2 98%   BMI 22.60 kg/m    Body mass index is 22.6 kg/m .  Physical Exam   GENERAL: healthy, alert and no distress  EYES: Eyes grossly normal to inspection, PERRL and conjunctivae and sclerae normal  HENT: ear canals and TM's normal, nose and mouth without ulcers or lesions  NECK: no adenopathy, no asymmetry, masses, or scars and thyroid normal to palpation  RESP: lungs clear to auscultation - no rales, rhonchi or wheezes  BREAST: no palpable axillary masses or adenopathy, tenderness left breast the 9 o'clock position and well healed  incision left breast  Palpable foreign body 9 o'clock position tender to touch  Right breast unremarkable  Mammogram reviewed  CV: regular rate and rhythm, normal S1 S2, no S3 or S4, no murmur, click or rub, no peripheral edema and peripheral pulses strong  ABDOMEN: soft, nontender, no hepatosplenomegaly, no masses and bowel sounds normal  MS: no gross musculoskeletal defects noted, no edema  SKIN: no suspicious lesions or rashes and hyperpigmented lesions left breast 5 o'clock position and 11:00   NEURO: speech  abnormal  PSYCH: affect flat, judgement and insight intact and appearance well groomed    Lab on 09/16/2021   Component Date Value Ref Range Status     INR 09/16/2021 2.2* 0.9 - 1.1 Final

## 2021-10-06 DIAGNOSIS — Z79.01 LONG TERM CURRENT USE OF ANTICOAGULANT THERAPY: ICD-10-CM

## 2021-10-06 DIAGNOSIS — Z86.73 HISTORY OF CVA (CEREBROVASCULAR ACCIDENT): ICD-10-CM

## 2021-10-06 DIAGNOSIS — I63.20 CEREBROVASCULAR ACCIDENT (CVA) DUE TO OCCLUSION OF PRECEREBRAL ARTERY (H): ICD-10-CM

## 2021-10-06 RX ORDER — WARFARIN SODIUM 5 MG/1
TABLET ORAL
Qty: 148 TABLET | Refills: 0 | Status: SHIPPED | OUTPATIENT
Start: 2021-10-06 | End: 2021-11-18

## 2021-10-07 ENCOUNTER — ANTICOAGULATION THERAPY VISIT (OUTPATIENT)
Dept: ANTICOAGULATION | Facility: CLINIC | Age: 76
End: 2021-10-07

## 2021-10-07 ENCOUNTER — LAB (OUTPATIENT)
Dept: LAB | Facility: CLINIC | Age: 76
End: 2021-10-07
Payer: COMMERCIAL

## 2021-10-07 DIAGNOSIS — Z79.01 LONG TERM CURRENT USE OF ANTICOAGULANT THERAPY: Primary | ICD-10-CM

## 2021-10-07 DIAGNOSIS — I63.20 CEREBROVASCULAR ACCIDENT (CVA) DUE TO OCCLUSION OF PRECEREBRAL ARTERY (H): Chronic | ICD-10-CM

## 2021-10-07 DIAGNOSIS — I63.20 CEREBROVASCULAR ACCIDENT (CVA) DUE TO OCCLUSION OF PRECEREBRAL ARTERY (H): ICD-10-CM

## 2021-10-07 DIAGNOSIS — Z79.01 LONG TERM CURRENT USE OF ANTICOAGULANT THERAPY: ICD-10-CM

## 2021-10-07 LAB — INR BLD: 2.5 (ref 0.9–1.1)

## 2021-10-07 PROCEDURE — 85610 PROTHROMBIN TIME: CPT

## 2021-10-07 PROCEDURE — 36415 COLL VENOUS BLD VENIPUNCTURE: CPT

## 2021-10-07 NOTE — PROGRESS NOTES
ANTICOAGULATION MANAGEMENT     Bebe Alfonso 75 year old female is on warfarin with therapeutic INR result. (Goal INR 2.0-3.0)    Recent labs: (last 7 days)     10/07/21  0828   INR 2.5*       ASSESSMENT     Source(s): Chart Review and Patient/Caregiver Call       Warfarin doses taken: Warfarin taken as instructed    Diet: No new diet changes identified    New illness, injury, or hospitalization: No    Medication/supplement changes: None noted    Signs or symptoms of bleeding or clotting: No    Previous INR: Therapeutic last visit; previously outside of goal range    Additional findings: Patient saw her PCP on 9/21 and discussed some pain/itching in her left breast. Patient has a LINQ cardiac monitor that she would like removed. Advised patient to notify [facility] when she schedules this, and if she needs to be off of her warfarin prior.      PLAN     Recommended plan for no diet, medication or health factor changes affecting INR     Dosing Instructions: Continue your current warfarin dose with next INR in 4 weeks       Summary  As of 10/7/2021    Full warfarin instructions:  10 mg every Mon, Thu; 7.5 mg all other days   Next INR check:  11/4/2021             Telephone call with Bebe who verbalizes understanding and agrees to plan    Lab visit scheduled    Education provided: Importance of notifying clinic for changes in medications; a sooner lab recheck maybe needed., Importance of notifying clinic of upcoming surgeries and procedures 2 weeks in advance and Contact [phone]  with any changes, questions or concerns.     Plan made per [facility] anticoagulation protocol    Annie Adames RN  Anticoagulation Clinic  10/7/2021    _______________________________________________________________________     Anticoagulation Episode Summary     Current INR goal:  2.0-3.0   TTR:  79.5 % (1 y)   Target end date:  Indefinite   Send INR reminders to:  [facility]    Indications    History of CVA (cerebrovascular  accident) (Resolved) [Z86.73]  Long term current use of anticoagulant therapy [Z79.01]  Cerebrovascular accident (CVA) due to occlusion of precerebral artery (H) [I63.20]           Comments:  * Pt states she has had 4 strokes (some after hip surgeries).          Anticoagulation Care Providers     Provider Role Specialty Phone number    Diana Ely APRN [facility] Referring Family Medicine [phone]

## 2021-11-04 ENCOUNTER — LAB (OUTPATIENT)
Dept: LAB | Facility: CLINIC | Age: 76
End: 2021-11-04
Payer: COMMERCIAL

## 2021-11-04 ENCOUNTER — ANTICOAGULATION THERAPY VISIT (OUTPATIENT)
Dept: ANTICOAGULATION | Facility: CLINIC | Age: 76
End: 2021-11-04

## 2021-11-04 DIAGNOSIS — Z79.01 LONG TERM CURRENT USE OF ANTICOAGULANT THERAPY: ICD-10-CM

## 2021-11-04 DIAGNOSIS — Z79.01 LONG TERM CURRENT USE OF ANTICOAGULANT THERAPY: Primary | ICD-10-CM

## 2021-11-04 DIAGNOSIS — I63.20 CEREBROVASCULAR ACCIDENT (CVA) DUE TO OCCLUSION OF PRECEREBRAL ARTERY (H): ICD-10-CM

## 2021-11-04 DIAGNOSIS — I63.20 CEREBROVASCULAR ACCIDENT (CVA) DUE TO OCCLUSION OF PRECEREBRAL ARTERY (H): Chronic | ICD-10-CM

## 2021-11-04 LAB — INR BLD: 3.2 (ref 0.9–1.1)

## 2021-11-04 PROCEDURE — 85610 PROTHROMBIN TIME: CPT

## 2021-11-04 PROCEDURE — 36416 COLLJ CAPILLARY BLOOD SPEC: CPT

## 2021-11-04 NOTE — PROGRESS NOTES
ANTICOAGULATION MANAGEMENT     Bebe Alfonso 75 year old female is on warfarin with supratherapeutic INR result. (Goal INR 2.0-3.0)    Recent labs: (last 7 days)     11/04/21  0828   INR 3.2*       ASSESSMENT     Source(s): Chart Review and Patient/Caregiver Call       Warfarin doses taken: Warfarin taken as instructed    Diet: No new diet changes identified    New illness, injury, or hospitalization: No    Medication/supplement changes: None noted    Signs or symptoms of bleeding or clotting: No    Previous INR: Therapeutic last 2(+) visits    Additional findings: None     PLAN     Recommended plan for no diet, medication or health factor changes affecting INR     Dosing Instructions: Continue your current warfarin dose with next INR in 2 weeks       Summary  As of 11/4/2021    Full warfarin instructions:  10 mg every Mon, Thu; 7.5 mg all other days   Next INR check:  11/18/2021             Telephone call with Bebe who verbalizes understanding and agrees to plan    Lab visit scheduled    Education provided: Contact [phone]  with any changes, questions or concerns.     Plan made per [facility] anticoagulation protocol    Annie Adames RN  Anticoagulation Clinic  11/4/2021    _______________________________________________________________________     Anticoagulation Episode Summary     Current INR goal:  2.0-3.0   TTR:  77.3 % (1 y)   Target end date:  Indefinite   Send INR reminders to:  [facility]    Indications    History of CVA (cerebrovascular accident) (Resolved) [Z86.73]  Long term current use of anticoagulant therapy [Z79.01]  Cerebrovascular accident (CVA) due to occlusion of precerebral artery (H) [I63.20]           Comments:  *         Anticoagulation Care Providers     Provider Role Specialty Phone number    Diana Ely APRN [facility] Referring Family Medicine [phone]

## 2021-11-18 ENCOUNTER — ANTICOAGULATION THERAPY VISIT (OUTPATIENT)
Dept: ANTICOAGULATION | Facility: CLINIC | Age: 76
End: 2021-11-18

## 2021-11-18 ENCOUNTER — LAB (OUTPATIENT)
Dept: LAB | Facility: CLINIC | Age: 76
End: 2021-11-18
Payer: COMMERCIAL

## 2021-11-18 DIAGNOSIS — I63.20 CEREBROVASCULAR ACCIDENT (CVA) DUE TO OCCLUSION OF PRECEREBRAL ARTERY (H): Chronic | ICD-10-CM

## 2021-11-18 DIAGNOSIS — Z86.73 HISTORY OF CVA (CEREBROVASCULAR ACCIDENT): ICD-10-CM

## 2021-11-18 DIAGNOSIS — Z79.01 LONG TERM CURRENT USE OF ANTICOAGULANT THERAPY: Primary | ICD-10-CM

## 2021-11-18 DIAGNOSIS — Z79.01 LONG TERM CURRENT USE OF ANTICOAGULANT THERAPY: ICD-10-CM

## 2021-11-18 DIAGNOSIS — I63.20 CEREBROVASCULAR ACCIDENT (CVA) DUE TO OCCLUSION OF PRECEREBRAL ARTERY (H): ICD-10-CM

## 2021-11-18 LAB — INR BLD: 3.5 (ref 0.9–1.1)

## 2021-11-18 PROCEDURE — 85610 PROTHROMBIN TIME: CPT

## 2021-11-18 PROCEDURE — 36416 COLLJ CAPILLARY BLOOD SPEC: CPT

## 2021-11-18 RX ORDER — WARFARIN SODIUM 5 MG/1
TABLET ORAL
Qty: 148 TABLET | Refills: 0
Start: 2021-11-18 | End: 2022-01-04

## 2021-11-18 NOTE — PROGRESS NOTES
ANTICOAGULATION MANAGEMENT     Bebe Alfonso 76 year old female is on warfarin with supratherapeutic INR result. (Goal INR 2.0-3.0)    Recent labs: (last 7 days)     11/18/21  0821   INR 3.5*       ASSESSMENT     Source(s): Chart Review and Patient/Caregiver Call       Warfarin doses taken: Warfarin taken as instructed    Diet: No new diet changes identified    New illness, injury, or hospitalization: No    Medication/supplement changes: None noted    Signs or symptoms of bleeding or clotting: No    Previous INR: Supratherapeutic    Additional findings: only change is that the patient states her hip replacements hurt during cold weather (metal parts)     PLAN     Recommended plan for no diet, medication or health factor changes affecting INR     Dosing Instructions:  Decrease your warfarin dose (8.6% change) with next INR in 2 weeks       Summary  As of 11/18/2021    Full warfarin instructions:  7.5 mg every day   Next INR check:  12/2/2021             Telephone call with Bebe who verbalizes understanding and agrees to plan    Lab visit scheduled    Education provided: Please call back if any changes to your diet, medications or how you've been taking warfarin and Contact [phone]  with any changes, questions or concerns.     Plan made per [facility] anticoagulation protocol    Natalie Sorto RN  Anticoagulation Clinic  11/18/2021    _______________________________________________________________________     Anticoagulation Episode Summary     Current INR goal:  2.0-3.0   TTR:  74.5 % (1 y)   Target end date:  Indefinite   Send INR reminders to:  [facility]    Indications    History of CVA (cerebrovascular accident) (Resolved) [Z86.73]  Long term current use of anticoagulant therapy [Z79.01]  Cerebrovascular accident (CVA) due to occlusion of precerebral artery (H) [I63.20]           Comments:  *         Anticoagulation Care Providers     Provider Role Specialty Phone number    Diana Ely,  SELWYN CNP Referring Family Medicine [phone]

## 2021-12-02 ENCOUNTER — ANTICOAGULATION THERAPY VISIT (OUTPATIENT)
Dept: ANTICOAGULATION | Facility: CLINIC | Age: 76
End: 2021-12-02

## 2021-12-02 ENCOUNTER — LAB (OUTPATIENT)
Dept: LAB | Facility: CLINIC | Age: 76
End: 2021-12-02
Payer: COMMERCIAL

## 2021-12-02 DIAGNOSIS — Z79.01 LONG TERM CURRENT USE OF ANTICOAGULANT THERAPY: ICD-10-CM

## 2021-12-02 DIAGNOSIS — I63.20 CEREBROVASCULAR ACCIDENT (CVA) DUE TO OCCLUSION OF PRECEREBRAL ARTERY (H): Chronic | ICD-10-CM

## 2021-12-02 DIAGNOSIS — Z79.01 LONG TERM CURRENT USE OF ANTICOAGULANT THERAPY: Primary | ICD-10-CM

## 2021-12-02 DIAGNOSIS — I63.20 CEREBROVASCULAR ACCIDENT (CVA) DUE TO OCCLUSION OF PRECEREBRAL ARTERY (H): ICD-10-CM

## 2021-12-02 LAB — INR BLD: 2.8 (ref 0.9–1.1)

## 2021-12-02 PROCEDURE — 85610 PROTHROMBIN TIME: CPT

## 2021-12-02 PROCEDURE — 36416 COLLJ CAPILLARY BLOOD SPEC: CPT

## 2021-12-02 NOTE — PROGRESS NOTES
ANTICOAGULATION MANAGEMENT     Bebe Alfonso 76 year old female is on warfarin with therapeutic INR result. (Goal INR 2.0-3.0)    Recent labs: (last 7 days)     12/02/21  0920   INR 2.8*       ASSESSMENT     Source(s): Chart Review and Patient/Caregiver Call       Warfarin doses taken: Warfarin taken as instructed    Diet: No new diet changes identified    New illness, injury, or hospitalization: Patient mentioned ongoing mild aphasia sx - saw Neurology & awaiting results    Medication/supplement changes: Melatonin started about 2 months ago which has potential for interaction; increasing INR    Signs or symptoms of bleeding or clotting: No    Previous INR: Supratherapeutic    Additional findings: none     PLAN     Recommended plan for no diet, medication or health factor changes affecting INR     Dosing Instructions: Continue your current warfarin dose with next INR in 2 weeks       Summary  As of 12/2/2021    Full warfarin instructions:  7.5 mg every day   Next INR check:  12/16/2021             Telephone call with Bebe who verbalizes understanding and agrees to plan    Lab visit scheduled    Education provided: Please call back if any changes to your diet, medications or how you've been taking warfarin, Monitoring for bleeding signs and symptoms, Monitoring for clotting signs and symptoms and Importance of notifying clinic for changes in medications; a sooner lab recheck maybe needed.    Plan made per [facility] anticoagulation protocol    Natalie Kitchen RN  Anticoagulation Clinic  12/2/2021    _______________________________________________________________________     Anticoagulation Episode Summary     Current INR goal:  2.0-3.0   TTR:  75.6 % (1 y)   Target end date:  Indefinite   Send INR reminders to:  [facility]    Indications    History of CVA (cerebrovascular accident) (Resolved) [Z86.73]  Long term current use of anticoagulant therapy [Z79.01]  Cerebrovascular accident (CVA) due to  occlusion of precerebral artery (H) [I63.20]           Comments:  *         Anticoagulation Care Providers     Provider Role Specialty Phone number    Diana Ely APRN [facility] Referring Family Medicine [phone]

## 2021-12-10 ENCOUNTER — MYC MEDICAL ADVICE (OUTPATIENT)
Dept: FAMILY MEDICINE | Facility: CLINIC | Age: 76
End: 2021-12-10
Payer: COMMERCIAL

## 2021-12-16 ENCOUNTER — LAB (OUTPATIENT)
Dept: LAB | Facility: CLINIC | Age: 76
End: 2021-12-16
Payer: COMMERCIAL

## 2021-12-16 ENCOUNTER — ANTICOAGULATION THERAPY VISIT (OUTPATIENT)
Dept: ANTICOAGULATION | Facility: CLINIC | Age: 76
End: 2021-12-16

## 2021-12-16 DIAGNOSIS — I63.20 CEREBROVASCULAR ACCIDENT (CVA) DUE TO OCCLUSION OF PRECEREBRAL ARTERY (H): Chronic | ICD-10-CM

## 2021-12-16 DIAGNOSIS — I10 ESSENTIAL HYPERTENSION: ICD-10-CM

## 2021-12-16 DIAGNOSIS — Z79.01 LONG TERM CURRENT USE OF ANTICOAGULANT THERAPY: ICD-10-CM

## 2021-12-16 DIAGNOSIS — Z79.01 LONG TERM CURRENT USE OF ANTICOAGULANT THERAPY: Primary | ICD-10-CM

## 2021-12-16 DIAGNOSIS — I63.20 CEREBROVASCULAR ACCIDENT (CVA) DUE TO OCCLUSION OF PRECEREBRAL ARTERY (H): ICD-10-CM

## 2021-12-16 DIAGNOSIS — N18.31 STAGE 3A CHRONIC KIDNEY DISEASE (H): ICD-10-CM

## 2021-12-16 LAB
ALBUMIN SERPL-MCNC: 3.3 G/DL (ref 3.4–5)
ANION GAP SERPL CALCULATED.3IONS-SCNC: 6 MMOL/L (ref 3–14)
BUN SERPL-MCNC: 29 MG/DL (ref 7–30)
CALCIUM SERPL-MCNC: 9.1 MG/DL (ref 8.5–10.1)
CHLORIDE BLD-SCNC: 108 MMOL/L (ref 94–109)
CO2 SERPL-SCNC: 26 MMOL/L (ref 20–32)
CREAT SERPL-MCNC: 1.39 MG/DL (ref 0.52–1.04)
GFR SERPL CREATININE-BSD FRML MDRD: 37 ML/MIN/1.73M2
GLUCOSE BLD-MCNC: 93 MG/DL (ref 70–99)
HOLD SPECIMEN: NORMAL
INR BLD: 2.8 (ref 0.9–1.1)
MAGNESIUM SERPL-MCNC: 2.4 MG/DL (ref 1.6–2.3)
PHOSPHATE SERPL-MCNC: 3.6 MG/DL (ref 2.5–4.5)
POTASSIUM BLD-SCNC: 4.4 MMOL/L (ref 3.4–5.3)
PTH-INTACT SERPL-MCNC: 58 PG/ML (ref 18–80)
SODIUM SERPL-SCNC: 140 MMOL/L (ref 133–144)
TSH SERPL DL<=0.005 MIU/L-ACNC: 1.86 MU/L (ref 0.4–4)

## 2021-12-16 PROCEDURE — 80069 RENAL FUNCTION PANEL: CPT

## 2021-12-16 PROCEDURE — 83970 ASSAY OF PARATHORMONE: CPT

## 2021-12-16 PROCEDURE — 36415 COLL VENOUS BLD VENIPUNCTURE: CPT

## 2021-12-16 PROCEDURE — 36416 COLLJ CAPILLARY BLOOD SPEC: CPT

## 2021-12-16 PROCEDURE — 84443 ASSAY THYROID STIM HORMONE: CPT

## 2021-12-16 PROCEDURE — 83735 ASSAY OF MAGNESIUM: CPT

## 2021-12-16 PROCEDURE — 85610 PROTHROMBIN TIME: CPT

## 2021-12-16 NOTE — PROGRESS NOTES
ANTICOAGULATION MANAGEMENT     Bebe Alfonso 76 year old female is on warfarin with therapeutic INR result. (Goal INR 2.0-3.0)    Recent labs: (last 7 days)     12/16/21  0826   INR 2.8*       ASSESSMENT     Source(s): Chart Review and Patient/Caregiver Call       Warfarin doses taken: Warfarin taken as instructed    Diet: No new diet changes identified    New illness, injury, or hospitalization: No    Medication/supplement changes: None noted    Signs or symptoms of bleeding or clotting: No    Previous INR: Therapeutic last visit; previously outside of goal range    Additional findings: None     PLAN     Recommended plan for no diet, medication or health factor changes affecting INR     Dosing Instructions: Continue your current warfarin dose with next INR in 3 weeks       Summary  As of 12/16/2021    Full warfarin instructions:  7.5 mg every day   Next INR check:  1/6/2022             Telephone call with Bebe who agrees to plan and repeated back plan correctly    Lab visit scheduled    Education provided: Please call back if any changes to your diet, medications or how you've been taking warfarin    Plan made per [facility] anticoagulation protocol    Brianda Durant RN  Anticoagulation Clinic  12/16/2021    _______________________________________________________________________     Anticoagulation Episode Summary     Current INR goal:  2.0-3.0   TTR:  77.3 % (1 y)   Target end date:  Indefinite   Send INR reminders to:  [facility]    Indications    History of CVA (cerebrovascular accident) (Resolved) [Z86.73]  Long term current use of anticoagulant therapy [Z79.01]  Cerebrovascular accident (CVA) due to occlusion of precerebral artery (H) [I63.20]           Comments:           Anticoagulation Care Providers     Provider Role Specialty Phone number    Diana Ely APRN [facility] Referring Family Medicine [phone]

## 2021-12-17 ENCOUNTER — TELEPHONE (OUTPATIENT)
Dept: FAMILY MEDICINE | Facility: CLINIC | Age: 76
End: 2021-12-17
Payer: COMMERCIAL

## 2021-12-17 NOTE — TELEPHONE ENCOUNTER
Reason for Call:  Other call back    Detailed comments: Patient has questions about something interfering with their warfrin.     Phone Number Patient can be reached at: Home number on file [phone] (home) or Cell number on file:    Telephone Information:   Mobile [phone]       Best Time: N/a    Can we leave a detailed message on this number? YES    Call taken on 12/17/2021 at 10:18 AM by Zaynab Flaherty

## 2021-12-17 NOTE — TELEPHONE ENCOUNTER
Reason for Call:  Other     Detailed comments: the patient would like to talk with an INR nurse     Phone Number Patient can be reached at: Home number on file [phone] (home)    Best Time: any    Can we leave a detailed message on this number? YES    Call taken on 12/17/2021 at 10:23 AM by Maira Dsouza

## 2021-12-17 NOTE — TELEPHONE ENCOUNTER
Spoke with Bebe and she states that she is making homemade apple cider with cloves, allspice and cinnamon and doesn't want it to interfere with her INR. I have advised her that this all will be ok and should not interfere. Patient verbalized understanding and agrees with plan of care. Pt had no further questions or concerns at this time. Chirag Ely RN, BSN  [facility] Anticoagulation Team

## 2022-01-04 ENCOUNTER — TELEPHONE (OUTPATIENT)
Dept: FAMILY MEDICINE | Facility: CLINIC | Age: 77
End: 2022-01-04
Payer: COMMERCIAL

## 2022-01-04 DIAGNOSIS — I63.20 CEREBROVASCULAR ACCIDENT (CVA) DUE TO OCCLUSION OF PRECEREBRAL ARTERY (H): ICD-10-CM

## 2022-01-04 DIAGNOSIS — Z86.73 HISTORY OF CVA (CEREBROVASCULAR ACCIDENT): ICD-10-CM

## 2022-01-04 DIAGNOSIS — Z79.01 LONG TERM CURRENT USE OF ANTICOAGULANT THERAPY: ICD-10-CM

## 2022-01-04 RX ORDER — WARFARIN SODIUM 5 MG/1
TABLET ORAL
Qty: 148 TABLET | Refills: 0 | Status: SHIPPED | OUTPATIENT
Start: 2022-01-04 | End: 2022-03-03

## 2022-01-04 NOTE — TELEPHONE ENCOUNTER
10:24 AM    Writer approved refill and sent to the patient's PCP for authorization. Patient updated.    Justin Naidu RN, BSN, PHN  Anticoagulation Clinic   [place] # [id number]  [phone]

## 2022-01-04 NOTE — TELEPHONE ENCOUNTER
Reason for Call:  Medication or medication refill:    Do you use a [facility]?  Name of the pharmacy and phone number for the current request:  [facility] - [address] [place] [address] - [phone]    Name of the medication requested: Warfarin    Other request: Callback to confirm    Can we leave a detailed message on this number? YES    Phone number patient can be reached at: Home number on file [phone] (home)    Best Time: Anytime    Call taken on 1/4/2022 at 9:12 AM by Marco Murillo

## 2022-01-06 ENCOUNTER — LAB (OUTPATIENT)
Dept: LAB | Facility: CLINIC | Age: 77
End: 2022-01-06
Payer: COMMERCIAL

## 2022-01-06 ENCOUNTER — ANTICOAGULATION THERAPY VISIT (OUTPATIENT)
Dept: ANTICOAGULATION | Facility: CLINIC | Age: 77
End: 2022-01-06

## 2022-01-06 DIAGNOSIS — I63.20 CEREBROVASCULAR ACCIDENT (CVA) DUE TO OCCLUSION OF PRECEREBRAL ARTERY (H): Chronic | ICD-10-CM

## 2022-01-06 DIAGNOSIS — Z79.01 LONG TERM CURRENT USE OF ANTICOAGULANT THERAPY: Primary | ICD-10-CM

## 2022-01-06 DIAGNOSIS — Z79.01 LONG TERM CURRENT USE OF ANTICOAGULANT THERAPY: ICD-10-CM

## 2022-01-06 DIAGNOSIS — I63.20 CEREBROVASCULAR ACCIDENT (CVA) DUE TO OCCLUSION OF PRECEREBRAL ARTERY (H): ICD-10-CM

## 2022-01-06 LAB — INR BLD: 1.8 (ref 0.9–1.1)

## 2022-01-06 PROCEDURE — 85610 PROTHROMBIN TIME: CPT

## 2022-01-06 PROCEDURE — 36416 COLLJ CAPILLARY BLOOD SPEC: CPT

## 2022-01-06 NOTE — PROGRESS NOTES
ANTICOAGULATION MANAGEMENT     Bebe SHELDON Alfonso 76 year old female is on warfarin with subtherapeutic INR result. (Goal INR 2.0-3.0)    Recent labs: (last 7 days)     01/06/22  0850   INR 1.8*       ASSESSMENT     Source(s): Chart Review and Patient/Caregiver Call       Warfarin doses taken: Warfarin taken as instructed    Diet: No new diet changes identified    New illness, injury, or hospitalization: No    Medication/supplement changes: None noted    Signs or symptoms of bleeding or clotting: No    Previous INR: Therapeutic last 2(+) visits    Additional findings: None     PLAN     Recommended plan for no diet, medication or health factor changes affecting INR     Dosing Instructions: Booster dose then continue your current warfarin dose with next INR in 2 weeks       Summary  As of 1/6/2022    Full warfarin instructions:  1/6: 10 mg; Otherwise 7.5 mg every day   Next INR check:  1/20/2022             Telephone call with Bebe who verbalizes understanding and agrees to plan    Lab visit scheduled    Education provided: Please call back if any changes to your diet, medications or how you've been taking warfarin and Contact [phone]  with any changes, questions or concerns.     Plan made per [facility] anticoagulation protocol    Natalie Sorto RN  Anticoagulation Clinic  1/6/2022    _______________________________________________________________________     Anticoagulation Episode Summary     Current INR goal:  2.0-3.0   TTR:  77.3 % (1 y)   Target end date:  Indefinite   Send INR reminders to:  [facility]    Indications    History of CVA (cerebrovascular accident) (Resolved) [Z86.73]  Long term current use of anticoagulant therapy [Z79.01]  Cerebrovascular accident (CVA) due to occlusion of precerebral artery (H) [I63.20]           Comments:           Anticoagulation Care Providers     Provider Role Specialty Phone number    Diana Ely APRN [facility] Referring Family Medicine [phone]

## 2022-01-11 DIAGNOSIS — I10 ESSENTIAL HYPERTENSION: ICD-10-CM

## 2022-01-11 DIAGNOSIS — J44.1 COPD EXACERBATION (H): ICD-10-CM

## 2022-01-13 RX ORDER — ENALAPRIL MALEATE 5 MG/1
TABLET ORAL
Qty: 90 TABLET | Refills: 0 | Status: SHIPPED | OUTPATIENT
Start: 2022-01-13 | End: 2022-04-07

## 2022-01-13 RX ORDER — AMLODIPINE BESYLATE 5 MG/1
TABLET ORAL
Qty: 90 TABLET | Refills: 0 | Status: SHIPPED | OUTPATIENT
Start: 2022-01-13 | End: 2022-04-07

## 2022-01-13 NOTE — TELEPHONE ENCOUNTER
Routing refill request to provider for review/approval because:  Labs out of range:  Creat 1.39  on 12/16/21  Last seen 9/21/21

## 2022-01-20 ENCOUNTER — LAB (OUTPATIENT)
Dept: LAB | Facility: CLINIC | Age: 77
End: 2022-01-20
Payer: COMMERCIAL

## 2022-01-20 ENCOUNTER — ANTICOAGULATION THERAPY VISIT (OUTPATIENT)
Dept: ANTICOAGULATION | Facility: CLINIC | Age: 77
End: 2022-01-20

## 2022-01-20 DIAGNOSIS — I63.20 CEREBROVASCULAR ACCIDENT (CVA) DUE TO OCCLUSION OF PRECEREBRAL ARTERY (H): ICD-10-CM

## 2022-01-20 DIAGNOSIS — Z79.01 LONG TERM CURRENT USE OF ANTICOAGULANT THERAPY: Primary | ICD-10-CM

## 2022-01-20 DIAGNOSIS — Z79.01 LONG TERM CURRENT USE OF ANTICOAGULANT THERAPY: ICD-10-CM

## 2022-01-20 DIAGNOSIS — I63.20 CEREBROVASCULAR ACCIDENT (CVA) DUE TO OCCLUSION OF PRECEREBRAL ARTERY (H): Chronic | ICD-10-CM

## 2022-01-20 LAB — INR BLD: 2.9 (ref 0.9–1.1)

## 2022-01-20 PROCEDURE — 85610 PROTHROMBIN TIME: CPT

## 2022-01-20 PROCEDURE — 36416 COLLJ CAPILLARY BLOOD SPEC: CPT

## 2022-01-20 NOTE — PROGRESS NOTES
ANTICOAGULATION MANAGEMENT     Bebe Alfonso 76 year old female is on warfarin with therapeutic INR result. (Goal INR 2.0-3.0)    Recent labs: (last 7 days)     01/20/22  0910   INR 2.9*       ASSESSMENT     Source(s): Chart Review and Patient/Caregiver Call       Warfarin doses taken: Warfarin taken as instructed    Diet: No new diet changes identified    New illness, injury, or hospitalization: No    Medication/supplement changes: None noted    Signs or symptoms of bleeding or clotting: No    Previous INR: Subtherapeutic    Additional findings: None     PLAN     Recommended plan for no diet, medication or health factor changes affecting INR     Dosing Instructions: Continue your current warfarin dose with next INR in 3 weeks       Summary  As of 1/20/2022    Full warfarin instructions:  7.5 mg every day   Next INR check:  2/10/2022             Telephone call with Bebe who verbalizes understanding and agrees to plan    Lab visit scheduled    Education provided: Contact [phone]  with any changes, questions or concerns.     Plan made per [facility] anticoagulation protocol    Annie Adames RN  Anticoagulation Clinic  1/20/2022    _______________________________________________________________________     Anticoagulation Episode Summary     Current INR goal:  2.0-3.0   TTR:  77.9 % (1 y)   Target end date:  Indefinite   Send INR reminders to:  [facility]    Indications    History of CVA (cerebrovascular accident) (Resolved) [Z86.73]  Long term current use of anticoagulant therapy [Z79.01]  Cerebrovascular accident (CVA) due to occlusion of precerebral artery (H) [I63.20]           Comments:           Anticoagulation Care Providers     Provider Role Specialty Phone number    Diana Ely APRN [facility] Referring Family Medicine [phone]           Okay to refill with 84 tablets which should last until appt with Jenna in 6/2019.

## 2022-01-21 ENCOUNTER — TELEPHONE (OUTPATIENT)
Dept: FAMILY MEDICINE | Facility: CLINIC | Age: 77
End: 2022-01-21
Payer: COMMERCIAL

## 2022-01-21 NOTE — TELEPHONE ENCOUNTER
States dropped can on lt foot/ great toe approx 2 hrs ago. Had slippers on. Did not break skin. States area slightly swollen, no bruising at this time. Sitting with foot elevated.  Advised cold pack to affected area to reduce swelling and any bleeding into tissues, continue to elevate, monitor.   Pt voices understanding/ agreement.  TIFFANIE Mann RN

## 2022-01-21 NOTE — TELEPHONE ENCOUNTER
Reason for Call:  Left Foot Big Toe     Detailed comments: Pt dropped a can on her left foot Big toe- slightly swollen. Pt is currently on warfain should she concerned about this and her medication. This happened a couple of hrs ago.     Phone Number Patient can be reached at: Home number on file [phone] (home)    Best Time: Any Time      Can we leave a detailed message on this number? YES    Call taken on 1/21/2022 at 2:23 PM by Jessica Hoffmann

## 2022-02-10 ENCOUNTER — LAB (OUTPATIENT)
Dept: LAB | Facility: CLINIC | Age: 77
End: 2022-02-10
Payer: COMMERCIAL

## 2022-02-10 ENCOUNTER — ANTICOAGULATION THERAPY VISIT (OUTPATIENT)
Dept: ANTICOAGULATION | Facility: CLINIC | Age: 77
End: 2022-02-10

## 2022-02-10 ENCOUNTER — TELEPHONE (OUTPATIENT)
Dept: ANTICOAGULATION | Facility: CLINIC | Age: 77
End: 2022-02-10

## 2022-02-10 DIAGNOSIS — I63.20 CEREBROVASCULAR ACCIDENT (CVA) DUE TO OCCLUSION OF PRECEREBRAL ARTERY (H): ICD-10-CM

## 2022-02-10 DIAGNOSIS — Z86.73 HISTORY OF CVA (CEREBROVASCULAR ACCIDENT): ICD-10-CM

## 2022-02-10 DIAGNOSIS — Z79.01 LONG TERM CURRENT USE OF ANTICOAGULANT THERAPY: ICD-10-CM

## 2022-02-10 DIAGNOSIS — Z79.01 LONG TERM CURRENT USE OF ANTICOAGULANT THERAPY: Primary | ICD-10-CM

## 2022-02-10 DIAGNOSIS — I63.20 CEREBROVASCULAR ACCIDENT (CVA) DUE TO OCCLUSION OF PRECEREBRAL ARTERY (H): Chronic | ICD-10-CM

## 2022-02-10 LAB — INR BLD: 2.8 (ref 0.9–1.1)

## 2022-02-10 PROCEDURE — 85610 PROTHROMBIN TIME: CPT

## 2022-02-10 PROCEDURE — 36416 COLLJ CAPILLARY BLOOD SPEC: CPT

## 2022-02-10 NOTE — PROGRESS NOTES
ANTICOAGULATION MANAGEMENT     Bebe Alfonso 76 year old female is on warfarin with therapeutic INR result. (Goal INR 2.0-3.0)    Recent labs: (last 7 days)     02/10/22  0913   INR 2.8*       ASSESSMENT     Source(s): Chart Review and Patient/Caregiver Call       Warfarin doses taken: Warfarin taken as instructed    Diet: No new diet changes identified    New illness, injury, or hospitalization: intermittent liquid stools in the morning    Medication/supplement changes: None noted    Signs or symptoms of bleeding or clotting: No    Previous INR: Therapeutic last visit; previously outside of goal range    Additional findings: None     PLAN     Recommended plan for no diet, medication or health factor changes affecting INR     Dosing Instructions: Continue your current warfarin dose with next INR in 3 weeks       Summary  As of 2/10/2022    Full warfarin instructions:  7.5 mg every day   Next INR check:  3/10/2022             Telephone call with Bebe who verbalizes understanding and agrees to plan    Lab visit scheduled    Education provided: Please call back if any changes to your diet, medications or how you've been taking warfarin    Plan made per [facility] anticoagulation protocol    Deneen Pretty RN  Anticoagulation Clinic  2/10/2022    _______________________________________________________________________     Anticoagulation Episode Summary     Current INR goal:  2.0-3.0   TTR:  77.9 % (1 y)   Target end date:  Indefinite   Send INR reminders to:  [facility]    Indications    History of CVA (cerebrovascular accident) (Resolved) [Z86.73]  Long term current use of anticoagulant therapy [Z79.01]  Cerebrovascular accident (CVA) due to occlusion of precerebral artery (H) [I63.20]           Comments:           Anticoagulation Care Providers     Provider Role Specialty Phone number    Diana Ely APRN [facility] Referring Family Medicine [phone]

## 2022-02-10 NOTE — TELEPHONE ENCOUNTER
ANTICOAGULATION MANAGEMENT      Bebe Alfonso due for annual renewal of referral to anticoagulation monitoring. Order pended for your review and signature.      ANTICOAGULATION SUMMARY      Warfarin indication(s)     CVA due to occlusion of precerebral artery    Heart valve present?  NO       Current goal range   INR: 2.0-3.0     Goal appropriate for indication? Yes, INR 2-3 appropriate for hx of DVT, PE, hypercoagulable state, Afib, LVAD, or bileaflet AVR without risk factors     Current duration of therapy Indefinite/long term therapy   Time in Therapeutic Range (TTR)  (Goal > 60%) 77.9%       Office visit with referring provider's group within last year yes on 9/21/21       Deneen Pretty RN

## 2022-02-17 PROBLEM — N18.30 STAGE 3 CHRONIC KIDNEY DISEASE (H): Chronic | Status: ACTIVE | Noted: 2019-04-18

## 2022-02-22 ENCOUNTER — VIRTUAL VISIT (OUTPATIENT)
Dept: FAMILY MEDICINE | Facility: CLINIC | Age: 77
End: 2022-02-22
Payer: COMMERCIAL

## 2022-02-22 DIAGNOSIS — R19.7 VOMITING AND DIARRHEA: ICD-10-CM

## 2022-02-22 DIAGNOSIS — N18.31 STAGE 3A CHRONIC KIDNEY DISEASE (H): ICD-10-CM

## 2022-02-22 DIAGNOSIS — K29.00 ACUTE GASTRITIS WITHOUT HEMORRHAGE, UNSPECIFIED GASTRITIS TYPE: Primary | ICD-10-CM

## 2022-02-22 DIAGNOSIS — J44.9 CHRONIC OBSTRUCTIVE PULMONARY DISEASE, UNSPECIFIED COPD TYPE (H): ICD-10-CM

## 2022-02-22 DIAGNOSIS — R11.10 VOMITING AND DIARRHEA: ICD-10-CM

## 2022-02-22 PROCEDURE — 99213 OFFICE O/P EST LOW 20 MIN: CPT | Mod: 95 | Performed by: NURSE PRACTITIONER

## 2022-02-22 ASSESSMENT — ENCOUNTER SYMPTOMS: DIARRHEA: 1

## 2022-02-22 NOTE — PATIENT INSTRUCTIONS
For the possible stomach ulcer  Could use over the counter  Omeprazole 40 mg daily for 4- 8 weeks  For the diarrhea Imodium over the counter  Initial: 4 mg, followed by 2 mg after each loose stool; maximum: 16 mg/day. Limit use to ?48 hours .  Call or return to the clinic with any worsening of symptoms or no resolution.   Take Care,  Diana Ely MSN,FNP-BC  [place] [id number]  [address] [place] [id number]  [phone]  Patient Education     Diarrhea with Uncertain Cause (Adult)    Diarrhea is when stools are loose and watery. This can be caused by:    Viral infections    Bacterial infections    Food poisoning    Parasites    Irritable bowel syndrome (IBS)    Inflammatory bowel diseases such as ulcerative colitis, Crohn's disease, and celiac disease    Food intolerance, such as to lactose, the sugar found in milk and milk products    Reaction to medicines like antibiotics, laxatives, cancer drugs, and antacids  Along with diarrhea, you may also have:    Abdominal pain and cramping    Nausea and vomiting    Loss of bowel control    Fever and chills    Bloody stools  In some cases, antibiotics may help to treat diarrhea. You may have a stool sample test. This is done to see what is causing your diarrhea, and if antibiotics will help treat it. The results of a stool sample test may take up to 2 days. The healthcare provider may not give you antibiotics until he or she has the stool test results.  Diarrhea can cause dehydration. This is the loss of too much water and other fluids from the body. When this occurs, body fluid must be replaced. This can be done with oral rehydration solutions. Oral rehydration solutions are available at drugstores and grocery stores without a prescription. Sports drinks are not the best choice if you are very dehydrated. They have too much sugar and not enough electrolytes.  Home care  Follow all instructions given by your healthcare provider. Rest at  home for the next 24 hours, or until you feel better. Avoid caffeine, tobacco, and alcohol. These can make diarrhea, cramping, and pain worse.  If taking medicines:    Over-the-counter nausea and diarrhea medicines are generally OK unless you experience fever or blood stool. Check with your doctor first in those circumstances.    You may use acetaminophen or NSAID medicines like ibuprofen or naproxen to reduce pain and fever. Don t use these if you have chronic liver or kidney disease, or ever had a stomach ulcer or gastrointestinal bleeding. Don't use NSAID medicines if you are already taking one for another condition (like arthritis) or are on daily aspirin therapy (such as for heart disease or after a stroke). Talk with your healthcare provider first.    If antibiotics were prescribed, be sure you take them until they are finished. Don t stop taking them even when you feel better. Antibiotics must be taken as a full course.  To prevent the spread of illness:    Remember that washing with soap and water and using alcohol-based  is the best way to prevent the spread of infection. Dry your hands with a single use towel (like a paper towel).    Clean the toilet after each use.    Wash your hands before eating.    Wash your hands before and after preparing food. Keep in mind that people with diarrhea or vomiting should not prepare food for others.    Wash your hands after using cutting boards, countertops, and knives that have been in contact with raw foods.    Wash and then peel fruits and vegetables.    Keep uncooked meats away from cooked and ready-to-eat foods.    Use a food thermometer when cooking. Cook poultry to at least 165 F (74 C). Cook ground meat (beef, veal, pork, lamb) to at least 160 F (71 C). Cook fresh beef, veal, lamb, and pork to at least 145 F (63 C).    Don t eat raw or undercooked eggs (poached or luda side up), poultry, meat, or unpasteurized milk and juices.  Food and drinks  The  main goal while treating vomiting or diarrhea is to prevent dehydration. This is done by taking small amounts of liquids often.    Keep in mind that liquids are more important than food right now.    Drink only small amounts of liquids at a time.    Don t force yourself to eat, especially if you are having cramping, vomiting, or diarrhea. Don t eat large amounts at a time, even if you are hungry.    If you eat, avoid fatty, greasy, spicy, or fried foods.    Don t eat dairy foods or drink milk if you have diarrhea. These can make diarrhea worse.  During the first 24 hours you can try:    Oral rehydration solutions.  Sports drinks may be used if you are not too dehydrated and are otherwise healthy.    Soft drinks without caffeine    Ginger ale    Water (plain or flavored)    Decaf tea or coffee    Clear broth, consommé, or bouillon    Gelatin, popsicles, or frozen fruit juice bars  The second 24 hours, if you are feeling better, you can add:    Hot cereal, plain toast, bread, rolls, or crackers    Plain noodles, rice, mashed potatoes, chicken noodle soup, or rice soup    Unsweetened canned fruit (no pineapple)    Bananas  As you recover:    Limit fat intake to less than 15 grams per day. Don t eat margarine, butter, oils, mayonnaise, sauces, gravies, fried foods, peanut butter, meat, poultry, or fish.    Limit fiber. Don t eat raw or cooked vegetables, fresh fruits except bananas, or bran cereals.    Limit caffeine and chocolate.    Limit dairy.    Don t use spices or seasonings except salt.    Go back to your normal diet over time, as you feel better and your symptoms improve.    If the symptoms come back, go back to a simple diet or clear liquids.  Follow-up care  Follow up with your healthcare provider, or as advised. If a stool sample was taken or cultures were done, call the healthcare provider for the results as instructed.  Call 911  Call 911 if you have any of these symptoms:    Trouble  breathing    Confusion    Extreme drowsiness or trouble walking    Loss of consciousness    Rapid heart rate    Chest pain    Stiff neck    Seizure  When to seek medical advice  Call your healthcare provider right away if any of these occur:    Abdominal pain that gets worse    Constant lower right abdominal pain    Continued vomiting and inability to keep liquids down    Diarrhea more than 5 times a day    Blood in vomit or stool    Dark urine or no urine for 8 hours, dry mouth and tongue, tiredness, weakness, or dizziness    Drowsiness    New rash    You don t get better in 2 to 3 days    Fever of 100.4 F (38 C) or higher, or as directed by your healthcare provider  [employer] last reviewed this educational content on 6/1/2018 2000-2021 [employer]. All rights reserved. This information is not intended as a substitute for professional medical care. Always follow your healthcare professional's instructions.

## 2022-02-22 NOTE — PROGRESS NOTES
Bebe is a 76 year old who is being evaluated via a billable telephone visit.      What phone number would you like to be contacted at? [phone]  How would you like to obtain your AVS? MyChart    Assessment & Plan     Acute gastritis without hemorrhage, unspecified gastritis type  Symptomatic care strategies reviewed.   Begin omeprazole 40 mg daily for 4-8 weeks for ulcer prevention  TUMS up to 8 a day before meals and at HS as needed.     Vomiting and diarrhea  Imodium 4 mg initially then 2 mg after each loose stool up to 16 mg daily  Rehydration strategies reviewed.     Chronic obstructive pulmonary disease, unspecified COPD type (H)  Stable. No meds at this time      Stage 3a chronic kidney disease (H)  Unchanged. Last labs in December  Continue to monitor     Call or return to the clinic with any worsening of symptoms or no resolution. Patient/Parent verbalized understanding and is in agreement. Medication side effects reviewed.   Current Outpatient Medications   Medication Sig Dispense Refill     amLODIPine (NORVASC) 5 MG tablet TAKE 1 TABLET BY MOUTH ONCE DAILY .  NEED  BLOOD  PRESSURE  CHECK. 90 tablet 0     atorvastatin (LIPITOR) 40 MG tablet Take 1 tablet by mouth once daily 90 tablet 3     enalapril (VASOTEC) 5 MG tablet Take 1 tablet by mouth once daily 90 tablet 0     fluticasone (FLONASE) 50 MCG/ACT nasal spray Spray 1 spray into both nostrils daily 16 g 1     warfarin ANTICOAGULANT (COUMADIN) 5 MG tablet TAKE 7.5 mg daily OR AS DIRECTED BY THE ANTICOAGULATION CLINIC. 148 tablet 0     ASPIRIN NOT PRESCRIBED (INTENTIONAL) Please choose reason not prescribed, below (Patient not taking: Reported on 2/22/2022)       Urea 40 % CREA Externally apply topically daily (Patient not taking: Reported on 2/22/2022) 85 g 11     Chart documentation with Dragon Voice recognition Software. Although reviewed after completion, some words and grammatical errors may remain.      Return in about 2 weeks (around  3/8/2022), or if symptoms worsen or fail to improve.    SELWYN Dennis CNP  [facility]    Jayla Spence is a 76 year old who presents for the following health issues     Diarrhea    History of Present Illness     Reason for visit:  Stomach problems  Symptom onset:  3-4 weeks ago  Symptoms include:  Sharp pain in stomach, ? if I have another ulcer, vomiting, diarrhea, weight loss  Symptom intensity:  Moderate  Symptom progression:  Improving  Had these symptoms before:  Yes  Has tried/received treatment for these symptoms:  No  What makes it worse:  Certain foods  What makes it better:  No eating    She eats 0-1 servings of fruits and vegetables daily.She consumes 0 sweetened beverage(s) daily.She exercises with enough effort to increase her heart rate 9 or less minutes per day.  She exercises with enough effort to increase her heart rate 3 or less days per week.   She is taking medications regularly.       Diarrhea  Onset/Duration: January 30th  Description:       Consistency of stool: watery and explosive       Blood in stool: no       Number of loose stools past 24 hours: 3  Progression of Symptoms: improving and same  Accompanying signs and symptoms:       Fever: no       Nausea/Vomiting: YES-vomiting, but getting better       Abdominal pain: no       Weight loss: YES 4lbs       Episodes of constipation: YES  History   Ill contacts: no  Recent use of antibiotics: no  Recent travels: no  Recent medication-new or changes(Rx or OTC): no  Precipitating or alleviating factors: None  Therapies tried and outcome: tums seemed to help for a few days taken  Down 4 lbs   Kept food down the last two days  No one else in the house has been sick   Better now the last 24 hours  No blood in stool or emesis  No further emesis now. Diarrhea last last night  BP at home has been 120/70 range. No dizziness.       Watching renal function  History of CKD stage 3 renal artery stenosis  Last  Renal Panel:  Sodium   Date Value Ref Range Status   12/16/2021 140 133 - 144 mmol/L Final   06/10/2021 133 133 - 144 mmol/L Final     Potassium   Date Value Ref Range Status   12/16/2021 4.4 3.4 - 5.3 mmol/L Final   06/10/2021 3.9 3.4 - 5.3 mmol/L Final     Chloride   Date Value Ref Range Status   12/16/2021 108 94 - 109 mmol/L Final   06/10/2021 103 94 - 109 mmol/L Final     Carbon Dioxide   Date Value Ref Range Status   06/10/2021 25 20 - 32 mmol/L Final     Carbon Dioxide (CO2)   Date Value Ref Range Status   12/16/2021 26 20 - 32 mmol/L Final     Anion Gap   Date Value Ref Range Status   12/16/2021 6 3 - 14 mmol/L Final   06/10/2021 5 3 - 14 mmol/L Final     Glucose   Date Value Ref Range Status   12/16/2021 93 70 - 99 mg/dL Final   06/10/2021 91 70 - 99 mg/dL Final     Urea Nitrogen   Date Value Ref Range Status   12/16/2021 29 7 - 30 mg/dL Final   06/10/2021 25 7 - 30 mg/dL Final     Creatinine   Date Value Ref Range Status   12/16/2021 1.39 (H) 0.52 - 1.04 mg/dL Final   06/10/2021 1.49 (H) 0.52 - 1.04 mg/dL Final     GFR Estimate   Date Value Ref Range Status   12/16/2021 37 (L) >60 mL/min/1.73m2 Final     Comment:     As of July 11, 2021, eGFR is calculated by the CKD-EPI creatinine equation, without race adjustment. eGFR can be influenced by muscle mass, exercise, and diet. The reported eGFR is an estimation only and is only applicable if the renal function is stable.   06/10/2021 34 (L) >60 mL/min/[1.73_m2] Final     Comment:     Non  GFR Calc  Starting 12/18/2018, serum creatinine based estimated GFR (eGFR) will be   calculated using the Chronic Kidney Disease Epidemiology Collaboration   (CKD-EPI) equation.       Calcium   Date Value Ref Range Status   12/16/2021 9.1 8.5 - 10.1 mg/dL Final   06/10/2021 8.6 8.5 - 10.1 mg/dL Final     Phosphorus   Date Value Ref Range Status   12/16/2021 3.6 2.5 - 4.5 mg/dL Final   05/28/2020 3.4 2.5 - 4.5 mg/dL Final     Albumin   Date Value Ref Range  Status   12/16/2021 3.3 (L) 3.4 - 5.0 g/dL Final   05/28/2020 3.3 (L) 3.4 - 5.0 g/dL Final     COPD  Breathing has not been an issue per patient report  No need for recent use of inhalers          Review of Systems   Gastrointestinal: Positive for diarrhea.      Constitutional, HEENT, cardiovascular, pulmonary, GI, , musculoskeletal, neuro, skin, endocrine and psych systems are negative, except as otherwise noted.      Objective           Vitals:  No vitals were obtained today due to virtual visit.    Physical Exam   alert and no distress  PSYCH: Alert and oriented times 3; coherent speech, normal   rate and volume, able to articulate logical thoughts, able   to abstract reason, no tangential thoughts, no hallucinations   or delusions  Her affect is normal  RESP: No cough, no audible wheezing, able to talk in full sentences  Remainder of exam unable to be completed due to telephone visits    Lab on 02/10/2022   Component Date Value Ref Range Status     INR 02/10/2022 2.8 (A) 0.9 - 1.1 Final               Phone call duration: 11 minutes

## 2022-03-03 ENCOUNTER — LAB (OUTPATIENT)
Dept: LAB | Facility: CLINIC | Age: 77
End: 2022-03-03
Payer: COMMERCIAL

## 2022-03-03 ENCOUNTER — ANTICOAGULATION THERAPY VISIT (OUTPATIENT)
Dept: ANTICOAGULATION | Facility: CLINIC | Age: 77
End: 2022-03-03

## 2022-03-03 DIAGNOSIS — Z86.73 HISTORY OF CVA (CEREBROVASCULAR ACCIDENT): ICD-10-CM

## 2022-03-03 DIAGNOSIS — Z79.01 LONG TERM CURRENT USE OF ANTICOAGULANT THERAPY: Primary | ICD-10-CM

## 2022-03-03 DIAGNOSIS — I63.20 CEREBROVASCULAR ACCIDENT (CVA) DUE TO OCCLUSION OF PRECEREBRAL ARTERY (H): ICD-10-CM

## 2022-03-03 DIAGNOSIS — Z79.01 LONG TERM CURRENT USE OF ANTICOAGULANT THERAPY: ICD-10-CM

## 2022-03-03 LAB — INR BLD: 3.8 (ref 0.9–1.1)

## 2022-03-03 PROCEDURE — 85610 PROTHROMBIN TIME: CPT

## 2022-03-03 PROCEDURE — 36416 COLLJ CAPILLARY BLOOD SPEC: CPT

## 2022-03-03 RX ORDER — WARFARIN SODIUM 5 MG/1
TABLET ORAL
Qty: 148 TABLET | Refills: 0
Start: 2022-03-03 | End: 2022-04-05

## 2022-03-03 NOTE — PROGRESS NOTES
ANTICOAGULATION MANAGEMENT     Bebe Alfonso 76 year old female is on warfarin with supratherapeutic INR result. (Goal INR 2.0-3.0)    Recent labs: (last 7 days)     03/03/22  0922   INR 3.8*       ASSESSMENT       Source(s): Chart Review and Patient/Caregiver Call       Warfarin doses taken: Warfarin taken as instructed    Diet: No new diet changes identified    New illness, injury, or hospitalization: Yes: gastritis/possible ulcer    Medication/supplement changes: omeprazole started on 2-22-22 which has potential for interaction; increasing INR. Patient also starting tums today and taking imodium PRN for diarrhea    Signs or symptoms of bleeding or clotting: No    Previous INR: Therapeutic last 2(+) visits    Additional findings: diarrhea has improved a week ago. no longer taking imodium. the stabbing stomach pain has improved also. Patient still get nausea and upset stomach after eating sometimes. She will be on omeprazole for 4-8 weeks. Patient to let [facility] know when she stops this as it will affect INR.        PLAN     Recommended plan for temporary change(s) and ongoing change(s) affecting INR     Dosing Instructions: Partial hold then Decrease your warfarin dose (9.5% change) with next INR in 2 weeks       Summary  As of 3/3/2022    Full warfarin instructions:  3/3: 2.5 mg; Otherwise 5 mg every Mon, Thu; 7.5 mg all other days   Next INR check:  3/17/2022             Telephone call with Bebe who verbalizes understanding and agrees to plan    Lab visit scheduled    Education provided: Please call back if any changes to your diet, medications or how you've been taking warfarin, Importance of notifying clinic for changes in medications; a sooner lab recheck maybe needed., Importance of notifying clinic for diarrhea, nausea/vomiting, reduced intake, and/or illness; a sooner lab recheck maybe needed. and Contact [phone]  with any changes, questions or concerns.     Plan made per [facility] anticoagulation  protocol    Natalie Sorto RN  Anticoagulation Clinic  3/3/2022    _______________________________________________________________________     Anticoagulation Episode Summary     Current INR goal:  2.0-3.0   TTR:  73.3 % (1 y)   Target end date:  Indefinite   Send INR reminders to:  [facility]    Indications    History of CVA (cerebrovascular accident) (Resolved) [Z86.73]  Long term current use of anticoagulant therapy [Z79.01]  Cerebrovascular accident (CVA) due to occlusion of precerebral artery (H) [I63.20]  History of CVA (cerebrovascular accident) [Z86.73]           Comments:           Anticoagulation Care Providers     Provider Role Specialty Phone number    Diana Ely APRN [facility] Referring Family Medicine [phone]

## 2022-03-17 ENCOUNTER — ANTICOAGULATION THERAPY VISIT (OUTPATIENT)
Dept: ANTICOAGULATION | Facility: CLINIC | Age: 77
End: 2022-03-17

## 2022-03-17 ENCOUNTER — LAB (OUTPATIENT)
Dept: LAB | Facility: CLINIC | Age: 77
End: 2022-03-17
Payer: COMMERCIAL

## 2022-03-17 DIAGNOSIS — Z79.01 LONG TERM CURRENT USE OF ANTICOAGULANT THERAPY: ICD-10-CM

## 2022-03-17 DIAGNOSIS — I63.20 CEREBROVASCULAR ACCIDENT (CVA) DUE TO OCCLUSION OF PRECEREBRAL ARTERY (H): ICD-10-CM

## 2022-03-17 DIAGNOSIS — Z86.73 HISTORY OF CVA (CEREBROVASCULAR ACCIDENT): ICD-10-CM

## 2022-03-17 LAB — INR BLD: 2.8 (ref 0.9–1.1)

## 2022-03-17 PROCEDURE — 85610 PROTHROMBIN TIME: CPT

## 2022-03-17 PROCEDURE — 36416 COLLJ CAPILLARY BLOOD SPEC: CPT

## 2022-03-17 NOTE — PROGRESS NOTES
ANTICOAGULATION MANAGEMENT     Bebe Alfonso 76 year old female is on warfarin with therapeutic INR result. (Goal INR 2.0-3.0)    Recent labs: (last 7 days)     03/17/22  0808   INR 2.8*       ASSESSMENT       Source(s): Chart Review and Patient/Caregiver Call       Warfarin doses taken: More warfarin taken than planned which may be affecting INR    Diet: No new diet changes identified    New illness, injury, or hospitalization: Yes: Pt has a hx of gastritis,diarrhea, stomach pain-pt reports that this has resolved since taking omeprazole    Medication/supplement changes: omeprazole started on 2/22/22. Pt reports that she has about another month on this medication. Omeprazole elevates the INR. Maintenance dose was reduced 2 weeks ago.    Signs or symptoms of bleeding or clotting: No    Previous INR: Supratherapeutic    Additional findings: Discussed eating greens 2-3 times per week. Pt reminded to stay on the current warfarin MD while she is on omeprazole       PLAN     Recommended plan for temporary change(s) affecting INR     Dosing Instructions: Continue your current warfarin dose with next INR in 2-3 weeks       Summary  As of 3/17/2022    Full warfarin instructions:  5 mg every Mon, Thu; 7.5 mg all other days   Next INR check:  4/7/2022             Telephone call with Bebe who agrees to plan and repeated back plan correctly    Lab visit scheduled    Education provided: Importance of notifying clinic for changes in medications; a sooner lab recheck maybe needed. and Contact [phone]  with any changes, questions or concerns.     Plan made per [facility] anticoagulation protocol    Jackelyn Ramos RN  Anticoagulation Clinic  3/17/2022    _______________________________________________________________________     Anticoagulation Episode Summary     Current INR goal:  2.0-3.0   TTR:  70.2 % (1 y)   Target end date:  Indefinite   Send INR reminders to:  [facility]    Indications    History of CVA  (cerebrovascular accident) (Resolved) [Z86.73]  Long term current use of anticoagulant therapy [Z79.01]  Cerebrovascular accident (CVA) due to occlusion of precerebral artery (H) [I63.20]  History of CVA (cerebrovascular accident) [Z86.73]           Comments:           Anticoagulation Care Providers     Provider Role Specialty Phone number    Diana Ely APRN [facility] Referring Family Medicine [phone]

## 2022-04-05 DIAGNOSIS — I10 ESSENTIAL HYPERTENSION: ICD-10-CM

## 2022-04-05 DIAGNOSIS — J44.1 COPD EXACERBATION (H): ICD-10-CM

## 2022-04-07 ENCOUNTER — LAB (OUTPATIENT)
Dept: LAB | Facility: CLINIC | Age: 77
End: 2022-04-07
Payer: COMMERCIAL

## 2022-04-07 ENCOUNTER — ANTICOAGULATION THERAPY VISIT (OUTPATIENT)
Dept: ANTICOAGULATION | Facility: CLINIC | Age: 77
End: 2022-04-07

## 2022-04-07 DIAGNOSIS — Z79.01 LONG TERM CURRENT USE OF ANTICOAGULANT THERAPY: Primary | ICD-10-CM

## 2022-04-07 DIAGNOSIS — I63.20 CEREBROVASCULAR ACCIDENT (CVA) DUE TO OCCLUSION OF PRECEREBRAL ARTERY (H): ICD-10-CM

## 2022-04-07 DIAGNOSIS — Z86.73 HISTORY OF CVA (CEREBROVASCULAR ACCIDENT): ICD-10-CM

## 2022-04-07 DIAGNOSIS — Z79.01 LONG TERM CURRENT USE OF ANTICOAGULANT THERAPY: ICD-10-CM

## 2022-04-07 LAB — INR BLD: 2.9 (ref 0.9–1.1)

## 2022-04-07 PROCEDURE — 85610 PROTHROMBIN TIME: CPT

## 2022-04-07 PROCEDURE — 36416 COLLJ CAPILLARY BLOOD SPEC: CPT

## 2022-04-07 RX ORDER — ENALAPRIL MALEATE 5 MG/1
TABLET ORAL
Qty: 90 TABLET | Refills: 0 | Status: SHIPPED | OUTPATIENT
Start: 2022-04-07 | End: 2022-07-12

## 2022-04-07 RX ORDER — AMLODIPINE BESYLATE 5 MG/1
5 TABLET ORAL DAILY
Qty: 90 TABLET | Refills: 0 | Status: SHIPPED | OUTPATIENT
Start: 2022-04-07 | End: 2022-07-12

## 2022-04-07 NOTE — PROGRESS NOTES
ANTICOAGULATION MANAGEMENT     Bebe Alfonso 76 year old female is on warfarin with therapeutic INR result. (Goal INR 2.0-3.0)    Recent labs: (last 7 days)     04/07/22  0905   INR 2.9*       ASSESSMENT       Source(s): Chart Review and Patient/Caregiver Call       Warfarin doses taken: Warfarin taken as instructed    Diet: No new diet changes identified    New illness, injury, or hospitalization: No    Medication/supplement changes: None noted-patient stated she stopped the omeprazole for a few days but her symptoms returned so she is back on it for a few more weeks. She will follow up with her PCP to determine the plan of care, and will notify [facility]. If she remains on omeprazole, can extend to a 4 week recheck as INR has been stable at this dose. INR check will be needed IF she stops the medication, since it can raise INR and her dose was lowered while taking it.     Signs or symptoms of bleeding or clotting: No    Previous INR: Therapeutic last visit; previously outside of goal range    Additional findings: None       PLAN     Recommended plan for no diet, medication or health factor changes affecting INR     Dosing Instructions: continue your current warfarin dose with next INR in 3 weeks       Summary  As of 4/7/2022    Full warfarin instructions:  5 mg every Mon, Thu; 7.5 mg all other days   Next INR check:  4/21/2022             Telephone call with Bebe who verbalizes understanding and agrees to plan    Lab visit scheduled    Education provided: Potential interaction between warfarin and omeprazole, Importance of notifying clinic for changes in medications; a sooner lab recheck maybe needed. and Contact [phone]  with any changes, questions or concerns.     Plan made per [facility] anticoagulation protocol    Annie Adames, RN  Anticoagulation Clinic  4/7/2022    _______________________________________________________________________     Anticoagulation Episode Summary     Current INR goal:  2.0-3.0    TTR:  70.2 % (1 y)   Target end date:  Indefinite   Send INR reminders to:  [facility]    Indications    History of CVA (cerebrovascular accident) (Resolved) [Z86.73]  Long term current use of anticoagulant therapy [Z79.01]  Cerebrovascular accident (CVA) due to occlusion of precerebral artery (H) [I63.20]  History of CVA (cerebrovascular accident) [Z86.73]           Comments:           Anticoagulation Care Providers     Provider Role Specialty Phone number    Diana Ely APRN [facility] Referring Family Medicine [phone]

## 2022-04-25 ENCOUNTER — TELEPHONE (OUTPATIENT)
Dept: FAMILY MEDICINE | Facility: CLINIC | Age: 77
End: 2022-04-25
Payer: COMMERCIAL

## 2022-04-25 NOTE — TELEPHONE ENCOUNTER
Pt requesting in person appt with Diana in F/U to 02-22-22 VV-  Acute gastritis without hemorrhage, unspecified gastritis type  Symptomatic care strategies reviewed.   Begin omeprazole 40 mg daily for 4-8 weeks for ulcer prevention  TUMS up to 8 a day before meals and at HS as needed.        States improved but not fully resolved, reports intermittent nausea, diarrhea. Taking OTC omeprazole, TUMS as advised.  Appt scheduled with Diana 05-03-22.  TIFFANIE Mann RN

## 2022-04-28 ENCOUNTER — ANTICOAGULATION THERAPY VISIT (OUTPATIENT)
Dept: ANTICOAGULATION | Facility: CLINIC | Age: 77
End: 2022-04-28

## 2022-04-28 ENCOUNTER — LAB (OUTPATIENT)
Dept: LAB | Facility: CLINIC | Age: 77
End: 2022-04-28
Payer: COMMERCIAL

## 2022-04-28 DIAGNOSIS — I63.20 CEREBROVASCULAR ACCIDENT (CVA) DUE TO OCCLUSION OF PRECEREBRAL ARTERY (H): ICD-10-CM

## 2022-04-28 DIAGNOSIS — Z86.73 HISTORY OF CVA (CEREBROVASCULAR ACCIDENT): ICD-10-CM

## 2022-04-28 DIAGNOSIS — Z79.01 LONG TERM CURRENT USE OF ANTICOAGULANT THERAPY: ICD-10-CM

## 2022-04-28 DIAGNOSIS — Z79.01 LONG TERM CURRENT USE OF ANTICOAGULANT THERAPY: Primary | ICD-10-CM

## 2022-04-28 LAB — INR BLD: 2.6 (ref 0.9–1.1)

## 2022-04-28 PROCEDURE — 36416 COLLJ CAPILLARY BLOOD SPEC: CPT

## 2022-04-28 PROCEDURE — 85610 PROTHROMBIN TIME: CPT

## 2022-04-28 NOTE — PROGRESS NOTES
ANTICOAGULATION MANAGEMENT     Bebe Alfonso 76 year old female is on warfarin with therapeutic INR result. (Goal INR 2.0-3.0)    Recent labs: (last 7 days)     04/28/22  0909   INR 2.6*       ASSESSMENT       Source(s): Chart Review and Patient/Caregiver Call       Warfarin doses taken: Warfarin taken as instructed    Diet: No new diet changes identified    New illness, injury, or hospitalization: No    Medication/supplement changes: None noted    Signs or symptoms of bleeding or clotting: No    Previous INR: Therapeutic last 2(+) visits    Additional findings: seeing provider next week and will let [facility] know if omeprazole is stopped       PLAN     Recommended plan for no diet, medication or health factor changes affecting INR     Dosing Instructions: continue your current warfarin dose with next INR in 4 weeks       Summary  As of 4/28/2022    Full warfarin instructions:  5 mg every Mon, Thu; 7.5 mg all other days   Next INR check:  5/26/2022             Telephone call with Bebe who verbalizes understanding and agrees to plan    Lab visit scheduled    Education provided: Please call back if any changes to your diet, medications or how you've been taking warfarin, Importance of notifying clinic for changes in medications; a sooner lab recheck maybe needed. and Contact [phone]  with any changes, questions or concerns.     Plan made per [facility] anticoagulation protocol    Natalie Sorto RN  Anticoagulation Clinic  4/28/2022    _______________________________________________________________________     Anticoagulation Episode Summary     Current INR goal:  2.0-3.0   TTR:  70.2 % (1 y)   Target end date:  Indefinite   Send INR reminders to:  [facility]    Indications    History of CVA (cerebrovascular accident) (Resolved) [Z86.73]  Long term current use of anticoagulant therapy [Z79.01]  Cerebrovascular accident (CVA) due to occlusion of precerebral artery (H) [I63.20]  History of CVA (cerebrovascular  accident) [Z86.73]           Comments:           Anticoagulation Care Providers     Provider Role Specialty Phone number    Diana Ely APRN [facility] Referring Family Medicine [phone]

## 2022-05-03 ENCOUNTER — ANCILLARY PROCEDURE (OUTPATIENT)
Dept: GENERAL RADIOLOGY | Facility: CLINIC | Age: 77
End: 2022-05-03
Attending: NURSE PRACTITIONER
Payer: COMMERCIAL

## 2022-05-03 ENCOUNTER — OFFICE VISIT (OUTPATIENT)
Dept: FAMILY MEDICINE | Facility: CLINIC | Age: 77
End: 2022-05-03
Payer: COMMERCIAL

## 2022-05-03 VITALS
BODY MASS INDEX: 21.79 KG/M2 | WEIGHT: 135 LBS | OXYGEN SATURATION: 97 % | RESPIRATION RATE: 14 BRPM | SYSTOLIC BLOOD PRESSURE: 160 MMHG | HEART RATE: 85 BPM | DIASTOLIC BLOOD PRESSURE: 70 MMHG | TEMPERATURE: 97.3 F

## 2022-05-03 DIAGNOSIS — R10.13 ABDOMINAL PAIN, EPIGASTRIC: ICD-10-CM

## 2022-05-03 DIAGNOSIS — F17.200 TOBACCO DEPENDENCE: ICD-10-CM

## 2022-05-03 DIAGNOSIS — I70.1 RENAL ARTERY STENOSIS (H): ICD-10-CM

## 2022-05-03 DIAGNOSIS — N18.32 STAGE 3B CHRONIC KIDNEY DISEASE (H): ICD-10-CM

## 2022-05-03 DIAGNOSIS — R10.11 RUQ ABDOMINAL PAIN: ICD-10-CM

## 2022-05-03 DIAGNOSIS — R10.11 RUQ ABDOMINAL PAIN: Primary | ICD-10-CM

## 2022-05-03 DIAGNOSIS — Z79.899 OTHER LONG TERM (CURRENT) DRUG THERAPY: ICD-10-CM

## 2022-05-03 DIAGNOSIS — Z51.81 ENCOUNTER FOR THERAPEUTIC DRUG LEVEL MONITORING: ICD-10-CM

## 2022-05-03 DIAGNOSIS — Z79.899 ENCOUNTER FOR LONG-TERM (CURRENT) USE OF MEDICATIONS: ICD-10-CM

## 2022-05-03 DIAGNOSIS — Z12.11 SCREEN FOR COLON CANCER: ICD-10-CM

## 2022-05-03 LAB
ALBUMIN SERPL-MCNC: 3.7 G/DL (ref 3.4–5)
ALP SERPL-CCNC: 52 U/L (ref 40–150)
ALT SERPL W P-5'-P-CCNC: 25 U/L (ref 0–50)
AMYLASE SERPL-CCNC: 71 U/L (ref 30–110)
ANION GAP SERPL CALCULATED.3IONS-SCNC: 5 MMOL/L (ref 3–14)
AST SERPL W P-5'-P-CCNC: 17 U/L (ref 0–45)
BASOPHILS # BLD AUTO: 0.1 10E3/UL (ref 0–0.2)
BASOPHILS NFR BLD AUTO: 1 %
BILIRUB SERPL-MCNC: 0.4 MG/DL (ref 0.2–1.3)
BUN SERPL-MCNC: 23 MG/DL (ref 7–30)
CALCIUM SERPL-MCNC: 9.3 MG/DL (ref 8.5–10.1)
CHLORIDE BLD-SCNC: 103 MMOL/L (ref 94–109)
CHOLEST SERPL-MCNC: 166 MG/DL
CO2 SERPL-SCNC: 27 MMOL/L (ref 20–32)
CREAT SERPL-MCNC: 1.48 MG/DL (ref 0.52–1.04)
CREAT UR-MCNC: 20 MG/DL
CREAT UR-MCNC: 21 MG/DL
EOSINOPHIL # BLD AUTO: 0.3 10E3/UL (ref 0–0.7)
EOSINOPHIL NFR BLD AUTO: 3 %
ERYTHROCYTE [DISTWIDTH] IN BLOOD BY AUTOMATED COUNT: 12.6 % (ref 10–15)
FASTING STATUS PATIENT QL REPORTED: NORMAL
GFR SERPL CREATININE-BSD FRML MDRD: 37 ML/MIN/1.73M2
GLUCOSE BLD-MCNC: 89 MG/DL (ref 70–99)
HCT VFR BLD AUTO: 38.3 % (ref 35–47)
HDLC SERPL-MCNC: 62 MG/DL
HGB BLD-MCNC: 12.9 G/DL (ref 11.7–15.7)
IMM GRANULOCYTES # BLD: 0 10E3/UL
IMM GRANULOCYTES NFR BLD: 0 %
LDLC SERPL CALC-MCNC: 74 MG/DL
LIPASE SERPL-CCNC: 83 U/L (ref 73–393)
LYMPHOCYTES # BLD AUTO: 1.9 10E3/UL (ref 0.8–5.3)
LYMPHOCYTES NFR BLD AUTO: 22 %
MCH RBC QN AUTO: 30.5 PG (ref 26.5–33)
MCHC RBC AUTO-ENTMCNC: 33.7 G/DL (ref 31.5–36.5)
MCV RBC AUTO: 91 FL (ref 78–100)
MICROALBUMIN UR-MCNC: 229 MG/L
MICROALBUMIN/CREAT UR: 1145 MG/G CR (ref 0–25)
MONOCYTES # BLD AUTO: 0.6 10E3/UL (ref 0–1.3)
MONOCYTES NFR BLD AUTO: 7 %
NEUTROPHILS # BLD AUTO: 5.9 10E3/UL (ref 1.6–8.3)
NEUTROPHILS NFR BLD AUTO: 67 %
NONHDLC SERPL-MCNC: 104 MG/DL
PLATELET # BLD AUTO: 239 10E3/UL (ref 150–450)
POTASSIUM BLD-SCNC: 4.9 MMOL/L (ref 3.4–5.3)
PROT SERPL-MCNC: 7.3 G/DL (ref 6.8–8.8)
RBC # BLD AUTO: 4.23 10E6/UL (ref 3.8–5.2)
SODIUM SERPL-SCNC: 135 MMOL/L (ref 133–144)
TRIGL SERPL-MCNC: 148 MG/DL
WBC # BLD AUTO: 8.8 10E3/UL (ref 4–11)

## 2022-05-03 PROCEDURE — 80053 COMPREHEN METABOLIC PANEL: CPT | Performed by: NURSE PRACTITIONER

## 2022-05-03 PROCEDURE — 74019 RADEX ABDOMEN 2 VIEWS: CPT | Mod: TC | Performed by: RADIOLOGY

## 2022-05-03 PROCEDURE — 80061 LIPID PANEL: CPT | Performed by: NURSE PRACTITIONER

## 2022-05-03 PROCEDURE — 99215 OFFICE O/P EST HI 40 MIN: CPT | Performed by: NURSE PRACTITIONER

## 2022-05-03 PROCEDURE — 82043 UR ALBUMIN QUANTITATIVE: CPT | Performed by: NURSE PRACTITIONER

## 2022-05-03 PROCEDURE — 80307 DRUG TEST PRSMV CHEM ANLYZR: CPT | Performed by: NURSE PRACTITIONER

## 2022-05-03 PROCEDURE — 82150 ASSAY OF AMYLASE: CPT | Performed by: NURSE PRACTITIONER

## 2022-05-03 PROCEDURE — 83690 ASSAY OF LIPASE: CPT | Performed by: NURSE PRACTITIONER

## 2022-05-03 PROCEDURE — 36415 COLL VENOUS BLD VENIPUNCTURE: CPT | Performed by: NURSE PRACTITIONER

## 2022-05-03 PROCEDURE — 85025 COMPLETE CBC W/AUTO DIFF WBC: CPT | Performed by: NURSE PRACTITIONER

## 2022-05-03 RX ORDER — OMEPRAZOLE 40 MG/1
40 CAPSULE, DELAYED RELEASE ORAL DAILY
Qty: 90 CAPSULE | Refills: 0 | Status: SHIPPED | OUTPATIENT
Start: 2022-05-03 | End: 2022-09-27

## 2022-05-03 ASSESSMENT — PAIN SCALES - GENERAL: PAINLEVEL: NO PAIN (0)

## 2022-05-03 NOTE — PROGRESS NOTES
Assessment & Plan     RUQ abdominal pain  - CBC with platelets and differential  - Comprehensive metabolic panel (BMP + Alb, Alk Phos, ALT, AST, Total. Bili, TP)  - Lipid panel reflex to direct LDL Fasting  - XR Abdomen 2 Views  - Amylase  - Lipase  -- CBC with platelets and differential  Labs done today.  If normal labs- CT abdomen      Abdominal pain, epigastric  Increase omeprazole to 40 mg daily  Labs done today  X-ray done today  - Albumin Random Urine Quantitative with Creat Ratio  - CBC with platelets and differential  - XR Abdomen 2 Views  - omeprazole (PRILOSEC) 40 MG DR capsule  Dispense: 90 capsule; Refill: 0      Stage 3b chronic kidney disease (H)  Renal artery stenosis (H)  Chronic and ongoing.  History of renal artery stenosis and proteinuria  Nephrology consultation recommended  Continue enalapril  - Adult Nephrology  Referral        Encounter for therapeutic drug level monitoring   - [id number] - Urine Drug Confirmation Panel (Comprehensive)    Other long term (current) drug therapy     - Lipid panel reflex to direct LDL Fasting      Tobacco dependence  Cessation encouraged      Screen for colon cancer   colonoscopy recommended  - Adult Gastro Ref - Procedure Only    Chronic use of protein pump inhibitor.  Epigastric pain.  Right upper quadrant pain.  EGD recommended  Referral placed  - Adult Gastro Ref - Procedure Only      Call or return to the clinic with any worsening of symptoms or no resolution. Patient/Parent verbalized understanding and is in agreement. Medication side effects reviewed.   Current Outpatient Medications   Medication Sig Dispense Refill     omeprazole (PRILOSEC) 40 MG DR capsule Take 1 capsule (40 mg) by mouth daily 90 capsule 0     amLODIPine (NORVASC) 5 MG tablet Take 1 tablet (5 mg) by mouth daily 90 tablet 0     atorvastatin (LIPITOR) 40 MG tablet Take 1 tablet by mouth once daily 90 tablet 3     enalapril (VASOTEC) 5 MG tablet Take 1 tablet by mouth once daily 90  tablet 0     fluticasone (FLONASE) 50 MCG/ACT nasal spray Spray 1 spray into both nostrils daily 16 g 1     warfarin ANTICOAGULANT (COUMADIN) 5 MG tablet TAKE 5 mg Mon/Thurs; 7.5 mg all other days OR AS DIRECTED BY THE ANTICOAGULATION CLINIC. 135 tablet 0     Chart documentation with Dragon Voice recognition Software. Although reviewed after completion, some words and grammatical errors may remain.  Greater than 45 minutes spent on chart review, exam, education and documentation  SELWYN Dennis CNP  [facility]   Bebe is a 76 year old who presents for the following health issues     GI Problem    History of Present Illness       Reason for visit:  Stomach  Symptom onset:  More than a month  Symptoms include:  Pain and gas  Symptom intensity:  Moderate  Symptom progression:  Staying the same  Had these symptoms before:  Yes  Has tried/received treatment for these symptoms:  Yes  Previous treatment was successful:  Yes  Prior treatment description:  Omperciate  What makes it worse:  No  What makes it better:  MedShe exercises with enough effort to increase her heart rate 10 to 19 minutes per day.  She exercises with enough effort to increase her heart rate 3 or less days per week.   She is taking medications regularly.     Patient has a history of CKD stage III.  History of renal artery stenosis  Continues to smoke-lung cancer screening declined  Previous CVA.  History of COPD  On warfarin  Not taking her baby aspirin  Took PPI for a month then stopped it  Then had bkfst burrito and threw up  Eating eggs then vomitting.   Then went back on omeprazole for a month   INR was up and down.  Right sided abdominal cramping and pain   Sensitive in the middle yet.   Eating doesn't seem to bother it now   Nauseated comes and goes.   Wt Readings from Last 5 Encounters:   05/03/22 61.2 kg (135 lb)   09/21/21 63.5 kg (140 lb)   07/13/21 62.6 kg (138 lb)   04/20/20 61.9 kg (136  lb 6.4 oz)   01/24/20 60.8 kg (134 lb)   no night sweats.   Since last stroke sweats anyhow.   No diarrhea resolved with one dose of imodium  Sometimes doesn't empty her bladder completely  usually can push and then more urine will empty.   Abdominal discomfort started in January.     Answers for HPI/ROS submitted by the patient on 5/3/2022  How many minutes a day do you exercise enough to make your heart beat faster?: 10 to 19  How many days a week do you exercise enough to make your heart beat faster?: 3 or less  How many days per week do you miss taking your medication?: 0  What is the reason for your visit today?: Stomach  When did your symptoms begin?: More than a month  What are your symptoms?: Pain and gas  How would you describe these symptoms?: Moderate  Are your symptoms:: Staying the same  Have you had these symptoms before?: Yes  Have you tried or received treatment for these symptoms before?: Yes  Did that treatment work? : Yes  Please describe the treatment you had:: Omperciate  Is there anything that makes you feel worse?: No  Is there anything that makes you feel better?: Jose     has a past medical history of Anticoagulation monitoring, INR range 2-3 (10/22/2015), Benign essential hypertension (9/15/2016), Carotid bruit (11/12/2012), Chronic obstructive pulmonary disease (H) (1/19/2016), Esophageal reflux (4/8/2008), Heart disease (born with it), History of blood transfusion (70 years ago), History of CVA (cerebrovascular accident) (10/20/2015), Long term current use of anticoagulant therapy (3/20/2018), Major depressive disorder, recurrent episode, moderate (H) (10/29/2008), Osteoarthrosis, hip (10/19/2010), and Thyroid nodule (5/22/2013).    She has no past medical history of Cancer (H), Congestive heart failure (H), Depressive disorder, Diabetes (H), or Uncomplicated asthma.        Review of Systems   Constitutional, HEENT, cardiovascular, pulmonary, GI, , musculoskeletal, neuro, skin, endocrine  and psych systems are negative, except as otherwise noted.      Objective    BP (!) 168/68   Pulse 85   Temp 97.3  F (36.3  C) (Tympanic)   Resp 14   Wt 61.2 kg (135 lb)   SpO2 97%   BMI 21.79 kg/m    Body mass index is 21.79 kg/m .  Physical Exam   GENERAL: alert and no distress  EYES: Eyes grossly normal to inspection, PERRL and conjunctivae and sclerae normal  HENT: ear canals and TM's normal, nose and mouth without ulcers or lesions  NECK: no adenopathy, no asymmetry, masses, or scars and thyroid normal to palpation  RESP: lungs clear to auscultation - no rales, rhonchi or wheezes  CV: regular rate and rhythm, normal S1 S2, no S3 or S4, no murmur, click or rub, no peripheral edema and peripheral pulses strong  ABDOMEN: tenderness epigastric and RUQ and bowel sounds normal  MS: no gross musculoskeletal defects noted, no edema  SKIN: no suspicious lesions or rashes  NEURO: speech abnormal  PSYCH: mentation appears normal, affect normal/bright    Lab on 04/28/2022   Component Date Value Ref Range Status     INR 04/28/2022 2.6 (A) 0.9 - 1.1 Final       Results for orders placed or performed in visit on 05/03/22   XR Abdomen 2 Views     Status: None    Narrative    ABDOMEN TWO VIEW  5/3/2022 11:19 AM    HISTORY: Abdominal pain, epigastric; RUQ abdominal pain.    COMPARISON: 4/18/2019, 4/10/2019    FINDINGS/    Impression    IMPRESSION: No signs of bowel obstruction, pneumatosis, or  pneumoperitoneum. Moderate volume of formed stool within the colon,  most pronounced in the region of the cecum and along the descending  and rectosigmoid colon. Coarse calcifications are seen in the pelvis,  corresponding to previously seen calcified uterine fibroids. No  pathologic calcifications. Bilateral hip arthroplasties are present.  Multilevel degenerative changes of the spine are seen. No acute  osseous abnormality. A leadless pacer device projects over the left  heart.    RICHI TEJADA MD         SYSTEM ID:  [id number]    Results for orders placed or performed in visit on 05/03/22   Lipase     Status: Normal   Result Value Ref Range    Lipase 83 73 - 393 U/L   Amylase     Status: Normal   Result Value Ref Range    Amylase 71 30 - 110 U/L   Lipid panel reflex to direct LDL Fasting     Status: None   Result Value Ref Range    Cholesterol 166 <200 mg/dL    Triglycerides 148 <150 mg/dL    Direct Measure HDL 62 >=50 mg/dL    LDL Cholesterol Calculated 74 <=100 mg/dL    Non HDL Cholesterol 104 <130 mg/dL    Patient Fasting > 8hrs? Unknown     Narrative    Cholesterol  Desirable:  <200 mg/dL    Triglycerides  Normal:  Less than 150 mg/dL  Borderline High:  150-199 mg/dL  High:  200-499 mg/dL  Very High:  Greater than or equal to 500 mg/dL    Direct Measure HDL  Female:  Greater than or equal to 50 mg/dL   Male:  Greater than or equal to 40 mg/dL    LDL Cholesterol  Desirable:  <100mg/dL  Above Desirable:  100-129 mg/dL   Borderline High:  130-159 mg/dL   High:  160-189 mg/dL   Very High:  >= 190 mg/dL    Non HDL Cholesterol  Desirable:  130 mg/dL  Above Desirable:  130-159 mg/dL  Borderline High:  160-189 mg/dL  High:  190-219 mg/dL  Very High:  Greater than or equal to 220 mg/dL   Comprehensive metabolic panel (BMP + Alb, Alk Phos, ALT, AST, Total. Bili, TP)     Status: Abnormal   Result Value Ref Range    Sodium 135 133 - 144 mmol/L    Potassium 4.9 3.4 - 5.3 mmol/L    Chloride 103 94 - 109 mmol/L    Carbon Dioxide (CO2) 27 20 - 32 mmol/L    Anion Gap 5 3 - 14 mmol/L    Urea Nitrogen 23 7 - 30 mg/dL    Creatinine 1.48 (H) 0.52 - 1.04 mg/dL    Calcium 9.3 8.5 - 10.1 mg/dL    Glucose 89 70 - 99 mg/dL    Alkaline Phosphatase 52 40 - 150 U/L    AST 17 0 - 45 U/L    ALT 25 0 - 50 U/L    Protein Total 7.3 6.8 - 8.8 g/dL    Albumin 3.7 3.4 - 5.0 g/dL    Bilirubin Total 0.4 0.2 - 1.3 mg/dL    GFR Estimate 37 (L) >60 mL/min/1.73m2   Albumin Random Urine Quantitative with Creat Ratio     Status: Abnormal   Result Value Ref Range    Creatinine  Urine mg/dL 20 mg/dL    Albumin Urine mg/L 229 mg/L    Albumin Urine mg/g Cr 1,145.00 (H) 0.00 - 25.00 mg/g Cr   CBC with platelets and differential     Status: None   Result Value Ref Range    WBC Count 8.8 4.0 - 11.0 10e3/uL    RBC Count 4.23 3.80 - 5.20 10e6/uL    Hemoglobin 12.9 11.7 - 15.7 g/dL    Hematocrit 38.3 35.0 - 47.0 %    MCV 91 78 - 100 fL    MCH 30.5 26.5 - 33.0 pg    MCHC 33.7 31.5 - 36.5 g/dL    RDW 12.6 10.0 - 15.0 %    Platelet Count 239 150 - 450 10e3/uL    % Neutrophils 67 %    % Lymphocytes 22 %    % Monocytes 7 %    % Eosinophils 3 %    % Basophils 1 %    % Immature Granulocytes 0 %    Absolute Neutrophils 5.9 1.6 - 8.3 10e3/uL    Absolute Lymphocytes 1.9 0.8 - 5.3 10e3/uL    Absolute Monocytes 0.6 0.0 - 1.3 10e3/uL    Absolute Eosinophils 0.3 0.0 - 0.7 10e3/uL    Absolute Basophils 0.1 0.0 - 0.2 10e3/uL    Absolute Immature Granulocytes 0.0 <=0.4 10e3/uL   Urine Drug Confirmation Panel     Status: None    Narrative    Interpretation:  Absent or none detected  This test was developed and its performance characteristics determined by the [facility],  Special Chemistry Laboratory. It has not been cleared or approved by the FDA. The laboratory is regulated under CLIA as qualified to perform high-complexity testing. This test is used for clinical purposes. It should not be regarded as investigational or for research.   Urine Creatinine for Drug Screen Panel     Status: None   Result Value Ref Range    Creatinine Urine for Drug Screen 21 mg/dL   CBC with platelets and differential     Status: None    Narrative    The following orders were created for panel order CBC with platelets and differential.  Procedure                               Abnormality         Status                     ---------                               -----------         ------                     CBC with platelets and d...[[id number]]                      Final result                 Please view  results for these tests on the individual orders.   [id number] - Urine Drug Confirmation Panel (Comprehensive)     Status: None    Narrative    The following orders were created for panel order [id number] - Urine Drug Confirmation Panel (Comprehensive).  Procedure                               Abnormality         Status                     ---------                               -----------         ------                     Urine Drug Confirmation ...[[id number]]                      Final result               Urine Creatinine for Malcolm...[[id number]]                      Final result                 Please view results for these tests on the individual orders.

## 2022-05-05 ENCOUNTER — TELEPHONE (OUTPATIENT)
Dept: FAMILY MEDICINE | Facility: CLINIC | Age: 77
End: 2022-05-05
Payer: COMMERCIAL

## 2022-05-05 NOTE — TELEPHONE ENCOUNTER
Patient Quality Outreach    Patient is due for the following:   Hypertension -  BP check    NEXT STEPS:   Bp check in 1 month     Type of outreach:    Phone, left message for patient/parent to call back.      Questions for provider review:    None     Zuly Pedro, CMA

## 2022-05-09 DIAGNOSIS — Z11.59 ENCOUNTER FOR SCREENING FOR OTHER VIRAL DISEASES: Primary | ICD-10-CM

## 2022-05-17 ENCOUNTER — TELEPHONE (OUTPATIENT)
Dept: FAMILY MEDICINE | Facility: CLINIC | Age: 77
End: 2022-05-17

## 2022-05-17 ENCOUNTER — HOSPITAL ENCOUNTER (OUTPATIENT)
Dept: CT IMAGING | Facility: CLINIC | Age: 77
Discharge: HOME | End: 2022-05-17
Attending: NURSE PRACTITIONER | Admitting: NURSE PRACTITIONER
Payer: COMMERCIAL

## 2022-05-17 ENCOUNTER — ANESTHESIA EVENT (OUTPATIENT)
Dept: GASTROENTEROLOGY | Facility: CLINIC | Age: 77
End: 2022-05-17
Payer: COMMERCIAL

## 2022-05-17 DIAGNOSIS — N18.32 STAGE 3B CHRONIC KIDNEY DISEASE (H): ICD-10-CM

## 2022-05-17 DIAGNOSIS — R10.11 RUQ ABDOMINAL PAIN: ICD-10-CM

## 2022-05-17 DIAGNOSIS — K55.1 CHRONIC MESENTERIC INSUFFICIENCY (H): Primary | ICD-10-CM

## 2022-05-17 DIAGNOSIS — R10.13 ABDOMINAL PAIN, EPIGASTRIC: ICD-10-CM

## 2022-05-17 LAB
CREAT BLD-MCNC: 1.8 MG/DL (ref 0.5–1)
GFR SERPL CREATININE-BSD FRML MDRD: 29 ML/MIN/1.73M2

## 2022-05-17 PROCEDURE — 74176 CT ABD & PELVIS W/O CONTRAST: CPT

## 2022-05-17 PROCEDURE — 82565 ASSAY OF CREATININE: CPT

## 2022-05-17 RX ORDER — IOPAMIDOL 755 MG/ML
66 INJECTION, SOLUTION INTRAVASCULAR ONCE
Status: DISCONTINUED | OUTPATIENT
Start: 2022-05-17 | End: 2022-05-17

## 2022-05-17 ASSESSMENT — LIFESTYLE VARIABLES: TOBACCO_USE: 1

## 2022-05-17 ASSESSMENT — COPD QUESTIONNAIRES: COPD: 1

## 2022-05-17 NOTE — ANESTHESIA PREPROCEDURE EVALUATION
Anesthesia Pre-Procedure Evaluation    Patient: Bebe Alfonso   MRN: [id number] : 1945        Procedure : Procedure(s):  ESOPHAGOGASTRODUODENOSCOPY (EGD)          Past Medical History:   Diagnosis Date     Anticoagulation monitoring, INR range 2-3 10/22/2015     Benign essential hypertension 9/15/2016     Carotid bruit 2012    Overview:  12/6/10 Carotid US  Elevated velocities throughout the carotid arteries bilaterally. There is a focal area of increased velocity in the mid left internal carotid artery with a plaque in this region on the color flow images. This corresponds to 50 - 70 % diameter reduction. There is an area of elevated velocity in the left external carotid artery consistent with stenosis. There is no focal area of significant stenosis in the right carotid arteries in the neck. Plaque in the carotid arteries bilaterally.      Chronic obstructive pulmonary disease (H) 2016    The FVC, FEV1 and FEV1/FVC ratio are reduced.  The inspiratory flow rates are within normal limits.  The FVC is reduced relative to the SVC indicating air trapping.  While the TLC is within normal limits, the FRC, RV and RV/TLC ratio are increased.  The  diffusing capacity is mildly reduced. IMPRESSION: Mild-moderate Airflow Obstruction with air trapping ____________________________________________Clarke Iverson  2018     Esophageal reflux 2008    Overview:  Omeprazole PRN, occasional symptoms such as chest burning and knife twisted to stomach.      Heart disease born with it     History of blood transfusion 70 years ago     History of CVA (cerebrovascular accident) 10/20/2015     Long term current use of anticoagulant therapy 3/20/2018     Major depressive disorder, recurrent episode, moderate (H) 10/29/2008    Overview:  She is not taking any medication at this time.     Osteoarthrosis, hip 10/19/2010    Overview:  Pt scheduled for right  total hip arthroplasty on 13 with Dr. Addison escobedo  xray (11/2012) Severe degenerative changes seen of the right hip with near bone-on-bone appearance of the joint and several subchondral cysts forming.     Thyroid nodule 5/22/2013    Overview:  Thyroid US (2012) Stable nodule left lobe of thyroid TSH- (2/19/12) normal (2.26)      Past Surgical History:   Procedure Laterality Date     BIOPSY  appox in 1980's     BREAST SURGERY  last time in the 1990's    non cancer      Allergies   Allergen Reactions     Losartan Swelling, Nausea, Shortness Of Breath and Palpitations     Gabapentin GI Disturbance and Unknown     Double vision  flatulence     Lisinopril Cough     Oxycodone Other (See Comments)     But has tolerated hydrocodone     Tramadol Other (See Comments)     Augmentin [Amoxicillin-Pot Clavulanate] Rash     Bacitracin Rash     blisters     Bupropion Rash     Allergic to brand not generic      Social History     Tobacco Use     Smoking status: Current Every Day Smoker     Packs/day: 1.00     Years: 50.00     Pack years: 50.00     Types: Cigarettes     Start date: 1/1/2016     Smokeless tobacco: Never Used   Substance Use Topics     Alcohol use: No      Wt Readings from Last 1 Encounters:   05/03/22 61.2 kg (135 lb)        Anesthesia Evaluation   Pt has had prior anesthetic.         ROS/MED HX  ENT/Pulmonary:     (+) tobacco use, Current use, COPD,     Neurologic:     (+) CVA,     Cardiovascular:     (+) Dyslipidemia hypertension--CAD ---Taking blood thinners     METS/Exercise Tolerance:     Hematologic:       Musculoskeletal:   (+) arthritis,     GI/Hepatic:     (+) GERD,     Renal/Genitourinary:     (+) renal disease, type: CRI,     Endo:     (+) thyroid problem,     Psychiatric/Substance Use:     (+) psychiatric history depression     Infectious Disease:       Malignancy:       Other:            Physical Exam    Airway  airway exam normal           Respiratory Devices and Support         Dental  no notable dental history         Cardiovascular   cardiovascular  exam normal          Pulmonary   pulmonary exam normal                OUTSIDE LABS:  CBC:   Lab Results   Component Value Date    WBC 8.8 05/03/2022    WBC 10.1 12/12/2019    HGB 12.9 05/03/2022    HGB 12.4 12/12/2019    HCT 38.3 05/03/2022    HCT 37.3 12/12/2019     05/03/2022     12/12/2019     BMP:   Lab Results   Component Value Date     05/03/2022     12/16/2021    POTASSIUM 4.9 05/03/2022    POTASSIUM 4.4 12/16/2021    CHLORIDE 103 05/03/2022    CHLORIDE 108 12/16/2021    CO2 27 05/03/2022    CO2 26 12/16/2021    BUN 23 05/03/2022    BUN 29 12/16/2021    CR 1.8 (H) 05/17/2022    CR 1.48 (H) 05/03/2022    GLC 89 05/03/2022    GLC 93 12/16/2021     COAGS:   Lab Results   Component Value Date    INR 2.6 (H) 04/28/2022     POC: No results found for: BGM, HCG, HCGS  HEPATIC:   Lab Results   Component Value Date    ALBUMIN 3.7 05/03/2022    PROTTOTAL 7.3 05/03/2022    ALT 25 05/03/2022    AST 17 05/03/2022    ALKPHOS 52 05/03/2022    BILITOTAL 0.4 05/03/2022     OTHER:   Lab Results   Component Value Date    A1C 6.1 (H) 03/22/2019    LEXUS 9.3 05/03/2022    PHOS 3.6 12/16/2021    MAG 2.4 (H) 12/16/2021    LIPASE 83 05/03/2022    AMYLASE 71 05/03/2022    TSH 1.86 12/16/2021       Anesthesia Plan    ASA Status:  4      Anesthesia Type: General.              Consents    Anesthesia Plan(s) and associated risks, benefits, and realistic alternatives discussed. Questions answered and patient/representative(s) expressed understanding.     - Discussed: Risks, Benefits and Alternatives for BOTH SEDATION and the PROCEDURE were discussed     - Discussed with:  Patient         Postoperative Care            Comments:                Janine Martinez, CRNA, APRN CRNA

## 2022-05-19 NOTE — TELEPHONE ENCOUNTER
Notify patient that I have reviewed her CT scan with radiologist we do not need to repeat the CT scan with contrast there is some concern that there might be some chronic mesenteric ischemia that is causing some of his abdominal pain but this means is there is a narrowing of the vessels supplying the intestines but there are no acute findings just concern for chronic issue due to your vascular disease as a contributing factor.  Based on these findings of the CT scan would recommend follow-up with vascular medicine.  You will need to contact them to schedule an appointment   Notify patient that I have reviewed her CT scan with radiologist we do not need to repeat the CT scan with contrast there is some concern that there might be some chronic mesenteric ischemia that is causing some of his abdominal pain but this means is there is a narrowing of the vessels supplying the intestines but there are no acute findings just concern for chronic issue due to your vascular disease as a contributing factor.  Based on these findings of the CT scan would recommend follow-up with vascular medicine.  You will need to contact them to schedule an appointment     Also as we discussed the creatinine was elevated and has increased I would recommend a follow-up visit with nephrology I placed that referral as well they will contact you to schedule and then we will just review your kidney function and further interventions.      Please call me if have any further questions but you should schedule appointment with vascular     Also as we discussed the creatinine was elevated and has increased I would recommend a follow-up visit with nephrology I placed that referral as well they will contact you to schedule and then we will just review your kidney function and further interventions.      Please call me if have any further questions.    Eliana Gallegos CNP

## 2022-05-19 NOTE — TELEPHONE ENCOUNTER
Pt called and was wondering who called her from [place]. She stated doesn't know why anyone from [place] would be calling her. Writer reviewed her results with her, and she stated someone already reviewed the note with her. Writer still reviewed.she stated agreement to instructions.

## 2022-05-20 ENCOUNTER — TELEPHONE (OUTPATIENT)
Dept: FAMILY MEDICINE | Facility: CLINIC | Age: 77
End: 2022-05-20
Payer: COMMERCIAL

## 2022-05-20 DIAGNOSIS — I67.9 CEREBROVASCULAR DISEASE: Primary | ICD-10-CM

## 2022-05-20 NOTE — TELEPHONE ENCOUNTER
FYI: Patient was called today to schedule a vascular consultation, but she declined as she was not clear as to why she needs to be seen. Patient states she would like to speak to her primary doctor before scheduling any more appointments.

## 2022-05-20 NOTE — TELEPHONE ENCOUNTER
Please call pt hold coumadin for 5 days prior to egd this is the only med she needs to hold  She was notified

## 2022-05-20 NOTE — TELEPHONE ENCOUNTER
Patient has EGD scheduled for 05/25/22. Please advise which medications to hold in PCPs absence. Best number to reach patient is: Care team, [phone]  Cheyenne UNDERWOOD RN

## 2022-05-23 ENCOUNTER — LAB (OUTPATIENT)
Dept: LAB | Facility: CLINIC | Age: 77
End: 2022-05-23
Payer: COMMERCIAL

## 2022-05-23 DIAGNOSIS — Z11.59 ENCOUNTER FOR SCREENING FOR OTHER VIRAL DISEASES: ICD-10-CM

## 2022-05-23 PROCEDURE — U0005 INFEC AGEN DETEC AMPLI PROBE: HCPCS

## 2022-05-23 PROCEDURE — U0003 INFECTIOUS AGENT DETECTION BY NUCLEIC ACID (DNA OR RNA); SEVERE ACUTE RESPIRATORY SYNDROME CORONAVIRUS 2 (SARS-COV-2) (CORONAVIRUS DISEASE [COVID-19]), AMPLIFIED PROBE TECHNIQUE, MAKING USE OF HIGH THROUGHPUT TECHNOLOGIES AS DESCRIBED BY CMS-2020-01-R: HCPCS

## 2022-05-24 LAB — SARS-COV-2 RNA RESP QL NAA+PROBE: NEGATIVE

## 2022-05-25 ENCOUNTER — DOCUMENTATION ONLY (OUTPATIENT)
Dept: ANTICOAGULATION | Facility: CLINIC | Age: 77
End: 2022-05-25
Payer: COMMERCIAL

## 2022-05-25 ENCOUNTER — ANESTHESIA (OUTPATIENT)
Dept: GASTROENTEROLOGY | Facility: CLINIC | Age: 77
End: 2022-05-25
Payer: COMMERCIAL

## 2022-05-25 ENCOUNTER — HOSPITAL ENCOUNTER (OUTPATIENT)
Facility: CLINIC | Age: 77
Discharge: HOME | End: 2022-05-25
Attending: SURGERY | Admitting: SURGERY
Payer: COMMERCIAL

## 2022-05-25 VITALS
DIASTOLIC BLOOD PRESSURE: 69 MMHG | RESPIRATION RATE: 16 BRPM | TEMPERATURE: 97.5 F | WEIGHT: 135 LBS | HEART RATE: 75 BPM | HEIGHT: 66 IN | SYSTOLIC BLOOD PRESSURE: 145 MMHG | BODY MASS INDEX: 21.69 KG/M2 | OXYGEN SATURATION: 96 %

## 2022-05-25 DIAGNOSIS — Z79.01 LONG TERM CURRENT USE OF ANTICOAGULANT THERAPY: Primary | ICD-10-CM

## 2022-05-25 DIAGNOSIS — I63.20 CEREBROVASCULAR ACCIDENT (CVA) DUE TO OCCLUSION OF PRECEREBRAL ARTERY (H): ICD-10-CM

## 2022-05-25 DIAGNOSIS — Z86.73 HISTORY OF CVA (CEREBROVASCULAR ACCIDENT): ICD-10-CM

## 2022-05-25 LAB — UPPER GI ENDOSCOPY: NORMAL

## 2022-05-25 PROCEDURE — 250N000011 HC RX IP 250 OP 636: Performed by: NURSE ANESTHETIST, CERTIFIED REGISTERED

## 2022-05-25 PROCEDURE — 250N000009 HC RX 250: Performed by: NURSE ANESTHETIST, CERTIFIED REGISTERED

## 2022-05-25 PROCEDURE — 43239 EGD BIOPSY SINGLE/MULTIPLE: CPT | Performed by: SURGERY

## 2022-05-25 PROCEDURE — 370N000017 HC ANESTHESIA TECHNICAL FEE, PER MIN: Performed by: SURGERY

## 2022-05-25 PROCEDURE — 258N000003 HC RX IP 258 OP 636: Performed by: SURGERY

## 2022-05-25 PROCEDURE — 250N000009 HC RX 250: Performed by: SURGERY

## 2022-05-25 PROCEDURE — 88305 TISSUE EXAM BY PATHOLOGIST: CPT | Mod: TC | Performed by: SURGERY

## 2022-05-25 RX ORDER — NALOXONE HYDROCHLORIDE 0.4 MG/ML
0.2 INJECTION, SOLUTION INTRAMUSCULAR; INTRAVENOUS; SUBCUTANEOUS
Status: CANCELLED | OUTPATIENT
Start: 2022-05-25

## 2022-05-25 RX ORDER — NALOXONE HYDROCHLORIDE 0.4 MG/ML
0.4 INJECTION, SOLUTION INTRAMUSCULAR; INTRAVENOUS; SUBCUTANEOUS
Status: CANCELLED | OUTPATIENT
Start: 2022-05-25

## 2022-05-25 RX ORDER — SODIUM CHLORIDE, SODIUM LACTATE, POTASSIUM CHLORIDE, CALCIUM CHLORIDE 600; 310; 30; 20 MG/100ML; MG/100ML; MG/100ML; MG/100ML
INJECTION, SOLUTION INTRAVENOUS CONTINUOUS
Status: CANCELLED | OUTPATIENT
Start: 2022-05-25

## 2022-05-25 RX ORDER — ONDANSETRON 4 MG/1
4 TABLET, ORALLY DISINTEGRATING ORAL EVERY 30 MIN PRN
Status: DISCONTINUED | OUTPATIENT
Start: 2022-05-25 | End: 2022-05-25 | Stop reason: HOSPADM

## 2022-05-25 RX ORDER — FLUMAZENIL 0.1 MG/ML
0.2 INJECTION, SOLUTION INTRAVENOUS
Status: CANCELLED | OUTPATIENT
Start: 2022-05-25 | End: 2022-05-26

## 2022-05-25 RX ORDER — SODIUM CHLORIDE, SODIUM LACTATE, POTASSIUM CHLORIDE, CALCIUM CHLORIDE 600; 310; 30; 20 MG/100ML; MG/100ML; MG/100ML; MG/100ML
INJECTION, SOLUTION INTRAVENOUS CONTINUOUS
Status: DISCONTINUED | OUTPATIENT
Start: 2022-05-25 | End: 2022-05-25 | Stop reason: HOSPADM

## 2022-05-25 RX ORDER — ONDANSETRON 4 MG/1
4 TABLET, ORALLY DISINTEGRATING ORAL EVERY 30 MIN PRN
Status: CANCELLED | OUTPATIENT
Start: 2022-05-25

## 2022-05-25 RX ORDER — LIDOCAINE 40 MG/G
CREAM TOPICAL
Status: DISCONTINUED | OUTPATIENT
Start: 2022-05-25 | End: 2022-05-25 | Stop reason: HOSPADM

## 2022-05-25 RX ORDER — ONDANSETRON 2 MG/ML
4 INJECTION INTRAMUSCULAR; INTRAVENOUS
Status: DISCONTINUED | OUTPATIENT
Start: 2022-05-25 | End: 2022-05-25 | Stop reason: HOSPADM

## 2022-05-25 RX ORDER — PROPOFOL 10 MG/ML
INJECTION, EMULSION INTRAVENOUS PRN
Status: DISCONTINUED | OUTPATIENT
Start: 2022-05-25 | End: 2022-05-25

## 2022-05-25 RX ORDER — ONDANSETRON 2 MG/ML
4 INJECTION INTRAMUSCULAR; INTRAVENOUS EVERY 30 MIN PRN
Status: DISCONTINUED | OUTPATIENT
Start: 2022-05-25 | End: 2022-05-25 | Stop reason: HOSPADM

## 2022-05-25 RX ORDER — ONDANSETRON 2 MG/ML
4 INJECTION INTRAMUSCULAR; INTRAVENOUS EVERY 30 MIN PRN
Status: CANCELLED | OUTPATIENT
Start: 2022-05-25

## 2022-05-25 RX ADMIN — LIDOCAINE HYDROCHLORIDE 0.1 ML: 10 INJECTION, SOLUTION EPIDURAL; INFILTRATION; INTRACAUDAL; PERINEURAL at 10:35

## 2022-05-25 RX ADMIN — TOPICAL ANESTHETIC 2 SPRAY: 200 SPRAY DENTAL; PERIODONTAL at 12:23

## 2022-05-25 RX ADMIN — SODIUM CHLORIDE, POTASSIUM CHLORIDE, SODIUM LACTATE AND CALCIUM CHLORIDE: 600; 310; 30; 20 INJECTION, SOLUTION INTRAVENOUS at 10:35

## 2022-05-25 RX ADMIN — PROPOFOL 100 MG: 10 INJECTION, EMULSION INTRAVENOUS at 12:27

## 2022-05-25 RX ADMIN — PROPOFOL 100 MG: 10 INJECTION, EMULSION INTRAVENOUS at 12:25

## 2022-05-25 NOTE — LETTER
Bebe Alfonso  [address] [place] [address]  June 2, 2022    Dear Bebe,   This letter is to inform you of the results of your pathology report on your upper endoscopy (EGD).    Your pathology report was:  Normal, please follow up by having a repeat upper endoscopy (EGD), if your symptoms worsen.  If you have questions, please contact your primary care physician.    A.  Duodenum: Biopsy:  - Duodenal mucosa within normal limits  - Normal villous architecture with no increase in intraepithelial lymphocytes      B.  Stomach, antrum: Biopsy:  - Antral-type mucosa within normal limits  - No Helicobacter pylori-like organisms seen on H&E examination      C.  Stomach, body: Polypectomy:  - Oxyntic type mucosa within normal limits  - No Helicobacter pylori-like organisms seen on H&E examination   - Multiple additional levels examined      If you have any questions or concerns please do not hesitate to call my office at [phone].    Sincerely,   Alfredito Gomez, DO   [facility]

## 2022-05-25 NOTE — ANESTHESIA CARE TRANSFER NOTE
Patient: Bebe Alfonso    Procedure: Procedure(s):  ESOPHAGOGASTRODUODENOSCOPY, WITH BIOPSY       Diagnosis: Tobacco dependence [F17.200]  Epigastric pain [R10.13]  Long term use of drug [Z79.899]  Diagnosis Additional Information: No value filed.    Anesthesia Type:   General     Note:    Oropharynx: oropharynx clear of all foreign objects and spontaneously breathing  Level of Consciousness: awake  Oxygen Supplementation: nasal cannula and room air    Independent Airway: airway patency satisfactory and stable  Dentition: dentition unchanged  Vital Signs Stable: post-procedure vital signs reviewed and stable  Report to RN Given: handoff report given  Patient transferred to: Phase II    Handoff Report: Identifed the Patient, Identified the Reponsible Provider, Reviewed the pertinent medical history, Discussed the surgical course, Reviewed Intra-OP anesthesia mangement and issues during anesthesia, Set expectations for post-procedure period and Allowed opportunity for questions and acknowledgement of understanding      Vitals:  Vitals Value Taken Time   BP     Temp     Pulse     Resp     SpO2         Electronically Signed By: SELWYN Rosado CRNA  May 25, 2022  12:43 PM

## 2022-05-25 NOTE — PROGRESS NOTES
ANTICOAGULATION  MANAGEMENT: Discharge Review    Bebe Alfonso chart reviewed for anticoagulation continuity of care    Outpatient surgery/procedure on 5/25/22 for EGD.    Discharge disposition: Home    Results:    No results for input(s): INR, [id number], F2, ALMWH, AAUFH in the last 168 hours.  Anticoagulation inpatient management:     not applicable     Anticoagulation discharge instructions:     Warfarin dosing: home regimen continued   Bridging: No   INR goal change: No      Medication changes affecting anticoagulation: No    Additional factors affecting anticoagulation: No    Plan     No adjustment to anticoagulation plan needed    Patient just discharged and needs to be called on 5/26 to schedule INR.    Anticoagulation Calendar updated    Justin Naidu RN

## 2022-05-25 NOTE — ANESTHESIA POSTPROCEDURE EVALUATION
Patient: eBbe Alfonso    Procedure: Procedure(s):  ESOPHAGOGASTRODUODENOSCOPY, WITH BIOPSY       Anesthesia Type:  General    Note:  Disposition: Outpatient   Postop Pain Control: Uneventful            Sign Out: Well controlled pain   PONV: No   Neuro/Psych: Uneventful            Sign Out: Acceptable/Baseline neuro status   Airway/Respiratory: Uneventful            Sign Out: Acceptable/Baseline resp. status   CV/Hemodynamics: Uneventful            Sign Out: Acceptable CV status; No obvious hypovolemia; No obvious fluid overload   Other NRE: NONE   DID A NON-ROUTINE EVENT OCCUR? No           Last vitals:  Vitals Value Taken Time   BP     Temp     Pulse     Resp     SpO2 96 % 05/25/22 1245   Vitals shown include unvalidated device data.    Electronically Signed By: SELWYN Rosado CRNA  May 25, 2022  12:47 PM

## 2022-05-25 NOTE — H&P
"76 year old year old female here for upper endoscopy for \"stomach issues.\"  Ongoing.  No changes in stool.  She is on anticoagulation for AFIB.        Patient Active Problem List   Diagnosis     ACP (advance care planning)     Anticoagulation monitoring, INR range 2-3     Benign essential hypertension     Carotid bruit     Cerebrovascular disease     Chronic obstructive pulmonary disease (H)     Esophageal reflux     Osteoarthrosis, hip     Pain medication agreement     Thyroid nodule     Long term current use of anticoagulant therapy     Hyperlipidemia LDL goal <70     Cerebrovascular accident (CVA) due to occlusion of precerebral artery (H)     Chronic kidney disease, stage 3 (moderate)     Renal artery stenosis (H)     Tobacco dependence     History of cardiac monitoring     History of CVA (cerebrovascular accident)       Past Medical History:   Diagnosis Date     Anticoagulation monitoring, INR range 2-3 10/22/2015     Benign essential hypertension 9/15/2016     Carotid bruit 11/12/2012    Overview:  12/6/10 Carotid US  Elevated velocities throughout the carotid arteries bilaterally. There is a focal area of increased velocity in the mid left internal carotid artery with a plaque in this region on the color flow images. This corresponds to 50 - 70 % diameter reduction. There is an area of elevated velocity in the left external carotid artery consistent with stenosis. There is no focal area of significant stenosis in the right carotid arteries in the neck. Plaque in the carotid arteries bilaterally.      Chronic obstructive pulmonary disease (H) 1/19/2016    The FVC, FEV1 and FEV1/FVC ratio are reduced.  The inspiratory flow rates are within normal limits.  The FVC is reduced relative to the SVC indicating air trapping.  While the TLC is within normal limits, the FRC, RV and RV/TLC ratio are increased.  The  diffusing capacity is mildly reduced. IMPRESSION: Mild-moderate Airflow Obstruction with air trapping " ____________________________________________Clarke Iverson  April 2018     Esophageal reflux 4/8/2008    Overview:  Omeprazole PRN, occasional symptoms such as chest burning and knife twisted to stomach.      Heart disease born with it     History of blood transfusion 70 years ago     History of CVA (cerebrovascular accident) 10/20/2015     Long term current use of anticoagulant therapy 3/20/2018     Major depressive disorder, recurrent episode, moderate (H) 10/29/2008    Overview:  She is not taking any medication at this time.     Osteoarthrosis, hip 10/19/2010    Overview:  Pt scheduled for right  total hip arthroplasty on 6/14/13 with Dr. Addison HEREDIA hip xray (11/2012) Severe degenerative changes seen of the right hip with near bone-on-bone appearance of the joint and several subchondral cysts forming.     Thyroid nodule 5/22/2013    Overview:  Thyroid US (2012) Stable nodule left lobe of thyroid TSH- (2/19/12) normal (2.26)       Past Surgical History:   Procedure Laterality Date     BIOPSY  appox in 1980's     BREAST SURGERY  last time in the 1990's    non cancer       Family History   Problem Relation Age of Onset     Diabetes Maternal Grandfather      Diabetes Sister         developed after cancer treatment     Breast Cancer Sister      Obesity Sister      Hypertension Mother      Hyperlipidemia Mother      Osteoporosis Mother      Breast Cancer Sister      Osteoporosis Sister      Breast Cancer Daughter      Other Cancer Sister         throught out body     Substance Abuse Sister         acol     Obesity Sister      Substance Abuse Sister         acol     Obesity Sister        No current outpatient medications on file.       Allergies   Allergen Reactions     Losartan Swelling, Nausea, Shortness Of Breath and Palpitations     Gabapentin GI Disturbance and Unknown     Double vision  flatulence     Lisinopril Cough     Oxycodone Other (See Comments)     But has tolerated hydrocodone     Tramadol Other (See  Comments)     Augmentin [Amoxicillin-Pot Clavulanate] Rash     Bacitracin Rash     blisters     Bupropion Rash     Allergic to brand not generic       Pt reports that she has been smoking cigarettes. She started smoking about 6 years ago. She has a 50.00 pack-year smoking history. She has never used smokeless tobacco. She reports that she does not drink alcohol and does not use drugs.    Exam:    Awake, Alert OX3  Lungs - CTA bilaterally  CV - RRR, no murmurs, distal pulses intact  Abd - soft, non-distended, non-tender, +BS  Extr - No cyanosis or edema    A/P 76 year old year old female in need of upper endoscopy for abdominal pain. Risks, benefits, alternatives, and complications were discussed including the possibility of perforation and the patient agreed to proceed.    Alfredito Gomez, DO on 5/25/2022 at 12:17 PM

## 2022-05-26 ENCOUNTER — LAB (OUTPATIENT)
Dept: LAB | Facility: CLINIC | Age: 77
End: 2022-05-26
Payer: COMMERCIAL

## 2022-05-26 DIAGNOSIS — I63.20 CEREBROVASCULAR ACCIDENT (CVA) DUE TO OCCLUSION OF PRECEREBRAL ARTERY (H): ICD-10-CM

## 2022-05-26 DIAGNOSIS — Z86.73 HISTORY OF CVA (CEREBROVASCULAR ACCIDENT): ICD-10-CM

## 2022-05-26 DIAGNOSIS — Z79.01 LONG TERM CURRENT USE OF ANTICOAGULANT THERAPY: ICD-10-CM

## 2022-05-26 LAB — INR BLD: 1 (ref 0.9–1.1)

## 2022-05-26 PROCEDURE — 36416 COLLJ CAPILLARY BLOOD SPEC: CPT

## 2022-05-26 PROCEDURE — 85610 PROTHROMBIN TIME: CPT

## 2022-05-26 NOTE — PROGRESS NOTES
ANTICOAGULATION  MANAGEMENT: Discharge Review    Bebe Alfonso chart reviewed for anticoagulation continuity of care    Outpatient surgery/procedure on 5/25 for EGD.    Discharge disposition: Home    Results:    Recent labs: (last 7 days)     05/26/22  0910   INR 1.0     Anticoagulation inpatient management:     not applicable     Anticoagulation discharge instructions:     Warfarin dosing: home regimen continued   Bridging: No   INR goal change: No      Medication changes affecting anticoagulation: No    Additional factors affecting anticoagulation: No    Plan     Recommend to check INR on 6/3 take 10 mg today (5/26), then resume usual dose    Spoke with pt    Anticoagulation Calendar updated    Veronika Weber RN

## 2022-05-27 ENCOUNTER — TELEPHONE (OUTPATIENT)
Dept: FAMILY MEDICINE | Facility: CLINIC | Age: 77
End: 2022-05-27

## 2022-05-27 DIAGNOSIS — N18.32 STAGE 3B CHRONIC KIDNEY DISEASE (H): Primary | ICD-10-CM

## 2022-05-27 LAB
PATH REPORT.COMMENTS IMP SPEC: NORMAL
PATH REPORT.COMMENTS IMP SPEC: NORMAL
PATH REPORT.FINAL DX SPEC: NORMAL
PATH REPORT.GROSS SPEC: NORMAL
PATH REPORT.MICROSCOPIC SPEC OTHER STN: NORMAL
PATH REPORT.RELEVANT HX SPEC: NORMAL
PHOTO IMAGE: NORMAL

## 2022-05-27 PROCEDURE — 88305 TISSUE EXAM BY PATHOLOGIST: CPT | Mod: 26 | Performed by: PATHOLOGY

## 2022-05-27 NOTE — TELEPHONE ENCOUNTER
Patient calls back, she called the number back and was told clinic is the [facility] in [place], [place].     Patient is told to check with insurance, she will call us back and let us know.    Referral is cued up for this clinic. Awaiting for patient to call back to determine if insurance will cover this clinic.      YAW Claire

## 2022-05-27 NOTE — TELEPHONE ENCOUNTER
She called [facility] and they will cover the specialty.    Elif Robbins PSC on 5/27/2022 at 10:21 AM

## 2022-05-27 NOTE — TELEPHONE ENCOUNTER
Patient called the clinic, she is trying to get into to see the nephrologist, called back to schedule but patient did not want to go to the [facility] so says she was transferred to a [place] clinic. There she was told she would need to have a referral to the clinic faxed to [phone]. Patient is not sure what the name of this clinic is. Patient is told to call the number back and to ask name of clinic, from there will send for another referral. Patient is asked to call care team back and ask for writer or any other RN.    YAW Claire

## 2022-06-03 ENCOUNTER — TELEPHONE (OUTPATIENT)
Dept: ANTICOAGULATION | Facility: CLINIC | Age: 77
End: 2022-06-03

## 2022-06-03 ENCOUNTER — LAB (OUTPATIENT)
Dept: LAB | Facility: CLINIC | Age: 77
End: 2022-06-03
Payer: COMMERCIAL

## 2022-06-03 ENCOUNTER — ANTICOAGULATION THERAPY VISIT (OUTPATIENT)
Dept: ANTICOAGULATION | Facility: CLINIC | Age: 77
End: 2022-06-03

## 2022-06-03 DIAGNOSIS — I63.20 CEREBROVASCULAR ACCIDENT (CVA) DUE TO OCCLUSION OF PRECEREBRAL ARTERY (H): ICD-10-CM

## 2022-06-03 DIAGNOSIS — Z86.73 HISTORY OF CVA (CEREBROVASCULAR ACCIDENT): ICD-10-CM

## 2022-06-03 DIAGNOSIS — Z79.01 LONG TERM CURRENT USE OF ANTICOAGULANT THERAPY: Primary | ICD-10-CM

## 2022-06-03 DIAGNOSIS — Z79.01 LONG TERM CURRENT USE OF ANTICOAGULANT THERAPY: ICD-10-CM

## 2022-06-03 LAB — INR BLD: 2.4 (ref 0.9–1.1)

## 2022-06-03 PROCEDURE — 85610 PROTHROMBIN TIME: CPT

## 2022-06-03 PROCEDURE — 36416 COLLJ CAPILLARY BLOOD SPEC: CPT

## 2022-06-03 NOTE — PROGRESS NOTES
ANTICOAGULATION MANAGEMENT     Bebe Alfonso 76 year old female is on warfarin with therapeutic INR result. (Goal INR 2.0-3.0)    Recent labs: (last 7 days)     06/03/22  0853   INR 2.4*       ASSESSMENT       Source(s): Chart Review    Previous INR was Subtherapeutic    Medication, diet, health changes since last INR : Post Endoscopy on 5/25/22. No bridging.           PLAN     Recommended plan for no diet, medication or health factor changes affecting INR     Dosing Instructions: continue your current warfarin dose with next INR in 2 weeks       Summary  As of 6/3/2022    Full warfarin instructions:  5 mg every Mon, Thu; 7.5 mg all other days   Next INR check:  6/17/2022             Sent [employer] message with dosing and follow up instructions    Contact [phone]  to schedule and with any changes, questions or concerns.     Education provided: Please call back if any changes to your diet, medications or how you've been taking warfarin, Importance of notifying clinic for changes in medications; a sooner lab recheck maybe needed. and Contact [phone]  with any changes, questions or concerns.     Plan made per [facility] anticoagulation protocol    Justin Naidu RN  Anticoagulation Clinic  6/3/2022    _______________________________________________________________________     Anticoagulation Episode Summary     Current INR goal:  2.0-3.0   TTR:  63.9 % (1 y)   Target end date:  Indefinite   Send INR reminders to:  [facility]    Indications    History of CVA (cerebrovascular accident) (Resolved) [Z86.73]  Long term current use of anticoagulant therapy [Z79.01]  Cerebrovascular accident (CVA) due to occlusion of precerebral artery (H) [I63.20]  History of CVA (cerebrovascular accident) [Z86.73]           Comments:           Anticoagulation Care Providers     Provider Role Specialty Phone number    Diana Ely APRN [facility] Referring Family Medicine [phone]

## 2022-06-03 NOTE — TELEPHONE ENCOUNTER
Reason for Call:  Other returning call    Detailed comments: Patient is retuning call from INR nurse.     Phone Number Patient can be reached at: Home number on file [phone] (home)    Best Time: Anytime     Can we leave a detailed message on this number? YES    Call taken on 6/3/2022 at 10:26 AM by Marissa Duncan

## 2022-06-03 NOTE — TELEPHONE ENCOUNTER
4:25 PM    Patient has already been notified.    Justin Naidu RN, BSN, N  Anticoagulation Clinic   Ivor # [id number]  [phone]

## 2022-06-03 NOTE — PROGRESS NOTES
ANTICOAGULATION MANAGEMENT     Bebe Alfonso 76 year old female is on warfarin with therapeutic INR result. (Goal INR 2.0-3.0)    Recent labs: (last 7 days)     06/03/22  0853   INR 2.4*       ASSESSMENT       Source(s): Patient/Caregiver Call       Warfarin doses taken: Warfarin taken as instructed    Diet: No new diet changes identified    New illness, injury, or hospitalization: No    Medication/supplement changes: None noted    Signs or symptoms of bleeding or clotting: No    Previous INR: Subtherapeutic    Additional findings: None       PLAN     Recommended plan for no diet, medication or health factor changes affecting INR     Dosing Instructions: continue your current warfarin dose with next INR in 2 weeks       Summary  As of 6/3/2022    Full warfarin instructions:  5 mg every Mon, Thu; 7.5 mg all other days   Next INR check:  6/16/2022             Telephone call with Bebe who verbalizes understanding and agrees to plan    Lab visit scheduled    Education provided: Please call back if any changes to your diet, medications or how you've been taking warfarin, Importance of notifying clinic for changes in medications; a sooner lab recheck maybe needed., Importance of notifying clinic for diarrhea, nausea/vomiting, reduced intake, and/or illness; a sooner lab recheck maybe needed., Importance of notifying clinic of upcoming surgeries and procedures 2 weeks in advance and Contact [phone]  with any changes, questions or concerns.     Plan made per [facility] anticoagulation protocol    Justin Naidu RN  Anticoagulation Clinic  6/3/2022    _______________________________________________________________________     Anticoagulation Episode Summary     Current INR goal:  2.0-3.0   TTR:  63.9 % (1 y)   Target end date:  Indefinite   Send INR reminders to:  [facility]    Indications    History of CVA (cerebrovascular accident) (Resolved) [Z86.73]  Long term current use of anticoagulant therapy  [Z79.01]  Cerebrovascular accident (CVA) due to occlusion of precerebral artery (H) [I63.20]  History of CVA (cerebrovascular accident) [Z86.73]           Comments:           Anticoagulation Care Providers     Provider Role Specialty Phone number    Diana Ely APRN [facility] Referring Family Medicine [phone]

## 2022-06-14 ENCOUNTER — OFFICE VISIT (OUTPATIENT)
Dept: VASCULAR SURGERY | Facility: CLINIC | Age: 77
End: 2022-06-14
Attending: NURSE PRACTITIONER
Payer: COMMERCIAL

## 2022-06-14 VITALS
BODY MASS INDEX: 21.69 KG/M2 | OXYGEN SATURATION: 97 % | WEIGHT: 135 LBS | DIASTOLIC BLOOD PRESSURE: 58 MMHG | HEIGHT: 66 IN | TEMPERATURE: 97.3 F | SYSTOLIC BLOOD PRESSURE: 154 MMHG | HEART RATE: 83 BPM

## 2022-06-14 DIAGNOSIS — K55.1 CHRONIC MESENTERIC INSUFFICIENCY (H): ICD-10-CM

## 2022-06-14 DIAGNOSIS — R09.89 CAROTID BRUIT, UNSPECIFIED LATERALITY: ICD-10-CM

## 2022-06-14 DIAGNOSIS — Z02.89 PAIN MEDICATION AGREEMENT: ICD-10-CM

## 2022-06-14 DIAGNOSIS — I63.20 CEREBROVASCULAR ACCIDENT (CVA) DUE TO OCCLUSION OF PRECEREBRAL ARTERY (H): ICD-10-CM

## 2022-06-14 DIAGNOSIS — F17.200 TOBACCO DEPENDENCE: Primary | ICD-10-CM

## 2022-06-14 DIAGNOSIS — I67.9 CEREBROVASCULAR DISEASE: ICD-10-CM

## 2022-06-14 DIAGNOSIS — E78.5 HYPERLIPIDEMIA LDL GOAL <70: ICD-10-CM

## 2022-06-14 DIAGNOSIS — Z86.73 HISTORY OF CVA (CEREBROVASCULAR ACCIDENT): ICD-10-CM

## 2022-06-14 DIAGNOSIS — I10 BENIGN ESSENTIAL HYPERTENSION: ICD-10-CM

## 2022-06-14 DIAGNOSIS — N18.32 STAGE 3B CHRONIC KIDNEY DISEASE (H): ICD-10-CM

## 2022-06-14 PROCEDURE — 99203 OFFICE O/P NEW LOW 30 MIN: CPT | Performed by: SURGERY

## 2022-06-14 RX ORDER — ASPIRIN 81 MG/81MG
CAPSULE ORAL
COMMUNITY
Start: 2022-05-20

## 2022-06-14 RX ORDER — FLUTICASONE PROPIONATE 50 MCG
1 SPRAY, SUSPENSION (ML) NASAL
COMMUNITY
Start: 2021-07-13 | End: 2022-09-28

## 2022-06-14 RX ORDER — UREA 40 %
CREAM (GRAM) TOPICAL
COMMUNITY
Start: 2022-06-10 | End: 2022-09-28

## 2022-06-14 NOTE — NURSING NOTE
"Initial BP (!) 200/71 (BP Location: Left arm, Patient Position: Sitting, Cuff Size: Adult Regular)   Pulse 83   Temp 97.3  F (36.3  C) (Tympanic)   Ht 1.676 m (5' 6\")   Wt 61.2 kg (135 lb)   SpO2 97%   BMI 21.79 kg/m   Estimated body mass index is 21.79 kg/m  as calculated from the following:    Height as of this encounter: 1.676 m (5' 6\").    Weight as of this encounter: 61.2 kg (135 lb). .    Pallavi Garcia LPN on 6/14/2022 at 2:25 PM    "

## 2022-06-14 NOTE — PATIENT INSTRUCTIONS
Yazmin Alfonso ,    Thank you for entrusting your care with us today. After your visit today with Dr Campbell Riojas this is the plan that was discussed at your appointment.    Ultrasound for legs (FAMILIA and arterial) and carotid    2. Follow up with results in August.     3.     4.       I am including additional information on these things and our contact information if you have any questions or concerns.   Please do not hesitate to reach out to us if you felt we did not answer your questions or you are unsure of the treatment plan after your visit today. Our number is [phone].Thank you for trusting us with your care.         Again thank you for your time.     Rosita Hickman RN      Ankle-Brachial Index (FAMILIA) or Physiologic Test    Description  An ankle-brachial index test is relatively pain free. Blood pressure cuffs of various sizes are placed on your thigh, calf, foot and toes.  Similar to having your blood pressure checked with an arm cuff, as the technician inflates the cuffs, they progressively tighten and are then quickly released.  You may feel some discomfort, but generally for less than 60 seconds for each measurement. You will be asked to remove your socks and shoes and possibly your pants or shorts. Gowns will be provided. It usually takes about 30-60 minutes.   Depending on the initial readings and patient symptoms, you may be asked to perform a light walk on a treadmill.  The technician will apply ultrasound gel, usually warmed for your comfort, to your ankles and wrists. Through the gel, the technician will use a small hand-held device that emits sound waves.  Risks  There are typically no side effects or complications associated with a physiologic study.  How to Prepare  Eat and take medications as usual.  There is no preparation required for an ankle-brachial index (FAMILIA) or physiologic exam.  What Can I Expect After the Test?  The technician will send the ultrasound images to your  vascular surgeon for evaluation. Typically, a report is available in 2-3 days. If anything critical is found, it is standard practice to notify the vascular surgeon immediately.  Reference: https://vascular.org/patient-resources/vascular-tests

## 2022-06-14 NOTE — PROGRESS NOTES
Patient is in clinic today to see MD Angelica Riojas.      Patient is here for a consult to discuss Chronic mesenteric insufficiency.    The patient does smoke.    Pt is currently taking Coumadin and statin.    The patient states he/she are NOT wearing compression .    Swelling is observed in N/A.    Pt rates pain 0/10 located at .    Pts symptoms include leg cramps, leg discoloration, shsde or floaters in vision Bilateral  extremity.    The provider has been notified that the patient has no complaints.

## 2022-06-16 ENCOUNTER — ANTICOAGULATION THERAPY VISIT (OUTPATIENT)
Dept: ANTICOAGULATION | Facility: CLINIC | Age: 77
End: 2022-06-16

## 2022-06-16 ENCOUNTER — LAB (OUTPATIENT)
Dept: LAB | Facility: CLINIC | Age: 77
End: 2022-06-16
Payer: COMMERCIAL

## 2022-06-16 ENCOUNTER — TELEPHONE (OUTPATIENT)
Dept: ANTICOAGULATION | Facility: CLINIC | Age: 77
End: 2022-06-16

## 2022-06-16 DIAGNOSIS — Z79.01 LONG TERM CURRENT USE OF ANTICOAGULANT THERAPY: ICD-10-CM

## 2022-06-16 DIAGNOSIS — Z86.73 HISTORY OF CVA (CEREBROVASCULAR ACCIDENT): ICD-10-CM

## 2022-06-16 DIAGNOSIS — Z79.01 LONG TERM CURRENT USE OF ANTICOAGULANT THERAPY: Primary | ICD-10-CM

## 2022-06-16 DIAGNOSIS — I63.20 CEREBROVASCULAR ACCIDENT (CVA) DUE TO OCCLUSION OF PRECEREBRAL ARTERY (H): ICD-10-CM

## 2022-06-16 LAB — INR BLD: 2.4 (ref 0.9–1.1)

## 2022-06-16 PROCEDURE — 85610 PROTHROMBIN TIME: CPT

## 2022-06-16 PROCEDURE — 36416 COLLJ CAPILLARY BLOOD SPEC: CPT

## 2022-06-16 NOTE — PROGRESS NOTES
ANTICOAGULATION MANAGEMENT     Bebe Alfonso 76 year old female is on warfarin with therapeutic INR result. (Goal INR 2.0-3.0)    Recent labs: (last 7 days)     06/16/22  0847   INR 2.4*       ASSESSMENT       Source(s): Chart Review and Patient/Caregiver Call       Warfarin doses taken: Warfarin taken as instructed    Diet: No new diet changes identified    New illness, injury, or hospitalization: No    Medication/supplement changes: None noted    Signs or symptoms of bleeding or clotting: No    Previous INR: Therapeutic last visit; previously outside of goal range    Additional findings: patient has questions about starting vazalore. TE sent to PCP to review and advise       PLAN     Recommended plan for no diet, medication or health factor changes affecting INR     Dosing Instructions: continue your current warfarin dose with next INR in 4 weeks       Summary  As of 6/16/2022    Full warfarin instructions:  5 mg every Mon, Thu; 7.5 mg all other days   Next INR check:  7/14/2022             Telephone call with Bebe who verbalizes understanding and agrees to plan    Lab visit scheduled    Education provided: Please call back if any changes to your diet, medications or how you've been taking warfarin, Importance of notifying clinic for changes in medications; a sooner lab recheck maybe needed. and Contact [phone]  with any changes, questions or concerns.     Plan made per [facility] anticoagulation protocol    Natalie Sorto RN  Anticoagulation Clinic  6/16/2022    _______________________________________________________________________     Anticoagulation Episode Summary     Current INR goal:  2.0-3.0   TTR:  63.9 % (1 y)   Target end date:  Indefinite   Send INR reminders to:  [facility]    Indications    History of CVA (cerebrovascular accident) (Resolved) [Z86.73]  Long term current use of anticoagulant therapy [Z79.01]  Cerebrovascular accident (CVA) due to occlusion of precerebral artery (H)  [I63.20]  History of CVA (cerebrovascular accident) [Z86.73]           Comments:           Anticoagulation Care Providers     Provider Role Specialty Phone number    Diana Ely APRN CNP Referring Family Medicine [phone]

## 2022-06-16 NOTE — TELEPHONE ENCOUNTER
The vascular note is not available for review yet  For now hold off on the aspirin and continue the warfarin until recommendations completed.   Thanks Diana Ely P-BC

## 2022-06-16 NOTE — TELEPHONE ENCOUNTER
Elijah Ortiz,    This patient asked me about taking vazalore while being on warfarin. She stated her vascular provider thought it would be beneficial. Per [employer]:      2022 UpToDate, Inc. and/or its affiliates. All Rights Reserved.    Title Vitamin K Antagonists / Salicylates  Dependencies    Dose: Low cardioprotective aspirin doses, when indicated, generally only require enhanced monitoring for bleeding in patients receiving warfarin. Higher aspirin doses, and other salicylates included in this monograph, should generally be avoided.    Risk Rating D: Consider therapy modification  Summary Salicylates may enhance the anticoagulant effect of Vitamin K Antagonists. Severity Major Reliability Rating Excellent  Patient Management Patients receiving vitamin K antagonists should not take salicylate-containing medicines on an as-needed basis. Nonacetylated salicylates might be safer than aspirin. Acetaminophen (<1.3 g/day for <1 week) may be an acceptable antipyretic and analgesic choice for patients taking vitamin K antagonists; caution appears warranted with higher acetaminophen doses. Aspirin (80 to 325 mg/day) and warfarin are used together, in selected cases and with careful monitoring, for the prevention of cardiovascular events. Monitor for increased signs and symptoms of bleeding whenever a coumarin derivative and a salicylate are used concomitantly    The patient is hesitant to start vazalore. I told her I would run it by her PCP. What are your thoughts?    Natalie Sorto, RN, BSN, PHN  Anticoagulation Clinic

## 2022-06-19 NOTE — PROGRESS NOTES
VASCULAR SURGERY CLINIC CONSULTATION    VASCULAR SURGEON: Jovon Riojas MD, RPVI     LOCATION:  [facility]     Bebe Alfonso   Medical Record #:  [id number]  YOB: 1945  Age:  76 year old     Date of Service: 6/14/2022    PRIMARY CARE PROVIDER: Diana Ely      Possible mesenteric ischemia    IMPRESSION: No evidence of chronic mesenteric ischemia on my physical exam.  CT scan is consistent with chronic arterial disease involving all 3 mesenteric vessels, however, CT scan was done without IV contrast.  Patient does not have symptoms of classic mesenteric ischemia.  She also has significant symptoms of claudication.  There is significant bilateral carotid bruit present.  Patient is a smoker.  Currently on aspirin and atorvastatin    RECOMMENDATION/RISKS: We will order ABIs and carotid ultrasound.  Follow-up after studies are done.  Continue best medical management therapy.    HPI:  Bebe Alfonso is a 76 year old female who was seen today in consultation for recent findings on CT scan concerning for calcifications involving all 3 mesenteric vessels.  Patient denies any symptoms of chronic mesenteric ischemia.  Denies any symptoms of TIA or stroke.  Patient is complaining of significant claudication involving bilateral lower extremities.    REVIEW OF SYSTEMS:    A 12 point ROS was reviewed and is negative except for bilateral lower extremity claudication    PHH:    Past Medical History:   Diagnosis Date     Anticoagulation monitoring, INR range 2-3 10/22/2015     Benign essential hypertension 9/15/2016     Carotid bruit 11/12/2012    Overview:  12/6/10 Carotid US  Elevated velocities throughout the carotid arteries bilaterally. There is a focal area of increased velocity in the mid left internal carotid artery with a plaque in this region on the color flow images. This corresponds to 50 - 70 % diameter reduction. There is an area of elevated velocity in the left  external carotid artery consistent with stenosis. There is no focal area of significant stenosis in the right carotid arteries in the neck. Plaque in the carotid arteries bilaterally.      Chronic obstructive pulmonary disease (H) 1/19/2016    The FVC, FEV1 and FEV1/FVC ratio are reduced.  The inspiratory flow rates are within normal limits.  The FVC is reduced relative to the SVC indicating air trapping.  While the TLC is within normal limits, the FRC, RV and RV/TLC ratio are increased.  The  diffusing capacity is mildly reduced. IMPRESSION: Mild-moderate Airflow Obstruction with air trapping ____________________________________________M.DClarke Orellana  April 2018     Esophageal reflux 4/8/2008    Overview:  Omeprazole PRN, occasional symptoms such as chest burning and knife twisted to stomach.      Heart disease born with it     History of blood transfusion 70 years ago     History of CVA (cerebrovascular accident) 10/20/2015     Long term current use of anticoagulant therapy 3/20/2018     Major depressive disorder, recurrent episode, moderate (H) 10/29/2008    Overview:  She is not taking any medication at this time.     Osteoarthrosis, hip 10/19/2010    Overview:  Pt scheduled for right  total hip arthroplasty on 6/14/13 with Dr. Addison HEREDIA hip xray (11/2012) Severe degenerative changes seen of the right hip with near bone-on-bone appearance of the joint and several subchondral cysts forming.     Thyroid nodule 5/22/2013    Overview:  Thyroid US (2012) Stable nodule left lobe of thyroid TSH- (2/19/12) normal (2.26)          Past Surgical History:   Procedure Laterality Date     BIOPSY  appox in 1980's     BREAST SURGERY  last time in the 1990's    non cancer     ESOPHAGOSCOPY, GASTROSCOPY, DUODENOSCOPY (EGD), COMBINED N/A 5/25/2022    Procedure: ESOPHAGOGASTRODUODENOSCOPY, WITH BIOPSY;  Surgeon: Alfredito Gomez DO;  Location: [facility]       ALLERGIES:  Losartan, Gabapentin, Lisinopril, Oxycodone, Tramadol,  Augmentin [amoxicillin-pot clavulanate], Bacitracin, and Bupropion    MEDS:    Current Outpatient Medications:      amLODIPine (NORVASC) 5 MG tablet, Take 1 tablet (5 mg) by mouth daily, Disp: 90 tablet, Rfl: 0     Aspirin (VAZALORE) 81 MG CAPS, Please choose reason not prescribed from choices below., Disp: , Rfl:      atorvastatin (LIPITOR) 40 MG tablet, Take 1 tablet by mouth once daily, Disp: 90 tablet, Rfl: 3     enalapril (VASOTEC) 5 MG tablet, Take 1 tablet by mouth once daily, Disp: 90 tablet, Rfl: 0     fluticasone (FLONASE) 50 MCG/ACT nasal spray, Spray 1 spray in nostril, Disp: , Rfl:      fluticasone (FLONASE) 50 MCG/ACT nasal spray, Spray 1 spray into both nostrils daily, Disp: 16 g, Rfl: 1     omeprazole (PRILOSEC) 40 MG DR capsule, Take 1 capsule (40 mg) by mouth daily, Disp: 90 capsule, Rfl: 0     Urea 40 % CREA, , Disp: , Rfl:      warfarin ANTICOAGULANT (COUMADIN) 5 MG tablet, TAKE 5 mg Mon/Thurs; 7.5 mg all other days OR AS DIRECTED BY THE ANTICOAGULATION CLINIC., Disp: 135 tablet, Rfl: 0     ASPIRIN NOT PRESCRIBED (INTENTIONAL), Please choose reason not prescribed from choices below. (Patient not taking: Reported on 6/14/2022), Disp: , Rfl:     SOCIAL HABITS:    History   Smoking Status     Current Every Day Smoker     Packs/day: 1.00     Years: 50.00     Types: Cigarettes     Start date: 1/1/2016   Smokeless Tobacco     Never Used     Social History    Substance and Sexual Activity      Alcohol use: No      History   Drug Use Unknown       FAMILY HISTORY:    Family History   Problem Relation Age of Onset     Diabetes Maternal Grandfather      Diabetes Sister         developed after cancer treatment     Breast Cancer Sister      Obesity Sister      Hypertension Mother      Hyperlipidemia Mother      Osteoporosis Mother      Breast Cancer Sister      Osteoporosis Sister      Breast Cancer Daughter      Other Cancer Sister         throught out body     Substance Abuse Sister         acol     Obesity  "Sister      Substance Abuse Sister         acol     Obesity Sister        PE:  BP (!) 154/58 (BP Location: Left arm, Patient Position: Sitting, Cuff Size: Adult Regular)   Pulse 83   Temp 97.3  F (36.3  C) (Tympanic)   Ht 1.676 m (5' 6\")   Wt 61.2 kg (135 lb)   SpO2 97%   BMI 21.79 kg/m    Wt Readings from Last 1 Encounters:   06/14/22 61.2 kg (135 lb)     Body mass index is 21.79 kg/m .    EXAM:  GENERAL: This is a well-developed 76 year old female who appears her stated age  EYES: Grossly normal.  MOUTH: Buccal mucosa normal   CARDIAC: Normal   CHEST/LUNG: Clear to auscultation bilaterally  GASTROINTESINAL soft nontender nondistended  MUSCULOSKELETAL: Grossly normal and both lower extremities are intact.  HEME/LYMPH: No lymphedema  NEUROLOGIC: Focally intact, Alert and oriented x 3.   PSYCH: appropriate affect  INTEGUMENT: No open lesions or ulcers  Pulse Exam: Monophasic signals present bilaterally          DIAGNOSTIC STUDIES:     Images:  No results found.      LABS:      Sodium   Date Value Ref Range Status   05/03/2022 135 133 - 144 mmol/L Final   12/16/2021 140 133 - 144 mmol/L Final   06/10/2021 133 133 - 144 mmol/L Final   05/28/2020 131 (L) 133 - 144 mmol/L Final   12/12/2019 137 133 - 144 mmol/L Final     Urea Nitrogen   Date Value Ref Range Status   05/03/2022 23 7 - 30 mg/dL Final   12/16/2021 29 7 - 30 mg/dL Final   06/10/2021 25 7 - 30 mg/dL Final   05/28/2020 28 7 - 30 mg/dL Final   12/12/2019 25 7 - 30 mg/dL Final     Hemoglobin   Date Value Ref Range Status   05/03/2022 12.9 11.7 - 15.7 g/dL Final   12/12/2019 12.4 11.7 - 15.7 g/dL Final   05/09/2019 13.0 11.7 - 15.7 g/dL Final   03/22/2019 12.7 11.7 - 15.7 g/dL Final     Platelet Count   Date Value Ref Range Status   05/03/2022 239 150 - 450 10e3/uL Final   12/12/2019 302 150 - 450 10e9/L Final   05/09/2019 241 150 - 450 10e9/L Final   03/22/2019 293 150 - 450 10e9/L Final     INR   Date Value Ref Range Status   06/16/2022 2.4 (H) 0.9 - 1.1 " Final   06/03/2022 2.4 (H) 0.9 - 1.1 Final   05/26/2022 1.0 0.9 - 1.1 Final   06/24/2021 2.60 (H) 0.86 - 1.14 Final     Comment:     This test is intended for monitoring Coumadin therapy.  Results are not   accurate in patients with prolonged INR due to factor deficiency.     06/10/2021 2.80 (H) 0.86 - 1.14 Final     Comment:     This test is intended for monitoring Coumadin therapy.  Results are not   accurate in patients with prolonged INR due to factor deficiency.     04/29/2021 2.90 (H) 0.86 - 1.14 Final     Comment:     This test is intended for monitoring Coumadin therapy.  Results are not   accurate in patients with prolonged INR due to factor deficiency.         30minutes spent on the day of encounter doing chart review, history and exam, documentation, and further activities as noted.         Jovon Riojas MD, [facility]  VASCULAR SURGERY

## 2022-06-20 ENCOUNTER — TELEPHONE (OUTPATIENT)
Dept: FAMILY MEDICINE | Facility: CLINIC | Age: 77
End: 2022-06-20
Payer: COMMERCIAL

## 2022-06-20 DIAGNOSIS — E78.5 HYPERLIPIDEMIA LDL GOAL <70: ICD-10-CM

## 2022-06-20 NOTE — TELEPHONE ENCOUNTER
Reason for Call:  Other    Detailed comments: Dr. Vinicio Govea calling and would like a call back from Diana Ely in regards to pt. Wanted to discuss a few things about pt's condition.    Phone Number: Dr. Govea's cell: [phone]      Call taken on 6/20/2022 at 3:51 PM by Jacquie Cordova

## 2022-06-21 RX ORDER — ATORVASTATIN CALCIUM 40 MG/1
TABLET, FILM COATED ORAL
Qty: 90 TABLET | Refills: 0 | Status: SHIPPED | OUTPATIENT
Start: 2022-06-21 | End: 2022-09-27

## 2022-06-21 NOTE — TELEPHONE ENCOUNTER
Impressions   06/17/2022 10:46 AM CDT    1. Abnormally elevated velocities in the visualized abdominal aorta measuring up to 515 cm/s, which is indicative of hemodynamically significant aortic stenosis likely due to extensive atherosclerotic calcified disease. Evaluation with contrast enhanced CT angiogram of the abdomen and pelvis may be helpful for further characterization.   2. Abnormally elevated bilateral renal artery velocities, which may reflect hemodynamically significant renal artery stenosis however may also be secondary to abnormally increased abdominal aortic velocities.   3. Image of right kidney which demonstrates abnormally increased echogenicity, a nonspecific finding that can be seen in the setting of long-standing medical renal disease.   5 pm  Phone call placed to Dr Govea .. will call us back when finished with patients    Previous cardiology team [facility] [address]  [facility]    [address]    [place] [address] [phone]      5:29 pm   Consultation with Dr Govea  Will work on medical management   Smoking cessation  HTN control  Cholesterol control  Follow up nephrology Dr Granger- Dr Perales replacement in 4 months  Add in Carvedilol 3.125 bid  Recheck BP/ HR in 2 weeks  Thanks Diana Ely [facility]

## 2022-06-21 NOTE — TELEPHONE ENCOUNTER
It looks like the note is complete but I do not see mention of the vazalore. Routing back to PCP.    Natalie Sorto, RN, BSN, PHN

## 2022-06-21 NOTE — TELEPHONE ENCOUNTER
His note states she is already taking Aspirin 81 mg/Vasalore 81 mg- same medication..   daily  Continue if no GI complaints indefinitely  If GI complaints stop aspirin to prevent chance of GI bleed.   Thanks Diana Ely [facility]

## 2022-06-22 NOTE — TELEPHONE ENCOUNTER
9:47 AM    Writer updated the patient on the plan noted below. Patient will take both ASA and Vasalore. Patient was advised to stop the ASA and notify [facility] if any GI concerns or S/S bleeding.    Justin Naidu RN, BSN, PHN  Anticoagulation Clinic   [place] # [id number]  [phone]

## 2022-06-28 ENCOUNTER — HOSPITAL ENCOUNTER (OUTPATIENT)
Dept: ULTRASOUND IMAGING | Facility: CLINIC | Age: 77
Discharge: HOME | End: 2022-06-28
Attending: SURGERY
Payer: COMMERCIAL

## 2022-06-28 ENCOUNTER — TELEPHONE (OUTPATIENT)
Dept: FAMILY MEDICINE | Facility: CLINIC | Age: 77
End: 2022-06-28

## 2022-06-28 DIAGNOSIS — K55.1 CHRONIC MESENTERIC INSUFFICIENCY (H): ICD-10-CM

## 2022-06-28 DIAGNOSIS — I10 BENIGN ESSENTIAL HYPERTENSION: ICD-10-CM

## 2022-06-28 DIAGNOSIS — F17.200 TOBACCO DEPENDENCE: ICD-10-CM

## 2022-06-28 DIAGNOSIS — Z86.73 HISTORY OF CVA (CEREBROVASCULAR ACCIDENT): ICD-10-CM

## 2022-06-28 DIAGNOSIS — R09.89 CAROTID BRUIT, UNSPECIFIED LATERALITY: ICD-10-CM

## 2022-06-28 DIAGNOSIS — N18.32 STAGE 3B CHRONIC KIDNEY DISEASE (H): ICD-10-CM

## 2022-06-28 DIAGNOSIS — I67.9 CEREBROVASCULAR DISEASE: ICD-10-CM

## 2022-06-28 DIAGNOSIS — I63.20 CEREBROVASCULAR ACCIDENT (CVA) DUE TO OCCLUSION OF PRECEREBRAL ARTERY (H): ICD-10-CM

## 2022-06-28 DIAGNOSIS — E78.5 HYPERLIPIDEMIA LDL GOAL <70: ICD-10-CM

## 2022-06-28 DIAGNOSIS — Z02.89 PAIN MEDICATION AGREEMENT: ICD-10-CM

## 2022-06-28 PROCEDURE — 93925 LOWER EXTREMITY STUDY: CPT

## 2022-06-28 PROCEDURE — 93880 EXTRACRANIAL BILAT STUDY: CPT

## 2022-06-28 PROCEDURE — 93922 UPR/L XTREMITY ART 2 LEVELS: CPT

## 2022-06-28 NOTE — TELEPHONE ENCOUNTER
Diana Please call Provider to Provider Consultation - Dr. Emerson Govea Nephrologist  [facility] - when your available.      Please Call [phone]    [facility]

## 2022-07-06 ENCOUNTER — TELEPHONE (OUTPATIENT)
Dept: FAMILY MEDICINE | Facility: CLINIC | Age: 77
End: 2022-07-06

## 2022-07-06 NOTE — TELEPHONE ENCOUNTER
Patient Quality Outreach    Patient is due for the following:   Hypertension -  BP check    NEXT STEPS:   Patient has upcoming appointment, these items will be addressed at that time. 07/14    Type of outreach:    Chart review performed, no outreach needed.      Questions for provider review:    None     Zuly Pedro, CMA

## 2022-07-11 DIAGNOSIS — I10 ESSENTIAL HYPERTENSION: ICD-10-CM

## 2022-07-11 DIAGNOSIS — J44.1 COPD EXACERBATION (H): ICD-10-CM

## 2022-07-12 RX ORDER — ENALAPRIL MALEATE 5 MG/1
TABLET ORAL
Qty: 90 TABLET | Refills: 0 | Status: SHIPPED | OUTPATIENT
Start: 2022-07-12 | End: 2022-09-28

## 2022-07-12 RX ORDER — AMLODIPINE BESYLATE 5 MG/1
TABLET ORAL
Qty: 90 TABLET | Refills: 0 | Status: SHIPPED | OUTPATIENT
Start: 2022-07-12 | End: 2022-09-28

## 2022-07-12 NOTE — TELEPHONE ENCOUNTER
"Has appointment scheduled for 7/15/22      Requested Prescriptions   Pending Prescriptions Disp Refills     enalapril (VASOTEC) 5 MG tablet [Pharmacy Med Name: Enalapril Maleate 5 MG Oral Tablet] 90 tablet 0     Sig: Take 1 tablet by mouth once daily       ACE Inhibitors (Including Combos) Protocol Failed - 7/11/2022  9:37 AM        Failed - Blood pressure under 140/90 in past 12 months     BP Readings from Last 3 Encounters:   06/14/22 (!) 154/58   05/25/22 (!) 145/69 05/03/22 (!) 160/70                 Failed - Normal serum creatinine on file in past 12 months     Recent Labs   Lab Test 05/17/22  1442 05/09/19  1129 04/18/19  1024   CR 1.8*   < >  --    CREAT  --   --  1.1*    < > = values in this interval not displayed.       Ok to refill medication if creatinine is low          Passed - Recent (12 mo) or future (30 days) visit within the authorizing provider's specialty     Patient has had an office visit with the authorizing provider or a provider within the authorizing providers department within the previous 12 mos or has a future within next 30 days. See \"Patient Info\" tab in inbasket, or \"Choose Columns\" in Meds & Orders section of the refill encounter.              Passed - Medication is active on med list        Passed - Patient is age 18 or older        Passed - No active pregnancy on record        Passed - Normal serum potassium on file in past 12 months     Recent Labs   Lab Test 05/03/22  1115   POTASSIUM 4.9             Passed - No positive pregnancy test within past 12 months           amLODIPine (NORVASC) 5 MG tablet [Pharmacy Med Name: amLODIPine Besylate 5 MG Oral Tablet] 90 tablet 0     Sig: Take 1 tablet by mouth once daily       Calcium Channel Blockers Protocol  Failed - 7/11/2022  9:37 AM        Failed - Blood pressure under 140/90 in past 12 months     BP Readings from Last 3 Encounters:   06/14/22 (!) 154/58   05/25/22 (!) 145/69 05/03/22 (!) 160/70                 Failed - Normal " "serum creatinine on file in past 12 months     Recent Labs   Lab Test 05/17/22  1442 05/09/19  1129 04/18/19  1024   CR 1.8*   < >  --    CREAT  --   --  1.1*    < > = values in this interval not displayed.       Ok to refill medication if creatinine is low          Passed - Recent (12 mo) or future (30 days) visit within the authorizing provider's specialty     Patient has had an office visit with the authorizing provider or a provider within the authorizing providers department within the previous 12 mos or has a future within next 30 days. See \"Patient Info\" tab in inbasket, or \"Choose Columns\" in Meds & Orders section of the refill encounter.              Passed - Medication is active on med list        Passed - Patient is age 18 or older        Passed - No active pregnancy on record        Passed - No positive pregnancy test in past 12 months             "

## 2022-07-14 ENCOUNTER — ANTICOAGULATION THERAPY VISIT (OUTPATIENT)
Dept: ANTICOAGULATION | Facility: CLINIC | Age: 77
End: 2022-07-14

## 2022-07-14 ENCOUNTER — LAB (OUTPATIENT)
Dept: LAB | Facility: CLINIC | Age: 77
End: 2022-07-14
Payer: COMMERCIAL

## 2022-07-14 DIAGNOSIS — Z86.73 HISTORY OF CVA (CEREBROVASCULAR ACCIDENT): ICD-10-CM

## 2022-07-14 DIAGNOSIS — Z79.01 LONG TERM CURRENT USE OF ANTICOAGULANT THERAPY: Primary | ICD-10-CM

## 2022-07-14 DIAGNOSIS — I63.20 CEREBROVASCULAR ACCIDENT (CVA) DUE TO OCCLUSION OF PRECEREBRAL ARTERY (H): ICD-10-CM

## 2022-07-14 DIAGNOSIS — Z79.01 LONG TERM CURRENT USE OF ANTICOAGULANT THERAPY: ICD-10-CM

## 2022-07-14 LAB — INR BLD: 3 (ref 0.9–1.1)

## 2022-07-14 PROCEDURE — 85610 PROTHROMBIN TIME: CPT

## 2022-07-14 PROCEDURE — 36416 COLLJ CAPILLARY BLOOD SPEC: CPT

## 2022-07-14 NOTE — PROGRESS NOTES
ANTICOAGULATION MANAGEMENT     Bebe Alfonso 76 year old female is on warfarin with therapeutic INR result. (Goal INR 2.0-3.0)    Recent labs: (last 7 days)     07/14/22  0825   INR 3.0*       ASSESSMENT       Source(s): Chart Review and Patient/Caregiver Call       Warfarin doses taken: Warfarin taken as instructed    Diet: No new diet changes identified    New illness, injury, or hospitalization: No    Medication/supplement changes: None noted    Signs or symptoms of bleeding or clotting: No    Previous INR: Therapeutic last 2(+) visits    Additional findings: patient is cutting down on smoking. She went from a full pack a day to half a pack in the last few weeks. Checking in 2 weeks due to changes in smoking       PLAN     Recommended plan for ongoing change(s) affecting INR     Dosing Instructions: continue your current warfarin dose with next INR in 2 weeks       Summary  As of 7/14/2022    Full warfarin instructions:  5 mg every Mon, Thu; 7.5 mg all other days   Next INR check:  7/28/2022             Telephone call with Bebe who verbalizes understanding and agrees to plan    Lab visit scheduled    Education provided: Please call back if any changes to your diet, medications or how you've been taking warfarin and Contact [phone]  with any changes, questions or concerns.     Plan made per [facility] anticoagulation protocol    Natalie Sorto RN  Anticoagulation Clinic  7/14/2022    _______________________________________________________________________     Anticoagulation Episode Summary     Current INR goal:  2.0-3.0   TTR:  63.9 % (1 y)   Target end date:  Indefinite   Send INR reminders to:  [facility]    Indications    History of CVA (cerebrovascular accident) (Resolved) [Z86.73]  Long term current use of anticoagulant therapy [Z79.01]  Cerebrovascular accident (CVA) due to occlusion of precerebral artery (H) [I63.20]  History of CVA (cerebrovascular accident) [Z86.73]           Comments:            Anticoagulation Care Providers     Provider Role Specialty Phone number    Diana Ely, SELWYN CNP Referring Family Medicine [phone]

## 2022-07-15 ENCOUNTER — VIRTUAL VISIT (OUTPATIENT)
Dept: FAMILY MEDICINE | Facility: CLINIC | Age: 77
End: 2022-07-15

## 2022-07-15 DIAGNOSIS — F41.1 GENERALIZED ANXIETY DISORDER: ICD-10-CM

## 2022-07-15 DIAGNOSIS — I10 BENIGN ESSENTIAL HYPERTENSION: ICD-10-CM

## 2022-07-15 DIAGNOSIS — F17.200 NICOTINE DEPENDENCE, UNCOMPLICATED, UNSPECIFIED NICOTINE PRODUCT TYPE: ICD-10-CM

## 2022-07-15 DIAGNOSIS — Z12.11 SCREEN FOR COLON CANCER: Primary | ICD-10-CM

## 2022-07-15 PROCEDURE — 99214 OFFICE O/P EST MOD 30 MIN: CPT | Mod: 95 | Performed by: NURSE PRACTITIONER

## 2022-07-15 RX ORDER — NICOTINE 21 MG/24HR
1 PATCH, TRANSDERMAL 24 HOURS TRANSDERMAL EVERY 24 HOURS
Qty: 28 PATCH | Refills: 0 | Status: CANCELLED | OUTPATIENT
Start: 2022-08-26 | End: 2022-09-23

## 2022-07-15 RX ORDER — ENALAPRIL MALEATE 10 MG/1
10 TABLET ORAL DAILY
Qty: 90 TABLET | Refills: 1 | Status: SHIPPED | OUTPATIENT
Start: 2022-07-15 | End: 2022-09-28

## 2022-07-15 RX ORDER — CLONIDINE HYDROCHLORIDE 0.1 MG/1
0.1 TABLET ORAL 2 TIMES DAILY
Qty: 60 TABLET | Refills: 0 | Status: SHIPPED | OUTPATIENT
Start: 2022-07-15 | End: 2022-09-28

## 2022-07-15 RX ORDER — NICOTINE 21 MG/24HR
1 PATCH, TRANSDERMAL 24 HOURS TRANSDERMAL EVERY 24 HOURS
Qty: 42 PATCH | Refills: 0 | Status: CANCELLED | OUTPATIENT
Start: 2022-07-15 | End: 2022-08-26

## 2022-07-15 RX ORDER — VARENICLINE TARTRATE 1 MG/1
1 TABLET, FILM COATED ORAL 2 TIMES DAILY
Qty: 60 TABLET | Refills: 1 | Status: CANCELLED | OUTPATIENT
Start: 2022-08-14

## 2022-07-15 NOTE — PROGRESS NOTES
Bebe is a 76 year old who is being evaluated via a billable telephone visit.      What phone number would you like to be contacted at? [phone]  How would you like to obtain your AVS? MyChart    Assessment & Plan     Screen for colon cancer  Due   - Colonscopy Screening  Referral    Nicotine dependence, uncomplicated, unspecified nicotine product type  Use nicotine gum as discussed     Generalized anxiety disorder  Trial new medication   - FLUoxetine (PROZAC) 20 MG capsule  Dispense: 90 capsule; Refill: 1    Benign essential hypertension  Increase enalapril to 10 mg daily  - enalapril (VASOTEC) 10 MG tablet  Dispense: 90 tablet; Refill: 1  Add in for HTN crisis  - cloNIDine (CATAPRES) 0.1 MG tablet  Dispense: 60 tablet; Refill: 0    Call or return to the clinic with any worsening of symptoms or no resolution. Patient/Parent verbalized understanding and is in agreement. Medication side effects reviewed.   Current Outpatient Medications   Medication Sig Dispense Refill     amLODIPine (NORVASC) 5 MG tablet Take 1 tablet by mouth once daily 90 tablet 0     Aspirin (VAZALORE) 81 MG CAPS Please choose reason not prescribed from choices below.       ASPIRIN NOT PRESCRIBED (INTENTIONAL) Please choose reason not prescribed from choices below.       atorvastatin (LIPITOR) 40 MG tablet Take 1 tablet by mouth once daily 90 tablet 0     cloNIDine (CATAPRES) 0.1 MG tablet Take 1 tablet (0.1 mg) by mouth 2 times daily BP 60 tablet 0     enalapril (VASOTEC) 10 MG tablet Take 1 tablet (10 mg) by mouth daily 90 tablet 1     enalapril (VASOTEC) 5 MG tablet Take 1 tablet by mouth once daily 90 tablet 0     FLUoxetine (PROZAC) 20 MG capsule Take 1 capsule (20 mg) by mouth daily 90 capsule 1     fluticasone (FLONASE) 50 MCG/ACT nasal spray Spray 1 spray in nostril       fluticasone (FLONASE) 50 MCG/ACT nasal spray Spray 1 spray into both nostrils daily 16 g 1     omeprazole (PRILOSEC) 40 MG DR capsule Take 1 capsule (40  mg) by mouth daily 90 capsule 0     Urea 40 % CREA        warfarin ANTICOAGULANT (COUMADIN) 5 MG tablet TAKE 5 mg Mon/Thurs; 7.5 mg all other days OR AS DIRECTED BY THE ANTICOAGULATION CLINIC. 135 tablet 0     Chart documentation with Dragon Voice recognition Software. Although reviewed after completion, some words and grammatical errors may remain.  As the provider for this telephone/video service, I attest that I introduced myself to the patient, provided my credentials, disclosed my location, and determined that, based on a review of the patient's chart and/or a discussion with members of the patient's treatment team, a telephone/video visit is an appropriate and effective means of providing this service. The patient and I mutually agree that this visit is appropriate for telephone/video visit as well.        SELWYN [facility] [place]    Subjective  Bebe is a 76 year old, presenting for the following health issues:  Smoking Cessation and Hypertension      HPI   TOBACCO TREATMENT  VISIT      Subjective:      Patient is a 76 year old White female that was contacted to participate in the Tobacco Treatment Program for Nicotine Dependence on 7/15/2022 by the Tobacco Treatment Specialist. Patient  reports that she has been smoking cigarettes. She started smoking about 6 years ago. She has a 50.00 pack-year smoking history. She has never used smokeless tobacco.           Summary of visit:      Bebe Alfonso is still smoking/using tobacco: Yes  These MI interventions were used: Expressed Empathy/Understanding and Open-ended questions  We discussed: Risks of smoking  Benefits of quitting  Ways to cope with cravings and manage triggers  Hints for managing nicotine withdrawal  Ways to reduce stress to prevent relapse  Patient is ready to quit smoking or continue to stay 100% smoke-free.  Advice to quit and impact of smoking provided to patient: yes        Plan:         Tobacco Treatment Specialist consulted   Wants to quit cold turkey and will use gum as needed  Kidney problems are what is making her want to quit smoking  1/2 ppd at this time. Was 1 ppd and cut back      Hypertension Follow-up  > 140/90    Do you check your blood pressure regularly outside of the clinic? Yes     Are you following a low salt diet? No    Are your blood pressures ever more than 140 on the top number (systolic) OR more   than 90 on the bottom number (diastolic), for example 140/90? Yes            Review of Systems   Constitutional, HEENT, cardiovascular, pulmonary, GI, , musculoskeletal, neuro, skin, endocrine and psych systems are negative, except as otherwise noted.      Objective           Vitals:  No vitals were obtained today due to virtual visit.    Physical Exam   alert and no distress  PSYCH: Alert and oriented times 3; coherent speech, normal   rate and volume, able to articulate logical thoughts, able   to abstract reason, no tangential thoughts, no hallucinations   or delusions  Her affect is normal  RESP: No cough, no audible wheezing, able to talk in full sentences  Remainder of exam unable to be completed due to telephone visits                Phone call duration: 12 minutes    .  ..

## 2022-07-15 NOTE — PATIENT INSTRUCTIONS
Zyban / Wellbutrin    Dosing:    Before Your Quit Date: Dose   Days 1-3 Take 150 mg by mouth once daily in the morning.   Days 4-7 (or up to 4-14 days) Take 150 mg by mouth twice daily in the morning and evening.   After Your Quit Date:    Treatment usually continues 7-12 weeks Take 150 mg by mouth twice daily in the morning and evening.       Some tips:    You will start taking bupropion at least 1 week before quitting smoking. It is okay to smoke while using bupropion.     You can use nicotine replacement therapy such as nicotine gum while using  Bupropion.     Take your 2 daily doses at least 8 hours apart.     DO NOT CRUSH OR BREAK TABLETS. Swallow the tablet whole.     You can take your medication with or without food.     Do not drink alcohol while taking this medication.     Side Effects:    Some people may experience dry mouth and insomnia. These may resolve in time.     Small frequent meals, frequent mouth care, chewing gum, or sucking lozenges may help with dry mouth.     Other possible rare side effects include constipation, nausea, headache, drowsiness, and weight loss may occur.     If you experience any other side effects that are troublesome, or if you feel something is not right, call your health care professional.           Nicotine Patch 21 mg  Uses  For quitting smoking.  Instructions  DO NOT take this medicine by mouth.  Avoid placing the patch near the breast.  Remove the patch after 24 hours.  Keep the medicine at room temperature. Avoid heat and direct light.  This patch should not be cut.  Wash your hands before and after handling this medicine.  Remove old patch before applying new one. Change the location of the new patch.  If you have vivid dreams or trouble sleeping, you may remove the patch before going to sleep.  Ask your doctor or pharmacist about locations on your body where this patch can be used.  Remove the plastic liner that protects the sticky side of the patch before applying  to the skin.  Be sure the area of skin is clean and dry before putting on a new patch.  Apply the patch to a clean, dry, hairless area.  Press the patch firmly for a few seconds to make sure it stays in place.  After removing the patch, fold it together and discard it out of reach of children and pets.  Please ask your doctor or pharmacist how you can safely dispose of used patches.  If the skin under the patch becomes irritated, remove the patch. Do not apply a new patch to the area until the skin feels better.  To avoid irritating your skin, use a different location for a new patch.  Apply the patch only to normal looking skin. Avoid areas of the skin that are red, have scrapes, or damaged.  If the patch falls off, apply a new a patch on a different location of the body.  Please tell your doctor and pharmacist about all the medicines you take. Include both prescription and over-the-counter medicines. Also tell them about any vitamins, herbal medicines, or anything else you take for your health.  If you need to stop this medicine, your doctor may wish to gradually reduce the dosage before stopping.  Do not use more than 1 patch at any one time.  Cautions  Tell your doctor and pharmacist if you ever had an allergic reaction to a medicine. Symptoms of an allergic reaction can include trouble breathing, skin rash, itching, swelling, or severe dizziness.  Do not use the medication any more than instructed.  Avoid smoking while on this medicine. Smoking may increase your risk for stroke, heart attack, blood clots, high blood pressure, and other diseases of the heart and blood vessels.  Tell the doctor or pharmacist if you are pregnant, planning to be pregnant, or breastfeeding.  Ask your pharmacist if this medicine can interact with any of your other medicines. Be sure to tell them about all the medicines you take.  Please tell all your doctors and dentists that you are on this medicine before they provide care.  Side  Effects  The following is a list of some common side effects from this medicine. Please speak with your doctor about what you should do if you experience these or other side effects.    skin irritation where medicine is applied  If you have any of the following side effects, you may be getting too much medicine. Please contact your doctor to let them know about these side effects.    diarrhea    dizziness    nausea    rapid heartbeat    vomiting  A few people may have an allergic reactions to this medicine. Symptoms can include difficulty breathing, skin rash, itching, swelling, or severe dizziness. If you notice any of these symptoms, seek medical help quickly.  Extra  Please speak with your doctor, nurse, or pharmacist if you have any questions about this medicine.  [email or web address]  IMPORTANT NOTE: This document tells you briefly how to take your medicine, but it does not tell you all there is to know about it.Your doctor or pharmacist may give you other documents about your medicine. Please talk to them if you have any questions.Always follow their advice. There is a more complete description of this medicine available in English.Scan this code on your smartphone or tablet or use the web address below. You can also ask your pharmacist for a printout. If you have any questions, please ask your pharmacist.     2021 [employer].

## 2022-07-19 ENCOUNTER — OFFICE VISIT (OUTPATIENT)
Dept: VASCULAR SURGERY | Facility: CLINIC | Age: 77
End: 2022-07-19
Payer: COMMERCIAL

## 2022-07-19 VITALS — SYSTOLIC BLOOD PRESSURE: 157 MMHG | RESPIRATION RATE: 16 BRPM | DIASTOLIC BLOOD PRESSURE: 57 MMHG | HEART RATE: 73 BPM

## 2022-07-19 DIAGNOSIS — I73.9 PAD (PERIPHERAL ARTERY DISEASE) (H): ICD-10-CM

## 2022-07-19 DIAGNOSIS — R09.89 CAROTID BRUIT, UNSPECIFIED LATERALITY: Primary | ICD-10-CM

## 2022-07-19 PROCEDURE — 99214 OFFICE O/P EST MOD 30 MIN: CPT | Performed by: PHYSICIAN ASSISTANT

## 2022-07-19 NOTE — PROGRESS NOTES
VASCULAR SURGERY PROGRESS NOTE    LOCATION:  [facility]     Bebe Alfonso  Medical Record #: [id number]  YOB: 1945  Age: 76 year old     Date of Service: 7/19/2022    PRIMARY CARE PROVIDER: Diana Ely    Reason for visit: Follow up ABIs and carotid screen    IMPRESSION: 77 YO female presenting for follow up. She was noted to have claudication at her last visit and underwent ABIs/arterial duplex showing mild arterial insufficiency on the left but normal triphasic waveforms throughout both lower extremities. Denies any symptoms consistent with claudication today. No rest pain or nonhealing wounds.    Carotid duplex showing 50-69% stenosis bilaterally. Completely asymptomatic.    RECOMMENDATION/RISKS: Continue best medical management with aspirin and statin therapy. Encouraged smoking cessation and regular ambulation/activity. Follow up in 1 year with repeat ABIs and carotid duplex.     HPI:  Bebe Alfonso is a 76 year old female with past medical history significant for hypertension, hyperlipidemia, ongoing tobacco abuse, COPD, history of stroke, and chronic kidney disease. Patient was seen 1 month ago by vascular surgery team for possible mesenteric ischemia due to findings on non contrast CT. Patient was not experiencing any symptoms consistent with mesenteric ischemic but did note significant bilateral lower extremity claudication for which she was scheduled for follow up with ABIs. Given her smoking history and findning of carotid bruits, carotid screen was ordered as well.     Today, Mrs. Alfonso presents to follow up on imaging studies. She is accompanied by her . Mrs. Walsh denies any claudication, rest pain, or nonhealing wounds. She has some hip pain that has been present since having both hips replaced but believes this is musculoskeletal in nature. Patient a history of strokes in the past, has been about 3 years since her last stroke. She denies  any recent visual changes, speech changes, extremity weakness, or other signs/symptoms of TIA or stroke. She is compliant with medications but continues to smoke. She is down to 1/2 pack per day and it working to reach complete cessation. No other concerns today.    REVIEW OF SYSTEMS:    A 12 point ROS was reviewed and is negative except for what is listed above in HPI.    PHH:    Past Medical History:   Diagnosis Date     Anticoagulation monitoring, INR range 2-3 10/22/2015     Benign essential hypertension 9/15/2016     Carotid bruit 11/12/2012    Overview:  12/6/10 Carotid US  Elevated velocities throughout the carotid arteries bilaterally. There is a focal area of increased velocity in the mid left internal carotid artery with a plaque in this region on the color flow images. This corresponds to 50 - 70 % diameter reduction. There is an area of elevated velocity in the left external carotid artery consistent with stenosis. There is no focal area of significant stenosis in the right carotid arteries in the neck. Plaque in the carotid arteries bilaterally.      Chronic obstructive pulmonary disease (H) 1/19/2016    The FVC, FEV1 and FEV1/FVC ratio are reduced.  The inspiratory flow rates are within normal limits.  The FVC is reduced relative to the SVC indicating air trapping.  While the TLC is within normal limits, the FRC, RV and RV/TLC ratio are increased.  The  diffusing capacity is mildly reduced. IMPRESSION: Mild-moderate Airflow Obstruction with air trapping ____________________________________________Clarke Iverson  April 2018     Esophageal reflux 4/8/2008    Overview:  Omeprazole PRN, occasional symptoms such as chest burning and knife twisted to stomach.      Heart disease born with it     History of blood transfusion 70 years ago     History of CVA (cerebrovascular accident) 10/20/2015     Long term current use of anticoagulant therapy 3/20/2018     Major depressive disorder, recurrent episode,  moderate (H) 10/29/2008    Overview:  She is not taking any medication at this time.     Osteoarthrosis, hip 10/19/2010    Overview:  Pt scheduled for right  total hip arthroplasty on 6/14/13 with Dr. Addison HEREDIA hip xray (11/2012) Severe degenerative changes seen of the right hip with near bone-on-bone appearance of the joint and several subchondral cysts forming.     Thyroid nodule 5/22/2013    Overview:  Thyroid US (2012) Stable nodule left lobe of thyroid TSH- (2/19/12) normal (2.26)     Past Surgical History:   Procedure Laterality Date     BIOPSY  appox in 1980's     BREAST SURGERY  last time in the 1990's    non cancer     ESOPHAGOSCOPY, GASTROSCOPY, DUODENOSCOPY (EGD), COMBINED N/A 5/25/2022    Procedure: ESOPHAGOGASTRODUODENOSCOPY, WITH BIOPSY;  Surgeon: Alfredito Gomez DO;  Location: [facility]     ALLERGIES:  Losartan, Gabapentin, Lisinopril, Oxycodone, Tramadol, Augmentin [amoxicillin-pot clavulanate], Bacitracin, and Bupropion    MEDS:    Current Outpatient Medications:      amLODIPine (NORVASC) 5 MG tablet, Take 1 tablet by mouth once daily, Disp: 90 tablet, Rfl: 0     Aspirin (VAZALORE) 81 MG CAPS, Please choose reason not prescribed from choices below., Disp: , Rfl:      ASPIRIN NOT PRESCRIBED (INTENTIONAL), Please choose reason not prescribed from choices below., Disp: , Rfl:      atorvastatin (LIPITOR) 40 MG tablet, Take 1 tablet by mouth once daily, Disp: 90 tablet, Rfl: 0     cloNIDine (CATAPRES) 0.1 MG tablet, Take 1 tablet (0.1 mg) by mouth 2 times daily BP, Disp: 60 tablet, Rfl: 0     enalapril (VASOTEC) 10 MG tablet, Take 1 tablet (10 mg) by mouth daily, Disp: 90 tablet, Rfl: 1     enalapril (VASOTEC) 5 MG tablet, Take 1 tablet by mouth once daily, Disp: 90 tablet, Rfl: 0     FLUoxetine (PROZAC) 20 MG capsule, Take 1 capsule (20 mg) by mouth daily, Disp: 90 capsule, Rfl: 1     fluticasone (FLONASE) 50 MCG/ACT nasal spray, Spray 1 spray in nostril, Disp: , Rfl:      fluticasone (FLONASE) 50  MCG/ACT nasal spray, Spray 1 spray into both nostrils daily, Disp: 16 g, Rfl: 1     omeprazole (PRILOSEC) 40 MG DR capsule, Take 1 capsule (40 mg) by mouth daily, Disp: 90 capsule, Rfl: 0     Urea 40 % CREA, , Disp: , Rfl:      warfarin ANTICOAGULANT (COUMADIN) 5 MG tablet, TAKE 5 mg every Mon, Thu; 7.5 mg all other days OR AS DIRECTED BY THE ANTICOAGULATION CLINIC., Disp: 114 tablet, Rfl: 0    SOCIAL HABITS:    History   Smoking Status     Current Every Day Smoker     Packs/day: 1.00     Years: 50.00     Types: Cigarettes     Start date: 1/1/2016   Smokeless Tobacco     Never Used     Social History    Substance and Sexual Activity      Alcohol use: No      History   Drug Use Unknown     FAMILY HISTORY:    Family History   Problem Relation Age of Onset     Diabetes Maternal Grandfather      Diabetes Sister         developed after cancer treatment     Breast Cancer Sister      Obesity Sister      Hypertension Mother      Hyperlipidemia Mother      Osteoporosis Mother      Breast Cancer Sister      Osteoporosis Sister      Breast Cancer Daughter      Other Cancer Sister         throught out body     Substance Abuse Sister         acol     Obesity Sister      Substance Abuse Sister         acol     Obesity Sister      PE:  There were no vitals taken for this visit.  Wt Readings from Last 1 Encounters:   06/14/22 61.2 kg (135 lb)     There is no height or weight on file to calculate BMI.    EXAM:  GENERAL: well-developed 76 year old female who appears her stated age  CARDIAC: normal   CHEST/LUNG: normal respiratory effort   MUSCULOSKELETAL: grossly normal and both lower extremities are intact  NEUROLOGIC: focally intact, alert and oriented x 3  PSYCH: appropriate affect  VASCULAR: no lower extremity edema, no open wounds or ulcers    DIAGNOSTIC STUDIES:     Images:    US FAMILIA with PPG wo Exercise Bilateral    Result Date: 6/29/2022    IMPRESSION:   1. Right ABIs are normal without evidence of arterial insufficiency;  however, right posterior tibial artery waveform is dampened.   2. Left FAMILIA shows mild arterial insufficiency.    US Carotid Bilateral    Result Date: 6/29/2022  IMPRESSION:   1. 50-69% diameter stenosis of the right ICA relative to the distal ICA diameter, unchanged.   2. 50-69% diameter stenosis of the left ICA relative to the distal ICA diameter, unchanged.     US Lower Extremity Arterial Duplex Bilateral    Result Date: 6/28/2022  IMPRESSION: No definite significant stenoses identified on ultrasound.    LABS:      Sodium   Date Value Ref Range Status   05/03/2022 135 133 - 144 mmol/L Final   12/16/2021 140 133 - 144 mmol/L Final   06/10/2021 133 133 - 144 mmol/L Final   05/28/2020 131 (L) 133 - 144 mmol/L Final   12/12/2019 137 133 - 144 mmol/L Final     Urea Nitrogen   Date Value Ref Range Status   05/03/2022 23 7 - 30 mg/dL Final   12/16/2021 29 7 - 30 mg/dL Final   06/10/2021 25 7 - 30 mg/dL Final   05/28/2020 28 7 - 30 mg/dL Final   12/12/2019 25 7 - 30 mg/dL Final     Hemoglobin   Date Value Ref Range Status   05/03/2022 12.9 11.7 - 15.7 g/dL Final   12/12/2019 12.4 11.7 - 15.7 g/dL Final   05/09/2019 13.0 11.7 - 15.7 g/dL Final   03/22/2019 12.7 11.7 - 15.7 g/dL Final     Platelet Count   Date Value Ref Range Status   05/03/2022 239 150 - 450 10e3/uL Final   12/12/2019 302 150 - 450 10e9/L Final   05/09/2019 241 150 - 450 10e9/L Final   03/22/2019 293 150 - 450 10e9/L Final     INR   Date Value Ref Range Status   07/14/2022 3.0 (H) 0.9 - 1.1 Final   06/16/2022 2.4 (H) 0.9 - 1.1 Final   06/03/2022 2.4 (H) 0.9 - 1.1 Final   06/24/2021 2.60 (H) 0.86 - 1.14 Final     Comment:     This test is intended for monitoring Coumadin therapy.  Results are not   accurate in patients with prolonged INR due to factor deficiency.     06/10/2021 2.80 (H) 0.86 - 1.14 Final     Comment:     This test is intended for monitoring Coumadin therapy.  Results are not   accurate in patients with prolonged INR due to factor  deficiency.     04/29/2021 2.90 (H) 0.86 - 1.14 Final     Comment:     This test is intended for monitoring Coumadin therapy.  Results are not   accurate in patients with prolonged INR due to factor deficiency.       30 minutes spent on the day of encounter doing chart review, history and exam, documentation, and further activities as noted.     Veronika Daley PA-C  VASCULAR SURGERY

## 2022-07-19 NOTE — NURSING NOTE
"Initial BP (!) 157/57 (BP Location: Left arm, Patient Position: Chair, Cuff Size: Adult Regular)   Pulse 73   Resp 16  Estimated body mass index is 21.79 kg/m  as calculated from the following:    Height as of 6/14/22: 1.676 m (5' 6\").    Weight as of 6/14/22: 61.2 kg (135 lb). .    Patient is here for results.  gavino vásquez LPN    "

## 2022-07-28 ENCOUNTER — LAB (OUTPATIENT)
Dept: LAB | Facility: CLINIC | Age: 77
End: 2022-07-28
Payer: COMMERCIAL

## 2022-07-28 ENCOUNTER — ANTICOAGULATION THERAPY VISIT (OUTPATIENT)
Dept: ANTICOAGULATION | Facility: CLINIC | Age: 77
End: 2022-07-28

## 2022-07-28 DIAGNOSIS — I63.20 CEREBROVASCULAR ACCIDENT (CVA) DUE TO OCCLUSION OF PRECEREBRAL ARTERY (H): ICD-10-CM

## 2022-07-28 DIAGNOSIS — Z86.73 HISTORY OF CVA (CEREBROVASCULAR ACCIDENT): ICD-10-CM

## 2022-07-28 DIAGNOSIS — Z79.01 LONG TERM CURRENT USE OF ANTICOAGULANT THERAPY: Primary | ICD-10-CM

## 2022-07-28 DIAGNOSIS — Z79.01 LONG TERM CURRENT USE OF ANTICOAGULANT THERAPY: ICD-10-CM

## 2022-07-28 LAB — INR BLD: 3.1 (ref 0.9–1.1)

## 2022-07-28 PROCEDURE — 85610 PROTHROMBIN TIME: CPT

## 2022-07-28 PROCEDURE — 36416 COLLJ CAPILLARY BLOOD SPEC: CPT

## 2022-07-28 RX ORDER — WARFARIN SODIUM 5 MG/1
TABLET ORAL
Qty: 114 TABLET | Refills: 0 | COMMUNITY
Start: 2022-07-28 | End: 2022-09-28

## 2022-07-28 NOTE — PROGRESS NOTES
ANTICOAGULATION MANAGEMENT     Bebe SHELDON Alfonso 76 year old female is on warfarin with supratherapeutic INR result. (Goal INR 2.0-3.0)    Recent labs: (last 7 days)     07/28/22  0834   INR 3.1*       ASSESSMENT       Source(s): Chart Review and Patient/Caregiver Call       Warfarin doses taken: Warfarin taken as instructed    Diet: No new diet changes identified    New illness, injury, or hospitalization: No    Medication/supplement changes: bupropion - smoking, plans to use gum to quit smoking  Fluoxetine - Summary Agents with Antiplatelet Properties may enhance the anticoagulant effect of Anticoagulants. Severity Moderate Reliability Rating Fair     ASA    Clonidine - no interactions anticipated     Signs or symptoms of bleeding or clotting: No    Previous INR: Therapeutic last 2(+) visits    Additional findings: plans to quit smoking in about a week     PLAN     Recommended plan for ongoing change(s) affecting INR     Dosing Instructions: decrease your warfarin dose (5.3% change) with next INR in about 10 days    Summary  As of 7/28/2022    Full warfarin instructions:  5 mg every Tue, Thu, Sat; 7.5 mg all other days   Next INR check:  8/9/2022             Telephone call with Bebe who verbalizes understanding and agrees to plan    Lab visit scheduled    Education provided: Please call back if any changes to your diet, medications or how you've been taking warfarin and Monitoring for bleeding signs and symptoms    Plan made per [facility] anticoagulation protocol    Deneen Pretty RN  Anticoagulation Clinic  7/28/2022    _______________________________________________________________________     Anticoagulation Episode Summary     Current INR goal:  2.0-3.0   TTR:  60.0 % (1 y)   Target end date:  Indefinite   Send INR reminders to:  [facility]    Indications    History of CVA (cerebrovascular accident) (Resolved) [Z86.73]  Long term current use of anticoagulant therapy [Z79.01]  Cerebrovascular accident  (CVA) due to occlusion of precerebral artery (H) [I63.20]  History of CVA (cerebrovascular accident) [Z86.73]           Comments:           Anticoagulation Care Providers     Provider Role Specialty Phone number    Diana Ely APRN [facility] Referring Family Medicine [phone]

## 2022-08-09 ENCOUNTER — TELEPHONE (OUTPATIENT)
Dept: ANTICOAGULATION | Facility: CLINIC | Age: 77
End: 2022-08-09

## 2022-08-09 ENCOUNTER — LAB (OUTPATIENT)
Dept: LAB | Facility: CLINIC | Age: 77
End: 2022-08-09
Payer: COMMERCIAL

## 2022-08-09 ENCOUNTER — ANTICOAGULATION THERAPY VISIT (OUTPATIENT)
Dept: ANTICOAGULATION | Facility: CLINIC | Age: 77
End: 2022-08-09

## 2022-08-09 DIAGNOSIS — I63.20 CEREBROVASCULAR ACCIDENT (CVA) DUE TO OCCLUSION OF PRECEREBRAL ARTERY (H): ICD-10-CM

## 2022-08-09 DIAGNOSIS — Z79.01 LONG TERM CURRENT USE OF ANTICOAGULANT THERAPY: Primary | ICD-10-CM

## 2022-08-09 DIAGNOSIS — Z79.01 LONG TERM CURRENT USE OF ANTICOAGULANT THERAPY: ICD-10-CM

## 2022-08-09 DIAGNOSIS — Z86.73 HISTORY OF CVA (CEREBROVASCULAR ACCIDENT): ICD-10-CM

## 2022-08-09 LAB — INR BLD: 2.2 (ref 0.9–1.1)

## 2022-08-09 PROCEDURE — 85610 PROTHROMBIN TIME: CPT

## 2022-08-09 PROCEDURE — 36416 COLLJ CAPILLARY BLOOD SPEC: CPT

## 2022-08-09 NOTE — TELEPHONE ENCOUNTER
Plan put in [facility] calendar and notes. Will inform patient of this at next 8-23-22 INR result date.    Natalie Sorto, RN, BSN, PHN

## 2022-08-09 NOTE — TELEPHONE ENCOUNTER
"JULIETH-PROCEDURAL ANTICOAGULATION  MANAGEMENT    ASSESSMENT     Warfarin interruption plan for colonoscopy on 2022.    Indication for Anticoagulation: Stroke      CVA 2015     HX CVA 3x prior ,     Afib ruled out      Julieth-Procedure Risk stratification for thromboembolism: moderate (2018 American Society of Hematology guideline)    VTE: 2018 American Society of Hematology recommends against bridging in low and moderate risk VTE patients holding warfarin     RECOMMENDATION       Pre-Procedure:  o Hold warfarin for 5 days, until after procedure startin2022   o No Bridge      Post-Procedure:  o Resume warfarin dose if okay with provider doing procedure on night of procedure, 2022 PM: 10mg, 2022 7.5mg  o Recheck INR ~ 7 days after resuming warfarin       Plan routed to referring provider for approval  ?   Diana Murillo [facility]    SUBJECTIVE/OBJECTIVE     Bebe Alfonso, a 76 year old female    Goal INR Range: 2.0-3.0     Patient bridged in past: No    Pertinent History: tolerated hold with bridge 2022    Wt Readings from Last 3 Encounters:   22 61.2 kg (135 lb)   22 61.2 kg (135 lb)   22 61.2 kg (135 lb)      Ideal body weight: 59.3 kg (130 lb 11.7 oz)  Adjusted ideal body weight: 60.1 kg (132 lb 7 oz)     Estimated body mass index is 21.79 kg/m  as calculated from the following:    Height as of 22: 1.676 m (5' 6\").    Weight as of 22: 61.2 kg (135 lb).    Lab Results   Component Value Date    INR 2.2 (H) 2022    INR 3.1 (H) 2022    INR 3.0 (H) 2022     Lab Results   Component Value Date    HGB 12.9 2022    HGB 12.4 2019    HCT 38.3 2022    HCT 37.3 2019     2022     2019     Lab Results   Component Value Date    CR 1.8 (H) 2022    CR 1.48 (H) 2022    CR 1.39 (H) 2021     CrCl cannot be calculated (Patient's most recent lab result is older than the maximum 30 " days allowed.).

## 2022-08-09 NOTE — TELEPHONE ENCOUNTER
Bebe is having a colonoscopy on 9-7-22. Please advise on warfarin hold/potential bridging. Thank you.    Natalie Sorto RN, BSN, PHN

## 2022-08-09 NOTE — PROGRESS NOTES
ANTICOAGULATION MANAGEMENT     Bebe Alfonso 76 year old female is on warfarin with therapeutic INR result. (Goal INR 2.0-3.0)    Recent labs: (last 7 days)     08/09/22  0820   INR 2.2*       ASSESSMENT       Source(s): Chart Review and Patient/Caregiver Call       Warfarin doses taken: Warfarin taken as instructed    Diet: No new diet changes identified    New illness, injury, or hospitalization: No    Medication/supplement changes: omeprazole stopped on one week ago which may be decreasing INR today. Patient is trying to quit smoking. She did not smoke for 5 days but then resumed    Signs or symptoms of bleeding or clotting: No    Previous INR: Supratherapeutic resulting in a dose decrease    Additional findings: Upcoming surgery/procedure 9-7-22 colonoscopy. TE sent to [facility]       PLAN     Recommended plan for temporary change(s) and ongoing change(s) affecting INR     Dosing Instructions: Continue your current warfarin dose with next INR in 2 weeks       Summary  As of 8/9/2022    Full warfarin instructions:  5 mg every Tue, Thu, Sat; 7.5 mg all other days   Next INR check:  8/23/2022             Telephone call with Bebe who verbalizes understanding and agrees to plan    Lab visit scheduled    Education provided: Please call back if any changes to your diet, medications or how you've been taking warfarin, Importance of notifying clinic for changes in medications; a sooner lab recheck maybe needed. and Contact [phone]  with any changes, questions or concerns.     Plan made per [facility] anticoagulation protocol    Natalie Sorto RN  Anticoagulation Clinic  8/9/2022    _______________________________________________________________________     Anticoagulation Episode Summary     Current INR goal:  2.0-3.0   TTR:  62.9 % (1 y)   Target end date:  Indefinite   Send INR reminders to:  [facility]    Indications    History of CVA (cerebrovascular accident) (Resolved) [Z86.73]  Long term current use of  anticoagulant therapy [Z79.01]  Cerebrovascular accident (CVA) due to occlusion of precerebral artery (H) [I63.20]  History of CVA (cerebrovascular accident) [Z86.73]           Comments:           Anticoagulation Care Providers     Provider Role Specialty Phone number    Diana Ely APRN [facility] Referring Family Medicine [phone]

## 2022-08-21 ENCOUNTER — HEALTH MAINTENANCE LETTER (OUTPATIENT)
Age: 77
End: 2022-08-21

## 2022-08-23 ENCOUNTER — ANTICOAGULATION THERAPY VISIT (OUTPATIENT)
Dept: ANTICOAGULATION | Facility: CLINIC | Age: 77
End: 2022-08-23

## 2022-08-23 ENCOUNTER — LAB (OUTPATIENT)
Dept: LAB | Facility: CLINIC | Age: 77
End: 2022-08-23
Payer: COMMERCIAL

## 2022-08-23 ENCOUNTER — ANCILLARY PROCEDURE (OUTPATIENT)
Dept: MAMMOGRAPHY | Facility: CLINIC | Age: 77
End: 2022-08-23
Attending: NURSE PRACTITIONER
Payer: COMMERCIAL

## 2022-08-23 DIAGNOSIS — Z12.31 VISIT FOR SCREENING MAMMOGRAM: ICD-10-CM

## 2022-08-23 DIAGNOSIS — Z79.01 LONG TERM CURRENT USE OF ANTICOAGULANT THERAPY: Primary | ICD-10-CM

## 2022-08-23 DIAGNOSIS — I63.20 CEREBROVASCULAR ACCIDENT (CVA) DUE TO OCCLUSION OF PRECEREBRAL ARTERY (H): ICD-10-CM

## 2022-08-23 DIAGNOSIS — Z86.73 HISTORY OF CVA (CEREBROVASCULAR ACCIDENT): ICD-10-CM

## 2022-08-23 DIAGNOSIS — Z79.01 LONG TERM CURRENT USE OF ANTICOAGULANT THERAPY: ICD-10-CM

## 2022-08-23 LAB — INR BLD: 3.5 (ref 0.9–1.1)

## 2022-08-23 PROCEDURE — 77067 SCR MAMMO BI INCL CAD: CPT | Mod: TC | Performed by: RADIOLOGY

## 2022-08-23 PROCEDURE — 77063 BREAST TOMOSYNTHESIS BI: CPT | Mod: TC | Performed by: RADIOLOGY

## 2022-08-23 PROCEDURE — 85610 PROTHROMBIN TIME: CPT

## 2022-08-23 PROCEDURE — 36416 COLLJ CAPILLARY BLOOD SPEC: CPT

## 2022-08-23 NOTE — PROGRESS NOTES
ANTICOAGULATION MANAGEMENT     Bebe Alfonso 76 year old female is on warfarin with supratherapeutic INR result. (Goal INR 2.0-3.0)    Recent labs: (last 7 days)     08/23/22  0808   INR 3.5*       ASSESSMENT       Source(s): Chart Review and Patient/Caregiver Call       Warfarin doses taken: Warfarin taken as instructed    Diet: No new diet changes identified    New illness, injury, or hospitalization: No    Medication/supplement changes: None noted    Signs or symptoms of bleeding or clotting: No    Previous INR: Therapeutic last visit; previously outside of goal range    Additional findings: Upcoming surgery/procedure 9/7 - colonoscopy      PLAN     Recommended plan for no diet, medication or health factor changes affecting INR     Dosing Instructions: hold dose then decrease your warfarin dose (5.6% change) with next INR in 1 week       Briefly reviewed plan for colonoscopy hold but patient would like to discuss that further next week.    Summary  As of 8/23/2022    Full warfarin instructions:  8/23: Hold; 9/2: Hold; 9/3: Hold; 9/4: Hold; 9/5: Hold; 9/6: Hold; 9/7: 10 mg; 9/8: 7.5 mg; Otherwise 7.5 mg every Mon, Wed, Fri; 5 mg all other days   Next INR check:  8/31/2022             Telephone call with Bebe who verbalizes understanding and agrees to plan    Lab visit scheduled    Education provided: Please call back if any changes to your diet, medications or how you've been taking warfarin and Monitoring for bleeding signs and symptoms    Plan made per [facility] anticoagulation protocol    Deneen Pretty RN  Anticoagulation Clinic  8/23/2022    _______________________________________________________________________     Anticoagulation Episode Summary     Current INR goal:  2.0-3.0   TTR:  65.2 % (1 y)   Target end date:  Indefinite   Send INR reminders to:  [facility]    Indications    History of CVA (cerebrovascular accident) (Resolved) [Z86.73]  Long term current use of anticoagulant therapy  [Z79.01]  Cerebrovascular accident (CVA) due to occlusion of precerebral artery (H) [I63.20]  History of CVA (cerebrovascular accident) [Z86.73]           Comments:           Anticoagulation Care Providers     Provider Role Specialty Phone number    Diana Ely APRN [facility] Referring Family Medicine [phone]

## 2022-08-24 ENCOUNTER — TELEPHONE (OUTPATIENT)
Dept: FAMILY MEDICINE | Facility: CLINIC | Age: 77
End: 2022-08-24

## 2022-08-24 NOTE — TELEPHONE ENCOUNTER
Reason for Call:  INR    Who is calling?  PATIENT    Phone number: [phone]     Fax number:  N/A    Name of caller: SOHAM ALMENDAREZ    INR Value:  UNKNOWN    Are there any other concerns:  Yes: PLEASE CALL PATIENT    Can we leave a detailed message on this number? YES    Phone number patient can be reached at: Home number on file [phone] (home)      Call taken on 8/24/2022 at 11:54 AM by Michelle Friedman

## 2022-08-24 NOTE — PROGRESS NOTES
Reviewed dosing with patient and read each day of dosing.  Patient verbalizes understanding and agrees to plan. No further questions or concerns.  Deneen Pretty RN on 8/24/2022 at 12:36 PM

## 2022-08-31 ENCOUNTER — ANTICOAGULATION THERAPY VISIT (OUTPATIENT)
Dept: ANTICOAGULATION | Facility: CLINIC | Age: 77
End: 2022-08-31

## 2022-08-31 ENCOUNTER — LAB (OUTPATIENT)
Dept: LAB | Facility: CLINIC | Age: 77
End: 2022-08-31
Payer: COMMERCIAL

## 2022-08-31 DIAGNOSIS — Z79.01 LONG TERM CURRENT USE OF ANTICOAGULANT THERAPY: ICD-10-CM

## 2022-08-31 DIAGNOSIS — Z79.01 LONG TERM CURRENT USE OF ANTICOAGULANT THERAPY: Primary | ICD-10-CM

## 2022-08-31 DIAGNOSIS — I63.20 CEREBROVASCULAR ACCIDENT (CVA) DUE TO OCCLUSION OF PRECEREBRAL ARTERY (H): ICD-10-CM

## 2022-08-31 DIAGNOSIS — Z86.73 HISTORY OF CVA (CEREBROVASCULAR ACCIDENT): ICD-10-CM

## 2022-08-31 LAB — INR BLD: 3.1 (ref 0.9–1.1)

## 2022-08-31 PROCEDURE — 85610 PROTHROMBIN TIME: CPT

## 2022-08-31 PROCEDURE — 36416 COLLJ CAPILLARY BLOOD SPEC: CPT

## 2022-08-31 NOTE — PROGRESS NOTES
ANTICOAGULATION MANAGEMENT     Bebe Alfonso 76 year old female is on warfarin with supratherapeutic INR result. (Goal INR 2.0-3.0)    Recent labs: (last 7 days)     08/31/22  0657   INR 3.1*       ASSESSMENT       Source(s): Chart Review and Patient/Caregiver Call       Warfarin doses taken: Warfarin taken as instructed    Diet: No new diet changes identified    New illness, injury, or hospitalization: No    Medication/supplement changes: stopped omperzole about 2 weeks ago. restarted today.    Signs or symptoms of bleeding or clotting: No    Previous INR: Supratherapeutic    Additional findings: Upcoming surgery/procedure 9/7 colonoscopy no bridge needed. Holding for 5 days       PLAN     Recommended plan for no diet, medication or health factor changes affecting INR     Dosing Instructions: partial hold then continue your current warfarin dose with next INR in 2 weeks   Has hold coming up for colonoscopy. And boosters following.    Summary  As of 8/31/2022    Full warfarin instructions:  9/2: Hold; 9/3: Hold; 9/4: Hold; 9/5: Hold; 9/6: Hold; 9/7: 10 mg; 9/8: 7.5 mg; Otherwise 7.5 mg every Mon, Wed, Fri; 5 mg all other days   Next INR check:               Telephone call with Bebe who verbalizes understanding and agrees to plan    Lab visit scheduled    Education provided: Please call back if any changes to your diet, medications or how you've been taking warfarin and Monitoring for bleeding signs and symptoms    Plan made per [facility] anticoagulation protocol    Mary Kearns RN  Anticoagulation Clinic  8/31/2022    _______________________________________________________________________     Anticoagulation Episode Summary     Current INR goal:  2.0-3.0   TTR:  65.3 % (1 y)   Target end date:  Indefinite   Send INR reminders to:  [facility]    Indications    History of CVA (cerebrovascular accident) (Resolved) [Z86.73]  Long term current use of anticoagulant therapy [Z79.01]  Cerebrovascular  accident (CVA) due to occlusion of precerebral artery (H) [I63.20]  History of CVA (cerebrovascular accident) [Z86.73]           Comments:           Anticoagulation Care Providers     Provider Role Specialty Phone number    Diana Ely APRN [facility] Referring Family Medicine [phone]

## 2022-09-06 ENCOUNTER — ANESTHESIA EVENT (OUTPATIENT)
Dept: GASTROENTEROLOGY | Facility: CLINIC | Age: 77
End: 2022-09-06
Payer: COMMERCIAL

## 2022-09-06 ASSESSMENT — LIFESTYLE VARIABLES: TOBACCO_USE: 1

## 2022-09-06 ASSESSMENT — COPD QUESTIONNAIRES: COPD: 1

## 2022-09-06 NOTE — ANESTHESIA PREPROCEDURE EVALUATION
Anesthesia Pre-Procedure Evaluation    Patient: Bebe Alfonso   MRN: [id number] : 1945        Procedure : Procedure(s):  COLONOSCOPY          Past Medical History:   Diagnosis Date     Anticoagulation monitoring, INR range 2-3 10/22/2015     Benign essential hypertension 9/15/2016     Carotid bruit 2012    Overview:  12/6/10 Carotid US  Elevated velocities throughout the carotid arteries bilaterally. There is a focal area of increased velocity in the mid left internal carotid artery with a plaque in this region on the color flow images. This corresponds to 50 - 70 % diameter reduction. There is an area of elevated velocity in the left external carotid artery consistent with stenosis. There is no focal area of significant stenosis in the right carotid arteries in the neck. Plaque in the carotid arteries bilaterally.      Chronic obstructive pulmonary disease (H) 2016    The FVC, FEV1 and FEV1/FVC ratio are reduced.  The inspiratory flow rates are within normal limits.  The FVC is reduced relative to the SVC indicating air trapping.  While the TLC is within normal limits, the FRC, RV and RV/TLC ratio are increased.  The  diffusing capacity is mildly reduced. IMPRESSION: Mild-moderate Airflow Obstruction with air trapping ____________________________________________Clarke Iverson  2018     Esophageal reflux 2008    Overview:  Omeprazole PRN, occasional symptoms such as chest burning and knife twisted to stomach.      Heart disease born with it     History of blood transfusion 70 years ago     History of CVA (cerebrovascular accident) 10/20/2015     Long term current use of anticoagulant therapy 3/20/2018     Major depressive disorder, recurrent episode, moderate (H) 10/29/2008    Overview:  She is not taking any medication at this time.     Osteoarthrosis, hip 10/19/2010    Overview:  Pt scheduled for right  total hip arthroplasty on 13 with Dr. Addison HEREDIA hip xray (2012) Severe  degenerative changes seen of the right hip with near bone-on-bone appearance of the joint and several subchondral cysts forming.     Thyroid nodule 5/22/2013    Overview:  Thyroid US (2012) Stable nodule left lobe of thyroid TSH- (2/19/12) normal (2.26)      Past Surgical History:   Procedure Laterality Date     BIOPSY  appox in 1980's     BREAST SURGERY  last time in the 1990's    non cancer     ESOPHAGOSCOPY, GASTROSCOPY, DUODENOSCOPY (EGD), COMBINED N/A 5/25/2022    Procedure: ESOPHAGOGASTRODUODENOSCOPY, WITH BIOPSY;  Surgeon: Alfredito Gomez DO;  Location: [facility]      Allergies   Allergen Reactions     Losartan Swelling, Nausea, Shortness Of Breath and Palpitations     Gabapentin GI Disturbance and Unknown     Double vision  flatulence     Lisinopril Cough     Oxycodone Other (See Comments)     But has tolerated hydrocodone     Tramadol Other (See Comments)     Augmentin [Amoxicillin-Pot Clavulanate] Rash     Bacitracin Rash     blisters     Bupropion Rash     Allergic to brand not generic      Social History     Tobacco Use     Smoking status: Current Every Day Smoker     Packs/day: 1.00     Years: 50.00     Pack years: 50.00     Types: Cigarettes     Start date: 1/1/2016     Smokeless tobacco: Never Used   Substance Use Topics     Alcohol use: No      Wt Readings from Last 1 Encounters:   06/14/22 61.2 kg (135 lb)        Anesthesia Evaluation            ROS/MED HX  ENT/Pulmonary:     (+) tobacco use, Current use, COPD,     Neurologic:     (+) CVA, TIA,     Cardiovascular:     (+) Dyslipidemia hypertension-----Taking blood thinners     METS/Exercise Tolerance:     Hematologic: Comments: anticoagulated - neg hematologic  ROS     Musculoskeletal:  - neg musculoskeletal ROS     GI/Hepatic:     (+) GERD,     Renal/Genitourinary:     (+) renal disease,     Endo:     (+) thyroid problem,     Psychiatric/Substance Use:  - neg psychiatric ROS     Infectious Disease:  - neg infectious disease ROS     Malignancy:   - neg malignancy ROS     Other:  - neg other ROS          Physical Exam    Airway  airway exam normal      Mallampati: II   TM distance: > 3 FB   Neck ROM: full   Mouth opening: > 3 cm    Respiratory Devices and Support         Dental  no notable dental history         Cardiovascular   cardiovascular exam normal          Pulmonary   pulmonary exam normal                OUTSIDE LABS:  CBC:   Lab Results   Component Value Date    WBC 8.8 05/03/2022    WBC 10.1 12/12/2019    HGB 12.9 05/03/2022    HGB 12.4 12/12/2019    HCT 38.3 05/03/2022    HCT 37.3 12/12/2019     05/03/2022     12/12/2019     BMP:   Lab Results   Component Value Date     05/03/2022     12/16/2021    POTASSIUM 4.9 05/03/2022    POTASSIUM 4.4 12/16/2021    CHLORIDE 103 05/03/2022    CHLORIDE 108 12/16/2021    CO2 27 05/03/2022    CO2 26 12/16/2021    BUN 23 05/03/2022    BUN 29 12/16/2021    CR 1.8 (H) 05/17/2022    CR 1.48 (H) 05/03/2022    GLC 89 05/03/2022    GLC 93 12/16/2021     COAGS:   Lab Results   Component Value Date    INR 3.1 (H) 08/31/2022     POC: No results found for: BGM, HCG, HCGS  HEPATIC:   Lab Results   Component Value Date    ALBUMIN 3.7 05/03/2022    PROTTOTAL 7.3 05/03/2022    ALT 25 05/03/2022    AST 17 05/03/2022    ALKPHOS 52 05/03/2022    BILITOTAL 0.4 05/03/2022     OTHER:   Lab Results   Component Value Date    A1C 6.1 (H) 03/22/2019    LEXUS 9.3 05/03/2022    PHOS 3.6 12/16/2021    MAG 2.4 (H) 12/16/2021    LIPASE 83 05/03/2022    AMYLASE 71 05/03/2022    TSH 1.86 12/16/2021       Anesthesia Plan    ASA Status:  3   NPO Status:  NPO Appropriate    Anesthesia Type: General.   Induction: Intravenous.   Maintenance: TIVA.        Consents    Anesthesia Plan(s) and associated risks, benefits, and realistic alternatives discussed. Questions answered and patient/representative(s) expressed understanding.     - Discussed: Risks, Benefits and Alternatives for BOTH SEDATION and the PROCEDURE were  discussed     - Discussed with:  Patient         Postoperative Care            Comments:                SELWYN Allen CRNA

## 2022-09-07 ENCOUNTER — HOSPITAL ENCOUNTER (OUTPATIENT)
Facility: CLINIC | Age: 77
Discharge: HOME | End: 2022-09-07
Attending: SURGERY | Admitting: SURGERY
Payer: COMMERCIAL

## 2022-09-07 ENCOUNTER — ANESTHESIA (OUTPATIENT)
Dept: GASTROENTEROLOGY | Facility: CLINIC | Age: 77
End: 2022-09-07
Payer: COMMERCIAL

## 2022-09-07 ENCOUNTER — DOCUMENTATION ONLY (OUTPATIENT)
Dept: ANTICOAGULATION | Facility: CLINIC | Age: 77
End: 2022-09-07

## 2022-09-07 VITALS
TEMPERATURE: 98.8 F | WEIGHT: 135 LBS | SYSTOLIC BLOOD PRESSURE: 182 MMHG | DIASTOLIC BLOOD PRESSURE: 82 MMHG | HEART RATE: 60 BPM | RESPIRATION RATE: 18 BRPM | BODY MASS INDEX: 21.69 KG/M2 | OXYGEN SATURATION: 99 % | HEIGHT: 66 IN

## 2022-09-07 LAB — COLONOSCOPY: NORMAL

## 2022-09-07 PROCEDURE — 250N000009 HC RX 250: Performed by: NURSE ANESTHETIST, CERTIFIED REGISTERED

## 2022-09-07 PROCEDURE — 370N000017 HC ANESTHESIA TECHNICAL FEE, PER MIN: Performed by: SURGERY

## 2022-09-07 PROCEDURE — 45380 COLONOSCOPY AND BIOPSY: CPT | Performed by: SURGERY

## 2022-09-07 PROCEDURE — 45385 COLONOSCOPY W/LESION REMOVAL: CPT | Mod: PT

## 2022-09-07 PROCEDURE — 258N000003 HC RX IP 258 OP 636: Performed by: NURSE ANESTHETIST, CERTIFIED REGISTERED

## 2022-09-07 PROCEDURE — 45385 COLONOSCOPY W/LESION REMOVAL: CPT | Mod: PT | Performed by: SURGERY

## 2022-09-07 PROCEDURE — 250N000011 HC RX IP 250 OP 636: Performed by: NURSE ANESTHETIST, CERTIFIED REGISTERED

## 2022-09-07 PROCEDURE — 45380 COLONOSCOPY AND BIOPSY: CPT | Mod: 59 | Performed by: SURGERY

## 2022-09-07 PROCEDURE — 88305 TISSUE EXAM BY PATHOLOGIST: CPT | Mod: 26 | Performed by: PATHOLOGY

## 2022-09-07 PROCEDURE — 88305 TISSUE EXAM BY PATHOLOGIST: CPT | Mod: TC | Performed by: SURGERY

## 2022-09-07 RX ORDER — SODIUM CHLORIDE, SODIUM LACTATE, POTASSIUM CHLORIDE, CALCIUM CHLORIDE 600; 310; 30; 20 MG/100ML; MG/100ML; MG/100ML; MG/100ML
INJECTION, SOLUTION INTRAVENOUS CONTINUOUS
Status: DISCONTINUED | OUTPATIENT
Start: 2022-09-07 | End: 2022-09-07 | Stop reason: HOSPADM

## 2022-09-07 RX ORDER — LIDOCAINE 40 MG/G
CREAM TOPICAL
Status: DISCONTINUED | OUTPATIENT
Start: 2022-09-07 | End: 2022-09-07 | Stop reason: HOSPADM

## 2022-09-07 RX ORDER — NALOXONE HYDROCHLORIDE 0.4 MG/ML
0.2 INJECTION, SOLUTION INTRAMUSCULAR; INTRAVENOUS; SUBCUTANEOUS
Status: CANCELLED | OUTPATIENT
Start: 2022-09-07

## 2022-09-07 RX ORDER — LIDOCAINE HYDROCHLORIDE 10 MG/ML
INJECTION, SOLUTION EPIDURAL; INFILTRATION; INTRACAUDAL; PERINEURAL PRN
Status: DISCONTINUED | OUTPATIENT
Start: 2022-09-07 | End: 2022-09-07

## 2022-09-07 RX ORDER — NALOXONE HYDROCHLORIDE 0.4 MG/ML
0.4 INJECTION, SOLUTION INTRAMUSCULAR; INTRAVENOUS; SUBCUTANEOUS
Status: CANCELLED | OUTPATIENT
Start: 2022-09-07

## 2022-09-07 RX ORDER — ONDANSETRON 4 MG/1
4 TABLET, ORALLY DISINTEGRATING ORAL EVERY 30 MIN PRN
Status: DISCONTINUED | OUTPATIENT
Start: 2022-09-07 | End: 2022-09-07 | Stop reason: HOSPADM

## 2022-09-07 RX ORDER — PROPOFOL 10 MG/ML
INJECTION, EMULSION INTRAVENOUS CONTINUOUS PRN
Status: DISCONTINUED | OUTPATIENT
Start: 2022-09-07 | End: 2022-09-07

## 2022-09-07 RX ORDER — FLUMAZENIL 0.1 MG/ML
0.2 INJECTION, SOLUTION INTRAVENOUS
Status: CANCELLED | OUTPATIENT
Start: 2022-09-07 | End: 2022-09-07

## 2022-09-07 RX ORDER — ONDANSETRON 2 MG/ML
4 INJECTION INTRAMUSCULAR; INTRAVENOUS EVERY 30 MIN PRN
Status: DISCONTINUED | OUTPATIENT
Start: 2022-09-07 | End: 2022-09-07 | Stop reason: HOSPADM

## 2022-09-07 RX ADMIN — PROPOFOL 200 MCG/KG/MIN: 10 INJECTION, EMULSION INTRAVENOUS at 09:56

## 2022-09-07 RX ADMIN — LIDOCAINE HYDROCHLORIDE 0.1 ML: 10 INJECTION, SOLUTION EPIDURAL; INFILTRATION; INTRACAUDAL; PERINEURAL at 09:40

## 2022-09-07 RX ADMIN — SODIUM CHLORIDE, POTASSIUM CHLORIDE, SODIUM LACTATE AND CALCIUM CHLORIDE: 600; 310; 30; 20 INJECTION, SOLUTION INTRAVENOUS at 09:39

## 2022-09-07 RX ADMIN — LIDOCAINE HYDROCHLORIDE 50 MG: 10 INJECTION, SOLUTION EPIDURAL; INFILTRATION; INTRACAUDAL; PERINEURAL at 09:56

## 2022-09-07 ASSESSMENT — ACTIVITIES OF DAILY LIVING (ADL): ADLS_ACUITY_SCORE: 35

## 2022-09-07 NOTE — LETTER
Bebe Alfonso  [address] [place] [address]      September 13, 2022    Dear Bebe,  This letter is written to inform you of the results of your recent colonoscopy.  Your examination showed polyp(s) in your sigmoid colon and rectum. All polyps were removed in their entirety and sent for review by a pathologist. As you will see on the pathology report below, the tissue(s) were tubular adenomatous polyps and hyperplastic polyps. Your examination was otherwise without abnormality.    Final Diagnosis   A(1).  Colon, Descending, polyp, polypectomy:  - Colonic mucosa with no specific histopathologic abnormalities.  (See comment)  - Negative for dysplasia or malignancy.        B(2).   Colon, Sigmoid, polyp(s), polypectomy:  -Tubular adenoma  -Negative for high-grade dysplasia and malignancy.     -Hyperplastic polyps  -Negative for dysplasia or malignancy.        C(3).  Colon, Rectum, polyp(s), polypectomy:  -Hyperplastic polyps  -Negative for dysplasia or malignancy.       Adenomatous polyps are entirely benign (non-cancerous); however, patients who have developed these polyps are at an increased risk for developing additional polyps in the future. If these are not eventually removed, there is a risk of developing colon cancer. We will advise more frequent examinations with you because of the risk associated with this type of polyp.  Hyperplastic polyps are entirely benign (non-cancerous) and rarely associated with the development of additional polyps or colorectal cancer.    Given these findings, your personal history of polyps, I recommend that you undergo a repeat colonoscopy in 5 year(s) for surveillance. We will enter you into a recall system so you receive a reminder closer to the time that you are due for repeat examination.     Please remember that this recommendation is made with the understanding that you are not experiencing persistent changes in bowel function, bleeding per rectum, and/or  significant abdominal pain. If you experience these symptoms, please contact your primary care provider for a further evaluation.     If you have any questions or concerns about the results of your colonoscopy or the appropriate follow-up, please contact my assistant at [phone]    Sincerely,      Alfredito Gomez,    [facility]  ___

## 2022-09-07 NOTE — ANESTHESIA POSTPROCEDURE EVALUATION
Patient: Bebe Alfonso    Procedure: Procedure(s):  COLONOSCOPY, WITH POLYPECTOMY AND BIOPSY       Anesthesia Type:  General    Note:  Disposition: Outpatient   Postop Pain Control: Uneventful            Sign Out: Well controlled pain   PONV: No   Neuro/Psych: Uneventful            Sign Out: Acceptable/Baseline neuro status   Airway/Respiratory: Uneventful            Sign Out: Acceptable/Baseline resp. status   CV/Hemodynamics: Uneventful            Sign Out: Acceptable CV status; No obvious hypovolemia; No obvious fluid overload   Other NRE: NONE   DID A NON-ROUTINE EVENT OCCUR? No           Last vitals:  Vitals Value Taken Time   /56 09/07/22 1030   Temp     Pulse 65 09/07/22 1030   Resp 18 09/07/22 1027   SpO2 92 % 09/07/22 1031   Vitals shown include unvalidated device data.    Electronically Signed By: SELWYN Contreras CRNA  September 7, 2022  10:32 AM

## 2022-09-07 NOTE — H&P
76 year old year old female here for colonoscopy for screening.  Last colonoscopy was 10 years ago.  Patient denies blood in stool or change in stool caliber.  There is no known family history of colon cancer or polyps.    Patient Active Problem List   Diagnosis     ACP (advance care planning)     Anticoagulation monitoring, INR range 2-3     Benign essential hypertension     Carotid bruit     Cerebrovascular disease     Chronic obstructive pulmonary disease (H)     Esophageal reflux     Osteoarthrosis, hip     Pain medication agreement     Thyroid nodule     Long term current use of anticoagulant therapy     Hyperlipidemia LDL goal <70     Cerebrovascular accident (CVA) due to occlusion of precerebral artery (H)     Chronic kidney disease, stage 3 (moderate)     Renal artery stenosis (H)     Tobacco dependence     History of cardiac monitoring     History of CVA (cerebrovascular accident)       Past Medical History:   Diagnosis Date     Anticoagulation monitoring, INR range 2-3 10/22/2015     Benign essential hypertension 9/15/2016     Carotid bruit 11/12/2012    Overview:  12/6/10 Carotid US  Elevated velocities throughout the carotid arteries bilaterally. There is a focal area of increased velocity in the mid left internal carotid artery with a plaque in this region on the color flow images. This corresponds to 50 - 70 % diameter reduction. There is an area of elevated velocity in the left external carotid artery consistent with stenosis. There is no focal area of significant stenosis in the right carotid arteries in the neck. Plaque in the carotid arteries bilaterally.      Chronic obstructive pulmonary disease (H) 1/19/2016    The FVC, FEV1 and FEV1/FVC ratio are reduced.  The inspiratory flow rates are within normal limits.  The FVC is reduced relative to the SVC indicating air trapping.  While the TLC is within normal limits, the FRC, RV and RV/TLC ratio are increased.  The  diffusing capacity is mildly  reduced. IMPRESSION: Mild-moderate Airflow Obstruction with air trapping ____________________________________________Clarke Iverson  April 2018     Esophageal reflux 4/8/2008    Overview:  Omeprazole PRN, occasional symptoms such as chest burning and knife twisted to stomach.      Heart disease born with it     History of blood transfusion 70 years ago     History of CVA (cerebrovascular accident) 10/20/2015     Long term current use of anticoagulant therapy 3/20/2018     Major depressive disorder, recurrent episode, moderate (H) 10/29/2008    Overview:  She is not taking any medication at this time.     Osteoarthrosis, hip 10/19/2010    Overview:  Pt scheduled for right  total hip arthroplasty on 6/14/13 with Dr. Addison HEREDIA hip xray (11/2012) Severe degenerative changes seen of the right hip with near bone-on-bone appearance of the joint and several subchondral cysts forming.     Thyroid nodule 5/22/2013    Overview:  Thyroid US (2012) Stable nodule left lobe of thyroid TSH- (2/19/12) normal (2.26)       Past Surgical History:   Procedure Laterality Date     BIOPSY  appox in 1980's     BREAST SURGERY  last time in the 1990's    non cancer     ESOPHAGOSCOPY, GASTROSCOPY, DUODENOSCOPY (EGD), COMBINED N/A 5/25/2022    Procedure: ESOPHAGOGASTRODUODENOSCOPY, WITH BIOPSY;  Surgeon: Alfredito Gomez DO;  Location: [facility]       Family History   Problem Relation Age of Onset     Diabetes Maternal Grandfather      Diabetes Sister         developed after cancer treatment     Breast Cancer Sister      Obesity Sister      Hypertension Mother      Hyperlipidemia Mother      Osteoporosis Mother      Breast Cancer Sister      Osteoporosis Sister      Breast Cancer Daughter      Other Cancer Sister         throught out body     Substance Abuse Sister         acol     Obesity Sister      Substance Abuse Sister         acol     Obesity Sister        No current outpatient medications on file.       Allergies   Allergen Reactions  "    Losartan Swelling, Nausea, Shortness Of Breath and Palpitations     Gabapentin GI Disturbance and Unknown     Double vision  flatulence     Lisinopril Cough     Oxycodone Other (See Comments)     But has tolerated hydrocodone     Tramadol Other (See Comments)     Augmentin [Amoxicillin-Pot Clavulanate] Rash     Bacitracin Rash     blisters     Bupropion Rash     Allergic to brand not generic       Pt reports that she has been smoking cigarettes. She started smoking about 6 years ago. She has a 50.00 pack-year smoking history. She has never used smokeless tobacco. She reports that she does not drink alcohol and does not use drugs.    Exam:  BP (!) 185/71 (BP Location: Left arm)   Pulse 74   Temp 98.8  F (37.1  C) (Oral)   Resp 16   Ht 1.676 m (5' 6\")   Wt 61.2 kg (135 lb)   SpO2 98%   BMI 21.79 kg/m      Awake, Alert OX3  Lungs - CTA bilaterally  CV - RRR, no murmurs, distal pulses intact  Abd - soft, non-distended, non-tender, +BS  Extr - No cyanosis or edema    A/P 76 year old year old female in need of colonoscopy for screening. Risks, benefits, alternatives, and complications were discussed including the possibility of perforation, bleeding, missed lesion and the patient agreed to proceed    Alfredito Gomez,  on 9/7/2022 at 9:43 AM    "

## 2022-09-07 NOTE — PROGRESS NOTES
ANTICOAGULATION  MANAGEMENT: Discharge Review    Bebe Alfonso chart reviewed for anticoagulation continuity of care    Outpatient surgery/procedure on 9/7 for a colonoscopy.    Discharge disposition: Home    Results:    No results for input(s): INR, [id number], F2, ALMWH, AAUFH in the last 168 hours.  Anticoagulation inpatient management:     not applicable     Anticoagulation discharge instructions:     Warfarin dosing: patient has been advised on restart after procedure is to take booster dose 9/7 10 MG 9/8 7.5 mg then resume home dosing    Bridging: No   INR goal change: No      Medication changes affecting anticoagulation: No    Additional factors affecting anticoagulation: No     PLAN     No adjustment to anticoagulation plan needed    Recommended follow up is scheduled  Patient not contacted    No adjustment to Anticoagulation Calendar was required    Eun Moore RN

## 2022-09-07 NOTE — ANESTHESIA CARE TRANSFER NOTE
Patient: Bebe Alfonso    Procedure: Procedure(s):  COLONOSCOPY, WITH POLYPECTOMY AND BIOPSY       Diagnosis: Screen for colon cancer [Z12.11]  Diagnosis Additional Information: No value filed.    Anesthesia Type:   General     Note:    Oropharynx: spontaneously breathing  Level of Consciousness: drowsy  Oxygen Supplementation: room air    Independent Airway: airway patency satisfactory and stable  Dentition: dentition unchanged  Vital Signs Stable: post-procedure vital signs reviewed and stable  Report to RN Given: handoff report given  Patient transferred to: Phase II    Handoff Report: Identifed the Patient, Identified the Reponsible Provider, Reviewed the pertinent medical history, Discussed the surgical course, Reviewed Intra-OP anesthesia mangement and issues during anesthesia, Set expectations for post-procedure period and Allowed opportunity for questions and acknowledgement of understanding      Vitals:  Vitals Value Taken Time   /56 09/07/22 1030   Temp     Pulse 65 09/07/22 1030   Resp 18 09/07/22 1027   SpO2 92 % 09/07/22 1031   Vitals shown include unvalidated device data.    Electronically Signed By: SELWYN Contreras CRNA  September 7, 2022  10:32 AM

## 2022-09-08 LAB
PATH REPORT.COMMENTS IMP SPEC: NORMAL
PATH REPORT.FINAL DX SPEC: NORMAL
PATH REPORT.GROSS SPEC: NORMAL
PATH REPORT.MICROSCOPIC SPEC OTHER STN: NORMAL
PATH REPORT.RELEVANT HX SPEC: NORMAL
PHOTO IMAGE: NORMAL

## 2022-09-13 ENCOUNTER — LAB (OUTPATIENT)
Dept: LAB | Facility: CLINIC | Age: 77
End: 2022-09-13
Payer: COMMERCIAL

## 2022-09-13 ENCOUNTER — ANTICOAGULATION THERAPY VISIT (OUTPATIENT)
Dept: ANTICOAGULATION | Facility: CLINIC | Age: 77
End: 2022-09-13

## 2022-09-13 DIAGNOSIS — Z79.01 LONG TERM CURRENT USE OF ANTICOAGULANT THERAPY: Primary | ICD-10-CM

## 2022-09-13 DIAGNOSIS — I63.20 CEREBROVASCULAR ACCIDENT (CVA) DUE TO OCCLUSION OF PRECEREBRAL ARTERY (H): ICD-10-CM

## 2022-09-13 DIAGNOSIS — Z86.73 HISTORY OF CVA (CEREBROVASCULAR ACCIDENT): ICD-10-CM

## 2022-09-13 DIAGNOSIS — Z79.01 LONG TERM CURRENT USE OF ANTICOAGULANT THERAPY: ICD-10-CM

## 2022-09-13 LAB — INR BLD: 1.7 (ref 0.9–1.1)

## 2022-09-13 PROCEDURE — 36416 COLLJ CAPILLARY BLOOD SPEC: CPT

## 2022-09-13 PROCEDURE — 85610 PROTHROMBIN TIME: CPT

## 2022-09-13 NOTE — PROGRESS NOTES
ANTICOAGULATION MANAGEMENT     Bebe Alfonso 76 year old female is on warfarin with subtherapeutic INR result. (Goal INR 2.0-3.0)    Recent labs: (last 7 days)     09/13/22  0903   INR 1.7*       ASSESSMENT       Source(s): Chart Review and Patient/Caregiver Call       Warfarin doses taken: Held for Colonoscopy  recently which may be affecting INR    Diet: No new diet changes identified    New illness, injury, or hospitalization: No    Medication/supplement changes: None noted    Signs or symptoms of bleeding or clotting: No    Previous INR: Supratherapeutic    Additional findings: None       PLAN     Recommended plan for temporary change(s) affecting INR     Dosing Instructions: booster dose then continue your current warfarin dose with next INR in 2 weeks       Summary  As of 9/13/2022    Full warfarin instructions:  9/13: 7.5 mg; Otherwise 7.5 mg every Mon, Wed, Fri; 5 mg all other days   Next INR check:  9/28/2022             Telephone call with Bebe who verbalizes understanding and agrees to plan    Check at provider office visit    Education provided: Please call back if any changes to your diet, medications or how you've been taking warfarin, Monitoring for bleeding signs and symptoms, Monitoring for clotting signs and symptoms, When to seek medical attention/emergency care and Contact [phone]  with any changes, questions or concerns.     Plan made per [facility] anticoagulation protocol    Justin JOHNSON RN  Anticoagulation Clinic  9/13/2022    _______________________________________________________________________     Anticoagulation Episode Summary     Current INR goal:  2.0-3.0   TTR:  66.6 % (1 y)   Target end date:  Indefinite   Send INR reminders to:  [facility]    Indications    History of CVA (cerebrovascular accident) (Resolved) [Z86.73]  Long term current use of anticoagulant therapy [Z79.01]  Cerebrovascular accident (CVA) due to occlusion of precerebral artery (H) [I63.20]  History of  CVA (cerebrovascular accident) [Z86.73]           Comments:           Anticoagulation Care Providers     Provider Role Specialty Phone number    Diana Ely APRN CNP Referring Family Medicine [phone]

## 2022-09-16 ENCOUNTER — TRANSCRIBE ORDERS (OUTPATIENT)
Dept: OTHER | Age: 77
End: 2022-09-16

## 2022-09-16 DIAGNOSIS — I69.30 H/O: STROKE WITH RESIDUAL EFFECTS: Primary | ICD-10-CM

## 2022-09-26 DIAGNOSIS — E78.5 HYPERLIPIDEMIA LDL GOAL <70: ICD-10-CM

## 2022-09-26 DIAGNOSIS — R10.13 ABDOMINAL PAIN, EPIGASTRIC: ICD-10-CM

## 2022-09-27 RX ORDER — ATORVASTATIN CALCIUM 40 MG/1
TABLET, FILM COATED ORAL
Qty: 90 TABLET | Refills: 2 | Status: SHIPPED | OUTPATIENT
Start: 2022-09-27 | End: 2023-06-23

## 2022-09-27 RX ORDER — OMEPRAZOLE 40 MG/1
CAPSULE, DELAYED RELEASE ORAL
Qty: 90 CAPSULE | Refills: 2 | Status: SHIPPED | OUTPATIENT
Start: 2022-09-27 | End: 2023-10-26

## 2022-09-28 ENCOUNTER — OFFICE VISIT (OUTPATIENT)
Dept: FAMILY MEDICINE | Facility: CLINIC | Age: 77
End: 2022-09-28
Payer: COMMERCIAL

## 2022-09-28 ENCOUNTER — ANTICOAGULATION THERAPY VISIT (OUTPATIENT)
Dept: ANTICOAGULATION | Facility: CLINIC | Age: 77
End: 2022-09-28

## 2022-09-28 VITALS
HEIGHT: 66 IN | BODY MASS INDEX: 20.41 KG/M2 | WEIGHT: 127 LBS | RESPIRATION RATE: 14 BRPM | TEMPERATURE: 97.3 F | HEART RATE: 74 BPM | SYSTOLIC BLOOD PRESSURE: 135 MMHG | DIASTOLIC BLOOD PRESSURE: 62 MMHG

## 2022-09-28 DIAGNOSIS — I10 BENIGN ESSENTIAL HYPERTENSION: ICD-10-CM

## 2022-09-28 DIAGNOSIS — I63.20 CEREBROVASCULAR ACCIDENT (CVA) DUE TO OCCLUSION OF PRECEREBRAL ARTERY (H): ICD-10-CM

## 2022-09-28 DIAGNOSIS — R63.4 WEIGHT LOSS: ICD-10-CM

## 2022-09-28 DIAGNOSIS — Z79.01 LONG TERM CURRENT USE OF ANTICOAGULANT THERAPY: ICD-10-CM

## 2022-09-28 DIAGNOSIS — N18.32 STAGE 3B CHRONIC KIDNEY DISEASE (H): ICD-10-CM

## 2022-09-28 DIAGNOSIS — J44.1 COPD EXACERBATION (H): ICD-10-CM

## 2022-09-28 DIAGNOSIS — I69.30 H/O: STROKE WITH RESIDUAL EFFECTS: ICD-10-CM

## 2022-09-28 DIAGNOSIS — Z86.73 HISTORY OF CVA (CEREBROVASCULAR ACCIDENT): ICD-10-CM

## 2022-09-28 DIAGNOSIS — E55.9 VITAMIN D DEFICIENCY: ICD-10-CM

## 2022-09-28 DIAGNOSIS — I10 ESSENTIAL HYPERTENSION: ICD-10-CM

## 2022-09-28 DIAGNOSIS — Z79.01 LONG TERM CURRENT USE OF ANTICOAGULANT THERAPY: Primary | ICD-10-CM

## 2022-09-28 DIAGNOSIS — Z00.00 ENCOUNTER FOR MEDICARE ANNUAL WELLNESS EXAM: Primary | ICD-10-CM

## 2022-09-28 DIAGNOSIS — J31.0 CHRONIC RHINITIS: ICD-10-CM

## 2022-09-28 LAB
ALBUMIN SERPL BCG-MCNC: 4.3 G/DL (ref 3.5–5.2)
ANION GAP SERPL CALCULATED.3IONS-SCNC: 11 MMOL/L (ref 7–15)
BUN SERPL-MCNC: 30.4 MG/DL (ref 8–23)
CALCIUM SERPL-MCNC: 9.2 MG/DL (ref 8.8–10.2)
CHLORIDE SERPL-SCNC: 99 MMOL/L (ref 98–107)
CREAT SERPL-MCNC: 1.57 MG/DL (ref 0.51–0.95)
DEPRECATED HCO3 PLAS-SCNC: 23 MMOL/L (ref 22–29)
ERYTHROCYTE [DISTWIDTH] IN BLOOD BY AUTOMATED COUNT: 12.8 % (ref 10–15)
GFR SERPL CREATININE-BSD FRML MDRD: 34 ML/MIN/1.73M2
GLUCOSE SERPL-MCNC: 91 MG/DL (ref 70–99)
HCT VFR BLD AUTO: 36.1 % (ref 35–47)
HGB BLD-MCNC: 12 G/DL (ref 11.7–15.7)
INR PPP: 2.47 (ref 0.85–1.15)
MCH RBC QN AUTO: 30.8 PG (ref 26.5–33)
MCHC RBC AUTO-ENTMCNC: 33.2 G/DL (ref 31.5–36.5)
MCV RBC AUTO: 93 FL (ref 78–100)
PHOSPHATE SERPL-MCNC: 3.8 MG/DL (ref 2.5–4.5)
PLATELET # BLD AUTO: 244 10E3/UL (ref 150–450)
POTASSIUM SERPL-SCNC: 4.7 MMOL/L (ref 3.4–5.3)
PTH-INTACT SERPL-MCNC: 62 PG/ML (ref 15–65)
RBC # BLD AUTO: 3.9 10E6/UL (ref 3.8–5.2)
SODIUM SERPL-SCNC: 133 MMOL/L (ref 136–145)
WBC # BLD AUTO: 6.6 10E3/UL (ref 4–11)

## 2022-09-28 PROCEDURE — 36415 COLL VENOUS BLD VENIPUNCTURE: CPT | Performed by: NURSE PRACTITIONER

## 2022-09-28 PROCEDURE — 80069 RENAL FUNCTION PANEL: CPT | Performed by: NURSE PRACTITIONER

## 2022-09-28 PROCEDURE — 85610 PROTHROMBIN TIME: CPT | Performed by: NURSE PRACTITIONER

## 2022-09-28 PROCEDURE — 82306 VITAMIN D 25 HYDROXY: CPT | Performed by: NURSE PRACTITIONER

## 2022-09-28 PROCEDURE — 99214 OFFICE O/P EST MOD 30 MIN: CPT | Mod: 25 | Performed by: NURSE PRACTITIONER

## 2022-09-28 PROCEDURE — 83970 ASSAY OF PARATHORMONE: CPT | Performed by: NURSE PRACTITIONER

## 2022-09-28 PROCEDURE — G0008 ADMIN INFLUENZA VIRUS VAC: HCPCS | Performed by: NURSE PRACTITIONER

## 2022-09-28 PROCEDURE — 90662 IIV NO PRSV INCREASED AG IM: CPT | Performed by: NURSE PRACTITIONER

## 2022-09-28 PROCEDURE — G0439 PPPS, SUBSEQ VISIT: HCPCS | Performed by: NURSE PRACTITIONER

## 2022-09-28 PROCEDURE — 85027 COMPLETE CBC AUTOMATED: CPT | Performed by: NURSE PRACTITIONER

## 2022-09-28 PROCEDURE — 84134 ASSAY OF PREALBUMIN: CPT | Performed by: NURSE PRACTITIONER

## 2022-09-28 RX ORDER — ENALAPRIL MALEATE 10 MG/1
10 TABLET ORAL DAILY
Qty: 90 TABLET | Refills: 1 | Status: SHIPPED | OUTPATIENT
Start: 2022-09-28 | End: 2022-09-28 | Stop reason: DRUGHIGH

## 2022-09-28 RX ORDER — WARFARIN SODIUM 5 MG/1
TABLET ORAL
Qty: 114 TABLET | Refills: 0 | Status: SHIPPED | OUTPATIENT
Start: 2022-09-28 | End: 2023-01-19

## 2022-09-28 RX ORDER — AMLODIPINE BESYLATE 5 MG/1
5 TABLET ORAL DAILY
Qty: 90 TABLET | Refills: 1 | Status: SHIPPED | OUTPATIENT
Start: 2022-09-28 | End: 2023-04-25

## 2022-09-28 RX ORDER — FLUTICASONE PROPIONATE 50 MCG
1 SPRAY, SUSPENSION (ML) NASAL DAILY
Qty: 16 G | Refills: 1 | Status: SHIPPED | OUTPATIENT
Start: 2022-09-28

## 2022-09-28 RX ORDER — ENALAPRIL MALEATE 5 MG/1
5 TABLET ORAL DAILY
Qty: 90 TABLET | Refills: 0 | Status: CANCELLED | OUTPATIENT
Start: 2022-09-28

## 2022-09-28 RX ORDER — ENALAPRIL MALEATE 20 MG/1
20 TABLET ORAL DAILY
Qty: 90 TABLET | Refills: 1 | Status: SHIPPED | OUTPATIENT
Start: 2022-09-28 | End: 2023-05-09

## 2022-09-28 ASSESSMENT — ENCOUNTER SYMPTOMS
HEMATOCHEZIA: 0
HEADACHES: 0
EYE PAIN: 0
WEAKNESS: 1
HEARTBURN: 0
FEVER: 0
DIZZINESS: 1
BREAST MASS: 0
NAUSEA: 0
PALPITATIONS: 0
CHILLS: 1
DYSURIA: 0
NERVOUS/ANXIOUS: 1
CONSTIPATION: 0
FREQUENCY: 0
SORE THROAT: 0
COUGH: 0
HEMATURIA: 0
SHORTNESS OF BREATH: 0
ARTHRALGIAS: 0
JOINT SWELLING: 0
PARESTHESIAS: 0
MYALGIAS: 0
DIARRHEA: 0

## 2022-09-28 ASSESSMENT — PAIN SCALES - GENERAL: PAINLEVEL: NO PAIN (0)

## 2022-09-28 ASSESSMENT — ACTIVITIES OF DAILY LIVING (ADL): CURRENT_FUNCTION: HOUSEWORK REQUIRES ASSISTANCE

## 2022-09-28 NOTE — PATIENT INSTRUCTIONS
Patient Education   Personalized Prevention Plan  You are due for the preventive services outlined below.  Your care team is available to assist you in scheduling these services.  If you have already completed any of these items, please share that information with your care team to update in your medical record.  Health Maintenance Due   Topic Date Due     COPD Action Plan  Never done     LUNG CANCER SCREENING  Never done     Diptheria Tetanus Pertussis (DTAP/TDAP/TD) Vaccine (2 - Td or Tdap) 04/20/2019     COVID-19 Vaccine (3 - Booster for Moderna series) 06/24/2021     Zoster (Shingles) Vaccine (3 of 3) 12/30/2021     Your Health Risk Assessment indicates you feel you are not in good health    A healthy lifestyle helps keep the body fit and the mind alert. It helps protect you from disease, helps you fight disease, and helps prevent chronic disease (disease that doesn't go away) from getting worse. This is important as you get older and begin to notice twinges in muscles and joints and a decline in the strength and stamina you once took for granted. A healthy lifestyle includes good healthcare, good nutrition, weight control, recreation, and regular exercise. Avoid harmful substances and do what you can to keep safe. Another part of a healthy lifestyle is stay mentally active and socially involved.    Good healthcare     Have a wellness visit every year.     If you have new symptoms, let us know right away. Don't wait until the next checkup.     Take medicines exactly as prescribed and keep your medicines in a safe place. Tell us if your medicine causes problems.   Healthy diet and weight control     Eat 3 or 4 small, nutritious, low-fat, high-fiber meals a day. Include a variety of fruits, vegetables, and whole-grain foods.     Make sure you get enough calcium in your diet. Calcium, vitamin D, and exercise help prevent osteoporosis (bone thinning).     If you live alone, try eating with others when you can.  That way you get a good meal and have company while you eat it.     Try to keep a healthy weight. If you eat more calories than your body uses for energy, it will be stored as fat and you will gain weight.     Recreation   Recreation is not limited to sports and team events. It includes any activity that provides relaxation, interest, enjoyment, and exercise. Recreation provides an outlet for physical, mental, and social energy. It can give a sense of worth and achievement. It can help you stay healthy.    Mental Exercise and Social Involvement  Mental and emotional health is as important as physical health. Keep in touch with friends and family. Stay as active as possible. Continue to learn and challenge yourself.   Things you can do to stay mentally active are:    Learn something new, like a foreign language or musical instrument.     Play SCRABBLE or do crossword puzzles. If you cannot find people to play these games with you at home, you can play them with others on your computer through the Internet.     Join a games club--anything from card games to chess or checkers or lawn bowling.     Start a new hobby.     Go back to school.     Volunteer.     Read.   Keep up with world events.  Activities of Daily Living    Your Health Risk Assessment indicates you have difficulties with activities of daily living such as housework, bathing, preparing meals, taking medication, etc. Please make a follow up appointment for us to address this issue in more detail.    Signs of Hearing Loss      Hearing much better with one ear can be a sign of hearing loss.   Hearing loss is a problem shared by many people. In fact, it is one of the most common health problems, particularly as people age. Most people age 65 and older have some hearing loss. By age 80, almost everyone does. Hearing loss often occurs slowly over the years. So you may not realize your hearing has gotten worse.  Have your hearing checked  Call your healthcare  provider if you:    Have to strain to hear normal conversation    Have to watch other people s faces very carefully to follow what they re saying    Need to ask people to repeat what they ve said    Often misunderstand what people are saying    Turn the volume of the television or radio up so high that others complain    Feel that people are mumbling when they re talking to you    Find that the effort to hear leaves you feeling tired and irritated    Notice, when using the phone, that you hear better with one ear than the other  [employer] last reviewed this educational content on 1/1/2020 2000-2021 [employer]. All rights reserved. This information is not intended as a substitute for professional medical care. Always follow your healthcare professional's instructions.

## 2022-09-28 NOTE — PROGRESS NOTES
ANTICOAGULATION MANAGEMENT     Bebe SHELDON Alfonso 76 year old female is on warfarin with therapeutic INR result. (Goal INR 2.0-3.0)    Recent labs: (last 7 days)     09/28/22  1019   INR 2.47*       ASSESSMENT       Source(s): Chart Review and Patient/Caregiver Call       Warfarin doses taken: Warfarin taken as instructed    Diet: No new diet changes identified    New illness, injury, or hospitalization: No    Medication/supplement changes: None noted    Signs or symptoms of bleeding or clotting: No    Previous INR: Subtherapeutic    Additional findings: None       PLAN     Recommended plan for no diet, medication or health factor changes affecting INR     Dosing Instructions: Continue your current warfarin dose with next INR in 3 weeks       Summary  As of 9/28/2022    Full warfarin instructions:  7.5 mg every Mon, Wed, Fri; 5 mg all other days   Next INR check:  10/19/2022             Telephone call with Bebe who verbalizes understanding and agrees to plan    Lab visit scheduled    Education provided: Please call back if any changes to your diet, medications or how you've been taking warfarin and Contact [phone]  with any changes, questions or concerns.     Plan made per [facility] anticoagulation protocol    Justin JOHNSON RN  Anticoagulation Clinic  9/28/2022    _______________________________________________________________________     Anticoagulation Episode Summary     Current INR goal:  2.0-3.0   TTR:  65.0 % (1 y)   Target end date:  Indefinite   Send INR reminders to:  [facility]    Indications    History of CVA (cerebrovascular accident) (Resolved) [Z86.73]  Long term current use of anticoagulant therapy [Z79.01]  Cerebrovascular accident (CVA) due to occlusion of precerebral artery (H) [I63.20]  History of CVA (cerebrovascular accident) [Z86.73]           Comments:           Anticoagulation Care Providers     Provider Role Specialty Phone number    Diana Ely APRN CNP Referring Family  Medicine [phone]

## 2022-09-28 NOTE — PROGRESS NOTES
"SUBJECTIVE:   Bebe is a 76 year old who presents for Preventive Visit.      Patient has been advised of split billing requirements and indicates understanding: Yes  Are you in the first 12 months of your Medicare coverage?  No    Healthy Habits:     In general, how would you rate your overall health?  Fair    Duration of exercise:  15-30 minutes    Do you usually eat at least 4 servings of fruit and vegetables a day, include whole grains    & fiber and avoid regularly eating high fat or \"junk\" foods?  Yes    Taking medications regularly:  Yes    Medication side effects:  Not applicable    Ability to successfully perform activities of daily living:  Housework requires assistance    Home Safety:  No safety concerns identified    Hearing Impairment:  Need to ask people to speak up or repeat themselves    In the past 6 months, have you been bothered by leaking of urine?  No    In general, how would you rate your overall mental or emotional health?  Good      PHQ-2 Total Score: 0    Additional concerns today:  Yes    Do you feel safe in your environment? Yes    Have you ever done Advance Care Planning? (For example, a Health Directive, POLST, or a discussion with a medical provider or your loved ones about your wishes): Yes, patient states has an Advance Care Planning document and will bring a copy to the clinic.       Fall risk  Fallen 2 or more times in the past year?: No  Any fall with injury in the past year?: No  click delete button to remove this line now    Mini-CogTM Izzy Simmons. Licensed by the author for use in [facility]; reprinted with permission (eh@.[email or web address]). All rights reserved.      Do you have sleep apnea, excessive snoring or daytime drowsiness?: no    Reviewed and updated as needed this visit by clinical staff   Tobacco  Allergies  Meds   Med Hx  Surg Hx  Fam Hx  Soc Hx          Reviewed and updated as needed this visit by Provider                   Social History "     Tobacco Use     Smoking status: Current Every Day Smoker     Packs/day: 1.00     Years: 50.00     Pack years: 50.00     Types: Cigarettes     Start date: 1/1/2016     Smokeless tobacco: Never Used   Substance Use Topics     Alcohol use: No     If you drink alcohol do you typically have >3 drinks per day or >7 drinks per week? No    Alcohol Use 9/28/2022   Prescreen: >3 drinks/day or >7 drinks/week? Not Applicable   Prescreen: >3 drinks/day or >7 drinks/week? -   No flowsheet data found.     Recent CVA 9/2022 after off coumain for colonoscopy   Polyps removed. Flat polypectomy recommended yet.   Pathology reports reviewed with patient today   Right hand weakness and dropping things.   Left eye vision changes cataracts.. needs eye doc appt to discuss  Memory feels ok  Swallow can be an issue.   Speech sometimes garbled. Hard to find words sometimes  5 strokes -  1 new stroke on recent imaging  Will need to find a new neurologist as Dr Meyer is retiring.   INR clinic in [facility] INR done today  Was off coumadin for 5 days due to her Colonoscopy  Was told she had another stroke after this by her neurologist    MRI Brain  Sondaadrian, Alfredito Holder MD - 09/23/2022   Formatting of this note might be different from the original.   For Patients:  As a result of the 21st Century Cures Act, medical imaging exams and procedure reports are released immediately into your electronic medical record.  You may view this report before your referring provider.  If you have questions, please contact your health care provider.       INDICATION:   Stroke with residual effects. Worsening handwriting.     TECHNIQUE:   Multiplanar multisequence noncontrast MR images acquired.     COMPARISON:   MRI brain 07/28/2015.     FINDINGS:   Stable moderate chronic right middle cerebral artery territory infarction involving the lateral right frontal lobe, right insula, and right subinsular region. Interval evolution of moderate chronic  left middle cerebral artery territory infarction within the left temporal lobe. New small to moderate chronic infarction within the posterior left frontal and anterior left parietal lobes, with involvement of the left corona radiata and left postcentral gyrus. Susceptibility within the infarct beds may relate to chronic blood products or laminar necrosis.     Prominence of the ventricles and sulci compatible with mild-to-moderate diffuse cerebral volume loss. No mass effect or midline shift. Scattered and patchy T2 FLAIR hyperintensities in the supratentorial white matter and dimitri have slightly progressed, and are typical for mild to moderate chronic microvascular changes.     No diffusion restriction to suggest acute infarction. No recent intracranial hemorrhage or pathologic extra-axial fluid collection.     The major arterial flow voids of the skullbase are preserved. The globes are symmetric. The paranasal sinuses are well aerated. Trace right mastoid fluid.     IMPRESSION:   1. No acute infarction, mass effect, or recent intracranial hemorrhage.   2. Stable moderate chronic right middle cerebral artery territory infarction. Interval evolution of moderate chronic left middle cerebral artery territory infarction. New small to moderate chronic infarction within the posterior left frontal and anterior left parietal lobes.   3. Mild-to-moderate chronic microvascular ischemic changes have slightly progressed. There is mild-to-moderate diffuse cerebral volume loss.     Nephrologist 8/5/2022  [facility]    [address] [place] [address]    [phone]   Alex Granger MD    [address] [facility]    [place] [address]    [phone] (Work)    [phone] (Fax)       Previously seen by Dr Govea on 6/10/22. At that time, Cr had increased from ~ 1.0 in 2017 to 1.7. Has small right kidney (6.1 cm compared to 10.5 cm on the left) and moderate right renal artery stenosis. SPEP with no monoclonal  protein detected and kappa:lambda ratio of 2.45 (elevation not unusual in setting of CKD) on 6/10/22. Serology has included negative MARCO. Most recent UA with no RBC or WBC, and upc ratio 1.2 (6/10/22). Kidney ds is thought to be due to both large and small vessel vascular ds and decreased right kidney function. Saw vascular surgery at [place], they have decided not to intervene on right MARQUEZ.  Blood Pressure and Volume Management plan at that visit     Continue amlodipine 5 mg daily and enalapril 10 mg daily.    Would stop prn clonidine and increase enalapril to 20 mg daily if needed (will consider increasing enalapril at follow up for proteinuria).      Current providers sharing in care for this patient include:   Patient Care Team:  Diana Ely APRN CNP as PCP - General (Family Practice)  Gregoria Mcneal MD as MD (Nephrology)  Katherine Grant LPN as LPN  Diana Ely APRN CNP as Assigned PCP  Jovon Riojas MD as Assigned Heart and Vascular Provider    The following health maintenance items are reviewed in Epic and correct as of today:  Health Maintenance   Topic Date Due     COPD ACTION PLAN  Never done     LUNG CANCER SCREENING  Never done     DTAP/TDAP/TD IMMUNIZATION (2 - Td or Tdap) 04/20/2019     COVID-19 Vaccine (3 - Booster for Moderna series) 06/24/2021     INFLUENZA VACCINE (1) 09/01/2022     ZOSTER IMMUNIZATION (3 of 3) 12/30/2021     BMP  05/03/2023     LIPID  05/03/2023     MICROALBUMIN  05/03/2023     URINE DRUG SCREEN  05/03/2023     ANNUAL REVIEW OF HM ORDERS  05/03/2023     HEMOGLOBIN  05/03/2023     NICOTINE/TOBACCO CESSATION COUNSELING Q 1 YR  09/28/2023     MEDICARE ANNUAL WELLNESS VISIT  09/28/2023     FALL RISK ASSESSMENT  09/28/2023     MAMMO SCREENING  08/23/2024     COLORECTAL CANCER SCREENING  09/07/2027     ADVANCE CARE PLANNING  09/28/2027     DEXA  07/16/2034     SPIROMETRY  Completed     HEPATITIS C SCREENING  Completed     PHQ-2 (once  per calendar year)  Completed     Pneumococcal Vaccine: 65+ Years  Completed     URINALYSIS  Completed     IPV IMMUNIZATION  Aged Out     MENINGITIS IMMUNIZATION  Aged Out     HEPATITIS B IMMUNIZATION  Aged Out     Labs reviewed in EPIC  BP Readings from Last 3 Encounters:   09/28/22 135/62   09/07/22 (!) 182/82   07/19/22 (!) 157/57    Wt Readings from Last 3 Encounters:   09/28/22 57.6 kg (127 lb)   09/07/22 61.2 kg (135 lb)   06/14/22 61.2 kg (135 lb)                  Patient Active Problem List   Diagnosis     ACP (advance care planning)     Anticoagulation monitoring, INR range 2-3     Benign essential hypertension     Carotid bruit     Cerebrovascular disease     Chronic obstructive pulmonary disease (H)     Esophageal reflux     Osteoarthrosis, hip     Pain medication agreement     Thyroid nodule     Long term current use of anticoagulant therapy     Hyperlipidemia LDL goal <70     Cerebrovascular accident (CVA) due to occlusion of precerebral artery (H)     Chronic kidney disease, stage 3 (moderate)     Renal artery stenosis (H)     Tobacco dependence     History of cardiac monitoring     History of CVA (cerebrovascular accident)     Past Surgical History:   Procedure Laterality Date     BIOPSY  appox in 1980's     BREAST SURGERY  last time in the 1990's    non cancer     COLONOSCOPY N/A 9/7/2022    Procedure: COLONOSCOPY, WITH POLYPECTOMY AND BIOPSY;  Surgeon: Alfredito Gomez DO;  Location: [facility]     ESOPHAGOSCOPY, GASTROSCOPY, DUODENOSCOPY (EGD), COMBINED N/A 5/25/2022    Procedure: ESOPHAGOGASTRODUODENOSCOPY, WITH BIOPSY;  Surgeon: Alfredito Gomez DO;  Location: [facility]       Social History     Tobacco Use     Smoking status: Current Every Day Smoker     Packs/day: 1.00     Years: 50.00     Pack years: 50.00     Types: Cigarettes     Start date: 1/1/2016     Smokeless tobacco: Never Used   Substance Use Topics     Alcohol use: No     Family History   Problem Relation Age of Onset      Diabetes Maternal Grandfather      Diabetes Sister         developed after cancer treatment     Breast Cancer Sister      Obesity Sister      Hypertension Mother      Hyperlipidemia Mother      Osteoporosis Mother      Breast Cancer Sister      Osteoporosis Sister      Breast Cancer Daughter      Other Cancer Sister         throught out body     Substance Abuse Sister         acol     Obesity Sister      Substance Abuse Sister         acol     Obesity Sister          Current Outpatient Medications   Medication Sig Dispense Refill     amLODIPine (NORVASC) 5 MG tablet Take 1 tablet (5 mg) by mouth daily 90 tablet 1     Aspirin (VAZALORE) 81 MG CAPS Please choose reason not prescribed from choices below.       ASPIRIN NOT PRESCRIBED (INTENTIONAL) Please choose reason not prescribed from choices below.       atorvastatin (LIPITOR) 40 MG tablet Take 1 tablet by mouth once daily 90 tablet 2     cloNIDine (CATAPRES) 0.1 MG tablet Take 1 tablet (0.1 mg) by mouth 2 times daily BP 60 tablet 0     enalapril (VASOTEC) 10 MG tablet Take 1 tablet (10 mg) by mouth daily 90 tablet 1     FLUoxetine (PROZAC) 20 MG capsule Take 1 capsule (20 mg) by mouth daily 90 capsule 1     fluticasone (FLONASE) 50 MCG/ACT nasal spray Spray 1 spray into both nostrils daily 16 g 1     warfarin ANTICOAGULANT (COUMADIN) 5 MG tablet Take 7.5 mg every Mon, Wed, Fri; 5 mg all other days or As directed by Anticoagulation clinic 114 tablet 0     omeprazole (PRILOSEC) 40 MG DR capsule Take 1 capsule by mouth once daily 90 capsule 2     Allergies   Allergen Reactions     Losartan Swelling, Nausea, Shortness Of Breath and Palpitations     Gabapentin GI Disturbance and Unknown     Double vision  flatulence     Lisinopril Cough     Oxycodone Other (See Comments)     But has tolerated hydrocodone     Tramadol Other (See Comments)     Augmentin [Amoxicillin-Pot Clavulanate] Rash     Bacitracin Rash     blisters     Bupropion Rash     Allergic to brand not generic  "        Mammogram Screening - Patient over age 75, has elected to continue with screening.  Pertinent mammograms are reviewed under the imaging tab.    Review of Systems   Constitutional: Positive for chills. Negative for fever.   HENT: Positive for hearing loss. Negative for congestion, ear pain and sore throat.    Eyes: Positive for visual disturbance. Negative for pain.   Respiratory: Negative for cough and shortness of breath.    Cardiovascular: Negative for chest pain, palpitations and peripheral edema.   Gastrointestinal: Negative for constipation, diarrhea, heartburn, hematochezia and nausea.   Breasts:  Positive for tenderness. Negative for breast mass and discharge.   Genitourinary: Negative for dysuria, frequency, genital sores, hematuria, pelvic pain, urgency, vaginal bleeding and vaginal discharge.   Musculoskeletal: Negative for arthralgias, joint swelling and myalgias.   Skin: Negative for rash.   Neurological: Positive for dizziness and weakness. Negative for headaches and paresthesias.   Psychiatric/Behavioral: Negative for mood changes. The patient is nervous/anxious.        OBJECTIVE:   /62   Pulse 74   Temp 97.3  F (36.3  C) (Tympanic)   Resp 14   Ht 1.664 m (5' 5.5\")   Wt 57.6 kg (127 lb)   BMI 20.81 kg/m   Estimated body mass index is 20.81 kg/m  as calculated from the following:    Height as of this encounter: 1.664 m (5' 5.5\").    Weight as of this encounter: 57.6 kg (127 lb).  Physical Exam  GENERAL APPEARANCE: alert, no distress and pale  EYES: Eyes grossly normal to inspection, PERRL and conjunctivae and sclerae normal cataracts bilaterally   HENT: ear canals and TM's normal, nose and mouth without ulcers or lesions, oropharynx clear and oral mucous membranes moist  NECK: no adenopathy, no asymmetry, masses, or scars and thyroid normal to palpation carotid bruit bilaterally   RESP: lungs clear to auscultation - no rales, rhonchi or wheezes  CV: regular rate and rhythm, normal S1 " S2, no S3 or S4, no murmur, click or rub, no peripheral edema and peripheral pulses strong  ABDOMEN: soft, nontender, no hepatosplenomegaly, no masses and bowel sounds normal  MS: no musculoskeletal defects are noted and gait is age appropriate without ataxia  SKIN: AK face right temple region and cheek   NEURO: word finding deficit  PSYCH: mentation appears normal and anxious    Diagnostic Test Results:  Labs reviewed in Epic  Results for orders placed or performed in visit on 09/28/22   CBC with platelets     Status: Normal   Result Value Ref Range    WBC Count 6.6 4.0 - 11.0 10e3/uL    RBC Count 3.90 3.80 - 5.20 10e6/uL    Hemoglobin 12.0 11.7 - 15.7 g/dL    Hematocrit 36.1 35.0 - 47.0 %    MCV 93 78 - 100 fL    MCH 30.8 26.5 - 33.0 pg    MCHC 33.2 31.5 - 36.5 g/dL    RDW 12.8 10.0 - 15.0 %    Platelet Count 244 150 - 450 10e3/uL       ASSESSMENT / PLAN:   Bebe was seen today for physical.    Diagnoses and all orders for this visit:  Encounter for Medicare annual wellness exam  H/O: stroke with residual effects  -     Adult Neurology  Referral; Future  Formal referral placed due to alf coming of her current neurologist     Benign essential hypertension  Meds refilled  Goal < 130/90  Increase dose-     enalapril (VASOTEC) 20 MG tablet; Take 1 tablet (10 mg) by mouth daily  -     amLODIPine (NORVASC) 5 MG tablet; Take 1 tablet (5 mg) by mouth daily  Meeting goal today   Clonidine discontinued  -     CBC with platelets    Chronic rhinitis  Ongoing refilled   -     fluticasone (FLONASE) 50 MCG/ACT nasal spray; Spray 1 spray into both nostrils daily      Long term current use of anticoagulant therapy  -     warfarin ANTICOAGULANT (COUMADIN) 5 MG tablet; Take 7.5 mg every Mon, Wed, Fri; 5 mg all other days or As directed by Anticoagulation clinic    Cerebrovascular accident (CVA) due to occlusion of precerebral artery (H)  -     warfarin ANTICOAGULANT (COUMADIN) 5 MG tablet; Take 7.5 mg every Mon,  "Wed, Fri; 5 mg all other days or As directed by Anticoagulation clinic  -     Adult Neurology  Referral; Future    History of CVA (cerebrovascular accident)  -     warfarin ANTICOAGULANT (COUMADIN) 5 MG tablet; Take 7.5 mg every Mon, Wed, Fri; 5 mg all other days or As directed by Anticoagulation clinic  -     Adult Neurology  Referral; Future  -     INR    Stage 3b chronic kidney disease (H)  Follow up with nephrology as planned  Yearly labs done today to monitor.   -     CBC with platelets  -     Parathyroid Hormone Intact  -     Renal panel (Alb, BUN, Ca, Cl, CO2, Creat, Gluc, Phos, K, Na)    Vitamin D deficiency  Taking OTC supplements daily  Lab to monitor level.  -     Vitamin D Deficiency        Other orders  -     INFLUENZA, QUAD, HIGH DOSE, PF, 65YR + (FLUZONE HD)        Patient has been advised of split billing requirements and indicates understanding: Yes    COUNSELING:  Reviewed preventive health counseling, as reflected in patient instructions    Estimated body mass index is 20.81 kg/m  as calculated from the following:    Height as of this encounter: 1.664 m (5' 5.5\").    Weight as of this encounter: 57.6 kg (127 lb).    Wt Readings from Last 5 Encounters:   09/28/22 57.6 kg (127 lb)   09/07/22 61.2 kg (135 lb)   06/14/22 61.2 kg (135 lb)   05/25/22 61.2 kg (135 lb)   05/03/22 61.2 kg (135 lb)       Weight management plan pre-albumin level obtained and return visit in 1 Month    She reports that she has been smoking cigarettes. She started smoking about 6 years ago. She has a 50.00 pack-year smoking history. She has never used smokeless tobacco.  Nicotine/Tobacco Cessation Plan:   Information offered: Patient not interested at this time      Appropriate preventive services were discussed with this patient, including applicable screening as appropriate for cardiovascular disease, diabetes, osteopenia/osteoporosis, and glaucoma.  As appropriate for age/gender, discussed screening for " colorectal cancer, prostate cancer, breast cancer, and cervical cancer. Checklist reviewing preventive services available has been given to the patient.    Reviewed patients plan of care and provided an AVS. The Intermediate Care Plan ( asthma action plan, low back pain action plan, and migraine action plan) for Bebe meets the Care Plan requirement. This Care Plan has been established and reviewed with the Patient and her  Rodriguez.    Call or return to the clinic with any worsening of symptoms or no resolution. Patient/Parent verbalized understanding and is in agreement. Medication side effects reviewed.   Current Outpatient Medications   Medication Sig Dispense Refill     amLODIPine (NORVASC) 5 MG tablet Take 1 tablet (5 mg) by mouth daily 90 tablet 1     Aspirin (VAZALORE) 81 MG CAPS Please choose reason not prescribed from choices below.       ASPIRIN NOT PRESCRIBED (INTENTIONAL) Please choose reason not prescribed from choices below.       atorvastatin (LIPITOR) 40 MG tablet Take 1 tablet by mouth once daily 90 tablet 2     cloNIDine (CATAPRES) 0.1 MG tablet Take 1 tablet (0.1 mg) by mouth 2 times daily BP 60 tablet 0     enalapril (VASOTEC) 10 MG tablet Take 1 tablet (10 mg) by mouth daily 90 tablet 1     FLUoxetine (PROZAC) 20 MG capsule Take 1 capsule (20 mg) by mouth daily 90 capsule 1     fluticasone (FLONASE) 50 MCG/ACT nasal spray Spray 1 spray into both nostrils daily 16 g 1     warfarin ANTICOAGULANT (COUMADIN) 5 MG tablet Take 7.5 mg every Mon, Wed, Fri; 5 mg all other days or As directed by Anticoagulation clinic 114 tablet 0     omeprazole (PRILOSEC) 40 MG DR capsule Take 1 capsule by mouth once daily 90 capsule 2     Chart documentation with Dragon Voice recognition Software. Although reviewed after completion, some words and grammatical errors may remain.      SELWYN Dennis CNP  [facility]    Identified Health Risks:    The patient was provided with  suggestions to help her develop a healthy physical lifestyle.  The patient reports that she has difficulty with activities of daily living. I have asked that the patient make a follow up appointment in 1 weeks where this issue will be further evaluated and addressed.  The patient was provided with written information regarding signs of hearing loss.

## 2022-09-29 LAB
DEPRECATED CALCIDIOL+CALCIFEROL SERPL-MC: 42 UG/L (ref 20–75)
PREALB SERPL IA-MCNC: 29 MG/DL (ref 15–45)

## 2022-10-10 ENCOUNTER — TELEPHONE (OUTPATIENT)
Dept: FAMILY MEDICINE | Facility: CLINIC | Age: 77
End: 2022-10-10

## 2022-10-10 NOTE — TELEPHONE ENCOUNTER
Medication Question or Refill    Contacts       Type Contact Phone/Fax    10/10/2022 11:11 AM CDT Phone (Incoming) Bebe Alfonso (Self) [phone] (H)          What medication are you calling about (include dose and sig)?: Vasotec 20MG tablet    Controlled Substance Agreement on file:   CSA -- Patient Level:    CSA: None found at the patient level.       Who prescribed the medication?: Diana phillips    Do you need a refill? No    When did you use the medication last? NA    Patient offered an appointment? No    Do you have any questions or concerns?  Yes: Pt was under the impression the medication was supposed to be a 10mg tablet and not a 20. Pt called to confirm correct dose     Preferred Pharmacy:   [facility] [address] [place] [address]  [address] [place] [address]  Phone: [phone] Fax: [phone]      Could we send this information to you in [facility] or would you prefer to receive a phone call?:   Patient would prefer a phone call   Okay to leave a detailed message?: Yes at Home number on file [phone] (home)

## 2022-10-10 NOTE — TELEPHONE ENCOUNTER
Please advise what dose she should be on:    Blood Pressure and Volume Management plan at that visit     Continue amlodipine 5 mg daily and enalapril 10 mg daily.    Would stop prn clonidine and increase enalapril to 20 mg daily if needed (will consider increasing enalapril at follow up for proteinuria).

## 2022-10-11 NOTE — TELEPHONE ENCOUNTER
enalapril (VASOTEC) 20 MG tablet; Take 1 tablet (20 mg) by mouth daily  -     amLODIPine (NORVASC) 5 MG tablet; Take 1 tablet (5 mg) by mouth daily    Thanks Diana Ely P-BC

## 2022-10-11 NOTE — TELEPHONE ENCOUNTER
After reviewing with Diana Ely, patient notified she should be taking enalapril 20 mg daily which is an increase from previous dose as well as amlodipine 5 mg daily for blood pressure.Pt verbalized understanding.  Cheyenne UNDERWOOD RN

## 2022-10-19 ENCOUNTER — LAB (OUTPATIENT)
Dept: LAB | Facility: CLINIC | Age: 77
End: 2022-10-19
Payer: COMMERCIAL

## 2022-10-19 ENCOUNTER — ANTICOAGULATION THERAPY VISIT (OUTPATIENT)
Dept: ANTICOAGULATION | Facility: CLINIC | Age: 77
End: 2022-10-19

## 2022-10-19 DIAGNOSIS — I63.20 CEREBROVASCULAR ACCIDENT (CVA) DUE TO OCCLUSION OF PRECEREBRAL ARTERY (H): ICD-10-CM

## 2022-10-19 DIAGNOSIS — Z79.01 LONG TERM CURRENT USE OF ANTICOAGULANT THERAPY: ICD-10-CM

## 2022-10-19 DIAGNOSIS — Z86.73 HISTORY OF CVA (CEREBROVASCULAR ACCIDENT): ICD-10-CM

## 2022-10-19 DIAGNOSIS — Z79.01 LONG TERM CURRENT USE OF ANTICOAGULANT THERAPY: Primary | ICD-10-CM

## 2022-10-19 DIAGNOSIS — I63.20 CEREBROVASCULAR ACCIDENT (CVA) DUE TO OCCLUSION OF PRECEREBRAL ARTERY (H): Chronic | ICD-10-CM

## 2022-10-19 LAB — INR BLD: 3.1 (ref 0.9–1.1)

## 2022-10-19 PROCEDURE — 85610 PROTHROMBIN TIME: CPT

## 2022-10-19 PROCEDURE — 36415 COLL VENOUS BLD VENIPUNCTURE: CPT

## 2022-10-19 NOTE — PROGRESS NOTES
ANTICOAGULATION MANAGEMENT     Bebe Alfonso 76 year old female is on warfarin with supratherapeutic INR result. (Goal INR 2.0-3.0)    Recent labs: (last 7 days)     10/19/22  0849   INR 3.1*       ASSESSMENT       Source(s): Chart Review and Patient/Caregiver Call       Warfarin doses taken: Warfarin taken as instructed    Diet: No new diet changes identified    New illness, injury, or hospitalization: No    Medication/supplement changes: Enalapril increased from 10mg to 20mg daily started on 10/10/22 No interaction anticipated    And Clonidine PRN.    Signs or symptoms of bleeding or clotting: No    Previous INR: Therapeutic last visit at 2.47; previously outside of goal range at 1.7    Additional findings: None       PLAN     Recommended plan for ongoing change(s) affecting INR     Dosing Instructions: hold dose then continue your current warfarin dose with next INR in 2 weeks       Summary  As of 10/19/2022    Full warfarin instructions:  10/19: Hold; Otherwise 7.5 mg every Mon, Wed, Fri; 5 mg all other days   Next INR check:  11/2/2022             Telephone call with  Bebe ([phone]) who verbalizes understanding and agrees to plan    Lab visit scheduled - INR on 11/9/22 @ [facility]    Education provided: Importance of consistent vitamin K intake, Goal range and significance of current result, No interaction anticipated between warfarin and Clonidine and Enalapril and Monitoring for bleeding signs and symptoms    Plan made per [facility] anticoagulation protocol    Nicky Harper, RN  Anticoagulation Clinic  10/19/2022    _______________________________________________________________________     Anticoagulation Episode Summary     Current INR goal:  2.0-3.0   TTR:  64.0 % (1 y)   Target end date:  Indefinite   Send INR reminders to:  [facility]    Indications    History of CVA (cerebrovascular accident) (Resolved) [Z86.73]  Long term current use of anticoagulant therapy  [Z79.01]  Cerebrovascular accident (CVA) due to occlusion of precerebral artery (H) [I63.20]  History of CVA (cerebrovascular accident) [Z86.73]           Comments:           Anticoagulation Care Providers     Provider Role Specialty Phone number    Diana Ely APRN [facility] Referring Family Medicine [phone]

## 2022-11-09 ENCOUNTER — ANTICOAGULATION THERAPY VISIT (OUTPATIENT)
Dept: ANTICOAGULATION | Facility: CLINIC | Age: 77
End: 2022-11-09

## 2022-11-09 ENCOUNTER — LAB (OUTPATIENT)
Dept: LAB | Facility: CLINIC | Age: 77
End: 2022-11-09
Payer: COMMERCIAL

## 2022-11-09 DIAGNOSIS — I63.20 CEREBROVASCULAR ACCIDENT (CVA) DUE TO OCCLUSION OF PRECEREBRAL ARTERY (H): ICD-10-CM

## 2022-11-09 DIAGNOSIS — Z79.01 LONG TERM CURRENT USE OF ANTICOAGULANT THERAPY: ICD-10-CM

## 2022-11-09 DIAGNOSIS — Z79.01 LONG TERM CURRENT USE OF ANTICOAGULANT THERAPY: Primary | ICD-10-CM

## 2022-11-09 DIAGNOSIS — Z86.73 HISTORY OF CVA (CEREBROVASCULAR ACCIDENT): ICD-10-CM

## 2022-11-09 DIAGNOSIS — I63.20 CEREBROVASCULAR ACCIDENT (CVA) DUE TO OCCLUSION OF PRECEREBRAL ARTERY (H): Chronic | ICD-10-CM

## 2022-11-09 LAB — INR BLD: 2.4 (ref 0.9–1.1)

## 2022-11-09 PROCEDURE — 36416 COLLJ CAPILLARY BLOOD SPEC: CPT

## 2022-11-09 PROCEDURE — 85610 PROTHROMBIN TIME: CPT

## 2022-11-09 NOTE — PROGRESS NOTES
ANTICOAGULATION MANAGEMENT     Bebe Alfonso 76 year old female is on warfarin with therapeutic INR result. (Goal INR 2.0-3.0)    Recent labs: (last 7 days)     11/09/22  0828   INR 2.4*       ASSESSMENT       Source(s): Chart Review and Patient/Caregiver Call       Warfarin doses taken: Warfarin taken as instructed    Diet: No new diet changes identified    New illness, injury, or hospitalization: No    Medication/supplement changes: None noted    Signs or symptoms of bleeding or clotting: No    Previous INR: Supratherapeutic    Additional findings: None     PLAN     Recommended plan for no diet, medication or health factor changes affecting INR     Dosing Instructions: Continue your current warfarin dose with next INR in 3 weeks       Summary  As of 11/9/2022    Full warfarin instructions:  7.5 mg every Mon, Wed, Fri; 5 mg all other days; Starting 11/9/2022   Next INR check:  11/30/2022             Telephone call with Bebe who verbalizes understanding and agrees to plan    Lab visit scheduled    Education provided:     Please call back if any changes to your diet, medications or how you've been taking warfarin    Plan made per [facility] anticoagulation protocol    Deneen Pretty RN  Anticoagulation Clinic  11/9/2022    _______________________________________________________________________     Anticoagulation Episode Summary     Current INR goal:  2.0-3.0   TTR:  66.6 % (1 y)   Target end date:  Indefinite   Send INR reminders to:  [facility]    Indications    History of CVA (cerebrovascular accident) (Resolved) [Z86.73]  Long term current use of anticoagulant therapy [Z79.01]  Cerebrovascular accident (CVA) due to occlusion of precerebral artery (H) [I63.20]  History of CVA (cerebrovascular accident) [Z86.73]           Comments:           Anticoagulation Care Providers     Provider Role Specialty Phone number    Diana Ely APRN [facility] Referring Family Medicine [phone]

## 2022-12-07 ENCOUNTER — ANTICOAGULATION THERAPY VISIT (OUTPATIENT)
Dept: ANTICOAGULATION | Facility: CLINIC | Age: 77
End: 2022-12-07

## 2022-12-07 ENCOUNTER — LAB (OUTPATIENT)
Dept: LAB | Facility: CLINIC | Age: 77
End: 2022-12-07
Payer: COMMERCIAL

## 2022-12-07 DIAGNOSIS — Z79.01 LONG TERM CURRENT USE OF ANTICOAGULANT THERAPY: Primary | ICD-10-CM

## 2022-12-07 DIAGNOSIS — Z79.01 LONG TERM CURRENT USE OF ANTICOAGULANT THERAPY: ICD-10-CM

## 2022-12-07 DIAGNOSIS — I63.20 CEREBROVASCULAR ACCIDENT (CVA) DUE TO OCCLUSION OF PRECEREBRAL ARTERY (H): ICD-10-CM

## 2022-12-07 DIAGNOSIS — Z86.73 HISTORY OF CVA (CEREBROVASCULAR ACCIDENT): ICD-10-CM

## 2022-12-07 DIAGNOSIS — I63.20 CEREBROVASCULAR ACCIDENT (CVA) DUE TO OCCLUSION OF PRECEREBRAL ARTERY (H): Chronic | ICD-10-CM

## 2022-12-07 LAB — INR BLD: 2.6 (ref 0.9–1.1)

## 2022-12-07 PROCEDURE — 36416 COLLJ CAPILLARY BLOOD SPEC: CPT

## 2022-12-07 PROCEDURE — 85610 PROTHROMBIN TIME: CPT

## 2022-12-07 NOTE — PROGRESS NOTES
ANTICOAGULATION MANAGEMENT     Bebe SHELDON Kaden 77 year old female is on warfarin with therapeutic INR result. (Goal INR 2.0-3.0)    Recent labs: (last 7 days)     12/07/22  1247   INR 2.6*       ASSESSMENT       Source(s): Chart Review and Patient/Caregiver Call       Warfarin doses taken: Warfarin taken as instructed    Diet: No new diet changes identified    New illness, injury, or hospitalization: No    Medication/supplement changes: None noted    Signs or symptoms of bleeding or clotting: No    Previous INR: Therapeutic last visit; previously outside of goal range    Additional findings: None       PLAN     Recommended plan for no diet, medication or health factor changes affecting INR     Dosing Instructions: Continue your current warfarin dose with next INR in 4 weeks       Summary  As of 12/7/2022    Full warfarin instructions:  7.5 mg every Mon, Wed, Fri; 5 mg all other days; Starting 12/7/2022   Next INR check:  1/4/2023             Telephone call with Bebe who verbalizes understanding and agrees to plan    Lab visit scheduled    Education provided:     Please call back if any changes to your diet, medications or how you've been taking warfarin    Contact [phone]  with any changes, questions or concerns.     Plan made per [facility] anticoagulation protocol    Justin JOHNSON RN  Anticoagulation Clinic  12/7/2022    _______________________________________________________________________     Anticoagulation Episode Summary     Current INR goal:  2.0-3.0   TTR:  71.9 % (1 y)   Target end date:  Indefinite   Send INR reminders to:  [facility]    Indications    History of CVA (cerebrovascular accident) (Resolved) [Z86.73]  Long term current use of anticoagulant therapy [Z79.01]  Cerebrovascular accident (CVA) due to occlusion of precerebral artery (H) [I63.20]  History of CVA (cerebrovascular accident) [Z86.73]           Comments:           Anticoagulation Care Providers     Provider Role Specialty  Phone number    [place] Diana Lucero, SELWYN CNP Referring Family Medicine [phone]        ANTICOAGULATION MANAGEMENT     Bebe SHELDON Alfonso 77 year old female is on warfarin with therapeutic INR result. (Goal INR 2.0-3.0)    Recent labs: (last 7 days)     12/07/22  1247   INR 2.6*       ASSESSMENT       Source(s): Chart Review    Previous INR was Therapeutic last visit; previously outside of goal range    Medication, diet, health changes since last INR chart reviewed; none identified           PLAN     Unable to reach Bebe today.    No instructions provided. Unable to leave voicemail.    Follow up required to confirm warfarin dose taken and assess for changes    Justin JOHNSON RN  Anticoagulation Clinic  12/7/2022

## 2022-12-30 DIAGNOSIS — F41.1 GENERALIZED ANXIETY DISORDER: ICD-10-CM

## 2023-01-04 ENCOUNTER — TELEPHONE (OUTPATIENT)
Dept: ANTICOAGULATION | Facility: CLINIC | Age: 78
End: 2023-01-04

## 2023-01-04 ENCOUNTER — LAB (OUTPATIENT)
Dept: LAB | Facility: CLINIC | Age: 78
End: 2023-01-04
Payer: COMMERCIAL

## 2023-01-04 ENCOUNTER — ANTICOAGULATION THERAPY VISIT (OUTPATIENT)
Dept: ANTICOAGULATION | Facility: CLINIC | Age: 78
End: 2023-01-04

## 2023-01-04 DIAGNOSIS — Z79.01 LONG TERM CURRENT USE OF ANTICOAGULANT THERAPY: ICD-10-CM

## 2023-01-04 DIAGNOSIS — Z86.73 HISTORY OF CVA (CEREBROVASCULAR ACCIDENT): ICD-10-CM

## 2023-01-04 DIAGNOSIS — Z79.01 ANTICOAGULATION MONITORING, INR RANGE 2-3: Chronic | ICD-10-CM

## 2023-01-04 DIAGNOSIS — I63.20 CEREBROVASCULAR ACCIDENT (CVA) DUE TO OCCLUSION OF PRECEREBRAL ARTERY (H): Primary | ICD-10-CM

## 2023-01-04 DIAGNOSIS — I63.20 CEREBROVASCULAR ACCIDENT (CVA) DUE TO OCCLUSION OF PRECEREBRAL ARTERY (H): ICD-10-CM

## 2023-01-04 DIAGNOSIS — Z79.01 LONG TERM CURRENT USE OF ANTICOAGULANT THERAPY: Primary | ICD-10-CM

## 2023-01-04 DIAGNOSIS — I63.20 CEREBROVASCULAR ACCIDENT (CVA) DUE TO OCCLUSION OF PRECEREBRAL ARTERY (H): Chronic | ICD-10-CM

## 2023-01-04 LAB — INR BLD: 2.9 (ref 0.9–1.1)

## 2023-01-04 PROCEDURE — 85610 PROTHROMBIN TIME: CPT

## 2023-01-04 PROCEDURE — 36416 COLLJ CAPILLARY BLOOD SPEC: CPT

## 2023-01-04 NOTE — TELEPHONE ENCOUNTER
ANTICOAGULATION CLINIC REFERRAL RENEWAL REQUEST       An annual renewal order is required for all patients referred to [facility].?  Please review and sign the pended referral order for Bebe Alfonso.       ANTICOAGULATION SUMMARY      Warfarin indication(s)   Stroke    Mechanical heart valve present?  NO       Current goal range   INR: 2.0-3.0     Goal appropriate for indication? Goal INR 2-3, standard for indication(s) above     Time in Therapeutic Range (TTR)  (Goal > 60%) 71.9%       Office visit with referring provider's group within last year yes on 9/28/22       Justin Naidu RN  [facility]

## 2023-01-04 NOTE — PROGRESS NOTES
ANTICOAGULATION MANAGEMENT     Bebe SHELDON Alfonso 77 year old female is on warfarin with therapeutic INR result. (Goal INR 2.0-3.0)    Recent labs: (last 7 days)     01/04/23  0947   INR 2.9*       ASSESSMENT       Source(s): Chart Review and Patient/Caregiver Call       Warfarin doses taken: Warfarin taken as instructed    Diet: No new diet changes identified    New illness, injury, or hospitalization: No    Medication/supplement changes: None noted    Signs or symptoms of bleeding or clotting: No    Previous INR: Therapeutic last 2(+) visits    Additional findings: None       PLAN     Recommended plan for no diet, medication or health factor changes affecting INR     Dosing Instructions: Continue your current warfarin dose with next INR in 6 weeks       Summary  As of 1/4/2023    Full warfarin instructions:  7.5 mg every Mon, Wed, Fri; 5 mg all other days   Next INR check:  2/15/2023             Telephone call with Bebe who verbalizes understanding and agrees to plan    Lab visit scheduled    Education provided:     Please call back if any changes to your diet, medications or how you've been taking warfarin    Contact [phone]  with any changes, questions or concerns.     Plan made per [facility] anticoagulation protocol    Justin JOHNSON RN  Anticoagulation Clinic  1/4/2023    _______________________________________________________________________     Anticoagulation Episode Summary     Current INR goal:  2.0-3.0   TTR:  72.4 % (1 y)   Target end date:  Indefinite   Send INR reminders to:  [facility]    Indications    History of CVA (cerebrovascular accident) (Resolved) [Z86.73]  Long term current use of anticoagulant therapy [Z79.01]  Cerebrovascular accident (CVA) due to occlusion of precerebral artery (H) [I63.20]  History of CVA (cerebrovascular accident) [Z86.73]           Comments:           Anticoagulation Care Providers     Provider Role Specialty Phone number    Diana Ely APRN CNP  [facility] Family Medicine [phone]

## 2023-01-17 DIAGNOSIS — Z86.73 HISTORY OF CVA (CEREBROVASCULAR ACCIDENT): ICD-10-CM

## 2023-01-17 DIAGNOSIS — I63.20 CEREBROVASCULAR ACCIDENT (CVA) DUE TO OCCLUSION OF PRECEREBRAL ARTERY (H): ICD-10-CM

## 2023-01-17 DIAGNOSIS — Z79.01 LONG TERM CURRENT USE OF ANTICOAGULANT THERAPY: ICD-10-CM

## 2023-01-19 RX ORDER — WARFARIN SODIUM 5 MG/1
TABLET ORAL
Qty: 109 TABLET | Refills: 1 | Status: SHIPPED | OUTPATIENT
Start: 2023-01-19 | End: 2023-07-25

## 2023-01-19 NOTE — TELEPHONE ENCOUNTER
ANTICOAGULATION MANAGEMENT:  Medication Refill    Anticoagulation Summary  As of 1/4/2023    Warfarin maintenance plan:  7.5 mg (5 mg x 1.5) every Mon, Wed, Fri; 5 mg (5 mg x 1) all other days   Next INR check:  2/15/2023   Target end date:  Indefinite    Indications    History of CVA (cerebrovascular accident) (Resolved) [Z86.73]  Long term current use of anticoagulant therapy [Z79.01]  Cerebrovascular accident (CVA) due to occlusion of precerebral artery (H) [I63.20]  History of CVA (cerebrovascular accident) [Z86.73]             Anticoagulation Care Providers     Provider Role Specialty Phone number    Diana Ely APRN [facility] Referring Family Medicine [phone]          Visit with referring provider/group within last year: Yes    [facility] referral signed within last year: Yes    Bebe meets all criteria for refill (current [facility] referral, office visit with referring provider/group in last year, lab monitoring up to date or not exceeding 2 weeks overdue). Rx instructions and quantity supplied updated to match patient's current dosing plan. Warfarin 90 day supply with 1 refill granted per [facility] protocol     Natalie Sorto RN  Anticoagulation Clinic

## 2023-02-15 ENCOUNTER — LAB (OUTPATIENT)
Dept: LAB | Facility: CLINIC | Age: 78
End: 2023-02-15
Payer: COMMERCIAL

## 2023-02-15 ENCOUNTER — ANTICOAGULATION THERAPY VISIT (OUTPATIENT)
Dept: ANTICOAGULATION | Facility: CLINIC | Age: 78
End: 2023-02-15

## 2023-02-15 DIAGNOSIS — Z79.01 ANTICOAGULATION MONITORING, INR RANGE 2-3: Chronic | ICD-10-CM

## 2023-02-15 DIAGNOSIS — Z79.01 LONG TERM CURRENT USE OF ANTICOAGULANT THERAPY: ICD-10-CM

## 2023-02-15 DIAGNOSIS — I63.20 CEREBROVASCULAR ACCIDENT (CVA) DUE TO OCCLUSION OF PRECEREBRAL ARTERY (H): Chronic | ICD-10-CM

## 2023-02-15 DIAGNOSIS — Z86.73 HISTORY OF CVA (CEREBROVASCULAR ACCIDENT): ICD-10-CM

## 2023-02-15 DIAGNOSIS — I63.20 CEREBROVASCULAR ACCIDENT (CVA) DUE TO OCCLUSION OF PRECEREBRAL ARTERY (H): ICD-10-CM

## 2023-02-15 DIAGNOSIS — Z79.01 LONG TERM CURRENT USE OF ANTICOAGULANT THERAPY: Primary | ICD-10-CM

## 2023-02-15 LAB — INR BLD: 2.3 (ref 0.9–1.1)

## 2023-02-15 PROCEDURE — 85610 PROTHROMBIN TIME: CPT

## 2023-02-15 PROCEDURE — 36416 COLLJ CAPILLARY BLOOD SPEC: CPT

## 2023-02-15 NOTE — PROGRESS NOTES
ANTICOAGULATION MANAGEMENT     Bebe Alofnso 77 year old female is on warfarin with therapeutic INR result. (Goal INR 2.0-3.0)    Recent labs: (last 7 days)     02/15/23  0934   INR 2.3*       ASSESSMENT       Source(s): Chart Review and Patient/Caregiver Call       Warfarin doses taken: Warfarin taken as instructed    Diet: No new diet changes identified    New illness, injury, or hospitalization: No    Medication/supplement changes: None noted    Signs or symptoms of bleeding or clotting: No    Previous INR: Therapeutic last 2(+) visits    Additional findings: None     PLAN     Recommended plan for no diet, medication or health factor changes affecting INR     Dosing Instructions: Continue your current warfarin dose with next INR in 6 weeks       Summary  As of 2/15/2023    Full warfarin instructions:  7.5 mg every Mon, Wed, Fri; 5 mg all other days   Next INR check:  3/29/2023             Telephone call with Bebe who verbalizes understanding and agrees to plan    Lab visit scheduled    Education provided:     Please call back if any changes to your diet, medications or how you've been taking warfarin    Plan made per [facility] anticoagulation protocol    Deneen Pretty RN  Anticoagulation Clinic  2/15/2023    _______________________________________________________________________     Anticoagulation Episode Summary     Current INR goal:  2.0-3.0   TTR:  73.9 % (1 y)   Target end date:  Indefinite   Send INR reminders to:  [facility]    Indications    History of CVA (cerebrovascular accident) (Resolved) [Z86.73]  Long term current use of anticoagulant therapy [Z79.01]  Cerebrovascular accident (CVA) due to occlusion of precerebral artery (H) [I63.20]  History of CVA (cerebrovascular accident) [Z86.73]  Anticoagulation monitoring  INR range 2-3 [Z79.01]           Comments:           Anticoagulation Care Providers     Provider Role Specialty Phone number    Diana Ely APRN CNP Referring  Family Medicine [phone]

## 2023-03-06 NOTE — PATIENT INSTRUCTIONS
Labs today for kidney function  Take doxycycline 100 mg twice daily for 10 day  Ok to hold the hydrochlorithiazide for now.   Stop smoking  Continue to follow renal diet.     Patient Education     Kidney Disease: Avoiding High-Sodium Foods  Sodium is a mineral that the body needs in small amounts. Sodium is found in table salt. Table salt is sodium chloride. Most people eat far more salt than they need. There are 2 main reasons for this. Salt is present in high amounts in most processed foods (pre-prepared foods like breakfast cereals, cookies, and pickles) and in all restaurant foods. In other words, if you are not cooking from fresh ingredients at home, you are very likely eating more salt than you need. When sodium intake is too high, it can increase thirst and cause the body to retain fluid. This can increase blood pressure and strain the kidneys. If you have chronic kidney disease, try not to eat the foods listed here, unless the label states that they are made without salt. People with chronic kidney disease should restrict their sodium intake to less than 1,500 mg of sodium (3,800 mg of table salt) each day.      Canned and processed foods, such as gravies, instant cereal, packaged noodles and potato mixes, olives, pickles, soups, vegetables    Cheeses, such as American, Blue, Parmesan, Roquefort    Cured meats, such as antony, beef jerky, bologna, corned beef, ham, hot dogs, sandwich meats, sausages    Fast foods, such as burritos, fish sandwiches, milk shakes, salted French fries, tacos    Frozen foods, such as meat pies, TV dinners, waffles    Salted snacks, such as chips, crackers, peanut popcorn, pretzels, and nuts    Other packaged items, such as antacids, baking soda, bouillon, catsup, lite salt, relish, salted butter and margarine, soy and teriyaki sauce, steak sauce, vegetable juices  Date Last Reviewed: 2/1/2017 2000-2018 [employer]. [address] [place] [address]. All  rights reserved. This information is not intended as a substitute for professional medical care. Always follow your healthcare professional's instructions.           Patient Education     Acute Bacterial Rhinosinusitis (ABRS)    Acute bacterial rhinosinusitis (ABRS) is an infection of your nasal cavity and sinuses. It s caused by bacteria. Acute means that you ve had symptoms for less than 4 weeks, but possibly up to 12 weeks.  Understanding your sinuses  The nasal cavity is the large air-filled space behind your nose. The sinuses are a group of spaces formed by the bones of your face. They connect with your nasal cavity. ABRS causes the tissue lining these spaces to become inflamed. Mucus may not drain normally. This leads to facial pain and other symptoms.  What causes ABRS?  ABRS most often follows an upper respiratory infection caused by a virus. Bacteria then infect the lining of your nasal cavity and sinuses. But you can also get ABRS if you have:    Nasal allergies    Long-term nasal swelling and congestion not caused by allergies    Blockage in the nose  Symptoms of ABRS  The symptoms of ABRS may be different for each person and include:    Nasal congestion or blockage    Pain or pressure in the face    Thick, colored drainage from the nose  Other symptoms may include:    Runny nose    Fluid draining from the nose down the throat (postnasal drip)    Headache    Cough    Pain    Fever  Diagnosing ABRS  ABRS may be diagnosed if you ve had an upper respiratory infection like a cold and cough for 10 or more days without improvement or with worsening symptoms. Your healthcare provider will ask about your symptoms and your medical history. The provider will check your vital signs, including your temperature. You ll have a physical exam. The healthcare provider will check your ears, nose, and throat. You likely won t need any tests. If ABRS comes back, you may have a culture or other tests.  Treatment for  ABRS  Treatment may include:    Antibiotic medicine. This is for symptoms that last for at least 10 to 14 days.    Nasal corticosteroid medicine. Drops or spray used in the nose can lessen swelling and congestion.    Over-the-counter pain medicine. This is to lessen sinus pain and pressure.    Nasal decongestant medicine. Spray or drops may help to lessen congestion. Do not use them for more than a few days.    Salt wash (saline irrigation). This can help to loosen mucus.  Possible complications of ABRS  ABRS may come back or become long-term (chronic). In rare cases, ABRS may cause complications such as:     Inflamed tissue around the brain and spinal cord (meningitis)    Inflamed tissue around the eyes (orbital cellulitis)    Inflamed bones around the sinuses (osteitis)  These problems may need to be treated in a hospital with intravenous (IV) antibiotic medicine or surgery.  When to call the healthcare provider  Call your healthcare provider if you have any of the following:    Symptoms that don t get better, or get worse    Symptoms that don t get better after 3 to 5 days on antibiotics    Trouble seeing    Swelling around your eyes    Confusion or trouble staying awake   Date Last Reviewed: 5/1/2017 2000-2018 [employer]. [address] [place] [address]. All rights reserved. This information is not intended as a substitute for professional medical care. Always follow your healthcare professional's instructions.           Patient Education     Kidney Disease: Eating a Safe Amount of Potassium  Potassium is a mineral found in many foods. The body needs some potassium to keep the heart working normally. But if your kidneys don t work well, potassium can build up in your blood. It can be serious and even deadly if the levels go up too high. By controlling the amount of potassium you eat, you can keep a safe level in your blood.  Using this guide  Use this serving guide along with the food  list below. Always follow your dietitian s instructions on the number and size of servings to eat. You can ferreira some of the potassium out of some high potassium foods by soaking and cooking them in large amounts of water. Ask your dietitian about how to do this. Also, talk with your dietitian before eating foods that aren t on this list.    ___ daily servings of foods that have high potassium content (250 mg to 500 mg per serving).    ___ daily servings of foods that have medium potassium content (150 mg to 250 mg per serving).    ___ daily servings of foods that have low potassium content (5 mg to 150 mg per serving).  You can substitute food choices in the following way:  Potassium content of some foods      Vegetable Fruit Starches   High   Artichokes (1)  Bok shravan (  cup)  Spinach (  cup)  Tomatoes (  cup) Bananas (1)  Cantaloupe or honeydew  (  melon)  Oranges (1)  Peaches, fresh (1) Beans, dried (  cup)  Lentils (  cup)  Potatoes (  cup  or 1 small)  Winter squash,  yams (  cup)   Medium   Broccoli (  cup)  Carrots (  cup)  Eggplant (  cup)  Peppers (1) Apples (1)  Cherries (  cup)  Peaches, canned (  cup)  Pears, fresh (  cup) Bread, pumpernickel  (1 slice)  Chickpeas, cooked (  cup)  Corn, fresh (  cup)  Tortillas, corn (4 small)   Low   Asparagus (4 del rio)  Green beans (  cup)  Cauliflower (  cup)  Cucumbers ( )    Blueberries (1 cup)  Grapefruit (  cup)  Grapes (  cup)  Strawberries (  cup)  Watermelon (  cup) Bagel, plain (1)  Bread, white (2 slices)  Oatmeal (  cup)  Pasta, plain (1 cup)  Rice, white (1 cup)   Date Last Reviewed: 1/1/2017 2000-2018 [employer]. [address] [place] [address]. All rights reserved. This information is not intended as a substitute for professional medical care. Always follow your healthcare professional's instructions.            No

## 2023-03-29 ENCOUNTER — LAB (OUTPATIENT)
Dept: LAB | Facility: CLINIC | Age: 78
End: 2023-03-29
Payer: COMMERCIAL

## 2023-03-29 ENCOUNTER — ANTICOAGULATION THERAPY VISIT (OUTPATIENT)
Dept: ANTICOAGULATION | Facility: CLINIC | Age: 78
End: 2023-03-29

## 2023-03-29 DIAGNOSIS — Z79.01 ANTICOAGULATION MONITORING, INR RANGE 2-3: Chronic | ICD-10-CM

## 2023-03-29 DIAGNOSIS — I63.20 CEREBROVASCULAR ACCIDENT (CVA) DUE TO OCCLUSION OF PRECEREBRAL ARTERY (H): ICD-10-CM

## 2023-03-29 DIAGNOSIS — Z86.73 HISTORY OF CVA (CEREBROVASCULAR ACCIDENT): ICD-10-CM

## 2023-03-29 DIAGNOSIS — I63.20 CEREBROVASCULAR ACCIDENT (CVA) DUE TO OCCLUSION OF PRECEREBRAL ARTERY (H): Chronic | ICD-10-CM

## 2023-03-29 DIAGNOSIS — Z79.01 LONG TERM CURRENT USE OF ANTICOAGULANT THERAPY: Primary | ICD-10-CM

## 2023-03-29 DIAGNOSIS — Z79.01 LONG TERM CURRENT USE OF ANTICOAGULANT THERAPY: ICD-10-CM

## 2023-03-29 LAB — INR BLD: 3.4 (ref 0.9–1.1)

## 2023-03-29 PROCEDURE — 85610 PROTHROMBIN TIME: CPT

## 2023-03-29 PROCEDURE — 36416 COLLJ CAPILLARY BLOOD SPEC: CPT

## 2023-03-29 NOTE — PROGRESS NOTES
ANTICOAGULATION MANAGEMENT     Bebe SHELDON Alfonso 77 year old female is on warfarin with supratherapeutic INR result. (Goal INR 2.0-3.0)    Recent labs: (last 7 days)     03/29/23  0824   INR 3.4*       ASSESSMENT       Source(s): Chart Review and Patient/Caregiver Call       Warfarin doses taken: Warfarin taken as instructed    Diet: No new diet changes identified    New illness, injury, or hospitalization: No    Medication/supplement changes: None noted    Signs or symptoms of bleeding or clotting: No    Previous INR: Therapeutic last 2(+) visits    Additional findings: knees hurt somewhat yesterday but they are better today. It is not likely that one day would affect the INR today.         PLAN     Recommended plan for no diet, medication or health factor changes affecting INR     Dosing Instructions: decrease your warfarin dose (5.9% change) with next INR in 2 weeks       Summary  As of 3/29/2023    Full warfarin instructions:  7.5 mg every Mon, Fri; 5 mg all other days   Next INR check:  4/12/2023             Telephone call with Bebe who verbalizes understanding and agrees to plan    Lab visit scheduled    Education provided:     Please call back if any changes to your diet, medications or how you've been taking warfarin    Contact [phone]  with any changes, questions or concerns.     Plan made per [facility] anticoagulation protocol    Natalie Sorto RN  Anticoagulation Clinic  3/29/2023    _______________________________________________________________________     Anticoagulation Episode Summary     Current INR goal:  2.0-3.0   TTR:  77.3 % (1 y)   Target end date:  Indefinite   Send INR reminders to:  [facility]    Indications    History of CVA (cerebrovascular accident) (Resolved) [Z86.73]  Long term current use of anticoagulant therapy [Z79.01]  Cerebrovascular accident (CVA) due to occlusion of precerebral artery (H) [I63.20]  History of CVA (cerebrovascular accident) [Z86.73]  Anticoagulation  monitoring  INR range 2-3 [Z79.01]           Comments:           Anticoagulation Care Providers     Provider Role Specialty Phone number    Diana Ely APRN CNP Referring Family Medicine [phone]

## 2023-04-13 ENCOUNTER — ANTICOAGULATION THERAPY VISIT (OUTPATIENT)
Dept: ANTICOAGULATION | Facility: CLINIC | Age: 78
End: 2023-04-13

## 2023-04-13 ENCOUNTER — LAB (OUTPATIENT)
Dept: LAB | Facility: CLINIC | Age: 78
End: 2023-04-13
Payer: COMMERCIAL

## 2023-04-13 DIAGNOSIS — Z79.01 LONG TERM CURRENT USE OF ANTICOAGULANT THERAPY: ICD-10-CM

## 2023-04-13 DIAGNOSIS — Z79.01 ANTICOAGULATION MONITORING, INR RANGE 2-3: Chronic | ICD-10-CM

## 2023-04-13 DIAGNOSIS — Z86.73 HISTORY OF CVA (CEREBROVASCULAR ACCIDENT): ICD-10-CM

## 2023-04-13 DIAGNOSIS — I63.20 CEREBROVASCULAR ACCIDENT (CVA) DUE TO OCCLUSION OF PRECEREBRAL ARTERY (H): Chronic | ICD-10-CM

## 2023-04-13 DIAGNOSIS — Z79.01 LONG TERM CURRENT USE OF ANTICOAGULANT THERAPY: Primary | ICD-10-CM

## 2023-04-13 DIAGNOSIS — I63.20 CEREBROVASCULAR ACCIDENT (CVA) DUE TO OCCLUSION OF PRECEREBRAL ARTERY (H): ICD-10-CM

## 2023-04-13 LAB — INR BLD: 3.1 (ref 0.9–1.1)

## 2023-04-13 PROCEDURE — 85610 PROTHROMBIN TIME: CPT

## 2023-04-13 PROCEDURE — 36416 COLLJ CAPILLARY BLOOD SPEC: CPT

## 2023-04-13 NOTE — PROGRESS NOTES
ANTICOAGULATION MANAGEMENT     Beeb SHELDON Kaden 77 year old female is on warfarin with supratherapeutic INR result. (Goal INR 2.0-3.0)    Recent labs: (last 7 days)     04/13/23  0940   INR 3.1*       ASSESSMENT       Source(s): Chart Review and Patient/Caregiver Call       Warfarin doses taken: Warfarin taken as instructed    Diet: No new diet changes identified    New illness, injury, or hospitalization: No    Medication/supplement changes: None noted    Signs or symptoms of bleeding or clotting: No    Previous INR: Supratherapeutic    Additional findings: None         PLAN     Recommended plan for no diet, medication or health factor changes affecting INR     Dosing Instructions: decrease your warfarin dose (6.2% change) with next INR in 2 weeks       Summary  As of 4/13/2023    Full warfarin instructions:  7.5 mg every Mon; 5 mg all other days   Next INR check:  4/27/2023             Telephone call with Bebe who verbalizes understanding and agrees to plan    Lab visit scheduled    Education provided:     Goal range and lab monitoring: goal range and significance of current result    Plan made per [facility] anticoagulation protocol    Vandana Garcia RN  Anticoagulation Clinic  4/13/2023    _______________________________________________________________________     Anticoagulation Episode Summary     Current INR goal:  2.0-3.0   TTR:  73.1 % (1 y)   Target end date:  Indefinite   Send INR reminders to:  [facility]    Indications    History of CVA (cerebrovascular accident) (Resolved) [Z86.73]  Long term current use of anticoagulant therapy [Z79.01]  Cerebrovascular accident (CVA) due to occlusion of precerebral artery (H) [I63.20]  History of CVA (cerebrovascular accident) [Z86.73]  Anticoagulation monitoring  INR range 2-3 [Z79.01]           Comments:           Anticoagulation Care Providers     Provider Role Specialty Phone number    Diana Ely APRN CNP Referring Family Medicine [phone]

## 2023-04-19 ENCOUNTER — OFFICE VISIT (OUTPATIENT)
Dept: URGENT CARE | Facility: URGENT CARE | Age: 78
End: 2023-04-19
Payer: COMMERCIAL

## 2023-04-19 VITALS
SYSTOLIC BLOOD PRESSURE: 172 MMHG | WEIGHT: 131 LBS | HEART RATE: 68 BPM | TEMPERATURE: 97.9 F | BODY MASS INDEX: 21.47 KG/M2 | OXYGEN SATURATION: 97 % | DIASTOLIC BLOOD PRESSURE: 57 MMHG

## 2023-04-19 DIAGNOSIS — Z79.01 ANTICOAGULATION MONITORING, INR RANGE 2-3: Chronic | ICD-10-CM

## 2023-04-19 DIAGNOSIS — Z86.73 HISTORY OF CVA (CEREBROVASCULAR ACCIDENT): ICD-10-CM

## 2023-04-19 DIAGNOSIS — Z23 NEED FOR VACCINATION: ICD-10-CM
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
## 2023-04-19 PROCEDURE — 99214 OFFICE O/P EST MOD 30 MIN: CPT | Mod: 25 | Performed by: NURSE PRACTITIONER

## 2023-04-19 PROCEDURE — 90471 IMMUNIZATION ADMIN: CPT | Performed by: NURSE PRACTITIONER

## 2023-04-19 PROCEDURE — 90715 TDAP VACCINE 7 YRS/> IM: CPT | Performed by: NURSE PRACTITIONER

## 2023-04-19 RX ORDER — DOXYCYCLINE 100 MG/1
100 CAPSULE ORAL 2 TIMES DAILY
Qty: 14 CAPSULE | Refills: 0 | Status: SHIPPED | OUTPATIENT
Start: 2023-04-19 | End: 2023-04-26

## 2023-04-19 NOTE — PROGRESS NOTES
Assessment & Plan     1. Cat bite, initial encounter  Recommend treatment with oral antibiotic and was given tetanus that was due for vaccination in clinic.  Side effects were discussed as well and has close follow-up with Coumadin nurse as she is on long-term anticoagulation which she notes she has been running somewhat elevated last INR was approximately 5 days ago this was 3.1.  She has history of CVAs in the past which is currently why she is taking anticoag therapy.  Discussed close monitoring of area for signs of infection discussed if area became inflamed, warm, swollen with fever would recommend close follow-up through emergency department as this may be an indication of sepsis patient verbalized understanding of the above.  - Tdap, tetanus-diptheria-acell pertussis, (BOOSTRIX) 5-2.5-18.5 LF-MCG/0.5 DAMI injection; Inject 0.5 mLs into the muscle once for 1 dose  Dispense: 0.5 mL; Refill: 0  - doxycycline hyclate (VIBRAMYCIN) 100 MG capsule; Take 1 capsule (100 mg) by mouth 2 times daily for 7 days  Dispense: 14 capsule; Refill: 0    2. Need for vaccination    - Tdap, tetanus-diptheria-acell pertussis, (BOOSTRIX) 5-2.5-18.5 LF-MCG/0.5 DAMI injection; Inject 0.5 mLs into the muscle once for 1 dose  Dispense: 0.5 mL; Refill: 0    3. Anticoagulation monitoring, INR range 2-3    4. History of CVA (cerebrovascular accident)      SELWYN [facility]    Jayla Spence is a 77 year old female who presents to clinic today for the following health issues:  Chief Complaint   Patient presents with     Trauma     This morning was bit by her cat in the left forearm.  Last TDap April 2009.     HPI    Patient states that she was up this morning approximately 3 AM approximately 9 hours ago states that she was Holding Her as she was letting her cat outside the cat bit her left forearm she immediately washed the area with soap and water and applied hydrogen peroxide.   She denies fever.  States cat is not up-to-date on vaccinations.  Cat is mainly an indoor cat but does go outside probiotic lately.      Review of Systems  Constitutional, HEENT, cardiovascular, pulmonary, gi and gu systems are negative, except as otherwise noted.      Objective    BP (!) 172/57   Pulse 68   Temp 97.9  F (36.6  C) (Tympanic)   Wt 59.4 kg (131 lb)   SpO2 97%   BMI 21.47 kg/m    Physical Exam   GENERAL: healthy, alert and no distress  NECK: no adenopathy, no asymmetry, masses, or scars and thyroid normal to palpation  RESP: lungs clear to auscultation - no rales, rhonchi or wheezes  CV: regular rate and rhythm, normal S1 S2, no S3 or S4, no murmur, click or rub, no peripheral edema and peripheral pulses strong  MS: no gross musculoskeletal defects noted, no edema  SKIN: 3 sites of lacerations left forearm lateral side ranging in size from approximately 6 to 7 mm being the smallest, 1/2 inch second smallest and largest being approximately 1 inch in length.  Please see media tab for photo negative for inflammation fluctuance warmth or drainage.

## 2023-04-20 ENCOUNTER — TELEPHONE (OUTPATIENT)
Dept: SCHEDULING | Facility: CLINIC | Age: 78
End: 2023-04-20
Payer: COMMERCIAL

## 2023-04-20 DIAGNOSIS — Z79.01 LONG TERM CURRENT USE OF ANTICOAGULANT THERAPY: Primary | ICD-10-CM

## 2023-04-20 DIAGNOSIS — Z79.01 ANTICOAGULATION MONITORING, INR RANGE 2-3: Chronic | ICD-10-CM

## 2023-04-20 DIAGNOSIS — I63.20 CEREBROVASCULAR ACCIDENT (CVA) DUE TO OCCLUSION OF PRECEREBRAL ARTERY (H): Chronic | ICD-10-CM

## 2023-04-20 DIAGNOSIS — Z86.73 HISTORY OF CVA (CEREBROVASCULAR ACCIDENT): ICD-10-CM

## 2023-04-20 NOTE — TELEPHONE ENCOUNTER
Pt was prescribed doxycycline X 7 days at  for a cat bite. Pt picked up the prescription and started doxycyline today.    Summary Tetracyclines may enhance the anticoagulant effect of Vitamin K Antagonists. Severity Moderate Reliability Rating Fair     Advise pt to check INR on Friday or Monday in the lab. appt scheduled. Pt is to be seen for any concerns.  Deneen Pretty RN on 4/20/2023 at 12:17 PM

## 2023-04-20 NOTE — TELEPHONE ENCOUNTER
General Call    Contacts       Type Contact Phone/Fax    04/20/2023 09:52 AM CDT Phone (Incoming) Bebe Alfonso (Self) [phone] (H)        Reason for Call:  Anticoagulation     What are your questions or concerns:  Bebe is starting on new medications and was told to call you regarding this. Please call.     Date of last appointment with provider: 4/13    Could we send this information to you in [employer] or would you prefer to receive a phone call?:   Patient would prefer a phone call   Okay to leave a detailed message?: Yes at Home number on file [phone] (home)

## 2023-04-21 ENCOUNTER — ANTICOAGULATION THERAPY VISIT (OUTPATIENT)
Dept: ANTICOAGULATION | Facility: CLINIC | Age: 78
End: 2023-04-21

## 2023-04-21 ENCOUNTER — LAB (OUTPATIENT)
Dept: LAB | Facility: CLINIC | Age: 78
End: 2023-04-21
Payer: COMMERCIAL

## 2023-04-21 DIAGNOSIS — Z79.01 ANTICOAGULATION MONITORING, INR RANGE 2-3: Chronic | ICD-10-CM

## 2023-04-21 DIAGNOSIS — I63.20 CEREBROVASCULAR ACCIDENT (CVA) DUE TO OCCLUSION OF PRECEREBRAL ARTERY (H): ICD-10-CM

## 2023-04-21 DIAGNOSIS — I63.20 CEREBROVASCULAR ACCIDENT (CVA) DUE TO OCCLUSION OF PRECEREBRAL ARTERY (H): Chronic | ICD-10-CM

## 2023-04-21 DIAGNOSIS — Z79.01 LONG TERM CURRENT USE OF ANTICOAGULANT THERAPY: ICD-10-CM

## 2023-04-21 DIAGNOSIS — Z86.73 HISTORY OF CVA (CEREBROVASCULAR ACCIDENT): ICD-10-CM

## 2023-04-21 DIAGNOSIS — Z79.01 LONG TERM CURRENT USE OF ANTICOAGULANT THERAPY: Primary | ICD-10-CM

## 2023-04-21 LAB — INR BLD: 2 (ref 0.9–1.1)

## 2023-04-21 PROCEDURE — 36416 COLLJ CAPILLARY BLOOD SPEC: CPT

## 2023-04-21 PROCEDURE — 85610 PROTHROMBIN TIME: CPT

## 2023-04-21 NOTE — PROGRESS NOTES
ANTICOAGULATION MANAGEMENT     Bebe Alfonso 77 year old female is on warfarin with therapeutic INR result. (Goal INR 2.0-3.0)    Recent labs: (last 7 days)     04/21/23  1616   INR 2.0*       ASSESSMENT       Source(s): Chart Review    Previous INR was Supratherapeutic    Medication, diet, health changes since last INR new antibiotics              PLAN     Unable to reach Bebe today.    left [employer] messaging for weekend. No answer on cell phone and busy signal at home number    Follow up required to confirm warfarin dose taken and assess for changes, confirm warfarin dose taken, assess for changes  and discuss out of range result     Anitra Ricketts, RN  Anticoagulation Clinic  4/21/2023

## 2023-04-24 DIAGNOSIS — J44.1 COPD EXACERBATION (H): ICD-10-CM

## 2023-04-25 RX ORDER — AMLODIPINE BESYLATE 5 MG/1
TABLET ORAL
Qty: 90 TABLET | Refills: 0 | Status: SHIPPED | OUTPATIENT
Start: 2023-04-25 | End: 2023-07-26

## 2023-04-25 NOTE — TELEPHONE ENCOUNTER
"Requested Prescriptions   Pending Prescriptions Disp Refills    amLODIPine (NORVASC) 5 MG tablet [Pharmacy Med Name: amLODIPine Besylate 5 MG Oral Tablet] 90 tablet 0     Sig: Take 1 tablet by mouth once daily       Calcium Channel Blockers Protocol  Failed - 4/24/2023  9:30 AM        Failed - Blood pressure under 140/90 in past 12 months     BP Readings from Last 3 Encounters:   04/19/23 (!) 172/57   09/28/22 135/62   09/07/22 (!) 182/82                 Failed - Normal serum creatinine on file in past 12 months     Recent Labs   Lab Test 09/28/22  1019 05/09/19  1129 04/18/19  1024   CR 1.57*   < >  --    CREAT  --   --  1.1*    < > = values in this interval not displayed.       Ok to refill medication if creatinine is low          Passed - Recent (12 mo) or future (30 days) visit within the authorizing provider's specialty     Patient has had an office visit with the authorizing provider or a provider within the authorizing providers department within the previous 12 mos or has a future within next 30 days. See \"Patient Info\" tab in inbasket, or \"Choose Columns\" in Meds & Orders section of the refill encounter.              Passed - Medication is active on med list        Passed - Patient is age 18 or older        Passed - No active pregnancy on record        Passed - No positive pregnancy test in past 12 months             "

## 2023-04-27 ENCOUNTER — LAB (OUTPATIENT)
Dept: LAB | Facility: CLINIC | Age: 78
End: 2023-04-27
Payer: COMMERCIAL

## 2023-04-27 ENCOUNTER — ANTICOAGULATION THERAPY VISIT (OUTPATIENT)
Dept: ANTICOAGULATION | Facility: CLINIC | Age: 78
End: 2023-04-27

## 2023-04-27 DIAGNOSIS — I63.20 CEREBROVASCULAR ACCIDENT (CVA) DUE TO OCCLUSION OF PRECEREBRAL ARTERY (H): Chronic | ICD-10-CM

## 2023-04-27 DIAGNOSIS — Z79.01 LONG TERM CURRENT USE OF ANTICOAGULANT THERAPY: ICD-10-CM

## 2023-04-27 DIAGNOSIS — Z79.01 ANTICOAGULATION MONITORING, INR RANGE 2-3: Chronic | ICD-10-CM

## 2023-04-27 DIAGNOSIS — Z86.73 HISTORY OF CVA (CEREBROVASCULAR ACCIDENT): ICD-10-CM

## 2023-04-27 DIAGNOSIS — I63.20 CEREBROVASCULAR ACCIDENT (CVA) DUE TO OCCLUSION OF PRECEREBRAL ARTERY (H): ICD-10-CM

## 2023-04-27 DIAGNOSIS — Z79.01 LONG TERM CURRENT USE OF ANTICOAGULANT THERAPY: Primary | ICD-10-CM

## 2023-04-27 LAB — INR BLD: 2.3 (ref 0.9–1.1)

## 2023-04-27 PROCEDURE — 85610 PROTHROMBIN TIME: CPT

## 2023-04-27 PROCEDURE — 36416 COLLJ CAPILLARY BLOOD SPEC: CPT

## 2023-04-27 NOTE — PROGRESS NOTES
ANTICOAGULATION MANAGEMENT     Bebe Alfonso 77 year old female is on warfarin with therapeutic INR result. (Goal INR 2.0-3.0)    Recent labs: (last 7 days)     04/27/23  0809   INR 2.3*       ASSESSMENT       Source(s): Chart Review and Patient/Caregiver Call       Warfarin doses taken: Warfarin taken as instructed    Diet: No new diet changes identified    Medication/supplement changes: Pt has finished with doxy    New illness, injury, or hospitalization: No    Signs or symptoms of bleeding or clotting: No    Previous result: Therapeutic last visit; previously outside of goal range    Additional findings: None         PLAN     Recommended plan for no diet, medication or health factor changes affecting INR     Dosing Instructions: Continue your current warfarin dose with next INR in 2 weeks       Summary  As of 4/27/2023    Full warfarin instructions:  7.5 mg every Mon; 5 mg all other days   Next INR check:  5/11/2023             Telephone call with Bebe who verbalizes understanding and agrees to plan and who agrees to plan and repeated back plan correctly    Lab visit scheduled    Education provided:     None required    Plan made per [facility] anticoagulation protocol    Veronika Weber RN  Anticoagulation Clinic  4/27/2023    _______________________________________________________________________     Anticoagulation Episode Summary     Current INR goal:  2.0-3.0   TTR:  72.9 % (1 y)   Target end date:  Indefinite   Send INR reminders to:  [facility]    Indications    History of CVA (cerebrovascular accident) (Resolved) [Z86.73]  Long term current use of anticoagulant therapy [Z79.01]  Cerebrovascular accident (CVA) due to occlusion of precerebral artery (H) [I63.20]  History of CVA (cerebrovascular accident) [Z86.73]  Anticoagulation monitoring  INR range 2-3 [Z79.01]           Comments:           Anticoagulation Care Providers     Provider Role Specialty Phone number    Diana Ely APRN  CNP Referring Family Medicine [phone]

## 2023-05-06 DIAGNOSIS — I10 BENIGN ESSENTIAL HYPERTENSION: ICD-10-CM

## 2023-05-09 RX ORDER — ENALAPRIL MALEATE 20 MG/1
TABLET ORAL
Qty: 90 TABLET | Refills: 0 | Status: SHIPPED | OUTPATIENT
Start: 2023-05-09 | End: 2023-07-26

## 2023-05-11 ENCOUNTER — ANTICOAGULATION THERAPY VISIT (OUTPATIENT)
Dept: ANTICOAGULATION | Facility: CLINIC | Age: 78
End: 2023-05-11

## 2023-05-11 ENCOUNTER — NURSE TRIAGE (OUTPATIENT)
Dept: NURSING | Facility: CLINIC | Age: 78
End: 2023-05-11

## 2023-05-11 ENCOUNTER — OFFICE VISIT (OUTPATIENT)
Dept: FAMILY MEDICINE | Facility: CLINIC | Age: 78
End: 2023-05-11
Payer: COMMERCIAL

## 2023-05-11 ENCOUNTER — LAB (OUTPATIENT)
Dept: LAB | Facility: CLINIC | Age: 78
End: 2023-05-11
Payer: COMMERCIAL

## 2023-05-11 VITALS
SYSTOLIC BLOOD PRESSURE: 122 MMHG | OXYGEN SATURATION: 97 % | DIASTOLIC BLOOD PRESSURE: 50 MMHG | BODY MASS INDEX: 20.45 KG/M2 | TEMPERATURE: 98.9 F | HEART RATE: 84 BPM | WEIGHT: 124.8 LBS | RESPIRATION RATE: 24 BRPM

## 2023-05-11 DIAGNOSIS — R07.81 RIB PAIN ON RIGHT SIDE: Primary | ICD-10-CM

## 2023-05-11 DIAGNOSIS — Z86.73 HISTORY OF CVA (CEREBROVASCULAR ACCIDENT): ICD-10-CM

## 2023-05-11 DIAGNOSIS — Z79.01 ANTICOAGULATION MONITORING, INR RANGE 2-3: Chronic | ICD-10-CM

## 2023-05-11 DIAGNOSIS — I63.20 CEREBROVASCULAR ACCIDENT (CVA) DUE TO OCCLUSION OF PRECEREBRAL ARTERY (H): ICD-10-CM

## 2023-05-11 DIAGNOSIS — I63.20 CEREBROVASCULAR ACCIDENT (CVA) DUE TO OCCLUSION OF PRECEREBRAL ARTERY (H): Chronic | ICD-10-CM

## 2023-05-11 DIAGNOSIS — Z79.01 LONG TERM CURRENT USE OF ANTICOAGULANT THERAPY: Primary | ICD-10-CM

## 2023-05-11 DIAGNOSIS — Z79.01 LONG TERM CURRENT USE OF ANTICOAGULANT THERAPY: ICD-10-CM

## 2023-05-11 LAB — INR BLD: 2.2 (ref 0.9–1.1)

## 2023-05-11 PROCEDURE — 99213 OFFICE O/P EST LOW 20 MIN: CPT | Performed by: PHYSICIAN ASSISTANT

## 2023-05-11 PROCEDURE — 85610 PROTHROMBIN TIME: CPT

## 2023-05-11 PROCEDURE — 36416 COLLJ CAPILLARY BLOOD SPEC: CPT

## 2023-05-11 ASSESSMENT — PAIN SCALES - GENERAL: PAINLEVEL: EXTREME PAIN (8)

## 2023-05-11 NOTE — PATIENT INSTRUCTIONS
This is likely a hematoma vs a broken rib. Both will heal using the same treatments, so no x-ray today.      topical Voltaren gel, and Lidocaine patches to take the edge off and continue Tylenol throughout the day.      Using a pillow under the arm or a compressive device will help the area not move as much and decrease your pain.     Stay as active as you can.

## 2023-05-11 NOTE — TELEPHONE ENCOUNTER
"Nurse Triage SBAR    Is this a 2nd Level Triage? NO    Situation:     Patient calling reporting falling yesterday onto right rib cage.    Background:     Patient on Coumadin    Assessment:     Patient calling stating she fell onto right rib cage yesterday, denies hitting head.  Patient stating taking a deep breath is painful. Stating at rest pain improves/resolves.  Pain with movement, deep breath, cough \"8\" on 1-10 pain scale.  Denies swelling or bruising.  Patient denies feeling shortness of breath.    Protocol Recommended Disposition:   See provider with in 24 hours, transferred to Central Scheduling.  Reason for Disposition    [1] High-risk adult (e.g., age > 60 years, osteoporosis, chronic steroid use) AND [2] still hurts    Additional Information    Negative: Major injury from dangerous force or speed (e.g., MVA, fall > 10 feet or 3 meters)    Negative: Bullet wound, knife wound, or other penetrating object    Negative: Puncture wound that sounds life-threatening to the triager    Negative: SEVERE difficulty breathing (e.g., struggling for each breath, speaks in single words)    Negative: [1] Major bleeding (e.g., actively dripping or spurting) AND [2] can't be stopped    Negative: Open wound of the chest with sound of moving air (sucking wound) or visible air bubbles    Negative: Shock suspected (e.g., cold/pale/clammy skin, too weak to stand, low BP, rapid pulse)    Negative: Coughing or spitting up blood    Negative: Bluish (or gray) lips or face now    Negative: Unconscious or was unconscious    Negative: Sounds like a life-threatening emergency to the triager    Negative: [1] Injuries at more than 1 site AND [2] unsure which guideline to use    Negative: Chest pain not from an injury OR cause is unknown    Negative: Wound looks infected    Negative: SEVERE chest pain    Negative: [1] Difficulty breathing AND [2] not severe    Negative: Skin is split open or gaping    Negative: [1] Bleeding AND [2] won't " stop after 10 minutes of direct pressure (using correct technique)    Negative: Sounds like a serious injury to the triager    Negative: [1] Can't take a deep breath BUT [2] no respiratory distress    Negative: Shallow puncture wound    Negative: [1] Collarbone is painful AND [2] difficulty raising arm    Negative: Suspicious history for the injury    Negative: Patient is confused or is an unreliable provider of information (e.g., dementia, severe intellectual disability, alcohol intoxication)    Protocols used: CHEST INJURY-A-AH

## 2023-05-11 NOTE — PROGRESS NOTES
ANTICOAGULATION MANAGEMENT     Bebe Alfonso 77 year old female is on warfarin with therapeutic INR result. (Goal INR 2.0-3.0)    Recent labs: (last 7 days)     05/11/23  1337   INR 2.2*       ASSESSMENT       Source(s): Chart Review    Previous INR was Therapeutic last 2(+) visits    Medication, diet, health changes since last INR: Per PCP visit today patient had a fall yesterday at home. Bruise on ribs.              PLAN     Unable to reach Bebe today.    Unable to leave a VM. Sent MCM to continue dose of 5 mg tonight and requested a call back for assessment.    Follow up required to confirm warfarin dose taken and assess for changes    Justin Naidu RN  Anticoagulation Clinic  5/11/2023

## 2023-05-11 NOTE — PROGRESS NOTES
Assessment & Plan   Rib pain on right side  History and exam is mostly consistent with a rib fracture vs contusion. Vitals are wnl. Exam with good air movement bilaterally and no evidence of pneumonia. She is on warfarin and did consider a hemothorax but the patient does not even have any ecchymosis of the skin, no shortness of breath, and her exam was reassuring. CXR not indicated today as this would not , but patient will monitor symptoms closely and we can order a Chest XR as needed. Encouraged to use topical Voltaren gel, lidocaine patches and compression. Warning signs and symptoms discussed on when to return to clinic or go to the ER. Pt verbalized understanding.      SHRUTI Tang [facility]    Jayla Spence is a 77 year old, presenting for the following health issues:  Musculoskeletal Problem        5/11/2023     1:44 PM   Additional Questions   Roomed by Sandee RODRIGUEZ CMA   Accompanied by -Rodriguez     History of Present Illness       Reason for visit:  Fall-Injury (Patient states that she was in a hurry to get to the bathroom which made her dizzy and caused her to fall. She pushed off a counter to assure she did not hit her head but landed on her back and has back/rib pain )  Symptom onset:  1-3 days ago  Symptoms include:  Rib/back pain  Symptom intensity:  Moderate (Moderate to severe. Is better if she is just sitting/resting. Severe pain with movement, coughing/sneezing)  Symptom progression:  Worsening  Had these symptoms before:  No  What makes it worse:  Moving, walking, coughing and sneezing  What makes it better:  Rest, sleep tylenol    She eats 0-1 servings of fruits and vegetables daily.She consumes 1 sweetened beverage(s) daily.She exercises with enough effort to increase her heart rate 9 or less minutes per day.  She exercises with enough effort to increase her heart rate 3 or less days per week.   She is taking medications  regularly.       Review of Systems   See HPI       Objective    /50 (BP Location: Right arm, Patient Position: Sitting, Cuff Size: Adult Regular)   Pulse 84   Temp 98.9  F (37.2  C) (Tympanic)   Resp 24   Wt 56.6 kg (124 lb 12.8 oz)   SpO2 97%   BMI 20.45 kg/m    Body mass index is 20.45 kg/m .  Physical Exam   Constitutional: healthy, alert, and no distress  Head: Normocephalic. Atraumatic  Eyes: No conjunctival injection, sclera anicteric  Respiratory: No resp distress.  Musculoskeletal: extremities normal- no gross deformities noted, and normal muscle tone  Neurologic: Gait normal. CN 2-12 grossly intact  Psychiatric: mentation appears normal and affect normal/bright

## 2023-05-12 NOTE — PROGRESS NOTES
Attempted calling both home and mobile today, home number has busy signal and mobile has no vm set up. [employer] message sent 5/11, not currently read. Therap. INR result will have return as high, so will be reminder sooner. Will close encounter

## 2023-05-15 ENCOUNTER — TELEPHONE (OUTPATIENT)
Dept: FAMILY MEDICINE | Facility: CLINIC | Age: 78
End: 2023-05-15
Payer: COMMERCIAL

## 2023-05-15 NOTE — TELEPHONE ENCOUNTER
General Call    Contacts       Type Contact Phone/Fax    05/15/2023 09:52 AM CDT Phone (Incoming) Kaden Bebe M (Self) [phone] ()     Nathalie        Reason for Call:  [place]    What are your questions or concerns:  Patient is returning call from [facility] on 5/11. Please advise at the number provided.    Date of last appointment with provider: 5.11.23    Could we send this information to you in [employer] or would you prefer to receive a phone call?:   Patient would prefer a phone call   Okay to leave a detailed message?: Yes at Home number on file [phone] ([place])

## 2023-05-15 NOTE — TELEPHONE ENCOUNTER
Spoke with patient she wanted apt made for next inr. Apt scheduled.  Nathalie Bray Rn  [facility]

## 2023-06-01 ENCOUNTER — TELEPHONE (OUTPATIENT)
Dept: FAMILY MEDICINE | Facility: CLINIC | Age: 78
End: 2023-06-01
Payer: COMMERCIAL

## 2023-06-01 NOTE — TELEPHONE ENCOUNTER
Writer spoke with the patient. A higher dose of aspirin products likely won't affect her INR but could further inhibit her platelets and bleeding risk. Patient was informed and states she will return it and get the 81 mg dose she is used to.    Natalie Sorto RN, BSN, PHN

## 2023-06-01 NOTE — TELEPHONE ENCOUNTER
General Call      Reason for Call:  Medication question    What are your questions or concerns:  Patient would jorge to speak with INR nurse about the [place] baby asprin. She states the amount is usually 100 and something, and this one was 325 so she is not sure if it is going to mess things up?     Date of last appointment with provider:     Could we send this information to you in [place] or would you prefer to receive a phone call?:   Patient would prefer a phone call   Okay to leave a detailed message?: Yes at Home number on file [phone] (home)

## 2023-06-07 ENCOUNTER — ANTICOAGULATION THERAPY VISIT (OUTPATIENT)
Dept: ANTICOAGULATION | Facility: CLINIC | Age: 78
End: 2023-06-07

## 2023-06-07 ENCOUNTER — OFFICE VISIT (OUTPATIENT)
Dept: FAMILY MEDICINE | Facility: CLINIC | Age: 78
End: 2023-06-07
Payer: COMMERCIAL

## 2023-06-07 VITALS
BODY MASS INDEX: 19.61 KG/M2 | WEIGHT: 122 LBS | RESPIRATION RATE: 20 BRPM | OXYGEN SATURATION: 96 % | SYSTOLIC BLOOD PRESSURE: 120 MMHG | HEART RATE: 78 BPM | TEMPERATURE: 98 F | DIASTOLIC BLOOD PRESSURE: 58 MMHG | HEIGHT: 66 IN

## 2023-06-07 DIAGNOSIS — Z79.01 LONG TERM CURRENT USE OF ANTICOAGULANT THERAPY: ICD-10-CM

## 2023-06-07 DIAGNOSIS — R07.81 RIB PAIN ON RIGHT SIDE: Primary | ICD-10-CM

## 2023-06-07 DIAGNOSIS — I63.20 CEREBROVASCULAR ACCIDENT (CVA) DUE TO OCCLUSION OF PRECEREBRAL ARTERY (H): ICD-10-CM

## 2023-06-07 DIAGNOSIS — Z86.73 HISTORY OF CVA (CEREBROVASCULAR ACCIDENT): ICD-10-CM

## 2023-06-07 DIAGNOSIS — Z79.01 LONG TERM CURRENT USE OF ANTICOAGULANT THERAPY: Primary | ICD-10-CM

## 2023-06-07 DIAGNOSIS — Z79.01 ANTICOAGULATION MONITORING, INR RANGE 2-3: Chronic | ICD-10-CM

## 2023-06-07 DIAGNOSIS — I63.20 CEREBROVASCULAR ACCIDENT (CVA) DUE TO OCCLUSION OF PRECEREBRAL ARTERY (H): Chronic | ICD-10-CM

## 2023-06-07 LAB — INR BLD: 2.1 (ref 0.9–1.1)

## 2023-06-07 PROCEDURE — 99214 OFFICE O/P EST MOD 30 MIN: CPT | Performed by: PHYSICIAN ASSISTANT

## 2023-06-07 PROCEDURE — 85610 PROTHROMBIN TIME: CPT | Performed by: PHYSICIAN ASSISTANT

## 2023-06-07 PROCEDURE — 36416 COLLJ CAPILLARY BLOOD SPEC: CPT | Performed by: PHYSICIAN ASSISTANT

## 2023-06-07 ASSESSMENT — PAIN SCALES - GENERAL: PAINLEVEL: NO PAIN (0)

## 2023-06-07 NOTE — PROGRESS NOTES
ANTICOAGULATION MANAGEMENT     Bebe SHELDON Kaden 77 year old female is on warfarin with therapeutic INR result. (Goal INR 2.0-3.0)    Recent labs: (last 7 days)     06/07/23  1450   INR 2.1*       ASSESSMENT       Source(s): Chart Review and Patient/Caregiver Call       Warfarin doses taken: Warfarin taken as instructed    Diet: No new diet changes identified    Medication/supplement changes: None noted    New illness, injury, or hospitalization: No    Signs or symptoms of bleeding or clotting: No    Previous result: Therapeutic last 2(+) visits    Additional findings: None         PLAN     Recommended plan for no diet, medication or health factor changes affecting INR     Dosing Instructions: Continue your current warfarin dose with next INR in 5 weeks       Summary  As of 6/7/2023    Full warfarin instructions:  7.5 mg every Mon; 5 mg all other days   Next INR check:  7/12/2023             Telephone call with Bebe who verbalizes understanding and agrees to plan    Lab visit scheduled    Education provided:     Contact [phone]  with any changes, questions or concerns.     Plan made per [facility] anticoagulation protocol    Rebecca Hinojosa RN  Anticoagulation Clinic  6/7/2023    _______________________________________________________________________     Anticoagulation Episode Summary     Current INR goal:  2.0-3.0   TTR:  79.3 % (1 y)   Target end date:  Indefinite   Send INR reminders to:  [facility]    Indications    History of CVA (cerebrovascular accident) (Resolved) [Z86.73]  Long term current use of anticoagulant therapy [Z79.01]  Cerebrovascular accident (CVA) due to occlusion of precerebral artery (H) [I63.20]  History of CVA (cerebrovascular accident) [Z86.73]  Anticoagulation monitoring  INR range 2-3 [Z79.01]           Comments:           Anticoagulation Care Providers     Provider Role Specialty Phone number    Diana Ely APRN [facility] Referring Family Medicine [phone]

## 2023-06-07 NOTE — PATIENT INSTRUCTIONS
Continue current treatment for your ribs. I do suspect that you broke a rib given how long this is taking to heal.     I would recommend using the Voltaren gel and Lidocaine patches on the area as well.

## 2023-06-07 NOTE — PROGRESS NOTES
Assessment & Plan   Rib pain on right side  Patient reports significant improved pain with use of lidocaine patches. She wasn't sure how to open the Voltaren gel so she did not use it. She continues to have some pain at night which wakes her. Otherwise she is able to walk and move without pain. On exam, she has mild tenderness to palpation of her right side. I recommend she continue using the lidocaine patches. I instructed her on how to open the bottle of Voltaren gel and recommend she use it to help with residual pain. I suspect she fractured a rib given the extended healing time. CXR not indicated at this time as it would not . Patient can follow up as needed.     Cerebrovascular accident (CVA) due to occlusion of precerebral artery (H)  Long term current use of anticoagulant therapy  Anticoagulation monitoring, INR range 2-3  Patient due for INR recheck. Ordered today.   - INR point of care    LAUREANO Duran-S2    Alfredito Harman PA-C  [facility]    Jayla Spence is a 77 year old, presenting for the following health issues:  Rib Injury (Follow-up)        6/7/2023     2:17 PM   Additional Questions   Roomed by SMA Shelbi     History of Present Illness       Reason for visit:  Rib injury follow up    She eats 2-3 servings of fruits and vegetables daily.She consumes 0 sweetened beverage(s) daily.She exercises with enough effort to increase her heart rate 9 or less minutes per day.  She exercises with enough effort to increase her heart rate 3 or less days per week.   She is taking medications regularly.     Rib Injury Follow Up    Where is your pain located? (Select all that apply) Right Ribs    How would you describe your pain?  dull ache    Since your last clinic visit for rib injury, how has your pain changed? gradually improving    Since your last visit, have you tried any new treatment? No    Review of Systems   See HPI.      Objective    /58  "(BP Location: Right arm, Patient Position: Sitting, Cuff Size: Adult Large)   Pulse 78   Temp 98  F (36.7  C) (Tympanic)   Resp 20   Ht 1.664 m (5' 5.5\")   Wt 55.3 kg (122 lb)   SpO2 96%   BMI 19.99 kg/m    Body mass index is 19.99 kg/m .  Physical Exam   GENERAL: healthy, alert and no distress  RESP: lungs clear to auscultation - no rales, rhonchi or wheezes  CHEST: mild tenderness to palpation of right lateral ribs along the midaxillary line.   CV: regular rate and rhythm, normal S1 S2, no S3 or S4, no murmur, click or rub, no peripheral edema and peripheral pulses strong    Physician Attestation   I, Alfredito Harman PA-C, was present with the medical/ANJANA student who participated in the service and in the documentation of the note.  I have verified the history and personally performed the physical exam and medical decision making.  I agree with the assessment and plan of care as documented in the note.      Alfredito Harman PA-C            "

## 2023-06-21 ENCOUNTER — OFFICE VISIT (OUTPATIENT)
Dept: NEUROLOGY | Facility: CLINIC | Age: 78
End: 2023-06-21
Attending: NURSE PRACTITIONER
Payer: COMMERCIAL

## 2023-06-21 VITALS
SYSTOLIC BLOOD PRESSURE: 155 MMHG | HEART RATE: 70 BPM | DIASTOLIC BLOOD PRESSURE: 70 MMHG | WEIGHT: 122.6 LBS | BODY MASS INDEX: 20.09 KG/M2

## 2023-06-21 DIAGNOSIS — I65.22 LEFT CAROTID STENOSIS: ICD-10-CM

## 2023-06-21 DIAGNOSIS — I63.20 CEREBROVASCULAR ACCIDENT (CVA) DUE TO OCCLUSION OF PRECEREBRAL ARTERY (H): Primary | ICD-10-CM

## 2023-06-21 DIAGNOSIS — R06.83 SNORING: ICD-10-CM

## 2023-06-21 DIAGNOSIS — I69.30 H/O: STROKE WITH RESIDUAL EFFECTS: ICD-10-CM

## 2023-06-21 DIAGNOSIS — Z86.73 HISTORY OF CVA (CEREBROVASCULAR ACCIDENT): ICD-10-CM

## 2023-06-21 DIAGNOSIS — Z79.899 ENCOUNTER FOR LONG-TERM (CURRENT) USE OF MEDICATIONS: ICD-10-CM

## 2023-06-21 PROCEDURE — 99205 OFFICE O/P NEW HI 60 MIN: CPT | Performed by: PSYCHIATRY & NEUROLOGY

## 2023-06-21 NOTE — PROGRESS NOTES
INITIAL NEUROLOGY CONSULTATION    DATE OF VISIT: 6/21/2023  MRN: [id number]  PATIENT NAME: Bebe Alfonso  YOB: 1945    REFERRING PROVIDER: Diana Ely    Chief Complaint   Patient presents with     Stroke     referred by Diana Montesinos/Cerebrovascular accident (CVA) due to occlusion of precerebral artery   Establish Care          SUBJECTIVE:                                                      HPI:  Bebe Alfonso is a 77 year old female whom I have been asked by Diana Ely CNP, to see in consultation for history of stroke.  She previously followed with neurology in [place], Dr. Meyer who was there recently retired.  Most recent office visit with neurology was in September 2022.  At that time Elif reported she had not had any recent headaches.  She had been on warfarin and said she was doing well.  She is no longer driving.  She reported a recent episode of worsening of her handwriting about 3 weeks prior to when she was seen in clinic.  Her stroke occurred in 2011 and notes indicate she has some residual right-sided hemiparesis, dysarthria and mild word-finding difficulty when she is tired.    Because of her new symptoms at that time, updated brain MRI was ordered.  This showed a new small, chronic, area of infarct in the left frontal and parietal lobes.  Stable chronic right MCA stroke.  No change in medications was made because she was already on warfarin, baby aspirin and a statin.  Additional vessel imaging was recommended.  She was also advised to quit smoking.  Carotid ultrasound completed last fall showed 50-69% stenosis on the Left, <50% of the Right ICA. MRA showed some luminal irregularities of the left carotid artery.    Bebe tells me that it was decided to start warfarin because it was unclear why her strokes occurred.  She does not have known history of A-fib but mentions that there was some abnormality with her cardiac testing in the past.   "She says that the more recent stroke that developed was thought to have occurred when she was off of her warfarin prior to a colonoscopy.    She has also been seen in vascular surgery clinic for possible mesenteric ischemia.  She has an updated ultrasound of the carotids and FAMILIA ultrasound scheduled for next month and follow-up with vascular thereafter.    Bebe is here with her . She had one episode of dizziness 6 weeks ago. She has some positional lightheadedness.  No particular weakness or sensory changes that she has noticed.    She says her primary care provider checks her lipids every year.  LDL was 74 in 5.2022.     She says she is more tiredness during the day.  She tells me that she fell 6 weeks ago with that episode of dizziness and hit her ribs.  Because of this she cannot sleep on her preferred right side and this has made things harder from a rest standpoint.  She does snore per her .  She does not have much of an appetite. This has always been true, but she has lost weight recently because she does not eat much.     Occasional Left-sided headaches, which resolve with Tylenol.     EEG in 2016 for \"spells,\" showed some left temporal slowing.    In terms of the tobacco use, she says she was down to less than half a pack a day but is smoking more since the holidays.  She continues to work on cutting back.    No family history of strokes.    Past Medical History:   Diagnosis Date     Anticoagulation monitoring, INR range 2-3 10/22/2015     Benign essential hypertension 9/15/2016     Carotid bruit 11/12/2012    Overview:  12/6/10 Carotid US  Elevated velocities throughout the carotid arteries bilaterally. There is a focal area of increased velocity in the mid left internal carotid artery with a plaque in this region on the color flow images. This corresponds to 50 - 70 % diameter reduction. There is an area of elevated velocity in the left external carotid artery consistent with stenosis. " There is no focal area of significant stenosis in the right carotid arteries in the neck. Plaque in the carotid arteries bilaterally.      Chronic obstructive pulmonary disease (H) 1/19/2016    The FVC, FEV1 and FEV1/FVC ratio are reduced.  The inspiratory flow rates are within normal limits.  The FVC is reduced relative to the SVC indicating air trapping.  While the TLC is within normal limits, the FRC, RV and RV/TLC ratio are increased.  The  diffusing capacity is mildly reduced. IMPRESSION: Mild-moderate Airflow Obstruction with air trapping ____________________________________________MDestinyDClarke Orellana  April 2018     Esophageal reflux 4/8/2008    Overview:  Omeprazole PRN, occasional symptoms such as chest burning and knife twisted to stomach.      Heart disease born with it     History of blood transfusion 70 years ago     History of CVA (cerebrovascular accident) 10/20/2015     Long term current use of anticoagulant therapy 3/20/2018     Major depressive disorder, recurrent episode, moderate (H) 10/29/2008    Overview:  She is not taking any medication at this time.     Osteoarthrosis, hip 10/19/2010    Overview:  Pt scheduled for right  total hip arthroplasty on 6/14/13 with Dr. Addison HEREDIA hip xray (11/2012) Severe degenerative changes seen of the right hip with near bone-on-bone appearance of the joint and several subchondral cysts forming.     Thyroid nodule 5/22/2013    Overview:  Thyroid US (2012) Stable nodule left lobe of thyroid TSH- (2/19/12) normal (2.26)     Past Surgical History:   Procedure Laterality Date     BIOPSY  appox in 1980's     BREAST SURGERY  last time in the 1990's    non cancer     COLONOSCOPY N/A 9/7/2022    Procedure: COLONOSCOPY, WITH POLYPECTOMY AND BIOPSY;  Surgeon: Alfredito Gomez DO;  Location: [facility]     ESOPHAGOSCOPY, GASTROSCOPY, DUODENOSCOPY (EGD), COMBINED N/A 5/25/2022    Procedure: ESOPHAGOGASTRODUODENOSCOPY, WITH BIOPSY;  Surgeon: Alfredito Gomez DO;   [facility]       amLODIPine (NORVASC) 5 MG tablet, Take 1 tablet by mouth once daily  Aspirin (VAZALORE) 81 MG CAPS, Please choose reason not prescribed from choices below.  ASPIRIN NOT PRESCRIBED (INTENTIONAL), Please choose reason not prescribed from choices below.  atorvastatin (LIPITOR) 40 MG tablet, Take 1 tablet by mouth once daily  enalapril (VASOTEC) 20 MG tablet, Take 1 tablet by mouth once daily  FLUoxetine (PROZAC) 20 MG capsule, Take 1 capsule by mouth once daily  fluticasone (FLONASE) 50 MCG/ACT nasal spray, Spray 1 spray into both nostrils daily  warfarin ANTICOAGULANT (COUMADIN) 5 MG tablet, TAKE 7.5MG BY MOUTH EVERY MONDAY, WEDNESDAY, AND FRIDAY. TAKE 5MG ALL OTHER DAYS OR AS DIRECTED BY ANTICOAGULATION CLINIC  omeprazole (PRILOSEC) 40 MG DR capsule, Take 1 capsule by mouth once daily (Patient not taking: Reported on 5/11/2023)    No current facility-administered medications on file prior to visit.    Allergies   Allergen Reactions     Losartan Swelling, Nausea, Shortness Of Breath and Palpitations     Gabapentin GI Disturbance and Unknown     Double vision  flatulence     Lisinopril Cough     Oxycodone Other (See Comments)     But has tolerated hydrocodone     Tramadol Other (See Comments)     Augmentin [Amoxicillin-Pot Clavulanate] Rash     Bacitracin Rash     blisters     Bupropion Rash     Allergic to brand not generic        Problem (# of Occurrences) Relation (Name,Age of Onset)    Substance Abuse (2) Sister (Jasmina): acol, Sister (Eileen): acol    Diabetes (2) Maternal Grandfather (Lew), Sister (Vianca): developed after cancer treatment    Hypertension (1) Mother (Arcelia)    Osteoporosis (2) Mother (Arcelia), Sister (Jessica)    Obesity (3) Sister (Vianca), Sister (Jasmina), Sister (Eileen)    Breast Cancer (3) Sister (Vianca), Sister (Jessica), Daughter (Renuka)    Other Cancer (1) Sister (Louisa): throught out body    Hyperlipidemia (1) Mother (Arcelia)        Social History      Tobacco Use     Smoking status: Every Day     Packs/day: 1.00     Years: 50.00     Pack years: 50.00     Types: Cigarettes     Start date: 2016     Smokeless tobacco: Never   Vaping Use     Vaping Use: Never used   Substance Use Topics     Alcohol use: No     Drug use: Never       REVIEW OF SYSTEMS:                                                      10-point review of systems is negative except as mentioned above in HPI.     EXAM:                                                      Physical Exam:   Vitals: BP (!) 155/70   Pulse 70   Wt 55.6 kg (122 lb 9.6 oz)   BMI 20.09 kg/m    BMI= Body mass index is 20.09 kg/m .  GENERAL: NAD.  HEENT: NC/AT.   CV: RRR. S1, S2.   NECK: No bruits.  PULM: Non-labored breathing.   EXTR: Flat feet (pes planus).   Neurologic:  MENTAL STATUS: Alert, attentive. Speech is fluent. Normal comprehension. Mini-co/5. Normal concentration. Adequate fund of knowledge.   CRANIAL NERVES: Discs technically difficult to visualize. Visual fields intact to confrontation. Pupils equally, round and reactive to light. Facial sensation and movement normal. EOM full. Hearing intact to conversation. Trapezius strength intact. Palate moves symmetrically. Tongue midline.  MOTOR: 5/5 in proximal and distal muscle groups of upper and lower extremities. Tone is normal. Bulk is thin.  DTRs: Intact and symmetric in biceps, BR, patellae. Trace ankle jerks. Babinski equivocal bilaterally.   SENSATION: Normal light touch and pinprick in the hands.  Decreased pinprick sensation on the right plantar surface compared to the left.  Intact proprioception at great toes. Vibration: Diminished on the left, normal on the right.  COORDINATION: Normal finger nose finger. Finger tapping normal. Knee heel shin normal.  STATION AND GAIT: Sway with Romberg. Can stand on each foot for 2 seconds. Casual gait is normal.   Right hand-dominant.    Relevant Data:  Brain MRI (22):  1. No acute infarction, mass  effect, or recent intracranial hemorrhage.   2. Stable moderate chronic right middle cerebral artery territory infarction. Interval evolution of moderate chronic left middle cerebral artery territory infarction. New small to moderate chronic infarction within the posterior left frontal and anterior left parietal lobes.   3. Mild-to-moderate chronic microvascular ischemic changes have slightly progressed. There is mild-to-moderate diffuse cerebral volume loss.     Carotid US (10.7.22):  CONCLUSION:   Less than 50% stenosis right internal carotid artery using SRU criteria.   50-69% stenosis left internal carotid artery using SRU criteria.   Stenosis bilateral external carotid arteries.    MRA (10.24.22):  Impression:   1. Luminal irregularity of the left carotid bifurcation and proximal ICA. However, no evidence of hemodynamically significant stenosis in the cervical carotid/vertebral artery systems.   2. Moderate presumed plaquing of the proximal left subclavian artery.     Images not available for my review.    ASSESSMENT and PLAN:                                                      Assessment:     ICD-10-CM    1. Cerebrovascular accident (CVA) due to occlusion of precerebral artery (H)  I63.20 Adult Neurology  Referral      2. H/O: stroke with residual effects  I69.30 Adult Neurology  Referral     Adult Neurology  Referral     Adult Sleep Eval & Management  Referral      3. History of CVA (cerebrovascular accident)  Z86.73 Adult Neurology  Referral      4. Snoring  R06.83 Adult Sleep Eval & Management  Referral      5. Left carotid stenosis  I65.22       6. Encounter for long-term (current) use of medications  Z79.899           Ms. Alfonso is a pleasant 77-year-old woman with history of strokes, known carotid disease, here to establish care for stroke follow-up.  Her LDL is nearly at goal.  Her primary provider is managing her statin and her blood pressure  medications.  Systolic blood pressure was a little high today but it looks like generally it is within goal range.  In talking with the patient and her  today, I have concerns that she may have sleep apnea with heavy snoring and daytime fatigue.  I recommend a sleep consultation.  I explained to Bebe that untreated sleep apnea is a risk factor for stroke.  We also discussed smoking cessation.  She is working on this.  She does have follow-up in vascular clinic as well.  We will plan on annual follow-up.  Bebe will let me know if any concerns arise in the meantime.    Plan:  -- Continue the warfarin, aspirin and atorvastatin for stroke prevention.  -- I recommend a sleep evaluation for possible sleep apnea which is a risk factor for stroke. Referral is in.  -- Work on smoking cessation, as we discussed.    -- Return to neurology clinic in 1 year.  Please let us know if any concerns arise in the meantime.    Total Time: 60 minutes were spent with the patient and in chart review/documentation (as described above in Assessment and Plan) /coordinating the care on date of service.    Caitlin Kwon MD  Neurology    CC: FERNAND[employer] software used in the dictation of this note.

## 2023-06-21 NOTE — PATIENT INSTRUCTIONS
Plan:  -- Continue the warfarin, aspirin and atorvastatin for stroke prevention.  -- I recommend a sleep evaluation for possible sleep apnea which is a risk factor for stroke. Referral is in.  Work on smoking cessation, as we discussed.    -- Return to neurology clinic in 1 year.  Please let us know if any concerns arise in the meantime.

## 2023-06-21 NOTE — LETTER
6/21/2023         RE: Bebe Alfonso  [address] [place] [address]        Dear Colleague,    Thank you for referring your patient, Bebe Alfonso, to the [facility] [place]. Please see a copy of my visit note below.    INITIAL NEUROLOGY CONSULTATION    DATE OF VISIT: 6/21/2023  MRN: [id number]  PATIENT NAME: Bebe Alfonso  YOB: 1945    REFERRING PROVIDER: Diana Ely    Chief Complaint   Patient presents with     Stroke     referred by Diana Montesinos/Cerebrovascular accident (CVA) due to occlusion of precerebral artery   Establish Care          SUBJECTIVE:                                                      HPI:  Bebe Alfonso is a 77 year old female whom I have been asked by Diana Ely CNP, to see in consultation for history of stroke.  She previously followed with neurology in [place], Dr. Meyer who was there recently retired.  Most recent office visit with neurology was in September 2022.  At that time Elif reported she had not had any recent headaches.  She had been on warfarin and said she was doing well.  She is no longer driving.  She reported a recent episode of worsening of her handwriting about 3 weeks prior to when she was seen in clinic.  Her stroke occurred in 2011 and notes indicate she has some residual right-sided hemiparesis, dysarthria and mild word-finding difficulty when she is tired.    Because of her new symptoms at that time, updated brain MRI was ordered.  This showed a new small, chronic, area of infarct in the left frontal and parietal lobes.  Stable chronic right MCA stroke.  No change in medications was made because she was already on warfarin, baby aspirin and a statin.  Additional vessel imaging was recommended.  She was also advised to quit smoking.  Carotid ultrasound completed last fall showed 50-69% stenosis on the Left, <50% of the Right ICA. MRA showed some luminal  "irregularities of the left carotid artery.    Bebe tells me that it was decided to start warfarin because it was unclear why her strokes occurred.  She does not have known history of A-fib but mentions that there was some abnormality with her cardiac testing in the past.  She says that the more recent stroke that developed was thought to have occurred when she was off of her warfarin prior to a colonoscopy.    She has also been seen in vascular surgery clinic for possible mesenteric ischemia.  She has an updated ultrasound of the carotids and FAMILIA ultrasound scheduled for next month and follow-up with vascular thereafter.    Bebe is here with her . She had one episode of dizziness 6 weeks ago. She has some positional lightheadedness.  No particular weakness or sensory changes that she has noticed.    She says her primary care provider checks her lipids every year.  LDL was 74 in 5.2022.     She says she is more tiredness during the day.  She tells me that she fell 6 weeks ago with that episode of dizziness and hit her ribs.  Because of this she cannot sleep on her preferred right side and this has made things harder from a rest standpoint.  She does snore per her .  She does not have much of an appetite. This has always been true, but she has lost weight recently because she does not eat much.     Occasional Left-sided headaches, which resolve with Tylenol.     EEG in 2016 for \"spells,\" showed some left temporal slowing.    In terms of the tobacco use, she says she was down to less than half a pack a day but is smoking more since the holidays.  She continues to work on cutting back.    No family history of strokes.    Past Medical History:   Diagnosis Date     Anticoagulation monitoring, INR range 2-3 10/22/2015     Benign essential hypertension 9/15/2016     Carotid bruit 11/12/2012    Overview:  12/6/10 Carotid US  Elevated velocities throughout the carotid arteries bilaterally. There is a focal " area of increased velocity in the mid left internal carotid artery with a plaque in this region on the color flow images. This corresponds to 50 - 70 % diameter reduction. There is an area of elevated velocity in the left external carotid artery consistent with stenosis. There is no focal area of significant stenosis in the right carotid arteries in the neck. Plaque in the carotid arteries bilaterally.      Chronic obstructive pulmonary disease (H) 1/19/2016    The FVC, FEV1 and FEV1/FVC ratio are reduced.  The inspiratory flow rates are within normal limits.  The FVC is reduced relative to the SVC indicating air trapping.  While the TLC is within normal limits, the FRC, RV and RV/TLC ratio are increased.  The  diffusing capacity is mildly reduced. IMPRESSION: Mild-moderate Airflow Obstruction with air trapping ____________________________________________M.DClarke Orellana  April 2018     Esophageal reflux 4/8/2008    Overview:  Omeprazole PRN, occasional symptoms such as chest burning and knife twisted to stomach.      Heart disease born with it     History of blood transfusion 70 years ago     History of CVA (cerebrovascular accident) 10/20/2015     Long term current use of anticoagulant therapy 3/20/2018     Major depressive disorder, recurrent episode, moderate (H) 10/29/2008    Overview:  She is not taking any medication at this time.     Osteoarthrosis, hip 10/19/2010    Overview:  Pt scheduled for right  total hip arthroplasty on 6/14/13 with Dr. Addison HEREDIA hip xray (11/2012) Severe degenerative changes seen of the right hip with near bone-on-bone appearance of the joint and several subchondral cysts forming.     Thyroid nodule 5/22/2013    Overview:  Thyroid US (2012) Stable nodule left lobe of thyroid TSH- (2/19/12) normal (2.26)     Past Surgical History:   Procedure Laterality Date     BIOPSY  appox in 1980's     BREAST SURGERY  last time in the 1990's    non cancer     COLONOSCOPY N/A 9/7/2022    Procedure:  COLONOSCOPY, WITH POLYPECTOMY AND BIOPSY;  Surgeon: Alfredito Gomez DO;  Location: [facility]     ESOPHAGOSCOPY, GASTROSCOPY, DUODENOSCOPY (EGD), COMBINED N/A 5/25/2022    Procedure: ESOPHAGOGASTRODUODENOSCOPY, WITH BIOPSY;  Surgeon: Alfredito Gomez DO;  Location: [facility]       amLODIPine (NORVASC) 5 MG tablet, Take 1 tablet by mouth once daily  Aspirin (VAZALORE) 81 MG CAPS, Please choose reason not prescribed from choices below.  ASPIRIN NOT PRESCRIBED (INTENTIONAL), Please choose reason not prescribed from choices below.  atorvastatin (LIPITOR) 40 MG tablet, Take 1 tablet by mouth once daily  enalapril (VASOTEC) 20 MG tablet, Take 1 tablet by mouth once daily  FLUoxetine (PROZAC) 20 MG capsule, Take 1 capsule by mouth once daily  fluticasone (FLONASE) 50 MCG/ACT nasal spray, Spray 1 spray into both nostrils daily  warfarin ANTICOAGULANT (COUMADIN) 5 MG tablet, TAKE 7.5MG BY MOUTH EVERY MONDAY, WEDNESDAY, AND FRIDAY. TAKE 5MG ALL OTHER DAYS OR AS DIRECTED BY ANTICOAGULATION CLINIC  omeprazole (PRILOSEC) 40 MG DR capsule, Take 1 capsule by mouth once daily (Patient not taking: Reported on 5/11/2023)    No current facility-administered medications on file prior to visit.    Allergies   Allergen Reactions     Losartan Swelling, Nausea, Shortness Of Breath and Palpitations     Gabapentin GI Disturbance and Unknown     Double vision  flatulence     Lisinopril Cough     Oxycodone Other (See Comments)     But has tolerated hydrocodone     Tramadol Other (See Comments)     Augmentin [Amoxicillin-Pot Clavulanate] Rash     Bacitracin Rash     blisters     Bupropion Rash     Allergic to brand not generic        Problem (# of Occurrences) Relation (Name,Age of Onset)    Substance Abuse (2) Sister (Jasmina): acol, Sister (Eileen): acol    Diabetes (2) Maternal Grandfather (Lew), Sister (Vianca): developed after cancer treatment    Hypertension (1) Mother (Arcelia)    Osteoporosis (2) Mother (Arcelia), Sister  (Jessica)    Obesity (3) Sister (Vianca), Sister (Jasmina), Sister (Eileen)    Breast Cancer (3) Sister (Vianca), Sister (Jessica), Daughter (Renuka)    Other Cancer (1) Sister (Louisa): throught out body    Hyperlipidemia (1) Mother (Arcelia)        Social History     Tobacco Use     Smoking status: Every Day     Packs/day: 1.00     Years: 50.00     Pack years: 50.00     Types: Cigarettes     Start date: 2016     Smokeless tobacco: Never   Vaping Use     Vaping Use: Never used   Substance Use Topics     Alcohol use: No     Drug use: Never       REVIEW OF SYSTEMS:                                                      10-point review of systems is negative except as mentioned above in HPI.     EXAM:                                                      Physical Exam:   Vitals: BP (!) 155/70   Pulse 70   Wt 55.6 kg (122 lb 9.6 oz)   BMI 20.09 kg/m    BMI= Body mass index is 20.09 kg/m .  GENERAL: NAD.  HEENT: NC/AT.   CV: RRR. S1, S2.   NECK: No bruits.  PULM: Non-labored breathing.   EXTR: Flat feet (pes planus).   Neurologic:  MENTAL STATUS: Alert, attentive. Speech is fluent. Normal comprehension. Mini-co/5. Normal concentration. Adequate fund of knowledge.   CRANIAL NERVES: Discs technically difficult to visualize. Visual fields intact to confrontation. Pupils equally, round and reactive to light. Facial sensation and movement normal. EOM full. Hearing intact to conversation. Trapezius strength intact. Palate moves symmetrically. Tongue midline.  MOTOR: 5/5 in proximal and distal muscle groups of upper and lower extremities. Tone is normal. Bulk is thin.  DTRs: Intact and symmetric in biceps, BR, patellae. Trace ankle jerks. Babinski equivocal bilaterally.   SENSATION: Normal light touch and pinprick in the hands.  Decreased pinprick sensation on the right plantar surface compared to the left.  Intact proprioception at great toes. Vibration: Diminished on the left, normal on the right.  COORDINATION: Normal  finger nose finger. Finger tapping normal. Knee heel shin normal.  STATION AND GAIT: Sway with Romberg. Can stand on each foot for 2 seconds. Casual gait is normal.   Right hand-dominant.    Relevant Data:  Brain MRI (9.23.22):  1. No acute infarction, mass effect, or recent intracranial hemorrhage.   2. Stable moderate chronic right middle cerebral artery territory infarction. Interval evolution of moderate chronic left middle cerebral artery territory infarction. New small to moderate chronic infarction within the posterior left frontal and anterior left parietal lobes.   3. Mild-to-moderate chronic microvascular ischemic changes have slightly progressed. There is mild-to-moderate diffuse cerebral volume loss.     Carotid US (10.7.22):  CONCLUSION:   Less than 50% stenosis right internal carotid artery using SRU criteria.   50-69% stenosis left internal carotid artery using SRU criteria.   Stenosis bilateral external carotid arteries.    MRA (10.24.22):  Impression:   1. Luminal irregularity of the left carotid bifurcation and proximal ICA. However, no evidence of hemodynamically significant stenosis in the cervical carotid/vertebral artery systems.   2. Moderate presumed plaquing of the proximal left subclavian artery.     Images not available for my review.    ASSESSMENT and PLAN:                                                      Assessment:     ICD-10-CM    1. Cerebrovascular accident (CVA) due to occlusion of precerebral artery (H)  I63.20 Adult Neurology  Referral      2. H/O: stroke with residual effects  I69.30 Adult Neurology  Referral     Adult Neurology  Referral     Adult Sleep Eval & Management  Referral      3. History of CVA (cerebrovascular accident)  Z86.73 Adult Neurology  Referral      4. Snoring  R06.83 Adult Sleep Eval & Management  Referral      5. Left carotid stenosis  I65.22       6. Encounter for long-term (current) use of  medications  Z79.899           Ms. Alfonso is a pleasant 77-year-old woman with history of strokes, known carotid disease, here to establish care for stroke follow-up.  Her LDL is nearly at goal.  Her primary provider is managing her statin and her blood pressure medications.  Systolic blood pressure was a little high today but it looks like generally it is within goal range.  In talking with the patient and her  today, I have concerns that she may have sleep apnea with heavy snoring and daytime fatigue.  I recommend a sleep consultation.  I explained to Bebe that untreated sleep apnea is a risk factor for stroke.  We also discussed smoking cessation.  She is working on this.  She does have follow-up in vascular clinic as well.  We will plan on annual follow-up.  Bebe will let me know if any concerns arise in the meantime.    Plan:  -- Continue the warfarin, aspirin and atorvastatin for stroke prevention.  -- I recommend a sleep evaluation for possible sleep apnea which is a risk factor for stroke. Referral is in.  -- Work on smoking cessation, as we discussed.    -- Return to neurology clinic in 1 year.  Please let us know if any concerns arise in the meantime.    Total Time: 60 minutes were spent with the patient and in chart review/documentation (as described above in Assessment and Plan) /coordinating the care on date of service.    Caitlin Kwon MD  Neurology    CC: FERNANDO Gibson software used in the dictation of this note.        Again, thank you for allowing me to participate in the care of your patient.        Sincerely,        Caitlin Kwon MD

## 2023-06-21 NOTE — NURSING NOTE
"Bebe Alfonso is a 77 year old female who presents for:  Chief Complaint   Patient presents with     Stroke     referred by Diana Montesinos/Cerebrovascular accident (CVA) due to occlusion of precerebral artery   Establish Care           Initial Vitals:  BP (!) 155/70   Pulse 70   Wt 55.6 kg (122 lb 9.6 oz)   BMI 20.09 kg/m   Estimated body mass index is 20.09 kg/m  as calculated from the following:    Height as of 6/7/23: 1.664 m (5' 5.5\").    Weight as of this encounter: 55.6 kg (122 lb 9.6 oz).. Body surface area is 1.6 meters squared. BP completed using cuff size: wrist cuff    Michael Obrien  "

## 2023-07-12 ENCOUNTER — LAB (OUTPATIENT)
Dept: LAB | Facility: CLINIC | Age: 78
End: 2023-07-12
Payer: COMMERCIAL

## 2023-07-12 ENCOUNTER — ANTICOAGULATION THERAPY VISIT (OUTPATIENT)
Dept: ANTICOAGULATION | Facility: CLINIC | Age: 78
End: 2023-07-12

## 2023-07-12 DIAGNOSIS — Z86.73 HISTORY OF CVA (CEREBROVASCULAR ACCIDENT): ICD-10-CM

## 2023-07-12 DIAGNOSIS — I63.20 CEREBROVASCULAR ACCIDENT (CVA) DUE TO OCCLUSION OF PRECEREBRAL ARTERY (H): Chronic | ICD-10-CM

## 2023-07-12 DIAGNOSIS — Z79.01 LONG TERM CURRENT USE OF ANTICOAGULANT THERAPY: ICD-10-CM

## 2023-07-12 DIAGNOSIS — Z79.01 ANTICOAGULATION MONITORING, INR RANGE 2-3: Chronic | ICD-10-CM

## 2023-07-12 DIAGNOSIS — I63.20 CEREBROVASCULAR ACCIDENT (CVA) DUE TO OCCLUSION OF PRECEREBRAL ARTERY (H): ICD-10-CM

## 2023-07-12 DIAGNOSIS — Z79.01 LONG TERM CURRENT USE OF ANTICOAGULANT THERAPY: Primary | ICD-10-CM

## 2023-07-12 LAB — INR BLD: 1.8 (ref 0.9–1.1)

## 2023-07-12 PROCEDURE — 36416 COLLJ CAPILLARY BLOOD SPEC: CPT

## 2023-07-12 PROCEDURE — 85610 PROTHROMBIN TIME: CPT

## 2023-07-12 NOTE — PROGRESS NOTES
ANTICOAGULATION MANAGEMENT     Bebe Alfonso 77 year old female is on warfarin with subtherapeutic INR result. (Goal INR 2.0-3.0)    Recent labs: (last 7 days)     07/12/23  0806   INR 1.8*       ASSESSMENT     Source(s): Chart Review and Patient/Caregiver Call     Warfarin doses taken: Missed dose(s) may be affecting INR   Missed 5mg warfarin dose last night.  Diet: No new diet changes identified  Medication/supplement changes: None noted  New illness, injury, or hospitalization: No  Signs or symptoms of bleeding or clotting: No  Previous result: Therapeutic last 4 INR visits.  Additional findings: None       PLAN     Recommended plan for temporary change(s) affecting INR     Dosing Instructions: booster dose then continue your current warfarin dose with next INR in 2 weeks       Summary  As of 7/12/2023      Full warfarin instructions:  7/12: 7.5 mg; Otherwise 7.5 mg every Mon; 5 mg all other days   Next INR check:  7/26/2023               Telephone call with Bebe who verbalizes understanding and agrees to plan    Lab visit scheduled - INR on 7/25/23 at her Vascular appt @ [facility].    Education provided:   Taking warfarin: Importance of taking warfarin as instructed  Goal range and lab monitoring: goal range and significance of current result  Dietary considerations: importance of consistent vitamin K intake    Plan made per [facility] anticoagulation protocol    Nicky Harper RN  Anticoagulation Clinic  7/12/2023    _______________________________________________________________________     Anticoagulation Episode Summary       Current INR goal:  2.0-3.0   TTR:  73.0 % (1 y)   Target end date:  Indefinite   Send INR reminders to:  [facility]    Indications    History of CVA (cerebrovascular accident) (Resolved) [Z86.73]  Long term current use of anticoagulant therapy [Z79.01]  Cerebrovascular accident (CVA) due to occlusion of precerebral artery (H) [I63.20]  History of CVA (cerebrovascular accident)  [Z86.73]  Anticoagulation monitoring  INR range 2-3 [Z79.01]             Comments:               Anticoagulation Care Providers       Provider Role Specialty Phone number    Diana Ely APRN [facility] [phone]

## 2023-07-18 ENCOUNTER — HOSPITAL ENCOUNTER (OUTPATIENT)
Dept: ULTRASOUND IMAGING | Facility: CLINIC | Age: 78
Discharge: HOME | End: 2023-07-18
Attending: SURGERY
Payer: COMMERCIAL

## 2023-07-18 DIAGNOSIS — I73.9 PAD (PERIPHERAL ARTERY DISEASE) (H): ICD-10-CM

## 2023-07-18 DIAGNOSIS — R09.89 CAROTID BRUIT, UNSPECIFIED LATERALITY: ICD-10-CM

## 2023-07-18 PROCEDURE — 93880 EXTRACRANIAL BILAT STUDY: CPT

## 2023-07-18 PROCEDURE — 93922 UPR/L XTREMITY ART 2 LEVELS: CPT

## 2023-07-25 ENCOUNTER — LAB (OUTPATIENT)
Dept: LAB | Facility: CLINIC | Age: 78
End: 2023-07-25
Payer: COMMERCIAL

## 2023-07-25 ENCOUNTER — ANTICOAGULATION THERAPY VISIT (OUTPATIENT)
Dept: ANTICOAGULATION | Facility: CLINIC | Age: 78
End: 2023-07-25

## 2023-07-25 DIAGNOSIS — I63.20 CEREBROVASCULAR ACCIDENT (CVA) DUE TO OCCLUSION OF PRECEREBRAL ARTERY (H): ICD-10-CM

## 2023-07-25 DIAGNOSIS — Z79.01 LONG TERM CURRENT USE OF ANTICOAGULANT THERAPY: ICD-10-CM

## 2023-07-25 DIAGNOSIS — Z86.73 HISTORY OF CVA (CEREBROVASCULAR ACCIDENT): ICD-10-CM

## 2023-07-25 DIAGNOSIS — Z79.01 ANTICOAGULATION MONITORING, INR RANGE 2-3: Chronic | ICD-10-CM

## 2023-07-25 DIAGNOSIS — Z79.01 LONG TERM CURRENT USE OF ANTICOAGULANT THERAPY: Primary | ICD-10-CM

## 2023-07-25 DIAGNOSIS — I63.20 CEREBROVASCULAR ACCIDENT (CVA) DUE TO OCCLUSION OF PRECEREBRAL ARTERY (H): Chronic | ICD-10-CM

## 2023-07-25 LAB — INR BLD: 1.5 (ref 0.9–1.1)

## 2023-07-25 PROCEDURE — 36416 COLLJ CAPILLARY BLOOD SPEC: CPT

## 2023-07-25 PROCEDURE — 85610 PROTHROMBIN TIME: CPT

## 2023-07-25 RX ORDER — WARFARIN SODIUM 5 MG/1
TABLET ORAL
Qty: 100 TABLET | Refills: 1 | Status: SHIPPED | OUTPATIENT
Start: 2023-07-25 | End: 2024-01-29

## 2023-07-25 NOTE — PROGRESS NOTES
ANTICOAGULATION MANAGEMENT     Bebe SHELDON Alfonso 77 year old female is on warfarin with subtherapeutic INR result. (Goal INR 2.0-3.0)    Recent labs: (last 7 days)     07/25/23  0843   INR 1.5*       ASSESSMENT     Source(s): Chart Review and Patient/Caregiver Call     Warfarin doses taken: Warfarin taken as instructed  Diet: Increased greens/vitamin K in diet; plans to resume previous intake but pt would like a little more freedom with greens so she requested a slightly increase in dose   Medication/supplement changes: None noted  New illness, injury, or hospitalization: No  Signs or symptoms of bleeding or clotting: No  Previous result: Subtherapeutic  Additional findings: None       PLAN     Recommended plan for ongoing change(s) affecting INR     Dosing Instructions: booster dose then Increase your warfarin dose (6.7% change) with next INR in 2 weeks       Summary  As of 7/25/2023      Full warfarin instructions:  7/25: 10 mg; Otherwise 7.5 mg every Mon, Fri; 5 mg all other days   Next INR check:  8/8/2023               Telephone call with Bebe who verbalizes understanding and agrees to plan    Lab visit scheduled    Education provided:   Please call back if any changes to your diet, medications or how you've been taking warfarin  Goal range and lab monitoring: goal range and significance of current result, Importance of therapeutic range, and Importance of following up at instructed interval  Symptom monitoring: monitoring for clotting signs and symptoms, monitoring for stroke signs and symptoms, and when to seek medical attention/emergency care    Plan made per [facility] anticoagulation protocol    Sharlene Kramer, RN  Anticoagulation Clinic  7/25/2023    _______________________________________________________________________     Anticoagulation Episode Summary       Current INR goal:  2.0-3.0   TTR:  72.3 % (1 y)   Target end date:  Indefinite   Send INR reminders to:  [facility]    Indications     History of CVA (cerebrovascular accident) (Resolved) [Z86.73]  Long term current use of anticoagulant therapy [Z79.01]  Cerebrovascular accident (CVA) due to occlusion of precerebral artery (H) [I63.20]  History of CVA (cerebrovascular accident) [Z86.73]  Anticoagulation monitoring  INR range 2-3 [Z79.01]             Comments:               Anticoagulation Care Providers       Provider Role Specialty Phone number    Diana Ely APRN [facility] [phone]

## 2023-07-26 DIAGNOSIS — F41.1 GENERALIZED ANXIETY DISORDER: ICD-10-CM

## 2023-07-26 NOTE — PATIENT INSTRUCTIONS
Yazmin Spence,    Thank you for entrusting your care with us today. After your visit today with NP Dawn Whitaker this is the plan that was discussed at your appointment.    We will contact you to schedule 1 year follow up with repeat ultrasounds.       I am including additional information on these things and our contact information if you have any questions or concerns.   Please do not hesitate to reach out to us if you felt we did not answer your questions or you are unsure of the treatment plan after your visit today. Our number is [phone].Thank you for trusting us with your care.         Again thank you for your time.

## 2023-07-26 NOTE — PROGRESS NOTES
[facility]        Patient is here for a 1 year follow up  to discuss Carotid stenosis. Patient has no symptoms.  Peripheral artery disease (PAD). Symptoms include none in both lower extremities.    Pt is currently taking Aspirin, Statin, and Warfarin.        The provider has been notified that the patient has no concerns.     Questions patient would like addressed today are: N/A.    Refills are needed: No    Has homecare services and agency name:  No

## 2023-07-31 NOTE — PROGRESS NOTES
VASCULAR SURGERY PROGRESS NOTE    LOCATION:  [facility]     Bebe Alfonso  Medical Record #:  [id number]  YOB: 1945  Age:  77 year old     Date of Service: 8/1/2023    PRIMARY CARE PROVIDER: Diana Ely    Reason for visit:  Follow-up ABIs and carotid screen    IMPRESSION:  Bebe Alfonso is a 77 year old female presenting for follow-up. Denied symptoms of claudication last year, ABIs at that time showing mild arterial insufficiency on the left but with normal triphasic waveforms. Denies any symptoms consistent with claudication today. No rest pain or nonhealing wounds. Currently smoking 1 ppd.     Carotid duplex showing 50-69% stenosis bilaterally asymptomatic. ABIs show mild arterial insufficiency bilaterally with triphasic waveforms.     RECOMMENDATION/RISKS: Continue with best medical management with aspirin and statin therapy. Encouraged smoking cessation and regular ambulation/activity. Follow-up in 1 year with repeat ABIs and carotid duplex.       HPI:  Bebe Alfonso is a 77 year old female with past medical history significant for hypertension, hyperlipidemia, ongoing tobacco abuse, COPD, history of stroke, and chronic kidney disease. Patient was seen last year for possible mesenteric ischemia due to findings on non contrast CT. Patient was not experiencing any symptoms consistent with mesenteric ischemia, but did note significant bilateral lower extremity claudication for which she had ABIs.     Today Bebe presents to follow-up on imaging studies. Denies claudication, rest pain, or nonhealing wounds. She has some hip pain that has been present since having both hips replaced but believes this is musculoskeletal in nature. Patient a history of strokes in the past, has been about 4 years since her last stroke. She denies any recent visual changes, speech changes, extremity weakness, or other signs/symptoms of TIA or stroke. She is compliant with  medications but continues to smoke. She is at 1 pack per day and it working to reach complete cessation. No other concerns today.     REVIEW OF SYSTEMS:    A 12 point ROS was reviewed and is negative except for what is listed above in HPI.    PHH:    Past Medical History:   Diagnosis Date    Anticoagulation monitoring, INR range 2-3 10/22/2015    Benign essential hypertension 9/15/2016    Carotid bruit 11/12/2012    Overview:  12/6/10 Carotid US  Elevated velocities throughout the carotid arteries bilaterally. There is a focal area of increased velocity in the mid left internal carotid artery with a plaque in this region on the color flow images. This corresponds to 50 - 70 % diameter reduction. There is an area of elevated velocity in the left external carotid artery consistent with stenosis. There is no focal area of significant stenosis in the right carotid arteries in the neck. Plaque in the carotid arteries bilaterally.     Chronic obstructive pulmonary disease (H) 1/19/2016    The FVC, FEV1 and FEV1/FVC ratio are reduced.  The inspiratory flow rates are within normal limits.  The FVC is reduced relative to the SVC indicating air trapping.  While the TLC is within normal limits, the FRC, RV and RV/TLC ratio are increased.  The  diffusing capacity is mildly reduced. IMPRESSION: Mild-moderate Airflow Obstruction with air trapping ____________________________________________Clarke Iverson  April 2018    Esophageal reflux 4/8/2008    Overview:  Omeprazole PRN, occasional symptoms such as chest burning and knife twisted to stomach.     Heart disease born with it    History of blood transfusion 70 years ago    History of CVA (cerebrovascular accident) 10/20/2015    Long term current use of anticoagulant therapy 3/20/2018    Major depressive disorder, recurrent episode, moderate (H) 10/29/2008    Overview:  She is not taking any medication at this time.    Osteoarthrosis, hip 10/19/2010    Overview:  Pt scheduled for  right  total hip arthroplasty on 6/14/13 with Dr. Addison HEREDIA hip xray (11/2012) Severe degenerative changes seen of the right hip with near bone-on-bone appearance of the joint and several subchondral cysts forming.    Thyroid nodule 5/22/2013    Overview:  Thyroid US (2012) Stable nodule left lobe of thyroid TSH- (2/19/12) normal (2.26)          Past Surgical History:   Procedure Laterality Date    BIOPSY  appox in 1980's    BREAST SURGERY  last time in the 1990's    non cancer    COLONOSCOPY N/A 9/7/2022    Procedure: COLONOSCOPY, WITH POLYPECTOMY AND BIOPSY;  Surgeon: Alfredito Gomez DO;  Location: [facility]    ESOPHAGOSCOPY, GASTROSCOPY, DUODENOSCOPY (EGD), COMBINED N/A 5/25/2022    Procedure: ESOPHAGOGASTRODUODENOSCOPY, WITH BIOPSY;  Surgeon: Alfredito Gomez DO;  Location: [facility]       ALLERGIES:  Losartan, Gabapentin, Lisinopril, Oxycodone, Tramadol, Augmentin [amoxicillin-pot clavulanate], Bacitracin, and Bupropion    MEDS:    Current Outpatient Medications:     amLODIPine (NORVASC) 5 MG tablet, Take 1 tablet by mouth once daily, Disp: 90 tablet, Rfl: 0    Aspirin (VAZALORE) 81 MG CAPS, Please choose reason not prescribed from choices below., Disp: , Rfl:     ASPIRIN NOT PRESCRIBED (INTENTIONAL), Please choose reason not prescribed from choices below., Disp: , Rfl:     atorvastatin (LIPITOR) 40 MG tablet, Take 1 tablet by mouth once daily, Disp: 90 tablet, Rfl: 0    enalapril (VASOTEC) 20 MG tablet, Take 1 tablet by mouth once daily, Disp: 90 tablet, Rfl: 0    FLUoxetine (PROZAC) 20 MG capsule, Take 1 capsule by mouth once daily, Disp: 90 capsule, Rfl: 2    fluticasone (FLONASE) 50 MCG/ACT nasal spray, Spray 1 spray into both nostrils daily, Disp: 16 g, Rfl: 1    omeprazole (PRILOSEC) 40 MG DR capsule, Take 1 capsule by mouth once daily (Patient not taking: Reported on 5/11/2023), Disp: 90 capsule, Rfl: 2    warfarin ANTICOAGULANT (COUMADIN) 5 MG tablet, TAKE 7.5MG BY MOUTH EVERY MONDAY AND FRIDAY.  TAKE 5MG ALL OTHER DAYS OR AS DIRECTED BY ANTICOAGULATION CLINIC, Disp: 100 tablet, Rfl: 1    SOCIAL HABITS:    History   Smoking Status    Every Day    Packs/day: 1.00    Years: 50.00    Types: Cigarettes    Start date: 1/1/2016   Smokeless Tobacco    Never     Social History    Substance and Sexual Activity      Alcohol use: No      History   Drug Use Unknown       FAMILY HISTORY:    Family History   Problem Relation Age of Onset    Diabetes Maternal Grandfather     Diabetes Sister         developed after cancer treatment    Breast Cancer Sister     Obesity Sister     Hypertension Mother     Hyperlipidemia Mother     Osteoporosis Mother     Breast Cancer Sister     Osteoporosis Sister     Breast Cancer Daughter     Other Cancer Sister         throught out body    Substance Abuse Sister         acol    Obesity Sister     Substance Abuse Sister         acol    Obesity Sister        PE:  There were no vitals taken for this visit.  Wt Readings from Last 1 Encounters:   06/21/23 122 lb 9.6 oz (55.6 kg)     There is no height or weight on file to calculate BMI.    EXAM:  GENERAL: well-developed 77 year old female who appears her stated age  CARDIAC: normal   CHEST/LUNG: normal respiratory effort   MUSCULOSKELETAL: grossly normal and both lower extremities are intact, no lower extremity edema  NEUROLOGIC: focally intact, alert and oriented x 3  PSYCH: appropriate affect  VASCULAR:       DIAGNOSTIC STUDIES:     Images:  US Carotid Bilateral    Result Date: 7/18/2023  US CAROTID BILATERAL 7/18/2023 8:30 AM HISTORY: Carotid bruit. Peripheral arterial disease. COMPARISON: 6/28/2022 FINDINGS: Right side: On the grayscale images, there is scattered calcified plaque in the common carotid artery extending into the carotid bifurcation and into the internal and external carotid arteries. Spectral waveform analysis was performed. Peak systolic and diastolic velocities in the right internal carotid artery are 251 and 19 cm/s. Per  sonographic criteria, degree of stenosis in the right internal carotid artery is 50-69%. Flow in the right vertebral artery is antegrade. Left side: On the grayscale images, there is scattered calcified plaque in the common carotid artery extending into the carotid bifurcation and into the internal and external carotid arteries. Spectral waveform analysis was performed. Peak systolic and diastolic velocities in the left internal carotid artery are 218 and 27 cm/s. Per sonographic criteria, degree of stenosis in the left internal carotid artery is 50-69%. Flow in the left vertebral artery is antegrade.     IMPRESSION: Per sonographic criteria, there are 50-69% stenoses in the internal carotid arteries bilaterally. Degree of stenosis measured relative to the diameter of the normal internal carotid artery per NASCET or NASCET type criteria LORENA SUTTON MD   SYSTEM ID:  [id number]    US FAMILIA with PPG wo Exercise Bilateral    Result Date: 7/18/2023  US FAMILIA WITH PPG W/O EXERCISE BILAT   7/18/2023 9:10 AM HISTORY: Carotid bruit, unspecified laterality; PAD (peripheral artery disease) (H) COMPARISON: 6/28/2022 FINDINGS: Right FAMILIA: DP: 0.94 PT: 0.87. Left FAMILIA: DP: 0.90 PT: 0.89. Right Digital brachial index: 0.96. Left Digital Brachial index: 0.92 Waveforms: Multiphasic in the distal tibial arteries.     IMPRESSION: Mild arterial insufficiency in the lower extremities. This is not significantly changed. FAMILIA CRITERIA: >0.95 Normal 0.90 - 0.94 Mild 0.5 - 0.89 Moderate 0.2 - 0.49 Severe <0.2 Critical LORENA SUTTON MD   SYSTEM ID:  [id number]    LABS:      Sodium   Date Value Ref Range Status   09/28/2022 133 (L) 136 - 145 mmol/L Final   05/03/2022 135 133 - 144 mmol/L Final   12/16/2021 140 133 - 144 mmol/L Final   06/10/2021 133 133 - 144 mmol/L Final   05/28/2020 131 (L) 133 - 144 mmol/L Final   12/12/2019 137 133 - 144 mmol/L Final     Urea Nitrogen   Date Value Ref Range Status   09/28/2022 30.4 (H) 8.0 - 23.0 mg/dL Final    05/03/2022 23 7 - 30 mg/dL Final   12/16/2021 29 7 - 30 mg/dL Final   06/10/2021 25 7 - 30 mg/dL Final   05/28/2020 28 7 - 30 mg/dL Final   12/12/2019 25 7 - 30 mg/dL Final     Hemoglobin   Date Value Ref Range Status   09/28/2022 12.0 11.7 - 15.7 g/dL Final   05/03/2022 12.9 11.7 - 15.7 g/dL Final   12/12/2019 12.4 11.7 - 15.7 g/dL Final   05/09/2019 13.0 11.7 - 15.7 g/dL Final   03/22/2019 12.7 11.7 - 15.7 g/dL Final     Platelet Count   Date Value Ref Range Status   09/28/2022 244 150 - 450 10e3/uL Final   05/03/2022 239 150 - 450 10e3/uL Final   12/12/2019 302 150 - 450 10e9/L Final   05/09/2019 241 150 - 450 10e9/L Final   03/22/2019 293 150 - 450 10e9/L Final     INR   Date Value Ref Range Status   07/25/2023 1.5 (H) 0.9 - 1.1 Final   07/12/2023 1.8 (H) 0.9 - 1.1 Final   06/07/2023 2.1 (H) 0.9 - 1.1 Final   09/28/2022 2.47 (H) 0.85 - 1.15 Final   06/24/2021 2.60 (H) 0.86 - 1.14 Final     Comment:     This test is intended for monitoring Coumadin therapy.  Results are not   accurate in patients with prolonged INR due to factor deficiency.     06/10/2021 2.80 (H) 0.86 - 1.14 Final     Comment:     This test is intended for monitoring Coumadin therapy.  Results are not   accurate in patients with prolonged INR due to factor deficiency.     04/29/2021 2.90 (H) 0.86 - 1.14 Final     Comment:     This test is intended for monitoring Coumadin therapy.  Results are not   accurate in patients with prolonged INR due to factor deficiency.         30 minutes spent on the day of encounter doing chart review, history and exam, documentation, and further activities as noted.       Dawn Whitaker NP  VASCULAR SURGERY

## 2023-08-01 ENCOUNTER — OFFICE VISIT (OUTPATIENT)
Dept: VASCULAR SURGERY | Facility: CLINIC | Age: 78
End: 2023-08-01
Payer: COMMERCIAL

## 2023-08-01 VITALS
SYSTOLIC BLOOD PRESSURE: 140 MMHG | RESPIRATION RATE: 16 BRPM | DIASTOLIC BLOOD PRESSURE: 50 MMHG | TEMPERATURE: 98.1 F | HEART RATE: 68 BPM

## 2023-08-01 DIAGNOSIS — R09.89 CAROTID BRUIT, UNSPECIFIED LATERALITY: ICD-10-CM

## 2023-08-01 DIAGNOSIS — I73.9 PAD (PERIPHERAL ARTERY DISEASE) (H): Primary | ICD-10-CM

## 2023-08-01 PROCEDURE — G0463 HOSPITAL OUTPT CLINIC VISIT: HCPCS

## 2023-08-01 PROCEDURE — 99203 OFFICE O/P NEW LOW 30 MIN: CPT

## 2023-08-01 ASSESSMENT — PAIN SCALES - GENERAL: PAINLEVEL: NO PAIN (0)

## 2023-08-08 ENCOUNTER — LAB (OUTPATIENT)
Dept: LAB | Facility: CLINIC | Age: 78
End: 2023-08-08
Payer: COMMERCIAL

## 2023-08-08 ENCOUNTER — ANTICOAGULATION THERAPY VISIT (OUTPATIENT)
Dept: ANTICOAGULATION | Facility: CLINIC | Age: 78
End: 2023-08-08

## 2023-08-08 DIAGNOSIS — Z79.01 ANTICOAGULATION MONITORING, INR RANGE 2-3: Chronic | ICD-10-CM

## 2023-08-08 DIAGNOSIS — Z79.01 LONG TERM CURRENT USE OF ANTICOAGULANT THERAPY: ICD-10-CM

## 2023-08-08 DIAGNOSIS — Z79.01 LONG TERM CURRENT USE OF ANTICOAGULANT THERAPY: Primary | ICD-10-CM

## 2023-08-08 DIAGNOSIS — I63.20 CEREBROVASCULAR ACCIDENT (CVA) DUE TO OCCLUSION OF PRECEREBRAL ARTERY (H): Chronic | ICD-10-CM

## 2023-08-08 DIAGNOSIS — Z86.73 HISTORY OF CVA (CEREBROVASCULAR ACCIDENT): ICD-10-CM

## 2023-08-08 DIAGNOSIS — I63.20 CEREBROVASCULAR ACCIDENT (CVA) DUE TO OCCLUSION OF PRECEREBRAL ARTERY (H): ICD-10-CM

## 2023-08-08 LAB — INR BLD: 2.4 (ref 0.9–1.1)

## 2023-08-08 PROCEDURE — 36416 COLLJ CAPILLARY BLOOD SPEC: CPT

## 2023-08-08 PROCEDURE — 85610 PROTHROMBIN TIME: CPT

## 2023-08-08 NOTE — PROGRESS NOTES
ANTICOAGULATION MANAGEMENT     Bebe SHELDON Alfonso 77 year old female is on warfarin with therapeutic INR result. (Goal INR 2.0-3.0)    Recent labs: (last 7 days)     08/08/23  1429   INR 2.4*       ASSESSMENT     Source(s): Chart Review and Patient/Caregiver Call     Warfarin doses taken: Warfarin taken as instructed  Diet: No new diet changes identified  Medication/supplement changes: None noted  New illness, injury, or hospitalization: No  Signs or symptoms of bleeding or clotting: No  Previous result: Subtherapeutic  Additional findings: None       PLAN     Recommended plan for no diet, medication or health factor changes affecting INR     Dosing Instructions: Continue your current warfarin dose with next INR in 3 weeks       Summary  As of 8/8/2023      Full warfarin instructions:  7.5 mg every Mon, Fri; 5 mg all other days   Next INR check:  8/29/2023               Telephone call with Bebe who verbalizes understanding and agrees to plan    Lab visit scheduled    Education provided:   Contact [phone]  with any changes, questions or concerns.     Plan made per [facility] anticoagulation protocol    Annie Adames RN  Anticoagulation Clinic  8/8/2023    _______________________________________________________________________     Anticoagulation Episode Summary       Current INR goal:  2.0-3.0   TTR:  71.4 % (1 y)   Target end date:  Indefinite   Send INR reminders to:  [facility]    Indications    History of CVA (cerebrovascular accident) (Resolved) [Z86.73]  Long term current use of anticoagulant therapy [Z79.01]  Cerebrovascular accident (CVA) due to occlusion of precerebral artery (H) [I63.20]  History of CVA (cerebrovascular accident) [Z86.73]  Anticoagulation monitoring  INR range 2-3 [Z79.01]             Comments:               Anticoagulation Care Providers       Provider Role Specialty Phone number    Diana Ely APRN [facility] Referring Family Medicine [phone]

## 2023-08-29 ENCOUNTER — PATIENT OUTREACH (OUTPATIENT)
Dept: CARE COORDINATION | Facility: CLINIC | Age: 78
End: 2023-08-29

## 2023-08-29 ENCOUNTER — LAB (OUTPATIENT)
Dept: LAB | Facility: CLINIC | Age: 78
End: 2023-08-29
Payer: COMMERCIAL

## 2023-08-29 ENCOUNTER — ANTICOAGULATION THERAPY VISIT (OUTPATIENT)
Dept: ANTICOAGULATION | Facility: CLINIC | Age: 78
End: 2023-08-29

## 2023-08-29 DIAGNOSIS — I63.20 CEREBROVASCULAR ACCIDENT (CVA) DUE TO OCCLUSION OF PRECEREBRAL ARTERY (H): Chronic | ICD-10-CM

## 2023-08-29 DIAGNOSIS — Z79.01 LONG TERM CURRENT USE OF ANTICOAGULANT THERAPY: ICD-10-CM

## 2023-08-29 DIAGNOSIS — Z79.01 ANTICOAGULATION MONITORING, INR RANGE 2-3: Chronic | ICD-10-CM

## 2023-08-29 DIAGNOSIS — Z79.01 LONG TERM CURRENT USE OF ANTICOAGULANT THERAPY: Primary | ICD-10-CM

## 2023-08-29 DIAGNOSIS — I63.20 CEREBROVASCULAR ACCIDENT (CVA) DUE TO OCCLUSION OF PRECEREBRAL ARTERY (H): ICD-10-CM

## 2023-08-29 DIAGNOSIS — Z86.73 HISTORY OF CVA (CEREBROVASCULAR ACCIDENT): ICD-10-CM

## 2023-08-29 LAB — INR BLD: 2.3 (ref 0.9–1.1)

## 2023-08-29 PROCEDURE — 85610 PROTHROMBIN TIME: CPT

## 2023-08-29 PROCEDURE — 36416 COLLJ CAPILLARY BLOOD SPEC: CPT

## 2023-08-29 NOTE — PROGRESS NOTES
ANTICOAGULATION MANAGEMENT     Bebe SHELDON Kaden 77 year old female is on warfarin with therapeutic INR result. (Goal INR 2.0-3.0)    Recent labs: (last 7 days)     08/29/23  1000   INR 2.3*       ASSESSMENT     Source(s): Chart Review and Patient/Caregiver Call     Warfarin doses taken: Warfarin taken as instructed  Diet: No new diet changes identified  Medication/supplement changes: None noted  New illness, injury, or hospitalization: No  Signs or symptoms of bleeding or clotting: No  Previous result: Therapeutic last visit; previously outside of goal range  Additional findings: None       PLAN     Recommended plan for no diet, medication or health factor changes affecting INR     Dosing Instructions: Continue your current warfarin dose with next INR in 4 weeks       Summary  As of 8/29/2023      Full warfarin instructions:  7.5 mg every Mon, Fri; 5 mg all other days   Next INR check:  9/26/2023               Telephone call with Bebe who verbalizes understanding and agrees to plan    Lab visit scheduled    Education provided:   Goal range and lab monitoring: goal range and significance of current result    Plan made per [facility] anticoagulation protocol    Vandana Garcia RN  Anticoagulation Clinic  8/29/2023    _______________________________________________________________________     Anticoagulation Episode Summary       Current INR goal:  2.0-3.0   TTR:  74.5 % (1 y)   Target end date:  Indefinite   Send INR reminders to:  [facility]    Indications    History of CVA (cerebrovascular accident) (Resolved) [Z86.73]  Long term current use of anticoagulant therapy [Z79.01]  Cerebrovascular accident (CVA) due to occlusion of precerebral artery (H) [I63.20]  History of CVA (cerebrovascular accident) [Z86.73]  Anticoagulation monitoring  INR range 2-3 [Z79.01]             Comments:               Anticoagulation Care Providers       Provider Role Specialty Phone number    Diana Ely APRN CNP  [facility] [phone]

## 2023-09-11 NOTE — TELEPHONE ENCOUNTER
ANTICOAGULATION MANAGEMENT:  Medication Refill    Anticoagulation Summary  As of 7/25/2023      Warfarin maintenance plan:  7.5 mg (5 mg x 1.5) every Mon, Fri; 5 mg (5 mg x 1) all other days   Next INR check:  8/8/2023   Target end date:  Indefinite    Indications    History of CVA (cerebrovascular accident) (Resolved) [Z86.73]  Long term current use of anticoagulant therapy [Z79.01]  Cerebrovascular accident (CVA) due to occlusion of precerebral artery (H) [I63.20]  History of CVA (cerebrovascular accident) [Z86.73]  Anticoagulation monitoring  INR range 2-3 [Z79.01]                 Anticoagulation Care Providers       Provider Role Specialty Phone number    Diana Ely APRN [facility] Referring Family Medicine [phone]            Refill Criteria    Visit with referring provider/group: Meets criteria: office visit within referring provider group in the last 1 year on 6/7/23    [facility] referral signed last signed: 01/04/2023; within last year: Yes    Lab monitoring not exceeding 2 weeks overdue:  Yes    Bebe meets all criteria for refill. Rx instructions and quantity supplied updated to match patient's current dosing plan. Warfarin 90 day supply with 1 refill granted per [facility] protocol     Eun Moore RN  Anticoagulation Clinic     oral

## 2023-09-12 ENCOUNTER — PATIENT OUTREACH (OUTPATIENT)
Dept: CARE COORDINATION | Facility: CLINIC | Age: 78
End: 2023-09-12
Payer: COMMERCIAL

## 2023-09-26 ENCOUNTER — ANTICOAGULATION THERAPY VISIT (OUTPATIENT)
Dept: ANTICOAGULATION | Facility: CLINIC | Age: 78
End: 2023-09-26

## 2023-09-26 ENCOUNTER — LAB (OUTPATIENT)
Dept: LAB | Facility: CLINIC | Age: 78
End: 2023-09-26
Payer: COMMERCIAL

## 2023-09-26 DIAGNOSIS — I63.20 CEREBROVASCULAR ACCIDENT (CVA) DUE TO OCCLUSION OF PRECEREBRAL ARTERY (H): ICD-10-CM

## 2023-09-26 DIAGNOSIS — Z79.01 LONG TERM CURRENT USE OF ANTICOAGULANT THERAPY: Primary | ICD-10-CM

## 2023-09-26 DIAGNOSIS — Z79.01 ANTICOAGULATION MONITORING, INR RANGE 2-3: Chronic | ICD-10-CM

## 2023-09-26 DIAGNOSIS — Z86.73 HISTORY OF CVA (CEREBROVASCULAR ACCIDENT): ICD-10-CM

## 2023-09-26 DIAGNOSIS — Z79.01 LONG TERM CURRENT USE OF ANTICOAGULANT THERAPY: ICD-10-CM

## 2023-09-26 DIAGNOSIS — I63.20 CEREBROVASCULAR ACCIDENT (CVA) DUE TO OCCLUSION OF PRECEREBRAL ARTERY (H): Chronic | ICD-10-CM

## 2023-09-26 LAB — INR BLD: 1.9 (ref 0.9–1.1)

## 2023-09-26 PROCEDURE — 85610 PROTHROMBIN TIME: CPT

## 2023-09-26 PROCEDURE — 36416 COLLJ CAPILLARY BLOOD SPEC: CPT

## 2023-09-26 NOTE — PROGRESS NOTES
ANTICOAGULATION MANAGEMENT     Bebe Alfonso 77 year old female is on warfarin with subtherapeutic INR result. (Goal INR 2.0-3.0)    Recent labs: (last 7 days)     09/26/23  0924   INR 1.9*       ASSESSMENT     Source(s): Chart Review  Previous INR was Therapeutic last 2(+) visits  Medication, diet, health changes since last INR chart reviewed; none identified         PLAN     Unable to reach Bebe today.    No instructions provided. Unable to leave voicemail.    Follow up required to confirm warfarin dose taken and assess for changes and discuss out of range result     Vandana Garcia RN  Anticoagulation Clinic  9/26/2023

## 2023-09-26 NOTE — PROGRESS NOTES
ANTICOAGULATION MANAGEMENT     Bebe Alfonso 77 year old female is on warfarin with subtherapeutic INR result. (Goal INR 2.0-3.0)    Recent labs: (last 7 days)     09/26/23  0924   INR 1.9*       ASSESSMENT     Source(s): Chart Review and Patient/Caregiver Call     Warfarin doses taken: Warfarin taken as instructed  Diet: No new diet changes identified  Medication/supplement changes: None noted  New illness, injury, or hospitalization: No  Signs or symptoms of bleeding or clotting: No  Previous result: Therapeutic last 2(+) visits  Additional findings: None       PLAN     Recommended plan for no diet, medication or health factor changes affecting INR     Dosing Instructions: Continue your current warfarin dose with next INR in 2 weeks       Summary  As of 9/26/2023      Full warfarin instructions:  7.5 mg every Mon, Fri; 5 mg all other days   Next INR check:  10/10/2023               Telephone call with Bebe who verbalizes understanding and agrees to plan    Lab visit scheduled    Education provided:   Goal range and lab monitoring: goal range and significance of current result    Plan made per [facility] anticoagulation protocol    Vandana Garcia RN  Anticoagulation Clinic  9/26/2023    _______________________________________________________________________     Anticoagulation Episode Summary       Current INR goal:  2.0-3.0   TTR:  75.8 % (1 y)   Target end date:  Indefinite   Send INR reminders to:  [facility]    Indications    History of CVA (cerebrovascular accident) (Resolved) [Z86.73]  Long term current use of anticoagulant therapy [Z79.01]  Cerebrovascular accident (CVA) due to occlusion of precerebral artery (H) [I63.20]  History of CVA (cerebrovascular accident) [Z86.73]  Anticoagulation monitoring  INR range 2-3 [Z79.01]             Comments:               Anticoagulation Care Providers       Provider Role Specialty Phone number    Diana Ely APRN CNP Referring Family Medicine  [phone]

## 2023-10-11 ENCOUNTER — LAB (OUTPATIENT)
Dept: LAB | Facility: CLINIC | Age: 78
End: 2023-10-11
Payer: COMMERCIAL

## 2023-10-11 ENCOUNTER — ANTICOAGULATION THERAPY VISIT (OUTPATIENT)
Dept: ANTICOAGULATION | Facility: CLINIC | Age: 78
End: 2023-10-11

## 2023-10-11 DIAGNOSIS — I63.20 CEREBROVASCULAR ACCIDENT (CVA) DUE TO OCCLUSION OF PRECEREBRAL ARTERY (H): ICD-10-CM

## 2023-10-11 DIAGNOSIS — Z79.01 ANTICOAGULATION MONITORING, INR RANGE 2-3: Chronic | ICD-10-CM

## 2023-10-11 DIAGNOSIS — Z79.01 LONG TERM CURRENT USE OF ANTICOAGULANT THERAPY: ICD-10-CM

## 2023-10-11 DIAGNOSIS — J44.1 COPD EXACERBATION (H): ICD-10-CM

## 2023-10-11 DIAGNOSIS — I63.20 CEREBROVASCULAR ACCIDENT (CVA) DUE TO OCCLUSION OF PRECEREBRAL ARTERY (H): Chronic | ICD-10-CM

## 2023-10-11 DIAGNOSIS — E78.5 HYPERLIPIDEMIA LDL GOAL <70: ICD-10-CM

## 2023-10-11 DIAGNOSIS — Z79.01 LONG TERM CURRENT USE OF ANTICOAGULANT THERAPY: Primary | ICD-10-CM

## 2023-10-11 DIAGNOSIS — Z86.73 HISTORY OF CVA (CEREBROVASCULAR ACCIDENT): ICD-10-CM

## 2023-10-11 LAB — INR BLD: 1.9 (ref 0.9–1.1)

## 2023-10-11 PROCEDURE — 85610 PROTHROMBIN TIME: CPT

## 2023-10-11 PROCEDURE — 36416 COLLJ CAPILLARY BLOOD SPEC: CPT

## 2023-10-11 NOTE — PROGRESS NOTES
ANTICOAGULATION MANAGEMENT     Bebe SHELDON Kaden 77 year old female is on warfarin with subtherapeutic INR result. (Goal INR 2.0-3.0)    Recent labs: (last 7 days)     10/11/23  0802   INR 1.9*       ASSESSMENT     Source(s): Chart Review and Patient/Caregiver Call     Warfarin doses taken: Missed dose(s) may be affecting INR  Diet: No new diet changes identified  Medication/supplement changes: None noted  New illness, injury, or hospitalization: No  Signs or symptoms of bleeding or clotting: No  Previous result: Subtherapeutic  Additional findings: None       PLAN     Recommended plan for temporary change(s) affecting INR     Dosing Instructions: booster dose then continue your current warfarin dose with next INR in 2 weeks       Summary  As of 10/11/2023      Full warfarin instructions:  10/11: 7.5 mg; Otherwise 7.5 mg every Mon, Fri; 5 mg all other days   Next INR check:  10/25/2023               Telephone call with Bebe who verbalizes understanding and agrees to plan    Lab visit scheduled    Education provided:   Goal range and lab monitoring: goal range and significance of current result    Plan made per [facility] anticoagulation protocol    Vandana Garcia RN  Anticoagulation Clinic  10/11/2023    _______________________________________________________________________     Anticoagulation Episode Summary       Current INR goal:  2.0-3.0   TTR:  71.7% (1 y)   Target end date:  Indefinite   Send INR reminders to:  [facility]    Indications    History of CVA (cerebrovascular accident) (Resolved) [Z86.73]  Long term current use of anticoagulant therapy [Z79.01]  Cerebrovascular accident (CVA) due to occlusion of precerebral artery (H) [I63.20]  History of CVA (cerebrovascular accident) [Z86.73]  Anticoagulation monitoring  INR range 2-3 [Z79.01]             Comments:               Anticoagulation Care Providers       Provider Role Specialty Phone number    Diana Ely APRN CNP Referring Family  Medicine [phone]

## 2023-10-12 RX ORDER — AMLODIPINE BESYLATE 5 MG/1
TABLET ORAL
Qty: 90 TABLET | Refills: 1 | Status: SHIPPED | OUTPATIENT
Start: 2023-10-12 | End: 2024-05-01

## 2023-10-12 RX ORDER — ATORVASTATIN CALCIUM 40 MG/1
TABLET, FILM COATED ORAL
Qty: 90 TABLET | Refills: 0 | Status: SHIPPED | OUTPATIENT
Start: 2023-10-12 | End: 2023-10-26

## 2023-10-18 NOTE — PATIENT INSTRUCTIONS
Nguyen Copleand,    I biopsied 2 MIS on this patient's back. The one on the left I was able to biopsy the entire visible lesion. The other (right upper back) was about 2cm x 1cm and more ill-defined so I did a biopsy of a portion in the middle (you can see in the photo). Clinically is all appeared as MIS without any indurated areas, I just wanted to make your aware.    Thanks,  Rohit  ______________________________________________    1. 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 Copeland for surgery consultation. Picture in chart.     General Derm team: sent patient for Mohs surgery. F/u with me in 3 months.    ________________________    3. Skin, right jawline, shave biopsy:   -VERRUCOUS KERATOSIS, IRRITATED AND INFLAMED, see comment     COMMENT:  The histological findings (see microscopic description) are somewhat nonspecific, but this is a benign keratosis.  This spectrum of changes can be seen in irritated seborrheic keratoses or warts. This lesion is benign.     4. Skin, left lateral leg superior, shave biopsy:   -SEBORRHEIC KERATOSIS, IRRITATED AND INFLAMED     This lesion is benign.     5. Skin, left lateral leg inferior, shave biopsy:   -SEBORRHEIC KERATOSIS, IRRITATED AND INFLAMED      Benign--no additional treatment needed  ____________    I called the patient and informed her of the results including MIS x 2 and the need for surgery to prevent spread or metastasis. She voiced understanding Yazmin Spence,    Thank you for entrusting your care with us today. After your visit today with Veronika Thuy this is the plan that was discussed at your appointment.    Continue walking and working on smoking cessation    2. Continue aspirin and statin    3. Repeat ultrasounds in 1 year    4. Notify us sooner if any changes.       I am including additional information on these things and our contact information if you have any questions or concerns.   Please do not hesitate to reach out to us if you felt we did not answer your questions or you are unsure of the treatment plan after your visit today. Our number is [phone].Thank you for trusting us with your care.         Again thank you for your time.     Rosita Hickman RN

## 2023-10-24 ENCOUNTER — TELEPHONE (OUTPATIENT)
Dept: FAMILY MEDICINE | Facility: CLINIC | Age: 78
End: 2023-10-24
Payer: COMMERCIAL

## 2023-10-24 DIAGNOSIS — Z86.73 HISTORY OF CVA (CEREBROVASCULAR ACCIDENT): ICD-10-CM

## 2023-10-24 DIAGNOSIS — I10 BENIGN ESSENTIAL HYPERTENSION: Chronic | ICD-10-CM

## 2023-10-24 DIAGNOSIS — N18.32 STAGE 3B CHRONIC KIDNEY DISEASE (H): ICD-10-CM

## 2023-10-24 DIAGNOSIS — I70.1 RENAL ARTERY STENOSIS (H): ICD-10-CM

## 2023-10-24 DIAGNOSIS — E78.5 HYPERLIPIDEMIA LDL GOAL <70: Primary | ICD-10-CM

## 2023-10-24 DIAGNOSIS — M16.9 OSTEOARTHROSIS, HIP: ICD-10-CM

## 2023-10-24 DIAGNOSIS — E55.9 VITAMIN D DEFICIENCY: ICD-10-CM

## 2023-10-24 NOTE — TELEPHONE ENCOUNTER
"Patient calling requesting yearly labs. She has a lab appt tomorrow for INR. Last Medicare Annual Wellness Exam was 09/28/22. Assisted Bebe to schedule for next available on 11/16/23. Advised to come fasting in case PCP agrees to order labs. Informed Bebe Diana Solis might decide to wait to order labs until her visit. Last Lipid profile:  Lab Results   Component Value Date    CHOL 166 05/03/2022    CHOL 184 06/10/2021     Lab Results   Component Value Date    HDL 62 05/03/2022    HDL 84 06/10/2021     Lab Results   Component Value Date    LDL 74 05/03/2022    LDL 83 06/10/2021     Lab Results   Component Value Date    TRIG 148 05/03/2022    TRIG 84 06/10/2021     No results found for: \"CHOLHDLRATIO\"  Cheyenne UNDERWOOD RN                                                                                                                                                                                                                                     "

## 2023-10-25 ENCOUNTER — ANTICOAGULATION THERAPY VISIT (OUTPATIENT)
Dept: ANTICOAGULATION | Facility: CLINIC | Age: 78
End: 2023-10-25

## 2023-10-25 ENCOUNTER — LAB (OUTPATIENT)
Dept: LAB | Facility: CLINIC | Age: 78
End: 2023-10-25
Payer: COMMERCIAL

## 2023-10-25 DIAGNOSIS — Z86.73 HISTORY OF CVA (CEREBROVASCULAR ACCIDENT): ICD-10-CM

## 2023-10-25 DIAGNOSIS — I70.1 RENAL ARTERY STENOSIS (H): ICD-10-CM

## 2023-10-25 DIAGNOSIS — E78.5 HYPERLIPIDEMIA LDL GOAL <70: ICD-10-CM

## 2023-10-25 DIAGNOSIS — M16.9 OSTEOARTHROSIS, HIP: ICD-10-CM

## 2023-10-25 DIAGNOSIS — N18.32 STAGE 3B CHRONIC KIDNEY DISEASE (H): ICD-10-CM

## 2023-10-25 DIAGNOSIS — Z79.01 LONG TERM CURRENT USE OF ANTICOAGULANT THERAPY: Primary | ICD-10-CM

## 2023-10-25 DIAGNOSIS — I63.20 CEREBROVASCULAR ACCIDENT (CVA) DUE TO OCCLUSION OF PRECEREBRAL ARTERY (H): ICD-10-CM

## 2023-10-25 DIAGNOSIS — Z79.01 ANTICOAGULATION MONITORING, INR RANGE 2-3: Chronic | ICD-10-CM

## 2023-10-25 DIAGNOSIS — I63.20 CEREBROVASCULAR ACCIDENT (CVA) DUE TO OCCLUSION OF PRECEREBRAL ARTERY (H): Chronic | ICD-10-CM

## 2023-10-25 DIAGNOSIS — Z79.01 LONG TERM CURRENT USE OF ANTICOAGULANT THERAPY: ICD-10-CM

## 2023-10-25 DIAGNOSIS — E55.9 VITAMIN D DEFICIENCY: ICD-10-CM

## 2023-10-25 DIAGNOSIS — I10 BENIGN ESSENTIAL HYPERTENSION: ICD-10-CM

## 2023-10-25 LAB
ALBUMIN SERPL BCG-MCNC: 4.2 G/DL (ref 3.5–5.2)
ALP SERPL-CCNC: 42 U/L (ref 35–104)
ALT SERPL W P-5'-P-CCNC: 11 U/L (ref 0–50)
AMPHETAMINES UR QL: NOT DETECTED
ANION GAP SERPL CALCULATED.3IONS-SCNC: 13 MMOL/L (ref 7–15)
AST SERPL W P-5'-P-CCNC: 18 U/L (ref 0–45)
BARBITURATES UR QL SCN: NOT DETECTED
BASOPHILS # BLD AUTO: 0 10E3/UL (ref 0–0.2)
BASOPHILS NFR BLD AUTO: 1 %
BENZODIAZ UR QL SCN: NOT DETECTED
BILIRUB SERPL-MCNC: 0.2 MG/DL
BUN SERPL-MCNC: 37.1 MG/DL (ref 8–23)
BUPRENORPHINE UR QL: NOT DETECTED
CALCIUM SERPL-MCNC: 9.5 MG/DL (ref 8.8–10.2)
CANNABINOIDS UR QL: NOT DETECTED
CHLORIDE SERPL-SCNC: 103 MMOL/L (ref 98–107)
CHOLEST SERPL-MCNC: 201 MG/DL
COCAINE UR QL SCN: NOT DETECTED
CREAT SERPL-MCNC: 2 MG/DL (ref 0.51–0.95)
CREAT UR-MCNC: 65.6 MG/DL
D-METHAMPHET UR QL: NOT DETECTED
DEPRECATED HCO3 PLAS-SCNC: 20 MMOL/L (ref 22–29)
EGFRCR SERPLBLD CKD-EPI 2021: 25 ML/MIN/1.73M2
EOSINOPHIL # BLD AUTO: 0.2 10E3/UL (ref 0–0.7)
EOSINOPHIL NFR BLD AUTO: 2 %
ERYTHROCYTE [DISTWIDTH] IN BLOOD BY AUTOMATED COUNT: 13.1 % (ref 10–15)
GLUCOSE SERPL-MCNC: 93 MG/DL (ref 70–99)
HCT VFR BLD AUTO: 35.3 % (ref 35–47)
HDLC SERPL-MCNC: 64 MG/DL
HGB BLD-MCNC: 11.7 G/DL (ref 11.7–15.7)
IMM GRANULOCYTES # BLD: 0 10E3/UL
IMM GRANULOCYTES NFR BLD: 0 %
INR BLD: 2 (ref 0.9–1.1)
LDLC SERPL CALC-MCNC: 113 MG/DL
LYMPHOCYTES # BLD AUTO: 1.8 10E3/UL (ref 0.8–5.3)
LYMPHOCYTES NFR BLD AUTO: 24 %
MCH RBC QN AUTO: 31 PG (ref 26.5–33)
MCHC RBC AUTO-ENTMCNC: 33.1 G/DL (ref 31.5–36.5)
MCV RBC AUTO: 94 FL (ref 78–100)
METHADONE UR QL SCN: NOT DETECTED
MICROALBUMIN UR-MCNC: 109 MG/L
MICROALBUMIN/CREAT UR: 166.16 MG/G CR (ref 0–25)
MONOCYTES # BLD AUTO: 0.6 10E3/UL (ref 0–1.3)
MONOCYTES NFR BLD AUTO: 9 %
NEUTROPHILS # BLD AUTO: 4.9 10E3/UL (ref 1.6–8.3)
NEUTROPHILS NFR BLD AUTO: 65 %
NONHDLC SERPL-MCNC: 137 MG/DL
OPIATES UR QL SCN: NOT DETECTED
OXYCODONE UR QL SCN: NOT DETECTED
PCP UR QL SCN: NOT DETECTED
PLATELET # BLD AUTO: 233 10E3/UL (ref 150–450)
POTASSIUM SERPL-SCNC: 5.1 MMOL/L (ref 3.4–5.3)
PROT SERPL-MCNC: 6.8 G/DL (ref 6.4–8.3)
RBC # BLD AUTO: 3.77 10E6/UL (ref 3.8–5.2)
SODIUM SERPL-SCNC: 136 MMOL/L (ref 135–145)
TRICYCLICS UR QL SCN: NOT DETECTED
TRIGL SERPL-MCNC: 120 MG/DL
VIT D+METAB SERPL-MCNC: 39 NG/ML (ref 20–50)
WBC # BLD AUTO: 7.5 10E3/UL (ref 4–11)

## 2023-10-25 PROCEDURE — 85610 PROTHROMBIN TIME: CPT

## 2023-10-25 PROCEDURE — 80306 DRUG TEST PRSMV INSTRMNT: CPT

## 2023-10-25 PROCEDURE — 85025 COMPLETE CBC W/AUTO DIFF WBC: CPT

## 2023-10-25 PROCEDURE — 82043 UR ALBUMIN QUANTITATIVE: CPT

## 2023-10-25 PROCEDURE — 36415 COLL VENOUS BLD VENIPUNCTURE: CPT

## 2023-10-25 PROCEDURE — 80061 LIPID PANEL: CPT

## 2023-10-25 PROCEDURE — 80053 COMPREHEN METABOLIC PANEL: CPT

## 2023-10-25 PROCEDURE — 82306 VITAMIN D 25 HYDROXY: CPT

## 2023-10-25 PROCEDURE — 82570 ASSAY OF URINE CREATININE: CPT | Mod: 59

## 2023-10-25 NOTE — PROGRESS NOTES
ANTICOAGULATION MANAGEMENT     Bebe SHELDON Alfonso 77 year old female is on warfarin with therapeutic INR result. (Goal INR 2.0-3.0)    Recent labs: (last 7 days)     10/25/23  0911   INR 2.0*       ASSESSMENT     Source(s): Chart Review and Patient/Caregiver Call     Warfarin doses taken: Warfarin taken as instructed  Diet: No new diet changes identified  Medication/supplement changes: None noted  New illness, injury, or hospitalization: No  Signs or symptoms of bleeding or clotting: No  Previous result: Subtherapeutic  Additional findings:  Increase in activity - ongoing changes     PLAN     Recommended plan for ongoing change(s) affecting INR     Dosing Instructions: Continue your current warfarin dose with next INR in 3 weeks       Summary  As of 10/25/2023      Full warfarin instructions:  7.5 mg every Mon, Fri; 5 mg all other days   Next INR check:  11/15/2023               Telephone call with Bebe who verbalizes understanding and agrees to plan    Lab visit scheduled    Education provided:   Please call back if any changes to your diet, medications or how you've been taking warfarin    Plan made per [facility] anticoagulation protocol    Deneen Pretty RN  Anticoagulation Clinic  10/25/2023    _______________________________________________________________________     Anticoagulation Episode Summary       Current INR goal:  2.0-3.0   TTR:  69.6% (1 y)   Target end date:  Indefinite   Send INR reminders to:  [facility]    Indications    History of CVA (cerebrovascular accident) (Resolved) [Z86.73]  Long term current use of anticoagulant therapy [Z79.01]  Cerebrovascular accident (CVA) due to occlusion of precerebral artery (H) [I63.20]  History of CVA (cerebrovascular accident) [Z86.73]  Anticoagulation monitoring  INR range 2-3 [Z79.01]             Comments:               Anticoagulation Care Providers       Provider Role Specialty Phone number    Diana Ely APRN CNP Referring Family Medicine  [phone]

## 2023-10-26 DIAGNOSIS — E78.5 HYPERLIPIDEMIA LDL GOAL <70: ICD-10-CM

## 2023-10-26 RX ORDER — ATORVASTATIN CALCIUM 40 MG/1
TABLET, FILM COATED ORAL
Qty: 90 TABLET | Refills: 3 | Status: SHIPPED | OUTPATIENT
Start: 2023-10-26 | End: 2023-11-16 | Stop reason: DRUGHIGH

## 2023-10-30 DIAGNOSIS — I10 BENIGN ESSENTIAL HYPERTENSION: ICD-10-CM

## 2023-10-31 RX ORDER — ENALAPRIL MALEATE 20 MG/1
TABLET ORAL
Qty: 90 TABLET | Refills: 0 | Status: SHIPPED | OUTPATIENT
Start: 2023-10-31 | End: 2023-11-16

## 2023-11-15 ENCOUNTER — LAB (OUTPATIENT)
Dept: LAB | Facility: CLINIC | Age: 78
End: 2023-11-15
Payer: COMMERCIAL

## 2023-11-15 ENCOUNTER — ANTICOAGULATION THERAPY VISIT (OUTPATIENT)
Dept: ANTICOAGULATION | Facility: CLINIC | Age: 78
End: 2023-11-15

## 2023-11-15 DIAGNOSIS — Z86.73 HISTORY OF CVA (CEREBROVASCULAR ACCIDENT): ICD-10-CM

## 2023-11-15 DIAGNOSIS — Z79.01 ANTICOAGULATION MONITORING, INR RANGE 2-3: Chronic | ICD-10-CM

## 2023-11-15 DIAGNOSIS — I63.20 CEREBROVASCULAR ACCIDENT (CVA) DUE TO OCCLUSION OF PRECEREBRAL ARTERY (H): ICD-10-CM

## 2023-11-15 DIAGNOSIS — Z79.01 LONG TERM CURRENT USE OF ANTICOAGULANT THERAPY: ICD-10-CM

## 2023-11-15 DIAGNOSIS — I63.20 CEREBROVASCULAR ACCIDENT (CVA) DUE TO OCCLUSION OF PRECEREBRAL ARTERY (H): Chronic | ICD-10-CM

## 2023-11-15 DIAGNOSIS — Z79.01 LONG TERM CURRENT USE OF ANTICOAGULANT THERAPY: Primary | ICD-10-CM

## 2023-11-15 LAB — INR BLD: 3.5 (ref 0.9–1.1)

## 2023-11-15 PROCEDURE — 36416 COLLJ CAPILLARY BLOOD SPEC: CPT

## 2023-11-15 PROCEDURE — 85610 PROTHROMBIN TIME: CPT

## 2023-11-15 NOTE — PROGRESS NOTES
ANTICOAGULATION MANAGEMENT     Bebe Alfonso 78 year old female is on warfarin with supratherapeutic INR result. (Goal INR 2.0-3.0)    Recent labs: (last 7 days)     11/15/23  0918   INR 3.5*       ASSESSMENT     Source(s): Chart Review and Patient/Caregiver Call     Warfarin doses taken: Warfarin taken as instructed  Diet: No new diet changes identified  Medication/supplement changes:  Tylenol prn  New illness, injury, or hospitalization: Yes: URI started 2 days ago  Signs or symptoms of bleeding or clotting: Increased bruising  Previous result: Therapeutic last visit; previously outside of goal range  Additional findings: None       PLAN     Recommended plan for temporary change(s) affecting INR     Dosing Instructions: hold dose then continue your current warfarin dose with next INR in 2 weeks       Summary  As of 11/15/2023      Full warfarin instructions:  11/15: Hold; Otherwise 7.5 mg every Mon, Fri; 5 mg all other days   Next INR check:  11/29/2023               Telephone call with Bebe who verbalizes understanding and agrees to plan    Lab visit scheduled    Education provided:   Please call back if any changes to your diet, medications or how you've been taking warfarin  Symptom monitoring: monitoring for bleeding signs and symptoms and monitoring for clotting signs and symptoms    Plan made per [facility] anticoagulation protocol    Natalie Kitchen RN  Anticoagulation Clinic  11/15/2023    _______________________________________________________________________     Anticoagulation Episode Summary       Current INR goal:  2.0-3.0   TTR:  67.7% (1 y)   Target end date:  Indefinite   Send INR reminders to:  [facility]    Indications    History of CVA (cerebrovascular accident) (Resolved) [Z86.73]  Long term current use of anticoagulant therapy [Z79.01]  Cerebrovascular accident (CVA) due to occlusion of precerebral artery (H) [I63.20]  History of CVA (cerebrovascular accident)  [Z86.73]  Anticoagulation monitoring  INR range 2-3 [Z79.01]             Comments:               Anticoagulation Care Providers       Provider Role Specialty Phone number    Diana Ely APRN [facility] [phone]

## 2023-11-16 ENCOUNTER — ANCILLARY PROCEDURE (OUTPATIENT)
Dept: GENERAL RADIOLOGY | Facility: CLINIC | Age: 78
End: 2023-11-16
Attending: NURSE PRACTITIONER
Payer: COMMERCIAL

## 2023-11-16 ENCOUNTER — OFFICE VISIT (OUTPATIENT)
Dept: FAMILY MEDICINE | Facility: CLINIC | Age: 78
End: 2023-11-16
Payer: COMMERCIAL

## 2023-11-16 VITALS
SYSTOLIC BLOOD PRESSURE: 138 MMHG | HEIGHT: 65 IN | WEIGHT: 121 LBS | DIASTOLIC BLOOD PRESSURE: 72 MMHG | HEART RATE: 80 BPM | BODY MASS INDEX: 20.16 KG/M2 | RESPIRATION RATE: 18 BRPM | OXYGEN SATURATION: 97 % | TEMPERATURE: 97.3 F

## 2023-11-16 DIAGNOSIS — E78.5 HYPERLIPIDEMIA LDL GOAL <70: Chronic | ICD-10-CM

## 2023-11-16 DIAGNOSIS — F17.200 SMOKER: ICD-10-CM

## 2023-11-16 DIAGNOSIS — K21.00 GASTROESOPHAGEAL REFLUX DISEASE WITH ESOPHAGITIS WITHOUT HEMORRHAGE: ICD-10-CM

## 2023-11-16 DIAGNOSIS — Z86.73 HISTORY OF CVA (CEREBROVASCULAR ACCIDENT): ICD-10-CM

## 2023-11-16 DIAGNOSIS — J20.9 ACUTE BRONCHITIS WITH SYMPTOMS > 10 DAYS: ICD-10-CM

## 2023-11-16 DIAGNOSIS — R91.1 LUNG NODULE: ICD-10-CM

## 2023-11-16 DIAGNOSIS — I10 BENIGN ESSENTIAL HYPERTENSION: ICD-10-CM

## 2023-11-16 DIAGNOSIS — R05.3 CHRONIC COUGH: ICD-10-CM

## 2023-11-16 DIAGNOSIS — Z00.00 MEDICARE ANNUAL WELLNESS VISIT, SUBSEQUENT: Primary | ICD-10-CM

## 2023-11-16 DIAGNOSIS — N18.4 CKD (CHRONIC KIDNEY DISEASE) STAGE 4, GFR 15-29 ML/MIN (H): ICD-10-CM

## 2023-11-16 DIAGNOSIS — J44.1 COPD EXACERBATION (H): ICD-10-CM

## 2023-11-16 LAB
ALBUMIN SERPL BCG-MCNC: 4.4 G/DL (ref 3.5–5.2)
ANION GAP SERPL CALCULATED.3IONS-SCNC: 10 MMOL/L (ref 7–15)
BUN SERPL-MCNC: 29.6 MG/DL (ref 8–23)
CALCIUM SERPL-MCNC: 9.4 MG/DL (ref 8.8–10.2)
CHLORIDE SERPL-SCNC: 103 MMOL/L (ref 98–107)
CREAT SERPL-MCNC: 1.68 MG/DL (ref 0.51–0.95)
DEPRECATED HCO3 PLAS-SCNC: 23 MMOL/L (ref 22–29)
EGFRCR SERPLBLD CKD-EPI 2021: 31 ML/MIN/1.73M2
GLUCOSE SERPL-MCNC: 97 MG/DL (ref 70–99)
PHOSPHATE SERPL-MCNC: 3.7 MG/DL (ref 2.5–4.5)
POTASSIUM SERPL-SCNC: 4.3 MMOL/L (ref 3.4–5.3)
SODIUM SERPL-SCNC: 136 MMOL/L (ref 135–145)

## 2023-11-16 PROCEDURE — 80069 RENAL FUNCTION PANEL: CPT | Performed by: NURSE PRACTITIONER

## 2023-11-16 PROCEDURE — 36415 COLL VENOUS BLD VENIPUNCTURE: CPT | Performed by: NURSE PRACTITIONER

## 2023-11-16 PROCEDURE — 99214 OFFICE O/P EST MOD 30 MIN: CPT | Mod: 25 | Performed by: NURSE PRACTITIONER

## 2023-11-16 PROCEDURE — 71046 X-RAY EXAM CHEST 2 VIEWS: CPT | Mod: TC | Performed by: RADIOLOGY

## 2023-11-16 PROCEDURE — G0439 PPPS, SUBSEQ VISIT: HCPCS | Performed by: NURSE PRACTITIONER

## 2023-11-16 RX ORDER — ENALAPRIL MALEATE 20 MG/1
20 TABLET ORAL DAILY
Qty: 90 TABLET | Refills: 3 | Status: SHIPPED | OUTPATIENT
Start: 2023-11-16 | End: 2024-02-19

## 2023-11-16 RX ORDER — ATORVASTATIN CALCIUM 80 MG/1
80 TABLET, FILM COATED ORAL DAILY
Qty: 90 TABLET | Refills: 1 | Status: SHIPPED | OUTPATIENT
Start: 2023-11-16 | End: 2024-06-24

## 2023-11-16 RX ORDER — RESPIRATORY SYNCYTIAL VIRUS VACCINE 120MCG/0.5
0.5 KIT INTRAMUSCULAR ONCE
Qty: 1 EACH | Refills: 0 | Status: CANCELLED | OUTPATIENT
Start: 2023-11-16 | End: 2023-11-16

## 2023-11-16 RX ORDER — FAMOTIDINE 40 MG/1
40 TABLET, FILM COATED ORAL DAILY
Qty: 90 TABLET | Refills: 1 | Status: SHIPPED | OUTPATIENT
Start: 2023-11-16 | End: 2024-05-14

## 2023-11-16 ASSESSMENT — ENCOUNTER SYMPTOMS
DIARRHEA: 1
CHILLS: 1
DIZZINESS: 1
COUGH: 1
ARTHRALGIAS: 1
HEARTBURN: 1
SORE THROAT: 1
CONSTIPATION: 1
HEADACHES: 1
BREAST MASS: 0

## 2023-11-16 ASSESSMENT — PAIN SCALES - GENERAL: PAINLEVEL: NO PAIN (0)

## 2023-11-16 ASSESSMENT — ACTIVITIES OF DAILY LIVING (ADL): CURRENT_FUNCTION: NO ASSISTANCE NEEDED

## 2023-11-16 NOTE — PROGRESS NOTES
"SUBJECTIVE:   Bebe is a 78 year old, presenting for the following:  Physical        11/16/2023     9:10 AM   Additional Questions   Roomed by Zuly   Accompanied by self     Home reading 131/72       Are you in the first 12 months of your Medicare coverage?  No    Healthy Habits:     In general, how would you rate your overall health?  Fair    Frequency of exercise:  None    Duration of exercise:  Less than 15 minutes    Do you usually eat at least 4 servings of fruit and vegetables a day, include whole grains    & fiber and avoid regularly eating high fat or \"junk\" foods?  No    Taking medications regularly:  Yes    Medication side effects:  None    Ability to successfully perform activities of daily living:  No assistance needed    Home Safety:  No safety concerns identified    Hearing Impairment:  Need to ask people to speak up or repeat themselves and difficulty understanding soft or whispered speech    In the past 6 months, have you been bothered by leaking of urine?  No    In general, how would you rate your overall mental or emotional health?  Good    Additional concerns today:  No      Today's PHQ-2 Score:       11/16/2023     9:06 AM   PHQ-2 ( 1999 [employer])   Q1: Little interest or pleasure in doing things 0   Q2: Feeling down, depressed or hopeless 0   PHQ-2 Score 0   Q1: Little interest or pleasure in doing things Not at all   Q2: Feeling down, depressed or hopeless Not at all   PHQ-2 Score 0           Have you ever done Advance Care Planning? (For example, a Health Directive, POLST, or a discussion with a medical provider or your loved ones about your wishes):        Fall risk  Fallen 2 or more times in the past year?: Yes  Any fall with injury in the past year?: Yes    Cognitive Screening   1) Repeat 3 items (Leader, Season, Table)    2) Clock draw: NORMAL  3) 3 item recall: Recalls 2 objects   Results: NORMAL clock, 1-2 items recalled: COGNITIVE IMPAIRMENT LESS LIKELY    Mini-CogTM Copyright [employer] [place]. " Licensed by the author for use in [facility]; reprinted with permission (ranijoanne@[facility]). All rights reserved.          Reviewed and updated as needed this visit by clinical staff   Tobacco  Allergies  Meds  Problems  Med Hx  Surg Hx  Fam Hx        Weight around 170 typically  Wt Readings from Last 5 Encounters:   11/16/23 54.9 kg (121 lb)   06/21/23 55.6 kg (122 lb 9.6 oz)   06/07/23 55.3 kg (122 lb)   05/11/23 56.6 kg (124 lb 12.8 oz)   04/19/23 59.4 kg (131 lb)     Eating ok. Tired all the time  Eating high protein    Cold like symptoms x 1 week  BP meds not taken today  No COVID test done  HEADACHE, Sore throat, joint pain,   Smoking ongoing   PND from sinuses    5 strokes in the past  Is following with vascular and cardiology  Is on ASA 81 mg and warfarin    Home 130/60-70    Colonoscopy 9/2022 polyps removed follow up in 5 yrs    CKD stage IV  Seeing nephrologist in [place] [place]  Would like to see someone in the [facility] for follow up    Snoring on her back   Does not feel refreshed in the morning  Daytime fatigue  Has  upcoming sleep study       Reviewed and updated as needed this visit by Provider   Tobacco  Allergies  Meds  Problems  Med Hx  Surg Hx  Fam Hx         Social History     Tobacco Use    Smoking status: Every Day     Packs/day: 1.00     Years: 50.00     Additional pack years: 0.00     Total pack years: 50.00     Types: Cigarettes     Start date: 1/1/2016    Smokeless tobacco: Never   Substance Use Topics    Alcohol use: No             11/16/2023     9:05 AM   Alcohol Use   Prescreen: >3 drinks/day or >7 drinks/week? Not Applicable     Do you have a current opioid prescription? No  Do you use any other controlled substances or medications that are not prescribed by a provider? None      Current providers sharing in care for this patient include:   Patient Care Team:  Diana Ely APRN CNP as PCP - General (Family Practice)  Gregoria Mcneal MD as  MD (Nephrology)  Katherine Grant LPN as Diana Theodore APRN CNP as Assigned PCP  Caitlin Jesus MD as Assigned Neuroscience Provider  Dawn Whitaker NP as Assigned Heart and Vascular Provider  Rafael Oswald MD as MD (Internal Medicine)    The following health maintenance items are reviewed in Epic and correct as of today:  Health Maintenance   Topic Date Due    COPD ACTION PLAN  Never done    LUNG CANCER SCREENING  Never done    RSV VACCINE (Pregnancy & 60+) (1 - 1-dose 60+ series) Never done    ZOSTER IMMUNIZATION (3 of 3) 12/30/2021    INFLUENZA VACCINE (1) 09/01/2023    COVID-19 Vaccine (3 - 2023-24 season) 09/01/2023    LIPID  10/25/2024    MICROALBUMIN  10/25/2024    HEMOGLOBIN  10/25/2024    NICOTINE/TOBACCO CESSATION COUNSELING Q 1 YR  11/16/2024    MEDICARE ANNUAL WELLNESS VISIT  11/16/2024    BMP  11/16/2024    ANNUAL REVIEW OF HM ORDERS  11/16/2024    FALL RISK ASSESSMENT  11/16/2024    COLORECTAL CANCER SCREENING  09/07/2027    ADVANCE CARE PLANNING  09/28/2027    DTAP/TDAP/TD IMMUNIZATION (3 - Td or Tdap) 04/19/2033    DEXA  07/16/2034    SPIROMETRY  Completed    HEPATITIS C SCREENING  Completed    PHQ-2 (once per calendar year)  Completed    Pneumococcal Vaccine: 65+ Years  Completed    URINALYSIS  Completed    IPV IMMUNIZATION  Aged Out    HPV IMMUNIZATION  Aged Out    MENINGITIS IMMUNIZATION  Aged Out    RSV MONOCLONAL ANTIBODY  Aged Out    MAMMO SCREENING  Discontinued     Labs reviewed in EPIC  BP Readings from Last 3 Encounters:   11/16/23 138/72   08/01/23 (!) 140/50   06/21/23 (!) 155/70    Wt Readings from Last 3 Encounters:   11/16/23 54.9 kg (121 lb)   06/21/23 55.6 kg (122 lb 9.6 oz)   06/07/23 55.3 kg (122 lb)                  Patient Active Problem List   Diagnosis    ACP (advance care planning)    Anticoagulation monitoring, INR range 2-3    Benign essential hypertension    Carotid bruit    Cerebrovascular disease    Chronic obstructive pulmonary  disease (H)    Esophageal reflux    Osteoarthrosis, hip    Pain medication agreement    Thyroid nodule    Long term current use of anticoagulant therapy    Hyperlipidemia LDL goal <70    Cerebrovascular accident (CVA) due to occlusion of precerebral artery (H)    Chronic kidney disease, stage 3 (moderate)    Renal artery stenosis (H24)    Tobacco dependence    History of cardiac monitoring    History of CVA (cerebrovascular accident)     Past Surgical History:   Procedure Laterality Date    BIOPSY  appox in 1980's    BREAST SURGERY  last time in the 1990's    non cancer    COLONOSCOPY N/A 9/7/2022    Procedure: COLONOSCOPY, WITH POLYPECTOMY AND BIOPSY;  Surgeon: Alfredito Gomez DO;  Location: [facility]    ESOPHAGOSCOPY, GASTROSCOPY, DUODENOSCOPY (EGD), COMBINED N/A 5/25/2022    Procedure: ESOPHAGOGASTRODUODENOSCOPY, WITH BIOPSY;  Surgeon: Alfredito Gomez DO;  Location: [facility]       Social History     Tobacco Use    Smoking status: Every Day     Packs/day: 1.00     Years: 50.00     Additional pack years: 0.00     Total pack years: 50.00     Types: Cigarettes     Start date: 1/1/2016    Smokeless tobacco: Never   Substance Use Topics    Alcohol use: No     Family History   Problem Relation Age of Onset    Diabetes Maternal Grandfather     Diabetes Sister         developed after cancer treatment    Breast Cancer Sister     Obesity Sister     Hypertension Mother     Hyperlipidemia Mother     Osteoporosis Mother     Breast Cancer Sister     Osteoporosis Sister     Breast Cancer Daughter     Other Cancer Sister         throught out body    Substance Abuse Sister         acol    Obesity Sister     Substance Abuse Sister         acol    Obesity Sister          Current Outpatient Medications   Medication Sig Dispense Refill    amLODIPine (NORVASC) 5 MG tablet Take 1 tablet by mouth once daily 90 tablet 1    Aspirin (VAZALORE) 81 MG CAPS Please choose reason not prescribed from choices below.      atorvastatin  "(LIPITOR) 80 MG tablet Take 1 tablet (80 mg) by mouth daily 90 tablet 1    enalapril (VASOTEC) 20 MG tablet Take 1 tablet (20 mg) by mouth daily 90 tablet 3    famotidine (PEPCID) 40 MG tablet Take 1 tablet (40 mg) by mouth daily 90 tablet 1    FLUoxetine (PROZAC) 20 MG capsule Take 1 capsule by mouth once daily 90 capsule 2    warfarin ANTICOAGULANT (COUMADIN) 5 MG tablet TAKE 7.5MG BY MOUTH EVERY MONDAY AND FRIDAY. TAKE 5MG ALL OTHER DAYS OR AS DIRECTED BY ANTICOAGULATION CLINIC 100 tablet 1    fluticasone (FLONASE) 50 MCG/ACT nasal spray Spray 1 spray into both nostrils daily (Patient not taking: Reported on 11/16/2023) 16 g 1     Allergies   Allergen Reactions    Losartan Swelling, Nausea, Shortness Of Breath and Palpitations    Gabapentin GI Disturbance and Unknown     Double vision  flatulence    Lisinopril Cough    Oxycodone Other (See Comments)     But has tolerated hydrocodone    Tramadol Other (See Comments)    Augmentin [Amoxicillin-Pot Clavulanate] Rash    Bacitracin Rash     blisters    Bupropion Rash     Allergic to brand not generic     Mammogram Screening: Mammogram Screening - Patient over age 75, has elected to continue with screening.      Pertinent mammograms are reviewed under the imaging tab.    Review of Systems   Constitutional:  Positive for chills.   HENT:  Positive for sore throat.    Respiratory:  Positive for cough.    Gastrointestinal:  Positive for constipation, diarrhea and heartburn.   Breasts:  Positive for tenderness. Negative for breast mass and discharge.   Genitourinary:  Negative for pelvic pain, vaginal bleeding and vaginal discharge.   Musculoskeletal:  Positive for arthralgias.   Neurological:  Positive for dizziness and headaches.         OBJECTIVE:   /72   Pulse 80   Temp 97.3  F (36.3  C) (Tympanic)   Resp 18   Ht 1.651 m (5' 5\")   Wt 54.9 kg (121 lb)   LMP 09/15/1998   SpO2 97%   BMI 20.14 kg/m   Estimated body mass index is 20.14 kg/m  as calculated from " "the following:    Height as of this encounter: 1.651 m (5' 5\").    Weight as of this encounter: 54.9 kg (121 lb).  Physical Exam  Wt Readings from Last 5 Encounters:   11/16/23 54.9 kg (121 lb)   06/21/23 55.6 kg (122 lb 9.6 oz)   06/07/23 55.3 kg (122 lb)   05/11/23 56.6 kg (124 lb 12.8 oz)   04/19/23 59.4 kg (131 lb)       GENERAL: healthy, alert and no distress  EYES: Eyes grossly normal to inspection, PERRL and conjunctivae and sclerae normal  HENT: ear canals and TM's normal, nose and mouth without ulcers or lesions  NECK: no adenopathy, no asymmetry, masses, or scars and thyroid normal to palpation  Bilateral carotid bruits   RESP: lungs rhonchi LLL   CV: regular rate and rhythm,no peripheral edema and peripheral pulses strong  ABDOMEN: soft, nontender, no hepatosplenomegaly, no masses and bowel sounds normal  MS: no gross musculoskeletal defects noted, no edema  SKIN: no suspicious lesions or rashes  NEURO: Normal strength and tone, mentation intact and speech abnormal  Memory deficits, hard to find words for things. Slightly improved .. Hard to write  PSYCH: mentation appears abnormal, affect normal/bright    Diagnostic Test Results:  Labs reviewed in Epic  Recent Results (from the past 744 hour(s))   XR Chest 2 Views    Narrative    XR CHEST 2 VIEWS 11/16/2023 10:29 AM    HISTORY: Smoker; Chronic cough; Acute bronchitis with symptoms > 10  days    COMPARISON: None.        Impression    IMPRESSION: Hyperinflated lungs. An 8 mm nodular opacity in the  lateral right upper lobe could represent a nodule or superimposed soft  tissue shadows. Consider a follow-up chest CT for further evaluation.  Biapical pleural thickening. No infiltrate, pleural effusion or  pneumothorax. Normal heart size. Subclavian venous pacemaker.    YVES LAWSON MD         SYSTEM ID:  [id number]     .thisv    ASSESSMENT / PLAN:   Bebe was seen today for physical.    Diagnoses and all orders for this visit:    Medicare annual wellness " visit, subsequent  Concerns today   Weight loss, chronic cough, elevated BP, Worsening CKD.     Benign essential hypertension  Refilled   -     enalapril (VASOTEC) 20 MG tablet; Take 1 tablet (20 mg) by mouth daily  -     Renal panel (Alb, BUN, Ca, Cl, CO2, Creat, Gluc, Phos, K, Na)    CKD (chronic kidney disease) stage 4, GFR 15-29 ml/min (H)  [facility].   Formal referral generated.   Labs today   -     Adult Nephrology  Referral; Future    -     Renal panel (Alb, BUN, Ca, Cl, CO2, Creat, Gluc, Phos, K, Na)    History of CVA (cerebrovascular accident)  Continue warfarin  Continue ASA   Follow up with vascular as planned  Needs assistance with ADL and Medication management due to short term memory deficits    Hyperlipidemia LDL goal <70  Increase dose   -     atorvastatin (LIPITOR) 80 MG tablet; Take 1 tablet (80 mg) by mouth daily  Follow up lipids, AST 3 months.     Smoker  Cessation encouraged. Not interested  -     XR Chest 2 Views; Future  -     CT Chest w/o Contrast; Future    Chronic cough  -     XR Chest 2 Views; Future  -     CT Chest w/o Contrast; Future    Acute bronchitis with symptoms > 10 days  -     XR Chest 2 Views; Future    Gastroesophageal reflux disease with esophagitis without hemorrhage  Insurance will no longer cover omeprazole   Will change to   -     famotidine (PEPCID) 40 MG tablet; Take 1 tablet (40 mg) by mouth daily to use as needed.     Lung nodule  -     CT Chest w/o Contrast; Future    COPD  (H)  Inhalers declined   -     CT Chest w/o Contrast; Future    Other orders  -     REVIEW OF HEALTH MAINTENANCE PROTOCOL ORDERS        Patient has been advised of split billing requirements and indicates understanding: Yes      COUNSELING:  Reviewed preventive health counseling, as reflected in patient instructions       Regular exercise       Healthy diet/nutrition       Osteoporosis prevention/bone health       Consider lung cancer screening for  ages 55-80 years (78 for Medicare) and 20 pack-year smoking history  decline         She reports that she has been smoking cigarettes. She started smoking about 7 years ago. She has a 50.00 pack-year smoking history. She has never used smokeless tobacco.  Nicotine/Tobacco Cessation Plan:   Information offered: Patient not interested at this time      Appropriate preventive services were discussed with this patient, including applicable screening as appropriate for fall prevention, nutrition, physical activity, Tobacco-use cessation, weight loss and cognition.  Checklist reviewing preventive services available has been given to the patient.    Reviewed patients plan of care and provided an AVS. The Intermediate Care Plan ( asthma action plan, low back pain action plan, and migraine action plan) for Bebe meets the Care Plan requirement. This Care Plan has been established and reviewed with the Patient.    Call or return to the clinic with any worsening of symptoms or no resolution. Patient/Parent verbalized understanding and is in agreement. Medication side effects reviewed.   Current Outpatient Medications   Medication Sig Dispense Refill    amLODIPine (NORVASC) 5 MG tablet Take 1 tablet by mouth once daily 90 tablet 1    Aspirin (VAZALORE) 81 MG CAPS Please choose reason not prescribed from choices below.      atorvastatin (LIPITOR) 80 MG tablet Take 1 tablet (80 mg) by mouth daily 90 tablet 1    enalapril (VASOTEC) 20 MG tablet Take 1 tablet (20 mg) by mouth daily 90 tablet 3    famotidine (PEPCID) 40 MG tablet Take 1 tablet (40 mg) by mouth daily 90 tablet 1    FLUoxetine (PROZAC) 20 MG capsule Take 1 capsule by mouth once daily 90 capsule 2    warfarin ANTICOAGULANT (COUMADIN) 5 MG tablet TAKE 7.5MG BY MOUTH EVERY MONDAY AND FRIDAY. TAKE 5MG ALL OTHER DAYS OR AS DIRECTED BY ANTICOAGULATION CLINIC 100 tablet 1    fluticasone (FLONASE) 50 MCG/ACT nasal spray Spray 1 spray into both nostrils daily (Patient not  taking: Reported on 11/16/2023) 16 g 1       Chart documentation with Dragon Voice recognition Software. Although reviewed after completion, some words and grammatical errors may remain.      SELWYN Dennis CNP  [facility]    Identified Health Risks:  I have reviewed Opioid Use Disorder and Substance Use Disorder risk factors and made any needed referrals.

## 2023-11-20 ENCOUNTER — TELEPHONE (OUTPATIENT)
Dept: NEPHROLOGY | Facility: CLINIC | Age: 78
End: 2023-11-20
Payer: COMMERCIAL

## 2023-11-20 NOTE — TELEPHONE ENCOUNTER
[facility] Call Center    Phone Message    May a detailed message be left on voicemail: yes     Reason for Call: Order(s): Other:   Reason for requested: Order for labs. Labs scheduled for 4/9/24.  Date needed: Before 4/1/24  Provider name: Dr. Oswald    Action Taken: Message routed to:  Other: Nephrology    Travel Screening: Not Applicable

## 2023-11-26 ENCOUNTER — HEALTH MAINTENANCE LETTER (OUTPATIENT)
Age: 78
End: 2023-11-26

## 2023-11-29 ENCOUNTER — DOCUMENTATION ONLY (OUTPATIENT)
Dept: ANTICOAGULATION | Facility: CLINIC | Age: 78
End: 2023-11-29

## 2023-11-29 ENCOUNTER — LAB (OUTPATIENT)
Dept: LAB | Facility: CLINIC | Age: 78
End: 2023-11-29
Payer: COMMERCIAL

## 2023-11-29 ENCOUNTER — ANTICOAGULATION THERAPY VISIT (OUTPATIENT)
Dept: ANTICOAGULATION | Facility: CLINIC | Age: 78
End: 2023-11-29

## 2023-11-29 DIAGNOSIS — I63.20 CEREBROVASCULAR ACCIDENT (CVA) DUE TO OCCLUSION OF PRECEREBRAL ARTERY (H): ICD-10-CM

## 2023-11-29 DIAGNOSIS — Z79.01 LONG TERM CURRENT USE OF ANTICOAGULANT THERAPY: ICD-10-CM

## 2023-11-29 DIAGNOSIS — Z86.73 HISTORY OF CVA (CEREBROVASCULAR ACCIDENT): ICD-10-CM

## 2023-11-29 DIAGNOSIS — Z79.01 ANTICOAGULATION MONITORING, INR RANGE 2-3: Chronic | ICD-10-CM

## 2023-11-29 DIAGNOSIS — Z79.01 LONG TERM CURRENT USE OF ANTICOAGULANT THERAPY: Primary | ICD-10-CM

## 2023-11-29 DIAGNOSIS — I63.20 CEREBROVASCULAR ACCIDENT (CVA) DUE TO OCCLUSION OF PRECEREBRAL ARTERY (H): Chronic | ICD-10-CM

## 2023-11-29 DIAGNOSIS — Z79.01 ANTICOAGULATION MONITORING, INR RANGE 2-3: ICD-10-CM

## 2023-11-29 LAB — INR BLD: 2.8 (ref 0.9–1.1)

## 2023-11-29 PROCEDURE — 85610 PROTHROMBIN TIME: CPT

## 2023-11-29 PROCEDURE — 36416 COLLJ CAPILLARY BLOOD SPEC: CPT

## 2023-11-29 NOTE — PROGRESS NOTES
ANTICOAGULATION MANAGEMENT     Bebe Alfonso 78 year old female is on warfarin with therapeutic INR result. (Goal INR 2.0-3.0)    Recent labs: (last 7 days)     11/29/23 0923   INR 2.8*       ASSESSMENT     Warfarin Lab Questionnaire    Warfarin Doses Last 7 Days      11/28/2023    10:46 AM   Dose in Tablet or Mg   TAB or MG? tablet (tab)     Pt Rptd Dose SUNDAY MONDAY TUESDAY WED THURS FRIDAY SATURDAY 11/28/2023  10:46 AM 1 1.5 1 1 1 1.5 1         11/28/2023   Warfarin Lab Questionnaire   Missed doses within past 14 days? No   Changes in diet or alcohol within past 14 days? No   Medication changes since last result? No   Injuries or illness since last result? No   New shortness of breath, severe headaches or sudden changes in vision since last result? No   Abnormal bleeding since last result? No   Upcoming surgery, procedure? No     Previous result: Supratherapeutic  Additional findings: None     PLAN     Recommended plan for no diet, medication or health factor changes affecting INR     Dosing Instructions: Continue your current warfarin dose with next INR in 3 weeks       Summary  As of 11/29/2023      Full warfarin instructions:  7.5 mg every Mon, Fri; 5 mg all other days   Next INR check:  12/20/2023               Telephone call with Bebe who verbalizes understanding and agrees to plan    Lab visit scheduled    Education provided:   Please call back if any changes to your diet, medications or how you've been taking warfarin    Plan made per [facility] anticoagulation protocol    Deneen Pretty RN  Anticoagulation Clinic  11/29/2023    _______________________________________________________________________     Anticoagulation Episode Summary       Current INR goal:  2.0-3.0   TTR:  64.9% (1 y)   Target end date:  Indefinite   Send INR reminders to:  [facility]    Indications    History of CVA (cerebrovascular accident) (Resolved) [Z86.73]  Long term current use of anticoagulant therapy  [Z79.01]  Cerebrovascular accident (CVA) due to occlusion of precerebral artery (H) [I63.20]  History of CVA (cerebrovascular accident) [Z86.73]  Anticoagulation monitoring  INR range 2-3 [Z79.01]             Comments:               Anticoagulation Care Providers       Provider Role Specialty Phone number    Diana Ely APRN [facility] Referring Family Medicine [phone]

## 2023-11-29 NOTE — CONFIDENTIAL NOTE
ANTICOAGULATION CLINIC REFERRAL RENEWAL REQUEST       An annual renewal order is required for all patients referred to [facility].?  Please review and sign the pended referral order for Bebe Alfonso.       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  NO       Current goal range   INR: 2.0-3.0     Goal appropriate for indication? Goal INR 2-3, standard for indication(s) above     Time in Therapeutic Range (TTR)  (Goal > 60%) 64.9%       Office visit with referring provider's group within last year yes on 11/16/23       Deneen Pretty RN  [facility]

## 2023-12-06 ENCOUNTER — HOSPITAL ENCOUNTER (OUTPATIENT)
Dept: CT IMAGING | Facility: CLINIC | Age: 78
Discharge: HOME | End: 2023-12-06
Attending: NURSE PRACTITIONER | Admitting: NURSE PRACTITIONER
Payer: COMMERCIAL

## 2023-12-06 DIAGNOSIS — J44.1 COPD EXACERBATION (H): ICD-10-CM

## 2023-12-06 DIAGNOSIS — R05.3 CHRONIC COUGH: ICD-10-CM

## 2023-12-06 DIAGNOSIS — F17.200 SMOKER: ICD-10-CM

## 2023-12-06 DIAGNOSIS — R91.1 LUNG NODULE: ICD-10-CM

## 2023-12-06 PROCEDURE — 71250 CT THORAX DX C-: CPT

## 2023-12-08 ASSESSMENT — SLEEP AND FATIGUE QUESTIONNAIRES
HOW LIKELY ARE YOU TO NOD OFF OR FALL ASLEEP IN A CAR, WHILE STOPPED FOR A FEW MINUTES IN TRAFFIC: WOULD NEVER DOZE
HOW LIKELY ARE YOU TO NOD OFF OR FALL ASLEEP WHILE WATCHING TV: SLIGHT CHANCE OF DOZING
HOW LIKELY ARE YOU TO NOD OFF OR FALL ASLEEP WHILE SITTING INACTIVE IN A PUBLIC PLACE: WOULD NEVER DOZE
HOW LIKELY ARE YOU TO NOD OFF OR FALL ASLEEP WHILE SITTING QUIETLY AFTER LUNCH WITHOUT ALCOHOL: WOULD NEVER DOZE
HOW LIKELY ARE YOU TO NOD OFF OR FALL ASLEEP WHEN YOU ARE A PASSENGER IN A CAR FOR AN HOUR WITHOUT A BREAK: WOULD NEVER DOZE
HOW LIKELY ARE YOU TO NOD OFF OR FALL ASLEEP WHILE LYING DOWN TO REST IN THE AFTERNOON WHEN CIRCUMSTANCES PERMIT: HIGH CHANCE OF DOZING
HOW LIKELY ARE YOU TO NOD OFF OR FALL ASLEEP WHILE SITTING AND TALKING TO SOMEONE: WOULD NEVER DOZE
HOW LIKELY ARE YOU TO NOD OFF OR FALL ASLEEP WHILE SITTING AND READING: WOULD NEVER DOZE

## 2023-12-12 ENCOUNTER — OFFICE VISIT (OUTPATIENT)
Dept: SLEEP MEDICINE | Facility: CLINIC | Age: 78
End: 2023-12-12
Attending: PSYCHIATRY & NEUROLOGY
Payer: COMMERCIAL

## 2023-12-12 VITALS
OXYGEN SATURATION: 97 % | RESPIRATION RATE: 16 BRPM | DIASTOLIC BLOOD PRESSURE: 60 MMHG | TEMPERATURE: 98.3 F | HEART RATE: 77 BPM | SYSTOLIC BLOOD PRESSURE: 128 MMHG | WEIGHT: 123 LBS | BODY MASS INDEX: 20.47 KG/M2

## 2023-12-12 DIAGNOSIS — R06.83 SNORING: ICD-10-CM

## 2023-12-12 DIAGNOSIS — I69.30 H/O: STROKE WITH RESIDUAL EFFECTS: ICD-10-CM

## 2023-12-12 PROCEDURE — 99203 OFFICE O/P NEW LOW 30 MIN: CPT | Performed by: OTOLARYNGOLOGY

## 2023-12-12 NOTE — NURSING NOTE
Chief Complaint   Patient presents with    Sleep Problem      H/O: stroke with residual effects  Snoring

## 2023-12-12 NOTE — PROGRESS NOTES
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 for malignancy such as  adenocarcinoma in situ.  2.  Several additional micronodules in both lungs measuring up to 3  mm.  3.  Emphysema.    LUCIANA WASHINGTON MD      SYSTEM ID:  [id number]      PFT: Most Recent [facility] Pulmonary Function Testing    FVC-Pred   Date Value Ref Range Status   04/11/2018 3.01 L      FVC-Pre   Date Value Ref Range Status   04/11/2018 2.20 L      FVC-%Pred-Pre   Date Value Ref Range Status   04/11/2018 73 %      FEV1-Pre   Date Value Ref Range Status   04/11/2018 1.48 L      FEV1-%Pred-Pre   Date Value Ref Range Status   04/11/2018 63 %      FEV1FVC-Pred   Date Value Ref Range Status   04/11/2018 76 %      FEV1FVC-Pre   Date Value Ref Range Status   04/11/2018 67 %      No results found for: \"09670\"  FEFMax-Pred   Date Value Ref Range Status   04/11/2018 5.85 L/sec      FEFMax-Pre   Date Value Ref Range Status   04/11/2018 4.60 L/sec      FEFMax-%Pred-Pre   Date Value Ref Range Status   04/11/2018 78 %      ExpTime-Pre   Date Value Ref Range Status   04/11/2018 7.58 sec      FIFMax-Pre   Date Value Ref Range Status   04/11/2018 3.83 L/sec      FEV1FEV6-Pred   Date Value Ref Range Status   04/11/2018 79 %      FEV1FEV6-Pre   Date Value Ref Range Status   04/11/2018 67 %      No " "results found for: \"20055\"      Hina Praught 12/12/2023           "

## 2023-12-13 ENCOUNTER — PATIENT OUTREACH (OUTPATIENT)
Dept: ONCOLOGY | Facility: CLINIC | Age: 78
End: 2023-12-13
Payer: COMMERCIAL

## 2023-12-13 DIAGNOSIS — I25.10 CORONARY ARTERY DISEASE INVOLVING NATIVE CORONARY ARTERY OF NATIVE HEART WITHOUT ANGINA PECTORIS: Primary | ICD-10-CM

## 2023-12-13 DIAGNOSIS — F17.200 SMOKER: ICD-10-CM

## 2023-12-13 DIAGNOSIS — J44.9 CHRONIC OBSTRUCTIVE PULMONARY DISEASE, UNSPECIFIED COPD TYPE (H): Chronic | ICD-10-CM

## 2023-12-13 DIAGNOSIS — R91.8 PULMONARY NODULES: ICD-10-CM

## 2023-12-13 DIAGNOSIS — R91.8 PULMONARY NODULES: Primary | ICD-10-CM

## 2023-12-20 ENCOUNTER — LAB (OUTPATIENT)
Dept: LAB | Facility: CLINIC | Age: 78
End: 2023-12-20
Payer: COMMERCIAL

## 2023-12-20 ENCOUNTER — ANTICOAGULATION THERAPY VISIT (OUTPATIENT)
Dept: ANTICOAGULATION | Facility: CLINIC | Age: 78
End: 2023-12-20

## 2023-12-20 DIAGNOSIS — Z79.01 ANTICOAGULATION MONITORING, INR RANGE 2-3: Chronic | ICD-10-CM

## 2023-12-20 DIAGNOSIS — Z79.01 LONG TERM CURRENT USE OF ANTICOAGULANT THERAPY: Primary | ICD-10-CM

## 2023-12-20 DIAGNOSIS — Z79.01 LONG TERM CURRENT USE OF ANTICOAGULANT THERAPY: ICD-10-CM

## 2023-12-20 DIAGNOSIS — I63.20 CEREBROVASCULAR ACCIDENT (CVA) DUE TO OCCLUSION OF PRECEREBRAL ARTERY (H): Chronic | ICD-10-CM

## 2023-12-20 DIAGNOSIS — I63.20 CEREBROVASCULAR ACCIDENT (CVA) DUE TO OCCLUSION OF PRECEREBRAL ARTERY (H): ICD-10-CM

## 2023-12-20 DIAGNOSIS — Z79.01 ANTICOAGULATION MONITORING, INR RANGE 2-3: ICD-10-CM

## 2023-12-20 DIAGNOSIS — Z86.73 HISTORY OF CVA (CEREBROVASCULAR ACCIDENT): ICD-10-CM

## 2023-12-20 LAB — INR BLD: 3.4 (ref 0.9–1.1)

## 2023-12-20 PROCEDURE — 36416 COLLJ CAPILLARY BLOOD SPEC: CPT

## 2023-12-20 PROCEDURE — 85610 PROTHROMBIN TIME: CPT

## 2023-12-20 NOTE — PROGRESS NOTES
ANTICOAGULATION MANAGEMENT     Bebe M Kaden 78 year old female is on warfarin with supratherapeutic INR result. (Goal INR 2.0-3.0)    Recent labs: (last 7 days)     12/20/23  0910   INR 3.4*       ASSESSMENT     Source(s): Chart Review and Patient/Caregiver Call     Warfarin doses taken: Warfarin taken as instructed  Diet: No new diet changes identified  Medication/supplement changes: None noted  New illness, injury, or hospitalization: No  Signs or symptoms of bleeding or clotting: No  Previous result: Therapeutic last visit; previously outside of goal range  Additional findings: None       PLAN     Recommended plan for no diet, medication or health factor changes affecting INR     Dosing Instructions: decrease your warfarin dose (6.2% change) with next INR in 2 weeks       Summary  As of 12/20/2023      Full warfarin instructions:  7.5 mg every Mon, Fri; 5 mg all other days   Next INR check:  1/3/2024               Telephone call with Bebe who verbalizes understanding and agrees to plan    Lab visit scheduled    Education provided:   Goal range and lab monitoring: goal range and significance of current result    Plan made per [facility] anticoagulation protocol    Vandana Garcia RN  Anticoagulation Clinic  12/20/2023    _______________________________________________________________________     Anticoagulation Episode Summary       Current INR goal:  2.0-3.0   TTR:  61.1% (1 y)   Target end date:  Indefinite   Send INR reminders to:  [facility]    Indications    History of CVA (cerebrovascular accident) (Resolved) [Z86.73]  Long term current use of anticoagulant therapy [Z79.01]  Cerebrovascular accident (CVA) due to occlusion of precerebral artery (H) [I63.20]  History of CVA (cerebrovascular accident) [Z86.73]  Anticoagulation monitoring  INR range 2-3 [Z79.01]             Comments:               Anticoagulation Care Providers       Provider Role Specialty Phone number    Diana Ely APRN  CNP Referring Family Medicine [phone]

## 2023-12-27 ENCOUNTER — PRE VISIT (OUTPATIENT)
Dept: PULMONOLOGY | Facility: CLINIC | Age: 78
End: 2023-12-27

## 2023-12-27 ENCOUNTER — ONCOLOGY VISIT (OUTPATIENT)
Dept: PULMONOLOGY | Facility: CLINIC | Age: 78
End: 2023-12-27
Attending: NURSE PRACTITIONER
Payer: COMMERCIAL

## 2023-12-27 VITALS
DIASTOLIC BLOOD PRESSURE: 60 MMHG | HEART RATE: 76 BPM | SYSTOLIC BLOOD PRESSURE: 132 MMHG | WEIGHT: 122 LBS | BODY MASS INDEX: 20.3 KG/M2 | OXYGEN SATURATION: 99 %

## 2023-12-27 DIAGNOSIS — J44.9 CHRONIC OBSTRUCTIVE PULMONARY DISEASE, UNSPECIFIED COPD TYPE (H): Chronic | ICD-10-CM

## 2023-12-27 DIAGNOSIS — F17.200 SMOKER: ICD-10-CM

## 2023-12-27 DIAGNOSIS — R91.8 PULMONARY NODULES: ICD-10-CM

## 2023-12-27 PROCEDURE — 99204 OFFICE O/P NEW MOD 45 MIN: CPT | Performed by: INTERNAL MEDICINE

## 2023-12-27 NOTE — PROGRESS NOTES
LUNG NODULE & INTERVENTIONAL PULMONARY CLINIC  [facility]     Bebe Alfonso MRN# [id number]   Age: 78 year old YOB: 1945       Requesting Physician: SELWYN Dennis CNP  [address] [place] [id number]       Assessment and Plan:    1. New multiple pulmonary lung nodule(s). Given the characteristics on current/previous imaging and risk factors; I would classify this to be Intermediate (6-65%) risk for cancer. 30% approx. we discussed nodify and follow-up CT scan.  She prefers to do follow-up CT.    2.  She is precontemplative regarding smoking cessation.             History:     Bebe Alfonso is a 78 year old female with sig h/o for tobacco use who is here for evaluation/followup of lung nodule(s).  This was diagnosed during a lung cancer screening CT scan.  She has no active pulmonary symptoms.      - My interpretation of the images relevant for this visit includes: Several small nodules and one larger subsolid nodule in the left upper lobe.           Past Medical History:      Past Medical History:   Diagnosis Date    Anticoagulation monitoring, INR range 2-3 10/22/2015    Benign essential hypertension 9/15/2016    Carotid bruit 11/12/2012    Overview:  12/6/10 Carotid US  Elevated velocities throughout the carotid arteries bilaterally. There is a focal area of increased velocity in the mid left internal carotid artery with a plaque in this region on the color flow images. This corresponds to 50 - 70 % diameter reduction. There is an area of elevated velocity in the left external carotid artery consistent with stenosis. There is no focal area of significant stenosis in the right carotid arteries in the neck. Plaque in the carotid arteries bilaterally.     Chronic obstructive pulmonary disease (H) 1/19/2016    The FVC, FEV1 and FEV1/FVC ratio are reduced.  The inspiratory flow rates are within normal limits.   The FVC is reduced relative to the SVC indicating air trapping.  While the TLC is within normal limits, the FRC, RV and RV/TLC ratio are increased.  The  diffusing capacity is mildly reduced. IMPRESSION: Mild-moderate Airflow Obstruction with air trapping ____________________________________________M.D. Jelly Clarke  April 2018    Esophageal reflux 4/8/2008    Overview:  Omeprazole PRN, occasional symptoms such as chest burning and knife twisted to stomach.     Heart disease born with it    History of blood transfusion 70 years ago    History of CVA (cerebrovascular accident) 10/20/2015    Long term current use of anticoagulant therapy 3/20/2018    Major depressive disorder, recurrent episode, moderate (H) 10/29/2008    Overview:  She is not taking any medication at this time.    Osteoarthrosis, hip 10/19/2010    Overview:  Pt scheduled for right  total hip arthroplasty on 6/14/13 with Dr. Addison HEREDIA hip xray (11/2012) Severe degenerative changes seen of the right hip with near bone-on-bone appearance of the joint and several subchondral cysts forming.    Thyroid nodule 5/22/2013    Overview:  Thyroid US (2012) Stable nodule left lobe of thyroid TSH- (2/19/12) normal (2.26)           Past Surgical History:      Past Surgical History:   Procedure Laterality Date    BIOPSY  appox in 1980's    BREAST SURGERY  last time in the 1990's    non cancer    COLONOSCOPY N/A 9/7/2022    Procedure: COLONOSCOPY, WITH POLYPECTOMY AND BIOPSY;  Surgeon: Alfredito Gomez DO;  Location: [facility]    ESOPHAGOSCOPY, GASTROSCOPY, DUODENOSCOPY (EGD), COMBINED N/A 5/25/2022    Procedure: ESOPHAGOGASTRODUODENOSCOPY, WITH BIOPSY;  Surgeon: Alfredito Gomez DO;  Location: [facility]          Social History:     Social History     Tobacco Use    Smoking status: Every Day     Packs/day: 1.00     Years: 50.00     Additional pack years: 0.00     Total pack years: 50.00     Types: Cigarettes     Start date: 1/1/2016    Smokeless tobacco: Never    Substance Use Topics    Alcohol use: No          Family History:     Family History   Problem Relation Age of Onset    Diabetes Maternal Grandfather     Diabetes Sister         developed after cancer treatment    Breast Cancer Sister     Obesity Sister     Hypertension Mother     Hyperlipidemia Mother     Osteoporosis Mother     Breast Cancer Sister     Osteoporosis Sister     Breast Cancer Daughter     Other Cancer Sister         throught out body    Substance Abuse Sister         acol    Obesity Sister     Substance Abuse Sister         acol    Obesity Sister            Allergies:      Allergies   Allergen Reactions    Losartan Swelling, Nausea, Shortness Of Breath and Palpitations    Gabapentin GI Disturbance and Unknown     Double vision  flatulence    Lisinopril Cough    Oxycodone Other (See Comments)     But has tolerated hydrocodone    Tramadol Other (See Comments)    Augmentin [Amoxicillin-Pot Clavulanate] Rash    Bacitracin Rash     blisters    Bupropion Rash     Allergic to brand not generic          Medications:     Current Outpatient Medications   Medication Sig    amLODIPine (NORVASC) 5 MG tablet Take 1 tablet by mouth once daily    atorvastatin (LIPITOR) 80 MG tablet Take 1 tablet (80 mg) by mouth daily    enalapril (VASOTEC) 20 MG tablet Take 1 tablet (20 mg) by mouth daily    famotidine (PEPCID) 40 MG tablet Take 1 tablet (40 mg) by mouth daily    FLUoxetine (PROZAC) 20 MG capsule Take 1 capsule by mouth once daily    warfarin ANTICOAGULANT (COUMADIN) 5 MG tablet TAKE 7.5MG BY MOUTH EVERY MONDAY AND FRIDAY. TAKE 5MG ALL OTHER DAYS OR AS DIRECTED BY ANTICOAGULATION CLINIC    Aspirin (VAZALORE) 81 MG CAPS Please choose reason not prescribed from choices below.    fluticasone (FLONASE) 50 MCG/ACT nasal spray Spray 1 spray into both nostrils daily     No current facility-administered medications for this visit.          Review of Systems:     See HPI         Physical Exam:   /60 (BP Location:  Left arm, Patient Position: Chair, Cuff Size: Adult Regular)   Pulse 76   Wt 55.3 kg (122 lb)   LMP 09/15/1998   SpO2 99%   BMI 20.30 kg/m      Constitutional - looks well, in no apparent distress  Eyes - no redness or discharge  Respiratory -breathing appears comfortable. No wheeze or rhonchi.   Cardiac -- Normal rate, rhythm.   Skin - No appreciable discoloration or lesions (very limited exam)  Neurological - No apparent tremors. Speech fluent and articlate  Psychiatric - no signs of delirium or anxiety          Current Laboratory Data:   All laboratory and imaging data reviewed.    No results found for this or any previous visit (from the past 24 hour(s)).

## 2024-01-03 ENCOUNTER — LAB (OUTPATIENT)
Dept: LAB | Facility: CLINIC | Age: 79
End: 2024-01-03
Payer: COMMERCIAL

## 2024-01-03 ENCOUNTER — ANTICOAGULATION THERAPY VISIT (OUTPATIENT)
Dept: ANTICOAGULATION | Facility: CLINIC | Age: 79
End: 2024-01-03

## 2024-01-03 DIAGNOSIS — Z79.01 ANTICOAGULATION MONITORING, INR RANGE 2-3: Chronic | ICD-10-CM

## 2024-01-03 DIAGNOSIS — Z86.73 HISTORY OF CVA (CEREBROVASCULAR ACCIDENT): ICD-10-CM

## 2024-01-03 DIAGNOSIS — I63.20 CEREBROVASCULAR ACCIDENT (CVA) DUE TO OCCLUSION OF PRECEREBRAL ARTERY (H): ICD-10-CM

## 2024-01-03 DIAGNOSIS — Z79.01 ANTICOAGULATION MONITORING, INR RANGE 2-3: ICD-10-CM

## 2024-01-03 DIAGNOSIS — I63.20 CEREBROVASCULAR ACCIDENT (CVA) DUE TO OCCLUSION OF PRECEREBRAL ARTERY (H): Chronic | ICD-10-CM

## 2024-01-03 DIAGNOSIS — Z79.01 LONG TERM CURRENT USE OF ANTICOAGULANT THERAPY: ICD-10-CM

## 2024-01-03 DIAGNOSIS — Z79.01 LONG TERM CURRENT USE OF ANTICOAGULANT THERAPY: Primary | ICD-10-CM

## 2024-01-03 LAB — INR BLD: 2.2 (ref 0.9–1.1)

## 2024-01-03 PROCEDURE — 36416 COLLJ CAPILLARY BLOOD SPEC: CPT

## 2024-01-03 PROCEDURE — 85610 PROTHROMBIN TIME: CPT

## 2024-01-03 NOTE — PROGRESS NOTES
ANTICOAGULATION MANAGEMENT     Bebe SHELDON Kaden 78 year old female is on warfarin with therapeutic INR result. (Goal INR 2.0-3.0)    Recent labs: (last 7 days)     01/03/24  1016   INR 2.2*       ASSESSMENT     Source(s): Chart Review and Patient/Caregiver Call     Warfarin doses taken: Missed dose(s) may be affecting INR  Diet: No new diet changes identified  Medication/supplement changes: None noted  New illness, injury, or hospitalization: No  Signs or symptoms of bleeding or clotting: No  Previous result: Supratherapeutic  Additional findings: None     PLAN     Recommended plan for temporary change(s) affecting INR     Dosing Instructions: Continue your current warfarin dose with next INR in 2 weeks       Summary  As of 1/3/2024      Full warfarin instructions:  7.5 mg every Mon; 5 mg all other days   Next INR check:  1/17/2024               Telephone call with Bebe who verbalizes understanding and agrees to plan    Lab visit scheduled    Education provided:   Please call back if any changes to your diet, medications or how you've been taking warfarin    Plan made per [facility] anticoagulation protocol    Deneen Pretty RN  Anticoagulation Clinic  1/3/2024    _______________________________________________________________________     Anticoagulation Episode Summary       Current INR goal:  2.0-3.0   TTR:  59.8% (1 y)   Target end date:  Indefinite   Send INR reminders to:  [facility]    Indications    History of CVA (cerebrovascular accident) (Resolved) [Z86.73]  Long term current use of anticoagulant therapy [Z79.01]  Cerebrovascular accident (CVA) due to occlusion of precerebral artery (H) [I63.20]  History of CVA (cerebrovascular accident) [Z86.73]  Anticoagulation monitoring  INR range 2-3 [Z79.01]             Comments:               Anticoagulation Care Providers       Provider Role Specialty Phone number    Diana Ely APRN [facility] Referring Family Medicine [phone]

## 2024-01-17 ENCOUNTER — LAB (OUTPATIENT)
Dept: LAB | Facility: CLINIC | Age: 79
End: 2024-01-17
Payer: COMMERCIAL

## 2024-01-17 ENCOUNTER — ANTICOAGULATION THERAPY VISIT (OUTPATIENT)
Dept: ANTICOAGULATION | Facility: CLINIC | Age: 79
End: 2024-01-17

## 2024-01-17 DIAGNOSIS — Z79.01 LONG TERM CURRENT USE OF ANTICOAGULANT THERAPY: ICD-10-CM

## 2024-01-17 DIAGNOSIS — Z79.01 ANTICOAGULATION MONITORING, INR RANGE 2-3: ICD-10-CM

## 2024-01-17 DIAGNOSIS — Z79.01 ANTICOAGULATION MONITORING, INR RANGE 2-3: Chronic | ICD-10-CM

## 2024-01-17 DIAGNOSIS — I63.20 CEREBROVASCULAR ACCIDENT (CVA) DUE TO OCCLUSION OF PRECEREBRAL ARTERY (H): ICD-10-CM

## 2024-01-17 DIAGNOSIS — Z86.73 HISTORY OF CVA (CEREBROVASCULAR ACCIDENT): ICD-10-CM

## 2024-01-17 DIAGNOSIS — I63.20 CEREBROVASCULAR ACCIDENT (CVA) DUE TO OCCLUSION OF PRECEREBRAL ARTERY (H): Chronic | ICD-10-CM

## 2024-01-17 DIAGNOSIS — Z79.01 LONG TERM CURRENT USE OF ANTICOAGULANT THERAPY: Primary | ICD-10-CM

## 2024-01-17 LAB — INR BLD: 1.5 (ref 0.9–1.1)

## 2024-01-17 PROCEDURE — 36416 COLLJ CAPILLARY BLOOD SPEC: CPT

## 2024-01-17 PROCEDURE — 85610 PROTHROMBIN TIME: CPT

## 2024-01-17 NOTE — PROGRESS NOTES
ANTICOAGULATION MANAGEMENT     Bebe SHELDON Kaden 78 year old female is on warfarin with subtherapeutic INR result. (Goal INR 2.0-3.0)    Recent labs: (last 7 days)     01/17/24  0842   INR 1.5*       ASSESSMENT     Source(s): Chart Review and Patient/Caregiver Call     Warfarin doses taken: Warfarin taken as instructed  Denies missed doses  Diet: No new diet changes identified  Medication/supplement changes:  tylenol 1,000 mg about 2 times per day  New illness, injury, or hospitalization: Yes: sinus Sx, does have allergies  Patient reports Sx are worse than allergies - ongoing for months per patient   Signs or symptoms of bleeding or clotting: No  Previous result: Therapeutic last visit; previously outside of goal range  Additional findings: None     PLAN     Recommended plan for ongoing change(s) affecting INR     Dosing Instructions: Increase your warfarin dose (13.3% change) with next INR in 2 weeks       Summary  As of 1/17/2024      Full warfarin instructions:  1/17: 7.5 mg; Otherwise 7.5 mg every Mon, Wed, Fri; 5 mg all other days   Next INR check:  1/31/2024               Telephone call with Bebe who verbalizes understanding and agrees to plan    Lab visit scheduled    Education provided:   Please call back if any changes to your diet, medications or how you've been taking warfarin  Symptom monitoring: monitoring for clotting signs and symptoms and monitoring for stroke signs and symptoms    Plan made per [facility] anticoagulation protocol    Deneen Pretty RN  Anticoagulation Clinic  1/17/2024    _______________________________________________________________________     Anticoagulation Episode Summary       Current INR goal:  2.0-3.0   TTR:  57.1% (1 y)   Target end date:  Indefinite   Send INR reminders to:  [facility]    Indications    History of CVA (cerebrovascular accident) (Resolved) [Z86.73]  Long term current use of anticoagulant therapy [Z79.01]  Cerebrovascular accident (CVA) due to  occlusion of precerebral artery (H) [I63.20]  History of CVA (cerebrovascular accident) [Z86.73]  Anticoagulation monitoring  INR range 2-3 [Z79.01]             Comments:               Anticoagulation Care Providers       Provider Role Specialty Phone number    Diana Ely APRN [facility] [phone]

## 2024-01-29 DIAGNOSIS — I63.20 CEREBROVASCULAR ACCIDENT (CVA) DUE TO OCCLUSION OF PRECEREBRAL ARTERY (H): ICD-10-CM

## 2024-01-29 DIAGNOSIS — Z79.01 LONG TERM CURRENT USE OF ANTICOAGULANT THERAPY: ICD-10-CM

## 2024-01-29 DIAGNOSIS — Z86.73 HISTORY OF CVA (CEREBROVASCULAR ACCIDENT): ICD-10-CM

## 2024-01-30 RX ORDER — WARFARIN SODIUM 5 MG/1
TABLET ORAL
Qty: 105 TABLET | Refills: 1 | Status: SHIPPED | OUTPATIENT
Start: 2024-01-30 | End: 2024-08-19

## 2024-01-30 NOTE — TELEPHONE ENCOUNTER
ANTICOAGULATION MANAGEMENT:  Medication Refill    Anticoagulation Summary  As of 1/17/2024      Warfarin maintenance plan:  7.5 mg (5 mg x 1.5) every Mon, Wed, Fri; 5 mg (5 mg x 1) all other days   Next INR check:  1/31/2024   Target end date:  Indefinite    Indications    History of CVA (cerebrovascular accident) (Resolved) [Z86.73]  Long term current use of anticoagulant therapy [Z79.01]  Cerebrovascular accident (CVA) due to occlusion of precerebral artery (H) [I63.20]  History of CVA (cerebrovascular accident) [Z86.73]  Anticoagulation monitoring  INR range 2-3 [Z79.01]                 Anticoagulation Care Providers       Provider Role Specialty Phone number    Diana Ely APRN [facility] Referring Family Medicine [phone]            Refill Criteria    Visit with referring provider/group: Meets criteria: office visit within referring provider group in the last 1 year on 11/6/23    [facility] referral signed last signed: 11/29/2023; within last year: Yes    Lab monitoring not exceeding 2 weeks overdue: Yes    Bebe meets all criteria for refill. Rx instructions and quantity supplied updated to match patient's current dosing plan. Warfarin 90 day supply with 1 refill granted per [facility] protocol     Sharlene Kramer RN  Anticoagulation Clinic

## 2024-02-07 ENCOUNTER — LAB (OUTPATIENT)
Dept: LAB | Facility: CLINIC | Age: 79
End: 2024-02-07
Payer: COMMERCIAL

## 2024-02-07 ENCOUNTER — ANTICOAGULATION THERAPY VISIT (OUTPATIENT)
Dept: ANTICOAGULATION | Facility: CLINIC | Age: 79
End: 2024-02-07

## 2024-02-07 DIAGNOSIS — Z79.01 ANTICOAGULATION MONITORING, INR RANGE 2-3: Chronic | ICD-10-CM

## 2024-02-07 DIAGNOSIS — Z79.01 ANTICOAGULATION MONITORING, INR RANGE 2-3: ICD-10-CM

## 2024-02-07 DIAGNOSIS — Z79.01 LONG TERM CURRENT USE OF ANTICOAGULANT THERAPY: Primary | ICD-10-CM

## 2024-02-07 DIAGNOSIS — Z86.73 HISTORY OF CVA (CEREBROVASCULAR ACCIDENT): ICD-10-CM

## 2024-02-07 DIAGNOSIS — Z79.01 LONG TERM CURRENT USE OF ANTICOAGULANT THERAPY: ICD-10-CM

## 2024-02-07 DIAGNOSIS — I63.20 CEREBROVASCULAR ACCIDENT (CVA) DUE TO OCCLUSION OF PRECEREBRAL ARTERY (H): ICD-10-CM

## 2024-02-07 DIAGNOSIS — I63.20 CEREBROVASCULAR ACCIDENT (CVA) DUE TO OCCLUSION OF PRECEREBRAL ARTERY (H): Chronic | ICD-10-CM

## 2024-02-07 LAB — INR BLD: 2.5 (ref 0.9–1.1)

## 2024-02-07 PROCEDURE — 36416 COLLJ CAPILLARY BLOOD SPEC: CPT

## 2024-02-07 PROCEDURE — 85610 PROTHROMBIN TIME: CPT

## 2024-02-07 NOTE — PROGRESS NOTES
ANTICOAGULATION MANAGEMENT     Bebe SHELDON Kaden 78 year old female is on warfarin with therapeutic INR result. (Goal INR 2.0-3.0)    Recent labs: (last 7 days)     02/07/24  0927   INR 2.5*       ASSESSMENT     Warfarin Lab Questionnaire    Warfarin Doses Last 7 Days      2/6/2024     8:23 PM   Dose in Tablet or Mg   TAB or MG? milligram (mg)     Pt Rptd Dose SUNDAY MONDAY TUESDAY WED THURS FRIDAY SATURDAY 2/6/2024   8:23 PM 5 7.5 5 7.5 5 7.5 5         2/6/2024   Warfarin Lab Questionnaire   Missed doses within past 14 days? No   Changes in diet or alcohol within past 14 days? No   Medication changes since last result? No   Injuries or illness since last result? No   New shortness of breath, severe headaches or sudden changes in vision since last result? No   Abnormal bleeding since last result? No   Upcoming surgery, procedure? No     Previous result: Subtherapeutic  Additional findings: Warfarin maintenance dose was increased 13% at last visit         PLAN     Recommended plan for no diet, medication or health factor changes affecting INR     Dosing Instructions: Continue your current warfarin dose with next INR in 3 weeks       Summary  As of 2/7/2024      Full warfarin instructions:  7.5 mg every Mon, Wed, Fri; 5 mg all other days   Next INR check:  2/28/2024               Telephone call with Bebe who verbalizes understanding and agrees to plan    Lab visit scheduled    Education provided:   Taking warfarin: Importance of taking warfarin as instructed    Plan made per [facility] anticoagulation protocol    Michelle Lee, RN  Anticoagulation Clinic  2/7/2024    _______________________________________________________________________     Anticoagulation Episode Summary       Current INR goal:  2.0-3.0   TTR:  54.2% (1 y)   Target end date:  Indefinite   Send INR reminders to:  [facility]    Indications    History of CVA (cerebrovascular accident) (Resolved) [Z86.73]  Long term current use of anticoagulant  therapy [Z79.01]  Cerebrovascular accident (CVA) due to occlusion of precerebral artery (H) [I63.20]  History of CVA (cerebrovascular accident) [Z86.73]  Anticoagulation monitoring  INR range 2-3 [Z79.01]             Comments:               Anticoagulation Care Providers       Provider Role Specialty Phone number    Diana Ely APRN [facility] Referring Family Medicine [phone]

## 2024-02-09 ENCOUNTER — OFFICE VISIT (OUTPATIENT)
Dept: CARDIOLOGY | Facility: CLINIC | Age: 79
End: 2024-02-09
Payer: COMMERCIAL

## 2024-02-09 VITALS
RESPIRATION RATE: 12 BRPM | WEIGHT: 122 LBS | SYSTOLIC BLOOD PRESSURE: 129 MMHG | BODY MASS INDEX: 20.3 KG/M2 | OXYGEN SATURATION: 99 % | HEART RATE: 75 BPM | DIASTOLIC BLOOD PRESSURE: 74 MMHG

## 2024-02-09 DIAGNOSIS — I67.9 CEREBROVASCULAR DISEASE: ICD-10-CM

## 2024-02-09 DIAGNOSIS — I10 BENIGN ESSENTIAL HYPERTENSION: Primary | Chronic | ICD-10-CM

## 2024-02-09 DIAGNOSIS — E78.5 HYPERLIPIDEMIA LDL GOAL <70: Chronic | ICD-10-CM

## 2024-02-09 PROCEDURE — 99204 OFFICE O/P NEW MOD 45 MIN: CPT | Performed by: INTERNAL MEDICINE

## 2024-02-09 PROCEDURE — 93000 ELECTROCARDIOGRAM COMPLETE: CPT | Performed by: INTERNAL MEDICINE

## 2024-02-09 NOTE — LETTER
2/9/2024    Diana Ely, APRN CNP  [address] [place] [id number]    RE: Bebe PITO Alfonso       Dear Colleague,     I had the pleasure of seeing Bebe Alfonso in the [facility].  CARDIOLOGY CLINIC CONSULTATION    PRIMARY CARE PHYSICIAN:  Diana Ely    Tests reviewed/interpreted independently in clinic today:   EKG: Sinus rhythm, no ST changes, IRBBB  Echocardiogram: Not available  Blood work: CBC, BMP, INR 2.47  Carotid ultrasound: 50-69% bilateral stenosis of ICA  Lower extremity ultrasound: No lower extremity arterial stenosis     The level of medical decision making during this visit was of moderate complexity.  I spent 45 minutes taking care of Ms. Alfonso today.    HISTORY OF PRESENT ILLNESS:  Today, I had the pleasure of connecting with Bebe Alfonso.  She is a very pleasant 78-year-old lady who presents to the clinic in initial consultation.  She has a history of renal artery stenosis, CVA for which she is on anticoagulation per neurology, hyperlipidemia, benign essential hypertension, PAD [50 to 70% bilateral stenosis]. Other medical issues include COPD, GERD.  She had a CT scan of the chest in 12/2023 which showed severe coronary calcification as well as calcification of the aorta.  She however does not report any chest pain, chest tightness, exertional dyspnea.  She is able to walk without any cardiac limitations.    ASSESSMENT: Pertinent issues addressed/ reviewed during this cardiology visit    Coronary calcification noted on noncardiac CT  Renal artery stenosis  CVA-on anticoagulation.  Neurology  Bilateral ICA stenosis-50 to 69%    RECOMMENDATIONS:  It was a pleasure to see Bebe Alfonso in clinic today.  I am seeing her for the first time in initial consultation.  She has significant coronary calcification but does not endorse anginal symptoms.  Going to perform a transthoracic echocardiogram to assess her ejection fraction and wall  motion.  We are going to focus on managing her risk factors at this time.  Going to start she is on maximum dose of Lipitor which she is going to continue.  On last check her LDL cholesterol was 113 mg/dL.  We are going to check it again in a couple of months.  If need be we will add Zetia to her medical regimen.  From heart standpoint she does not need to be on aspirin as long as she is taking Coumadin.  I have told her to talk to her neurologist about this.  I am going to reach out to her with the results of the echocardiogram and make a follow-up appointment as needed.  If everything remains stable we are going to see her on a yearly basis.    Orders Placed This Encounter   Procedures    Follow-Up with Cardiology    EKG 12-lead complete w/read - Clinics    Echocardiogram Complete       PAST MEDICAL HISTORY:  Past Medical History:   Diagnosis Date    Anticoagulation monitoring, INR range 2-3 10/22/2015    Benign essential hypertension 9/15/2016    Carotid bruit 11/12/2012    Overview:  12/6/10 Carotid US  Elevated velocities throughout the carotid arteries bilaterally. There is a focal area of increased velocity in the mid left internal carotid artery with a plaque in this region on the color flow images. This corresponds to 50 - 70 % diameter reduction. There is an area of elevated velocity in the left external carotid artery consistent with stenosis. There is no focal area of significant stenosis in the right carotid arteries in the neck. Plaque in the carotid arteries bilaterally.     Chronic obstructive pulmonary disease (H) 1/19/2016    The FVC, FEV1 and FEV1/FVC ratio are reduced.  The inspiratory flow rates are within normal limits.  The FVC is reduced relative to the SVC indicating air trapping.  While the TLC is within normal limits, the FRC, RV and RV/TLC ratio are increased.  The  diffusing capacity is mildly reduced. IMPRESSION: Mild-moderate Airflow Obstruction with air trapping  ____________________________________________Clarke Iverson  April 2018    Esophageal reflux 4/8/2008    Overview:  Omeprazole PRN, occasional symptoms such as chest burning and knife twisted to stomach.     Heart disease born with it    History of blood transfusion 70 years ago    History of CVA (cerebrovascular accident) 10/20/2015    Long term current use of anticoagulant therapy 3/20/2018    Major depressive disorder, recurrent episode, moderate (H) 10/29/2008    Overview:  She is not taking any medication at this time.    Osteoarthrosis, hip 10/19/2010    Overview:  Pt scheduled for right  total hip arthroplasty on 6/14/13 with Dr. Addison HEREDIA hip xray (11/2012) Severe degenerative changes seen of the right hip with near bone-on-bone appearance of the joint and several subchondral cysts forming.    Thyroid nodule 5/22/2013    Overview:  Thyroid US (2012) Stable nodule left lobe of thyroid TSH- (2/19/12) normal (2.26)       MEDICATIONS:  Current Outpatient Medications   Medication    amLODIPine (NORVASC) 5 MG tablet    Aspirin (VAZALORE) 81 MG CAPS    atorvastatin (LIPITOR) 80 MG tablet    enalapril (VASOTEC) 20 MG tablet    famotidine (PEPCID) 40 MG tablet    FLUoxetine (PROZAC) 20 MG capsule    fluticasone (FLONASE) 50 MCG/ACT nasal spray    warfarin ANTICOAGULANT (COUMADIN) 5 MG tablet     No current facility-administered medications for this visit.       ALLERGIES:  Allergies   Allergen Reactions    Losartan Swelling, Nausea, Shortness Of Breath and Palpitations    Gabapentin GI Disturbance and Unknown     Double vision  flatulence    Lisinopril Cough    Oxycodone Other (See Comments)     But has tolerated hydrocodone    Tramadol Other (See Comments)    Augmentin [Amoxicillin-Pot Clavulanate] Rash    Bacitracin Rash     blisters    Bupropion Rash     Allergic to brand not generic       SOCIAL HISTORY:  I have reviewed this patient's social history and updated it with pertinent information if needed. Bebe SHELDON  Kaden  reports that she has been smoking cigarettes. She started smoking about 8 years ago. She has a 50 pack-year smoking history. She has never used smokeless tobacco. She reports that she does not drink alcohol and does not use drugs.    FAMILY HISTORY:  I have reviewed this patient's family history and updated it with pertinent information if needed.   Family History   Problem Relation Age of Onset    Diabetes Maternal Grandfather     Diabetes Sister         developed after cancer treatment    Breast Cancer Sister     Obesity Sister     Hypertension Mother     Hyperlipidemia Mother     Osteoporosis Mother     Breast Cancer Sister     Osteoporosis Sister     Breast Cancer Daughter     Other Cancer Sister         throught out body    Substance Abuse Sister         acol    Obesity Sister     Substance Abuse Sister         acol    Obesity Sister        REVIEW OF SYSTEMS:  Skin:        Eyes:       ENT:       Respiratory:  Negative shortness of breath;dyspnea on exertion;dyspnea at rest  Cardiovascular:  Negative;chest pain;palpitations;edema;syncope or near-syncope Positive for;dizziness;lightheadedness;fatigue  Gastroenterology:      Genitourinary:       Musculoskeletal:       Neurologic:       Psychiatric:       Heme/Lymph/Imm:       Endocrine:           PHYSICAL EXAM:                     Vital Signs with Ranges     122 lbs 0 oz    Constitutional: alert, no distress  Respiratory: Good bilateral air entry  Cardiovascular: s1 s2 normal, no murmurs  GI: nondistended  Neuropsychiatric: appropriate affact    It was a pleasure seeing this patient in clinic today. Please do not hesitate to contact me with any future questions.     Saurabh TAYLOR, FACC, FASE  Cardiology - [facility]  February 11, 2024    This note was completed in part using dictation via the Dragon voice recognition software. Some word and grammatical errors may occur and must be interpreted in the appropriate clinical context.  If there are any  questions pertaining to this issue, please contact me for further clarification.      Thank you for allowing me to participate in the care of your patient.      Sincerely,     Saurabh Harrell MD     [facility]  cc:   No referring provider defined for this encounter.

## 2024-02-11 NOTE — PROGRESS NOTES
CARDIOLOGY CLINIC CONSULTATION    PRIMARY CARE PHYSICIAN:  Diana Ely    Tests reviewed/interpreted independently in clinic today:   EKG: Sinus rhythm, no ST changes, IRBBB  Echocardiogram: Not available  Blood work: CBC, BMP, INR 2.47  Carotid ultrasound: 50-69% bilateral stenosis of ICA  Lower extremity ultrasound: No lower extremity arterial stenosis     The level of medical decision making during this visit was of moderate complexity.  I spent 45 minutes taking care of Ms. Alfonso today.    HISTORY OF PRESENT ILLNESS:  Today, I had the pleasure of connecting with Bebe Alfonso.  She is a very pleasant 78-year-old lady who presents to the clinic in initial consultation.  She has a history of renal artery stenosis, CVA for which she is on anticoagulation per neurology, hyperlipidemia, benign essential hypertension, PAD [50 to 70% bilateral stenosis]. Other medical issues include COPD, GERD.  She had a CT scan of the chest in 12/2023 which showed severe coronary calcification as well as calcification of the aorta.  She however does not report any chest pain, chest tightness, exertional dyspnea.  She is able to walk without any cardiac limitations.    ASSESSMENT: Pertinent issues addressed/ reviewed during this cardiology visit    Coronary calcification noted on noncardiac CT  Renal artery stenosis  CVA-on anticoagulation.  Neurology  Bilateral ICA stenosis-50 to 69%    RECOMMENDATIONS:  It was a pleasure to see Bebe Alfonso in clinic today.  I am seeing her for the first time in initial consultation.  She has significant coronary calcification but does not endorse anginal symptoms.  Going to perform a transthoracic echocardiogram to assess her ejection fraction and wall motion.  We are going to focus on managing her risk factors at this time.  Going to start she is on maximum dose of Lipitor which she is going to continue.  On last check her LDL cholesterol was 113 mg/dL.  We are going to check  it again in a couple of months.  If need be we will add Zetia to her medical regimen.  From heart standpoint she does not need to be on aspirin as long as she is taking Coumadin.  I have told her to talk to her neurologist about this.  I am going to reach out to her with the results of the echocardiogram and make a follow-up appointment as needed.  If everything remains stable we are going to see her on a yearly basis.    Orders Placed This Encounter   Procedures    Follow-Up with Cardiology    EKG 12-lead complete w/read - Clinics    Echocardiogram Complete       PAST MEDICAL HISTORY:  Past Medical History:   Diagnosis Date    Anticoagulation monitoring, INR range 2-3 10/22/2015    Benign essential hypertension 9/15/2016    Carotid bruit 11/12/2012    Overview:  12/6/10 Carotid US  Elevated velocities throughout the carotid arteries bilaterally. There is a focal area of increased velocity in the mid left internal carotid artery with a plaque in this region on the color flow images. This corresponds to 50 - 70 % diameter reduction. There is an area of elevated velocity in the left external carotid artery consistent with stenosis. There is no focal area of significant stenosis in the right carotid arteries in the neck. Plaque in the carotid arteries bilaterally.     Chronic obstructive pulmonary disease (H) 1/19/2016    The FVC, FEV1 and FEV1/FVC ratio are reduced.  The inspiratory flow rates are within normal limits.  The FVC is reduced relative to the SVC indicating air trapping.  While the TLC is within normal limits, the FRC, RV and RV/TLC ratio are increased.  The  diffusing capacity is mildly reduced. IMPRESSION: Mild-moderate Airflow Obstruction with air trapping ____________________________________________Clarke Iverson  April 2018    Esophageal reflux 4/8/2008    Overview:  Omeprazole PRN, occasional symptoms such as chest burning and knife twisted to stomach.     Heart disease born with it    History of  blood transfusion 70 years ago    History of CVA (cerebrovascular accident) 10/20/2015    Long term current use of anticoagulant therapy 3/20/2018    Major depressive disorder, recurrent episode, moderate (H) 10/29/2008    Overview:  She is not taking any medication at this time.    Osteoarthrosis, hip 10/19/2010    Overview:  Pt scheduled for right  total hip arthroplasty on 6/14/13 with Dr. Addison HEREDIA hip xray (11/2012) Severe degenerative changes seen of the right hip with near bone-on-bone appearance of the joint and several subchondral cysts forming.    Thyroid nodule 5/22/2013    Overview:  Thyroid US (2012) Stable nodule left lobe of thyroid TSH- (2/19/12) normal (2.26)       MEDICATIONS:  Current Outpatient Medications   Medication    amLODIPine (NORVASC) 5 MG tablet    Aspirin (VAZALORE) 81 MG CAPS    atorvastatin (LIPITOR) 80 MG tablet    enalapril (VASOTEC) 20 MG tablet    famotidine (PEPCID) 40 MG tablet    FLUoxetine (PROZAC) 20 MG capsule    fluticasone (FLONASE) 50 MCG/ACT nasal spray    warfarin ANTICOAGULANT (COUMADIN) 5 MG tablet     No current facility-administered medications for this visit.       ALLERGIES:  Allergies   Allergen Reactions    Losartan Swelling, Nausea, Shortness Of Breath and Palpitations    Gabapentin GI Disturbance and Unknown     Double vision  flatulence    Lisinopril Cough    Oxycodone Other (See Comments)     But has tolerated hydrocodone    Tramadol Other (See Comments)    Augmentin [Amoxicillin-Pot Clavulanate] Rash    Bacitracin Rash     blisters    Bupropion Rash     Allergic to brand not generic       SOCIAL HISTORY:  I have reviewed this patient's social history and updated it with pertinent information if needed. Bebe Alfonso  reports that she has been smoking cigarettes. She started smoking about 8 years ago. She has a 50 pack-year smoking history. She has never used smokeless tobacco. She reports that she does not drink alcohol and does not use drugs.    FAMILY  HISTORY:  I have reviewed this patient's family history and updated it with pertinent information if needed.   Family History   Problem Relation Age of Onset    Diabetes Maternal Grandfather     Diabetes Sister         developed after cancer treatment    Breast Cancer Sister     Obesity Sister     Hypertension Mother     Hyperlipidemia Mother     Osteoporosis Mother     Breast Cancer Sister     Osteoporosis Sister     Breast Cancer Daughter     Other Cancer Sister         throught out body    Substance Abuse Sister         acol    Obesity Sister     Substance Abuse Sister         acol    Obesity Sister        REVIEW OF SYSTEMS:  Skin:        Eyes:       ENT:       Respiratory:  Negative shortness of breath;dyspnea on exertion;dyspnea at rest  Cardiovascular:  Negative;chest pain;palpitations;edema;syncope or near-syncope Positive for;dizziness;lightheadedness;fatigue  Gastroenterology:      Genitourinary:       Musculoskeletal:       Neurologic:       Psychiatric:       Heme/Lymph/Imm:       Endocrine:           PHYSICAL EXAM:                     Vital Signs with Ranges     122 lbs 0 oz    Constitutional: alert, no distress  Respiratory: Good bilateral air entry  Cardiovascular: s1 s2 normal, no murmurs  GI: nondistended  Neuropsychiatric: appropriate affact    It was a pleasure seeing this patient in clinic today. Please do not hesitate to contact me with any future questions.     Saurabh TAYLOR, FACC, FASE  Cardiology - [facility]  February 11, 2024    This note was completed in part using dictation via the Dragon voice recognition software. Some word and grammatical errors may occur and must be interpreted in the appropriate clinical context.  If there are any questions pertaining to this issue, please contact me for further clarification.

## 2024-02-18 ENCOUNTER — PRE VISIT (OUTPATIENT)
Dept: PULMONOLOGY | Facility: CLINIC | Age: 79
End: 2024-02-18

## 2024-02-19 ENCOUNTER — MYC REFILL (OUTPATIENT)
Dept: FAMILY MEDICINE | Facility: CLINIC | Age: 79
End: 2024-02-19
Payer: COMMERCIAL

## 2024-02-19 DIAGNOSIS — I10 BENIGN ESSENTIAL HYPERTENSION: ICD-10-CM

## 2024-02-19 RX ORDER — ENALAPRIL MALEATE 20 MG/1
20 TABLET ORAL DAILY
Qty: 90 TABLET | Refills: 0 | Status: SHIPPED | OUTPATIENT
Start: 2024-02-19 | End: 2024-05-14

## 2024-02-28 ENCOUNTER — LAB (OUTPATIENT)
Dept: LAB | Facility: CLINIC | Age: 79
End: 2024-02-28
Payer: COMMERCIAL

## 2024-02-28 ENCOUNTER — ANTICOAGULATION THERAPY VISIT (OUTPATIENT)
Dept: ANTICOAGULATION | Facility: CLINIC | Age: 79
End: 2024-02-28

## 2024-02-28 DIAGNOSIS — Z79.01 LONG TERM CURRENT USE OF ANTICOAGULANT THERAPY: Primary | ICD-10-CM

## 2024-02-28 DIAGNOSIS — Z79.01 LONG TERM CURRENT USE OF ANTICOAGULANT THERAPY: ICD-10-CM

## 2024-02-28 DIAGNOSIS — I63.20 CEREBROVASCULAR ACCIDENT (CVA) DUE TO OCCLUSION OF PRECEREBRAL ARTERY (H): Chronic | ICD-10-CM

## 2024-02-28 DIAGNOSIS — Z79.01 ANTICOAGULATION MONITORING, INR RANGE 2-3: Chronic | ICD-10-CM

## 2024-02-28 DIAGNOSIS — I63.20 CEREBROVASCULAR ACCIDENT (CVA) DUE TO OCCLUSION OF PRECEREBRAL ARTERY (H): ICD-10-CM

## 2024-02-28 DIAGNOSIS — Z79.01 ANTICOAGULATION MONITORING, INR RANGE 2-3: ICD-10-CM

## 2024-02-28 DIAGNOSIS — Z86.73 HISTORY OF CVA (CEREBROVASCULAR ACCIDENT): ICD-10-CM

## 2024-02-28 LAB — INR BLD: 4 (ref 0.9–1.1)

## 2024-02-28 PROCEDURE — 36416 COLLJ CAPILLARY BLOOD SPEC: CPT

## 2024-02-28 PROCEDURE — 85610 PROTHROMBIN TIME: CPT

## 2024-02-28 NOTE — PROGRESS NOTES
ANTICOAGULATION MANAGEMENT     Bebe PITO Alfonso 78 year old female is on warfarin with supratherapeutic INR result. (Goal INR 2.0-3.0)    Recent labs: (last 7 days)     02/28/24  0920   INR 4.0*       ASSESSMENT     Source(s): Chart Review and Patient/Caregiver Call     Warfarin doses taken: Warfarin taken as instructed  Diet: No new diet changes identified  Medication/supplement changes: None noted  New illness, injury, or hospitalization: Yes: had two teeth pulled yesterday, allergies acting up  Signs or symptoms of bleeding or clotting: Yes: had bleeding from where the teeth were pulled  Previous result: Therapeutic last visit; previously outside of goal range  Additional findings: None       PLAN     Recommended plan for no diet, medication or health factor changes affecting INR     Dosing Instructions: hold dose then decrease your warfarin dose (11.8% change) with next INR in 10 days       Summary  As of 2/28/2024      Full warfarin instructions:  2/28: Hold; Otherwise 7.5 mg every Mon; 5 mg all other days   Next INR check:  3/8/2024               Telephone call with Bebe who verbalizes understanding and agrees to plan    Lab visit scheduled    Education provided:   Goal range and lab monitoring: goal range and significance of current result    Plan made per [facility] anticoagulation protocol    Vandana Garcia RN  Anticoagulation Clinic  2/28/2024    _______________________________________________________________________     Anticoagulation Episode Summary       Current INR goal:  2.0-3.0   TTR:  50.4% (1 y)   Target end date:  Indefinite   Send INR reminders to:  [facility]    Indications    History of CVA (cerebrovascular accident) (Resolved) [Z86.73]  Long term current use of anticoagulant therapy [Z79.01]  Cerebrovascular accident (CVA) due to occlusion of precerebral artery (H) [I63.20]  History of CVA (cerebrovascular accident) [Z86.73]  Anticoagulation monitoring  INR range 2-3 [Z79.01]              Comments:               Anticoagulation Care Providers       Provider Role Specialty Phone number    Diana Ely APRN [facility] Referring Family Medicine [phone]

## 2024-02-28 NOTE — PROGRESS NOTES
ANTICOAGULATION MANAGEMENT     Bebe Alfonso 78 year old female is on warfarin with supratherapeutic INR result. (Goal INR 2.0-3.0)    Recent labs: (last 7 days)     02/28/24  0920   INR 4.0*       ASSESSMENT     Source(s): Chart Review  Previous INR was Therapeutic last visit; previously outside of goal range  Medication, diet, health changes since last INR chart reviewed; none identified         PLAN     Unable to reach Bebe today.    No instructions provided. Unable to leave voicemail.    Follow up required to confirm warfarin dose taken and assess for changes and discuss out of range result     Vandana Garcia RN  Anticoagulation Clinic  2/28/2024

## 2024-03-01 ENCOUNTER — HOSPITAL ENCOUNTER (EMERGENCY)
Facility: CLINIC | Age: 79
Discharge: HOME | End: 2024-03-01
Attending: EMERGENCY MEDICINE | Admitting: EMERGENCY MEDICINE
Payer: COMMERCIAL

## 2024-03-01 ENCOUNTER — OFFICE VISIT (OUTPATIENT)
Dept: PEDIATRICS | Facility: CLINIC | Age: 79
End: 2024-03-01
Payer: COMMERCIAL

## 2024-03-01 ENCOUNTER — HOSPITAL ENCOUNTER (OUTPATIENT)
Dept: CT IMAGING | Facility: CLINIC | Age: 79
Discharge: HOME | End: 2024-03-01
Attending: EMERGENCY MEDICINE | Admitting: EMERGENCY MEDICINE
Payer: COMMERCIAL

## 2024-03-01 ENCOUNTER — NURSE TRIAGE (OUTPATIENT)
Dept: FAMILY MEDICINE | Facility: CLINIC | Age: 79
End: 2024-03-01
Payer: COMMERCIAL

## 2024-03-01 VITALS
TEMPERATURE: 97.6 F | BODY MASS INDEX: 18.9 KG/M2 | DIASTOLIC BLOOD PRESSURE: 37 MMHG | RESPIRATION RATE: 16 BRPM | SYSTOLIC BLOOD PRESSURE: 85 MMHG | OXYGEN SATURATION: 98 % | HEART RATE: 102 BPM | HEIGHT: 66 IN | WEIGHT: 117.6 LBS

## 2024-03-01 VITALS
HEART RATE: 81 BPM | TEMPERATURE: 98.2 F | OXYGEN SATURATION: 97 % | DIASTOLIC BLOOD PRESSURE: 57 MMHG | RESPIRATION RATE: 20 BRPM | BODY MASS INDEX: 19.49 KG/M2 | SYSTOLIC BLOOD PRESSURE: 149 MMHG | WEIGHT: 117 LBS | HEIGHT: 65 IN

## 2024-03-01 DIAGNOSIS — Z79.01 ANTICOAGULATED ON COUMADIN: ICD-10-CM

## 2024-03-01 DIAGNOSIS — Z79.01 CHRONIC ANTICOAGULATION: ICD-10-CM

## 2024-03-01 DIAGNOSIS — D64.9 SYMPTOMATIC ANEMIA: ICD-10-CM

## 2024-03-01 DIAGNOSIS — R58 BLEEDING: ICD-10-CM

## 2024-03-01 DIAGNOSIS — M62.81 GENERALIZED MUSCLE WEAKNESS: ICD-10-CM

## 2024-03-01 DIAGNOSIS — E86.0 DEHYDRATION: ICD-10-CM

## 2024-03-01 DIAGNOSIS — M62.81 GENERALIZED MUSCLE WEAKNESS: Primary | ICD-10-CM

## 2024-03-01 LAB
ABO/RH(D): NORMAL
ANION GAP SERPL CALCULATED.3IONS-SCNC: 12 MMOL/L (ref 7–15)
ANTIBODY SCREEN: NEGATIVE
BLD PROD TYP BPU: NORMAL
BLD PROD TYP BPU: NORMAL
BLOOD COMPONENT TYPE: NORMAL
BLOOD COMPONENT TYPE: NORMAL
BUN SERPL-MCNC: 31.6 MG/DL (ref 8–23)
CALCIUM SERPL-MCNC: 8.7 MG/DL (ref 8.8–10.2)
CHLORIDE SERPL-SCNC: 103 MMOL/L (ref 98–107)
CODING SYSTEM: NORMAL
CODING SYSTEM: NORMAL
CREAT SERPL-MCNC: 1.77 MG/DL (ref 0.51–0.95)
CROSSMATCH: NORMAL
CROSSMATCH: NORMAL
DEPRECATED HCO3 PLAS-SCNC: 21 MMOL/L (ref 22–29)
EGFRCR SERPLBLD CKD-EPI 2021: 29 ML/MIN/1.73M2
ERYTHROCYTE [DISTWIDTH] IN BLOOD BY AUTOMATED COUNT: 13.4 % (ref 10–15)
ERYTHROCYTE [DISTWIDTH] IN BLOOD BY AUTOMATED COUNT: 13.7 % (ref 10–15)
GLUCOSE SERPL-MCNC: 95 MG/DL (ref 70–99)
HCT VFR BLD AUTO: 19.5 % (ref 35–47)
HCT VFR BLD AUTO: 28 % (ref 35–47)
HGB BLD-MCNC: 6.3 G/DL (ref 11.7–15.7)
HGB BLD-MCNC: 9.3 G/DL (ref 11.7–15.7)
HOLD SPECIMEN: NORMAL
INR PPP: 2.69 (ref 0.85–1.15)
ISSUE DATE AND TIME: NORMAL
ISSUE DATE AND TIME: NORMAL
MCH RBC QN AUTO: 30.5 PG (ref 26.5–33)
MCH RBC QN AUTO: 30.6 PG (ref 26.5–33)
MCHC RBC AUTO-ENTMCNC: 32.3 G/DL (ref 31.5–36.5)
MCHC RBC AUTO-ENTMCNC: 33.2 G/DL (ref 31.5–36.5)
MCV RBC AUTO: 92 FL (ref 78–100)
MCV RBC AUTO: 95 FL (ref 78–100)
PLATELET # BLD AUTO: 226 10E3/UL (ref 150–450)
PLATELET # BLD AUTO: 250 10E3/UL (ref 150–450)
POTASSIUM SERPL-SCNC: 4.1 MMOL/L (ref 3.4–5.3)
RBC # BLD AUTO: 2.06 10E6/UL (ref 3.8–5.2)
RBC # BLD AUTO: 3.05 10E6/UL (ref 3.8–5.2)
SODIUM SERPL-SCNC: 136 MMOL/L (ref 135–145)
SPECIMEN EXPIRATION DATE: NORMAL
UNIT ABO/RH: NORMAL
UNIT ABO/RH: NORMAL
UNIT NUMBER: NORMAL
UNIT NUMBER: NORMAL
UNIT STATUS: NORMAL
UNIT STATUS: NORMAL
UNIT TYPE ISBT: 6200
UNIT TYPE ISBT: 6200
WBC # BLD AUTO: 8.5 10E3/UL (ref 4–11)
WBC # BLD AUTO: 9.4 10E3/UL (ref 4–11)

## 2024-03-01 PROCEDURE — 85610 PROTHROMBIN TIME: CPT | Performed by: EMERGENCY MEDICINE

## 2024-03-01 PROCEDURE — 99291 CRITICAL CARE FIRST HOUR: CPT | Mod: 25 | Performed by: EMERGENCY MEDICINE

## 2024-03-01 PROCEDURE — 36415 COLL VENOUS BLD VENIPUNCTURE: CPT | Performed by: EMERGENCY MEDICINE

## 2024-03-01 PROCEDURE — 99215 OFFICE O/P EST HI 40 MIN: CPT | Mod: 25 | Performed by: EMERGENCY MEDICINE

## 2024-03-01 PROCEDURE — 96360 HYDRATION IV INFUSION INIT: CPT

## 2024-03-01 PROCEDURE — 70450 CT HEAD/BRAIN W/O DYE: CPT

## 2024-03-01 PROCEDURE — 86923 COMPATIBILITY TEST ELECTRIC: CPT | Performed by: EMERGENCY MEDICINE

## 2024-03-01 PROCEDURE — 96361 HYDRATE IV INFUSION ADD-ON: CPT

## 2024-03-01 PROCEDURE — 99285 EMERGENCY DEPT VISIT HI MDM: CPT | Mod: 25

## 2024-03-01 PROCEDURE — 258N000003 HC RX IP 258 OP 636: Performed by: EMERGENCY MEDICINE

## 2024-03-01 PROCEDURE — 85027 COMPLETE CBC AUTOMATED: CPT | Performed by: EMERGENCY MEDICINE

## 2024-03-01 PROCEDURE — 86900 BLOOD TYPING SEROLOGIC ABO: CPT | Performed by: EMERGENCY MEDICINE

## 2024-03-01 PROCEDURE — 36430 TRANSFUSION BLD/BLD COMPNT: CPT

## 2024-03-01 PROCEDURE — P9016 RBC LEUKOCYTES REDUCED: HCPCS | Performed by: EMERGENCY MEDICINE

## 2024-03-01 PROCEDURE — 80048 BASIC METABOLIC PNL TOTAL CA: CPT | Performed by: EMERGENCY MEDICINE

## 2024-03-01 RX ORDER — SODIUM CHLORIDE, SODIUM LACTATE, POTASSIUM CHLORIDE, CALCIUM CHLORIDE 600; 310; 30; 20 MG/100ML; MG/100ML; MG/100ML; MG/100ML
1000 INJECTION, SOLUTION INTRAVENOUS CONTINUOUS
Status: DISCONTINUED | OUTPATIENT
Start: 2024-03-01 | End: 2024-03-01 | Stop reason: HOSPADM

## 2024-03-01 RX ADMIN — SODIUM CHLORIDE, POTASSIUM CHLORIDE, SODIUM LACTATE AND CALCIUM CHLORIDE 500 ML: 600; 310; 30; 20 INJECTION, SOLUTION INTRAVENOUS at 14:45

## 2024-03-01 RX ADMIN — Medication 1000 ML: at 11:38

## 2024-03-01 ASSESSMENT — ACTIVITIES OF DAILY LIVING (ADL)
ADLS_ACUITY_SCORE: 35

## 2024-03-01 ASSESSMENT — ENCOUNTER SYMPTOMS
ALLERGIC/IMMUNOLOGIC NEGATIVE: 1
WEAKNESS: 1
GASTROINTESTINAL NEGATIVE: 1
CARDIOVASCULAR NEGATIVE: 1
DIZZINESS: 1
EYES NEGATIVE: 1
PSYCHIATRIC NEGATIVE: 1
ENDOCRINE NEGATIVE: 1
FATIGUE: 1
RESPIRATORY NEGATIVE: 1
MUSCULOSKELETAL NEGATIVE: 1
HEMATOLOGIC/LYMPHATIC NEGATIVE: 1

## 2024-03-01 ASSESSMENT — PAIN SCALES - GENERAL: PAINLEVEL: NO PAIN (0)

## 2024-03-01 ASSESSMENT — COLUMBIA-SUICIDE SEVERITY RATING SCALE - C-SSRS
6. HAVE YOU EVER DONE ANYTHING, STARTED TO DO ANYTHING, OR PREPARED TO DO ANYTHING TO END YOUR LIFE?: NO
2. HAVE YOU ACTUALLY HAD ANY THOUGHTS OF KILLING YOURSELF IN THE PAST MONTH?: NO
1. IN THE PAST MONTH, HAVE YOU WISHED YOU WERE DEAD OR WISHED YOU COULD GO TO SLEEP AND NOT WAKE UP?: NO

## 2024-03-01 NOTE — TELEPHONE ENCOUNTER
"S-(situation): Patient calling to report dizziness    B-(background): 2 teeth extracted 02/27/24. She had bleeding issues and had sutures placed by the dentist 02/29 without any further bleeding  CVA  IN R 4.0 on 02/28/24    A-(assessment): Bebe reports lots of bleeding from her dental procedure site initially, none since sutures placed. No other bleeding, no chest pain, abdominal pain, breathing difficulty. She does not know her BP or pulse readings. She states she feels dizzy and like she is in a \"brain fog\" when up walking on a flat surface or standing in one place very long, no symptoms when walking up the stairs. Her  noticed her symptoms as well. She is talking in full sentences without garbled speech. She denies numbness, tingling or weakness on one side of her body. The dizziness started Tuesday after getting her teeth pulled. She says she might not be drinking enough because it was difficulty when her mouth was numb. Her sensation is back to normal and she is having normal amount of urinary output.     R-(recommendations): Disposition per protocol: Go to Ed/[facility] now.  I spoke with Jessica at [facility] who discussed with provider and will see Bebe today. They will contact her for an appointment time.     Referral to Acute and Diagnostic Services    [phone] ([place]) [address] [address]    Transition to Acute & Diagnostic Services Clinic has been discussed with patient, and she agrees with next level of care.   Patient understands that evaluation/treatment at [facility] typically takes significantly longer than in clinic/urgent care (>2 hours).  The [facility] Acute [facility] Diagnostics [facility] has been contacted by provider/staff to confirm patient acceptance.         Special issues:      None               The following provider has assessed this patient for intervention at [facility], and directed the patient for referral: Cheyenne Mcdonald RN                Reason for " Disposition   Follows bleeding (e.g., stomach, rectum, vagina)  (Exception: Became dizzy from sight of small amount blood.)    Additional Information   Negative: SEVERE difficulty breathing (e.g., struggling for each breath, speaks in single words)   Negative: Shock suspected (e.g., cold/pale/clammy skin, too weak to stand, low BP, rapid pulse)   Negative: Difficult to awaken or acting confused (e.g., disoriented, slurred speech)   Negative: Fainted, and still feels dizzy afterwards   Negative: Overdose (accidental or intentional) of medications   Negative: New neurologic deficit that is present now: * Weakness of the face, arm, or leg on one side of the body * Numbness of the face, arm, or leg on one side of the body * Loss of speech or garbled speech   Negative: Heart beating < 50 beats per minute OR > 140 beats per minute   Negative: Sounds like a life-threatening emergency to the triager   Negative: Chest pain   Negative: Rectal bleeding, bloody stool, or tarry-black stool   Negative: Vomiting is main symptom   Negative: Diarrhea is main symptom   Negative: Headache is main symptom   Negative: Heat exhaustion suspected (i.e., dehydration from heat exposure)   Negative: Patient states that they are having an anxiety or panic attack   Negative: Dizziness from low blood sugar (i.e., < 60 mg/dl or 3.5 mmol/l)   Negative: SEVERE dizziness (e.g., unable to stand, requires support to walk, feels like passing out now)   Negative: SEVERE headache or neck pain   Negative: Spinning or tilting sensation (vertigo) present now and one or more stroke risk factors (i.e., hypertension, diabetes mellitus, prior stroke/TIA, heart attack, age over 60) (Exception: Prior physician evaluation for this AND no different/worse than usual.)   Negative: Neurologic deficit that was brief (now gone), ANY of the following:* Weakness of the face, arm, or leg on one side of the body* Numbness of the face, arm, or leg on one side of the body*  "Loss of speech or garbled speech   Negative: Loss of vision or double vision  (Exception: Similar to previous migraines.)   Negative: Extra heartbeats, irregular heart beating, or heart is beating very fast (i.e., 'palpitations')   Negative: Difficulty breathing   Negative: Drinking very little and dehydration suspected (e.g., no urine > 12 hours, very dry mouth, very lightheaded)    Answer Assessment - Initial Assessment Questions  1. DESCRIPTION: \"Describe your dizziness.\"      Feel dizzy walking on flat surfaces, not when going up stairs. She feels like her brain is tired all of a sudden  2. LIGHTHEADED: \"Do you feel lightheaded?\" (e.g., somewhat faint, woozy, weak upon standing)      Woozy, when she sits down down symptoms subside, laying is even better  3. VERTIGO: \"Do you feel like either you or the room is spinning or tilting?\" (i.e. vertigo)      no  4. SEVERITY: \"How bad is it?\"  \"Do you feel like you are going to faint?\" \"Can you stand and walk?\"    - MILD: Feels slightly dizzy, but walking normally.    - MODERATE: Feels unsteady when walking, but not falling; interferes with normal activities (e.g., school, work).    - SEVERE: Unable to walk without falling, or requires assistance to walk without falling; feels like passing out now.       She report feeling as if she will fall when she is walking  5. ONSET:  \"When did the dizziness begin?\"      Tuesday after having 2 teeth pulled  6. AGGRAVATING FACTORS: \"Does anything make it worse?\" (e.g., standing, change in head position)      When up walking or standing too long  7. HEART RATE: \"Can you tell me your heart rate?\" \"How many beats in 15 seconds?\"  (Note: not all patients can do this)        She does not know  8. CAUSE: \"What do you think is causing the dizziness?\"      unknown  9. RECURRENT SYMPTOM: \"Have you had dizziness before?\" If Yes, ask: \"When was the last time?\" \"What happened that time?\"      Yes for a while when she could not sleep and felt " "that way when she was up walking  10. OTHER SYMPTOMS: \"Do you have any other symptoms?\" (e.g., fever, chest pain, vomiting, diarrhea, bleeding)        No, minor nausea on Wednesday 11. PREGNANCY: \"Is there any chance you are pregnant?\" \"When was your last menstrual period?\"        no    Protocols used: Shellie UNDERWOOD RN    "

## 2024-03-01 NOTE — ED TRIAGE NOTES
Hemoglobin 6.3  Had teeth removed and on blood thinners  Seen at [facility] and being sent here      Pt hypotensive in triage.     Triage Assessment (Adult)       Row Name 03/01/24 1208          Triage Assessment    Airway WDL WDL        Respiratory WDL    Respiratory WDL WDL        Skin Circulation/Temperature WDL    Skin Circulation/Temperature WDL all     Skin Temperature warm        Cardiac WDL    Cardiac WDL WDL        Peripheral/Neurovascular WDL    Peripheral Neurovascular WDL WDL        Cognitive/Neuro/Behavioral WDL    Cognitive/Neuro/Behavioral WDL WDL
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
## 2024-03-01 NOTE — ED NOTES
PRBC infusing. Pt has cough, but states it is unchanged. Denies shortness of breath. Resting in bed, talking in full sentances. NAD.

## 2024-03-01 NOTE — ED NOTES
Update to Dr Dockery, starting 2nd unit. MD at bedside to reassess patient to avoid fluid overload. Pt lung sounds have rhonchi, unchanged from previous assessment. Will proceed with second unit at 125mls/hr.
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
## 2024-03-01 NOTE — ED PROVIDER NOTES
History     Chief Complaint   Patient presents with    Abnormal Labs     Hemoglobin 6.3  Had teeth removed and on blood thinners  [facility] at [facility] and being sent here      HPI  Bebe Alfonso is a 78 year old female who presents for evaluation with concern about acute anemia due to blood loss after dental procedure with tooth extraction.  Patient was evaluated at the ambulatory diagnostic center and referred to the emergency department for further care due to concern for hypotension from blood loss and acute anemia.    Review of the medical record show patient has been previously diagnosed with hypertension, COPD, hyperlipidemia, stage III chronic kidney disease, esophageal reflux on anticoagulation with warfarin, also on amlodipine, Lipitor, enalapril, fluoxetine.    On examination she was accompanied by her  Rodriguez from home in [place], [place].  Patient reports she had 2 teeth pulled by [employer] 4 days prior.  She had uncontrolled bleeding that was ultimately stopped after stitching by her oral healthcare provider 2 days ago.  She reports that she is on warfarin for history of recurrent strokes of unclear cause.  She felt dizzy and fatigued and weak and went to the amatory diagnostic center where she was noted to be anemic and hypotensive and referred to the emergency department for care.  No active bleeding on exam.  She is open for blood transfusion.  Patient reports prior transfusion and childhood and after hip surgery few years back    Allergies:  Allergies   Allergen Reactions    Losartan Swelling, Nausea, Shortness Of Breath and Palpitations    Gabapentin GI Disturbance and Unknown     Double vision  flatulence    Lisinopril Cough    Oxycodone Other (See Comments)     But has tolerated hydrocodone    Tramadol Other (See Comments)    Augmentin [Amoxicillin-Pot Clavulanate] Rash    Bacitracin Rash     blisters    Bupropion Rash     Allergic to brand not generic       Problem List:     Patient Active Problem List    Diagnosis Date Noted    History of CVA (cerebrovascular accident) 02/10/2022     Priority: Medium    History of cardiac monitoring 09/21/2021     Priority: Medium    Chronic kidney disease, stage 3 (moderate) 04/18/2019     Priority: Medium     IMO Regulatory Load OCT 2020      Renal artery stenosis (H24) 04/18/2019     Priority: Medium    Cerebrovascular accident (CVA) due to occlusion of precerebral artery (H) 12/18/2018     Priority: Medium    Hyperlipidemia LDL goal <70 09/07/2018     Priority: Medium    Long term current use of anticoagulant therapy 03/20/2018     Priority: Medium    Benign essential hypertension 09/15/2016     Priority: Medium    Chronic obstructive pulmonary disease (H) 01/19/2016     Priority: Medium     The FVC, FEV1 and FEV1/FVC ratio are reduced.  The inspiratory flow rates are within normal limits.  The FVC is reduced relative to the SVC indicating air trapping.  While the TLC is within normal limits, the FRC, RV and RV/TLC ratio are increased.  The   diffusing capacity is mildly reduced.  IMPRESSION:  Mild-moderate Airflow Obstruction with air trapping  ____________________________________________Clarke Iverson    April 2018      Anticoagulation monitoring, INR range 2-3 10/22/2015     Priority: Medium    Cerebrovascular disease 10/20/2015     Priority: Medium    ACP (advance care planning) 06/15/2013     Priority: Medium     Overview:   Formatting of this note may be different from the original.  Patient has identified Health Care Agent(s): Yes  Add Health Care Agents: Yes    Health Care Agent(s):  Primary Health Care Agent: Rodriguez lemons  Relationship: spouse Phone: [phone]   Secondary Health Care Agent: Beckie Kearney  Relationship: Dtr Phone: [phone] [phone]   Conservator:  Relationship:  Phone:    Guardian: Relationship:  Phone:      Patient has Advance Care Plan Documents (Health Care Directive, POLST): Yes    Advance Care  Plan Documents:  Health Care Directive    Patient has identified Specific Treatment Preferences: Yes   Specific Treatment Preferences: per Chart  Full Code , please refer to pts document for tx details      Thyroid nodule 05/22/2013     Priority: Medium     Overview:   Thyroid US (2012) Stable nodule left lobe of thyroid  TSH- (2/19/12) normal (2.26)      Pain medication agreement 03/13/2013     Priority: Medium     Overview:   Per verbal order of provider, controlled substance agreement for Vicodin signed this date by Valentina Burrows LPN by Dr. Avilez's office.      Carotid bruit 11/12/2012     Priority: Medium     Overview:   12/6/10 Carotid US   Elevated velocities throughout the carotid arteries bilaterally. There is a focal area of increased velocity in the mid left internal carotid artery with a plaque in this region on the color flow images. This corresponds to 50 - 70 % diameter reduction. There is an area of elevated velocity in the left external carotid artery consistent with stenosis. There is no focal area of significant stenosis in the right carotid arteries in the neck. Plaque in the carotid arteries bilaterally.       Tobacco dependence 11/01/2010     Priority: Medium     Overview:   1 PPD for x 33 years, she attempted quitting twice in the past. She states trial of wellbutrin in the past. She failed trial of nicotine and chantix.        Osteoarthrosis, hip 10/19/2010     Priority: Medium     Overview:   Pt scheduled for right  total hip arthroplasty on 6/14/13 with Dr. Addison HEREDIA hip xray (11/2012) Severe degenerative changes seen of the right hip with near bone-on-bone appearance of the joint and several subchondral cysts forming.      Esophageal reflux 04/08/2008     Priority: Medium     Overview:   Omeprazole PRN, occasional symptoms such as chest burning and knife twisted to stomach.           Past Medical History:    Past Medical History:   Diagnosis Date    Anticoagulation monitoring, INR range  2-3 10/22/2015    Benign essential hypertension 9/15/2016    Carotid bruit 11/12/2012    Chronic obstructive pulmonary disease (H) 1/19/2016    Esophageal reflux 4/8/2008    Heart disease born with it    History of blood transfusion 70 years ago    History of CVA (cerebrovascular accident) 10/20/2015    Long term current use of anticoagulant therapy 3/20/2018    Major depressive disorder, recurrent episode, moderate (H) 10/29/2008    Osteoarthrosis, hip 10/19/2010    Thyroid nodule 5/22/2013       Past Surgical History:    Past Surgical History:   Procedure Laterality Date    BIOPSY  appox in 1980's    BREAST SURGERY  last time in the 1990's    non cancer    COLONOSCOPY N/A 9/7/2022    Procedure: COLONOSCOPY, WITH POLYPECTOMY AND BIOPSY;  Surgeon: Alfredito Gomez DO;  Location: [facility]    ESOPHAGOSCOPY, GASTROSCOPY, DUODENOSCOPY (EGD), COMBINED N/A 5/25/2022    Procedure: ESOPHAGOGASTRODUODENOSCOPY, WITH BIOPSY;  Surgeon: Alfredito Gomez DO;  Location: [facility]       Family History:    Family History   Problem Relation Age of Onset    Diabetes Maternal Grandfather     Diabetes Sister         developed after cancer treatment    Breast Cancer Sister     Obesity Sister     Hypertension Mother     Hyperlipidemia Mother     Osteoporosis Mother     Breast Cancer Sister     Osteoporosis Sister     Breast Cancer Daughter     Other Cancer Sister         throught out body    Substance Abuse Sister         acol    Obesity Sister     Substance Abuse Sister         acol    Obesity Sister        Social History:  Marital Status:   [2]  Social History     Tobacco Use    Smoking status: Every Day     Packs/day: 1.00     Years: 50.00     Additional pack years: 0.00     Total pack years: 50.00     Types: Cigarettes     Start date: 1/1/2016    Smokeless tobacco: Never   Vaping Use    Vaping Use: Never used   Substance Use Topics    Alcohol use: No    Drug use: Never        Medications:    amLODIPine (NORVASC) 5 MG  tablet  Aspirin (VAZALORE) 81 MG CAPS  atorvastatin (LIPITOR) 80 MG tablet  enalapril (VASOTEC) 20 MG tablet  famotidine (PEPCID) 40 MG tablet  FLUoxetine (PROZAC) 20 MG capsule  fluticasone (FLONASE) 50 MCG/ACT nasal spray  warfarin ANTICOAGULANT (COUMADIN) 5 MG tablet          Review of Systems   Constitutional:  Positive for fatigue.        Episode of oropharyngeal bleeding after dental extraction 4 days prior   HENT: Negative.     Eyes: Negative.    Respiratory: Negative.     Cardiovascular: Negative.    Gastrointestinal: Negative.    Endocrine: Negative.    Genitourinary: Negative.    Musculoskeletal: Negative.    Skin: Negative.    Allergic/Immunologic: Negative.    Neurological:  Positive for dizziness and weakness.   Hematological: Negative.    Psychiatric/Behavioral: Negative.     All other systems reviewed and are negative.      Physical Exam   BP: (!) 96/38  Pulse: 76  Resp: 16  Weight: 53.1 kg (117 lb)  SpO2: 100 %      Physical Exam  Constitutional:       Appearance: She is ill-appearing. She is not toxic-appearing or diaphoretic.   HENT:      Head: Normocephalic.      Nose: Nose normal.   Eyes:      Extraocular Movements: Extraocular movements intact.      Pupils: Pupils are equal, round, and reactive to light.   Cardiovascular:      Rate and Rhythm: Normal rate and regular rhythm.   Musculoskeletal:      Cervical back: Normal range of motion and neck supple.   Skin:     Capillary Refill: Capillary refill takes less than 2 seconds.      Coloration: Skin is pale.   Neurological:      Mental Status: She is alert and oriented to person, place, and time.      Cranial Nerves: No cranial nerve deficit.      Sensory: No sensory deficit.      Motor: No weakness.      Coordination: Coordination normal.      Gait: Gait normal.      Deep Tendon Reflexes: Reflexes normal.   Psychiatric:         Mood and Affect: Mood normal.         Behavior: Behavior normal.         Thought Content: Thought content normal.          Judgment: Judgment normal.         ED Course        Procedures              Critical Care time:  was 60 minutes for this patient excluding procedures.             ED medications:  Medications   lactated ringers BOLUS 500 mL (has no administration in time range)   lactated ringers infusion 1,000 mL (has no administration in time range)     ED Vitals:  Vitals:    03/01/24 1900 03/01/24 1930 03/01/24 1945 03/01/24 2000   BP: (!) 147/56 (!) 152/57  (!) 149/57   BP Location:    Left arm   Pulse: 72 82  81   Resp: 21 19 18 20   Temp:       TempSrc:       SpO2: 99% 97%     Weight:       Height:         ED labs and imaging:  Results for orders placed or performed during the hospital encounter of 03/01/24 (from the past 24 hour(s))   Christopher Draw    Narrative    The following orders were created for panel order Christopher Draw.  Procedure                               Abnormality         Status                     ---------                               -----------         ------                     Extra Blue Top Tube[[id number]]                              Final result               Extra Red Top Tube[[id number]]                               Final result               Extra Green Top (Lithium...[[id number]]                      Final result               Extra Purple Top Tube[[id number]]                            Final result               Extra Blood Bank Purple ...[[id number]]                      Final result                 Please view results for these tests on the individual orders.   Extra Blue Top Tube   Result Value Ref Range    Hold Specimen JIC    Extra Red Top Tube   Result Value Ref Range    Hold Specimen JIC    Extra Green Top (Lithium Heparin) Tube   Result Value Ref Range    Hold Specimen JIC    Extra Purple Top Tube   Result Value Ref Range    Hold Specimen JIC    Extra Blood Bank Purple Top Tube   Result Value Ref Range    Hold Specimen JIC    ABO/Rh type and screen    Narrative    The following orders were  created for panel order ABO/Rh type and screen.  Procedure                               Abnormality         Status                     ---------                               -----------         ------                     Adult Type and Screen[[id number]]                            Edited Result - FINAL        Please view results for these tests on the individual orders.   Adult Type and Screen   Result Value Ref Range    ABO/RH(D) A POS     Antibody Screen Negative Negative    SPECIMEN EXPIRATION DATE [id number]    Prepare red blood cells (unit)   Result Value Ref Range    Blood Component Type Red Blood Cells     Product Code G8903T54     Unit Status Issued     Unit Number [id number]     CROSSMATCH Compatible     CODING SYSTEM [id number]     ISSUE DATE AND TIME [id number]     UNIT ABO/RH A+     UNIT TYPE ISBT 6200    Prepare red blood cells (unit)   Result Value Ref Range    Blood Component Type Red Blood Cells     Product Code I2733K92     Unit Status Issued     Unit Number [id number]     CROSSMATCH Compatible     CODING SYSTEM [id number]     ISSUE DATE AND TIME [id number]     UNIT ABO/RH A+     UNIT TYPE ISBT 6200    CBC with platelets   Result Value Ref Range    WBC Count 8.5 4.0 - 11.0 10e3/uL    RBC Count 3.05 (L) 3.80 - 5.20 10e6/uL    Hemoglobin 9.3 (L) 11.7 - 15.7 g/dL    Hematocrit 28.0 (L) 35.0 - 47.0 %    MCV 92 78 - 100 fL    MCH 30.5 26.5 - 33.0 pg    MCHC 33.2 31.5 - 36.5 g/dL    RDW 13.7 10.0 - 15.0 %    Platelet Count 226 150 - 450 10e3/uL           Assessments & Plan (with Medical Decision Making)   Assessment Summary and clinical Impression: 78-year-old female who presented from the ambulatory diagnostic center with concern about hypotension due to blood loss after dental extraction.  Patient's hemoglobin was 6.3 and she is on anticoagulation with warfarin and is known to have a history of COPD, hypertension, history of stroke, and history of renal artery stenosis.  On intake patient  blood pressure was 96/30.  Patient arrived by car with her spouse reported that she had 2 teeth extracted at [facility] 4 days prior.  She had persistent bleeding that since stopped after her oral health care provider placed 2 stitches 2 days ago.  She had felt dizzy and weak and fatigued and was seen at the [facility] and had lab testing obtained revealing anemia with a hemoglobin of 6 and she was noted to be hypotensive and referred to the ED for further care.  On exam she was pleasant in no acute distress appeared pale.  No active bleeding from sutured  overlying tooth #2 and 3. With suspicion about symptomatic anemia from blood loss resulting in hypotension after witnessed written consent patient received 2 units of PRBC.  Post transfusion hemoglobin is 9.3.  She was discharged with outpatient follow-up.    ED course and plan:  Reviewed the medical record.  I reviewed the note from the [facility] diagnostic center where patient had been noted to present with dizziness and fatigue.  At the [facility] patient's blood pressure was 85/37.  INR was 2.69 this morning.  Hemoglobin 6.3.  Previously hemoglobin was 11.7 on 10/25/2023.  With concern for hemorrhagic shock with acute blood loss after her dental extraction and hypotension she was given gentle fluids.  After witnessed written consent patient was offered 2 units of blood.  Creatinine is 1.77 baseline ranges from 1.6-2.0  Hemoglobin post transfusion is 9.3.  Patient tolerated transfusion reviewed follow-up care including concerning symptoms and reasons to return for care.  Patient and spouse present during the entire ED course expressed understanding and agreement with plan of care.      Disclaimer: This note consists of symbols derived from keyboarding, dictation and/or voice recognition software. As a result, there may be errors in the script that have gone undetected. Please consider this when interpreting information found in this chart.   I have reviewed the nursing  notes.    I have reviewed the findings, diagnosis, plan and need for follow up with the patient.           Medical Decision Making  The patient's presentation was of high complexity (hemorrhagic shock with hypotension acute blood loss anemia after dental extraction.  Advanced age and anticoagulation).    The patient's evaluation involved:  review of external note(s) from 2 sources (diagnostic labs, resuscitation and blood transfusion)    The patient's management necessitated moderate risk (prescription drug management including medications given in the ED) and high risk (a decision regarding hospitalization).        New Prescriptions    No medications on file       Final diagnoses:   Symptomatic anemia - post dental extraction- 4 days prior   Chronic anticoagulation - on wafarin for reported Hx of recurrent strokes by report       3/1/2024   [facility] EMERGENCY DEPT       Krystian Dockery MD  03/01/24 [id number]

## 2024-03-02 NOTE — DISCHARGE INSTRUCTIONS
1) During your visit you have required transfusion of blood due to degree of bleeding as result of having a tooth extracted 4 days ago causing him to be dizzy weak and fatigued.  After receiving 2 units of blood her hemoglobin is improved to 9.3.    2) You appear stable for discharge to home at this time with plan to follow-up with your clinic provider for lab recheck if needed in the next 1 week.  If there are new concerns you should return to reevaluate

## 2024-03-04 ENCOUNTER — ANTICOAGULATION THERAPY VISIT (OUTPATIENT)
Dept: ANTICOAGULATION | Facility: CLINIC | Age: 79
End: 2024-03-04
Payer: COMMERCIAL

## 2024-03-04 DIAGNOSIS — Z79.01 LONG TERM CURRENT USE OF ANTICOAGULANT THERAPY: Primary | ICD-10-CM

## 2024-03-04 DIAGNOSIS — Z79.01 ANTICOAGULATION MONITORING, INR RANGE 2-3: Chronic | ICD-10-CM

## 2024-03-04 DIAGNOSIS — I63.20 CEREBROVASCULAR ACCIDENT (CVA) DUE TO OCCLUSION OF PRECEREBRAL ARTERY (H): Chronic | ICD-10-CM

## 2024-03-04 DIAGNOSIS — Z86.73 HISTORY OF CVA (CEREBROVASCULAR ACCIDENT): ICD-10-CM

## 2024-03-04 NOTE — PROGRESS NOTES
ANTICOAGULATION MANAGEMENT     Bebe Alfonso 78 year old female is on warfarin with therapeutic INR result. (Goal INR 2.0-3.0)    Recent labs: (last 7 days)     03/01/24  1045   INR 2.69*       ASSESSMENT     Source(s): Chart Review     Warfarin doses taken: Held for elevated INR  recently which may be affecting INR  Diet: No new diet changes identified  Medication/supplement changes: None noted  New illness, injury, or hospitalization: Yes: ED visit on 3/1/24 for anemia. Patient received 2 units of PRBC for Hgb of 6.0. After the transfusion, Hgb was 9.3.  Signs or symptoms of bleeding or clotting: Yes: Per last visit patient had bleeding from dental extraction. She was in the ED on 3/1 for anemia.   Previous result: Supratherapeutic  Additional findings: None       PLAN     Recommended plan for temporary change(s) affecting INR     Dosing Instructions: Continue your current warfarin dose with next INR in 4 days       Summary  As of 3/4/2024      Full warfarin instructions:  7.5 mg every Mon; 5 mg all other days   Next INR check:  3/8/2024               Chart review only. Patient has an INR already scheduled on 3/8/24.    Lab visit scheduled    Education provided:   None required    Plan made per [facility] anticoagulation protocol    Justin JOHNSON RN  Anticoagulation Clinic  3/4/2024    _______________________________________________________________________     Anticoagulation Episode Summary       Current INR goal:  2.0-3.0   TTR:  49.6% (1 y)   Target end date:  Indefinite   Send INR reminders to:  [facility]    Indications    History of CVA (cerebrovascular accident) (Resolved) [Z86.73]  Long term current use of anticoagulant therapy [Z79.01]  Cerebrovascular accident (CVA) due to occlusion of precerebral artery (H) [I63.20]  History of CVA (cerebrovascular accident) [Z86.73]  Anticoagulation monitoring  INR range 2-3 [Z79.01]             Comments:               Anticoagulation Care Providers       Provider  Role Specialty Phone number    Diana Ely APRN [facility] Referring Family Medicine [phone]

## 2024-03-08 ENCOUNTER — ANTICOAGULATION THERAPY VISIT (OUTPATIENT)
Dept: ANTICOAGULATION | Facility: CLINIC | Age: 79
End: 2024-03-08

## 2024-03-08 ENCOUNTER — LAB (OUTPATIENT)
Dept: LAB | Facility: CLINIC | Age: 79
End: 2024-03-08
Payer: COMMERCIAL

## 2024-03-08 DIAGNOSIS — Z86.73 HISTORY OF CVA (CEREBROVASCULAR ACCIDENT): ICD-10-CM

## 2024-03-08 DIAGNOSIS — Z79.01 ANTICOAGULATION MONITORING, INR RANGE 2-3: Chronic | ICD-10-CM

## 2024-03-08 DIAGNOSIS — I63.20 CEREBROVASCULAR ACCIDENT (CVA) DUE TO OCCLUSION OF PRECEREBRAL ARTERY (H): Chronic | ICD-10-CM

## 2024-03-08 DIAGNOSIS — Z79.01 LONG TERM CURRENT USE OF ANTICOAGULANT THERAPY: ICD-10-CM

## 2024-03-08 DIAGNOSIS — Z79.01 LONG TERM CURRENT USE OF ANTICOAGULANT THERAPY: Primary | ICD-10-CM

## 2024-03-08 DIAGNOSIS — I63.20 CEREBROVASCULAR ACCIDENT (CVA) DUE TO OCCLUSION OF PRECEREBRAL ARTERY (H): ICD-10-CM

## 2024-03-08 DIAGNOSIS — Z79.01 ANTICOAGULATION MONITORING, INR RANGE 2-3: ICD-10-CM

## 2024-03-08 LAB — INR BLD: 2.7 (ref 0.9–1.1)

## 2024-03-08 PROCEDURE — 36416 COLLJ CAPILLARY BLOOD SPEC: CPT

## 2024-03-08 PROCEDURE — 85610 PROTHROMBIN TIME: CPT

## 2024-03-08 NOTE — PROGRESS NOTES
ANTICOAGULATION MANAGEMENT     Bebe SHELDON Kaden 78 year old female is on warfarin with therapeutic INR result. (Goal INR 2.0-3.0)    Recent labs: (last 7 days)     03/08/24  0916   INR 2.7*       ASSESSMENT     Source(s): Chart Review and Patient/Caregiver Call     Warfarin doses taken: Warfarin taken as instructed  Diet: No new diet changes identified  Medication/supplement changes: None noted  New illness, injury, or hospitalization: No  Signs or symptoms of bleeding or clotting: No  Previous result: Therapeutic last visit; previously outside of goal range  Additional findings: None       PLAN     Recommended plan for no diet, medication or health factor changes affecting INR     Dosing Instructions: Continue your current warfarin dose with next INR in 1 week       Summary  As of 3/8/2024      Full warfarin instructions:  7.5 mg every Mon; 5 mg all other days   Next INR check:  3/15/2024               Telephone call with Bebe who verbalizes understanding and agrees to plan    Lab visit scheduled    Education provided:   Goal range and lab monitoring: goal range and significance of current result    Plan made per [facility] anticoagulation protocol    Vandana Garcia RN  Anticoagulation Clinic  3/8/2024    _______________________________________________________________________     Anticoagulation Episode Summary       Current INR goal:  2.0-3.0   TTR:  50.0% (1 y)   Target end date:  Indefinite   Send INR reminders to:  [facility]    Indications    History of CVA (cerebrovascular accident) (Resolved) [Z86.73]  Long term current use of anticoagulant therapy [Z79.01]  Cerebrovascular accident (CVA) due to occlusion of precerebral artery (H) [I63.20]  History of CVA (cerebrovascular accident) [Z86.73]  Anticoagulation monitoring  INR range 2-3 [Z79.01]             Comments:               Anticoagulation Care Providers       Provider Role Specialty Phone number    Diana Ely APRN CNP Referring Family  Medicine [phone]

## 2024-03-08 NOTE — PROGRESS NOTES
ANTICOAGULATION MANAGEMENT     Bebe Alfonso 78 year old female is on warfarin with therapeutic INR result. (Goal INR 2.0-3.0)    Recent labs: (last 7 days)     03/08/24  0916   INR 2.7*       ASSESSMENT     Source(s): Chart Review  Previous INR was Therapeutic last visit; previously outside of goal range  Medication, diet, health changes since last INR chart reviewed; none identified         PLAN     Unable to reach Bebe today.    No instructions provided. Unable to leave voicemail.    Follow up required to confirm warfarin dose taken and assess for changes    Vandana Garcia RN  Anticoagulation Clinic  3/8/2024

## 2024-03-11 ENCOUNTER — THERAPY VISIT (OUTPATIENT)
Dept: SLEEP MEDICINE | Facility: CLINIC | Age: 79
End: 2024-03-11
Payer: COMMERCIAL

## 2024-03-11 DIAGNOSIS — I69.30 H/O: STROKE WITH RESIDUAL EFFECTS: ICD-10-CM

## 2024-03-11 DIAGNOSIS — R06.83 SNORING: ICD-10-CM

## 2024-03-11 PROCEDURE — 95810 POLYSOM 6/> YRS 4/> PARAM: CPT | Performed by: INTERNAL MEDICINE

## 2024-03-11 ASSESSMENT — SLEEP AND FATIGUE QUESTIONNAIRES
HOW LIKELY ARE YOU TO NOD OFF OR FALL ASLEEP IN A CAR, WHILE STOPPED FOR A FEW MINUTES IN TRAFFIC: SLIGHT CHANCE OF DOZING
HOW LIKELY ARE YOU TO NOD OFF OR FALL ASLEEP WHILE WATCHING TV: MODERATE CHANCE OF DOZING
HOW LIKELY ARE YOU TO NOD OFF OR FALL ASLEEP WHILE SITTING QUIETLY AFTER LUNCH WITHOUT ALCOHOL: SLIGHT CHANCE OF DOZING
HOW LIKELY ARE YOU TO NOD OFF OR FALL ASLEEP WHILE SITTING AND READING: SLIGHT CHANCE OF DOZING
HOW LIKELY ARE YOU TO NOD OFF OR FALL ASLEEP WHILE SITTING AND TALKING TO SOMEONE: WOULD NEVER DOZE
HOW LIKELY ARE YOU TO NOD OFF OR FALL ASLEEP WHILE LYING DOWN TO REST IN THE AFTERNOON WHEN CIRCUMSTANCES PERMIT: MODERATE CHANCE OF DOZING
HOW LIKELY ARE YOU TO NOD OFF OR FALL ASLEEP WHILE SITTING INACTIVE IN A PUBLIC PLACE: WOULD NEVER DOZE
HOW LIKELY ARE YOU TO NOD OFF OR FALL ASLEEP WHEN YOU ARE A PASSENGER IN A CAR FOR AN HOUR WITHOUT A BREAK: SLIGHT CHANCE OF DOZING

## 2024-03-15 ENCOUNTER — LAB (OUTPATIENT)
Dept: LAB | Facility: CLINIC | Age: 79
End: 2024-03-15
Payer: COMMERCIAL

## 2024-03-15 ENCOUNTER — ANTICOAGULATION THERAPY VISIT (OUTPATIENT)
Dept: ANTICOAGULATION | Facility: CLINIC | Age: 79
End: 2024-03-15

## 2024-03-15 DIAGNOSIS — Z79.01 ANTICOAGULATION MONITORING, INR RANGE 2-3: Chronic | ICD-10-CM

## 2024-03-15 DIAGNOSIS — Z86.73 HISTORY OF CVA (CEREBROVASCULAR ACCIDENT): ICD-10-CM

## 2024-03-15 DIAGNOSIS — I63.20 CEREBROVASCULAR ACCIDENT (CVA) DUE TO OCCLUSION OF PRECEREBRAL ARTERY (H): ICD-10-CM

## 2024-03-15 DIAGNOSIS — Z79.01 LONG TERM CURRENT USE OF ANTICOAGULANT THERAPY: Primary | ICD-10-CM

## 2024-03-15 DIAGNOSIS — Z79.01 LONG TERM CURRENT USE OF ANTICOAGULANT THERAPY: ICD-10-CM

## 2024-03-15 DIAGNOSIS — I63.20 CEREBROVASCULAR ACCIDENT (CVA) DUE TO OCCLUSION OF PRECEREBRAL ARTERY (H): Chronic | ICD-10-CM

## 2024-03-15 DIAGNOSIS — Z79.01 ANTICOAGULATION MONITORING, INR RANGE 2-3: ICD-10-CM

## 2024-03-15 LAB — INR BLD: 2.8 (ref 0.9–1.1)

## 2024-03-15 PROCEDURE — 36416 COLLJ CAPILLARY BLOOD SPEC: CPT

## 2024-03-15 PROCEDURE — 85610 PROTHROMBIN TIME: CPT

## 2024-03-15 NOTE — PROGRESS NOTES
ANTICOAGULATION MANAGEMENT     Bebe SHELDON Kaden 78 year old female is on warfarin with therapeutic INR result. (Goal INR 2.0-3.0)    Recent labs: (last 7 days)     03/15/24  1417   INR 2.8*       ASSESSMENT     Source(s): Chart Review and Patient/Caregiver Call     Warfarin doses taken: Warfarin taken as instructed  Diet: No new diet changes identified  Medication/supplement changes: None noted  New illness, injury, or hospitalization: No  Signs or symptoms of bleeding or clotting: No  Previous result: Therapeutic last 2(+) visits  Additional findings: None       PLAN     Recommended plan for no diet, medication or health factor changes affecting INR     Dosing Instructions: Continue your current warfarin dose with next INR in 2 weeks       Summary  As of 3/15/2024      Full warfarin instructions:  7.5 mg every Mon; 5 mg all other days   Next INR check:  3/29/2024               Telephone call with Bebe who verbalizes understanding and agrees to plan and who agrees to plan and repeated back plan correctly    Lab visit scheduled    Education provided:   None required    Plan made per [facility] anticoagulation protocol    Veronika Weber, RN  Anticoagulation Clinic  3/15/2024    _______________________________________________________________________     Anticoagulation Episode Summary       Current INR goal:  2.0-3.0   TTR:  50.5% (1 y)   Target end date:  Indefinite   Send INR reminders to:  [facility]    Indications    History of CVA (cerebrovascular accident) (Resolved) [Z86.73]  Long term current use of anticoagulant therapy [Z79.01]  Cerebrovascular accident (CVA) due to occlusion of precerebral artery (H) [I63.20]  History of CVA (cerebrovascular accident) [Z86.73]  Anticoagulation monitoring  INR range 2-3 [Z79.01]             Comments:               Anticoagulation Care Providers       Provider Role Specialty Phone number    Diana Ely APRN [facility] Referring Family Medicine [phone]

## 2024-03-22 DIAGNOSIS — N18.4 CKD (CHRONIC KIDNEY DISEASE) STAGE 4, GFR 15-29 ML/MIN (H): Primary | ICD-10-CM

## 2024-03-29 ENCOUNTER — ANTICOAGULATION THERAPY VISIT (OUTPATIENT)
Dept: ANTICOAGULATION | Facility: CLINIC | Age: 79
End: 2024-03-29

## 2024-03-29 ENCOUNTER — LAB (OUTPATIENT)
Dept: LAB | Facility: CLINIC | Age: 79
End: 2024-03-29
Payer: COMMERCIAL

## 2024-03-29 DIAGNOSIS — I63.20 CEREBROVASCULAR ACCIDENT (CVA) DUE TO OCCLUSION OF PRECEREBRAL ARTERY (H): Chronic | ICD-10-CM

## 2024-03-29 DIAGNOSIS — Z79.01 ANTICOAGULATION MONITORING, INR RANGE 2-3: Chronic | ICD-10-CM

## 2024-03-29 DIAGNOSIS — Z79.01 ANTICOAGULATION MONITORING, INR RANGE 2-3: ICD-10-CM

## 2024-03-29 DIAGNOSIS — Z86.73 HISTORY OF CVA (CEREBROVASCULAR ACCIDENT): ICD-10-CM

## 2024-03-29 DIAGNOSIS — Z79.01 LONG TERM CURRENT USE OF ANTICOAGULANT THERAPY: ICD-10-CM

## 2024-03-29 DIAGNOSIS — Z79.01 LONG TERM CURRENT USE OF ANTICOAGULANT THERAPY: Primary | ICD-10-CM

## 2024-03-29 DIAGNOSIS — I63.20 CEREBROVASCULAR ACCIDENT (CVA) DUE TO OCCLUSION OF PRECEREBRAL ARTERY (H): ICD-10-CM

## 2024-03-29 LAB — INR BLD: 2.5 (ref 0.9–1.1)

## 2024-03-29 PROCEDURE — 85610 PROTHROMBIN TIME: CPT

## 2024-03-29 PROCEDURE — 36416 COLLJ CAPILLARY BLOOD SPEC: CPT

## 2024-03-29 NOTE — PROGRESS NOTES
ANTICOAGULATION MANAGEMENT     Bebe SHELDON Kaden 78 year old female is on warfarin with therapeutic INR result. (Goal INR 2.0-3.0)    Recent labs: (last 7 days)     03/29/24  0927   INR 2.5*       ASSESSMENT     Source(s): Chart Review and Patient/Caregiver Call     Warfarin doses taken: Warfarin taken as instructed  Diet: No new diet changes identified  Medication/supplement changes: None noted  New illness, injury, or hospitalization: No  Signs or symptoms of bleeding or clotting: No  Previous result: Therapeutic last 2(+) visits  Additional findings: None       PLAN     Recommended plan for no diet, medication or health factor changes affecting INR     Dosing Instructions: Continue your current warfarin dose with next INR in 3 weeks       Summary  As of 3/29/2024      Full warfarin instructions:  7.5 mg every Mon; 5 mg all other days   Next INR check:  4/19/2024               Telephone call with Bebe who verbalizes understanding and agrees to plan    Lab visit scheduled    Education provided:   Goal range and lab monitoring: goal range and significance of current result    Plan made per [facility] anticoagulation protocol    Vandana Garcia RN  Anticoagulation Clinic  3/29/2024    _______________________________________________________________________     Anticoagulation Episode Summary       Current INR goal:  2.0-3.0   TTR:  54.3% (1 y)   Target end date:  Indefinite   Send INR reminders to:  [facility]    Indications    History of CVA (cerebrovascular accident) (Resolved) [Z86.73]  Long term current use of anticoagulant therapy [Z79.01]  Cerebrovascular accident (CVA) due to occlusion of precerebral artery (H) [I63.20]  History of CVA (cerebrovascular accident) [Z86.73]  Anticoagulation monitoring  INR range 2-3 [Z79.01]             Comments:               Anticoagulation Care Providers       Provider Role Specialty Phone number    Diana Ely APRN CNP Referring Family Medicine [phone]

## 2024-04-03 LAB — SLPCOMP: NORMAL

## 2024-04-05 NOTE — PROGRESS NOTES
Does Bebe have a CPAP/Bipap?  No       [place] Sleep Scale: 11            Sleep Study Follow-Up Visit:    Date on this visit: 4/8/2024    Bebe Alfonso comes in today for follow-up of her sleep study done on 3/11/24 at the [facility] for possible sleep apnea.    Sleep latency 27 minutes without Ambien.  REM not achieved.   REM latency - minutes.  Sleep efficiency 47.2%. Total sleep time 243 minutes.    Sleep architecture:  Stage 1, 24.3% (5%), stage 2, 43.2% (45-55%), stage 3, 32.5% (15-20%), stage REM, 0% (20-25%).  AHI was 3, without significant desaturations. RDI 3.7.  REM RDI -, consistent with no REM NICHOLAS.  Supine RDI 18, consistent with moderate SUPINE NICHOLAS.  Periodic Limb Movement Index -/hour.           These findings were reviewed with patient.     Past medical/surgical history, family history, social history, medications and allergies were reviewed.      Problem List:  Patient Active Problem List    Diagnosis Date Noted    History of CVA (cerebrovascular accident) 02/10/2022     Priority: Medium    History of cardiac monitoring 09/21/2021     Priority: Medium    Chronic kidney disease, stage 3 (moderate) 04/18/2019     Priority: Medium     IMO Regulatory Load OCT 2020      Renal artery stenosis (H24) 04/18/2019     Priority: Medium    Cerebrovascular accident (CVA) due to occlusion of precerebral artery (H) 12/18/2018     Priority: Medium    Hyperlipidemia LDL goal <70 09/07/2018     Priority: Medium    Long term current use of anticoagulant therapy 03/20/2018     Priority: Medium    Benign essential hypertension 09/15/2016     Priority: Medium    Chronic obstructive pulmonary disease (H) 01/19/2016     Priority: Medium     The FVC, FEV1 and FEV1/FVC ratio are reduced.  The inspiratory flow rates are within normal limits.  The FVC is reduced relative to the SVC indicating air trapping.  While the TLC is within normal limits, the FRC, RV and RV/TLC ratio are increased.  The    diffusing capacity is mildly reduced.  IMPRESSION:  Mild-moderate Airflow Obstruction with air trapping  ____________________________________________Clarke Iverson    April 2018      Anticoagulation monitoring, INR range 2-3 10/22/2015     Priority: Medium    Cerebrovascular disease 10/20/2015     Priority: Medium    ACP (advance care planning) 06/15/2013     Priority: Medium     Overview:   Formatting of this note may be different from the original.  Patient has identified Health Care Agent(s): Yes  Add Health Care Agents: Yes    Health Care Agent(s):  Primary Health Care Agent: Rodriguez cristo  Relationship: spouse Phone: [phone]   Secondary Health Care Agent: Beckie Kearney  Relationship: Dtr Phone: [phone]   Conservator:  Relationship:  Phone:    Guardian: Relationship:  Phone:      Patient has Advance Care Plan Documents (Health Care Directive, POLST): Yes    Advance Care Plan Documents:  Health Care Directive    Patient has identified Specific Treatment Preferences: Yes   Specific Treatment Preferences: per Chart  Full Code , please refer to pts document for tx details      Thyroid nodule 05/22/2013     Priority: Medium     Overview:   Thyroid US (2012) Stable nodule left lobe of thyroid  TSH- (2/19/12) normal (2.26)      Pain medication agreement 03/13/2013     Priority: Medium     Overview:   Per verbal order of provider, controlled substance agreement for Vicodin signed this date by Valentina Burrows LPN by Dr. Avilez's office.      Carotid bruit 11/12/2012     Priority: Medium     Overview:   12/6/10 Carotid US   Elevated velocities throughout the carotid arteries bilaterally. There is a focal area of increased velocity in the mid left internal carotid artery with a plaque in this region on the color flow images. This corresponds to 50 - 70 % diameter reduction. There is an area of elevated velocity in the left external carotid artery consistent with stenosis. There is no  focal area of significant stenosis in the right carotid arteries in the neck. Plaque in the carotid arteries bilaterally.       Tobacco dependence 11/01/2010     Priority: Medium     Overview:   1 PPD for x 33 years, she attempted quitting twice in the past. She states trial of wellbutrin in the past. She failed trial of nicotine and chantix.        Osteoarthrosis, hip 10/19/2010     Priority: Medium     Overview:   Pt scheduled for right  total hip arthroplasty on 6/14/13 with Dr. Addison HEREDIA hip xray (11/2012) Severe degenerative changes seen of the right hip with near bone-on-bone appearance of the joint and several subchondral cysts forming.      Esophageal reflux 04/08/2008     Priority: Medium     Overview:   Omeprazole PRN, occasional symptoms such as chest burning and knife twisted to stomach.           Impression/Plan:    Primary snoring- no hypoxemia, moderate yojana in supine position only. We discussed avoiding supine sleep, sleep positioning aids.   She will follow up with me as needed.     Fifteen minutes spent with patient, all of which were spent face-to-face counseling, consulting, coordinating plan of care.          CC: Diana Ely

## 2024-04-08 ENCOUNTER — OFFICE VISIT (OUTPATIENT)
Dept: SLEEP MEDICINE | Facility: CLINIC | Age: 79
End: 2024-04-08
Payer: COMMERCIAL

## 2024-04-08 VITALS
SYSTOLIC BLOOD PRESSURE: 128 MMHG | DIASTOLIC BLOOD PRESSURE: 54 MMHG | OXYGEN SATURATION: 98 % | BODY MASS INDEX: 19.99 KG/M2 | HEART RATE: 81 BPM | HEIGHT: 65 IN | WEIGHT: 120 LBS

## 2024-04-08 DIAGNOSIS — R06.83 PRIMARY SNORING: Primary | ICD-10-CM

## 2024-04-08 PROCEDURE — 99213 OFFICE O/P EST LOW 20 MIN: CPT | Performed by: PHYSICIAN ASSISTANT

## 2024-04-08 ASSESSMENT — SLEEP AND FATIGUE QUESTIONNAIRES
HOW LIKELY ARE YOU TO NOD OFF OR FALL ASLEEP WHILE LYING DOWN TO REST IN THE AFTERNOON WHEN CIRCUMSTANCES PERMIT: HIGH CHANCE OF DOZING
HOW LIKELY ARE YOU TO NOD OFF OR FALL ASLEEP WHILE SITTING AND TALKING TO SOMEONE: WOULD NEVER DOZE
HOW LIKELY ARE YOU TO NOD OFF OR FALL ASLEEP WHILE SITTING QUIETLY AFTER LUNCH WITHOUT ALCOHOL: SLIGHT CHANCE OF DOZING
HOW LIKELY ARE YOU TO NOD OFF OR FALL ASLEEP WHILE SITTING INACTIVE IN A PUBLIC PLACE: WOULD NEVER DOZE
HOW LIKELY ARE YOU TO NOD OFF OR FALL ASLEEP WHEN YOU ARE A PASSENGER IN A CAR FOR AN HOUR WITHOUT A BREAK: MODERATE CHANCE OF DOZING
HOW LIKELY ARE YOU TO NOD OFF OR FALL ASLEEP WHILE SITTING AND READING: MODERATE CHANCE OF DOZING
HOW LIKELY ARE YOU TO NOD OFF OR FALL ASLEEP IN A CAR, WHILE STOPPED FOR A FEW MINUTES IN TRAFFIC: WOULD NEVER DOZE
HOW LIKELY ARE YOU TO NOD OFF OR FALL ASLEEP WHILE WATCHING TV: HIGH CHANCE OF DOZING

## 2024-04-09 ENCOUNTER — LAB (OUTPATIENT)
Dept: LAB | Facility: CLINIC | Age: 79
End: 2024-04-09
Payer: COMMERCIAL

## 2024-04-09 ENCOUNTER — OFFICE VISIT (OUTPATIENT)
Dept: NEPHROLOGY | Facility: CLINIC | Age: 79
End: 2024-04-09
Attending: NURSE PRACTITIONER
Payer: COMMERCIAL

## 2024-04-09 VITALS
HEART RATE: 82 BPM | OXYGEN SATURATION: 94 % | WEIGHT: 120 LBS | DIASTOLIC BLOOD PRESSURE: 56 MMHG | SYSTOLIC BLOOD PRESSURE: 133 MMHG | BODY MASS INDEX: 19.97 KG/M2

## 2024-04-09 DIAGNOSIS — N18.4 ANEMIA IN STAGE 4 CHRONIC KIDNEY DISEASE (H): Primary | ICD-10-CM

## 2024-04-09 DIAGNOSIS — D63.1 ANEMIA IN STAGE 4 CHRONIC KIDNEY DISEASE (H): ICD-10-CM

## 2024-04-09 DIAGNOSIS — N18.4 CKD (CHRONIC KIDNEY DISEASE) STAGE 4, GFR 15-29 ML/MIN (H): ICD-10-CM

## 2024-04-09 DIAGNOSIS — D63.1 ANEMIA IN STAGE 4 CHRONIC KIDNEY DISEASE (H): Primary | ICD-10-CM

## 2024-04-09 DIAGNOSIS — N18.4 ANEMIA IN STAGE 4 CHRONIC KIDNEY DISEASE (H): ICD-10-CM

## 2024-04-09 LAB
ALBUMIN MFR UR ELPH: 47.8 MG/DL
ALBUMIN SERPL BCG-MCNC: 4.3 G/DL (ref 3.5–5.2)
ALBUMIN UR-MCNC: 30 MG/DL
ANION GAP SERPL CALCULATED.3IONS-SCNC: 12 MMOL/L (ref 7–15)
APPEARANCE UR: CLEAR
BILIRUB UR QL STRIP: NEGATIVE
BUN SERPL-MCNC: 27.9 MG/DL (ref 8–23)
CALCIUM SERPL-MCNC: 9.7 MG/DL (ref 8.8–10.2)
CHLORIDE SERPL-SCNC: 104 MMOL/L (ref 98–107)
COLOR UR AUTO: YELLOW
CREAT SERPL-MCNC: 1.83 MG/DL (ref 0.51–0.95)
CREAT UR-MCNC: 75.9 MG/DL
CREAT UR-MCNC: 77.4 MG/DL
CYSTATIN C (ROCHE): 1.7 MG/L (ref 0.6–1)
DEPRECATED HCO3 PLAS-SCNC: 22 MMOL/L (ref 22–29)
EGFRCR SERPLBLD CKD-EPI 2021: 28 ML/MIN/1.73M2
ERYTHROCYTE [DISTWIDTH] IN BLOOD BY AUTOMATED COUNT: 12.9 % (ref 10–15)
FERRITIN SERPL-MCNC: 17 NG/ML (ref 11–328)
GFR SERPL CREATININE-BSD FRML MDRD: 33 ML/MIN/1.73M2
GLUCOSE SERPL-MCNC: 92 MG/DL (ref 70–99)
GLUCOSE UR STRIP-MCNC: NEGATIVE MG/DL
HCT VFR BLD AUTO: 34.2 % (ref 35–47)
HGB BLD-MCNC: 10.7 G/DL (ref 11.7–15.7)
HGB UR QL STRIP: ABNORMAL
IRON BINDING CAPACITY (ROCHE): 377 UG/DL (ref 240–430)
IRON SATN MFR SERPL: 10 % (ref 15–46)
IRON SERPL-MCNC: 37 UG/DL (ref 37–145)
KETONES UR STRIP-MCNC: NEGATIVE MG/DL
LEUKOCYTE ESTERASE UR QL STRIP: NEGATIVE
MCH RBC QN AUTO: 28.6 PG (ref 26.5–33)
MCHC RBC AUTO-ENTMCNC: 31.3 G/DL (ref 31.5–36.5)
MCV RBC AUTO: 91 FL (ref 78–100)
MICROALBUMIN UR-MCNC: 276 MG/L
MICROALBUMIN/CREAT UR: 363.64 MG/G CR (ref 0–25)
NITRATE UR QL: NEGATIVE
PH UR STRIP: 6.5 [PH] (ref 5–7)
PHOSPHATE SERPL-MCNC: 3.6 MG/DL (ref 2.5–4.5)
PLATELET # BLD AUTO: 270 10E3/UL (ref 150–450)
POTASSIUM SERPL-SCNC: 4.8 MMOL/L (ref 3.4–5.3)
PROT/CREAT 24H UR: 0.62 MG/MG CR (ref 0–0.2)
PTH-INTACT SERPL-MCNC: 42 PG/ML (ref 15–65)
RBC # BLD AUTO: 3.74 10E6/UL (ref 3.8–5.2)
RBC #/AREA URNS AUTO: NORMAL /HPF
SKIP: ABNORMAL
SODIUM SERPL-SCNC: 138 MMOL/L (ref 135–145)
SP GR UR STRIP: 1.01 (ref 1–1.03)
UROBILINOGEN UR STRIP-MCNC: NORMAL MG/DL
VIT D+METAB SERPL-MCNC: 58 NG/ML (ref 20–50)
WBC # BLD AUTO: 7 10E3/UL (ref 4–11)
WBC #/AREA URNS AUTO: NORMAL /HPF

## 2024-04-09 PROCEDURE — 83540 ASSAY OF IRON: CPT

## 2024-04-09 PROCEDURE — 81001 URINALYSIS AUTO W/SCOPE: CPT

## 2024-04-09 PROCEDURE — 82570 ASSAY OF URINE CREATININE: CPT

## 2024-04-09 PROCEDURE — 99205 OFFICE O/P NEW HI 60 MIN: CPT | Performed by: INTERNAL MEDICINE

## 2024-04-09 PROCEDURE — 84156 ASSAY OF PROTEIN URINE: CPT

## 2024-04-09 PROCEDURE — 82728 ASSAY OF FERRITIN: CPT

## 2024-04-09 PROCEDURE — 82610 CYSTATIN C: CPT

## 2024-04-09 PROCEDURE — 85027 COMPLETE CBC AUTOMATED: CPT

## 2024-04-09 PROCEDURE — 80069 RENAL FUNCTION PANEL: CPT

## 2024-04-09 PROCEDURE — 83550 IRON BINDING TEST: CPT

## 2024-04-09 PROCEDURE — 99417 PROLNG OP E/M EACH 15 MIN: CPT | Performed by: INTERNAL MEDICINE

## 2024-04-09 PROCEDURE — 82043 UR ALBUMIN QUANTITATIVE: CPT

## 2024-04-09 PROCEDURE — 82306 VITAMIN D 25 HYDROXY: CPT

## 2024-04-09 PROCEDURE — 36415 COLL VENOUS BLD VENIPUNCTURE: CPT

## 2024-04-09 PROCEDURE — G2211 COMPLEX E/M VISIT ADD ON: HCPCS | Performed by: INTERNAL MEDICINE

## 2024-04-09 PROCEDURE — 83970 ASSAY OF PARATHORMONE: CPT

## 2024-04-09 NOTE — NURSING NOTE
Bebe Alfonso's goals for this visit include:   Chief Complaint   Patient presents with    Consult     Referred by Diana Ely  CKD (chronic kidney disease) stage 4, GFR 15-29 ml/min  stage IV CKD with ongoing HTN  previously seen nephrologist at [facility] - records available in Care Everywhere        She requests these members of her care team be copied on today's visit information: yes     PCP: Diana Ely    Referring Provider:  Diana Ely, APRN CNP  [address] [place], [place] [id number]    /56 (BP Location: Left arm, Patient Position: Chair, Cuff Size: Adult Regular)   Pulse 82   Wt 54.4 kg (120 lb)   LMP 09/15/1998   SpO2 94%   BMI 19.97 kg/m      Do you need any medication refills at today's visit? No       HENRY Carroll   Neph/Pulm [facility]

## 2024-04-09 NOTE — PROGRESS NOTES
I was asked to see this patient by:  Dr. Diana Ely, APRN CNP  [address] [place] [id number]    CC: CKD    HPI:   Thank you for the referral. I had the pleasure today of seeing Bebe Alfonso  She is a 78 year old female  who presents for evaluation of CKD.  She is here today with her .  She seems to have memory impairment and some expressive aphasia as she seems to struggle to find words.    Her medical history significant for severe atherosclerotic disease and she has sustained five strokes in addition to several minor ones.  She reports that the cause of these strokes are unclear though she is on warfarin for anticoagulation. It is not clear to me why she is on anticoagulation.  Her heart rate in clinic today is regular.     She is also known to have hypertension and dyslipidemia.  She reports that her blood pressure recently has been better controlled.  She continues to smoke but she denies any shortness of breath.    She has been losing weight because she is limited on the diet that she can take with warfarin.  Also since she had been diagnosed with CKD, her food intake has declined as she is trying o watch her diet more.  She is actually now underweight with a BMI of 19.  She reports that she intermittently saw pink urine and blood in stool at the same time over the past year.   Her energy is okay.  She denies any skin rash, orthopnea, change in bowel habits, skin rash .    She was seen by an nephrologist at [facility] in December 2023.  Her workup showed a small right kidney 6 cm compared to a 10.5 cm on the left and moderate right renal artery stenosis.  Her workup was negative for monoclonal protein and her ANCA serology was negative.  Her UA had intermittent blood cells and red blood cells and some moderate proteinuria with a urine protein to creatinine ratio of 0.8 g/g.  Her kidney disease was thought to be secondary to atherosclerotic vascular disease and decreased right kidney  function.  She saw vascular surgery at [place] and they decided not to intervene on her right renal artery stenosis    Social hx: She is  and lives with her .  She has few children and children.  She continues to smoke.  She does not drink alcohol.  She used to work as a  and she is now retired.      Allergies   Allergen Reactions    Losartan Swelling, Nausea, Shortness Of Breath and Palpitations    Gabapentin GI Disturbance and Unknown     Double vision  flatulence    Lisinopril Cough    Oxycodone Other (See Comments)     But has tolerated hydrocodone    Tramadol Other (See Comments)    Augmentin [Amoxicillin-Pot Clavulanate] Rash    Bacitracin Rash     blisters    Bupropion Rash     Allergic to brand not generic       Current Outpatient Medications   Medication Sig Dispense Refill    amLODIPine (NORVASC) 5 MG tablet Take 1 tablet by mouth once daily 90 tablet 1    Aspirin (VAZALORE) 81 MG CAPS Please choose reason not prescribed from choices below.      atorvastatin (LIPITOR) 80 MG tablet Take 1 tablet (80 mg) by mouth daily 90 tablet 1    enalapril (VASOTEC) 20 MG tablet Take 1 tablet (20 mg) by mouth daily 90 tablet 0    famotidine (PEPCID) 40 MG tablet Take 1 tablet (40 mg) by mouth daily 90 tablet 1    FLUoxetine (PROZAC) 20 MG capsule Take 1 capsule by mouth once daily 90 capsule 2    warfarin ANTICOAGULANT (COUMADIN) 5 MG tablet Take 7.5 mg Mon, Wed, Fri and 5 mg all other days, or as directed by the [facility]mad[facility] Clinic. 105 tablet 1    fluticasone (FLONASE) 50 MCG/ACT nasal spray Spray 1 spray into both nostrils daily (Patient not taking: Reported on 3/1/2024) 16 g 1     No current facility-administered medications for this visit.       Past Medical History:   Diagnosis Date    Anticoagulation monitoring, INR range 2-3 10/22/2015    Benign essential hypertension 9/15/2016    Carotid bruit 11/12/2012    Overview:  12/6/10 Carotid US  Elevated velocities throughout the carotid arteries  bilaterally. There is a focal area of increased velocity in the mid left internal carotid artery with a plaque in this region on the color flow images. This corresponds to 50 - 70 % diameter reduction. There is an area of elevated velocity in the left external carotid artery consistent with stenosis. There is no focal area of significant stenosis in the right carotid arteries in the neck. Plaque in the carotid arteries bilaterally.     Chronic obstructive pulmonary disease (H) 1/19/2016    The FVC, FEV1 and FEV1/FVC ratio are reduced.  The inspiratory flow rates are within normal limits.  The FVC is reduced relative to the SVC indicating air trapping.  While the TLC is within normal limits, the FRC, RV and RV/TLC ratio are increased.  The  diffusing capacity is mildly reduced. IMPRESSION: Mild-moderate Airflow Obstruction with air trapping ____________________________________________M.DClarke Orellana  April 2018    Esophageal reflux 4/8/2008    Overview:  Omeprazole PRN, occasional symptoms such as chest burning and knife twisted to stomach.     Heart disease born with it    History of blood transfusion 70 years ago    History of CVA (cerebrovascular accident) 10/20/2015    Long term current use of anticoagulant therapy 3/20/2018    Major depressive disorder, recurrent episode, moderate (H) 10/29/2008    Overview:  She is not taking any medication at this time.    Osteoarthrosis, hip 10/19/2010    Overview:  Pt scheduled for right  total hip arthroplasty on 6/14/13 with Dr. Addison HEREDIA hip xray (11/2012) Severe degenerative changes seen of the right hip with near bone-on-bone appearance of the joint and several subchondral cysts forming.    Thyroid nodule 5/22/2013    Overview:  Thyroid US (2012) Stable nodule left lobe of thyroid TSH- (2/19/12) normal (2.26)       Past Surgical History:   Procedure Laterality Date    BIOPSY  appox in 1980's    BREAST SURGERY  last time in the 1990's    non cancer    COLONOSCOPY N/A  9/7/2022    Procedure: COLONOSCOPY, WITH POLYPECTOMY AND BIOPSY;  Surgeon: Alfredito Gomez DO;  Location: [facility]    ESOPHAGOSCOPY, GASTROSCOPY, DUODENOSCOPY (EGD), COMBINED N/A 5/25/2022    Procedure: ESOPHAGOGASTRODUODENOSCOPY, WITH BIOPSY;  Surgeon: Alfredito Gomez DO;  Location: [facility]       Social History     Tobacco Use    Smoking status: Every Day     Packs/day: 1.00     Years: 50.00     Additional pack years: 0.00     Total pack years: 50.00     Types: Cigarettes     Start date: 1/1/2016    Smokeless tobacco: Never   Vaping Use    Vaping Use: Never used   Substance Use Topics    Alcohol use: No    Drug use: Never       Family History   Problem Relation Age of Onset    Diabetes Maternal Grandfather     Diabetes Sister         developed after cancer treatment    Breast Cancer Sister     Obesity Sister     Hypertension Mother     Hyperlipidemia Mother     Osteoporosis Mother     Breast Cancer Sister     Osteoporosis Sister     Breast Cancer Daughter     Other Cancer Sister         throught out body    Substance Abuse Sister         acol    Obesity Sister     Substance Abuse Sister         acol    Obesity Sister        ROS: A 12 system review of systems was negative other than noted here or above.     Exam:  /56 (BP Location: Left arm, Patient Position: Chair, Cuff Size: Adult Regular)   Pulse 82   Wt 54.4 kg (120 lb)   LMP 09/15/1998   SpO2 94%   BMI 19.97 kg/m    BP Readings from Last 6 Encounters:   04/09/24 133/56   04/08/24 128/54   03/01/24 (!) 149/57   03/01/24 (!) 85/37   02/09/24 129/74   12/27/23 132/60     Wt Readings from Last 4 Encounters:   04/09/24 54.4 kg (120 lb)   04/08/24 54.4 kg (120 lb)   03/01/24 53.1 kg (117 lb)   03/01/24 53.3 kg (117 lb 9.6 oz)     GENERAL APPEARANCE: alert and no distress  EYES: PERRL  HENT: mouth without ulcers or lesions  NECK: supple, no adenopathy, bilateral carotid bruit more sharp on the left  RESP: lungs clear to auscultation - no rales,  rhonchi or wheezes  CV: regular rhythm, normal rate, no rub   ABDOMEN:  soft, nontender, no HSM or masses and bowel sounds normal  MS: extremities normal- no gross deformities noted, no evidence of inflammation in joints, no muscle tenderness  SKIN: no rash  NEURO: Normal strength and tone, sensory exam grossly normal, mentation intact and speech normal  PSYCH: mentation appears normal. and affect normal/bright  No Le edema  Bilateral femoral bruit    Results: Details with the patient and her     Assessment/Plan:   Problem #1 chronic kidney disease:  Previous workup by Dr. Granger at [facility] showed a small right kidney 6 cm compared to a 10.5 cm on the left and moderate right renal artery stenosis.  Her workup was negative for monoclonal protein and her ANCA serology was negative.  Her UA had intermittent blood cells and red blood cells and some moderate proteinuria with a urine protein to creatinine ratio of 0.8 g/g.  Her kidney disease was thought to be secondary to atherosclerotic vascular disease and decreased right kidney function.  She saw vascular surgery at [place] and they decided not to intervene on her right renal artery stenosis, especially that her blood pressure is currently well-controlled.  Her current creatinine is at 1.8 mg/dL with a concomitant GFR of 28 mL/min.  We discussed today the natural progression of chronic kidney disease and the ways to hold the progression of chronic kidney disease including maintaining good blood pressure control, ideal body weight and a low-salt diet I also emphasized the dangers of smoking on the progression of atherosclerosis and chronic kidney disease in her case.  - She is rather underweight with a small muscle mass and I will check a Cystatin C on her  - She is already on RAAS blockade  - I will start her on Jardiance 25 mg daily     Problem #2 severe peripheral vascular disease: She has significant atherosclerotic vascular disease.  She has bilateral  carotid bruit, several strokes over the years, mild bilateral femoral therapy.  Unfortunately she continues to smoke and I emphasized the importance of stopping smoking.  -She is on aspirin and warfarin    Problem #3 current smoking: I had a long discussion today about dangers effect of smoking regarding worsening her cardiovascular risk as well as chronic kidney disease.  I strongly suggested that she need to stop smoking    Problem #4 hypertension: Her blood pressure currently is at goal.  No changes were made to her medications.    Problem #5 anticoagulation: It is unclear to me why she is on anticoagulation at this point.  Cardiology note from 2015 by Dr. Alfredo also questions the nature of the stroke whether embolic or not and whether she need to be on anticoagulation or any novel anticoagulant.  Looks like she did have a loop recorder but is not clear to me at this point whether she really had A-fib or not.  This is important to determine because being on warfarin actually has limited her food intake and she is currently underweight.    Problem #6 underweight: Since her diagnosis with chronic kidney disease, her food intake has been intentionally declining.  She is also restricted on her food intake with warfarin.  We had a long discussion today about the healthy diet for the chronic kidney disease.  Ideally we prefer on a plant-based diet with and plant proteins are preferable than animal protein.  I advised her to increase her portions since she is underweight.  I also suggested 5 small meals instead of 3 if she is having early satiety or frequent snacking. I suggested to refer her to the dietitian given her dietary restrictions with the warfarin.  I also advised her to check with her neurologist to see if she can be on apixaban instead of warfarin.    Problem #7 anemia: Likely secondary to chronic kidney disease.  Her hemoglobin is currently at goal.  We will check iron studies    Problem #8 CKD MBD: Her  calcium, phosphorus, PTH and vitamin D are within normal limits    The total time of this encounter amounted to 84 minutes on the day of the encounter 4/9/2024. This time included face to face time spent with the patient, preparatory work and chart review, ordering tests, and performing post visit documentation.    The longitudinal plan of care for this patient was addressed during this visit. Due to the added complexity in care, I will continue to support the patient with subsequent management of this condition(s) and with the ongoing continuity of care of this condition(s).     *This dictation was prepared in part using Dragon recognition software.  As a result errors may occur. When identified these transcription errors have been corrected.  While every attempt is made to correct errors during dictation, errors may still exist.     Rafael Oswald MD   [facility]   Department of Medicine   Division of Renal Disease and Hypertension

## 2024-04-09 NOTE — PATIENT INSTRUCTIONS
It was a pleasure taking care of you today.  I've included a brief summary of our discussion and care plan from today's visit below.  Please review this information with your primary care provider.     My recommendations are summarized as follows:  -You can increase the portions of your diet. Remember to eat healthy with a diet more focused on vegetables.  -If eating plants is a problem with warfarin, check with your stroke doctor if you can change to another medication called abixapan. Also we can refer you to the dietician to help you with diet choices  -will start you on a new medication to protect your kidneys called jardiance.  -Cut down on smoking    Who do I call with any questions after my visit?  Please be in touch if there are any further questions that arise following today's visit.  There are multiple ways to contact your nephrology care team.       During business hours, you may reach your Nephrology Care Team or schedule or reschedule an appointment or lab at [phone].       If you need to schedule imaging, please call [phone].   To schedule a COVID test, please call [phone].     You can always send a secure message through [employer]. [employer] messages are answered by your nurse or doctor typically within 24-48 hours. Please allow extra time on weekends and holidays.       For urgent/emergent questions after business hours, you may reach the on-call Nephrology Fellow by contacting the [facility]  at [phone].     How will I get the results of any tests ordered?    You will receive all of your results.  If you have signed up for [employer], any tests ordered at your visit will be available to you once resulted on [facility]. Typically the physician reviews them and may or may not make further recommendations. If there are urgent results that require a change in your care plan, your physician or nurse will call you to discuss the next steps. If you are not on MyChart, a  letter may be generated and mailed to you with your results.

## 2024-04-11 ENCOUNTER — PATIENT OUTREACH (OUTPATIENT)
Dept: CARE COORDINATION | Facility: CLINIC | Age: 79
End: 2024-04-11
Payer: COMMERCIAL

## 2024-04-11 DIAGNOSIS — N18.32 STAGE 3B CHRONIC KIDNEY DISEASE (H): Primary | Chronic | ICD-10-CM

## 2024-04-11 DIAGNOSIS — N18.32 ANEMIA OF CHRONIC RENAL FAILURE, STAGE 3B (H): ICD-10-CM

## 2024-04-11 DIAGNOSIS — D63.1 ANEMIA OF CHRONIC RENAL FAILURE, STAGE 3B (H): ICD-10-CM

## 2024-04-11 RX ORDER — HEPARIN SODIUM (PORCINE) LOCK FLUSH IV SOLN 100 UNIT/ML 100 UNIT/ML
5 SOLUTION INTRAVENOUS
Status: CANCELLED | OUTPATIENT
Start: 2024-04-11

## 2024-04-11 RX ORDER — DIPHENHYDRAMINE HYDROCHLORIDE 50 MG/ML
50 INJECTION INTRAMUSCULAR; INTRAVENOUS
Status: CANCELLED
Start: 2024-04-11

## 2024-04-11 RX ORDER — EPINEPHRINE 1 MG/ML
0.3 INJECTION, SOLUTION, CONCENTRATE INTRAVENOUS EVERY 5 MIN PRN
Status: CANCELLED | OUTPATIENT
Start: 2024-04-11

## 2024-04-11 RX ORDER — METHYLPREDNISOLONE SODIUM SUCCINATE 125 MG/2ML
125 INJECTION, POWDER, LYOPHILIZED, FOR SOLUTION INTRAMUSCULAR; INTRAVENOUS
Status: CANCELLED
Start: 2024-04-11

## 2024-04-11 RX ORDER — ALBUTEROL SULFATE 0.83 MG/ML
2.5 SOLUTION RESPIRATORY (INHALATION)
Status: CANCELLED | OUTPATIENT
Start: 2024-04-11

## 2024-04-11 RX ORDER — HEPARIN SODIUM,PORCINE 10 UNIT/ML
5-20 VIAL (ML) INTRAVENOUS DAILY PRN
Status: CANCELLED | OUTPATIENT
Start: 2024-04-11

## 2024-04-11 RX ORDER — MEPERIDINE HYDROCHLORIDE 25 MG/ML
25 INJECTION INTRAMUSCULAR; INTRAVENOUS; SUBCUTANEOUS EVERY 30 MIN PRN
Status: CANCELLED | OUTPATIENT
Start: 2024-04-11

## 2024-04-11 RX ORDER — ALBUTEROL SULFATE 90 UG/1
1-2 AEROSOL, METERED RESPIRATORY (INHALATION)
Status: CANCELLED
Start: 2024-04-11

## 2024-04-11 NOTE — PROGRESS NOTES
Anemia Management Note - Enrollment    SUBJECTIVE/OBJECTIVE:    Referred by Dr. Rafael Oswald on 2024  Primary Diagnosis: Anemia in Chronic Kidney Disease (N18.3, D63.1)   3b  Secondary Diagnosis: Chronic Kidney Disease, Stage 3 (N18.3)  3b    Hgb goal range: 9-10  Epo/Darbo: TBD.  Hgb >10.0.  TSAT must be 20% or > before Insurance will apporve ALMA.   Iron regimen: Treat with IV Iron.   Labs : 2025  RX/TX plans : TBD    *[facility]; [phone], opt 2 for infusion appt    Recent ALMA use, transfusion, IV iron: NA  No MI and blood clots or cancers. Hx of CVA, HTN, and Anticoagulated.     Contact:No Consent to Communicate on File.         Latest Ref Rng & Units 5/3/2022 2022 10/25/2023 3/1/2024 2024   Anemia   Hemoglobin 11.7 - 15.7 g/dL 12.9  12.0  11.7  9.3     6.3  10.7    TSAT 15 - 46 %     10    Ferritin 11 - 328 ng/mL     17      BP Readings from Last 3 Encounters:   24 133/56   24 128/54   24 (!) 149/57     Wt Readings from Last 2 Encounters:   24 54.4 kg (120 lb)   24 54.4 kg (120 lb)     Current Outpatient Medications   Medication Sig Dispense Refill    amLODIPine (NORVASC) 5 MG tablet Take 1 tablet by mouth once daily 90 tablet 1    Aspirin (VAZALORE) 81 MG CAPS Please choose reason not prescribed from choices below.      atorvastatin (LIPITOR) 80 MG tablet Take 1 tablet (80 mg) by mouth daily 90 tablet 1    empagliflozin (JARDIANCE) 25 MG TABS tablet Take 1 tablet (25 mg) by mouth daily 90 tablet 3    enalapril (VASOTEC) 20 MG tablet Take 1 tablet (20 mg) by mouth daily 90 tablet 0    famotidine (PEPCID) 40 MG tablet Take 1 tablet (40 mg) by mouth daily 90 tablet 1    FLUoxetine (PROZAC) 20 MG capsule Take 1 capsule by mouth once daily 90 capsule 2    fluticasone (FLONASE) 50 MCG/ACT nasal spray Spray 1 spray into both nostrils daily (Patient not taking: Reported on 3/1/2024) 16 g 1    warfarin ANTICOAGULANT (COUMADIN) 5 MG tablet  Take 7.5 mg Mon, Wed, Fri and 5 mg all other days, or as directed by the [facility]. 105 tablet 1       ASSESSMENT:  Hgb At goal   Ferritin: Not at goal of (>100ng/mL)  TSat: not at goal of >30%  Iron regimen recommended: Treat with IV Iron.   Recommended ALMA regimen: Hgb >10.0.  TSAT must be 20% or > before Insurance will apporve ALMA.   Blood Pressure: Hx of HTN. Stable         PLAN:  1. Attempted to call [employer] today for enrollment in Anemia Management Service. No Answer, unable to LVM.   Sent [employer] message.   2. Need to discuss: anemia overview, monitoring service, and goal hemoglobin range and rationale and risks of ALMA blood clots, stroke, and increase in blood pressure  3. Dose location: in clinic [facility]  4. Labs: [place]  5. Pharmacy: NA      Hgb >10.0.  TSAT must be 20% or > before Insurance will apporve ALMA.   Treat with IV Iron. Attempted to call [employer] for enrollment in Anemia Management Service. No Answer, unable to LVM.   Sent [employer] message.     Next call date:  4/17/24    Jennifer Weber RN   Anemia Services  [facility]  [email or web address]   Office : [phone]  Fax: [phone]

## 2024-04-17 ENCOUNTER — PATIENT OUTREACH (OUTPATIENT)
Dept: CARE COORDINATION | Facility: CLINIC | Age: 79
End: 2024-04-17
Payer: COMMERCIAL

## 2024-04-17 NOTE — PROGRESS NOTES
Anemia Management Note - Reminder     Follow-up with anemia management service:    New Pt to Anemia Services.  Attempted to reach pt by phone and sent a [employer] message on 4/11/24.  [employer] message has not been read.   Called Pt again today.  No answer, unable to LVM.         Latest Ref Rng & Units 5/3/2022 9/28/2022 10/25/2023 3/1/2024 4/9/2024   Anemia   Hemoglobin 11.7 - 15.7 g/dL 12.9  12.0  11.7  9.3     6.3  10.7    TSAT 15 - 46 %     10    Ferritin 11 - 328 ng/mL     17            Follow-up call date: 4/24/24    Jennifer Weber RN   Anemia Services  [facility]  [email or web address]   Office : [phone]  Fax: [phone]

## 2024-04-19 ENCOUNTER — TELEPHONE (OUTPATIENT)
Dept: FAMILY MEDICINE | Facility: CLINIC | Age: 79
End: 2024-04-19

## 2024-04-19 ENCOUNTER — LAB (OUTPATIENT)
Dept: LAB | Facility: CLINIC | Age: 79
End: 2024-04-19
Payer: COMMERCIAL

## 2024-04-19 ENCOUNTER — ANTICOAGULATION THERAPY VISIT (OUTPATIENT)
Dept: ANTICOAGULATION | Facility: CLINIC | Age: 79
End: 2024-04-19

## 2024-04-19 DIAGNOSIS — Z79.01 LONG TERM CURRENT USE OF ANTICOAGULANT THERAPY: ICD-10-CM

## 2024-04-19 DIAGNOSIS — I63.20 CEREBROVASCULAR ACCIDENT (CVA) DUE TO OCCLUSION OF PRECEREBRAL ARTERY (H): Chronic | ICD-10-CM

## 2024-04-19 DIAGNOSIS — Z86.73 HISTORY OF CVA (CEREBROVASCULAR ACCIDENT): ICD-10-CM

## 2024-04-19 DIAGNOSIS — I63.20 CEREBROVASCULAR ACCIDENT (CVA) DUE TO OCCLUSION OF PRECEREBRAL ARTERY (H): ICD-10-CM

## 2024-04-19 DIAGNOSIS — Z79.01 ANTICOAGULATION MONITORING, INR RANGE 2-3: ICD-10-CM

## 2024-04-19 DIAGNOSIS — Z79.01 LONG TERM CURRENT USE OF ANTICOAGULANT THERAPY: Primary | ICD-10-CM

## 2024-04-19 DIAGNOSIS — Z79.01 ANTICOAGULATION MONITORING, INR RANGE 2-3: Chronic | ICD-10-CM

## 2024-04-19 LAB — INR BLD: 2.7 (ref 0.9–1.1)

## 2024-04-19 PROCEDURE — 85610 PROTHROMBIN TIME: CPT

## 2024-04-19 PROCEDURE — 36416 COLLJ CAPILLARY BLOOD SPEC: CPT

## 2024-04-19 NOTE — TELEPHONE ENCOUNTER
"Daughter Valentine called stating she has concerns she wanted to discuss with Diana Ely NP regarding her mother's weight.     No C2C on file to communication with Valentine, stated \"she must have removed C2C as I had it at one point but no she will not give you verbal consent to speak with me\".    States she took her clothes shopping and patient has dropped a size smaller.     Wt Readings from Last 5 Encounters:   24 54.4 kg (120 lb)   24 54.4 kg (120 lb)   24 53.1 kg (117 lb)   24 53.3 kg (117 lb 9.6 oz)   24 55.3 kg (122 lb)      Encouraged daughter to join patient at her next appointment to discuss concerns, states her mother will not let her go and \"she can just start planning her  then\".     Informed Valentine I will forward her message to Diana Ely NP to review upon her return next week.     Julie Behrendt RN    "

## 2024-04-19 NOTE — PROGRESS NOTES
ANTICOAGULATION MANAGEMENT     Bebe Alfonso 78 year old female is on warfarin with therapeutic INR result. (Goal INR 2.0-3.0)    Recent labs: (last 7 days)     04/19/24  0917   INR 2.7*       ASSESSMENT     Source(s): Chart Review and Patient/Caregiver Call     Warfarin doses taken: Warfarin taken as instructed  Diet: No new diet changes identified  Medication/supplement changes:  jardiance prescribed but patient will not be taking due to cost  New illness, injury, or hospitalization: No  Signs or symptoms of bleeding or clotting: No  Previous result: Therapeutic last 2(+) visits  Additional findings:  MCM sent with vit K info and patient told to check this when she has time       PLAN     Recommended plan for no diet, medication or health factor changes affecting INR     Dosing Instructions: Continue your current warfarin dose with next INR in 4 weeks       Summary  As of 4/19/2024      Full warfarin instructions:  7.5 mg every Mon; 5 mg all other days   Next INR check:  5/17/2024               Telephone call with Bebe who verbalizes understanding and agrees to plan    Lab visit scheduled    Education provided:   Please call back if any changes to your diet, medications or how you've been taking warfarin  Dietary considerations: importance of consistent vitamin K intake, impact of vitamin K foods on INR, and vitamin K content of foods  Contact [phone]  with any changes, questions or concerns.     Plan made per [facility] anticoagulation protocol    Natalie Sorto RN  Anticoagulation Clinic  4/19/2024    _______________________________________________________________________     Anticoagulation Episode Summary       Current INR goal:  2.0-3.0   TTR:  58.5% (1 y)   Target end date:  Indefinite   Send INR reminders to:  [facility]    Indications    History of CVA (cerebrovascular accident) (Resolved) [Z86.73]  Long term current use of anticoagulant therapy [Z79.01]  Cerebrovascular accident (CVA) due  to occlusion of precerebral artery (H) [I63.20]  History of CVA (cerebrovascular accident) [Z86.73]  Anticoagulation monitoring  INR range 2-3 [Z79.01]             Comments:               Anticoagulation Care Providers       Provider Role Specialty Phone number    Diana Ely APRN [facility] Referring Family Medicine [phone]

## 2024-05-01 ENCOUNTER — TELEPHONE (OUTPATIENT)
Dept: VASCULAR SURGERY | Facility: CLINIC | Age: 79
End: 2024-05-01
Payer: COMMERCIAL

## 2024-05-01 DIAGNOSIS — J44.1 COPD EXACERBATION (H): ICD-10-CM

## 2024-05-01 RX ORDER — AMLODIPINE BESYLATE 5 MG/1
TABLET ORAL
Qty: 90 TABLET | Refills: 0 | Status: SHIPPED | OUTPATIENT
Start: 2024-05-01 | End: 2024-08-05

## 2024-05-01 NOTE — TELEPHONE ENCOUNTER
Attempt#1- busy tone. Calling to schedule US carotid/FAMILIA and 1yr follow up with Veronika in August. [place] pt. [phone]    ________________________________  Sharlene Grant, RN  [facility]  Needs 1 year follow-up with Veronika Daley and us carotid and abis

## 2024-05-01 NOTE — TELEPHONE ENCOUNTER
Routing refill request to provider for review/approval because:  Labs out of range:    Creatinine   Date Value Ref Range Status   04/09/2024 1.83 (H) 0.51 - 0.95 mg/dL Final   06/10/2021 1.49 (H) 0.52 - 1.04 mg/dL Final     Last Written Prescription Date:  10/12/23  Last Fill Quantity: 90,  # refills: 1   Last office visit: 11/16/2023 ; last virtual visit: 7/15/2022 with prescribing provider:     Future Office Visit:  none    Julie Behrendt RN

## 2024-05-08 ENCOUNTER — PATIENT OUTREACH (OUTPATIENT)
Dept: CARE COORDINATION | Facility: CLINIC | Age: 79
End: 2024-05-08
Payer: COMMERCIAL

## 2024-05-08 NOTE — PROGRESS NOTES
Anemia Management Note - Reminder     Follow-up with anemia management service:    New Pt to Anemia Services. Attempted to reach pt by phone on 4/11 & 4/17. Sent a [employer] message on 4/11/24. [employer] message has not been read.   3rd attempt to reach pt:  Called Pt again today. No answer, unable to LVM.      Bebe needs IV Iron.   Orders have been entered.         Latest Ref Rng & Units 5/3/2022 9/28/2022 10/25/2023 3/1/2024 4/9/2024   Anemia   Hemoglobin 11.7 - 15.7 g/dL 12.9  12.0  11.7  9.3     6.3  10.7    TSAT 15 - 46 %     10    Ferritin 11 - 328 ng/mL     17            Follow-up call date: 5/22/24    Jennifer Weber RN   Anemia Services  [facility]  [email or web address]   Office : [phone]  Fax: [phone]

## 2024-05-12 DIAGNOSIS — F41.1 GENERALIZED ANXIETY DISORDER: ICD-10-CM

## 2024-05-13 DIAGNOSIS — I10 BENIGN ESSENTIAL HYPERTENSION: ICD-10-CM

## 2024-05-13 DIAGNOSIS — K21.00 GASTROESOPHAGEAL REFLUX DISEASE WITH ESOPHAGITIS WITHOUT HEMORRHAGE: ICD-10-CM

## 2024-05-14 RX ORDER — FAMOTIDINE 40 MG/1
40 TABLET, FILM COATED ORAL DAILY
Qty: 90 TABLET | Refills: 0 | Status: SHIPPED | OUTPATIENT
Start: 2024-05-14 | End: 2024-08-05

## 2024-05-14 RX ORDER — ENALAPRIL MALEATE 20 MG/1
20 TABLET ORAL DAILY
Qty: 90 TABLET | Refills: 0 | Status: SHIPPED | OUTPATIENT
Start: 2024-05-14 | End: 2024-08-05

## 2024-05-17 ENCOUNTER — ANTICOAGULATION THERAPY VISIT (OUTPATIENT)
Dept: ANTICOAGULATION | Facility: CLINIC | Age: 79
End: 2024-05-17

## 2024-05-17 ENCOUNTER — LAB (OUTPATIENT)
Dept: LAB | Facility: CLINIC | Age: 79
End: 2024-05-17
Payer: COMMERCIAL

## 2024-05-17 DIAGNOSIS — Z79.01 LONG TERM CURRENT USE OF ANTICOAGULANT THERAPY: ICD-10-CM

## 2024-05-17 DIAGNOSIS — I63.20 CEREBROVASCULAR ACCIDENT (CVA) DUE TO OCCLUSION OF PRECEREBRAL ARTERY (H): Chronic | ICD-10-CM

## 2024-05-17 DIAGNOSIS — Z79.01 LONG TERM CURRENT USE OF ANTICOAGULANT THERAPY: Primary | ICD-10-CM

## 2024-05-17 DIAGNOSIS — Z86.73 HISTORY OF CVA (CEREBROVASCULAR ACCIDENT): ICD-10-CM

## 2024-05-17 DIAGNOSIS — Z79.01 ANTICOAGULATION MONITORING, INR RANGE 2-3: ICD-10-CM

## 2024-05-17 DIAGNOSIS — I63.20 CEREBROVASCULAR ACCIDENT (CVA) DUE TO OCCLUSION OF PRECEREBRAL ARTERY (H): ICD-10-CM

## 2024-05-17 DIAGNOSIS — Z79.01 ANTICOAGULATION MONITORING, INR RANGE 2-3: Chronic | ICD-10-CM

## 2024-05-17 LAB — INR BLD: 3.3 (ref 0.9–1.1)

## 2024-05-17 PROCEDURE — 36416 COLLJ CAPILLARY BLOOD SPEC: CPT

## 2024-05-17 PROCEDURE — 85610 PROTHROMBIN TIME: CPT

## 2024-05-17 NOTE — PROGRESS NOTES
ANTICOAGULATION MANAGEMENT     Bebe Alfonso 78 year old female is on warfarin with supratherapeutic INR result. (Goal INR 2.0-3.0)    Recent labs: (last 7 days)     05/17/24  0910   INR 3.3*       ASSESSMENT     Source(s): Chart Review  Previous INR was Therapeutic last 2(+) visits  Medication, diet, health changes since last INR chart reviewed; none identified         PLAN     Unable to reach Bebe today.    No instructions provided. Unable to leave voicemail.    Follow up required to confirm warfarin dose taken and assess for changes and discuss out of range result     Vandana Garcia RN  Anticoagulation Clinic  5/17/2024

## 2024-05-17 NOTE — PROGRESS NOTES
ANTICOAGULATION MANAGEMENT     Bebe SHELDON Alfonso 78 year old female is on warfarin with supratherapeutic INR result. (Goal INR 2.0-3.0)    Recent labs: (last 7 days)     05/17/24  0910   INR 3.3*       ASSESSMENT     Source(s): Chart Review and Patient/Caregiver Call     Warfarin doses taken: Warfarin taken as instructed  Diet: No new diet changes identified  Medication/supplement changes: None noted  New illness, injury, or hospitalization: No  Signs or symptoms of bleeding or clotting: No  Previous result: Therapeutic last 2(+) visits  Additional findings: None       PLAN     Recommended plan for no diet, medication or health factor changes affecting INR     Dosing Instructions: Continue your current warfarin dose with next INR in 2 weeks       Summary  As of 5/17/2024      Full warfarin instructions:  7.5 mg every Mon; 5 mg all other days   Next INR check:  5/31/2024               Telephone call with Bebe who verbalizes understanding and agrees to plan    Lab visit scheduled    Education provided:   Goal range and lab monitoring: goal range and significance of current result    Plan made per [facility] anticoagulation protocol    Vandana Garcia RN  Anticoagulation Clinic  5/17/2024    _______________________________________________________________________     Anticoagulation Episode Summary       Current INR goal:  2.0-3.0   TTR:  54.6% (1 y)   Target end date:  Indefinite   Send INR reminders to:  [facility]    Indications    History of CVA (cerebrovascular accident) (Resolved) [Z86.73]  Long term current use of anticoagulant therapy [Z79.01]  Cerebrovascular accident (CVA) due to occlusion of precerebral artery (H) [I63.20]  History of CVA (cerebrovascular accident) [Z86.73]  Anticoagulation monitoring  INR range 2-3 [Z79.01]             Comments:               Anticoagulation Care Providers       Provider Role Specialty Phone number    Diana Ely APRN CNP Referring Family Medicine  [phone]

## 2024-05-21 ENCOUNTER — INFUSION THERAPY VISIT (OUTPATIENT)
Dept: INFUSION THERAPY | Facility: CLINIC | Age: 79
End: 2024-05-21
Attending: INTERNAL MEDICINE
Payer: COMMERCIAL

## 2024-05-21 ENCOUNTER — PATIENT OUTREACH (OUTPATIENT)
Dept: CARE COORDINATION | Facility: CLINIC | Age: 79
End: 2024-05-21
Payer: COMMERCIAL

## 2024-05-21 VITALS — HEART RATE: 66 BPM | SYSTOLIC BLOOD PRESSURE: 163 MMHG | TEMPERATURE: 98.1 F | DIASTOLIC BLOOD PRESSURE: 52 MMHG

## 2024-05-21 DIAGNOSIS — N18.32 ANEMIA OF CHRONIC RENAL FAILURE, STAGE 3B (H): Primary | ICD-10-CM

## 2024-05-21 DIAGNOSIS — D63.1 ANEMIA OF CHRONIC RENAL FAILURE, STAGE 3B (H): Primary | ICD-10-CM

## 2024-05-21 DIAGNOSIS — N18.32 STAGE 3B CHRONIC KIDNEY DISEASE (H): ICD-10-CM

## 2024-05-21 PROCEDURE — 96365 THER/PROPH/DIAG IV INF INIT: CPT

## 2024-05-21 PROCEDURE — 258N000003 HC RX IP 258 OP 636: Performed by: INTERNAL MEDICINE

## 2024-05-21 PROCEDURE — 250N000011 HC RX IP 250 OP 636: Performed by: INTERNAL MEDICINE

## 2024-05-21 RX ORDER — HEPARIN SODIUM,PORCINE 10 UNIT/ML
5-20 VIAL (ML) INTRAVENOUS DAILY PRN
Status: CANCELLED | OUTPATIENT
Start: 2024-05-23

## 2024-05-21 RX ORDER — METHYLPREDNISOLONE SODIUM SUCCINATE 125 MG/2ML
125 INJECTION, POWDER, LYOPHILIZED, FOR SOLUTION INTRAMUSCULAR; INTRAVENOUS
Status: CANCELLED
Start: 2024-05-23

## 2024-05-21 RX ORDER — ALBUTEROL SULFATE 0.83 MG/ML
2.5 SOLUTION RESPIRATORY (INHALATION)
Status: CANCELLED | OUTPATIENT
Start: 2024-05-23

## 2024-05-21 RX ORDER — EPINEPHRINE 1 MG/ML
0.3 INJECTION, SOLUTION, CONCENTRATE INTRAVENOUS EVERY 5 MIN PRN
Status: CANCELLED | OUTPATIENT
Start: 2024-05-23

## 2024-05-21 RX ORDER — DIPHENHYDRAMINE HYDROCHLORIDE 50 MG/ML
50 INJECTION INTRAMUSCULAR; INTRAVENOUS
Status: CANCELLED
Start: 2024-05-23

## 2024-05-21 RX ORDER — MEPERIDINE HYDROCHLORIDE 25 MG/ML
25 INJECTION INTRAMUSCULAR; INTRAVENOUS; SUBCUTANEOUS EVERY 30 MIN PRN
Status: CANCELLED | OUTPATIENT
Start: 2024-05-23

## 2024-05-21 RX ORDER — HEPARIN SODIUM (PORCINE) LOCK FLUSH IV SOLN 100 UNIT/ML 100 UNIT/ML
5 SOLUTION INTRAVENOUS
Status: CANCELLED | OUTPATIENT
Start: 2024-05-23

## 2024-05-21 RX ORDER — ALBUTEROL SULFATE 90 UG/1
1-2 AEROSOL, METERED RESPIRATORY (INHALATION)
Status: CANCELLED
Start: 2024-05-23

## 2024-05-21 RX ADMIN — SODIUM CHLORIDE 250 ML: 9 INJECTION, SOLUTION INTRAVENOUS at 10:21

## 2024-05-21 RX ADMIN — IRON SUCROSE 300 MG: 20 INJECTION, SOLUTION INTRAVENOUS at 10:22

## 2024-05-21 NOTE — PROGRESS NOTES
Anemia Management Note - Follow Up      SUBJECTIVE/OBJECTIVE:    Referred by Dr. Rafael Oswald on 2024  Primary Diagnosis: Anemia in Chronic Kidney Disease (N18.3, D63.1)   3b  Secondary Diagnosis: Chronic Kidney Disease, Stage 3 (N18.3)  3b     Hgb goal range: 9-10  Epo/Darbo: TBD.  Hgb >10.0.  TSAT must be 20% or > before Insurance will apporve ALMA.   Iron regimen: Treat with IV Iron.   Labs : 2025  RX/TX plans : TBD     *[facility]; [phone], opt 2 for infusion appt     Recent ALMA use, transfusion, IV iron: NA  No MI and blood clots or cancers. Hx of CVA, HTN, and Anticoagulated.      Contact:No Consent to Communicate on File.         Latest Ref Rng & Units 5/3/2022 2022 10/25/2023 3/1/2024 2024 2024   Anemia   Hemoglobin 11.7 - 15.7 g/dL 12.9  12.0  11.7  9.3     6.3  10.7     IV Iron Dose       300mg   TSAT 15 - 46 %     10     Ferritin 11 - 328 ng/mL     17       BP Readings from Last 3 Encounters:   24 (!) 143/57   24 133/56   24 128/54     Wt Readings from Last 2 Encounters:   24 54.4 kg (120 lb)   24 54.4 kg (120 lb)           ASSESSMENT:    Hgb:at goal - continue to monitor  TSat: Due for iron studies 4 weeks after last IV iron infusion Ferritin: Due for iron studies 4 weeks after last IV iron infusion        PLAN:  Last two venofer scheduled for  & .     Orders needed to be renewed (for next follow-up date) in EPIC: None    Iron labs due:  4 weeks after last IV iron infusion    Plan discussed with:  No call, chart review       NEXT FOLLOW-UP DATE:  24    Jennifer Weber RN   Anemia Services  [facility]  [email or web address]   Office : [phone]  Fax: [phone]

## 2024-05-21 NOTE — PROGRESS NOTES
Infusion Nursing Note:  Bebe Alfonso presents today for Venofer.    Patient seen by provider today: No   present during visit today: Not Applicable.    Note: N/A.    Intravenous Access:  Peripheral IV placed.    Treatment Conditions:  Not Applicable.    Post Infusion Assessment:  Patient tolerated infusion without incident.  Blood return noted pre and post infusion.  Site patent and intact, free from redness, edema or discomfort.  No evidence of extravasations.  Access discontinued per protocol.     Discharge Plan:   Patient discharged in stable condition accompanied by: self.  Departure Mode: Ambulatory.      Maynor Morelos RN

## 2024-05-23 ENCOUNTER — INFUSION THERAPY VISIT (OUTPATIENT)
Dept: INFUSION THERAPY | Facility: CLINIC | Age: 79
End: 2024-05-23
Attending: INTERNAL MEDICINE
Payer: COMMERCIAL

## 2024-05-23 VITALS — RESPIRATION RATE: 16 BRPM | DIASTOLIC BLOOD PRESSURE: 55 MMHG | SYSTOLIC BLOOD PRESSURE: 157 MMHG | HEART RATE: 63 BPM

## 2024-05-23 DIAGNOSIS — D63.1 ANEMIA OF CHRONIC RENAL FAILURE, STAGE 3B (H): Primary | ICD-10-CM

## 2024-05-23 DIAGNOSIS — N18.32 ANEMIA OF CHRONIC RENAL FAILURE, STAGE 3B (H): Primary | ICD-10-CM

## 2024-05-23 DIAGNOSIS — N18.32 STAGE 3B CHRONIC KIDNEY DISEASE (H): ICD-10-CM

## 2024-05-23 PROCEDURE — 96365 THER/PROPH/DIAG IV INF INIT: CPT

## 2024-05-23 PROCEDURE — 258N000003 HC RX IP 258 OP 636: Performed by: INTERNAL MEDICINE

## 2024-05-23 PROCEDURE — 96366 THER/PROPH/DIAG IV INF ADDON: CPT

## 2024-05-23 PROCEDURE — 250N000011 HC RX IP 250 OP 636: Performed by: INTERNAL MEDICINE

## 2024-05-23 RX ORDER — DIPHENHYDRAMINE HYDROCHLORIDE 50 MG/ML
50 INJECTION INTRAMUSCULAR; INTRAVENOUS
Status: CANCELLED
Start: 2024-05-25

## 2024-05-23 RX ORDER — HEPARIN SODIUM,PORCINE 10 UNIT/ML
5-20 VIAL (ML) INTRAVENOUS DAILY PRN
Status: CANCELLED | OUTPATIENT
Start: 2024-05-25

## 2024-05-23 RX ORDER — ALBUTEROL SULFATE 90 UG/1
1-2 AEROSOL, METERED RESPIRATORY (INHALATION)
Status: CANCELLED
Start: 2024-05-25

## 2024-05-23 RX ORDER — ALBUTEROL SULFATE 0.83 MG/ML
2.5 SOLUTION RESPIRATORY (INHALATION)
Status: CANCELLED | OUTPATIENT
Start: 2024-05-25

## 2024-05-23 RX ORDER — METHYLPREDNISOLONE SODIUM SUCCINATE 125 MG/2ML
125 INJECTION, POWDER, LYOPHILIZED, FOR SOLUTION INTRAMUSCULAR; INTRAVENOUS
Status: CANCELLED
Start: 2024-05-25

## 2024-05-23 RX ORDER — MEPERIDINE HYDROCHLORIDE 25 MG/ML
25 INJECTION INTRAMUSCULAR; INTRAVENOUS; SUBCUTANEOUS EVERY 30 MIN PRN
Status: CANCELLED | OUTPATIENT
Start: 2024-05-25

## 2024-05-23 RX ORDER — HEPARIN SODIUM (PORCINE) LOCK FLUSH IV SOLN 100 UNIT/ML 100 UNIT/ML
5 SOLUTION INTRAVENOUS
Status: CANCELLED | OUTPATIENT
Start: 2024-05-25

## 2024-05-23 RX ORDER — EPINEPHRINE 1 MG/ML
0.3 INJECTION, SOLUTION, CONCENTRATE INTRAVENOUS EVERY 5 MIN PRN
Status: CANCELLED | OUTPATIENT
Start: 2024-05-25

## 2024-05-23 RX ADMIN — SODIUM CHLORIDE 250 ML: 9 INJECTION, SOLUTION INTRAVENOUS at 10:09

## 2024-05-23 RX ADMIN — IRON SUCROSE 300 MG: 20 INJECTION, SOLUTION INTRAVENOUS at 10:24

## 2024-05-23 NOTE — PROGRESS NOTES
Infusion Nursing Note:  Bebe Alfonso presents today for 2/3 venofer.    Patient seen by provider today: No   present during visit today: Not Applicable.    Note: Pt reports she got home, she felt cold and tired. RN explained this would be an odd medication sensitivity response and if this occurs again today when she gets home, mychart her care team and discuss her symptoms.      Intravenous Access:  Peripheral IV placed.    Treatment Conditions:  Not Applicable.      Post Infusion Assessment:  Patient tolerated infusion without incident.  Patient observed for 30 minutes post venofer per protocol.  Blood return noted pre and post infusion.  No evidence of extravasations.  Access discontinued per protocol.       Discharge Plan:   Discharge instructions reviewed with: Patient.  Patient and/or family verbalized understanding of discharge instructions and all questions answered.  Patient discharged in stable condition accompanied by: self.  Departure Mode: Ambulatory.      Amelia Mancilla RN

## 2024-05-30 ENCOUNTER — INFUSION THERAPY VISIT (OUTPATIENT)
Dept: INFUSION THERAPY | Facility: CLINIC | Age: 79
End: 2024-05-30
Attending: INTERNAL MEDICINE
Payer: COMMERCIAL

## 2024-05-30 ENCOUNTER — PATIENT OUTREACH (OUTPATIENT)
Dept: CARE COORDINATION | Facility: CLINIC | Age: 79
End: 2024-05-30
Payer: COMMERCIAL

## 2024-05-30 VITALS — SYSTOLIC BLOOD PRESSURE: 125 MMHG | HEART RATE: 79 BPM | DIASTOLIC BLOOD PRESSURE: 57 MMHG | RESPIRATION RATE: 16 BRPM

## 2024-05-30 DIAGNOSIS — N18.32 STAGE 3B CHRONIC KIDNEY DISEASE (H): ICD-10-CM

## 2024-05-30 DIAGNOSIS — D63.1 ANEMIA OF CHRONIC RENAL FAILURE, STAGE 3B (H): Primary | ICD-10-CM

## 2024-05-30 DIAGNOSIS — N18.32 ANEMIA OF CHRONIC RENAL FAILURE, STAGE 3B (H): Primary | ICD-10-CM

## 2024-05-30 PROCEDURE — 258N000003 HC RX IP 258 OP 636: Performed by: INTERNAL MEDICINE

## 2024-05-30 PROCEDURE — 250N000011 HC RX IP 250 OP 636: Performed by: INTERNAL MEDICINE

## 2024-05-30 PROCEDURE — 96366 THER/PROPH/DIAG IV INF ADDON: CPT

## 2024-05-30 PROCEDURE — 96365 THER/PROPH/DIAG IV INF INIT: CPT

## 2024-05-30 RX ORDER — ALBUTEROL SULFATE 0.83 MG/ML
2.5 SOLUTION RESPIRATORY (INHALATION)
Status: CANCELLED | OUTPATIENT
Start: 2024-05-31

## 2024-05-30 RX ORDER — HEPARIN SODIUM,PORCINE 10 UNIT/ML
5-20 VIAL (ML) INTRAVENOUS DAILY PRN
Status: CANCELLED | OUTPATIENT
Start: 2024-05-31

## 2024-05-30 RX ORDER — MEPERIDINE HYDROCHLORIDE 25 MG/ML
25 INJECTION INTRAMUSCULAR; INTRAVENOUS; SUBCUTANEOUS EVERY 30 MIN PRN
Status: CANCELLED | OUTPATIENT
Start: 2024-05-31

## 2024-05-30 RX ORDER — HEPARIN SODIUM (PORCINE) LOCK FLUSH IV SOLN 100 UNIT/ML 100 UNIT/ML
5 SOLUTION INTRAVENOUS
Status: CANCELLED | OUTPATIENT
Start: 2024-05-31

## 2024-05-30 RX ORDER — ALBUTEROL SULFATE 90 UG/1
1-2 AEROSOL, METERED RESPIRATORY (INHALATION)
Status: CANCELLED
Start: 2024-05-31

## 2024-05-30 RX ORDER — EPINEPHRINE 1 MG/ML
0.3 INJECTION, SOLUTION, CONCENTRATE INTRAVENOUS EVERY 5 MIN PRN
Status: CANCELLED | OUTPATIENT
Start: 2024-05-31

## 2024-05-30 RX ORDER — DIPHENHYDRAMINE HYDROCHLORIDE 50 MG/ML
50 INJECTION INTRAMUSCULAR; INTRAVENOUS
Status: CANCELLED
Start: 2024-05-31

## 2024-05-30 RX ORDER — METHYLPREDNISOLONE SODIUM SUCCINATE 125 MG/2ML
125 INJECTION, POWDER, LYOPHILIZED, FOR SOLUTION INTRAMUSCULAR; INTRAVENOUS
Status: CANCELLED
Start: 2024-05-31

## 2024-05-30 RX ADMIN — IRON SUCROSE 300 MG: 20 INJECTION, SOLUTION INTRAVENOUS at 11:43

## 2024-05-30 RX ADMIN — SODIUM CHLORIDE 250 ML: 9 INJECTION, SOLUTION INTRAVENOUS at 11:26

## 2024-05-30 NOTE — PROGRESS NOTES
Anemia Management Note - Follow Up      SUBJECTIVE/OBJECTIVE:    Referred by Dr. Rafael Oswald on 2024  Primary Diagnosis: Anemia in Chronic Kidney Disease (N18.3, D63.1)   3b  Secondary Diagnosis: Chronic Kidney Disease, Stage 3 (N18.3)  3b     Hgb goal range: 9-10  Epo/Darbo: TBD.  Hgb >10.0.  TSAT must be 20% or > before Insurance will apporve ALMA.   Iron regimen: Treat with IV Iron.   Labs : 2025  RX/TX plans : TBD     *[facility]; [phone], opt 2 for infusion appt     Recent ALMA use, transfusion, IV iron: NA  No MI and blood clots or cancers. Hx of CVA, HTN, and Anticoagulated.      Contact:No Consent to Communicate on File.         Latest Ref Rng & Units 5/3/2022 2022 10/25/2023 3/1/2024 2024 2024 2024   Anemia   Hemoglobin 11.7 - 15.7 g/dL 12.9  12.0  11.7  9.3     6.3  10.7      IV Iron Dose       300mg 300mg   TSAT 15 - 46 %     10      Ferritin 11 - 328 ng/mL     17        BP Readings from Last 3 Encounters:   24 125/57   24 (!) 157/55   24 (!) 163/52     Wt Readings from Last 2 Encounters:   24 54.4 kg (120 lb)   24 54.4 kg (120 lb)           ASSESSMENT:    Hgb Due  TSat: Due for iron studies 4 weeks after last IV iron infusion Ferritin: Due for iron studies 4 weeks after last IV iron infusion        PLAN:  Check iron studies in 4 week(s).    Orders needed to be renewed (for next follow-up date) in EPIC: None    Iron labs due:  End of 2024    Plan discussed with:  No call, chart review       NEXT FOLLOW-UP DATE:  24    Jennifer Weber RN   Anemia Services  [facility]  [email or web address]   Office : [phone]  Fax: [phone]

## 2024-05-30 NOTE — PROGRESS NOTES
Infusion Nursing Note:  Bebe Alfonso presents today for 3/3 Venofer.    Patient seen by provider today: No   present during visit today: Not Applicable.    Note: Pt denies any new health changes or concerns.      Intravenous Access:  No Intravenous access/labs at this visit.  Peripheral IV placed.    Treatment Conditions:  Not Applicable.      Post Infusion Assessment:  Patient tolerated infusion without incident.  Patient observed for 30 minutes post venofer per protocol.  Blood return noted pre and post infusion.  Site patent and intact, free from redness, edema or discomfort.  No evidence of extravasations.  Access discontinued per protocol.       Discharge Plan:   Discharge instructions reviewed with: Patient.  Patient and/or family verbalized understanding of discharge instructions and all questions answered.  Patient discharged in stable condition accompanied by: self.  Departure Mode: Ambulatory.      Amelia Mancilla RN

## 2024-05-31 ENCOUNTER — LAB (OUTPATIENT)
Dept: LAB | Facility: CLINIC | Age: 79
End: 2024-05-31
Payer: COMMERCIAL

## 2024-05-31 ENCOUNTER — ANTICOAGULATION THERAPY VISIT (OUTPATIENT)
Dept: ANTICOAGULATION | Facility: CLINIC | Age: 79
End: 2024-05-31

## 2024-05-31 DIAGNOSIS — Z79.01 LONG TERM CURRENT USE OF ANTICOAGULANT THERAPY: ICD-10-CM

## 2024-05-31 DIAGNOSIS — Z79.01 ANTICOAGULATION MONITORING, INR RANGE 2-3: ICD-10-CM

## 2024-05-31 DIAGNOSIS — I63.20 CEREBROVASCULAR ACCIDENT (CVA) DUE TO OCCLUSION OF PRECEREBRAL ARTERY (H): Chronic | ICD-10-CM

## 2024-05-31 DIAGNOSIS — I63.20 CEREBROVASCULAR ACCIDENT (CVA) DUE TO OCCLUSION OF PRECEREBRAL ARTERY (H): ICD-10-CM

## 2024-05-31 DIAGNOSIS — Z79.01 LONG TERM CURRENT USE OF ANTICOAGULANT THERAPY: Primary | ICD-10-CM

## 2024-05-31 DIAGNOSIS — Z86.73 HISTORY OF CVA (CEREBROVASCULAR ACCIDENT): ICD-10-CM

## 2024-05-31 DIAGNOSIS — Z79.01 ANTICOAGULATION MONITORING, INR RANGE 2-3: Chronic | ICD-10-CM

## 2024-05-31 LAB — INR BLD: 2.2 (ref 0.9–1.1)

## 2024-05-31 PROCEDURE — 85610 PROTHROMBIN TIME: CPT

## 2024-05-31 PROCEDURE — 36416 COLLJ CAPILLARY BLOOD SPEC: CPT

## 2024-05-31 NOTE — PROGRESS NOTES
ANTICOAGULATION MANAGEMENT     Bebe SHELDON Kaden 78 year old female is on warfarin with therapeutic INR result. (Goal INR 2.0-3.0)    Recent labs: (last 7 days)     05/31/24  0920   INR 2.2*       ASSESSMENT     Source(s): Chart Review and Patient/Caregiver Call     Warfarin doses taken: Warfarin taken as instructed  Diet: No new diet changes identified  Medication/supplement changes: None noted  New illness, injury, or hospitalization: No  Signs or symptoms of bleeding or clotting: No  Previous result: Supratherapeutic  Additional findings: None       PLAN     Recommended plan for no diet, medication or health factor changes affecting INR     Dosing Instructions: Continue your current warfarin dose with next INR in 3 weeks       Summary  As of 5/31/2024      Full warfarin instructions:  7.5 mg every Mon; 5 mg all other days   Next INR check:  6/21/2024               Telephone call with Bebe who verbalizes understanding and agrees to plan    Lab visit scheduled    Education provided:   Goal range and lab monitoring: goal range and significance of current result    Plan made per [facility] anticoagulation protocol    Vandana Garcia RN  Anticoagulation Clinic  5/31/2024    _______________________________________________________________________     Anticoagulation Episode Summary       Current INR goal:  2.0-3.0   TTR:  53.6% (1 y)   Target end date:  Indefinite   Send INR reminders to:  [facility]    Indications    History of CVA (cerebrovascular accident) (Resolved) [Z86.73]  Long term current use of anticoagulant therapy [Z79.01]  Cerebrovascular accident (CVA) due to occlusion of precerebral artery (H) [I63.20]  History of CVA (cerebrovascular accident) [Z86.73]  Anticoagulation monitoring  INR range 2-3 [Z79.01]             Comments:               Anticoagulation Care Providers       Provider Role Specialty Phone number    Diana Ely APRN [facility] Referring Family Medicine [phone]

## 2024-06-20 ENCOUNTER — LAB (OUTPATIENT)
Dept: LAB | Facility: CLINIC | Age: 79
End: 2024-06-20
Payer: COMMERCIAL

## 2024-06-20 ENCOUNTER — ANTICOAGULATION THERAPY VISIT (OUTPATIENT)
Dept: ANTICOAGULATION | Facility: CLINIC | Age: 79
End: 2024-06-20

## 2024-06-20 DIAGNOSIS — Z79.01 ANTICOAGULATION MONITORING, INR RANGE 2-3: Chronic | ICD-10-CM

## 2024-06-20 DIAGNOSIS — N18.32 STAGE 3B CHRONIC KIDNEY DISEASE (H): Chronic | ICD-10-CM

## 2024-06-20 DIAGNOSIS — I63.20 CEREBROVASCULAR ACCIDENT (CVA) DUE TO OCCLUSION OF PRECEREBRAL ARTERY (H): ICD-10-CM

## 2024-06-20 DIAGNOSIS — N18.32 ANEMIA OF CHRONIC RENAL FAILURE, STAGE 3B (H): ICD-10-CM

## 2024-06-20 DIAGNOSIS — Z79.01 LONG TERM CURRENT USE OF ANTICOAGULANT THERAPY: ICD-10-CM

## 2024-06-20 DIAGNOSIS — D63.1 ANEMIA OF CHRONIC RENAL FAILURE, STAGE 3B (H): ICD-10-CM

## 2024-06-20 DIAGNOSIS — Z86.73 HISTORY OF CVA (CEREBROVASCULAR ACCIDENT): ICD-10-CM

## 2024-06-20 DIAGNOSIS — Z79.01 ANTICOAGULATION MONITORING, INR RANGE 2-3: ICD-10-CM

## 2024-06-20 DIAGNOSIS — Z79.01 LONG TERM CURRENT USE OF ANTICOAGULANT THERAPY: Primary | ICD-10-CM

## 2024-06-20 DIAGNOSIS — I63.20 CEREBROVASCULAR ACCIDENT (CVA) DUE TO OCCLUSION OF PRECEREBRAL ARTERY (H): Chronic | ICD-10-CM

## 2024-06-20 LAB
FERRITIN SERPL-MCNC: 197 NG/ML (ref 11–328)
HCT VFR BLD AUTO: 35.2 % (ref 35–47)
HGB BLD-MCNC: 11.3 G/DL (ref 11.7–15.7)
INR BLD: 2.4 (ref 0.9–1.1)
IRON BINDING CAPACITY (ROCHE): 278 UG/DL (ref 240–430)
IRON SATN MFR SERPL: 31 % (ref 15–46)
IRON SERPL-MCNC: 85 UG/DL (ref 37–145)

## 2024-06-20 PROCEDURE — 85018 HEMOGLOBIN: CPT

## 2024-06-20 PROCEDURE — 85014 HEMATOCRIT: CPT

## 2024-06-20 PROCEDURE — 85610 PROTHROMBIN TIME: CPT

## 2024-06-20 PROCEDURE — 36415 COLL VENOUS BLD VENIPUNCTURE: CPT

## 2024-06-20 PROCEDURE — 83550 IRON BINDING TEST: CPT

## 2024-06-20 PROCEDURE — 82728 ASSAY OF FERRITIN: CPT

## 2024-06-20 PROCEDURE — 83540 ASSAY OF IRON: CPT

## 2024-06-20 NOTE — PROGRESS NOTES
ANTICOAGULATION MANAGEMENT     Bebe SHELDON Kaden 78 year old female is on warfarin with therapeutic INR result. (Goal INR 2.0-3.0)    Recent labs: (last 7 days)     06/20/24  0821   INR 2.4*       ASSESSMENT     Source(s): Chart Review and Patient/Caregiver Call     Warfarin doses taken: Warfarin taken as instructed  Diet: No new diet changes identified  Medication/supplement changes: None noted  New illness, injury, or hospitalization: No  Signs or symptoms of bleeding or clotting: No  Previous result: Therapeutic last visit; previously outside of goal range  Additional findings: None       PLAN     Recommended plan for no diet, medication or health factor changes affecting INR     Dosing Instructions: Continue your current warfarin dose with next INR in 4 weeks       Summary  As of 6/20/2024      Full warfarin instructions:  7.5 mg every Mon; 5 mg all other days   Next INR check:  7/18/2024               Telephone call with Bebe who verbalizes understanding and agrees to plan    Lab visit scheduled    Education provided:   Goal range and lab monitoring: goal range and significance of current result    Plan made per [facility] anticoagulation protocol    Vandana Garcia RN  Anticoagulation Clinic  6/20/2024    _______________________________________________________________________     Anticoagulation Episode Summary       Current INR goal:  2.0-3.0   TTR:  54.1% (1 y)   Target end date:  Indefinite   Send INR reminders to:  [facility]    Indications    History of CVA (cerebrovascular accident) (Resolved) [Z86.73]  Long term current use of anticoagulant therapy [Z79.01]  Cerebrovascular accident (CVA) due to occlusion of precerebral artery (H) [I63.20]  History of CVA (cerebrovascular accident) [Z86.73]  Anticoagulation monitoring  INR range 2-3 [Z79.01]             Comments:               Anticoagulation Care Providers       Provider Role Specialty Phone number    Diana Ely APRN CNP Referring  Family Medicine [phone]

## 2024-06-24 DIAGNOSIS — E78.5 HYPERLIPIDEMIA LDL GOAL <70: Chronic | ICD-10-CM

## 2024-06-24 RX ORDER — ATORVASTATIN CALCIUM 80 MG/1
80 TABLET, FILM COATED ORAL DAILY
Qty: 90 TABLET | Refills: 0 | Status: SHIPPED | OUTPATIENT
Start: 2024-06-24 | End: 2024-09-19

## 2024-06-26 ENCOUNTER — PATIENT OUTREACH (OUTPATIENT)
Dept: CARE COORDINATION | Facility: CLINIC | Age: 79
End: 2024-06-26

## 2024-06-26 ENCOUNTER — OFFICE VISIT (OUTPATIENT)
Dept: NEUROLOGY | Facility: CLINIC | Age: 79
End: 2024-06-26
Payer: COMMERCIAL

## 2024-06-26 VITALS
DIASTOLIC BLOOD PRESSURE: 68 MMHG | SYSTOLIC BLOOD PRESSURE: 152 MMHG | HEART RATE: 64 BPM | HEIGHT: 65 IN | BODY MASS INDEX: 19.66 KG/M2 | WEIGHT: 118 LBS

## 2024-06-26 DIAGNOSIS — Z86.73 HISTORY OF CVA (CEREBROVASCULAR ACCIDENT): ICD-10-CM

## 2024-06-26 DIAGNOSIS — I65.23 BILATERAL CAROTID ARTERY STENOSIS: ICD-10-CM

## 2024-06-26 DIAGNOSIS — I69.30 H/O: STROKE WITH RESIDUAL EFFECTS: Primary | ICD-10-CM

## 2024-06-26 DIAGNOSIS — Z79.899 ENCOUNTER FOR LONG-TERM (CURRENT) USE OF MEDICATIONS: ICD-10-CM

## 2024-06-26 PROCEDURE — 99214 OFFICE O/P EST MOD 30 MIN: CPT | Performed by: PSYCHIATRY & NEUROLOGY

## 2024-06-26 NOTE — PROGRESS NOTES
ESTABLISHED PATIENT NEUROLOGY NOTE    DATE OF VISIT: 6/26/2024  MRN: [id number]  PATIENT NAME: Bebe Alfonso  YOB: 1945    Chief Complaint   Patient presents with    Annual Visit     Annual follow up for CVA. Pt states she is doing well. She has had one fall about 2-3 weeks ago, injured a rib. She has noticed she is having more difficulty with balance lately. She is now using a walker around the house.     SUBJECTIVE:                                                      HISTORY OF PRESENT ILLNESS:  Bebe is here for follow up regarding stroke.  I met her for an initial consultation about a year ago.    History as previously documented by me (6.21.23):   She previously followed with neurology in [place], Dr. Meyer who was there recently retired.  Most recent office visit with neurology was in September 2022.  At that time Elif reported she had not had any recent headaches.  She had been on warfarin and said she was doing well.  She is no longer driving.  She reported a recent episode of worsening of her handwriting about 3 weeks prior to when she was seen in clinic.  Her stroke occurred in 2011 and notes indicate she has some residual right-sided hemiparesis, dysarthria and mild word-finding difficulty when she is tired.     Because of her new symptoms at that time, updated brain MRI was ordered.  This showed a new small, chronic, area of infarct in the left frontal and parietal lobes.  Stable chronic right MCA stroke.  No change in medications was made because she was already on warfarin, baby aspirin and a statin.  Additional vessel imaging was recommended.  She was also advised to quit smoking.  Carotid ultrasound completed last fall showed 50-69% stenosis on the Left, <50% of the Right ICA. MRA showed some luminal irregularities of the left carotid artery.     Bebe tells me that it was decided to start warfarin because it was unclear why her strokes occurred.  She does not have known  "history of A-fib but mentions that there was some abnormality with her cardiac testing in the past.  She says that the more recent stroke that developed was thought to have occurred when she was off of her warfarin prior to a colonoscopy.     She has also been seen in vascular surgery clinic for possible mesenteric ischemia.  She has an updated ultrasound of the carotids and FAMILIA ultrasound scheduled for next month and follow-up with vascular thereafter.     Bebe is here with her . She had one episode of dizziness 6 weeks ago. She has some positional lightheadedness.  No particular weakness or sensory changes that she has noticed.     She says her primary care provider checks her lipids every year.  LDL was 74 in 5.2022.      She says she is more tiredness during the day.  She tells me that she fell 6 weeks ago with that episode of dizziness and hit her ribs.  Because of this she cannot sleep on her preferred right side and this has made things harder from a rest standpoint.  She does snore per her .  She does not have much of an appetite. This has always been true, but she has lost weight recently because she does not eat much.      Occasional Left-sided headaches, which resolve with Tylenol.      EEG in 2016 for \"spells,\" showed some left temporal slowing.     In terms of the tobacco use, she says she was down to less than half a pack a day but is smoking more since the holidays.  She continues to work on cutting back.     No family history of strokes.    Our plan was to continue warfarin, aspirin and atorvastatin for stroke prevention.  I also advised Elif to work on smoking cessation to reduce additional stroke risk in the future.  I also recommended a sleep evaluation for possible sleep apnea.  She had a sleep study completed in March of this year.  This study showed moderate NICHOLAS when supine only.  Positioning aids were recommended.    Bebe says she had a fall and injured a rib a couple of " weeks ago. I do not see any ED visits or updated imaging in her chart regarding this.  She did not have any imaging completed at that time.  Bebe says she had a tooth pulled this spring and it led to uncontrolled bleeding which sent her that to the ED and she required transfusion.  Head CT was negative for bleed at that time.    She says she is feeling better since then, more stable on her feet.  She does use a walker at times for stability.    She has not quit smoking.  She says she is not mentally ready to do so.  LDL was elevated last October, to 113.  She is still taking atorvastatin but tells me around that time she was doing a lot of baking and cookies are her vice.    No changes in her vision, though she is due for an updated eye exam.  Carotid imaging was stable about a year ago.  She continues to follow with vascular.  She endorses occasional headaches that respond to Tylenol.    She is here with her  who says that she shuffles at times.    Sleep has improved though she still has some tiredness during the day.    CURRENT MEDICATIONS:   Current Outpatient Medications   Medication Sig Dispense Refill    amLODIPine (NORVASC) 5 MG tablet Take 1 tablet by mouth once daily 90 tablet 0    Aspirin (VAZALORE) 81 MG CAPS Please choose reason not prescribed from choices below.      atorvastatin (LIPITOR) 80 MG tablet Take 1 tablet by mouth once daily 90 tablet 0    empagliflozin (JARDIANCE) 25 MG TABS tablet Take 1 tablet (25 mg) by mouth daily 90 tablet 3    enalapril (VASOTEC) 20 MG tablet Take 1 tablet by mouth once daily 90 tablet 0    famotidine (PEPCID) 40 MG tablet Take 1 tablet by mouth once daily 90 tablet 0    FLUoxetine (PROZAC) 20 MG capsule Take 1 capsule by mouth once daily 90 capsule 1    fluticasone (FLONASE) 50 MCG/ACT nasal spray Spray 1 spray into both nostrils daily (Patient taking differently: Spray 1 spray into both nostrils daily prn) 16 g 1    warfarin ANTICOAGULANT (COUMADIN) 5 MG  "tablet Take 7.5 mg Mon, Wed, Fri and 5 mg all other days, or as directed by the Coumadin Clinic. 105 tablet 1     No current facility-administered medications for this visit.       RECENT DIAGNOSTIC STUDIES:   Labs: No results found for any visits on 24.    Imaging:   CAROTID US (23):  IMPRESSION: Per sonographic criteria, there are 50-69% stenoses in the  internal carotid arteries bilaterally    Head CT (3.1.24):  IMPRESSION:   1. No acute intracranial hemorrhage, extra-axial fluid collection, or  mass effect.  2. Unchanged chronic bilateral middle cerebral artery territory  infarcts.  3. Brain atrophy and presumed chronic small vessel ischemic changes,  as described.    Imaging reviewed independently by me.  Agree with radiology read.    REVIEW OF SYSTEMS:                                                      10-point review of systems is negative except as mentioned above in HPI.    EXAM:                                                      Physical Exam:   Vitals: BP (!) 152/68 (BP Location: Right arm, Patient Position: Sitting)   Pulse 64   Ht 1.651 m (5' 5\")   Wt 53.5 kg (118 lb)   LMP 09/15/1998   BMI 19.64 kg/m    BMI= Body mass index is 19.64 kg/m .  GENERAL: NAD.  HEENT: NC/AT.  CV: RRR. S1, S2.   NECK: Bilateral carotid bruits.  PULM: Non-labored breathing.   Neurologic:  MENTAL STATUS: Alert, attentive. Speech is fluent. Normal comprehension. Mini-co/5. Normal concentration. Adequate fund of knowledge.   CRANIAL NERVES: Discs technically difficult to visualize. Visual fields intact to confrontation. Pupils equally, round and reactive to light. Facial sensation and movement normal. EOM full. Hearing intact to conversation. Trapezius strength intact. Palate moves symmetrically. Tongue midline.  MOTOR: 5/5 in proximal and distal muscle groups of upper and lower extremities. Tone is normal. Bulk is thin.  DTRs: Intact and symmetric in biceps, BR, patellae (Brisk:R>L).   COORDINATION: " Normal finger nose finger. Finger tapping normal. Knee heel shin normal.  STATION AND GAIT: Sway with Romberg. Casual gait is normal.   Right hand-dominant.    ASSESSMENT and PLAN:                                                      Assessment:    ICD-10-CM    1. H/O: stroke with residual effects  I69.30       2. Encounter for long-term (current) use of medications  Z79.899       3. History of CVA (cerebrovascular accident)  Z86.73       4. Bilateral carotid artery stenosis  I65.23           Ms. Alfonso is a pleasant 78-year-old woman here for follow-up regarding strokes.  She continues on atorvastatin and warfarin plus aspirin.  I do think that Bebe should continue to follow with vascular.  The rest of her stroke risk factors can be managed through her primary care provider if comfortable with that.  We will plan to follow-up as needed.  I did advise Bebe regarding the importance of stopping smoking.  She is in the contemplative stage at this point.    Plan:  -- Continue the warfarin, aspirin and atorvastatin for stroke prevention.  -- Work on smoking cessation, as we discussed.    -- Return to neurology clinic as needed.  Follow-up with your primary provider about the balance if this continues to be an issue.    Total Time: 30 minutes were spent with the patient and in chart review/documentation (as described above in Assessment and Plan)/coordinating the care on date of service.    Caitlin Kwon MD  Neurology    Dragon software used in the dictation of this note.

## 2024-06-26 NOTE — PROGRESS NOTES
Anemia Management Note - Follow Up      SUBJECTIVE/OBJECTIVE:    Referred by Dr. Rafael Oswald on 2024  Primary Diagnosis: Anemia in Chronic Kidney Disease (N18.3, D63.1)   3b  Secondary Diagnosis: Chronic Kidney Disease, Stage 3 (N18.3)  3b     Hgb goal range: 9-10  Epo/Darbo: TBD.  Hgb >10.0.  TSAT must be 20% or > before Insurance will apporve ALMA.   Iron regimen: Treat with IV Iron.   Labs : 2025  RX/TX plans : TBD     *[facility]; [phone], opt 2 for infusion appt     Recent ALMA use, transfusion, IV iron: NA  No MI and blood clots or cancers. Hx of CVA, HTN, and Anticoagulated.      Contact:No Consent to Communicate on File.         Latest Ref Rng & Units 2022 10/25/2023 3/1/2024 2024 2024 2024 2024   Anemia   Hemoglobin 11.7 - 15.7 g/dL 12.0  11.7  9.3     6.3  10.7    11.3    IV Iron Dose      300mg 300mg    TSAT 15 - 46 %    10    31    Ferritin 11 - 328 ng/mL    17    197        Multiple values from one day are sorted in reverse-chronological order     BP Readings from Last 3 Encounters:   24 (!) 152/68   24 125/57   24 (!) 157/55     Wt Readings from Last 2 Encounters:   24 53.5 kg (118 lb)   24 54.4 kg (120 lb)           ASSESSMENT:    Hgb:at goal - continue to monitor  TSat: at goal >30% Ferritin: At goal (>100ng/mL)        PLAN:  RTC for Anemia Labs in 4 week(s).    Orders needed to be renewed (for next follow-up date) in EPIC: None    Iron labs due:  End of 2024    Plan discussed with:  No call, chart review       NEXT FOLLOW-UP DATE:  24    Jennifer Weber RN   Anemia Services  [facility]  [email or web address]   Office : [phone]  Fax: [phone]

## 2024-06-26 NOTE — PATIENT INSTRUCTIONS
Plan:  -- Continue the warfarin, aspirin and atorvastatin for stroke prevention.  -- Work on smoking cessation, as we discussed.    -- Return to neurology clinic as needed.  Follow-up with your primary provider about the balance if this continues to be an issue.

## 2024-06-26 NOTE — LETTER
6/26/2024      Bebe Alfonso  [address] [place] [address]      Dear Colleague,    Thank you for referring your patient, Bebe Alfonso, to the [facility] [place]. Please see a copy of my visit note below.    ESTABLISHED PATIENT NEUROLOGY NOTE    DATE OF VISIT: 6/26/2024  MRN: [id number]  PATIENT NAME: Bebe Alfonso  YOB: 1945    Chief Complaint   Patient presents with     Annual Visit     Annual follow up for CVA. Pt states she is doing well. She has had one fall about 2-3 weeks ago, injured a rib. She has noticed she is having more difficulty with balance lately. She is now using a walker around the house.     SUBJECTIVE:                                                      HISTORY OF PRESENT ILLNESS:  Bebe is here for follow up regarding stroke.  I met her for an initial consultation about a year ago.    History as previously documented by me (6.21.23):   She previously followed with neurology in [place], Dr. Meyer who was there recently retired.  Most recent office visit with neurology was in September 2022.  At that time Elif reported she had not had any recent headaches.  She had been on warfarin and said she was doing well.  She is no longer driving.  She reported a recent episode of worsening of her handwriting about 3 weeks prior to when she was seen in clinic.  Her stroke occurred in 2011 and notes indicate she has some residual right-sided hemiparesis, dysarthria and mild word-finding difficulty when she is tired.     Because of her new symptoms at that time, updated brain MRI was ordered.  This showed a new small, chronic, area of infarct in the left frontal and parietal lobes.  Stable chronic right MCA stroke.  No change in medications was made because she was already on warfarin, baby aspirin and a statin.  Additional vessel imaging was recommended.  She was also advised to quit smoking.  Carotid ultrasound completed last fall showed  "50-69% stenosis on the Left, <50% of the Right ICA. MRA showed some luminal irregularities of the left carotid artery.     Bebe tells me that it was decided to start warfarin because it was unclear why her strokes occurred.  She does not have known history of A-fib but mentions that there was some abnormality with her cardiac testing in the past.  She says that the more recent stroke that developed was thought to have occurred when she was off of her warfarin prior to a colonoscopy.     She has also been seen in vascular surgery clinic for possible mesenteric ischemia.  She has an updated ultrasound of the carotids and FAMILIA ultrasound scheduled for next month and follow-up with vascular thereafter.     Bebe is here with her . She had one episode of dizziness 6 weeks ago. She has some positional lightheadedness.  No particular weakness or sensory changes that she has noticed.     She says her primary care provider checks her lipids every year.  LDL was 74 in 5.2022.      She says she is more tiredness during the day.  She tells me that she fell 6 weeks ago with that episode of dizziness and hit her ribs.  Because of this she cannot sleep on her preferred right side and this has made things harder from a rest standpoint.  She does snore per her .  She does not have much of an appetite. This has always been true, but she has lost weight recently because she does not eat much.      Occasional Left-sided headaches, which resolve with Tylenol.      EEG in 2016 for \"spells,\" showed some left temporal slowing.     In terms of the tobacco use, she says she was down to less than half a pack a day but is smoking more since the holidays.  She continues to work on cutting back.     No family history of strokes.    Our plan was to continue warfarin, aspirin and atorvastatin for stroke prevention.  I also advised Elif to work on smoking cessation to reduce additional stroke risk in the future.  I also recommended " a sleep evaluation for possible sleep apnea.  She had a sleep study completed in March of this year.  This study showed moderate NICHOLAS when supine only.  Positioning aids were recommended.    Bebe says she had a fall and injured a rib a couple of weeks ago. I do not see any ED visits or updated imaging in her chart regarding this.  She did not have any imaging completed at that time.  Bebe says she had a tooth pulled this spring and it led to uncontrolled bleeding which sent her that to the ED and she required transfusion.  Head CT was negative for bleed at that time.    She says she is feeling better since then, more stable on her feet.  She does use a walker at times for stability.    She has not quit smoking.  She says she is not mentally ready to do so.  LDL was elevated last October, to 113.  She is still taking atorvastatin but tells me around that time she was doing a lot of baking and cookies are her vice.    No changes in her vision, though she is due for an updated eye exam.  Carotid imaging was stable about a year ago.  She continues to follow with vascular.  She endorses occasional headaches that respond to Tylenol.    She is here with her  who says that she shuffles at times.    Sleep has improved though she still has some tiredness during the day.    CURRENT MEDICATIONS:   Current Outpatient Medications   Medication Sig Dispense Refill     amLODIPine (NORVASC) 5 MG tablet Take 1 tablet by mouth once daily 90 tablet 0     Aspirin (VAZALORE) 81 MG CAPS Please choose reason not prescribed from choices below.       atorvastatin (LIPITOR) 80 MG tablet Take 1 tablet by mouth once daily 90 tablet 0     empagliflozin (JARDIANCE) 25 MG TABS tablet Take 1 tablet (25 mg) by mouth daily 90 tablet 3     enalapril (VASOTEC) 20 MG tablet Take 1 tablet by mouth once daily 90 tablet 0     famotidine (PEPCID) 40 MG tablet Take 1 tablet by mouth once daily 90 tablet 0     FLUoxetine (PROZAC) 20 MG capsule Take 1  "capsule by mouth once daily 90 capsule 1     fluticasone (FLONASE) 50 MCG/ACT nasal spray Spray 1 spray into both nostrils daily (Patient taking differently: Spray 1 spray into both nostrils daily prn) 16 g 1     warfarin ANTICOAGULANT (COUMADIN) 5 MG tablet Take 7.5 mg Mon, Wed, Fri and 5 mg all other days, or as directed by the Coumadin Clinic. 105 tablet 1     No current facility-administered medications for this visit.       RECENT DIAGNOSTIC STUDIES:   Labs: No results found for any visits on 24.    Imaging:   CAROTID US (23):  IMPRESSION: Per sonographic criteria, there are 50-69% stenoses in the  internal carotid arteries bilaterally    Head CT (3.1.24):  IMPRESSION:   1. No acute intracranial hemorrhage, extra-axial fluid collection, or  mass effect.  2. Unchanged chronic bilateral middle cerebral artery territory  infarcts.  3. Brain atrophy and presumed chronic small vessel ischemic changes,  as described.    Imaging reviewed independently by me.  Agree with radiology read.    REVIEW OF SYSTEMS:                                                      10-point review of systems is negative except as mentioned above in HPI.    EXAM:                                                      Physical Exam:   Vitals: BP (!) 152/68 (BP Location: Right arm, Patient Position: Sitting)   Pulse 64   Ht 1.651 m (5' 5\")   Wt 53.5 kg (118 lb)   LMP 09/15/1998   BMI 19.64 kg/m    BMI= Body mass index is 19.64 kg/m .  GENERAL: NAD.  HEENT: NC/AT.  CV: RRR. S1, S2.   NECK: Bilateral carotid bruits.  PULM: Non-labored breathing.   Neurologic:  MENTAL STATUS: Alert, attentive. Speech is fluent. Normal comprehension. Mini-co/5. Normal concentration. Adequate fund of knowledge.   CRANIAL NERVES: Discs technically difficult to visualize. Visual fields intact to confrontation. Pupils equally, round and reactive to light. Facial sensation and movement normal. EOM full. Hearing intact to conversation. Trapezius " strength intact. Palate moves symmetrically. Tongue midline.  MOTOR: 5/5 in proximal and distal muscle groups of upper and lower extremities. Tone is normal. Bulk is thin.  DTRs: Intact and symmetric in biceps, BR, patellae (Brisk:R>L).   COORDINATION: Normal finger nose finger. Finger tapping normal. Knee heel shin normal.  STATION AND GAIT: Sway with Romberg. Casual gait is normal.   Right hand-dominant.    ASSESSMENT and PLAN:                                                      Assessment:    ICD-10-CM    1. H/O: stroke with residual effects  I69.30       2. Encounter for long-term (current) use of medications  Z79.899       3. History of CVA (cerebrovascular accident)  Z86.73       4. Bilateral carotid artery stenosis  I65.23           Ms. Alfonso is a pleasant 78-year-old woman here for follow-up regarding strokes.  She continues on atorvastatin and warfarin plus aspirin.  I do think that Bebe should continue to follow with vascular.  The rest of her stroke risk factors can be managed through her primary care provider if comfortable with that.  We will plan to follow-up as needed.  I did advise Bebe regarding the importance of stopping smoking.  She is in the contemplative stage at this point.    Plan:  -- Continue the warfarin, aspirin and atorvastatin for stroke prevention.  -- Work on smoking cessation, as we discussed.    -- Return to neurology clinic as needed.  Follow-up with your primary provider about the balance if this continues to be an issue.    Total Time: 30 minutes were spent with the patient and in chart review/documentation (as described above in Assessment and Plan)/coordinating the care on date of service.    Caitlin Kwon MD  Neurology    Dragon software used in the dictation of this note.                    Again, thank you for allowing me to participate in the care of your patient.        Sincerely,        Caitlin Kwon MD

## 2024-06-26 NOTE — NURSING NOTE
Chief Complaint   Patient presents with    Annual Visit     Annual follow up for CVA. Pt states she is doing well. She has had one fall about 2-3 weeks ago, injured a rib. She has noticed she is having more difficulty with balance lately. She is now using a walker around the house.     Tomeka Ely LPN on 6/26/2024 at 1:04 PM

## 2024-07-18 ENCOUNTER — ANTICOAGULATION THERAPY VISIT (OUTPATIENT)
Dept: ANTICOAGULATION | Facility: CLINIC | Age: 79
End: 2024-07-18

## 2024-07-18 ENCOUNTER — LAB (OUTPATIENT)
Dept: LAB | Facility: CLINIC | Age: 79
End: 2024-07-18
Payer: COMMERCIAL

## 2024-07-18 DIAGNOSIS — N18.32 ANEMIA OF CHRONIC RENAL FAILURE, STAGE 3B (H): ICD-10-CM

## 2024-07-18 DIAGNOSIS — Z86.73 HISTORY OF CVA (CEREBROVASCULAR ACCIDENT): ICD-10-CM

## 2024-07-18 DIAGNOSIS — N18.32 STAGE 3B CHRONIC KIDNEY DISEASE (H): Chronic | ICD-10-CM

## 2024-07-18 DIAGNOSIS — Z79.01 LONG TERM CURRENT USE OF ANTICOAGULANT THERAPY: ICD-10-CM

## 2024-07-18 DIAGNOSIS — D63.1 ANEMIA OF CHRONIC RENAL FAILURE, STAGE 3B (H): ICD-10-CM

## 2024-07-18 DIAGNOSIS — Z79.01 ANTICOAGULATION MONITORING, INR RANGE 2-3: ICD-10-CM

## 2024-07-18 DIAGNOSIS — Z79.01 ANTICOAGULATION MONITORING, INR RANGE 2-3: Chronic | ICD-10-CM

## 2024-07-18 DIAGNOSIS — I63.20 CEREBROVASCULAR ACCIDENT (CVA) DUE TO OCCLUSION OF PRECEREBRAL ARTERY (H): ICD-10-CM

## 2024-07-18 DIAGNOSIS — Z79.01 LONG TERM CURRENT USE OF ANTICOAGULANT THERAPY: Primary | ICD-10-CM

## 2024-07-18 DIAGNOSIS — I63.20 CEREBROVASCULAR ACCIDENT (CVA) DUE TO OCCLUSION OF PRECEREBRAL ARTERY (H): Chronic | ICD-10-CM

## 2024-07-18 LAB
FERRITIN SERPL-MCNC: 143 NG/ML (ref 11–328)
HCT VFR BLD AUTO: 36.9 % (ref 35–47)
HGB BLD-MCNC: 11.9 G/DL (ref 11.7–15.7)
INR BLD: 2.6 (ref 0.9–1.1)
IRON BINDING CAPACITY (ROCHE): 250 UG/DL (ref 240–430)
IRON SATN MFR SERPL: 28 % (ref 15–46)
IRON SERPL-MCNC: 70 UG/DL (ref 37–145)

## 2024-07-18 PROCEDURE — 85014 HEMATOCRIT: CPT

## 2024-07-18 PROCEDURE — 36415 COLL VENOUS BLD VENIPUNCTURE: CPT

## 2024-07-18 PROCEDURE — 83540 ASSAY OF IRON: CPT

## 2024-07-18 PROCEDURE — 85018 HEMOGLOBIN: CPT

## 2024-07-18 PROCEDURE — 85610 PROTHROMBIN TIME: CPT

## 2024-07-18 PROCEDURE — 83550 IRON BINDING TEST: CPT

## 2024-07-18 PROCEDURE — 82728 ASSAY OF FERRITIN: CPT

## 2024-07-18 NOTE — PROGRESS NOTES
ANTICOAGULATION MANAGEMENT     Bebe SHELDON Kaden 78 year old female is on warfarin with therapeutic INR result. (Goal INR 2.0-3.0)    Recent labs: (last 7 days)     07/18/24  0816   INR 2.6*       ASSESSMENT     Source(s): Chart Review and Patient/Caregiver Call     Warfarin doses taken: Warfarin taken as instructed  Diet: No new diet changes identified  Medication/supplement changes: None noted  New illness, injury, or hospitalization: No  Signs or symptoms of bleeding or clotting: No  Previous result: Therapeutic last 2(+) visits  Additional findings: None       PLAN     Recommended plan for no diet, medication or health factor changes affecting INR     Dosing Instructions: Continue your current warfarin dose with next INR in 5 weeks       Summary  As of 7/18/2024      Full warfarin instructions:  7.5 mg every Mon; 5 mg all other days   Next INR check:  8/29/2024               Telephone call with Bebe who verbalizes understanding and agrees to plan    Lab visit scheduled    Education provided: Goal range and lab monitoring: goal range and significance of current result    Plan made per [facility] anticoagulation protocol    Vandana Garcia RN  Anticoagulation Clinic  7/18/2024    _______________________________________________________________________     Anticoagulation Episode Summary       Current INR goal:  2.0-3.0   TTR:  61.7% (1 y)   Target end date:  Indefinite   Send INR reminders to:  [facility]    Indications    History of CVA (cerebrovascular accident) (Resolved) [Z86.73]  Long term current use of anticoagulant therapy [Z79.01]  Cerebrovascular accident (CVA) due to occlusion of precerebral artery (H) [I63.20]  History of CVA (cerebrovascular accident) [Z86.73]  Anticoagulation monitoring  INR range 2-3 [Z79.01]             Comments:               Anticoagulation Care Providers       Provider Role Specialty Phone number    Diana Ely APRN CNP Referring Family Medicine [phone]

## 2024-07-22 ENCOUNTER — PATIENT OUTREACH (OUTPATIENT)
Dept: CARE COORDINATION | Facility: CLINIC | Age: 79
End: 2024-07-22
Payer: COMMERCIAL

## 2024-07-22 NOTE — PROGRESS NOTES
Anemia Management Note - Follow Up      SUBJECTIVE/OBJECTIVE:    Referred by Dr. Rafael Oswald on 2024  Primary Diagnosis: Anemia in Chronic Kidney Disease (N18.3, D63.1)   3b  Secondary Diagnosis: Chronic Kidney Disease, Stage 3 (N18.3)  3b     Hgb goal range: 9-10  Epo/Darbo: TBD.  Hgb >10.0.  TSAT must be 20% or > before Insurance will approve ALMA.   Iron regimen: Treat with IV Iron.   Labs : 2025  RX/TX plans : TBD     [place] [place] [phone], opt 2 for infusion appt     Recent ALMA use, transfusion, IV iron: NA  No MI and blood clots or cancers. Hx of CVA, HTN, and Anticoagulated.      Contact:No Consent to Communicate on File.         Latest Ref Rng & Units 10/25/2023 3/1/2024 2024 2024 2024 2024 2024   Anemia   Hemoglobin 11.7 - 15.7 g/dL 11.7  9.3     6.3  10.7    11.3  11.9    IV Iron Dose     300mg 300mg     TSAT 15 - 46 %   10    31  28    Ferritin 11 - 328 ng/mL   17    197  143        Multiple values from one day are sorted in reverse-chronological order     BP Readings from Last 3 Encounters:   24 (!) 152/68   24 125/57   24 (!) 157/55     Wt Readings from Last 2 Encounters:   24 53.5 kg (118 lb)   24 54.4 kg (120 lb)           ASSESSMENT:    Hgb:at goal - continue to monitor  TSat: not at goal of >30% Ferritin: At goal (>100ng/mL)        PLAN:  RTC for Anemia Labs  in Aug 2024. Labs are scheduled for 24.      Orders needed to be renewed (for next follow-up date) in EPIC: None    Iron labs due:  Aug 2024    Plan discussed with:  No call, chart review       NEXT FOLLOW-UP DATE:  24    Jennifer Weber RN   Anemia Services  [facility]  [email or web address]   Office : [phone]  Fax: [phone]

## 2024-08-03 DIAGNOSIS — K21.00 GASTROESOPHAGEAL REFLUX DISEASE WITH ESOPHAGITIS WITHOUT HEMORRHAGE: ICD-10-CM

## 2024-08-03 DIAGNOSIS — I10 BENIGN ESSENTIAL HYPERTENSION: Primary | ICD-10-CM

## 2024-08-03 DIAGNOSIS — I10 BENIGN ESSENTIAL HYPERTENSION: ICD-10-CM

## 2024-08-03 DIAGNOSIS — J44.1 COPD EXACERBATION (H): ICD-10-CM

## 2024-08-05 RX ORDER — ENALAPRIL MALEATE 20 MG/1
20 TABLET ORAL DAILY
Qty: 90 TABLET | Refills: 0 | Status: SHIPPED | OUTPATIENT
Start: 2024-08-05

## 2024-08-05 RX ORDER — AMLODIPINE BESYLATE 5 MG/1
TABLET ORAL
Qty: 90 TABLET | Refills: 0 | Status: SHIPPED | OUTPATIENT
Start: 2024-08-05

## 2024-08-05 RX ORDER — FAMOTIDINE 40 MG/1
40 TABLET, FILM COATED ORAL DAILY
Qty: 90 TABLET | Refills: 0 | Status: SHIPPED | OUTPATIENT
Start: 2024-08-05

## 2024-08-06 ENCOUNTER — HOSPITAL ENCOUNTER (OUTPATIENT)
Dept: ULTRASOUND IMAGING | Facility: CLINIC | Age: 79
Discharge: HOME | End: 2024-08-06
Payer: COMMERCIAL

## 2024-08-06 ENCOUNTER — OFFICE VISIT (OUTPATIENT)
Dept: VASCULAR SURGERY | Facility: CLINIC | Age: 79
End: 2024-08-06
Payer: COMMERCIAL

## 2024-08-06 VITALS
HEIGHT: 65 IN | WEIGHT: 118 LBS | TEMPERATURE: 97.9 F | BODY MASS INDEX: 19.66 KG/M2 | SYSTOLIC BLOOD PRESSURE: 165 MMHG | OXYGEN SATURATION: 98 % | DIASTOLIC BLOOD PRESSURE: 54 MMHG | HEART RATE: 70 BPM

## 2024-08-06 DIAGNOSIS — I73.9 PAD (PERIPHERAL ARTERY DISEASE) (H): ICD-10-CM

## 2024-08-06 DIAGNOSIS — I73.9 PAD (PERIPHERAL ARTERY DISEASE) (H): Primary | ICD-10-CM

## 2024-08-06 DIAGNOSIS — R09.89 CAROTID BRUIT, UNSPECIFIED LATERALITY: ICD-10-CM

## 2024-08-06 PROCEDURE — 99214 OFFICE O/P EST MOD 30 MIN: CPT | Performed by: PHYSICIAN ASSISTANT

## 2024-08-06 PROCEDURE — 93922 UPR/L XTREMITY ART 2 LEVELS: CPT

## 2024-08-06 PROCEDURE — 93880 EXTRACRANIAL BILAT STUDY: CPT

## 2024-08-06 ASSESSMENT — PAIN SCALES - GENERAL: PAINLEVEL: NO PAIN (0)

## 2024-08-06 NOTE — Clinical Note
Hello, Patient was seen in vascular surgery clinic in [place] today, staff reported her blood pressure was 165/54. Thanks, Sharlene TIDWELL

## 2024-08-06 NOTE — PATIENT INSTRUCTIONS
"  This is the plan that was discussed at your appointment.    We will contact you to schedule your 1 year follow-up with repeat imaging        I am including additional information on these things and our contact information if you have any questions or concerns.   Please do not hesitate to reach out to us if you felt we did not answer your questions or you are unsure of the treatment plan after your visit today. Our number is [phone].  Thank you for trusting us with your care.         Again thank you for your time.     Carotid Artery Disease      What is carotid artery disease?  A carotid artery on each side of the neck supplies blood to the brain. Carotid artery disease occurs when a substance called plaque builds up in either or both arteries. The buildup may narrow the artery and limit blood flow to the brain. If this plaque breaks open, it may form a blood clot. Or pieces of the plaque may break off. A piece of plaque or a blood clot could move to the brain and cause a stroke or transient ischemic attack (TIA).    The narrowing in an artery is called stenosis. The more narrow an artery becomes, the greater the risk of stroke or TIA.        What causes it?  Carotid artery disease is caused by a process called hardening of the arteries, or atherosclerosis. Plaque builds up inside the carotid arteries. Things that can lead to this buildup include:    Smoking.  High blood pressure.  High cholesterol.  Diabetes.  A family history of hardening of the arteries.    What are the symptoms?  Many people have no symptoms. In some people, a doctor can hear a sound in their neck called a bruit (pronounced \"broo-EE\"). This is a whooshing sound that happens when a carotid artery is narrowed.    For some people, a transient ischemic attack (TIA) or stroke is the first sign of the disease.    Know the warning signs and symptoms of stroke: BE FAST     B = Balance loss   E = Eyesight changes   F = Facial droop or numbness   A " = Arm or leg weakness   S = Speech difficulty, slurred speech   T = Time to call 911 for help    How is carotid artery disease treated?  The goal of treatment is to lower your risk of a stroke. Treatment depends on whether you have symptoms and how narrow your arteries are. You probably will take medicine. You also will be encouraged to have a heart-healthy lifestyle. Some people also have a procedure to lower their risk.    Medicines  You may take aspirin or another medicine to prevent blood clots. You will likely also take medicine to lower cholesterol. You may also take medicine to help manage blood pressure.    Heart-healthy lifestyle  A heart-healthy lifestyle can help lower your risk of stroke.    Don't smoke. Avoid secondhand smoke too.  Eat heart-healthy foods.  Be active. Ask your doctor what type of exercise is safe for you.  Stay at a healthy weight. Lose weight if you need to.  Manage other health problems, such as diabetes. If you think you may have a problem with alcohol or drug use, talk to your doctor.  Get vaccinated against COVID-19, the flu, and pneumonia.    Surgery or stenting  Surgery in the arteries is called carotid endarterectomy. The doctor makes a cut in the neck and takes the plaque out of the artery.    Some people have a stent placed inside a carotid artery. A stent may be inserted during a catheter procedure. In this procedure, a doctor puts a thin tube, called a catheter, into a blood vessel in your groin or arm. The doctor threads the tube up to the carotid artery in the neck. The catheter is used to place the stent. In another type of procedure, a stent is placed in the artery through a cut in the neck.    Surgery and stenting may help lower your risk of a stroke. But they also have a risk of serious problems. You and your doctor can decide together if you want to have surgery or stenting.    Current as of: June 24, 2023  Author: [employer] are leaving this website for  "information purposes only  Clinical Review BoardYou are leaving this website for information purposes only  All Healthwise education is reviewed by a team that includes physicians, nurses, advanced practitioners, registered dieticians, and other healthcare professionals.    Your risk factors for stroke or TIA (transient ischemic attack):     Your Risk Factors Your Results Goals Additional education   Please scan QR code   [] High blood pressure BP (!) 165/54   Pulse 70   Temp 97.9  F (36.6  C) (Tympanic)   Ht 5' 5\" (1.651 m)   Wt 118 lb (53.5 kg)   LMP 09/15/1998   SpO2 98%   BMI 19.64 kg/m     Less than 120/80    [] Cholesterol          Total Cholesterol   Date Value Ref Range Status   10/25/2023 201 (H) <200 mg/dL Final   06/10/2021 184 <200 mg/dL Final    Less than 150     Triglycerides   Triglycerides   Date Value Ref Range Status   10/25/2023 120 <150 mg/dL Final   06/10/2021 84 <150 mg/dL Final     Comment:     Fasting specimen    Less than 150     LDL LDL Cholesterol Calculated   Date Value Ref Range Status   10/25/2023 113 (H) <=100 mg/dL Final   06/10/2021 83 <100 mg/dL Final     Comment:     Desirable:       <100 mg/dl      Less than 70     HDL HDL Cholesterol   Date Value Ref Range Status   06/10/2021 84 >49 mg/dL Final     Direct Measure HDL   Date Value Ref Range Status   10/25/2023 64 >=50 mg/dL Final    Greater than 40 (men)  Greater than 50 (women)    [] Diabetes                A1C Hemoglobin A1C   Date Value Ref Range Status   03/22/2019 6.1 (H) 0 - 5.6 % Final     Comment:     Normal <5.7% Prediabetes 5.7-6.4%  Diabetes 6.5% or higher - adopted from ADA   consensus guidelines.      Less than 5.7    [] Smoking/tobacco use   Tobacco Use      Smoking status: Every Day        Packs/day: 1.00        Years: 1 pack/day for 50.0 years (50.0 ttl pk-yrs)        Types: Cigarettes        Start date: 1/1/2016      Smokeless tobacco: Never   Quit smoking and tobacco    [] Overweight Estimated body mass " "index is 19.64 kg/m  as calculated from the following:    Height as of this encounter: 5' 5\" (1.651 m).    Weight as of this encounter: 118 lb (53.5 kg).  Less than 25                 "

## 2024-08-06 NOTE — PROGRESS NOTES
VASCULAR SURGERY PROGRESS NOTE    LOCATION:  [facility]     Bebe Alfonso  Medical Record #: [id number]  YOB: 1945  Age: 78 year old     Date of Service: 8/6/2024    PRIMARY CARE PROVIDER: Diana Ely    Reason for visit: Surveillance of PAD and carotid disease     IMPRESSION: 78-year-old female who presents for surveillance of peripheral arterial disease and carotid artery stenosis. FAMILIA is noncompressible on the right but with multiphasic waveforms suggesting a hemodynamically significant lesion is unlikely. FAMILIA on the left is normal at 1.1. Patient denies any claudication, rest pain, nonhealing wounds. Carotid ultrasound demonstrates 50 to 69% stenosis bilaterally.  Patient is completely asymptomatic. Medically optimized but unfortunately continues to smoke.    RECOMMENDATION/RISKS: Continue best medical management with aspirin and statin therapy.  Strongly encouraged smoking cessation.  Follow-up in 1 year with repeat imaging studies.    HPI:  Bebe Alfonso is a 78 year old female with past medical history significant for hypertension, hyperlipidemia, ongoing tobacco abuse, COPD, history of stroke, chronic kidney disease, peripheral arterial disease, and carotid artery stenosis.  Patient was seen in the vascular clinic 1 year ago and was noted to be doing well with stable imaging studies.    Today, Mrs. Alfonso  presents for follow-up.  She is accompanied by her . Patient has been doing well but did have a recent fall about 4 weeks ago. Notes she feels dizzy at times when she stands up but improves if she takes position changes slowly.  From a lower extremity standpoint, patient denies any pain in her legs with ambulation or at rest.  Denies any nonhealing lower extremity wounds.  From carotid standpoint patient is completely asymptomatic and denies any vision changes, slurred speech, facial asymmetry, unilateral extremity numbness or weakness, or other  signs/symptoms of TIA or stroke.  She is compliant with her aspirin and statin medications.  Continues to smoke about half a pack to 1 pack/day and is not interested in cutting back at this time.    Ultrasound results were discussed and all questions answered.  No other concerns    REVIEW OF SYSTEMS:    A 12 point ROS was reviewed and is negative except for what is listed above in HPI.    PHH:    Past Medical History:   Diagnosis Date    Anticoagulation monitoring, INR range 2-3 10/22/2015    Benign essential hypertension 9/15/2016    Carotid bruit 11/12/2012    Overview:  12/6/10 Carotid US  Elevated velocities throughout the carotid arteries bilaterally. There is a focal area of increased velocity in the mid left internal carotid artery with a plaque in this region on the color flow images. This corresponds to 50 - 70 % diameter reduction. There is an area of elevated velocity in the left external carotid artery consistent with stenosis. There is no focal area of significant stenosis in the right carotid arteries in the neck. Plaque in the carotid arteries bilaterally.     Chronic obstructive pulmonary disease (H) 1/19/2016    The FVC, FEV1 and FEV1/FVC ratio are reduced.  The inspiratory flow rates are within normal limits.  The FVC is reduced relative to the SVC indicating air trapping.  While the TLC is within normal limits, the FRC, RV and RV/TLC ratio are increased.  The  diffusing capacity is mildly reduced. IMPRESSION: Mild-moderate Airflow Obstruction with air trapping ____________________________________________Clarke Iverson  April 2018    Esophageal reflux 4/8/2008    Overview:  Omeprazole PRN, occasional symptoms such as chest burning and knife twisted to stomach.     Heart disease born with it    History of blood transfusion 70 years ago    History of CVA (cerebrovascular accident) 10/20/2015    Long term current use of anticoagulant therapy 3/20/2018    Major depressive disorder, recurrent episode,  moderate (H) 10/29/2008    Overview:  She is not taking any medication at this time.    Osteoarthrosis, hip 10/19/2010    Overview:  Pt scheduled for right  total hip arthroplasty on 6/14/13 with Dr. Addison HEREDIA hip xray (11/2012) Severe degenerative changes seen of the right hip with near bone-on-bone appearance of the joint and several subchondral cysts forming.    Thyroid nodule 5/22/2013    Overview:  Thyroid US (2012) Stable nodule left lobe of thyroid TSH- (2/19/12) normal (2.26)     Past Surgical History:   Procedure Laterality Date    BIOPSY  appox in 1980's    BREAST SURGERY  last time in the 1990's    non cancer    COLONOSCOPY N/A 9/7/2022    Procedure: COLONOSCOPY, WITH POLYPECTOMY AND BIOPSY;  Surgeon: Alfredito Gomez DO;  Location: [place] [facility]    ESOPHAGOSCOPY, GASTROSCOPY, DUODENOSCOPY (EGD), COMBINED N/A 5/25/2022    Procedure: ESOPHAGOGASTRODUODENOSCOPY, WITH BIOPSY;  Surgeon: Alfredito Gomez DO;  Location: [place] [facility]     ALLERGIES:  Losartan, Gabapentin, Lisinopril, Oxycodone, Tramadol, Augmentin [amoxicillin-pot clavulanate], Bacitracin, and Bupropion    MEDS:    Current Outpatient Medications:     amLODIPine (NORVASC) 5 MG tablet, Take 1 tablet by mouth once daily, Disp: 90 tablet, Rfl: 0    Aspirin (VAZALORE) 81 MG CAPS, Please choose reason not prescribed from choices below., Disp: , Rfl:     atorvastatin (LIPITOR) 80 MG tablet, Take 1 tablet by mouth once daily, Disp: 90 tablet, Rfl: 0    empagliflozin (JARDIANCE) 25 MG TABS tablet, Take 1 tablet (25 mg) by mouth daily, Disp: 90 tablet, Rfl: 3    enalapril (VASOTEC) 20 MG tablet, Take 1 tablet by mouth once daily, Disp: 90 tablet, Rfl: 0    famotidine (PEPCID) 40 MG tablet, Take 1 tablet by mouth once daily, Disp: 90 tablet, Rfl: 0    FLUoxetine (PROZAC) 20 MG capsule, Take 1 capsule by mouth once daily, Disp: 90 capsule, Rfl: 1    fluticasone (FLONASE) 50 MCG/ACT nasal spray, Spray 1 spray into both nostrils daily (Patient taking  "differently: Spray 1 spray into both nostrils daily prn), Disp: 16 g, Rfl: 1    warfarin ANTICOAGULANT (COUMADIN) 5 MG tablet, Take 7.5 mg Mon, Wed, Fri and 5 mg all other days, or as directed by the Coumadin Clinic., Disp: 105 tablet, Rfl: 1    SOCIAL HABITS:    History   Smoking Status    Every Day    Types: Cigarettes   Smokeless Tobacco    Never     Social History    Substance and Sexual Activity      Alcohol use: No      History   Drug Use Unknown     FAMILY HISTORY:    Family History   Problem Relation Age of Onset    Diabetes Maternal Grandfather     Diabetes Sister         developed after cancer treatment    Breast Cancer Sister     Obesity Sister     Hypertension Mother     Hyperlipidemia Mother     Osteoporosis Mother     Breast Cancer Sister     Osteoporosis Sister     Breast Cancer Daughter     Other Cancer Sister         throught out body    Substance Abuse Sister         acol    Obesity Sister     Substance Abuse Sister         acol    Obesity Sister      PE:  BP (!) 165/54   Pulse 70   Temp 97.9  F (36.6  C) (Tympanic)   Ht 1.651 m (5' 5\")   Wt 53.5 kg (118 lb)   LMP 09/15/1998   SpO2 98%   BMI 19.64 kg/m    Wt Readings from Last 1 Encounters:   08/06/24 53.5 kg (118 lb)     Body mass index is 19.64 kg/m .    EXAM:  GENERAL: well-developed 78 year old female who appears her stated age  CARDIAC: normal   CHEST/LUNG: normal respiratory effort   MUSCULOSKELETAL: grossly normal and both lower extremities are intact, no lower extremity edema  NEUROLOGIC: focally intact, alert and oriented x 3  PSYCH: appropriate affect    DIAGNOSTIC STUDIES:     Images:    US Carotid Bilateral    Result Date: 8/6/2024  HISTORY: Carotid bruit.     IMPRESSION:   Per sonographic criteria, there are 50-69% stenoses in the internal carotid arteries bilaterally.     US FAMILIA with PPG wo Exercise     Result Date: 8/6/2024  History: Hypertension, hyperlipidemia, peripheral vascular  disease    IMPRESSION:  1.  RIGHT LOWER " EXTREMITY: Nondiagnostic exam due to noncompressible  right ankle arteries. Similar appearance of the multiphasic waveform  compared with prior exams suggests that interval development of a  hemodynamically significant lesion is unlikely. Further imaging with  lower extremity Doppler ultrasound or CTA could be obtained if  clinically indicated.     2.  LEFT LOWER EXTREMITY: FAMILIA at rest is normal with normal waveforms  in the distal posterior tibial and dorsalis pedis arteries. Overall no  evidence of a hemodynamically significant lesion.    LABS:      Sodium   Date Value Ref Range Status   04/09/2024 138 135 - 145 mmol/L Final     Comment:     Reference intervals for this test were updated on 09/26/2023 to more accurately reflect our healthy population. There may be differences in the flagging of prior results with similar values performed with this method. Interpretation of those prior results can be made in the context of the updated reference intervals.    03/01/2024 136 135 - 145 mmol/L Final     Comment:     Reference intervals for this test were updated on 09/26/2023 to more accurately reflect our healthy population. There may be differences in the flagging of prior results with similar values performed with this method. Interpretation of those prior results can be made in the context of the updated reference intervals.    11/16/2023 136 135 - 145 mmol/L Final     Comment:     Reference intervals for this test were updated on 09/26/2023 to more accurately reflect our healthy population. There may be differences in the flagging of prior results with similar values performed with this method. Interpretation of those prior results can be made in the context of the updated reference intervals.    06/10/2021 133 133 - 144 mmol/L Final   05/28/2020 131 (L) 133 - 144 mmol/L Final   12/12/2019 137 133 - 144 mmol/L Final     Urea Nitrogen   Date Value Ref Range Status   04/09/2024 27.9 (H) 8.0 - 23.0 mg/dL Final    03/01/2024 31.6 (H) 8.0 - 23.0 mg/dL Final   11/16/2023 29.6 (H) 8.0 - 23.0 mg/dL Final   05/03/2022 23 7 - 30 mg/dL Final   12/16/2021 29 7 - 30 mg/dL Final   06/10/2021 25 7 - 30 mg/dL Final   05/28/2020 28 7 - 30 mg/dL Final   12/12/2019 25 7 - 30 mg/dL Final     Hemoglobin   Date Value Ref Range Status   07/18/2024 11.9 11.7 - 15.7 g/dL Final   06/20/2024 11.3 (L) 11.7 - 15.7 g/dL Final   04/09/2024 10.7 (L) 11.7 - 15.7 g/dL Final   12/12/2019 12.4 11.7 - 15.7 g/dL Final   05/09/2019 13.0 11.7 - 15.7 g/dL Final   03/22/2019 12.7 11.7 - 15.7 g/dL Final     Platelet Count   Date Value Ref Range Status   04/09/2024 270 150 - 450 10e3/uL Final   03/01/2024 226 150 - 450 10e3/uL Final   03/01/2024 250 150 - 450 10e3/uL Final   12/12/2019 302 150 - 450 10e9/L Final   05/09/2019 241 150 - 450 10e9/L Final   03/22/2019 293 150 - 450 10e9/L Final     INR   Date Value Ref Range Status   07/18/2024 2.6 (H) 0.9 - 1.1 Final   06/20/2024 2.4 (H) 0.9 - 1.1 Final   05/31/2024 2.2 (H) 0.9 - 1.1 Final   03/01/2024 2.69 (H) 0.85 - 1.15 Final   09/28/2022 2.47 (H) 0.85 - 1.15 Final   06/24/2021 2.60 (H) 0.86 - 1.14 Final     Comment:     This test is intended for monitoring Coumadin therapy.  Results are not   accurate in patients with prolonged INR due to factor deficiency.     06/10/2021 2.80 (H) 0.86 - 1.14 Final     Comment:     This test is intended for monitoring Coumadin therapy.  Results are not   accurate in patients with prolonged INR due to factor deficiency.     04/29/2021 2.90 (H) 0.86 - 1.14 Final     Comment:     This test is intended for monitoring Coumadin therapy.  Results are not   accurate in patients with prolonged INR due to factor deficiency.       30 minutes spent on the day of encounter doing chart review, history and exam, documentation, and further activities as noted.     Veronika Daley PA-C  VASCULAR SURGERY

## 2024-08-19 DIAGNOSIS — Z79.01 LONG TERM CURRENT USE OF ANTICOAGULANT THERAPY: ICD-10-CM

## 2024-08-19 DIAGNOSIS — I63.20 CEREBROVASCULAR ACCIDENT (CVA) DUE TO OCCLUSION OF PRECEREBRAL ARTERY (H): ICD-10-CM

## 2024-08-19 DIAGNOSIS — Z86.73 HISTORY OF CVA (CEREBROVASCULAR ACCIDENT): ICD-10-CM

## 2024-08-19 RX ORDER — WARFARIN SODIUM 5 MG/1
TABLET ORAL
Qty: 100 TABLET | Refills: 1 | Status: SHIPPED | OUTPATIENT
Start: 2024-08-19

## 2024-08-19 NOTE — TELEPHONE ENCOUNTER
ANTICOAGULATION MANAGEMENT:  Medication Refill    Anticoagulation Summary  As of 7/18/2024      Warfarin maintenance plan:  7.5 mg (5 mg x 1.5) every Mon; 5 mg (5 mg x 1) all other days   Next INR check:  8/29/2024   Target end date:  Indefinite    Indications    History of CVA (cerebrovascular accident) (Resolved) [Z86.73]  Long term current use of anticoagulant therapy [Z79.01]  Cerebrovascular accident (CVA) due to occlusion of precerebral artery (H) [I63.20]  History of CVA (cerebrovascular accident) [Z86.73]  Anticoagulation monitoring  INR range 2-3 [Z79.01]                 Anticoagulation Care Providers       Provider Role Specialty Phone number    Diana Ely APRN [facility] Referring Family Medicine [phone]            Refill Criteria    Visit with referring provider/group: Meets criteria: visit within referring provider group in the last 15 months on 3/1/24    [facility] referral last signed: 11/29/2023; within last year: Yes    Lab monitoring not exceeding 2 weeks overdue: No    Bebe meets all criteria for refill. Rx instructions and quantity supplied updated to match patient's current dosing plan. Warfarin 90 day supply with 1 refill granted per [facility] protocol     Deneen Pretty RN  Anticoagulation Clinic

## 2024-08-19 NOTE — TELEPHONE ENCOUNTER
Pending Prescriptions:                       Disp   Refills    warfarin ANTICOAGULANT (COUMADIN) 5 MG ta*105 ta*1            Sig: Take 7.5 mg Mon, Wed, Fri and 5 mg all other           days, or as directed by the Coumadin Clinic.

## 2024-08-29 ENCOUNTER — ANTICOAGULATION THERAPY VISIT (OUTPATIENT)
Dept: ANTICOAGULATION | Facility: CLINIC | Age: 79
End: 2024-08-29

## 2024-08-29 ENCOUNTER — LAB (OUTPATIENT)
Dept: LAB | Facility: CLINIC | Age: 79
End: 2024-08-29
Payer: COMMERCIAL

## 2024-08-29 DIAGNOSIS — I63.20 CEREBROVASCULAR ACCIDENT (CVA) DUE TO OCCLUSION OF PRECEREBRAL ARTERY (H): Chronic | ICD-10-CM

## 2024-08-29 DIAGNOSIS — I63.20 CEREBROVASCULAR ACCIDENT (CVA) DUE TO OCCLUSION OF PRECEREBRAL ARTERY (H): ICD-10-CM

## 2024-08-29 DIAGNOSIS — Z79.01 ANTICOAGULATION MONITORING, INR RANGE 2-3: Chronic | ICD-10-CM

## 2024-08-29 DIAGNOSIS — Z79.01 ANTICOAGULATION MONITORING, INR RANGE 2-3: ICD-10-CM

## 2024-08-29 DIAGNOSIS — Z79.01 LONG TERM CURRENT USE OF ANTICOAGULANT THERAPY: ICD-10-CM

## 2024-08-29 DIAGNOSIS — Z79.01 LONG TERM CURRENT USE OF ANTICOAGULANT THERAPY: Primary | ICD-10-CM

## 2024-08-29 DIAGNOSIS — Z86.73 HISTORY OF CVA (CEREBROVASCULAR ACCIDENT): ICD-10-CM

## 2024-08-29 LAB — INR BLD: 3.7 (ref 0.9–1.1)

## 2024-08-29 PROCEDURE — 85610 PROTHROMBIN TIME: CPT

## 2024-08-29 PROCEDURE — 36416 COLLJ CAPILLARY BLOOD SPEC: CPT

## 2024-08-29 NOTE — PROGRESS NOTES
Anemia Labs are due.   Only INR was drawn today (8/29/24). Next labs appt 9/12/24.     Jennifer Weber RN   Anemia Services  [facility]  [email or web address]   Office : [phone]  Fax: [phone]

## 2024-08-29 NOTE — PROGRESS NOTES
ANTICOAGULATION MANAGEMENT     Bebe Alfonso 78 year old female is on warfarin with supratherapeutic INR result. (Goal INR 2.0-3.0)    Recent labs: (last 7 days)     08/29/24  0820   INR 3.7*       ASSESSMENT     Source(s): Chart Review and Patient/Caregiver Call     Warfarin doses taken: Warfarin taken as instructed  Diet: No new diet changes identified  Medication/supplement changes:  Pt has been taking some OTC tylenol for general aches and plans to continue this.   New illness, injury, or hospitalization: Yes: General aches, chronic congestion  Signs or symptoms of bleeding or clotting: No  Previous result: Therapeutic last 2(+) visits  Additional findings: None       PLAN     Recommended plan for temporary change(s) and ongoing change(s) affecting INR     Dosing Instructions: partial hold then decrease your warfarin dose (6.7% change) with next INR in 2 weeks       Summary  As of 8/29/2024      Full warfarin instructions:  8/29: 2.5 mg; Otherwise 5 mg every day   Next INR check:  9/12/2024               Telephone call with Bebe who verbalizes understanding and agrees to plan and who agrees to plan and repeated back plan correctly    Lab visit scheduled    Education provided: Goal range and lab monitoring: goal range and significance of current result, Importance of therapeutic range, and Importance of following up at instructed interval  Symptom monitoring: monitoring for bleeding signs and symptoms, monitoring for clotting signs and symptoms, and when to seek medical attention/emergency care  Contact [phone] with any changes, questions or concerns.     Plan made per [facility] anticoagulation protocol    Chirag Ely RN  Anticoagulation Clinic  8/29/2024    _______________________________________________________________________     Anticoagulation Episode Summary       Current INR goal:  2.0-3.0   TTR:  58.3% (1 y)   Target end date:  Indefinite   Send INR reminders to:  [facility]     Indications    History of CVA (cerebrovascular accident) (Resolved) [Z86.73]  Long term current use of anticoagulant therapy [Z79.01]  Cerebrovascular accident (CVA) due to occlusion of precerebral artery (H) [I63.20]  History of CVA (cerebrovascular accident) [Z86.73]  Anticoagulation monitoring  INR range 2-3 [Z79.01]             Comments:               Anticoagulation Care Providers       Provider Role Specialty Phone number    Diana Ely APRN [facility] Referring Family Medicine [phone]

## 2024-09-04 DIAGNOSIS — N18.32 STAGE 3B CHRONIC KIDNEY DISEASE (H): Primary | Chronic | ICD-10-CM

## 2024-09-12 ENCOUNTER — LAB (OUTPATIENT)
Dept: LAB | Facility: CLINIC | Age: 79
End: 2024-09-12
Payer: COMMERCIAL

## 2024-09-12 ENCOUNTER — ANTICOAGULATION THERAPY VISIT (OUTPATIENT)
Dept: ANTICOAGULATION | Facility: CLINIC | Age: 79
End: 2024-09-12

## 2024-09-12 DIAGNOSIS — Z79.01 ANTICOAGULATION MONITORING, INR RANGE 2-3: ICD-10-CM

## 2024-09-12 DIAGNOSIS — N18.32 STAGE 3B CHRONIC KIDNEY DISEASE (H): ICD-10-CM

## 2024-09-12 DIAGNOSIS — D63.1 ANEMIA OF CHRONIC RENAL FAILURE, STAGE 3B (H): Primary | ICD-10-CM

## 2024-09-12 DIAGNOSIS — Z79.01 ANTICOAGULATION MONITORING, INR RANGE 2-3: Chronic | ICD-10-CM

## 2024-09-12 DIAGNOSIS — I63.20 CEREBROVASCULAR ACCIDENT (CVA) DUE TO OCCLUSION OF PRECEREBRAL ARTERY (H): ICD-10-CM

## 2024-09-12 DIAGNOSIS — Z79.01 LONG TERM CURRENT USE OF ANTICOAGULANT THERAPY: ICD-10-CM

## 2024-09-12 DIAGNOSIS — N18.32 ANEMIA OF CHRONIC RENAL FAILURE, STAGE 3B (H): Primary | ICD-10-CM

## 2024-09-12 DIAGNOSIS — Z79.01 LONG TERM CURRENT USE OF ANTICOAGULANT THERAPY: Primary | ICD-10-CM

## 2024-09-12 DIAGNOSIS — I63.20 CEREBROVASCULAR ACCIDENT (CVA) DUE TO OCCLUSION OF PRECEREBRAL ARTERY (H): Chronic | ICD-10-CM

## 2024-09-12 DIAGNOSIS — Z86.73 HISTORY OF CVA (CEREBROVASCULAR ACCIDENT): ICD-10-CM

## 2024-09-12 LAB
ALBUMIN SERPL BCG-MCNC: 4.3 G/DL (ref 3.5–5.2)
ALBUMIN UR-MCNC: ABNORMAL MG/DL
ANION GAP SERPL CALCULATED.3IONS-SCNC: 11 MMOL/L (ref 7–15)
APPEARANCE UR: CLEAR
BILIRUB UR QL STRIP: ABNORMAL
BUN SERPL-MCNC: 36.8 MG/DL (ref 8–23)
CALCIUM SERPL-MCNC: 9.4 MG/DL (ref 8.8–10.4)
CHLORIDE SERPL-SCNC: 103 MMOL/L (ref 98–107)
COLOR UR AUTO: YELLOW
CREAT SERPL-MCNC: 2.3 MG/DL (ref 0.51–0.95)
EGFRCR SERPLBLD CKD-EPI 2021: 21 ML/MIN/1.73M2
ERYTHROCYTE [DISTWIDTH] IN BLOOD BY AUTOMATED COUNT: 14.7 % (ref 10–15)
FERRITIN SERPL-MCNC: 71 NG/ML (ref 11–328)
GLUCOSE SERPL-MCNC: 84 MG/DL (ref 70–99)
GLUCOSE UR STRIP-MCNC: NEGATIVE MG/DL
HCO3 SERPL-SCNC: 24 MMOL/L (ref 22–29)
HCT VFR BLD AUTO: 31 % (ref 35–47)
HGB BLD-MCNC: 9.9 G/DL (ref 11.7–15.7)
HGB UR QL STRIP: ABNORMAL
INR BLD: 4.4 (ref 0.9–1.1)
IRON BINDING CAPACITY (ROCHE): 303 UG/DL (ref 240–430)
IRON SATN MFR SERPL: 21 % (ref 15–46)
IRON SERPL-MCNC: 63 UG/DL (ref 37–145)
KETONES UR STRIP-MCNC: NEGATIVE MG/DL
LEUKOCYTE ESTERASE UR QL STRIP: NEGATIVE
MCH RBC QN AUTO: 30.7 PG (ref 26.5–33)
MCHC RBC AUTO-ENTMCNC: 31.9 G/DL (ref 31.5–36.5)
MCV RBC AUTO: 96 FL (ref 78–100)
NITRATE UR QL: NEGATIVE
PH UR STRIP: 6 [PH] (ref 5–7)
PHOSPHATE SERPL-MCNC: 4 MG/DL (ref 2.5–4.5)
PLATELET # BLD AUTO: 258 10E3/UL (ref 150–450)
POTASSIUM SERPL-SCNC: 4.7 MMOL/L (ref 3.4–5.3)
RBC # BLD AUTO: 3.23 10E6/UL (ref 3.8–5.2)
RBC #/AREA URNS AUTO: NORMAL /HPF
SODIUM SERPL-SCNC: 138 MMOL/L (ref 135–145)
SP GR UR STRIP: 1.02 (ref 1–1.03)
UROBILINOGEN UR STRIP-ACNC: 0.2 E.U./DL
WBC # BLD AUTO: 7.4 10E3/UL (ref 4–11)
WBC #/AREA URNS AUTO: NORMAL /HPF

## 2024-09-12 PROCEDURE — 85610 PROTHROMBIN TIME: CPT

## 2024-09-12 PROCEDURE — 36415 COLL VENOUS BLD VENIPUNCTURE: CPT

## 2024-09-12 PROCEDURE — 81001 URINALYSIS AUTO W/SCOPE: CPT

## 2024-09-12 PROCEDURE — 80069 RENAL FUNCTION PANEL: CPT

## 2024-09-12 PROCEDURE — 82728 ASSAY OF FERRITIN: CPT

## 2024-09-12 PROCEDURE — 83550 IRON BINDING TEST: CPT

## 2024-09-12 PROCEDURE — 83540 ASSAY OF IRON: CPT

## 2024-09-12 PROCEDURE — 85027 COMPLETE CBC AUTOMATED: CPT

## 2024-09-12 NOTE — PROGRESS NOTES
ANTICOAGULATION MANAGEMENT     Bebe Alfonso 78 year old female is on warfarin with supratherapeutic INR result. (Goal INR 2.0-3.0)    Recent labs: (last 7 days)     09/12/24  0921   INR 4.4*       ASSESSMENT     Source(s): Chart Review and Patient/Caregiver Call     Warfarin doses taken: More warfarin taken than planned which may be affecting INR  Diet: No new diet changes identified  Medication/supplement changes:  Tylenol PRN  New illness, injury, or hospitalization: Yes: Patient has a cough and sounds congested. She thought it was allergies but believes it may be a cold.   Signs or symptoms of bleeding or clotting: No  Previous result: Supratherapeutic  Additional findings: None       PLAN     Recommended plan for temporary change(s) and ongoing changes affecting INR     Dosing Instructions: decrease your warfarin dose (14.3% change) with next INR in 1 week. Patient did not reduce dose as instructed at the last visit. Reduction this week will actually be 20% since dose was not reduced last week. Once patient is feeling better, MD will likely need to be increased.        Summary  As of 9/12/2024      Full warfarin instructions:  9/12: 2.5 mg; Otherwise 2.5 mg every Sun, Thu; 5 mg all other days   Next INR check:  9/19/2024               Telephone call with Bebe who verbalizes understanding and agrees to plan    Lab visit scheduled    Education provided: Contact [phone] with any changes, questions or concerns.     Plan made per [facility] anticoagulation protocol    Justin JOHNSON RN  9/12/2024  Anticoagulation Clinic  [employer] for routing messages: vic [facility] patient phone line: [phone]        _______________________________________________________________________     Anticoagulation Episode Summary       Current INR goal:  2.0-3.0   TTR:  54.5% (1 y)   Target end date:  Indefinite   Send INR reminders to:  [facility]    Indications    History of CVA (cerebrovascular accident)  (Resolved) [Z86.73]  Long term current use of anticoagulant therapy [Z79.01]  Cerebrovascular accident (CVA) due to occlusion of precerebral artery (H) [I63.20]  History of CVA (cerebrovascular accident) [Z86.73]  Anticoagulation monitoring  INR range 2-3 [Z79.01]             Comments:               Anticoagulation Care Providers       Provider Role Specialty Phone number    Diana Ely APRN [facility] [phone]

## 2024-09-13 ENCOUNTER — PATIENT OUTREACH (OUTPATIENT)
Dept: CARE COORDINATION | Facility: CLINIC | Age: 79
End: 2024-09-13
Payer: COMMERCIAL

## 2024-09-13 DIAGNOSIS — D63.1 ANEMIA OF CHRONIC RENAL FAILURE, STAGE 3B (H): Primary | ICD-10-CM

## 2024-09-13 DIAGNOSIS — N18.32 ANEMIA OF CHRONIC RENAL FAILURE, STAGE 3B (H): Primary | ICD-10-CM

## 2024-09-13 RX ORDER — DIPHENHYDRAMINE HYDROCHLORIDE 50 MG/ML
50 INJECTION INTRAMUSCULAR; INTRAVENOUS
Start: 2024-09-13

## 2024-09-13 RX ORDER — MEPERIDINE HYDROCHLORIDE 25 MG/ML
25 INJECTION INTRAMUSCULAR; INTRAVENOUS; SUBCUTANEOUS EVERY 30 MIN PRN
OUTPATIENT
Start: 2024-09-13

## 2024-09-13 RX ORDER — ALBUTEROL SULFATE 0.83 MG/ML
2.5 SOLUTION RESPIRATORY (INHALATION)
OUTPATIENT
Start: 2024-09-13

## 2024-09-13 RX ORDER — HEPARIN SODIUM,PORCINE 10 UNIT/ML
5-20 VIAL (ML) INTRAVENOUS DAILY PRN
OUTPATIENT
Start: 2024-09-13

## 2024-09-13 RX ORDER — EPINEPHRINE 1 MG/ML
0.3 INJECTION, SOLUTION, CONCENTRATE INTRAVENOUS EVERY 5 MIN PRN
OUTPATIENT
Start: 2024-09-13

## 2024-09-13 RX ORDER — ALBUTEROL SULFATE 90 UG/1
1-2 AEROSOL, METERED RESPIRATORY (INHALATION)
Start: 2024-09-13

## 2024-09-13 RX ORDER — HEPARIN SODIUM (PORCINE) LOCK FLUSH IV SOLN 100 UNIT/ML 100 UNIT/ML
5 SOLUTION INTRAVENOUS
OUTPATIENT
Start: 2024-09-13

## 2024-09-13 RX ORDER — METHYLPREDNISOLONE SODIUM SUCCINATE 125 MG/2ML
125 INJECTION, POWDER, LYOPHILIZED, FOR SOLUTION INTRAMUSCULAR; INTRAVENOUS
Start: 2024-09-13

## 2024-09-13 NOTE — PROGRESS NOTES
Anemia Management Note - Follow Up      SUBJECTIVE/OBJECTIVE:    Referred by Dr. Rafael Oswald on 2024  Primary Diagnosis: Anemia in Chronic Kidney Disease (N18.3, D63.1)   3b  Secondary Diagnosis: Chronic Kidney Disease, Stage 3 (N18.3)  3b     Hgb goal range: 9-10  Epo/Darbo: TBD.  Hgb >10.0.  TSAT must be 20% or > before Insurance will approve ALMA.   Iron regimen: Treat with IV Iron.   *completed Venofer on 24.     Labs : 2025  RX/TX plans : TBD     *[facility]; [phone], opt 2 for infusion appt     Recent ALMA use, transfusion, IV iron: NA  No MI and blood clots or cancers. Hx of CVA, HTN, and Anticoagulated.      Contact:No Consent to Communicate on File.         Latest Ref Rng & Units 2024   Anemia   Hemoglobin 11.7 - 15.7 g/dL 10.7     11.3  11.9  9.9    IV Iron Dose   300mg 300mg 300mg      TSAT 15 - 46 % 10     31  28  21    Ferritin 11 - 328 ng/mL 17     197  143  71         BP Readings from Last 3 Encounters:   24 (!) 165/54   24 (!) 152/68   24 125/57     Wt Readings from Last 2 Encounters:   24 53.5 kg (118 lb)   24 53.5 kg (118 lb)           ASSESSMENT:    Hgb:at goal - continue to monitor  TSat: not at goal of >30% Ferritin: Not at goal of (>100ng/mL)        PLAN:  Needs IV Iron.  Spoke with Elif,  she is in agreement to do another course of Venofer. Orders entered.  Elif requested that the [facility] call her to schedule those appt.  Message sent to scheduling.     Orders needed to be renewed (for next follow-up date) in EPIC: None    Iron labs due:  Needs IV Iron    Plan discussed with:  Elif      NEXT FOLLOW-UP DATE:  24    Jennifer Weber RN   Anemia Services  [facility]  [email or web address]   Office : [phone]  Fax: [phone]

## 2024-09-16 ENCOUNTER — OFFICE VISIT (OUTPATIENT)
Dept: NEPHROLOGY | Facility: CLINIC | Age: 79
End: 2024-09-16
Payer: COMMERCIAL

## 2024-09-16 VITALS
HEART RATE: 107 BPM | DIASTOLIC BLOOD PRESSURE: 54 MMHG | SYSTOLIC BLOOD PRESSURE: 148 MMHG | WEIGHT: 116 LBS | BODY MASS INDEX: 19.3 KG/M2

## 2024-09-16 DIAGNOSIS — N18.4 CKD (CHRONIC KIDNEY DISEASE) STAGE 4, GFR 15-29 ML/MIN (H): Primary | ICD-10-CM

## 2024-09-16 DIAGNOSIS — I10 HYPERTENSION, ESSENTIAL: ICD-10-CM

## 2024-09-16 DIAGNOSIS — D63.1 ANEMIA IN STAGE 4 CHRONIC KIDNEY DISEASE (H): ICD-10-CM

## 2024-09-16 DIAGNOSIS — R31.0 GROSS HEMATURIA: ICD-10-CM

## 2024-09-16 DIAGNOSIS — N18.4 ANEMIA IN STAGE 4 CHRONIC KIDNEY DISEASE (H): ICD-10-CM

## 2024-09-16 PROCEDURE — 99214 OFFICE O/P EST MOD 30 MIN: CPT | Performed by: PHYSICIAN ASSISTANT

## 2024-09-16 NOTE — PATIENT INSTRUCTIONS
Increase water intake.   Schedule kidney smart class, nursing staff will help with that.   Avoid ibuprofen/aleve.   Labs on 9/19/24, will call with results.   Check blood pressure daily, keep log.   Refer to urology and will talk to your PCP regarding blood in the urine and stool.   Labs and follow up in 3 months.

## 2024-09-16 NOTE — PROGRESS NOTES
Nephrology Progress Note  9/16/2024      CC: CKD    HPI:   Thank you for the referral. I had the pleasure today of seeing Bebe Alfonso  She is a 78 year old female  who presents for evaluation of CKD.  She is here today with her .  She seems to have memory impairment and some expressive aphasia as she seems to struggle to find words.    Her medical history significant for severe atherosclerotic disease and she has sustained five strokes in addition to several minor ones.  She reports that the cause of these strokes are unclear though she is on warfarin for anticoagulation. It is not clear to me why she is on anticoagulation.  Her heart rate in clinic today is regular.     She is also known to have hypertension and dyslipidemia.  She reports that her blood pressure recently has been better controlled, usually ~ 120s/50-60 at home.  She continues to smoke but she denies any shortness of breath.    She has been losing weight because she is limited on the diet that she can take with warfarin.  Also since she had been diagnosed with CKD, her food intake has declined as she is trying to watch her diet more.  She is actually now underweight with a BMI of 19.  She reports that she intermittently saw pink urine and blood in stool at the same time over the past year.   Her energy is okay.  She denies any skin rash, orthopnea, change in bowel habits, skin rash. She has not been seen by urology. Her sister had what sounds like a partial nephrectomy yesterday for kidney lesion.     She was seen by an nephrologist at [facility] in December 2023.  Her workup showed a small right kidney 6 cm compared to a 10.5 cm on the left and moderate right renal artery stenosis.  Her workup was negative for monoclonal protein and her ANCA serology was negative.  Her UA had intermittent blood cells and red blood cells and some moderate proteinuria with a urine protein to creatinine ratio of 0.8 g/g.  Her kidney disease was thought to be  secondary to atherosclerotic vascular disease and decreased right kidney function.  She saw vascular surgery at [place] and they decided not to intervene on her right renal artery stenosis    Social hx: She is  and lives with her .  She has few children and grandchildren.  She continues to smoke.  She does not drink alcohol.  She used to work as a  and she is now retired.      Allergies   Allergen Reactions    Losartan Swelling, Nausea, Shortness Of Breath and Palpitations    Gabapentin GI Disturbance and Unknown     Double vision  flatulence    Lisinopril Cough    Oxycodone Other (See Comments)     But has tolerated hydrocodone    Tramadol Other (See Comments)    Augmentin [Amoxicillin-Pot Clavulanate] Rash    Bacitracin Rash     blisters    Bupropion Rash     Allergic to brand not generic       Current Outpatient Medications   Medication Sig Dispense Refill    amLODIPine (NORVASC) 5 MG tablet Take 1 tablet by mouth once daily 90 tablet 0    Aspirin (VAZALORE) 81 MG CAPS Please choose reason not prescribed from choices below.      atorvastatin (LIPITOR) 80 MG tablet Take 1 tablet by mouth once daily 90 tablet 0    empagliflozin (JARDIANCE) 25 MG TABS tablet Take 1 tablet (25 mg) by mouth daily 90 tablet 3    enalapril (VASOTEC) 20 MG tablet Take 1 tablet by mouth once daily 90 tablet 0    famotidine (PEPCID) 40 MG tablet Take 1 tablet by mouth once daily 90 tablet 0    FLUoxetine (PROZAC) 20 MG capsule Take 1 capsule by mouth once daily 90 capsule 1    fluticasone (FLONASE) 50 MCG/ACT nasal spray Spray 1 spray into both nostrils daily (Patient taking differently: Spray 1 spray into both nostrils daily prn) 16 g 1    warfarin ANTICOAGULANT (COUMADIN) 5 MG tablet Take 7.5 mg every Mon; 5 mg all other days or As directed by Anticoagulation clinic 100 tablet 1     No current facility-administered medications for this visit.       Past Medical History:   Diagnosis Date    Anticoagulation monitoring,  INR range 2-3 10/22/2015    Benign essential hypertension 9/15/2016    Carotid bruit 11/12/2012    Overview:  12/6/10 Carotid US  Elevated velocities throughout the carotid arteries bilaterally. There is a focal area of increased velocity in the mid left internal carotid artery with a plaque in this region on the color flow images. This corresponds to 50 - 70 % diameter reduction. There is an area of elevated velocity in the left external carotid artery consistent with stenosis. There is no focal area of significant stenosis in the right carotid arteries in the neck. Plaque in the carotid arteries bilaterally.     Chronic obstructive pulmonary disease (H) 1/19/2016    The FVC, FEV1 and FEV1/FVC ratio are reduced.  The inspiratory flow rates are within normal limits.  The FVC is reduced relative to the SVC indicating air trapping.  While the TLC is within normal limits, the FRC, RV and RV/TLC ratio are increased.  The  diffusing capacity is mildly reduced. IMPRESSION: Mild-moderate Airflow Obstruction with air trapping ____________________________________________M.DClarke Orellana  April 2018    Esophageal reflux 4/8/2008    Overview:  Omeprazole PRN, occasional symptoms such as chest burning and knife twisted to stomach.     Heart disease born with it    History of blood transfusion 70 years ago    History of CVA (cerebrovascular accident) 10/20/2015    Long term current use of anticoagulant therapy 3/20/2018    Major depressive disorder, recurrent episode, moderate (H) 10/29/2008    Overview:  She is not taking any medication at this time.    Osteoarthrosis, hip 10/19/2010    Overview:  Pt scheduled for right  total hip arthroplasty on 6/14/13 with Dr. Addison HEREDIA hip xray (11/2012) Severe degenerative changes seen of the right hip with near bone-on-bone appearance of the joint and several subchondral cysts forming.    Thyroid nodule 5/22/2013    Overview:  Thyroid US (2012) Stable nodule left lobe of thyroid TSH-  (2/19/12) normal (2.26)       Past Surgical History:   Procedure Laterality Date    BIOPSY  appox in 1980's    BREAST SURGERY  last time in the 1990's    non cancer    COLONOSCOPY N/A 9/7/2022    Procedure: COLONOSCOPY, WITH POLYPECTOMY AND BIOPSY;  Surgeon: Alfredito Gomez DO;  Location: [facility]    ESOPHAGOSCOPY, GASTROSCOPY, DUODENOSCOPY (EGD), COMBINED N/A 5/25/2022    Procedure: ESOPHAGOGASTRODUODENOSCOPY, WITH BIOPSY;  Surgeon: Alfredito Gomez DO;  Location: [facility]       Social History     Tobacco Use    Smoking status: Every Day     Current packs/day: 1.00     Average packs/day: 1 pack/day for 50.1 years (50.1 ttl pk-yrs)     Types: Cigarettes     Start date: 1/1/2016    Smokeless tobacco: Never   Vaping Use    Vaping status: Never Used   Substance Use Topics    Alcohol use: No    Drug use: Never       Family History   Problem Relation Age of Onset    Diabetes Maternal Grandfather     Diabetes Sister         developed after cancer treatment    Breast Cancer Sister     Obesity Sister     Hypertension Mother     Hyperlipidemia Mother     Osteoporosis Mother     Breast Cancer Sister     Osteoporosis Sister     Breast Cancer Daughter     Other Cancer Sister         throught out body    Substance Abuse Sister         acol    Obesity Sister     Substance Abuse Sister         acol    Obesity Sister        ROS: A comprehensive review of systems was negative other than noted here or above.     Exam:  BP (!) 148/54   Pulse 107   Wt 52.6 kg (116 lb)   LMP 09/15/1998   BMI 19.30 kg/m    BP Readings from Last 6 Encounters:   09/16/24 (!) 148/54   08/06/24 (!) 165/54   06/26/24 (!) 152/68   05/30/24 125/57   05/23/24 (!) 157/55   05/21/24 (!) 163/52     Wt Readings from Last 4 Encounters:   08/06/24 53.5 kg (118 lb)   06/26/24 53.5 kg (118 lb)   04/09/24 54.4 kg (120 lb)   04/08/24 54.4 kg (120 lb)     GENERAL APPEARANCE: alert and no distress  RESP: lungs clear to auscultation - no rales, rhonchi or  wheezes  CV: regular rhythm, normal rate, no rub   MS: extremities normal- no gross deformities noted, no evidence of inflammation in joints, no muscle tenderness  SKIN: no rash  NEURO: Normal strength and tone, sensory exam grossly normal, mentation intact and speech normal  PSYCH: mentation appears normal. and affect normal/bright  No Le edema      Results: Details with the patient and her     Assessment/Plan:   Problem #1 chronic kidney disease:  Previous workup by Dr. Granger at [facility] showed a small right kidney 6 cm compared to a 10.5 cm on the left and moderate right renal artery stenosis.  Her workup was negative for monoclonal protein and her ANCA serology was negative.  Her UA had intermittent blood cells and red blood cells and some moderate proteinuria with a urine protein to creatinine ratio of 0.8 g/g.  Her kidney disease was thought to be secondary to atherosclerotic vascular disease and decreased right kidney function.  She saw vascular surgery at [place] and they decided not to intervene on her right renal artery stenosis, especially that her blood pressure is currently well-controlled.  Her current creatinine is at 2.2 mg/dL with a concomitant GFR of 21 mL/min. Cystatin GFR is actually a little higher than estimated GFR in April 2024. We discussed today the natural progression of chronic kidney disease and the ways to hold the progression of chronic kidney disease including maintaining good blood pressure control, ideal body weight and a low-salt diet I also emphasized the dangers of smoking on the progression of atherosclerosis and chronic kidney disease in her case. Also discussed increasing water intake. She is only hydrating with 4 cups of coffee per day.   - She is rather underweight with small muscle mass 4/9/24 cystatin C 1.7, GFR 33, same day Scr 1.8, eGFR 28  - She is already on RAAS blockade  - continue Jardiance 25 mg daily     Problem #2 severe peripheral vascular disease:  She has significant atherosclerotic vascular disease.  She has bilateral carotid bruit, several strokes over the years, mild bilateral femoral therapy.  Unfortunately she continues to smoke and I emphasized the importance of stopping smoking.  -She is on aspirin and warfarin    Problem #3 current smoking: I had a long discussion today about dangers effect of smoking regarding worsening her cardiovascular risk as well as chronic kidney disease.  I strongly suggested that she stop smoking    Problem #4 hypertension: Her blood pressure currently is at goal.  No changes were made to her medications.    Problem #5 anticoagulation: It is unclear to me why she is on anticoagulation at this point.  Cardiology note from 2015 by Dr. Alfredo also questions the nature of the stroke whether embolic or not and whether she need to be on anticoagulation or any novel anticoagulant.  Looks like she did have a loop recorder but is not clear to me at this point whether she really had A-fib or not.  This is important to determine because being on warfarin actually has limited her food intake and she is currently underweight.    Problem #6 underweight: Since her diagnosis with chronic kidney disease, her food intake has been intentionally declining.  She is also restricted on her food intake with warfarin.  We had a long discussion today about the healthy diet for the chronic kidney disease.  Ideally we prefer on a plant-based diet with and plant proteins are preferable than animal protein.  I advised her to increase her portions since she is underweight.  I also suggested 5 small meals instead of 3 if she is having early satiety or frequent snacking. I suggested to refer her to the dietitian given her dietary restrictions with the warfarin.  I also advised her to check with her neurologist to see if she can be on apixaban instead of warfarin.    Problem #7 anemia: Likely secondary to chronic kidney disease.  Her hemoglobin is currently  at goal. Iron sats are low normal  - she has been referred to anemia clinic    Problem #8 CKD MBD: Her calcium, phosphorus, PTH and vitamin D are within normal limits.     Gross Hematuria   - Refer to urology    Diana Abel PA-C    Visit length 15 minutes. An additional 15 minutes were spent on date of service in chart review, documentation, and other activities as noted.

## 2024-09-17 NOTE — PROGRESS NOTES
Sent a [employer] message to Elif to call and schedule her Iron Infusions.     Jennifer Weber RN   Anemia Services  [facility]  [email or web address]   Office : [phone]  Fax: [phone]

## 2024-09-19 ENCOUNTER — LAB (OUTPATIENT)
Dept: LAB | Facility: CLINIC | Age: 79
End: 2024-09-19
Payer: COMMERCIAL

## 2024-09-19 ENCOUNTER — ANTICOAGULATION THERAPY VISIT (OUTPATIENT)
Dept: ANTICOAGULATION | Facility: CLINIC | Age: 79
End: 2024-09-19

## 2024-09-19 DIAGNOSIS — Z79.01 LONG TERM CURRENT USE OF ANTICOAGULANT THERAPY: Primary | ICD-10-CM

## 2024-09-19 DIAGNOSIS — I63.20 CEREBROVASCULAR ACCIDENT (CVA) DUE TO OCCLUSION OF PRECEREBRAL ARTERY (H): ICD-10-CM

## 2024-09-19 DIAGNOSIS — Z79.01 LONG TERM CURRENT USE OF ANTICOAGULANT THERAPY: ICD-10-CM

## 2024-09-19 DIAGNOSIS — I63.20 CEREBROVASCULAR ACCIDENT (CVA) DUE TO OCCLUSION OF PRECEREBRAL ARTERY (H): Chronic | ICD-10-CM

## 2024-09-19 DIAGNOSIS — Z79.01 ANTICOAGULATION MONITORING, INR RANGE 2-3: Chronic | ICD-10-CM

## 2024-09-19 DIAGNOSIS — Z86.73 HISTORY OF CVA (CEREBROVASCULAR ACCIDENT): ICD-10-CM

## 2024-09-19 DIAGNOSIS — Z79.01 ANTICOAGULATION MONITORING, INR RANGE 2-3: ICD-10-CM

## 2024-09-19 DIAGNOSIS — E78.5 HYPERLIPIDEMIA LDL GOAL <70: Chronic | ICD-10-CM

## 2024-09-19 LAB — INR BLD: 2.8 (ref 0.9–1.1)

## 2024-09-19 PROCEDURE — 36416 COLLJ CAPILLARY BLOOD SPEC: CPT

## 2024-09-19 PROCEDURE — 85610 PROTHROMBIN TIME: CPT

## 2024-09-19 RX ORDER — ATORVASTATIN CALCIUM 80 MG/1
80 TABLET, FILM COATED ORAL DAILY
Qty: 90 TABLET | Refills: 2 | Status: SHIPPED | OUTPATIENT
Start: 2024-09-19

## 2024-09-19 NOTE — PROGRESS NOTES
ANTICOAGULATION MANAGEMENT     Bebe Alfonso 78 year old female is on warfarin with therapeutic INR result. (Goal INR 2.0-3.0)    Recent labs: (last 7 days)     09/19/24  1127   INR 2.8*       ASSESSMENT     Source(s): Chart Review and Patient/Caregiver Call     Warfarin doses taken: Warfarin taken as instructed  Diet: No new diet changes identified  Medication/supplement changes: None noted  New illness, injury, or hospitalization: No  Signs or symptoms of bleeding or clotting: No  Previous result: Supratherapeutic  Additional findings: None       PLAN     Recommended plan for no diet, medication or health factor changes affecting INR     Dosing Instructions: Continue your current warfarin dose with next INR in 2 weeks       Summary  As of 9/19/2024      Full warfarin instructions:  2.5 mg every Sun, Thu; 5 mg all other days   Next INR check:  10/3/2024               Telephone call with Bebe who verbalizes understanding and agrees to plan    Lab visit scheduled    Education provided: Goal range and lab monitoring: goal range and significance of current result    Plan made per [facility] anticoagulation protocol    Vandana Garcia RN  9/19/2024  Anticoagulation Clinic  [employer] for routing messages: vic [facility] patient phone line: [phone]        _______________________________________________________________________     Anticoagulation Episode Summary       Current INR goal:  2.0-3.0   TTR:  53.0% (1 y)   Target end date:  Indefinite   Send INR reminders to:  [facility]    Indications    History of CVA (cerebrovascular accident) (Resolved) [Z86.73]  Long term current use of anticoagulant therapy [Z79.01]  Cerebrovascular accident (CVA) due to occlusion of precerebral artery (H) [I63.20]  History of CVA (cerebrovascular accident) [Z86.73]  Anticoagulation monitoring  INR range 2-3 [Z79.01]             Comments:               Anticoagulation Care Providers       Provider Role Specialty  Phone number    Diana Ely APRN [facility] Referring Family Medicine [phone]

## 2024-09-23 ENCOUNTER — DOCUMENTATION ONLY (OUTPATIENT)
Dept: NEPHROLOGY | Facility: CLINIC | Age: 79
End: 2024-09-23
Payer: COMMERCIAL

## 2024-09-23 DIAGNOSIS — N18.32 STAGE 3B CHRONIC KIDNEY DISEASE (H): Primary | Chronic | ICD-10-CM

## 2024-09-23 NOTE — PROGRESS NOTES
Venofer is scheduled for 10/7, 10/10, & 10/14 at the [facility].     Jennifer Weber RN   [facility]  [facility]  [email or web address]   Office : [phone]  Fax: [phone]

## 2024-09-23 NOTE — CONFIDENTIAL NOTE
Patient signed up Kidney Smart class. Nephrology track flow sheet updated.  YAW Pollock Care Coordinator  Nephrology

## 2024-09-24 ENCOUNTER — PATIENT OUTREACH (OUTPATIENT)
Dept: NEPHROLOGY | Facility: CLINIC | Age: 79
End: 2024-09-24
Payer: COMMERCIAL

## 2024-09-24 NOTE — CONFIDENTIAL NOTE
Nephrology Note: Patient Outreach Encounter    REASON FOR CALL:     REASON FOR CALL: Chronic Disease Management                                  SITUATION/BACKROUND:   Patient is being treated for CKD Stage 4.    Patient agreed to KS and was referred on 9/23/24    Patient Journey Status:  Active    ASSESSMENT:     Kidney Smart referral    Neph Assessments:  No assessment indicated    PLAN:     Education:  Method:  general discussion/verbal explanation  Discussed: kidney education  These interventions were used: Empathy/Understanding  Education material provided and patient was given an opportunity to ask questions.      Follow Up:   Patient to follow up as scheduled at next appointment  Patient to call/MyChart message with updates     Patient verbalized understanding and will follow up as recommended.    CHIOMA AVENDANO RN

## 2024-10-03 ENCOUNTER — ANTICOAGULATION THERAPY VISIT (OUTPATIENT)
Dept: ANTICOAGULATION | Facility: CLINIC | Age: 79
End: 2024-10-03

## 2024-10-03 ENCOUNTER — OFFICE VISIT (OUTPATIENT)
Dept: UROLOGY | Facility: CLINIC | Age: 79
End: 2024-10-03
Attending: PHYSICIAN ASSISTANT
Payer: COMMERCIAL

## 2024-10-03 ENCOUNTER — LAB (OUTPATIENT)
Dept: LAB | Facility: CLINIC | Age: 79
End: 2024-10-03
Payer: COMMERCIAL

## 2024-10-03 VITALS
HEIGHT: 65 IN | SYSTOLIC BLOOD PRESSURE: 142 MMHG | WEIGHT: 115 LBS | BODY MASS INDEX: 19.16 KG/M2 | HEART RATE: 69 BPM | OXYGEN SATURATION: 100 % | TEMPERATURE: 97.1 F | DIASTOLIC BLOOD PRESSURE: 56 MMHG

## 2024-10-03 DIAGNOSIS — Z86.73 HISTORY OF CVA (CEREBROVASCULAR ACCIDENT): ICD-10-CM

## 2024-10-03 DIAGNOSIS — Z79.01 LONG TERM CURRENT USE OF ANTICOAGULANT THERAPY: Primary | ICD-10-CM

## 2024-10-03 DIAGNOSIS — R31.0 GROSS HEMATURIA: ICD-10-CM

## 2024-10-03 DIAGNOSIS — D63.1 ANEMIA OF CHRONIC RENAL FAILURE, STAGE 3B (H): ICD-10-CM

## 2024-10-03 DIAGNOSIS — N18.32 ANEMIA OF CHRONIC RENAL FAILURE, STAGE 3B (H): ICD-10-CM

## 2024-10-03 DIAGNOSIS — N18.32 STAGE 3B CHRONIC KIDNEY DISEASE (H): Chronic | ICD-10-CM

## 2024-10-03 DIAGNOSIS — Z79.01 LONG TERM CURRENT USE OF ANTICOAGULANT THERAPY: ICD-10-CM

## 2024-10-03 DIAGNOSIS — E78.5 HYPERLIPIDEMIA LDL GOAL <70: ICD-10-CM

## 2024-10-03 DIAGNOSIS — Z13.220 SCREENING FOR HYPERLIPIDEMIA: Primary | ICD-10-CM

## 2024-10-03 DIAGNOSIS — I63.20 CEREBROVASCULAR ACCIDENT (CVA) DUE TO OCCLUSION OF PRECEREBRAL ARTERY (H): ICD-10-CM

## 2024-10-03 DIAGNOSIS — I63.20 CEREBROVASCULAR ACCIDENT (CVA) DUE TO OCCLUSION OF PRECEREBRAL ARTERY (H): Chronic | ICD-10-CM

## 2024-10-03 DIAGNOSIS — Z79.01 ANTICOAGULATION MONITORING, INR RANGE 2-3: ICD-10-CM

## 2024-10-03 DIAGNOSIS — N18.4 CKD (CHRONIC KIDNEY DISEASE) STAGE 4, GFR 15-29 ML/MIN (H): ICD-10-CM

## 2024-10-03 DIAGNOSIS — Z79.01 ANTICOAGULATION MONITORING, INR RANGE 2-3: Chronic | ICD-10-CM

## 2024-10-03 LAB
ANION GAP SERPL CALCULATED.3IONS-SCNC: 12 MMOL/L (ref 7–15)
BUN SERPL-MCNC: 26.6 MG/DL (ref 8–23)
CALCIUM SERPL-MCNC: 9.2 MG/DL (ref 8.8–10.4)
CHLORIDE SERPL-SCNC: 98 MMOL/L (ref 98–107)
CHOLEST SERPL-MCNC: 145 MG/DL
CREAT SERPL-MCNC: 1.89 MG/DL (ref 0.51–0.95)
EGFRCR SERPLBLD CKD-EPI 2021: 27 ML/MIN/1.73M2
FASTING STATUS PATIENT QL REPORTED: YES
FASTING STATUS PATIENT QL REPORTED: YES
GLUCOSE SERPL-MCNC: 89 MG/DL (ref 70–99)
HCO3 SERPL-SCNC: 22 MMOL/L (ref 22–29)
HCT VFR BLD AUTO: 31.1 % (ref 35–47)
HDLC SERPL-MCNC: 59 MG/DL
HGB BLD-MCNC: 9.7 G/DL (ref 11.7–15.7)
INR BLD: 2.3 (ref 0.9–1.1)
LDLC SERPL CALC-MCNC: 62 MG/DL
NONHDLC SERPL-MCNC: 86 MG/DL
POTASSIUM SERPL-SCNC: 4.6 MMOL/L (ref 3.4–5.3)
SODIUM SERPL-SCNC: 132 MMOL/L (ref 135–145)
TRIGL SERPL-MCNC: 121 MG/DL

## 2024-10-03 PROCEDURE — 99204 OFFICE O/P NEW MOD 45 MIN: CPT | Performed by: STUDENT IN AN ORGANIZED HEALTH CARE EDUCATION/TRAINING PROGRAM

## 2024-10-03 PROCEDURE — 85018 HEMOGLOBIN: CPT

## 2024-10-03 PROCEDURE — 36415 COLL VENOUS BLD VENIPUNCTURE: CPT

## 2024-10-03 PROCEDURE — 85014 HEMATOCRIT: CPT

## 2024-10-03 PROCEDURE — 85610 PROTHROMBIN TIME: CPT

## 2024-10-03 PROCEDURE — 80048 BASIC METABOLIC PNL TOTAL CA: CPT

## 2024-10-03 PROCEDURE — 80061 LIPID PANEL: CPT

## 2024-10-03 ASSESSMENT — PAIN SCALES - GENERAL: PAINLEVEL: NO PAIN (0)

## 2024-10-03 NOTE — PROGRESS NOTES
ANTICOAGULATION MANAGEMENT     Bebe PITO Alfonso 78 year old female is on warfarin with therapeutic INR result. (Goal INR 2.0-3.0)    Recent labs: (last 7 days)     10/03/24  0903   INR 2.3*       ASSESSMENT     Source(s): Chart Review and Patient/Caregiver Call     Warfarin doses taken: Warfarin taken as instructed  Diet: No new diet changes identified  Medication/supplement changes: None noted  New illness, injury, or hospitalization: No  Signs or symptoms of bleeding or clotting: No  Previous result: Therapeutic last visit; previously outside of goal range  Additional findings: None       PLAN     Recommended plan for no diet, medication or health factor changes affecting INR     Dosing Instructions: Continue your current warfarin dose with next INR in 3 weeks       Summary  As of 10/3/2024      Full warfarin instructions:  2.5 mg every Sun, Thu; 5 mg all other days   Next INR check:  10/24/2024               Telephone call with Bebe who verbalizes understanding and agrees to plan    Lab visit scheduled    Education provided: Goal range and lab monitoring: goal range and significance of current result    Plan made per [facility] anticoagulation protocol    Vandana Garcia RN  10/3/2024  Anticoagulation Clinic  [employer] for routing messages: vic [facility] patient phone line: [phone]        _______________________________________________________________________     Anticoagulation Episode Summary       Current INR goal:  2.0-3.0   TTR:  56.8% (1 y)   Target end date:  Indefinite   Send INR reminders to:  [facility]    Indications    History of CVA (cerebrovascular accident) (Resolved) [Z86.73]  Long term current use of anticoagulant therapy [Z79.01]  Cerebrovascular accident (CVA) due to occlusion of precerebral artery (H) [I63.20]  History of CVA (cerebrovascular accident) [Z86.73]  Anticoagulation monitoring  INR range 2-3 [Z79.01]             Comments:               Anticoagulation Care  Providers       Provider Role Specialty Phone number    Diana Ely APRN CNP Referring Family Medicine [phone]

## 2024-10-03 NOTE — PROGRESS NOTES
Chief Complaint:   Gross hematuria         History of Present Illness:   Bebe Alfonso is a 78 year old female with a history of CVA, COPD, HLD, renal artery stenosis, HTN, and CKD stage 3 who presents for evaluation of gross hematuria.     The patient reports intermittent pink colored urine for several months.     She currently denies any dysuria, pyuria, hesitancy, intermittency, feelings of incomplete emptying, or any recent history of urinary tract infections or stones.    She is a current one pack per day smoker.          Past Medical History:     Past Medical History:   Diagnosis Date    Anticoagulation monitoring, INR range 2-3 10/22/2015    Benign essential hypertension 9/15/2016    Carotid bruit 11/12/2012    Overview:  12/6/10 Carotid US  Elevated velocities throughout the carotid arteries bilaterally. There is a focal area of increased velocity in the mid left internal carotid artery with a plaque in this region on the color flow images. This corresponds to 50 - 70 % diameter reduction. There is an area of elevated velocity in the left external carotid artery consistent with stenosis. There is no focal area of significant stenosis in the right carotid arteries in the neck. Plaque in the carotid arteries bilaterally.     Chronic obstructive pulmonary disease (H) 1/19/2016    The FVC, FEV1 and FEV1/FVC ratio are reduced.  The inspiratory flow rates are within normal limits.  The FVC is reduced relative to the SVC indicating air trapping.  While the TLC is within normal limits, the FRC, RV and RV/TLC ratio are increased.  The  diffusing capacity is mildly reduced. IMPRESSION: Mild-moderate Airflow Obstruction with air trapping ____________________________________________Clarke Iverson  April 2018    Esophageal reflux 4/8/2008    Overview:  Omeprazole PRN, occasional symptoms such as chest burning and knife twisted to stomach.     Heart disease born with it    History of blood transfusion 70  years ago    History of CVA (cerebrovascular accident) 10/20/2015    Long term current use of anticoagulant therapy 3/20/2018    Major depressive disorder, recurrent episode, moderate (H) 10/29/2008    Overview:  She is not taking any medication at this time.    Osteoarthrosis, hip 10/19/2010    Overview:  Pt scheduled for right  total hip arthroplasty on 6/14/13 with Dr. Addison HEREDIA hip xray (11/2012) Severe degenerative changes seen of the right hip with near bone-on-bone appearance of the joint and several subchondral cysts forming.    Thyroid nodule 5/22/2013    Overview:  Thyroid US (2012) Stable nodule left lobe of thyroid TSH- (2/19/12) normal (2.26)            Past Surgical History:     Past Surgical History:   Procedure Laterality Date    BIOPSY  appox in 1980's    BREAST SURGERY  last time in the 1990's    non cancer    COLONOSCOPY N/A 9/7/2022    Procedure: COLONOSCOPY, WITH POLYPECTOMY AND BIOPSY;  Surgeon: Alfredito Gomez DO;  Location: [facility]    ESOPHAGOSCOPY, GASTROSCOPY, DUODENOSCOPY (EGD), COMBINED N/A 5/25/2022    Procedure: ESOPHAGOGASTRODUODENOSCOPY, WITH BIOPSY;  Surgeon: Alfredito Gomez DO;  Location: [facility]            Medications     Current Outpatient Medications   Medication Sig Dispense Refill    amLODIPine (NORVASC) 5 MG tablet Take 1 tablet by mouth once daily 90 tablet 0    Aspirin (VAZALORE) 81 MG CAPS Please choose reason not prescribed from choices below.      atorvastatin (LIPITOR) 80 MG tablet Take 1 tablet by mouth once daily 90 tablet 2    enalapril (VASOTEC) 20 MG tablet Take 1 tablet by mouth once daily 90 tablet 0    famotidine (PEPCID) 40 MG tablet Take 1 tablet by mouth once daily 90 tablet 0    FLUoxetine (PROZAC) 20 MG capsule Take 1 capsule by mouth once daily 90 capsule 1    warfarin ANTICOAGULANT (COUMADIN) 5 MG tablet Take 7.5 mg every Mon; 5 mg all other days or As directed by Anticoagulation clinic 100 tablet 1    fluticasone (FLONASE) 50 MCG/ACT nasal  "spray Spray 1 spray into both nostrils daily (Patient taking differently: Spray 1 spray into both nostrils daily. prn) 16 g 1     No current facility-administered medications for this visit.            Allergies:   Losartan, Gabapentin, Lisinopril, Oxycodone, Tramadol, Augmentin [amoxicillin-pot clavulanate], Bacitracin, and Bupropion         Review of Systems:  From intake questionnaire   Negative 14 system review except as noted on HPI, nurse's note.         Physical Exam:   Patient is a 78 year old  female   Vitals: Blood pressure (!) 142/56, pulse 69, temperature 97.1  F (36.2  C), temperature source Tympanic, height 1.651 m (5' 5\"), weight 52.2 kg (115 lb), last menstrual period 09/15/1998, SpO2 100%, not currently breastfeeding.  General Appearance Adult: Alert, no acute distress, oriented.  Lungs: Non-labored breathing.  Heart: No obvious jugular venous distension present.  Neuro: Alert, oriented, speech and mentation normal      Labs and Pathology:    I personally reviewed all applicable laboratory data and went over findings with patient  Significant for:    CBC RESULTS:  Recent Labs   Lab Test 09/12/24  0921 07/18/24  0816 06/20/24  0821 04/09/24  0955 03/01/24 2000 03/01/24  1045   WBC 7.4  --   --  7.0 8.5 9.4   HGB 9.9* 11.9 11.3* 10.7* 9.3* 6.3*     --   --  270 226 250        BMP RESULTS:  Recent Labs   Lab Test 09/12/24  0921 04/09/24  0955 03/01/24  1045 11/16/23  1028 12/16/21  0834 06/10/21  0826 05/28/20  0923 12/12/19  1001 07/25/19  0753 07/02/19  1025    138 136 136   < > 133 131* 137  --  133   POTASSIUM 4.7 4.8 4.1 4.3   < > 3.9 4.1 4.4   < > 4.3   CHLORIDE 103 104 103 103   < > 103 99 104  --  102   CO2 24 22 21* 23   < > 25 27 28  --  26   ANIONGAP 11 12 12 10   < > 5 5 5  --  5   GLC 84 92 95 97   < > 91 120* 94  --  86   BUN 36.8* 27.9* 31.6* 29.6*   < > 25 28 25  --  21   CR 2.30* 1.83* 1.77* 1.68*   < > 1.49* 1.52* 1.48*  --  1.39*   GFRESTIMATED 21* 28* 29* 31*   < > 34* " 34* 35*  --  38*   GFRESTBLACK  --   --   --   --   --  40* 39* 40*  --  44*   LEXUS 9.4 9.7 8.7* 9.4   < > 8.6 8.6 9.2  --  9.5    < > = values in this interval not displayed.       UA RESULTS:   Recent Labs   Lab Test 09/12/24  0921 04/09/24  0955 05/09/19  1157   SG 1.020 1.010 <=1.005   URINEPH 6.0 6.5 6.0   NITRITE Negative Negative Negative   RBCU 0-2 0-2 2-5*   WBCU 0-5 None Seen 0 - 5            Assessment and Plan:     Assessment: Ms. Bebe Alfonso is a pleasant 78 year old female with gross hematuria that has been present intermittently for several months.  The differential diagnosis at this point includes stone disease, infection, vaginal contaminant, urothelial malignancy, renal disorder versus another yet unknown diagnosis.    Plan:  At this time, recommend proceeding with comprehensive hematuria evaluation to include:  - Urine cytology to look for cells concerning for malignancy.  - CT urogram for upper tract imaging.  - Cystoscopy to evaluate the interior of the bladder. Follow up for hematuria as recommended by urologist performing cystoscopic evaluation.      SILVIA MORRIS PA-C  Department of Urology

## 2024-10-03 NOTE — NURSING NOTE
"Initial BP (!) 142/56   Pulse 69   Temp 97.1  F (36.2  C) (Tympanic)   Ht 1.651 m (5' 5\")   Wt 52.2 kg (115 lb)   LMP 09/15/1998   SpO2 100%   BMI 19.14 kg/m   Estimated body mass index is 19.14 kg/m  as calculated from the following:    Height as of this encounter: 1.651 m (5' 5\").    Weight as of this encounter: 52.2 kg (115 lb). .  Mary ALONZO CMA 10/03/24 7:47 AM  "

## 2024-10-07 ENCOUNTER — LAB (OUTPATIENT)
Dept: LAB | Facility: CLINIC | Age: 79
End: 2024-10-07
Payer: COMMERCIAL

## 2024-10-07 ENCOUNTER — PATIENT OUTREACH (OUTPATIENT)
Dept: NEPHROLOGY | Facility: CLINIC | Age: 79
End: 2024-10-07

## 2024-10-07 DIAGNOSIS — D63.1 ANEMIA OF CHRONIC RENAL FAILURE, STAGE 3B (H): Primary | ICD-10-CM

## 2024-10-07 DIAGNOSIS — I63.20 CEREBROVASCULAR ACCIDENT (CVA) DUE TO OCCLUSION OF PRECEREBRAL ARTERY (H): ICD-10-CM

## 2024-10-07 DIAGNOSIS — N18.32 ANEMIA OF CHRONIC RENAL FAILURE, STAGE 3B (H): Primary | ICD-10-CM

## 2024-10-07 DIAGNOSIS — D63.1 ANEMIA OF CHRONIC RENAL FAILURE, STAGE 3B (H): ICD-10-CM

## 2024-10-07 DIAGNOSIS — N18.32 STAGE 3B CHRONIC KIDNEY DISEASE (H): Chronic | ICD-10-CM

## 2024-10-07 DIAGNOSIS — N18.32 ANEMIA OF CHRONIC RENAL FAILURE, STAGE 3B (H): ICD-10-CM

## 2024-10-07 DIAGNOSIS — R31.0 GROSS HEMATURIA: ICD-10-CM

## 2024-10-07 DIAGNOSIS — Z79.01 LONG TERM CURRENT USE OF ANTICOAGULANT THERAPY: ICD-10-CM

## 2024-10-07 DIAGNOSIS — Z79.01 ANTICOAGULATION MONITORING, INR RANGE 2-3: ICD-10-CM

## 2024-10-07 LAB
ALBUMIN MFR UR ELPH: 26.8 MG/DL
CREAT UR-MCNC: 78.6 MG/DL
CREAT UR-MCNC: 79.3 MG/DL
MICROALBUMIN UR-MCNC: 131.3 MG/L
MICROALBUMIN/CREAT UR: 165.57 MG/G CR (ref 0–25)
PROT/CREAT 24H UR: 0.34 MG/MG CR (ref 0–0.2)

## 2024-10-07 PROCEDURE — 82043 UR ALBUMIN QUANTITATIVE: CPT

## 2024-10-07 PROCEDURE — 84156 ASSAY OF PROTEIN URINE: CPT

## 2024-10-07 PROCEDURE — 88112 CYTOPATH CELL ENHANCE TECH: CPT | Performed by: PATHOLOGY

## 2024-10-07 PROCEDURE — 82570 ASSAY OF URINE CREATININE: CPT

## 2024-10-07 NOTE — CONFIDENTIAL NOTE
"Spoke with patient to inform her of a result note from Page TINSLEY.   \"Please let her know kidney function is a little better, sodium is low. How much water is she drinking now?     Thanks  Diana\"  Patient stated she drinks a bottle of water daily but drinks more coffee throughout the day.  Maris RN Care Coordinator  Nephrology    "

## 2024-10-08 DIAGNOSIS — N18.32 ANEMIA OF CHRONIC RENAL FAILURE, STAGE 3B (H): Primary | ICD-10-CM

## 2024-10-08 DIAGNOSIS — D63.1 ANEMIA OF CHRONIC RENAL FAILURE, STAGE 3B (H): Primary | ICD-10-CM

## 2024-10-09 LAB
PATH REPORT.COMMENTS IMP SPEC: NORMAL
PATH REPORT.FINAL DX SPEC: NORMAL
PATH REPORT.GROSS SPEC: NORMAL
PATH REPORT.MICROSCOPIC SPEC OTHER STN: NORMAL

## 2024-10-14 ENCOUNTER — PATIENT OUTREACH (OUTPATIENT)
Dept: CARE COORDINATION | Facility: CLINIC | Age: 79
End: 2024-10-14
Payer: COMMERCIAL

## 2024-10-14 NOTE — PROGRESS NOTES
Anemia Management Note - Reminder     Follow-up with anemia management service:    Due to the IV fluids shortage, Iron infusions are being reschedule.  First Iron Infusion rescheduled for 10/21.         Latest Ref Rng & Units 5/21/2024 5/23/2024 5/30/2024 6/20/2024 7/18/2024 9/12/2024 10/3/2024   Anemia   Hemoglobin 11.7 - 15.7 g/dL    11.3  11.9  9.9  9.7    IV Iron Dose  300mg 300mg 300mg       TSAT 15 - 46 %    31  28  21     Ferritin 11 - 328 ng/mL    197  143  71              Follow-up call date: 10/21/24    Jennifer Weber RN   Anemia Services  [facility]  [email or web address]   Office : [phone]  Fax: [phone]

## 2024-10-17 ENCOUNTER — PATIENT OUTREACH (OUTPATIENT)
Dept: CARE COORDINATION | Facility: CLINIC | Age: 79
End: 2024-10-17
Payer: COMMERCIAL

## 2024-10-21 ENCOUNTER — INFUSION THERAPY VISIT (OUTPATIENT)
Dept: INFUSION THERAPY | Facility: CLINIC | Age: 79
End: 2024-10-21
Attending: INTERNAL MEDICINE
Payer: COMMERCIAL

## 2024-10-21 ENCOUNTER — PATIENT OUTREACH (OUTPATIENT)
Dept: CARE COORDINATION | Facility: CLINIC | Age: 79
End: 2024-10-21

## 2024-10-21 VITALS — SYSTOLIC BLOOD PRESSURE: 178 MMHG | HEART RATE: 70 BPM | DIASTOLIC BLOOD PRESSURE: 55 MMHG | TEMPERATURE: 97.7 F

## 2024-10-21 DIAGNOSIS — D63.1 ANEMIA OF CHRONIC RENAL FAILURE, STAGE 3B (H): Primary | ICD-10-CM

## 2024-10-21 DIAGNOSIS — N18.32 ANEMIA OF CHRONIC RENAL FAILURE, STAGE 3B (H): Primary | ICD-10-CM

## 2024-10-21 PROCEDURE — 258N000003 HC RX IP 258 OP 636: Performed by: INTERNAL MEDICINE

## 2024-10-21 PROCEDURE — 250N000011 HC RX IP 250 OP 636: Performed by: INTERNAL MEDICINE

## 2024-10-21 PROCEDURE — 96365 THER/PROPH/DIAG IV INF INIT: CPT

## 2024-10-21 RX ORDER — ALBUTEROL SULFATE 90 UG/1
1-2 INHALANT RESPIRATORY (INHALATION)
Status: CANCELLED
Start: 2024-10-23

## 2024-10-21 RX ORDER — METHYLPREDNISOLONE SODIUM SUCCINATE 125 MG/2ML
125 INJECTION INTRAMUSCULAR; INTRAVENOUS
Status: CANCELLED
Start: 2024-10-23

## 2024-10-21 RX ORDER — MEPERIDINE HYDROCHLORIDE 25 MG/ML
25 INJECTION INTRAMUSCULAR; INTRAVENOUS; SUBCUTANEOUS EVERY 30 MIN PRN
Status: CANCELLED | OUTPATIENT
Start: 2024-10-23

## 2024-10-21 RX ORDER — HEPARIN SODIUM (PORCINE) LOCK FLUSH IV SOLN 100 UNIT/ML 100 UNIT/ML
5 SOLUTION INTRAVENOUS
Status: CANCELLED | OUTPATIENT
Start: 2024-10-23

## 2024-10-21 RX ORDER — EPINEPHRINE 1 MG/ML
0.3 INJECTION, SOLUTION, CONCENTRATE INTRAVENOUS EVERY 5 MIN PRN
Status: CANCELLED | OUTPATIENT
Start: 2024-10-23

## 2024-10-21 RX ORDER — ALBUTEROL SULFATE 0.83 MG/ML
2.5 SOLUTION RESPIRATORY (INHALATION)
Status: CANCELLED | OUTPATIENT
Start: 2024-10-23

## 2024-10-21 RX ORDER — DIPHENHYDRAMINE HYDROCHLORIDE 50 MG/ML
50 INJECTION INTRAMUSCULAR; INTRAVENOUS
Status: CANCELLED
Start: 2024-10-23

## 2024-10-21 RX ORDER — HEPARIN SODIUM,PORCINE 10 UNIT/ML
5-20 VIAL (ML) INTRAVENOUS DAILY PRN
Status: CANCELLED | OUTPATIENT
Start: 2024-10-23

## 2024-10-21 RX ADMIN — IRON SUCROSE 300 MG: 20 INJECTION, SOLUTION INTRAVENOUS at 10:02

## 2024-10-21 NOTE — PROGRESS NOTES
Anemia Management Note - Follow Up      SUBJECTIVE/OBJECTIVE:    Referred by Dr. Rafael Oswald on 2024  Primary Diagnosis: Anemia in Chronic Kidney Disease (N18.3, D63.1)   3b  Secondary Diagnosis: Chronic Kidney Disease, Stage 3 (N18.3)  3b     Hgb goal range: 9-10  Epo/Darbo: TBD.  Hgb >10.0.  TSAT must be 20% or > before Insurance will approve ALMA.   Iron regimen: Treat with IV Iron.   *completed Venofer on 24.      Labs : 2025  RX/TX plans : TBD     *[facility]; [phone], opt 2 for infusion appt     Recent ALMA use, transfusion, IV iron: NA  No MI and blood clots or cancers. Hx of CVA, HTN, and Anticoagulated.      Contact:No Consent to Communicate on File.         Latest Ref Rng & Units 2024 2024 2024 2024 2024 10/3/2024 10/21/2024   Anemia   Hemoglobin 11.7 - 15.7 g/dL   11.3  11.9  9.9  9.7     IV Iron Dose  300mg 300mg     300mg   TSAT 15 - 46 %   31  28  21      Ferritin 11 - 328 ng/mL   197  143  71           BP Readings from Last 3 Encounters:   10/21/24 (!) 178/55   10/03/24 (!) 142/56   24 (!) 148/54     Wt Readings from Last 2 Encounters:   10/03/24 52.2 kg (115 lb)   24 52.6 kg (116 lb)           ASSESSMENT:    Hgb:at goal - continue to monitor  TSat: Due for iron studies 4 weeks after last IV iron infusion Ferritin: Due for iron studies 4 weeks after last IV iron infusion        PLAN:  Last two venofer scheduled for 10/25 & 10/28.     Orders needed to be renewed (for next follow-up date) in EPIC: None    Iron labs due:  4 weeks after last IV iron infusion    Plan discussed with:  No call, chart review       NEXT FOLLOW-UP DATE:  10/28/24    Jennifer Weber RN   Anemia Services  [facility]  [email or web address]   Office : [phone]  Fax: [phone]

## 2024-10-21 NOTE — PROGRESS NOTES
Infusion Nursing Note:  Bebe Alfonso presents today for Venofer.    Patient seen by provider today: No   present during visit today: Not Applicable.    Note: N/A.    Intravenous Access:  Peripheral IV placed.    Treatment Conditions:  Not Applicable.    Post Infusion Assessment:  Patient tolerated infusion without incident.  Patient observed for 30 minutes post Venofer per protocol.  Blood return noted pre and post infusion.  Site patent and intact, free from redness, edema or discomfort.  No evidence of extravasations.  Access discontinued per protocol.     Discharge Plan:   Patient discharged in stable condition accompanied by: self.  Departure Mode: Ambulatory.    Maynor Morelos RN

## 2024-10-24 ENCOUNTER — HOSPITAL ENCOUNTER (OUTPATIENT)
Dept: CT IMAGING | Facility: CLINIC | Age: 79
Discharge: HOME | End: 2024-10-24
Attending: STUDENT IN AN ORGANIZED HEALTH CARE EDUCATION/TRAINING PROGRAM | Admitting: STUDENT IN AN ORGANIZED HEALTH CARE EDUCATION/TRAINING PROGRAM
Payer: COMMERCIAL

## 2024-10-24 ENCOUNTER — DOCUMENTATION ONLY (OUTPATIENT)
Dept: ANTICOAGULATION | Facility: CLINIC | Age: 79
End: 2024-10-24

## 2024-10-24 ENCOUNTER — ANTICOAGULATION THERAPY VISIT (OUTPATIENT)
Dept: ANTICOAGULATION | Facility: CLINIC | Age: 79
End: 2024-10-24

## 2024-10-24 ENCOUNTER — LAB (OUTPATIENT)
Dept: LAB | Facility: CLINIC | Age: 79
End: 2024-10-24
Payer: COMMERCIAL

## 2024-10-24 DIAGNOSIS — I63.20 CEREBROVASCULAR ACCIDENT (CVA) DUE TO OCCLUSION OF PRECEREBRAL ARTERY (H): ICD-10-CM

## 2024-10-24 DIAGNOSIS — Z79.01 ANTICOAGULATION MONITORING, INR RANGE 2-3: Chronic | ICD-10-CM

## 2024-10-24 DIAGNOSIS — Z79.01 LONG TERM CURRENT USE OF ANTICOAGULANT THERAPY: ICD-10-CM

## 2024-10-24 DIAGNOSIS — Z79.01 LONG TERM CURRENT USE OF ANTICOAGULANT THERAPY: Primary | ICD-10-CM

## 2024-10-24 DIAGNOSIS — Z86.73 HISTORY OF CVA (CEREBROVASCULAR ACCIDENT): ICD-10-CM

## 2024-10-24 DIAGNOSIS — I63.20 CEREBROVASCULAR ACCIDENT (CVA) DUE TO OCCLUSION OF PRECEREBRAL ARTERY (H): Primary | Chronic | ICD-10-CM

## 2024-10-24 DIAGNOSIS — I63.20 CEREBROVASCULAR ACCIDENT (CVA) DUE TO OCCLUSION OF PRECEREBRAL ARTERY (H): Chronic | ICD-10-CM

## 2024-10-24 DIAGNOSIS — Z79.01 ANTICOAGULATION MONITORING, INR RANGE 2-3: ICD-10-CM

## 2024-10-24 DIAGNOSIS — R31.0 GROSS HEMATURIA: ICD-10-CM

## 2024-10-24 LAB
CREAT BLD-MCNC: 2.2 MG/DL (ref 0.5–1)
EGFRCR SERPLBLD CKD-EPI 2021: 22 ML/MIN/1.73M2
INR BLD: 2.7 (ref 0.9–1.1)

## 2024-10-24 PROCEDURE — 82565 ASSAY OF CREATININE: CPT

## 2024-10-24 PROCEDURE — 85610 PROTHROMBIN TIME: CPT

## 2024-10-24 PROCEDURE — 74176 CT ABD & PELVIS W/O CONTRAST: CPT

## 2024-10-24 PROCEDURE — 36416 COLLJ CAPILLARY BLOOD SPEC: CPT

## 2024-10-24 RX ORDER — IOPAMIDOL 755 MG/ML
56 INJECTION, SOLUTION INTRAVASCULAR ONCE
Status: COMPLETED | OUTPATIENT
Start: 2024-10-24 | End: 2024-10-24

## 2024-10-24 NOTE — PROGRESS NOTES
ANTICOAGULATION CLINIC REFERRAL RENEWAL REQUEST       An annual renewal order is required for all patients referred to [facility].?  Please review and sign the pended referral order for Bebe Alfonso.       ANTICOAGULATION SUMMARY      Warfarin indication(s)   Stroke    Mechanical heart valve present?  NO       Current goal range   INR: 2.0-3.0     Goal appropriate for indication? Goal INR 2-3, standard for indication(s) above     Time in Therapeutic Range (TTR)  (Goal > 60%) 63%       Office visit with referring provider's group within last year yes on 03/21/2024       Michelle Lee RN  [facility]

## 2024-10-24 NOTE — PROGRESS NOTES
"ANTICOAGULATION MANAGEMENT     Bebe PITO Kaden 78 year old female is on warfarin with therapeutic INR result. (Goal INR 2.0-3.0)    Recent labs: (last 7 days)     10/24/24  0859   INR 2.7*     ASSESSMENT     Source(s): Chart Review and Patient/Caregiver Call     Warfarin doses taken: Warfarin taken as instructed  Diet: No new diet changes identified  Medication/supplement changes:  patient reports she took a \"pain pill\" once last week  New illness, injury, or hospitalization: No; however, patient reports intermittent chronic back and body aches/pains  Signs or symptoms of bleeding or clotting: Yes: intermittent hematuria, patient had CT urogram today  Previous result: Therapeutic last 2(+) visits  Additional findings: None       PLAN     Recommended plan for temporary change(s) and ongoing change(s) affecting INR     Dosing Instructions: Continue your current warfarin dose with next INR in 4 weeks       Summary  As of 10/24/2024      Full warfarin instructions:  2.5 mg every Sun, Thu; 5 mg all other days   Next INR check:  11/21/2024               Telephone call with Bebe who verbalizes understanding and agrees to plan    Lab visit scheduled    Education provided: Symptom monitoring: monitoring for bleeding signs and symptoms    Plan made per [facility] anticoagulation protocol    Michelle Lee RN  10/24/2024  Anticoagulation Clinic  [employer] for routing messages: vic [facility] patient phone line: [phone]        _______________________________________________________________________     Anticoagulation Episode Summary       Current INR goal:  2.0-3.0   TTR:  62.6% (1 y)   Target end date:  Indefinite   Send INR reminders to:  [facility]    Indications    History of CVA (cerebrovascular accident) (Resolved) [Z86.73]  Long term current use of anticoagulant therapy [Z79.01]  Cerebrovascular accident (CVA) due to occlusion of precerebral artery (H) [I63.20]  History of CVA (cerebrovascular " accident) [Z86.73]  Anticoagulation monitoring  INR range 2-3 [Z79.01]             Comments:  --             Anticoagulation Care Providers       Provider Role Specialty Phone number    Diana Ely APRN [facility] [phone]

## 2024-10-25 ENCOUNTER — INFUSION THERAPY VISIT (OUTPATIENT)
Dept: INFUSION THERAPY | Facility: CLINIC | Age: 79
End: 2024-10-25
Attending: INTERNAL MEDICINE
Payer: COMMERCIAL

## 2024-10-25 VITALS
DIASTOLIC BLOOD PRESSURE: 62 MMHG | HEART RATE: 67 BPM | SYSTOLIC BLOOD PRESSURE: 155 MMHG | RESPIRATION RATE: 18 BRPM | TEMPERATURE: 97.7 F

## 2024-10-25 DIAGNOSIS — D63.1 ANEMIA OF CHRONIC RENAL FAILURE, STAGE 3B (H): Primary | ICD-10-CM

## 2024-10-25 DIAGNOSIS — N18.32 ANEMIA OF CHRONIC RENAL FAILURE, STAGE 3B (H): Primary | ICD-10-CM

## 2024-10-25 PROCEDURE — 258N000003 HC RX IP 258 OP 636: Performed by: INTERNAL MEDICINE

## 2024-10-25 PROCEDURE — 250N000011 HC RX IP 250 OP 636: Performed by: INTERNAL MEDICINE

## 2024-10-25 PROCEDURE — 96365 THER/PROPH/DIAG IV INF INIT: CPT

## 2024-10-25 PROCEDURE — 96366 THER/PROPH/DIAG IV INF ADDON: CPT

## 2024-10-25 RX ORDER — HEPARIN SODIUM,PORCINE 10 UNIT/ML
5-20 VIAL (ML) INTRAVENOUS DAILY PRN
Status: CANCELLED | OUTPATIENT
Start: 2024-10-27

## 2024-10-25 RX ORDER — HEPARIN SODIUM (PORCINE) LOCK FLUSH IV SOLN 100 UNIT/ML 100 UNIT/ML
5 SOLUTION INTRAVENOUS
Status: CANCELLED | OUTPATIENT
Start: 2024-10-27

## 2024-10-25 RX ADMIN — IRON SUCROSE 300 MG: 20 INJECTION, SOLUTION INTRAVENOUS at 08:18

## 2024-10-25 ASSESSMENT — PAIN SCALES - GENERAL: PAINLEVEL_OUTOF10: NO PAIN (0)

## 2024-10-25 NOTE — PROGRESS NOTES
Infusion Nursing Note:  Bebe Alfonso presents today for Venofer.    Patient seen by provider today: No   present during visit today: Not Applicable.    Note: N/A.    Intravenous Access:  Peripheral IV placed.    Treatment Conditions:  Not Applicable.    Post Infusion Assessment:  Patient tolerated infusion without incident.  Patient observed for 30 minutes post Venofer per protocol.  Blood return noted pre and post infusion.  Site patent and intact, free from redness, edema or discomfort.  No evidence of extravasations.  Access discontinued per protocol.     Discharge Plan:   Discharge instructions reviewed with: Patient.  Patient and/or family verbalized understanding of discharge instructions and all questions answered.  AVS to patient via [employer].  Patient will return 10/28/2024 for next appointment.   Patient discharged in stable condition accompanied by: self.  Departure Mode: Ambulatory.    Brianda Wolff RN

## 2024-10-28 ENCOUNTER — INFUSION THERAPY VISIT (OUTPATIENT)
Dept: INFUSION THERAPY | Facility: CLINIC | Age: 79
End: 2024-10-28
Attending: INTERNAL MEDICINE
Payer: COMMERCIAL

## 2024-10-28 ENCOUNTER — PATIENT OUTREACH (OUTPATIENT)
Dept: CARE COORDINATION | Facility: CLINIC | Age: 79
End: 2024-10-28

## 2024-10-28 VITALS — SYSTOLIC BLOOD PRESSURE: 157 MMHG | DIASTOLIC BLOOD PRESSURE: 60 MMHG | HEART RATE: 65 BPM | TEMPERATURE: 97.7 F

## 2024-10-28 DIAGNOSIS — D63.1 ANEMIA OF CHRONIC RENAL FAILURE, STAGE 3B (H): Primary | ICD-10-CM

## 2024-10-28 DIAGNOSIS — N18.32 ANEMIA OF CHRONIC RENAL FAILURE, STAGE 3B (H): Primary | ICD-10-CM

## 2024-10-28 PROCEDURE — 96365 THER/PROPH/DIAG IV INF INIT: CPT

## 2024-10-28 PROCEDURE — 258N000003 HC RX IP 258 OP 636: Performed by: INTERNAL MEDICINE

## 2024-10-28 PROCEDURE — 250N000011 HC RX IP 250 OP 636: Performed by: INTERNAL MEDICINE

## 2024-10-28 RX ORDER — METHYLPREDNISOLONE SODIUM SUCCINATE 40 MG/ML
40 INJECTION INTRAMUSCULAR; INTRAVENOUS
Status: CANCELLED
Start: 2024-10-28

## 2024-10-28 RX ORDER — ALBUTEROL SULFATE 90 UG/1
1-2 INHALANT RESPIRATORY (INHALATION)
Status: CANCELLED
Start: 2024-10-28

## 2024-10-28 RX ORDER — EPINEPHRINE 1 MG/ML
0.3 INJECTION, SOLUTION, CONCENTRATE INTRAVENOUS EVERY 5 MIN PRN
Status: CANCELLED | OUTPATIENT
Start: 2024-10-28

## 2024-10-28 RX ORDER — DIPHENHYDRAMINE HYDROCHLORIDE 50 MG/ML
25 INJECTION INTRAMUSCULAR; INTRAVENOUS
Status: CANCELLED
Start: 2024-10-28

## 2024-10-28 RX ORDER — ALBUTEROL SULFATE 90 UG/1
1-2 INHALANT RESPIRATORY (INHALATION)
Start: 2024-10-29

## 2024-10-28 RX ORDER — MEPERIDINE HYDROCHLORIDE 25 MG/ML
25 INJECTION INTRAMUSCULAR; INTRAVENOUS; SUBCUTANEOUS
Status: CANCELLED | OUTPATIENT
Start: 2024-10-28

## 2024-10-28 RX ORDER — METHYLPREDNISOLONE SODIUM SUCCINATE 40 MG/ML
40 INJECTION INTRAMUSCULAR; INTRAVENOUS
Start: 2024-10-29

## 2024-10-28 RX ORDER — HEPARIN SODIUM (PORCINE) LOCK FLUSH IV SOLN 100 UNIT/ML 100 UNIT/ML
5 SOLUTION INTRAVENOUS
OUTPATIENT
Start: 2024-10-29

## 2024-10-28 RX ORDER — HEPARIN SODIUM,PORCINE 10 UNIT/ML
5-20 VIAL (ML) INTRAVENOUS DAILY PRN
OUTPATIENT
Start: 2024-10-29

## 2024-10-28 RX ORDER — DIPHENHYDRAMINE HYDROCHLORIDE 50 MG/ML
50 INJECTION INTRAMUSCULAR; INTRAVENOUS
Status: CANCELLED
Start: 2024-10-28

## 2024-10-28 RX ORDER — EPINEPHRINE 1 MG/ML
0.3 INJECTION, SOLUTION, CONCENTRATE INTRAVENOUS EVERY 5 MIN PRN
OUTPATIENT
Start: 2024-10-29

## 2024-10-28 RX ORDER — ALBUTEROL SULFATE 0.83 MG/ML
2.5 SOLUTION RESPIRATORY (INHALATION)
Status: CANCELLED | OUTPATIENT
Start: 2024-10-28

## 2024-10-28 RX ORDER — ALBUTEROL SULFATE 0.83 MG/ML
2.5 SOLUTION RESPIRATORY (INHALATION)
OUTPATIENT
Start: 2024-10-29

## 2024-10-28 RX ORDER — MEPERIDINE HYDROCHLORIDE 25 MG/ML
25 INJECTION INTRAMUSCULAR; INTRAVENOUS; SUBCUTANEOUS
OUTPATIENT
Start: 2024-10-29

## 2024-10-28 RX ORDER — DIPHENHYDRAMINE HYDROCHLORIDE 50 MG/ML
50 INJECTION INTRAMUSCULAR; INTRAVENOUS
Start: 2024-10-29

## 2024-10-28 RX ORDER — DIPHENHYDRAMINE HYDROCHLORIDE 50 MG/ML
25 INJECTION INTRAMUSCULAR; INTRAVENOUS
Start: 2024-10-29

## 2024-10-28 RX ADMIN — IRON SUCROSE 300 MG: 20 INJECTION, SOLUTION INTRAVENOUS at 08:05

## 2024-10-28 NOTE — PROGRESS NOTES
Anemia Management Note - Follow Up      SUBJECTIVE/OBJECTIVE:    Referred by Dr. Rafael Oswald on 2024  Primary Diagnosis: Anemia in Chronic Kidney Disease (N18.3, D63.1)   3b  Secondary Diagnosis: Chronic Kidney Disease, Stage 3 (N18.3)  3b     Hgb goal range: 9-10  Epo/Darbo: TBD.  Hgb >10.0.  TSAT must be 20% or > before Insurance will approve ALMA.   Iron regimen: Treat with IV Iron.   *completed Venofer on 24.      Labs : 2025  RX/TX plans : TBD     *[facility]; [phone], opt 2 for infusion appt     Recent ALMA use, transfusion, IV iron: NA  No MI and blood clots or cancers. Hx of CVA, HTN, and Anticoagulated.      Contact:No Consent to Communicate on File.         Latest Ref Rng & Units 2024 2024 2024 10/3/2024 10/21/2024 10/25/2024 10/28/2024   Anemia   Hemoglobin 11.7 - 15.7 g/dL 11.3  11.9  9.9  9.7       IV Iron Dose      300mg 300mg 300mg   TSAT 15 - 46 % 31  28  21        Ferritin 11 - 328 ng/mL 197  143  71             BP Readings from Last 3 Encounters:   10/28/24 (!) 157/60   10/25/24 (!) 155/62   10/21/24 (!) 178/55     Wt Readings from Last 2 Encounters:   10/03/24 52.2 kg (115 lb)   24 52.6 kg (116 lb)           ASSESSMENT:    Hgb:at goal - continue to monitor  TSat: Due for iron studies 4 weeks after last IV iron infusion Ferritin: Due for iron studies 4 weeks after last IV iron infusion        PLAN:  Check iron studies in 4 week(s).    Orders needed to be renewed (for next follow-up date) in EPIC: None    Iron labs due:  End of 2024    Plan discussed with:  No call, chart review       NEXT FOLLOW-UP DATE:  24    Jennifer Weber RN   Anemia Services  [facility]  [email or web address]   Office : [phone]  Fax: [phone]

## 2024-10-29 ENCOUNTER — PATIENT OUTREACH (OUTPATIENT)
Dept: CARE COORDINATION | Facility: CLINIC | Age: 79
End: 2024-10-29
Payer: COMMERCIAL

## 2024-10-31 ENCOUNTER — PATIENT OUTREACH (OUTPATIENT)
Dept: CARE COORDINATION | Facility: CLINIC | Age: 79
End: 2024-10-31
Payer: COMMERCIAL

## 2024-11-05 DIAGNOSIS — F41.1 GENERALIZED ANXIETY DISORDER: ICD-10-CM

## 2024-11-05 DIAGNOSIS — K21.00 GASTROESOPHAGEAL REFLUX DISEASE WITH ESOPHAGITIS WITHOUT HEMORRHAGE: ICD-10-CM

## 2024-11-05 DIAGNOSIS — I10 BENIGN ESSENTIAL HYPERTENSION: ICD-10-CM

## 2024-11-05 RX ORDER — FAMOTIDINE 40 MG/1
40 TABLET, FILM COATED ORAL DAILY
Qty: 90 TABLET | Refills: 1 | Status: SHIPPED | OUTPATIENT
Start: 2024-11-05

## 2024-11-05 RX ORDER — ENALAPRIL MALEATE 20 MG/1
20 TABLET ORAL DAILY
Qty: 90 TABLET | Refills: 0 | Status: SHIPPED | OUTPATIENT
Start: 2024-11-05

## 2024-11-05 RX ORDER — AMLODIPINE BESYLATE 5 MG/1
TABLET ORAL
Qty: 90 TABLET | Refills: 0 | Status: SHIPPED | OUTPATIENT
Start: 2024-11-05

## 2024-11-05 NOTE — TELEPHONE ENCOUNTER
Requested Prescriptions   Pending Prescriptions Disp Refills    amLODIPine (NORVASC) 5 MG tablet [Pharmacy Med Name: amLODIPine Besylate 5 MG Oral Tablet] 90 tablet 0     Sig: Take 1 tablet by mouth once daily       Calcium Channel Blockers Protocol  Failed - 11/5/2024 12:55 PM        Failed - Most recent blood pressure under 140/90 in past 12 months     BP Readings from Last 3 Encounters:   10/28/24 (!) 157/60   10/25/24 (!) 155/62   10/21/24 (!) 178/55       No data recorded            Failed - GFR is on file in the past 12 months and most recent GFR is normal        Passed - Medication is active on med list        Passed - Recent (12 mo) or future (90 days) visit within the authorizing provider's specialty     The patient must have completed an in-person or virtual visit within the past 12 months or has a future visit scheduled within the next 90 days with the authorizing provider s specialty.  Urgent care and e-visits do not quality as an office visit for this protocol.          Passed - Patient is age 18 or older        Passed - No active pregnancy on record        Passed - No positive pregnancy test in past 12 months          enalapril (VASOTEC) 20 MG tablet [Pharmacy Med Name: Enalapril Maleate 20 MG Oral Tablet] 90 tablet 0     Sig: Take 1 tablet by mouth once daily       ACE Inhibitors (Including Combos) Protocol Failed - 11/5/2024 12:55 PM        Failed - Most recent blood pressure under 140/90 in past 12 months- Clinicial or Patient Reported     BP Readings from Last 3 Encounters:   10/28/24 (!) 157/60   10/25/24 (!) 155/62   10/21/24 (!) 178/55       No data recorded            Failed - Most recent GFR on file in the past 12 months >30        Passed - Medication is active on med list        Passed - Medication indicated for associated diagnosis     Medication is associated with one or more of the following diagnoses:     Chronic Kidney Disease (CKD)   Coronary Artery Disease (CAD)   Diabetes   Heart  Failure (HF)   Hypertension (HTN)   Nephropathy   History of myocarditis   Tachycardia induced cardiomyopathy   STEMI (ST elevation myocardial infarction)   Spontaneous dissection of coronary artery   Status post percutaneous transluminal coronary angioplasty            Passed - Recent (12 mo) or future (90 days) visit within the authorizing provider's specialty     The patient must have completed an in-person or virtual visit within the past 12 months or has a future visit scheduled within the next 90 days with the authorizing provider s specialty.  Urgent care and e-visits do not quality as an office visit for this protocol.          Passed - Patient is age 18 or older        Passed - No active pregnancy on record        Passed - Normal serum potassium on file in past 12 months     Recent Labs   Lab Test 10/03/24  0903   POTASSIUM 4.6             Passed - No positive pregnancy test within past 12 months

## 2024-11-06 ENCOUNTER — OFFICE VISIT (OUTPATIENT)
Dept: UROLOGY | Facility: CLINIC | Age: 79
End: 2024-11-06
Payer: COMMERCIAL

## 2024-11-06 VITALS
SYSTOLIC BLOOD PRESSURE: 159 MMHG | DIASTOLIC BLOOD PRESSURE: 77 MMHG | TEMPERATURE: 97.4 F | OXYGEN SATURATION: 96 % | HEART RATE: 75 BPM

## 2024-11-06 DIAGNOSIS — R31.0 GROSS HEMATURIA: Primary | ICD-10-CM

## 2024-11-06 PROCEDURE — 52000 CYSTOURETHROSCOPY: CPT | Performed by: STUDENT IN AN ORGANIZED HEALTH CARE EDUCATION/TRAINING PROGRAM

## 2024-11-06 RX ADMIN — Medication 500 MG: at 12:37

## 2024-11-06 NOTE — NURSING NOTE
"Initial BP (!) 159/77 (BP Location: Left arm, Patient Position: Sitting, Cuff Size: Adult Regular)   Pulse 75   Temp 97.4  F (36.3  C) (Tympanic)   LMP 09/15/1998   SpO2 96%  Estimated body mass index is 19.14 kg/m  as calculated from the following:    Height as of 10/3/24: 1.651 m (5' 5\").    Weight as of 10/3/24: 52.2 kg (115 lb). .  Yesy Conley MA on 11/6/2024 at 12:06 PM    "

## 2024-11-06 NOTE — PROGRESS NOTES
Reason for cystoscopy: hematuria    Brief History:  Bebe Alfonso is a very pleasant AGE: 78 year old year old person  CVA, COPD, HLD, renal artery stenosis, HTN, and CKD stage 3 who presents for evaluation of gross hematuria.     Per notes from 10/3/2024 Tri rae pt reports pink urine for several months  Active smoker      The following distinct labs were reviewed   Most Recent Urinalysis:  Recent Labs   Lab Test 09/12/24  0921   COLOR Yellow   APPEARANCE Clear   URINEGLC Negative   URINEBILI Small*   URINEKETONE Negative   SG 1.020   UBLD Trace*   URINEPH 6.0   PROTEIN Trace*   UROBILINOGEN 0.2   NITRITE Negative   LEUKEST Negative   RBCU 0-2   WBCU 0-5     Creatinine   Date Value Ref Range Status   10/03/2024 1.89 (H) 0.51 - 0.95 mg/dL Final   06/10/2021 1.49 (H) 0.52 - 1.04 mg/dL Final     Creatinine POCT   Date Value Ref Range Status   10/24/2024 2.2 (H) 0.5 - 1.0 mg/dL Final         Imaging  Non contrast CT without any h ydronephrosis or renal stones    Cytology negative    CYSTOSCOPY  After informed consent was obtained, the patient was brought to the procedure room where she was placed in the lithotomy position with all pressure points well padded.  She was prepped and draped in a sterile fashion. A flexible cystoscope was introduced through a well-lubricated urethra.  The bladder was examined carefully and systematically. The scope was retroflexed. There were no tumors or stones present in the bladder. The bladder demonstrated no trabeculation. The scope was removed slowly to examine the urethra which was normal.     PLAN:  Bebe Alfonso is a very pleasant AGE: 78 year old year old person  CVA, COPD, HLD, renal artery stenosis, HTN, and CKD stage 3 who presents for evaluation of gross hematuria.    Gross hematuria - cystoscopy today normal, cytology negative, CT was done withou contrast due to CKD - her urine analysis have actually never shown blood this year and last year only patient report  of the gross hematuria. No renal masses on renal US from 2 year ago. Unclear etiology of the pink urine, likely to be benign given normal cystoscopy, normal CT without contrast, and negative urine analysis and cytology. If more worrisome we could get MR urogram or do renal ultrasound and retrogrades but I think unnecessary at this point. Will plan to see her back in 1 year with UA and if persistent gross hematuria can get renal US

## 2024-11-18 DIAGNOSIS — N18.4 CKD (CHRONIC KIDNEY DISEASE) STAGE 4, GFR 15-29 ML/MIN (H): Primary | ICD-10-CM

## 2024-11-21 ENCOUNTER — LAB (OUTPATIENT)
Dept: LAB | Facility: CLINIC | Age: 79
End: 2024-11-21
Payer: COMMERCIAL

## 2024-11-21 ENCOUNTER — ANTICOAGULATION THERAPY VISIT (OUTPATIENT)
Dept: ANTICOAGULATION | Facility: CLINIC | Age: 79
End: 2024-11-21

## 2024-11-21 DIAGNOSIS — Z79.01 ANTICOAGULATION MONITORING, INR RANGE 2-3: Chronic | ICD-10-CM

## 2024-11-21 DIAGNOSIS — I63.20 CEREBROVASCULAR ACCIDENT (CVA) DUE TO OCCLUSION OF PRECEREBRAL ARTERY (H): Chronic | ICD-10-CM

## 2024-11-21 DIAGNOSIS — Z86.73 HISTORY OF CVA (CEREBROVASCULAR ACCIDENT): ICD-10-CM

## 2024-11-21 DIAGNOSIS — Z79.01 LONG TERM CURRENT USE OF ANTICOAGULANT THERAPY: Primary | ICD-10-CM

## 2024-11-21 LAB — INR BLD: 4.4 (ref 0.9–1.1)

## 2024-11-21 NOTE — PROGRESS NOTES
ANTICOAGULATION MANAGEMENT     Bebe Alfonso 79 year old female is on warfarin with supratherapeutic INR result. (Goal INR 2.0-3.0)    Recent labs: (last 7 days)     11/21/24  0826   INR 4.4*       ASSESSMENT     Source(s): Chart Review  Previous INR was Therapeutic last 2(+) visits  Medication, diet, health changes since last INR chart reviewed; none identified         PLAN     Unable to reach Bebe today.    No instructions provided. Unable to leave voicemail.    Follow up required to confirm warfarin dose taken and assess for changes and discuss out of range result     Vandana Garcia RN  11/21/2024  Anticoagulation Clinic  [place] [facility] [place] for routing messages: vic [facility] patient phone line: [phone]

## 2024-11-21 NOTE — PROGRESS NOTES
ANTICOAGULATION MANAGEMENT     Bebe Alfonso 79 year old female is on warfarin with supratherapeutic INR result. (Goal INR 2.0-3.0)    Recent labs: (last 7 days)     11/21/24  0826   INR 4.4*       ASSESSMENT     Source(s): Chart Review and Patient/Caregiver Call     Warfarin doses taken: Warfarin taken as instructed  Diet: No new diet changes identified  Medication/supplement changes: None noted  New illness, injury, or hospitalization: Yes: cough does not feel well at all  Signs or symptoms of bleeding or clotting: No  Previous result: Therapeutic last 2(+) visits  Additional findings: None       PLAN     Recommended plan for temporary change(s) affecting INR     Dosing Instructions: hold dose then continue your current warfarin dose with next INR in 1 week       Summary  As of 11/21/2024      Full warfarin instructions:  11/21: Hold; Otherwise 2.5 mg every Sun, Thu; 5 mg all other days   Next INR check:  11/29/2024               Telephone call with Bebe who verbalizes understanding and agrees to plan    Lab visit scheduled    Education provided: Goal range and lab monitoring: goal range and significance of current result    Plan made per [facility] anticoagulation protocol    Vandana Garcia RN  11/21/2024  Anticoagulation Clinic  [place] for routing messages: vic [facility] patient phone line: [phone]        _______________________________________________________________________     Anticoagulation Episode Summary       Current INR goal:  2.0-3.0   TTR:  60.1% (1 y)   Target end date:  Indefinite   Send INR reminders to:  [facility]    Indications    History of CVA (cerebrovascular accident) (Resolved) [Z86.73]  Long term current use of anticoagulant therapy [Z79.01]  Cerebrovascular accident (CVA) due to occlusion of precerebral artery (H) [I63.20]  History of CVA (cerebrovascular accident) [Z86.73]  Anticoagulation monitoring  INR range 2-3 [Z79.01]             Comments:  --              Anticoagulation Care Providers       Provider Role Specialty Phone number    Diana Ely, SELWYN CNP Referring Family Medicine [phone]

## 2024-12-02 ENCOUNTER — VIRTUAL VISIT (OUTPATIENT)
Dept: NEPHROLOGY | Facility: CLINIC | Age: 79
End: 2024-12-02
Attending: PHYSICIAN ASSISTANT
Payer: COMMERCIAL

## 2024-12-02 DIAGNOSIS — D63.1 ANEMIA OF CHRONIC RENAL FAILURE, STAGE 3B (H): ICD-10-CM

## 2024-12-02 DIAGNOSIS — N18.4 CKD (CHRONIC KIDNEY DISEASE) STAGE 4, GFR 15-29 ML/MIN (H): Primary | ICD-10-CM

## 2024-12-02 DIAGNOSIS — I10 HYPERTENSION, ESSENTIAL: ICD-10-CM

## 2024-12-02 DIAGNOSIS — N18.32 ANEMIA OF CHRONIC RENAL FAILURE, STAGE 3B (H): ICD-10-CM

## 2024-12-02 DIAGNOSIS — R31.0 GROSS HEMATURIA: ICD-10-CM

## 2024-12-02 PROCEDURE — 99214 OFFICE O/P EST MOD 30 MIN: CPT | Mod: 95 | Performed by: PHYSICIAN ASSISTANT

## 2024-12-02 NOTE — NURSING NOTE
Current patient location: Patient declined to provide     Is the patient currently in the state of [place]? YES    Visit mode:VIDEO    If the visit is dropped, the patient can be reconnected by:VIDEO VISIT: Text to cell phone:   Telephone Information:   Mobile [phone]       Will anyone else be joining the visit? NO  (If patient encounters technical issues they should call [phone]    Are changes needed to the allergy or medication list? No    Are refills needed on medications prescribed by this physician? NO    Rooming Documentation:  Not applicable    Reason for visit: RECHECK    Chrsisy DU

## 2024-12-02 NOTE — PATIENT INSTRUCTIONS
Start taking amlodipine at night instead of the morning. Check blood pressure daily, one hour after morning medications and keep log. Nurse will call in 1-2 weeks for report.   Avoid ibuprofen/aleve.   Will reach out to anemia clinic for management.   Labs and follow up in 2 months.

## 2024-12-02 NOTE — PROGRESS NOTES
Virtual Visit Details    Type of service:  Video Visit   Video Start Time: 8:20 am  Video End Time: 8:35 am    Originating Location (pt. Location): Home    Distant Location (provider location):  On-site  Platform used for Video Visit: Vijaya          Nephrology Progress Note  12/2/2024      CC: CKD    HPI:   Thank you for the referral. I had the pleasure today of seeing Bebe Alfonso  She is a 79 year old female  who presents for evaluation of CKD.  She is here today with her .  She seems to have memory impairment and some expressive aphasia as she seems to struggle to find words.    Her medical history significant for severe atherosclerotic disease and she has sustained five strokes in addition to several minor ones.  She reports that the cause of these strokes are unclear though she is on warfarin for anticoagulation. It is not clear to me why she is on anticoagulation.  Her heart rate in clinic today is regular.     She is also known to have hypertension and dyslipidemia.  She reports that her blood pressure recently has been better controlled, usually ~ 115-130s, mostly 120s/50-60 at home.  She continues to smoke but she denies any shortness of breath.  Current antihypertensive regimen: amlodipine 5 mg daily, enalapril 20 mg daily.     She has been losing weight because she is limited on the diet that she can take with warfarin.  Also since she had been diagnosed with CKD, her food intake has declined as she is trying to watch her diet more.  She is actually now underweight with a BMI of 19.  She reports that she intermittently saw pink urine and blood in stool at the same time over the past year.   Her energy is okay.  She denies any skin rash, orthopnea, change in bowel habits. She has not been seen by urology. Her sister had what sounds like a partial nephrectomy for kidney lesion.     She was seen by an nephrologist at [facility] in December 2023.  Her workup showed a small right kidney 6 cm compared  to a 10.5 cm on the left and moderate right renal artery stenosis.  Her workup was negative for monoclonal protein and her ANCA serology was negative.  Her UA had intermittent blood cells and red blood cells and some moderate proteinuria with a urine protein to creatinine ratio of 0.8 g/g.  Her kidney disease was thought to be secondary to atherosclerotic vascular disease and decreased right kidney function.  She saw vascular surgery at [facility] and they decided not to intervene on her right renal artery stenosis    Social hx: She is  and lives with her .  She has few children and grandchildren.  She continues to smoke.  She does not drink alcohol.  She used to work as a  and she is now retired.      Allergies   Allergen Reactions    Losartan Swelling, Nausea, Shortness Of Breath and Palpitations    Gabapentin GI Disturbance and Unknown     Double vision  flatulence    Lisinopril Cough    Oxycodone Other (See Comments)     But has tolerated hydrocodone    Tramadol Other (See Comments)    Augmentin [Amoxicillin-Pot Clavulanate] Rash    Bacitracin Rash     blisters    Bupropion Rash     Allergic to brand not generic       Current Outpatient Medications   Medication Sig Dispense Refill    amLODIPine (NORVASC) 5 MG tablet Take 1 tablet by mouth once daily 90 tablet 0    Aspirin (VAZALORE) 81 MG CAPS Please choose reason not prescribed from choices below.      atorvastatin (LIPITOR) 80 MG tablet Take 1 tablet by mouth once daily 90 tablet 2    enalapril (VASOTEC) 20 MG tablet Take 1 tablet by mouth once daily 90 tablet 0    famotidine (PEPCID) 40 MG tablet Take 1 tablet by mouth once daily 90 tablet 1    FLUoxetine (PROZAC) 20 MG capsule Take 1 capsule by mouth once daily 90 capsule 0    fluticasone (FLONASE) 50 MCG/ACT nasal spray Spray 1 spray into both nostrils daily 16 g 1    warfarin ANTICOAGULANT (COUMADIN) 5 MG tablet Take 7.5 mg every Mon; 5 mg all other days or As directed by Anticoagulation  clinic 100 tablet 1     No current facility-administered medications for this visit.       Past Medical History:   Diagnosis Date    Anticoagulation monitoring, INR range 2-3 10/22/2015    Benign essential hypertension 9/15/2016    Carotid bruit 11/12/2012    Overview:  12/6/10 Carotid US  Elevated velocities throughout the carotid arteries bilaterally. There is a focal area of increased velocity in the mid left internal carotid artery with a plaque in this region on the color flow images. This corresponds to 50 - 70 % diameter reduction. There is an area of elevated velocity in the left external carotid artery consistent with stenosis. There is no focal area of significant stenosis in the right carotid arteries in the neck. Plaque in the carotid arteries bilaterally.     Chronic obstructive pulmonary disease (H) 1/19/2016    The FVC, FEV1 and FEV1/FVC ratio are reduced.  The inspiratory flow rates are within normal limits.  The FVC is reduced relative to the SVC indicating air trapping.  While the TLC is within normal limits, the FRC, RV and RV/TLC ratio are increased.  The  diffusing capacity is mildly reduced. IMPRESSION: Mild-moderate Airflow Obstruction with air trapping ____________________________________________MDestinyDClarke Orellana  April 2018    Esophageal reflux 4/8/2008    Overview:  Omeprazole PRN, occasional symptoms such as chest burning and knife twisted to stomach.     Heart disease born with it    History of blood transfusion 70 years ago    History of CVA (cerebrovascular accident) 10/20/2015    Long term current use of anticoagulant therapy 3/20/2018    Major depressive disorder, recurrent episode, moderate (H) 10/29/2008    Overview:  She is not taking any medication at this time.    Osteoarthrosis, hip 10/19/2010    Overview:  Pt scheduled for right  total hip arthroplasty on 6/14/13 with Dr. Addison HEREDIA hip xray (11/2012) Severe degenerative changes seen of the right hip with near bone-on-bone  appearance of the joint and several subchondral cysts forming.    Thyroid nodule 5/22/2013    Overview:  Thyroid US (2012) Stable nodule left lobe of thyroid TSH- (2/19/12) normal (2.26)       Past Surgical History:   Procedure Laterality Date    BIOPSY  appox in 1980's    BREAST SURGERY  last time in the 1990's    non cancer    COLONOSCOPY N/A 9/7/2022    Procedure: COLONOSCOPY, WITH POLYPECTOMY AND BIOPSY;  Surgeon: Alfredito Gomez DO;  Location: [facility]    ESOPHAGOSCOPY, GASTROSCOPY, DUODENOSCOPY (EGD), COMBINED N/A 5/25/2022    Procedure: ESOPHAGOGASTRODUODENOSCOPY, WITH BIOPSY;  Surgeon: Alfredito Gomez DO;  Location: [facility]       Social History     Tobacco Use    Smoking status: Every Day     Current packs/day: 1.00     Average packs/day: 1 pack/day for 50.3 years (50.3 ttl pk-yrs)     Types: Cigarettes     Start date: 1/1/2016    Smokeless tobacco: Never   Vaping Use    Vaping status: Never Used   Substance Use Topics    Alcohol use: No    Drug use: Never       Family History   Problem Relation Age of Onset    Diabetes Maternal Grandfather     Diabetes Sister         developed after cancer treatment    Breast Cancer Sister     Obesity Sister     Hypertension Mother     Hyperlipidemia Mother     Osteoporosis Mother     Breast Cancer Sister     Osteoporosis Sister     Breast Cancer Daughter     Other Cancer Sister         throught out body    Substance Abuse Sister         acol    Obesity Sister     Substance Abuse Sister         acol    Obesity Sister        ROS: A comprehensive review of systems was negative other than noted here or above.     Exam:  LMP 09/15/1998   BP Readings from Last 6 Encounters:   11/06/24 (!) 159/77   10/28/24 (!) 157/60   10/25/24 (!) 155/62   10/21/24 (!) 178/55   10/03/24 (!) 142/56   09/16/24 (!) 148/54     Wt Readings from Last 4 Encounters:   10/03/24 52.2 kg (115 lb)   09/16/24 52.6 kg (116 lb)   08/06/24 53.5 kg (118 lb)   06/26/24 53.5 kg (118 lb)     GENERAL  APPEARANCE: alert and no distress      Results: Details with the patient and her     Assessment/Plan:   Problem #1 chronic kidney disease:  Previous workup by Dr. Granger at [facility] showed a small right kidney 6 cm compared to a 10.5 cm on the left and moderate right renal artery stenosis.  Her workup was negative for monoclonal protein and her ANCA serology was negative.  Her UA had intermittent blood cells and red blood cells and some moderate proteinuria with a urine protein to creatinine ratio of 0.8 g/g.  Her kidney disease was thought to be secondary to atherosclerotic vascular disease and decreased right kidney function.  She saw vascular surgery at [facility] and they decided not to intervene on her right renal artery stenosis, especially that her blood pressure is currently well-controlled.  Her current creatinine is at 2.2 mg/dL with a concomitant GFR of 21 mL/min. Cystatin GFR is actually a little higher than estimated GFR in April 2024. We discussed today the natural progression of chronic kidney disease and the ways to hold the progression of chronic kidney disease including maintaining good blood pressure control, ideal body weight and a low-salt diet I also emphasized the dangers of smoking on the progression of atherosclerosis and chronic kidney disease in her case. Also discussed increasing water intake. She is only hydrating with 4 cups of coffee per day.   - She is rather underweight with small muscle mass 4/9/24 cystatin C 1.7, GFR 33, same day Scr 1.8, eGFR 28  -- recent Scr 1.6, eGFR 32; urine prot/cr 0.6 mg/mg  - She is already on RAAS blockade  - no longer on Jardiance 25 mg daily     Problem #2 severe peripheral vascular disease: She has significant atherosclerotic vascular disease.  She has bilateral carotid bruit, several strokes over the years, mild bilateral femoral therapy.  Unfortunately she continues to smoke and I emphasized the importance of stopping smoking.  -She is on  aspirin and warfarin    Problem #3 current smoking: I had a long discussion today about dangers effect of smoking regarding worsening her cardiovascular risk as well as chronic kidney disease.  I strongly suggested that she stop smoking    Problem #4 hypertension: Her blood pressure currently is at goal.  Though she is having some orthostatic symptoms.   - start taking amlodipine at night and enalapril in the morning. Check BP one hour after morning medications  - No changes were made to her medications.    Problem #5 anticoagulation: It is unclear to me why she is on anticoagulation at this point.  Cardiology note from 2015 by Dr. Alfredo also questions the nature of the stroke whether embolic or not and whether she need to be on anticoagulation or any novel anticoagulant.  Looks like she did have a loop recorder but is not clear to me at this point whether she really had A-fib or not.  This is important to determine because being on warfarin actually has limited her food intake and she is currently underweight.    Problem #6 underweight: Since her diagnosis with chronic kidney disease, her food intake has been intentionally declining.  She is also restricted on her food intake with warfarin.  We had a long discussion today about the healthy diet for the chronic kidney disease.  Ideally we prefer on a plant-based diet with and plant proteins are preferable than animal protein.  I advised her to increase her portions since she is underweight.  I also suggested 5 small meals instead of 3 if she is having early satiety or frequent snacking. I suggested to refer her to the dietitian given her dietary restrictions with the warfarin.  I also advised her to check with her neurologist to see if she can be on apixaban instead of warfarin.    Problem #7 anemia: Likely secondary to chronic kidney disease.  Her hemoglobin is below goal. Iron sats are low normal  - will reach out to anemia clinic, likely needs iron course   -  she has been referred to anemia clinic    Problem #8 CKD MBD: Her calcium, phosphorus, PTH D are within normal limits. Recent Vit D elevated  - recheck vit D and PTH next visit.     Gross Hematuria   - none recently  - had CT, cytology and cystoscopy which was normal  - recommended to follow up in 1 year with urology      Diana Abel PA-C    Visit length 15 minutes. An additional 20 minutes were spent on date of service in chart review, documentation, and other activities as noted.

## 2024-12-02 NOTE — LETTER
12/2/2024       RE: Bebe Alfonso  [address] [place] [address]     Dear Colleague,    Thank you for referring your patient, Bebe Alfonso, to the [facility] NEPHROLOGY CLINIC [place] [facility]. Please see a copy of my visit note below.    Virtual Visit Details    Type of service:  Video Visit   Video Start Time: 8:20 am  Video End Time: 8:35 am    Originating Location (pt. Location): Home    Distant Location (provider location):  On-site  Platform used for Video Visit: Vijaya          Nephrology Progress Note  12/2/2024      CC: CKD    HPI:   Thank you for the referral. I had the pleasure today of seeing Bebe Alfonso  She is a 79 year old female  who presents for evaluation of CKD.  She is here today with her .  She seems to have memory impairment and some expressive aphasia as she seems to struggle to find words.    Her medical history significant for severe atherosclerotic disease and she has sustained five strokes in addition to several minor ones.  She reports that the cause of these strokes are unclear though she is on warfarin for anticoagulation. It is not clear to me why she is on anticoagulation.  Her heart rate in clinic today is regular.     She is also known to have hypertension and dyslipidemia.  She reports that her blood pressure recently has been better controlled, usually ~ 115-130s, mostly 120s/50-60 at home.  She continues to smoke but she denies any shortness of breath.  Current antihypertensive regimen: amlodipine 5 mg daily, enalapril 20 mg daily.     She has been losing weight because she is limited on the diet that she can take with warfarin.  Also since she had been diagnosed with CKD, her food intake has declined as she is trying to watch her diet more.  She is actually now underweight with a BMI of 19.  She reports that she intermittently saw pink urine and blood in stool at the same time over the  past year.   Her energy is okay.  She denies any skin rash, orthopnea, change in bowel habits. She has not been seen by urology. Her sister had what sounds like a partial nephrectomy for kidney lesion.     She was seen by an nephrologist at [facility] in December 2023.  Her workup showed a small right kidney 6 cm compared to a 10.5 cm on the left and moderate right renal artery stenosis.  Her workup was negative for monoclonal protein and her ANCA serology was negative.  Her UA had intermittent blood cells and red blood cells and some moderate proteinuria with a urine protein to creatinine ratio of 0.8 g/g.  Her kidney disease was thought to be secondary to atherosclerotic vascular disease and decreased right kidney function.  She saw vascular surgery at [place] and they decided not to intervene on her right renal artery stenosis    Social hx: She is  and lives with her .  She has few children and grandchildren.  She continues to smoke.  She does not drink alcohol.  She used to work as a  and she is now retired.      Allergies   Allergen Reactions     Losartan Swelling, Nausea, Shortness Of Breath and Palpitations     Gabapentin GI Disturbance and Unknown     Double vision  flatulence     Lisinopril Cough     Oxycodone Other (See Comments)     But has tolerated hydrocodone     Tramadol Other (See Comments)     Augmentin [Amoxicillin-Pot Clavulanate] Rash     Bacitracin Rash     blisters     Bupropion Rash     Allergic to brand not generic       Current Outpatient Medications   Medication Sig Dispense Refill     amLODIPine (NORVASC) 5 MG tablet Take 1 tablet by mouth once daily 90 tablet 0     Aspirin (VAZALORE) 81 MG CAPS Please choose reason not prescribed from choices below.       atorvastatin (LIPITOR) 80 MG tablet Take 1 tablet by mouth once daily 90 tablet 2     enalapril (VASOTEC) 20 MG tablet Take 1 tablet by mouth once daily 90 tablet 0     famotidine (PEPCID) 40 MG tablet Take 1 tablet by  mouth once daily 90 tablet 1     FLUoxetine (PROZAC) 20 MG capsule Take 1 capsule by mouth once daily 90 capsule 0     fluticasone (FLONASE) 50 MCG/ACT nasal spray Spray 1 spray into both nostrils daily 16 g 1     warfarin ANTICOAGULANT (COUMADIN) 5 MG tablet Take 7.5 mg every Mon; 5 mg all other days or As directed by Anticoagulation clinic 100 tablet 1     No current facility-administered medications for this visit.       Past Medical History:   Diagnosis Date     Anticoagulation monitoring, INR range 2-3 10/22/2015     Benign essential hypertension 9/15/2016     Carotid bruit 11/12/2012    Overview:  12/6/10 Carotid US  Elevated velocities throughout the carotid arteries bilaterally. There is a focal area of increased velocity in the mid left internal carotid artery with a plaque in this region on the color flow images. This corresponds to 50 - 70 % diameter reduction. There is an area of elevated velocity in the left external carotid artery consistent with stenosis. There is no focal area of significant stenosis in the right carotid arteries in the neck. Plaque in the carotid arteries bilaterally.      Chronic obstructive pulmonary disease (H) 1/19/2016    The FVC, FEV1 and FEV1/FVC ratio are reduced.  The inspiratory flow rates are within normal limits.  The FVC is reduced relative to the SVC indicating air trapping.  While the TLC is within normal limits, the FRC, RV and RV/TLC ratio are increased.  The  diffusing capacity is mildly reduced. IMPRESSION: Mild-moderate Airflow Obstruction with air trapping ____________________________________________Clarke Iverson  April 2018     Esophageal reflux 4/8/2008    Overview:  Omeprazole PRN, occasional symptoms such as chest burning and knife twisted to stomach.      Heart disease born with it     History of blood transfusion 70 years ago     History of CVA (cerebrovascular accident) 10/20/2015     Long term current use of anticoagulant therapy 3/20/2018     Major  depressive disorder, recurrent episode, moderate (H) 10/29/2008    Overview:  She is not taking any medication at this time.     Osteoarthrosis, hip 10/19/2010    Overview:  Pt scheduled for right  total hip arthroplasty on 6/14/13 with Dr. Addison HEREDIA hip xray (11/2012) Severe degenerative changes seen of the right hip with near bone-on-bone appearance of the joint and several subchondral cysts forming.     Thyroid nodule 5/22/2013    Overview:  Thyroid US (2012) Stable nodule left lobe of thyroid TSH- (2/19/12) normal (2.26)       Past Surgical History:   Procedure Laterality Date     BIOPSY  appox in 1980's     BREAST SURGERY  last time in the 1990's    non cancer     COLONOSCOPY N/A 9/7/2022    Procedure: COLONOSCOPY, WITH POLYPECTOMY AND BIOPSY;  Surgeon: Alfredito Gomez DO;  Location: [facility]     ESOPHAGOSCOPY, GASTROSCOPY, DUODENOSCOPY (EGD), COMBINED N/A 5/25/2022    Procedure: ESOPHAGOGASTRODUODENOSCOPY, WITH BIOPSY;  Surgeon: Alfredito Gomez DO;  Location: [facility]       Social History     Tobacco Use     Smoking status: Every Day     Current packs/day: 1.00     Average packs/day: 1 pack/day for 50.3 years (50.3 ttl pk-yrs)     Types: Cigarettes     Start date: 1/1/2016     Smokeless tobacco: Never   Vaping Use     Vaping status: Never Used   Substance Use Topics     Alcohol use: No     Drug use: Never       Family History   Problem Relation Age of Onset     Diabetes Maternal Grandfather      Diabetes Sister         developed after cancer treatment     Breast Cancer Sister      Obesity Sister      Hypertension Mother      Hyperlipidemia Mother      Osteoporosis Mother      Breast Cancer Sister      Osteoporosis Sister      Breast Cancer Daughter      Other Cancer Sister         throught out body     Substance Abuse Sister         acol     Obesity Sister      Substance Abuse Sister         acol     Obesity Sister        ROS: A comprehensive review of systems was negative other than noted here or  above.     Exam:  LMP 09/15/1998   BP Readings from Last 6 Encounters:   11/06/24 (!) 159/77   10/28/24 (!) 157/60   10/25/24 (!) 155/62   10/21/24 (!) 178/55   10/03/24 (!) 142/56   09/16/24 (!) 148/54     Wt Readings from Last 4 Encounters:   10/03/24 52.2 kg (115 lb)   09/16/24 52.6 kg (116 lb)   08/06/24 53.5 kg (118 lb)   06/26/24 53.5 kg (118 lb)     GENERAL APPEARANCE: alert and no distress      Results: Details with the patient and her     Assessment/Plan:   Problem #1 chronic kidney disease:  Previous workup by Dr. Granger at [facility] showed a small right kidney 6 cm compared to a 10.5 cm on the left and moderate right renal artery stenosis.  Her workup was negative for monoclonal protein and her ANCA serology was negative.  Her UA had intermittent blood cells and red blood cells and some moderate proteinuria with a urine protein to creatinine ratio of 0.8 g/g.  Her kidney disease was thought to be secondary to atherosclerotic vascular disease and decreased right kidney function.  She saw vascular surgery at [place] and they decided not to intervene on her right renal artery stenosis, especially that her blood pressure is currently well-controlled.  Her current creatinine is at 2.2 mg/dL with a concomitant GFR of 21 mL/min. Cystatin GFR is actually a little higher than estimated GFR in April 2024. We discussed today the natural progression of chronic kidney disease and the ways to hold the progression of chronic kidney disease including maintaining good blood pressure control, ideal body weight and a low-salt diet I also emphasized the dangers of smoking on the progression of atherosclerosis and chronic kidney disease in her case. Also discussed increasing water intake. She is only hydrating with 4 cups of coffee per day.   - She is rather underweight with small muscle mass 4/9/24 cystatin C 1.7, GFR 33, same day Scr 1.8, eGFR 28  -- recent Scr 1.6, eGFR 32; urine prot/cr 0.6 mg/mg  - She is  already on RAAS blockade  - no longer on Jardiance 25 mg daily     Problem #2 severe peripheral vascular disease: She has significant atherosclerotic vascular disease.  She has bilateral carotid bruit, several strokes over the years, mild bilateral femoral therapy.  Unfortunately she continues to smoke and I emphasized the importance of stopping smoking.  -She is on aspirin and warfarin    Problem #3 current smoking: I had a long discussion today about dangers effect of smoking regarding worsening her cardiovascular risk as well as chronic kidney disease.  I strongly suggested that she stop smoking    Problem #4 hypertension: Her blood pressure currently is at goal.  Though she is having some orthostatic symptoms.   - start taking amlodipine at night and enalapril in the morning. Check BP one hour after morning medications  - No changes were made to her medications.    Problem #5 anticoagulation: It is unclear to me why she is on anticoagulation at this point.  Cardiology note from 2015 by Dr. Alfredo also questions the nature of the stroke whether embolic or not and whether she need to be on anticoagulation or any novel anticoagulant.  Looks like she did have a loop recorder but is not clear to me at this point whether she really had A-fib or not.  This is important to determine because being on warfarin actually has limited her food intake and she is currently underweight.    Problem #6 underweight: Since her diagnosis with chronic kidney disease, her food intake has been intentionally declining.  She is also restricted on her food intake with warfarin.  We had a long discussion today about the healthy diet for the chronic kidney disease.  Ideally we prefer on a plant-based diet with and plant proteins are preferable than animal protein.  I advised her to increase her portions since she is underweight.  I also suggested 5 small meals instead of 3 if she is having early satiety or frequent snacking. I suggested to  refer her to the dietitian given her dietary restrictions with the warfarin.  I also advised her to check with her neurologist to see if she can be on apixaban instead of warfarin.    Problem #7 anemia: Likely secondary to chronic kidney disease.  Her hemoglobin is below goal. Iron sats are low normal  - will reach out to anemia clinic, likely needs iron course   - she has been referred to anemia clinic    Problem #8 CKD MBD: Her calcium, phosphorus, PTH D are within normal limits. Recent Vit D elevated  - recheck vit D and PTH next visit.     Gross Hematuria   - none recently  - had CT, cytology and cystoscopy which was normal  - recommended to follow up in 1 year with urology      Megan Abel PA-C    Visit length 15 minutes. An additional 20 minutes were spent on date of service in chart review, documentation, and other activities as noted.       Again, thank you for allowing me to participate in the care of your patient.      Sincerely,    MEGAN JOHNSON PA-C

## 2024-12-03 ENCOUNTER — PATIENT OUTREACH (OUTPATIENT)
Dept: CARE COORDINATION | Facility: CLINIC | Age: 79
End: 2024-12-03
Payer: COMMERCIAL

## 2024-12-03 DIAGNOSIS — D63.1 ANEMIA OF CHRONIC RENAL FAILURE, STAGE 3B (H): Primary | ICD-10-CM

## 2024-12-03 DIAGNOSIS — N18.32 ANEMIA OF CHRONIC RENAL FAILURE, STAGE 3B (H): Primary | ICD-10-CM

## 2024-12-03 RX ORDER — HEPARIN SODIUM (PORCINE) LOCK FLUSH IV SOLN 100 UNIT/ML 100 UNIT/ML
5 SOLUTION INTRAVENOUS
OUTPATIENT
Start: 2024-12-03

## 2024-12-03 RX ORDER — DIPHENHYDRAMINE HYDROCHLORIDE 50 MG/ML
50 INJECTION INTRAMUSCULAR; INTRAVENOUS
Start: 2024-12-03

## 2024-12-03 RX ORDER — ALBUTEROL SULFATE 90 UG/1
1-2 INHALANT RESPIRATORY (INHALATION)
Start: 2024-12-03

## 2024-12-03 RX ORDER — EPINEPHRINE 1 MG/ML
0.3 INJECTION, SOLUTION, CONCENTRATE INTRAVENOUS EVERY 5 MIN PRN
OUTPATIENT
Start: 2024-12-03

## 2024-12-03 RX ORDER — HEPARIN SODIUM,PORCINE 10 UNIT/ML
5-20 VIAL (ML) INTRAVENOUS DAILY PRN
OUTPATIENT
Start: 2024-12-03

## 2024-12-03 RX ORDER — DIPHENHYDRAMINE HYDROCHLORIDE 50 MG/ML
25 INJECTION INTRAMUSCULAR; INTRAVENOUS
Start: 2024-12-03

## 2024-12-03 RX ORDER — ALBUTEROL SULFATE 0.83 MG/ML
2.5 SOLUTION RESPIRATORY (INHALATION)
OUTPATIENT
Start: 2024-12-03

## 2024-12-03 RX ORDER — MEPERIDINE HYDROCHLORIDE 25 MG/ML
25 INJECTION INTRAMUSCULAR; INTRAVENOUS; SUBCUTANEOUS
OUTPATIENT
Start: 2024-12-03

## 2024-12-03 RX ORDER — METHYLPREDNISOLONE SODIUM SUCCINATE 40 MG/ML
40 INJECTION INTRAMUSCULAR; INTRAVENOUS
Start: 2024-12-03

## 2024-12-03 NOTE — PROGRESS NOTES
Anemia Management Note - Follow Up      SUBJECTIVE/OBJECTIVE:    Referred by Dr. Rafael Oswald on 2024  Primary Diagnosis: Anemia in Chronic Kidney Disease (N18.3, D63.1)   3b  Secondary Diagnosis: Chronic Kidney Disease, Stage 3 (N18.3)  3b     Hgb goal range: 9-10  Epo/Darbo: TBD.   TSAT must be 20% or > before Insurance will approve ALMA.   Iron regimen: Treat with IV Iron.   *completed Venofer on 24.      Labs : 2025  RX/TX plans : TBD     *[facility]; [phone], opt 2 for infusion appt     Recent ALMA use, transfusion, IV iron: NA  No MI and blood clots or cancers. Hx of CVA, HTN, and Anticoagulated.      Contact:No Consent to Communicate on File.           Latest Ref Rng & Units 2024 2024 10/3/2024 10/21/2024 10/25/2024 10/28/2024 2024   Anemia   Hemoglobin 11.7 - 15.7 g/dL 11.9  9.9  9.7     8.9    IV Iron Dose     300mg 300mg 300mg    TSAT 15 - 46 % 28  21      16    Ferritin 11 - 328 ng/mL 143  71      242         BP Readings from Last 3 Encounters:   24 (!) 159/77   10/28/24 (!) 157/60   10/25/24 (!) 155/62     Wt Readings from Last 2 Encounters:   10/03/24 52.2 kg (115 lb)   24 52.6 kg (116 lb)           ASSESSMENT:    Hgb:Not at goal/Known  TSat: not at goal of >30% Ferritin: At goal (>100ng/mL)        PLAN:  Elif needs another course of IV Iron.    Previously received Venofer 300mg x 3 doses (Oct 2024).    Spoke with Elif,  orders placed for Venofer.   Message sent to the [facility].     Orders needed to be renewed (for next follow-up date) in EPIC: None    Iron labs due:  Needs IV Iron    Plan discussed with:  Spoke with Elif.      NEXT FOLLOW-UP DATE:  12/10/24    Jennifer Weber RN   Anemia Services  [facility]  [email or web address]   Office : [phone]  Fax: [phone]

## 2024-12-08 ENCOUNTER — MYC MEDICAL ADVICE (OUTPATIENT)
Dept: NEPHROLOGY | Facility: CLINIC | Age: 79
End: 2024-12-08
Payer: COMMERCIAL

## 2024-12-12 DIAGNOSIS — I10 BENIGN ESSENTIAL HYPERTENSION: ICD-10-CM

## 2024-12-12 RX ORDER — AMLODIPINE BESYLATE 5 MG/1
TABLET ORAL
Qty: 90 TABLET | Refills: 0 | OUTPATIENT
Start: 2024-12-12

## 2024-12-19 ENCOUNTER — OFFICE VISIT (OUTPATIENT)
Dept: FAMILY MEDICINE | Facility: CLINIC | Age: 79
End: 2024-12-19
Payer: COMMERCIAL

## 2024-12-19 ENCOUNTER — LAB (OUTPATIENT)
Dept: LAB | Facility: CLINIC | Age: 79
End: 2024-12-19
Payer: COMMERCIAL

## 2024-12-19 ENCOUNTER — TELEPHONE (OUTPATIENT)
Dept: ANTICOAGULATION | Facility: CLINIC | Age: 79
End: 2024-12-19

## 2024-12-19 ENCOUNTER — ANTICOAGULATION THERAPY VISIT (OUTPATIENT)
Dept: ANTICOAGULATION | Facility: CLINIC | Age: 79
End: 2024-12-19

## 2024-12-19 VITALS
BODY MASS INDEX: 17.52 KG/M2 | HEIGHT: 66 IN | TEMPERATURE: 98 F | RESPIRATION RATE: 18 BRPM | DIASTOLIC BLOOD PRESSURE: 84 MMHG | OXYGEN SATURATION: 96 % | SYSTOLIC BLOOD PRESSURE: 139 MMHG | WEIGHT: 109 LBS | HEART RATE: 83 BPM

## 2024-12-19 DIAGNOSIS — I63.20 CEREBROVASCULAR ACCIDENT (CVA) DUE TO OCCLUSION OF PRECEREBRAL ARTERY (H): Chronic | ICD-10-CM

## 2024-12-19 DIAGNOSIS — I10 BENIGN ESSENTIAL HYPERTENSION: ICD-10-CM

## 2024-12-19 DIAGNOSIS — Z86.73 HISTORY OF CVA (CEREBROVASCULAR ACCIDENT): ICD-10-CM

## 2024-12-19 DIAGNOSIS — R91.8 PULMONARY NODULES: ICD-10-CM

## 2024-12-19 DIAGNOSIS — Z79.01 LONG TERM CURRENT USE OF ANTICOAGULANT THERAPY: Primary | ICD-10-CM

## 2024-12-19 DIAGNOSIS — Z79.01 ANTICOAGULATION MONITORING, INR RANGE 2-3: Chronic | ICD-10-CM

## 2024-12-19 DIAGNOSIS — N18.32 STAGE 3B CHRONIC KIDNEY DISEASE (H): ICD-10-CM

## 2024-12-19 DIAGNOSIS — R06.02 SOBOE (SHORTNESS OF BREATH ON EXERTION): ICD-10-CM

## 2024-12-19 DIAGNOSIS — R05.3 CHRONIC COUGH: ICD-10-CM

## 2024-12-19 DIAGNOSIS — Z91.81 PERSONAL HISTORY OF FALL: ICD-10-CM

## 2024-12-19 DIAGNOSIS — Z00.00 ENCOUNTER FOR MEDICARE ANNUAL WELLNESS EXAM: Primary | ICD-10-CM

## 2024-12-19 DIAGNOSIS — M53.3 PAIN IN THE COCCYX: ICD-10-CM

## 2024-12-19 PROBLEM — N18.30 STAGE 3 CHRONIC KIDNEY DISEASE (H): Chronic | Status: RESOLVED | Noted: 2019-04-18 | Resolved: 2024-12-19

## 2024-12-19 LAB — INR BLD: 1.8 (ref 0.9–1.1)

## 2024-12-19 PROCEDURE — G0439 PPPS, SUBSEQ VISIT: HCPCS | Performed by: NURSE PRACTITIONER

## 2024-12-19 PROCEDURE — 90662 IIV NO PRSV INCREASED AG IM: CPT | Performed by: NURSE PRACTITIONER

## 2024-12-19 PROCEDURE — 99214 OFFICE O/P EST MOD 30 MIN: CPT | Mod: 25 | Performed by: NURSE PRACTITIONER

## 2024-12-19 PROCEDURE — G0008 ADMIN INFLUENZA VIRUS VAC: HCPCS | Performed by: NURSE PRACTITIONER

## 2024-12-19 RX ORDER — AZITHROMYCIN 250 MG/1
TABLET, FILM COATED ORAL
Qty: 6 TABLET | Refills: 0 | Status: SHIPPED | OUTPATIENT
Start: 2024-12-19 | End: 2024-12-24

## 2024-12-19 RX ORDER — PREDNISONE 20 MG/1
40 TABLET ORAL DAILY
Qty: 10 TABLET | Refills: 0 | Status: SHIPPED | OUTPATIENT
Start: 2024-12-19 | End: 2024-12-24

## 2024-12-19 RX ORDER — AMLODIPINE BESYLATE 5 MG/1
5 TABLET ORAL DAILY
Qty: 90 TABLET | Refills: 3 | Status: SHIPPED | OUTPATIENT
Start: 2024-12-19

## 2024-12-19 RX ORDER — ENALAPRIL MALEATE 20 MG/1
20 TABLET ORAL DAILY
Qty: 90 TABLET | Refills: 1 | Status: SHIPPED | OUTPATIENT
Start: 2024-12-19

## 2024-12-19 SDOH — HEALTH STABILITY: PHYSICAL HEALTH: ON AVERAGE, HOW MANY MINUTES DO YOU ENGAGE IN EXERCISE AT THIS LEVEL?: 0 MIN

## 2024-12-19 SDOH — HEALTH STABILITY: PHYSICAL HEALTH: ON AVERAGE, HOW MANY DAYS PER WEEK DO YOU ENGAGE IN MODERATE TO STRENUOUS EXERCISE (LIKE A BRISK WALK)?: 0 DAYS

## 2024-12-19 ASSESSMENT — PAIN SCALES - GENERAL: PAINLEVEL_OUTOF10: NO PAIN (0)

## 2024-12-19 ASSESSMENT — SOCIAL DETERMINANTS OF HEALTH (SDOH): HOW OFTEN DO YOU GET TOGETHER WITH FRIENDS OR RELATIVES?: ONCE A WEEK

## 2024-12-19 NOTE — TELEPHONE ENCOUNTER
"ANTICOAGULATION  MANAGEMENT     Interacting Medication Review    Interacting medication(s): Azithromycin (Zithromax, Z-nima) and Prednisone with warfarin.    Duration: 5 days (12/19/24 to 12/23/24)    Indication:  Chronic Cough    New medication?: Yes, interaction may increase INR and risk of bleeding. Closer INR monitoring recommended.        PLAN     Continue your current warfarin dose with next INR in 4 days            Left detailed voice message for Bebe with dosing instructions and follow up date.     New recheck date updated on anticoagulation calendar     [facility] priority set/moved to \"high\" for new major drug interaction    Plan made per [facility] anticoagulation protocol    Vandana Garcia RN  12/19/2024  Anticoagulation Clinic  [facility] for routing messages: vic [facility] patient phone line: [phone]    "

## 2024-12-19 NOTE — PROGRESS NOTES
ANTICOAGULATION MANAGEMENT     Bebe Alfonso 79 year old female is on warfarin with subtherapeutic INR result. (Goal INR 2.0-3.0)    Recent labs: (last 7 days)     12/19/24  1552   INR 1.8*       ASSESSMENT     Source(s): Chart Review  Previous INR was Therapeutic last visit; previously outside of goal range  Medication, diet, health changes since last INR   Was started on Prednisone and Azithromycin today         PLAN     Unable to reach Bebe today.    No instructions provided. Unable to leave voicemail.    Follow up required to confirm warfarin dose taken and assess for changes and discuss out of range result     Vandana Garcia RN  12/19/2024  Anticoagulation Clinic  [facility] for routing messages: vic [facility] patient phone line: [phone]

## 2024-12-19 NOTE — PROGRESS NOTES
ANTICOAGULATION MANAGEMENT     Bebe Alfonso 79 year old female is on warfarin with subtherapeutic INR result. (Goal INR 2.0-3.0)    Recent labs: (last 7 days)     12/19/24  1552   INR 1.8*       ASSESSMENT     Source(s): Chart Review and Patient/Caregiver Call     Warfarin doses taken: Warfarin taken as instructed  Diet: No new diet changes identified  Medication/supplement changes:  azithromycin and prednisone  New illness, injury, or hospitalization: Yes: chronic cough  Signs or symptoms of bleeding or clotting: No  Previous result: Therapeutic last visit; previously outside of goal range  Additional findings: None       PLAN     Recommended plan for temporary change(s) affecting INR     Dosing Instructions: Continue your current warfarin dose with next INR in 1 week       Summary  As of 12/19/2024      Full warfarin instructions:  2.5 mg every Sun, Thu; 5 mg all other days   Next INR check:  12/26/2024               Telephone call with Bebe who verbalizes understanding and agrees to plan    Lab visit scheduled    Education provided: Goal range and lab monitoring: goal range and significance of current result    Plan made per [facility] anticoagulation protocol    Vandana Garcia RN  12/19/2024  Anticoagulation Clinic  [facility] for routing messages: vic [facility] patient phone line: [phone]        _______________________________________________________________________     Anticoagulation Episode Summary       Current INR goal:  2.0-3.0   TTR:  59.8% (1 y)   Target end date:  Indefinite   Send INR reminders to:  [facility]    Indications    History of CVA (cerebrovascular accident) (Resolved) [Z86.73]  Long term current use of anticoagulant therapy [Z79.01]  Cerebrovascular accident (CVA) due to occlusion of precerebral artery (H) [I63.20]  History of CVA (cerebrovascular accident) [Z86.73]  Anticoagulation monitoring  INR range 2-3 [Z79.01]             Comments:  --              Anticoagulation Care Providers       Provider Role Specialty Phone number    Diana Ely, SELWYN CNP Referring Family Medicine [phone]

## 2024-12-19 NOTE — PROGRESS NOTES
Preventive Care Visit  [facility]  SELWYN Dennis CNP, Family Medicine  Dec 19, 2024      Assessment & Plan     Encounter for Medicare annual wellness exam      Benign essential hypertension    - amLODIPine (NORVASC) 5 MG tablet; Take 1 tablet (5 mg) by mouth daily.  - enalapril (VASOTEC) 20 MG tablet; Take 1 tablet (20 mg) by mouth daily. Take in the morning    Pulmonary nodules    - CT Chest w/o Contrast; Future  - XR Chest 2 Views; Future    Chronic cough    - azithromycin (ZITHROMAX) 250 MG tablet; Take 2 tablets (500 mg) by mouth daily for 1 day, THEN 1 tablet (250 mg) daily for 4 days.  - predniSONE (DELTASONE) 20 MG tablet; Take 2 tablets (40 mg) by mouth daily for 5 days.  - XR Chest 2 Views; Future    SOBOE (shortness of breath on exertion)    - azithromycin (ZITHROMAX) 250 MG tablet; Take 2 tablets (500 mg) by mouth daily for 1 day, THEN 1 tablet (250 mg) daily for 4 days.  - predniSONE (DELTASONE) 20 MG tablet; Take 2 tablets (40 mg) by mouth daily for 5 days.  - XR Chest 2 Views; Future    Personal history of fall    - XR Pelvis and Hip Bilateral 2 Views; Future  - XR Sacrum and Coccyx 2 Views; Future    Pain in the coccyx    - XR Pelvis and Hip Bilateral 2 Views; Future  - XR Sacrum and Coccyx 2 Views; Future    Stage 3b chronic kidney disease (H)  Avoid nephrotoxic procedures and drug  Monitor every 6 months.             Nicotine/Tobacco Cessation  She reports that she has been smoking cigarettes. She started smoking about 9 years ago. She has a 50.4 pack-year smoking history. She has never used smokeless tobacco.  Nicotine/Tobacco Cessation Plan  Information offered: Patient not interested at this time      Counseling  Appropriate preventive services were addressed with this patient via screening, questionnaire, or discussion as appropriate for fall prevention, nutrition, physical activity, Tobacco-use cessation, social engagement, weight loss and cognition.   Checklist reviewing preventive services available has been given to the patient.      Call or return to the clinic with any worsening of symptoms or no resolution. Patient/Parent verbalized understanding and is in agreement. Medication side effects reviewed.   Current Outpatient Medications   Medication Sig Dispense Refill    amLODIPine (NORVASC) 5 MG tablet Take 1 tablet (5 mg) by mouth daily. 90 tablet 3    Aspirin (VAZALORE) 81 MG CAPS Please choose reason not prescribed from choices below.      atorvastatin (LIPITOR) 80 MG tablet Take 1 tablet by mouth once daily 90 tablet 2    enalapril (VASOTEC) 20 MG tablet Take 1 tablet (20 mg) by mouth daily. Take in the morning 90 tablet 1    famotidine (PEPCID) 40 MG tablet Take 1 tablet by mouth once daily 90 tablet 1    FLUoxetine (PROZAC) 20 MG capsule Take 1 capsule by mouth once daily 90 capsule 0    fluticasone (FLONASE) 50 MCG/ACT nasal spray Spray 1 spray into both nostrils daily 16 g 1    warfarin ANTICOAGULANT (COUMADIN) 5 MG tablet Take 7.5 mg every Mon; 5 mg all other days or As directed by Anticoagulation clinic 100 tablet 1     Chart documentation with Dragon Voice recognition Software. Although reviewed after completion, some words and grammatical errors may remain.  Diana Ely MSN,[facility] [id number] [phone]        See Patient Instructions    Jayla Spence is a 79 year old, presenting for the following:  Physical        12/19/2024     2:12 PM   Additional Questions   Roomed by Zuly OVERTON  Wt Readings from Last 5 Encounters:   12/19/24 49.4 kg (109 lb)   10/03/24 52.2 kg (115 lb)   09/16/24 52.6 kg (116 lb)   08/06/24 53.5 kg (118 lb)   06/26/24 53.5 kg (118 lb)     Not able to gain weight  Down another 9 lbs  Eating 3 meals a day  Napping a lot lately sleeping all the time again this week  Having iron infusions again  Still smoking  Overdue for follow up CT            Health Care Directive  Patient has a Health Care Directive on file        12/19/2024   General Health   How would you rate your overall physical health? (!) FAIR   Feel stress (tense, anxious, or unable to sleep) Not at all         12/19/2024   Nutrition   Diet: Low salt         12/19/2024   Exercise   Days per week of moderate/strenous exercise 0 days   Average minutes spent exercising at this level 0 min   (!) EXERCISE CONCERN      12/19/2024   Social Factors   Frequency of gathering with friends or relatives Once a week   Worry food won't last until get money to buy more No   Food not last or not have enough money for food? No   Do you have housing? (Housing is defined as stable permanent housing and does not include staying ouside in a car, in a tent, in an abandoned building, in an overnight shelter, or couch-surfing.) Yes   Are you worried about losing your housing? No   Lack of transportation? No   Unable to get utilities (heat,electricity)? No         12/19/2024   Fall Risk   Fallen 2 or more times in the past year? No   Trouble with walking or balance? Yes   Gait Speed Test (Document in seconds) 5.5   Gait Speed Test Interpretation Greater than 5.01 seconds - ABNORMAL          12/19/2024   Activities of Daily Living- Home Safety   Needs help with the following daily activites Housework   Safety concerns in the home None of the above         12/19/2024   Dental   Dentist two times every year? Yes         12/19/2024   Hearing Screening   Hearing concerns? (!) I FEEL THAT PEOPLE ARE MUMBLING OR NOT SPEAKING CLEARLY.         12/19/2024   Driving Risk Screening   Patient/family members have concerns about driving No         12/19/2024   General Alertness/Fatigue Screening   Have you been more tired than usual lately? (!) YES         12/19/2024   Urinary Incontinence Screening   Bothered by leaking urine in past 6 months No         12/19/2024   TB Screening   Were you born outside of the [place]? Yes          Today's PHQ-2 Score:       12/19/2024    10:37 AM   PHQ-2 ( 1999 Pfizer)   PHQ-2 Score Incomplete           12/19/2024   Substance Use   If I could quit smoking, I would Neutral   I want to quit somking, worry about health affects Neutral   Willing to make a plan to quit smoking Neutral   Willing to cut down before quitting Neutral   Alcohol more than 3/day or more than 7/wk Not Applicable   Do you have a current opioid prescription? No   How severe/bad is pain from 1 to 10? 2/10   Do you use any other substances recreationally? No     Social History     Tobacco Use    Smoking status: Every Day     Current packs/day: 1.00     Average packs/day: 1 pack/day for 50.4 years (50.4 ttl pk-yrs)     Types: Cigarettes     Start date: 1/1/2016    Smokeless tobacco: Never   Vaping Use    Vaping status: Never Used   Substance Use Topics    Alcohol use: No    Drug use: Never           8/23/2022   LAST FHS-7 RESULTS   1st degree relative breast or ovarian cancer Yes   Any relative bilateral breast cancer Yes   Any male have breast cancer No   Any ONE woman have BOTH breast AND ovarian cancer No   Any woman with breast cancer before 50yrs Yes   2 or more relatives with breast AND/OR ovarian cancer Yes   2 or more relatives with breast AND/OR bowel cancer No        Mammogram Screening - Mammogram every 1-2 years updated in Health Maintenance based on mutual decision making    ASCVD Risk   The ASCVD Risk score (Sara ISAACS, et al., 2019) failed to calculate for the following reasons:    Risk score cannot be calculated because patient has a medical history suggesting prior/existing ASCVD            Reviewed and updated as needed this visit by Provider   Tobacco  Allergies  Meds  Problems  Med Hx  Surg Hx  Fam Hx            Past Medical History:   Diagnosis Date    Anticoagulation monitoring, INR range 2-3 10/22/2015    Benign essential hypertension 9/15/2016    Carotid bruit 11/12/2012    Overview:  12/6/10  Carotid US  Elevated velocities throughout the carotid arteries bilaterally. There is a focal area of increased velocity in the mid left internal carotid artery with a plaque in this region on the color flow images. This corresponds to 50 - 70 % diameter reduction. There is an area of elevated velocity in the left external carotid artery consistent with stenosis. There is no focal area of significant stenosis in the right carotid arteries in the neck. Plaque in the carotid arteries bilaterally.     Chronic obstructive pulmonary disease (H) 1/19/2016    The FVC, FEV1 and FEV1/FVC ratio are reduced.  The inspiratory flow rates are within normal limits.  The FVC is reduced relative to the SVC indicating air trapping.  While the TLC is within normal limits, the FRC, RV and RV/TLC ratio are increased.  The  diffusing capacity is mildly reduced. IMPRESSION: Mild-moderate Airflow Obstruction with air trapping ____________________________________________M.DClarke Orellana  April 2018    Esophageal reflux 4/8/2008    Overview:  Omeprazole PRN, occasional symptoms such as chest burning and knife twisted to stomach.     Heart disease born with it    History of blood transfusion 70 years ago    History of CVA (cerebrovascular accident) 10/20/2015    Long term current use of anticoagulant therapy 3/20/2018    Major depressive disorder, recurrent episode, moderate (H) 10/29/2008    Overview:  She is not taking any medication at this time.    Osteoarthrosis, hip 10/19/2010    Overview:  Pt scheduled for right  total hip arthroplasty on 6/14/13 with Dr. Addison HEREDIA hip xray (11/2012) Severe degenerative changes seen of the right hip with near bone-on-bone appearance of the joint and several subchondral cysts forming.    Thyroid nodule 5/22/2013    Overview:  Thyroid US (2012) Stable nodule left lobe of thyroid TSH- (2/19/12) normal (2.26)     Past Surgical History:   Procedure Laterality Date    BIOPSY  appox in 1980's    BREAST SURGERY   last time in the 1990's    non cancer    COLONOSCOPY N/A 9/7/2022    Procedure: COLONOSCOPY, WITH POLYPECTOMY AND BIOPSY;  Surgeon: Alfredito Gomez DO;  Location: [facility]    ESOPHAGOSCOPY, GASTROSCOPY, DUODENOSCOPY (EGD), COMBINED N/A 5/25/2022    Procedure: ESOPHAGOGASTRODUODENOSCOPY, WITH BIOPSY;  Surgeon: Alfredito Gomez DO;  Location: [facility]     Labs reviewed in EPIC  BP Readings from Last 3 Encounters:   12/19/24 139/84   11/06/24 (!) 159/77   10/28/24 (!) 157/60    Wt Readings from Last 3 Encounters:   12/19/24 49.4 kg (109 lb)   10/03/24 52.2 kg (115 lb)   09/16/24 52.6 kg (116 lb)                  Patient Active Problem List   Diagnosis    ACP (advance care planning)    Anticoagulation monitoring, INR range 2-3    Benign essential hypertension    Carotid bruit    Cerebrovascular disease    Esophageal reflux    Osteoarthrosis, hip    Pain medication agreement    Thyroid nodule    Long term current use of anticoagulant therapy    Hyperlipidemia LDL goal <70    Cerebrovascular accident (CVA) due to occlusion of precerebral artery (H)    Renal artery stenosis (H)    Tobacco dependence    History of cardiac monitoring    History of CVA (cerebrovascular accident)    Anemia of chronic renal failure, stage 3b (H)     Past Surgical History:   Procedure Laterality Date    BIOPSY  appox in 1980's    BREAST SURGERY  last time in the 1990's    non cancer    COLONOSCOPY N/A 9/7/2022    Procedure: COLONOSCOPY, WITH POLYPECTOMY AND BIOPSY;  Surgeon: Alfredito Gomez DO;  Location: [facility]    ESOPHAGOSCOPY, GASTROSCOPY, DUODENOSCOPY (EGD), COMBINED N/A 5/25/2022    Procedure: ESOPHAGOGASTRODUODENOSCOPY, WITH BIOPSY;  Surgeon: Alfredito Gomez DO;  Location: [facility]       Social History     Tobacco Use    Smoking status: Every Day     Current packs/day: 1.00     Average packs/day: 1 pack/day for 50.4 years (50.4 ttl pk-yrs)     Types: Cigarettes     Start date: 1/1/2016    Smokeless tobacco: Never    Substance Use Topics    Alcohol use: No     Family History   Problem Relation Age of Onset    Diabetes Maternal Grandfather     Diabetes Sister         developed after cancer treatment    Breast Cancer Sister     Obesity Sister     Hypertension Mother     Hyperlipidemia Mother     Osteoporosis Mother     Breast Cancer Sister     Osteoporosis Sister     Breast Cancer Daughter     Other Cancer Sister         throught out body    Substance Abuse Sister         acol    Obesity Sister     Substance Abuse Sister         acol    Obesity Sister          Current Outpatient Medications   Medication Sig Dispense Refill    amLODIPine (NORVASC) 5 MG tablet Take 1 tablet (5 mg) by mouth daily. 90 tablet 3    Aspirin (VAZALORE) 81 MG CAPS Please choose reason not prescribed from choices below.      atorvastatin (LIPITOR) 80 MG tablet Take 1 tablet by mouth once daily 90 tablet 2    enalapril (VASOTEC) 20 MG tablet Take 1 tablet (20 mg) by mouth daily. Take in the morning 90 tablet 1    famotidine (PEPCID) 40 MG tablet Take 1 tablet by mouth once daily 90 tablet 1    FLUoxetine (PROZAC) 20 MG capsule Take 1 capsule by mouth once daily 90 capsule 0    fluticasone (FLONASE) 50 MCG/ACT nasal spray Spray 1 spray into both nostrils daily 16 g 1    warfarin ANTICOAGULANT (COUMADIN) 5 MG tablet Take 7.5 mg every Mon; 5 mg all other days or As directed by Anticoagulation clinic 100 tablet 1     Allergies   Allergen Reactions    Losartan Swelling, Nausea, Shortness Of Breath and Palpitations    Gabapentin GI Disturbance and Unknown     Double vision  flatulence    Lisinopril Cough    Oxycodone Other (See Comments)     But has tolerated hydrocodone    Tramadol Other (See Comments)    Augmentin [Amoxicillin-Pot Clavulanate] Rash    Bacitracin Rash     blisters    Bupropion Rash     Allergic to brand not generic     Current providers sharing in care for this patient include:  Patient Care Team:  Diana Ely APRN CNP as PCP -  General (Family Practice)  Gregoria Mcneal MD as MD (Nephrology)  Diana Ely APRN CNP as Assigned PCP  Caitlin Jesus MD as Assigned Neuroscience Provider  Rafael Oswald MD as MD (Internal Medicine)  Erika aLnda MD as MD (Cardiovascular Disease)  Saurabh Harrell MD as MD (Cardiology)  Gallo Jurado MD as Assigned Pulmonology Provider  Jennifer Weber, RN as Specialty Care Coordinator (Pharmacy)  Marcio Blanca PA-C as Assigned Sleep Provider  Diana Abel PA-C as Physician Assistant (Nephrology)  Veronika Daley PA-C as Assigned Heart and Vascular Provider  Tri Jacinto PA-C as Physician Assistant (Urology)  Diana Abel PA-C as Referring Physician (Nephrology)  Maris Terrazas, RN as Specialty Care Coordinator (Nephrology)  Diana Abel PA-C as Assigned Nephrology Provider  Leo Alvares MD as Assigned Surgical Provider    The following health maintenance items are reviewed in Epic and correct as of today:  Health Maintenance   Topic Date Due    COPD ACTION PLAN  Never done    RSV VACCINE (1 - 1-dose 75+ series) Never done    ZOSTER IMMUNIZATION (3 of 3) 12/30/2021    COVID-19 Vaccine (3 - 2024-25 season) 09/01/2024    NICOTINE/TOBACCO CESSATION COUNSELING Q 1 YR  11/16/2024    LUNG CANCER SCREENING  12/06/2024    MICROALBUMIN  04/07/2025    LIPID  10/03/2025    BMP  11/29/2025    HEMOGLOBIN  11/29/2025    MEDICARE ANNUAL WELLNESS VISIT  12/19/2025    ANNUAL REVIEW OF HM ORDERS  12/19/2025    FALL RISK ASSESSMENT  12/19/2025    COLORECTAL CANCER SCREENING  09/07/2027    ADVANCE CARE PLANNING  09/28/2027    GLUCOSE  11/29/2027    DTAP/TDAP/TD IMMUNIZATION (3 - Td or Tdap) 04/19/2033    DEXA  07/16/2034    PARATHYROID  Completed    PHOSPHORUS  Completed    SPIROMETRY  Completed    HEPATITIS C SCREENING  Completed    PHQ-2 (once per calendar year)  Completed    INFLUENZA VACCINE  Completed    Pneumococcal Vaccine: 50+  "Years  Completed    URINALYSIS  Completed    ALK PHOS  Completed    HPV IMMUNIZATION  Aged Out    MENINGITIS IMMUNIZATION  Aged Out    RSV MONOCLONAL ANTIBODY  Aged Out    MAMMO SCREENING  Discontinued         Review of Systems  Constitutional, neuro, ENT, endocrine, pulmonary, cardiac, gastrointestinal, genitourinary, musculoskeletal, integument and psychiatric systems are negative, except as otherwise noted.     Objective    Exam  /84   Pulse 83   Temp 98  F (36.7  C) (Tympanic)   Resp 18   Ht 1.664 m (5' 5.5\")   Wt 49.4 kg (109 lb)   LMP 09/15/1998   SpO2 96%   BMI 17.86 kg/m     Estimated body mass index is 17.86 kg/m  as calculated from the following:    Height as of this encounter: 1.664 m (5' 5.5\").    Weight as of this encounter: 49.4 kg (109 lb).    Physical Exam  GENERAL: alert and no distress  EYES: Eyes grossly normal to inspection, PERRL and conjunctivae and sclerae normal  HENT: ear canals and TM's normal, nose and mouth without ulcers or lesions  NECK: no adenopathy, no asymmetry, masses, or scars  RESP: decreased breath sounds throughout  CV: regular rate and rhythm, normal S1 S2, no S3 or S4, no murmur, click or rub, no peripheral edema  ABDOMEN: soft, nontender, no hepatosplenomegaly, no masses and bowel sounds normal  MS: no gross musculoskeletal defects noted, no edema  SKIN: no suspicious lesions or rashes  NEURO: Normal strength and tone, sensory exam grossly normal, mentation intact, and speech abnormal        12/19/2024   Mini Cog   Clock Draw Score 2 Normal   3 Item Recall 3 objects recalled   Mini Cog Total Score 5              Signed Electronically by: SELWYN Dennis CNP    "

## 2024-12-26 ENCOUNTER — ANTICOAGULATION THERAPY VISIT (OUTPATIENT)
Dept: ANTICOAGULATION | Facility: CLINIC | Age: 79
End: 2024-12-26

## 2024-12-26 ENCOUNTER — LAB (OUTPATIENT)
Dept: LAB | Facility: CLINIC | Age: 79
End: 2024-12-26
Payer: COMMERCIAL

## 2024-12-26 DIAGNOSIS — I63.20 CEREBROVASCULAR ACCIDENT (CVA) DUE TO OCCLUSION OF PRECEREBRAL ARTERY (H): Chronic | ICD-10-CM

## 2024-12-26 DIAGNOSIS — Z79.01 ANTICOAGULATION MONITORING, INR RANGE 2-3: Chronic | ICD-10-CM

## 2024-12-26 DIAGNOSIS — Z79.01 LONG TERM CURRENT USE OF ANTICOAGULANT THERAPY: Primary | ICD-10-CM

## 2024-12-26 DIAGNOSIS — Z86.73 HISTORY OF CVA (CEREBROVASCULAR ACCIDENT): ICD-10-CM

## 2024-12-26 LAB — INR BLD: 3.2 (ref 0.9–1.1)

## 2024-12-26 NOTE — PROGRESS NOTES
ANTICOAGULATION MANAGEMENT     Bebe SHELDON Alfonso 79 year old female is on warfarin with supratherapeutic INR result. (Goal INR 2.0-3.0)    Recent labs: (last 7 days)     12/26/24  1422   INR 3.2*       ASSESSMENT     Source(s): Chart Review and Patient/Caregiver Call     Warfarin doses taken: Warfarin taken as instructed  Diet: No new diet changes identified  Medication/supplement changes:  finished prednisone and zpack on 12/24 which is likely increasing INR today.   Starting round of venofer infusions 1/3 - no interaction with warfarin  New illness, injury, or hospitalization: No  Signs or symptoms of bleeding or clotting: No  Previous result: Subtherapeutic on same dose  Additional findings: None       PLAN     Recommended plan for temporary change(s) affecting INR     Dosing Instructions: Continue your current warfarin dose with next INR in 2 weeks       Summary  As of 12/26/2024      Full warfarin instructions:  2.5 mg every Sun, Thu; 5 mg all other days   Next INR check:  1/9/2025               Telephone call with Bebe who verbalizes understanding and agrees to plan    Lab visit scheduled    Education provided: Goal range and lab monitoring: goal range and significance of current result  Interaction IS anticipated between warfarin and prednisone and abx    Plan made per [facility] anticoagulation protocol    Tana Chester RN  12/26/2024  Anticoagulation Clinic  [employer] for routing messages: p [facility]  [facility] patient phone line: [phone]        _______________________________________________________________________     Anticoagulation Episode Summary       Current INR goal:  2.0-3.0   TTR:  60.5% (1 y)   Target end date:  Indefinite   Send INR reminders to:  [facility]    Indications    History of CVA (cerebrovascular accident) (Resolved) [Z86.73]  Long term current use of anticoagulant therapy [Z79.01]  Cerebrovascular accident (CVA) due to occlusion of precerebral artery (H)  [I63.20]  History of CVA (cerebrovascular accident) [Z86.73]  Anticoagulation monitoring  INR range 2-3 [Z79.01]             Comments:  --             Anticoagulation Care Providers       Provider Role Specialty Phone number    Diana Ely APRN [facility] Referring Family Medicine [phone]

## 2024-12-31 NOTE — PATIENT INSTRUCTIONS
Patient Education   Preventive Care Advice   This is general advice given by our system to help you stay healthy. However, your care team may have specific advice just for you. Please talk to your care team about your preventive care needs.  Nutrition  Eat 5 or more servings of fruits and vegetables each day.  Try wheat bread, brown rice and whole grain pasta (instead of white bread, rice, and pasta).  Get enough calcium and vitamin D. Check the label on foods and aim for 100% of the RDA (recommended daily allowance).  Lifestyle  Exercise at least 150 minutes each week  (30 minutes a day, 5 days a week).  Do muscle strengthening activities 2 days a week. These help control your weight and prevent disease.  No smoking.  Wear sunscreen to prevent skin cancer.  Have a dental exam and cleaning every 6 months.  Yearly exams  See your health care team every year to talk about:  Any changes in your health.  Any medicines your care team has prescribed.  Preventive care, family planning, and ways to prevent chronic diseases.  Shots (vaccines)   HPV shots (up to age 26), if you've never had them before.  Hepatitis B shots (up to age 59), if you've never had them before.  COVID-19 shot: Get this shot when it's due.  Flu shot: Get a flu shot every year.  Tetanus shot: Get a tetanus shot every 10 years.  Pneumococcal, hepatitis A, and RSV shots: Ask your care team if you need these based on your risk.  Shingles shot (for age 50 and up)  General health tests  Diabetes screening:  Starting at age 35, Get screened for diabetes at least every 3 years.  If you are younger than age 35, ask your care team if you should be screened for diabetes.  Cholesterol test: At age 39, start having a cholesterol test every 5 years, or more often if advised.  Bone density scan (DEXA): At age 50, ask your care team if you should have this scan for osteoporosis (brittle bones).  Hepatitis C: Get tested at least once in your life.  STIs (sexually  transmitted infections)  Before age 24: Ask your care team if you should be screened for STIs.  After age 24: Get screened for STIs if you're at risk. You are at risk for STIs (including HIV) if:  You are sexually active with more than one person.  You don't use condoms every time.  You or a partner was diagnosed with a sexually transmitted infection.  If you are at risk for HIV, ask about PrEP medicine to prevent HIV.  Get tested for HIV at least once in your life, whether you are at risk for HIV or not.  Cancer screening tests  Cervical cancer screening: If you have a cervix, begin getting regular cervical cancer screening tests starting at age 21.  Breast cancer scan (mammogram): If you've ever had breasts, begin having regular mammograms starting at age 40. This is a scan to check for breast cancer.  Colon cancer screening: It is important to start screening for colon cancer at age 45.  Have a colonoscopy test every 10 years (or more often if you're at risk) Or, ask your provider about stool tests like a FIT test every year or Cologuard test every 3 years.  To learn more about your testing options, visit:   .  For help making a decision, visit:   https://bit.ly/th38910.  Prostate cancer screening test: If you have a prostate, ask your care team if a prostate cancer screening test (PSA) at age 55 is right for you.  Lung cancer screening: If you are a current or former smoker ages 50 to 80, ask your care team if ongoing lung cancer screenings are right for you.  For informational purposes only. Not to replace the advice of your health care provider. Copyright   2023 [employer]. All rights reserved. Clinically reviewed by the [facility] Transitions Program. [employer] 132804 - REV 01/24.

## 2025-01-06 ENCOUNTER — INFUSION THERAPY VISIT (OUTPATIENT)
Dept: INFUSION THERAPY | Facility: CLINIC | Age: 80
End: 2025-01-06
Attending: PHYSICIAN ASSISTANT
Payer: COMMERCIAL

## 2025-01-06 VITALS
RESPIRATION RATE: 18 BRPM | SYSTOLIC BLOOD PRESSURE: 184 MMHG | HEART RATE: 68 BPM | TEMPERATURE: 97.7 F | DIASTOLIC BLOOD PRESSURE: 64 MMHG

## 2025-01-06 DIAGNOSIS — N18.32 ANEMIA OF CHRONIC RENAL FAILURE, STAGE 3B (H): Primary | ICD-10-CM

## 2025-01-06 DIAGNOSIS — D63.1 ANEMIA OF CHRONIC RENAL FAILURE, STAGE 3B (H): Primary | ICD-10-CM

## 2025-01-06 PROCEDURE — 96365 THER/PROPH/DIAG IV INF INIT: CPT

## 2025-01-06 PROCEDURE — 258N000003 HC RX IP 258 OP 636: Performed by: PHYSICIAN ASSISTANT

## 2025-01-06 PROCEDURE — 250N000011 HC RX IP 250 OP 636: Performed by: PHYSICIAN ASSISTANT

## 2025-01-06 PROCEDURE — 96366 THER/PROPH/DIAG IV INF ADDON: CPT

## 2025-01-06 RX ORDER — MEPERIDINE HYDROCHLORIDE 25 MG/ML
25 INJECTION INTRAMUSCULAR; INTRAVENOUS; SUBCUTANEOUS
Status: CANCELLED | OUTPATIENT
Start: 2025-01-07

## 2025-01-06 RX ORDER — DIPHENHYDRAMINE HYDROCHLORIDE 50 MG/ML
25 INJECTION INTRAMUSCULAR; INTRAVENOUS
Status: CANCELLED
Start: 2025-01-07

## 2025-01-06 RX ORDER — METHYLPREDNISOLONE SODIUM SUCCINATE 40 MG/ML
40 INJECTION INTRAMUSCULAR; INTRAVENOUS
Status: CANCELLED
Start: 2025-01-07

## 2025-01-06 RX ORDER — DIPHENHYDRAMINE HYDROCHLORIDE 50 MG/ML
50 INJECTION INTRAMUSCULAR; INTRAVENOUS
Status: CANCELLED
Start: 2025-01-07

## 2025-01-06 RX ORDER — ALBUTEROL SULFATE 90 UG/1
1-2 INHALANT RESPIRATORY (INHALATION)
Status: CANCELLED
Start: 2025-01-07

## 2025-01-06 RX ORDER — EPINEPHRINE 1 MG/ML
0.3 INJECTION, SOLUTION, CONCENTRATE INTRAVENOUS EVERY 5 MIN PRN
Status: CANCELLED | OUTPATIENT
Start: 2025-01-07

## 2025-01-06 RX ORDER — ALBUTEROL SULFATE 0.83 MG/ML
2.5 SOLUTION RESPIRATORY (INHALATION)
Status: CANCELLED | OUTPATIENT
Start: 2025-01-07

## 2025-01-06 RX ORDER — HEPARIN SODIUM (PORCINE) LOCK FLUSH IV SOLN 100 UNIT/ML 100 UNIT/ML
5 SOLUTION INTRAVENOUS
Status: CANCELLED | OUTPATIENT
Start: 2025-01-07

## 2025-01-06 RX ORDER — HEPARIN SODIUM,PORCINE 10 UNIT/ML
5-20 VIAL (ML) INTRAVENOUS DAILY PRN
Status: CANCELLED | OUTPATIENT
Start: 2025-01-07

## 2025-01-06 RX ADMIN — IRON SUCROSE 300 MG: 20 INJECTION, SOLUTION INTRAVENOUS at 09:12

## 2025-01-06 RX ADMIN — SODIUM CHLORIDE 250 ML: 9 INJECTION, SOLUTION INTRAVENOUS at 09:13

## 2025-01-06 NOTE — PROGRESS NOTES
Infusion Nursing Note:  Bebe Alfonso presents today for Venofer.    Patient seen by provider today: No   present during visit today: Not Applicable.    Note: Patient states she took her bp meds this morning. She says she typically runs 120-130's.    Intravenous Access:  Peripheral IV placed.    Treatment Conditions:  Not Applicable.    Post Infusion Assessment:  Patient tolerated infusion without incident.  Patient observed for 30 minutes post Venofer per protocol.  Blood return noted pre and post infusion.  Site patent and intact, free from redness, edema or discomfort.  No evidence of extravasations.  Access discontinued per protocol.     Discharge Plan:   Discharge instructions reviewed with: Patient.  Patient and/or family verbalized understanding of discharge instructions and all questions answered.  AVS to patient via [employer].  Patient will return 1/9/2025 for next appointment.   Patient discharged in stable condition accompanied by: self.  Departure Mode: Ambulatory.    Brianda Wolff RN

## 2025-01-09 ENCOUNTER — INFUSION THERAPY VISIT (OUTPATIENT)
Dept: INFUSION THERAPY | Facility: CLINIC | Age: 80
End: 2025-01-09
Attending: PHYSICIAN ASSISTANT
Payer: COMMERCIAL

## 2025-01-09 ENCOUNTER — PATIENT OUTREACH (OUTPATIENT)
Dept: CARE COORDINATION | Facility: CLINIC | Age: 80
End: 2025-01-09

## 2025-01-09 VITALS
SYSTOLIC BLOOD PRESSURE: 195 MMHG | DIASTOLIC BLOOD PRESSURE: 63 MMHG | TEMPERATURE: 97.9 F | RESPIRATION RATE: 18 BRPM | HEART RATE: 66 BPM

## 2025-01-09 DIAGNOSIS — D63.1 ANEMIA OF CHRONIC RENAL FAILURE, STAGE 3B (H): Primary | ICD-10-CM

## 2025-01-09 DIAGNOSIS — N18.32 ANEMIA OF CHRONIC RENAL FAILURE, STAGE 3B (H): Primary | ICD-10-CM

## 2025-01-09 PROCEDURE — 258N000003 HC RX IP 258 OP 636: Performed by: PHYSICIAN ASSISTANT

## 2025-01-09 PROCEDURE — 250N000011 HC RX IP 250 OP 636: Performed by: PHYSICIAN ASSISTANT

## 2025-01-09 RX ORDER — ALBUTEROL SULFATE 0.83 MG/ML
2.5 SOLUTION RESPIRATORY (INHALATION)
OUTPATIENT
Start: 2025-01-10

## 2025-01-09 RX ORDER — DIPHENHYDRAMINE HYDROCHLORIDE 50 MG/ML
50 INJECTION INTRAMUSCULAR; INTRAVENOUS
Start: 2025-01-10

## 2025-01-09 RX ORDER — HEPARIN SODIUM,PORCINE 10 UNIT/ML
5-20 VIAL (ML) INTRAVENOUS DAILY PRN
OUTPATIENT
Start: 2025-01-10

## 2025-01-09 RX ORDER — DIPHENHYDRAMINE HYDROCHLORIDE 50 MG/ML
25 INJECTION INTRAMUSCULAR; INTRAVENOUS
Start: 2025-01-10

## 2025-01-09 RX ORDER — EPINEPHRINE 1 MG/ML
0.3 INJECTION, SOLUTION, CONCENTRATE INTRAVENOUS EVERY 5 MIN PRN
OUTPATIENT
Start: 2025-01-10

## 2025-01-09 RX ORDER — METHYLPREDNISOLONE SODIUM SUCCINATE 40 MG/ML
40 INJECTION INTRAMUSCULAR; INTRAVENOUS
Start: 2025-01-10

## 2025-01-09 RX ORDER — MEPERIDINE HYDROCHLORIDE 25 MG/ML
25 INJECTION INTRAMUSCULAR; INTRAVENOUS; SUBCUTANEOUS
OUTPATIENT
Start: 2025-01-10

## 2025-01-09 RX ORDER — HEPARIN SODIUM (PORCINE) LOCK FLUSH IV SOLN 100 UNIT/ML 100 UNIT/ML
5 SOLUTION INTRAVENOUS
OUTPATIENT
Start: 2025-01-10

## 2025-01-09 RX ORDER — ALBUTEROL SULFATE 90 UG/1
1-2 INHALANT RESPIRATORY (INHALATION)
Start: 2025-01-10

## 2025-01-09 RX ADMIN — SODIUM CHLORIDE 100 ML: 900 INJECTION INTRAVENOUS at 08:09

## 2025-01-09 RX ADMIN — IRON SUCROSE 300 MG: 20 INJECTION, SOLUTION INTRAVENOUS at 08:09

## 2025-01-09 NOTE — PROGRESS NOTES
Infusion Nursing Note:  Bebe Alfonso presents today for 3/3 Venofer.    Patient seen by provider today: No   present during visit today: Not Applicable.    Note: Pt BP is high today. 173/50 and 195/63. Pt states that this morning it was 200 over something. She took her meds then took a nap and the systolic came down to the 190's.  Pt had some clonidine in her purse. The med isn't on her list and asked her where she got it. She said in [place] and they told her to take it when her systolic was over 176. Instructed pt to recheck her BP at home with her cuff and if it remains high she should be seen. Pt verbalized understanding.      Intravenous Access:  Peripheral IV placed.    Treatment Conditions:  Not Applicable.      Post Infusion Assessment:  Patient tolerated infusion without incident.  Patient observed for Venofer minutes post 30 per protocol.  Site patent and intact, free from redness, edema or discomfort.  No evidence of extravasations.  Access discontinued per protocol.       Discharge Plan:   Discharge instructions reviewed with: Patient.  Patient discharged in stable condition accompanied by: self.  Departure Mode: Ambulatory.      Anika Shields RN

## 2025-01-09 NOTE — PROGRESS NOTES
Anemia Management Note - Follow Up      SUBJECTIVE/OBJECTIVE:    Referred by Dr. Rafael Oswald on 2024  Primary Diagnosis: Anemia in Chronic Kidney Disease (N18.3, D63.1)   3b  Secondary Diagnosis: Chronic Kidney Disease, Stage 3 (N18.3)  3b     Hgb goal range: 9-10  Epo/Darbo: TBD.   TSAT must be 20% or > before Insurance will approve ALMA.   Iron regimen: Treat with IV Iron.   Venofer completed on 109  *completed Venofer on 24.      Labs : 2025  RX/TX plans : TBD     *[facility]; [phone], opt 2 for infusion appt     Recent ALMA use, transfusion, IV iron: NA  No MI and blood clots or cancers. Hx of CVA, HTN, and Anticoagulated.      Contact:No Consent to Communicate on File.         Latest Ref Rng & Units 10/21/2024 10/25/2024 10/28/2024 2024 1/3/2025 2025 2025   Anemia   Hemoglobin 11.7 - 15.7 g/dL    8.9       IV Iron Dose  300mg 300mg 300mg  300mg 300mg 300mg   TSAT 15 - 46 %    16       Ferritin 11 - 328 ng/mL    242         BP Readings from Last 3 Encounters:   25 (!) 195/63   25 (!) 184/64   25 (!) 178/62     Wt Readings from Last 2 Encounters:   24 49.4 kg (109 lb)   10/03/24 52.2 kg (115 lb)         ASSESSMENT:    Hgb:Not at goal/Known  TSat: Due for iron studies 4 weeks after last IV iron infusion Ferritin: Due for iron studies 4 weeks after last IV iron infusion   Goals Addressed    None         PLAN:  RTC for anemia labs in 4 week(s).    Orders needed to be renewed (for next follow-up date) in EPIC: None    Iron labs due:  4 weeks from today    Plan discussed with:  Bebe via Simple      NEXT FOLLOW-UP DATE:  206    Carrie Leopold, RN BSN  Anemia Services  [facility]  [email or web address]  Office: [phone]  Fax [phone]

## 2025-01-14 ENCOUNTER — LAB (OUTPATIENT)
Dept: LAB | Facility: CLINIC | Age: 80
End: 2025-01-14
Payer: COMMERCIAL

## 2025-01-14 ENCOUNTER — ANTICOAGULATION THERAPY VISIT (OUTPATIENT)
Dept: ANTICOAGULATION | Facility: CLINIC | Age: 80
End: 2025-01-14

## 2025-01-14 DIAGNOSIS — Z79.01 LONG TERM CURRENT USE OF ANTICOAGULANT THERAPY: Primary | ICD-10-CM

## 2025-01-14 DIAGNOSIS — I63.20 CEREBROVASCULAR ACCIDENT (CVA) DUE TO OCCLUSION OF PRECEREBRAL ARTERY (H): Chronic | ICD-10-CM

## 2025-01-14 DIAGNOSIS — Z86.73 HISTORY OF CVA (CEREBROVASCULAR ACCIDENT): ICD-10-CM

## 2025-01-14 DIAGNOSIS — Z79.01 ANTICOAGULATION MONITORING, INR RANGE 2-3: Chronic | ICD-10-CM

## 2025-01-14 LAB — INR BLD: 2.3 (ref 0.9–1.1)

## 2025-01-14 PROCEDURE — 85610 PROTHROMBIN TIME: CPT

## 2025-01-14 PROCEDURE — 36416 COLLJ CAPILLARY BLOOD SPEC: CPT

## 2025-01-14 NOTE — PROGRESS NOTES
ANTICOAGULATION MANAGEMENT     Bebe M Kaden 79 year old female is on warfarin with therapeutic INR result. (Goal INR 2.0-3.0)    Recent labs: (last 7 days)     01/14/25  1058   INR 2.3*       ASSESSMENT     Source(s): Chart Review and Patient/Caregiver Call     Warfarin doses taken: Warfarin taken as instructed  Diet: No new diet changes identified  Medication/supplement changes: None noted  New illness, injury, or hospitalization: No  Signs or symptoms of bleeding or clotting: No  Previous result: Supratherapeutic  Additional findings: None       PLAN     Recommended plan for no diet, medication or health factor changes affecting INR     Dosing Instructions: Continue your current warfarin dose with next INR in 4 weeks (last INR 3+ weeks ago)      Summary  As of 1/14/2025      Full warfarin instructions:  2.5 mg every Sun, Thu; 5 mg all other days   Next INR check:  2/13/2025               Telephone call with Bebe who verbalizes understanding and agrees to plan and who agrees to plan and repeated back plan correctly    Lab visit scheduled    Education provided: Contact [phone] with any changes, questions or concerns.     Plan made per [facility] anticoagulation protocol    Annie Adames RN  1/14/2025  Anticoagulation Clinic  [facility] for routing messages: vic [facility] patient phone line: [phone]        _______________________________________________________________________     Anticoagulation Episode Summary       Current INR goal:  2.0-3.0   TTR:  61.5% (1 y)   Target end date:  Indefinite   Send INR reminders to:  [facility]    Indications    History of CVA (cerebrovascular accident) (Resolved) [Z86.73]  Long term current use of anticoagulant therapy [Z79.01]  Cerebrovascular accident (CVA) due to occlusion of precerebral artery (H) [I63.20]  History of CVA (cerebrovascular accident) [Z86.73]  Anticoagulation monitoring  INR range 2-3 [Z79.01]             Comments:  --              Anticoagulation Care Providers       Provider Role Specialty Phone number    Diana Ely, SELWYN CNP Referring Family Medicine [phone]

## 2025-01-30 ENCOUNTER — TELEPHONE (OUTPATIENT)
Dept: NEPHROLOGY | Facility: CLINIC | Age: 80
End: 2025-01-30
Payer: COMMERCIAL

## 2025-01-30 NOTE — TELEPHONE ENCOUNTER
Patient confirmed scheduled appointment:  Date: 03/28/2025  Time: 9:30AM  Visit type: [facility]  Provider: MEGAN VALIENTE  Location: VIRTUAL  Testing/imaging: N/A  Additional notes: N/A

## 2025-02-06 ENCOUNTER — LAB (OUTPATIENT)
Dept: LAB | Facility: CLINIC | Age: 80
End: 2025-02-06
Payer: COMMERCIAL

## 2025-02-06 ENCOUNTER — PATIENT OUTREACH (OUTPATIENT)
Dept: ONCOLOGY | Facility: CLINIC | Age: 80
End: 2025-02-06

## 2025-02-06 ENCOUNTER — OFFICE VISIT (OUTPATIENT)
Dept: FAMILY MEDICINE | Facility: CLINIC | Age: 80
End: 2025-02-06
Payer: COMMERCIAL

## 2025-02-06 ENCOUNTER — TELEPHONE (OUTPATIENT)
Dept: ANTICOAGULATION | Facility: CLINIC | Age: 80
End: 2025-02-06

## 2025-02-06 VITALS
BODY MASS INDEX: 19.01 KG/M2 | DIASTOLIC BLOOD PRESSURE: 72 MMHG | HEART RATE: 78 BPM | RESPIRATION RATE: 18 BRPM | TEMPERATURE: 97 F | WEIGHT: 116 LBS | OXYGEN SATURATION: 100 % | SYSTOLIC BLOOD PRESSURE: 138 MMHG

## 2025-02-06 DIAGNOSIS — N18.32 STAGE 3B CHRONIC KIDNEY DISEASE (H): Chronic | ICD-10-CM

## 2025-02-06 DIAGNOSIS — J44.1 COPD EXACERBATION (H): Primary | ICD-10-CM

## 2025-02-06 DIAGNOSIS — D63.1 ANEMIA OF CHRONIC RENAL FAILURE, STAGE 3B (H): ICD-10-CM

## 2025-02-06 DIAGNOSIS — Z79.01 ANTICOAGULATION MONITORING, INR RANGE 2-3: Chronic | ICD-10-CM

## 2025-02-06 DIAGNOSIS — I63.20 CEREBROVASCULAR ACCIDENT (CVA) DUE TO OCCLUSION OF PRECEREBRAL ARTERY (H): Chronic | ICD-10-CM

## 2025-02-06 DIAGNOSIS — R91.8 PULMONARY NODULES: Primary | ICD-10-CM

## 2025-02-06 DIAGNOSIS — R09.89 BILATERAL CAROTID BRUITS: ICD-10-CM

## 2025-02-06 DIAGNOSIS — R05.3 CHRONIC COUGH: ICD-10-CM

## 2025-02-06 DIAGNOSIS — N18.32 STAGE 3B CHRONIC KIDNEY DISEASE (H): ICD-10-CM

## 2025-02-06 DIAGNOSIS — F41.1 GENERALIZED ANXIETY DISORDER: ICD-10-CM

## 2025-02-06 DIAGNOSIS — Z86.73 HISTORY OF CVA (CEREBROVASCULAR ACCIDENT): ICD-10-CM

## 2025-02-06 DIAGNOSIS — N18.32 ANEMIA OF CHRONIC RENAL FAILURE, STAGE 3B (H): ICD-10-CM

## 2025-02-06 DIAGNOSIS — Z79.01 LONG TERM CURRENT USE OF ANTICOAGULANT THERAPY: Primary | ICD-10-CM

## 2025-02-06 DIAGNOSIS — R91.1 NODULE OF LEFT LUNG: ICD-10-CM

## 2025-02-06 LAB
FERRITIN SERPL-MCNC: 362 NG/ML (ref 11–328)
HCT VFR BLD AUTO: 35.7 % (ref 35–47)
HGB BLD-MCNC: 11.5 G/DL (ref 11.7–15.7)
IRON BINDING CAPACITY (ROCHE): 298 UG/DL (ref 240–430)
IRON SATN MFR SERPL: 28 % (ref 15–46)
IRON SERPL-MCNC: 83 UG/DL (ref 37–145)

## 2025-02-06 RX ORDER — AZITHROMYCIN 250 MG/1
TABLET, FILM COATED ORAL
Qty: 6 TABLET | Refills: 0 | Status: SHIPPED | OUTPATIENT
Start: 2025-02-06 | End: 2025-02-11

## 2025-02-06 ASSESSMENT — PAIN SCALES - GENERAL: PAINLEVEL_OUTOF10: NO PAIN (0)

## 2025-02-06 NOTE — PROGRESS NOTES
New IP (Interventional Pulmonology) referral rec'd.  Chart reviewed.       New Patient: Interventional Pulmonary (Lung nodule) Nurse Navigator Note    Referring provider: Diana Ely APRN CNPNb [facility]    Referred to (specialty): Interventional Pulmonary (Lung nodule)    Requested provider (if applicable): n/a    Date Referral Received: 2/6/2025    Evaluation for:  left lung CT 1/2025    Clinical History (per Nurse review of records provided):    **BOOK MARKED**    EXAM: CT CHEST W/O CONTRAST  LOCATION: [facility]  DATE: 1/16/2025     INDICATION: Pulmonary nodules.  COMPARISON: 12/6/2023.  TECHNIQUE: CT chest without IV contrast. Multiplanar reformats were obtained. Dose reduction techniques were used.  CONTRAST: None.     FINDINGS:   LUNGS AND PLEURA: Minimal apical fibrosis appear similar to previous. Scattered atelectasis and/or fibrosis. Scattered diminutive nodules are stable. Previously seen groundglass nodule appears more solid today in the central left upper lobe measuring 9   mm. There is increased consolidation in the periphery of the left upper lobe since comparison. Some of this may be dilated impacted airways.     MEDIASTINUM/AXILLAE: No adenopathy demonstrated in the absence of contrast. There are extensive atherosclerotic changes of the visualized aorta and its branches. There is no evidence of aortic aneurysm.     CORONARY ARTERY CALCIFICATION: Moderate.     UPPER ABDOMEN: No acute findings.     MUSCULOSKELETAL: No frankly destructive bony lesions.                                                                      IMPRESSION:   1.  The previously seen groundglass nodule appears more solid today with more peripheral opacities in the left upper lobe, this has an appearance compatible with infectious or inflammatory change which likely includes dilated impacted airways.  2.  The nodules are stable.     Records  Location: [facility]     Records Needed: none    Additional testing needed prior to consult: PFT's

## 2025-02-06 NOTE — PROGRESS NOTES
Assessment & Plan     COPD exacerbation (H)  Nodule of left lung  Chronic cough  - azithromycin (ZITHROMAX) 250 MG tablet; Take 2 tablets (500 mg) by mouth daily for 1 day, THEN 1 tablet (250 mg) daily for 4 days.  - Adult Pulmonary Medicine  Referral; Future  Smoking cessation again encouraged       Generalized anxiety disorder  Improved with fluoxetine  Continue  - FLUoxetine (PROZAC) 20 MG capsule; Take 1 capsule (20 mg) by mouth daily.    Stage 3b chronic kidney disease (H)  Follow up with nephrology as planned.     Bilateral carotid bruits  Vascular follow up as planned.     Call or return to the clinic with any worsening of symptoms or no resolution. Patient/Parent verbalized understanding and is in agreement. Medication side effects reviewed.   Current Outpatient Medications   Medication Sig Dispense Refill    amLODIPine (NORVASC) 5 MG tablet Take 1 tablet (5 mg) by mouth daily. 90 tablet 3    Aspirin (VAZALORE) 81 MG CAPS Please choose reason not prescribed from choices below.      atorvastatin (LIPITOR) 80 MG tablet Take 1 tablet by mouth once daily 90 tablet 2    azithromycin (ZITHROMAX) 250 MG tablet Take 2 tablets (500 mg) by mouth daily for 1 day, THEN 1 tablet (250 mg) daily for 4 days. 6 tablet 0    enalapril (VASOTEC) 20 MG tablet Take 1 tablet (20 mg) by mouth daily. Take in the morning 90 tablet 1    famotidine (PEPCID) 40 MG tablet Take 1 tablet by mouth once daily 90 tablet 1    FLUoxetine (PROZAC) 20 MG capsule Take 1 capsule (20 mg) by mouth daily. 90 capsule 0    fluticasone (FLONASE) 50 MCG/ACT nasal spray Spray 1 spray into both nostrils daily 16 g 1    warfarin ANTICOAGULANT (COUMADIN) 5 MG tablet Take 7.5 mg every Mon; 5 mg all other days or As directed by Anticoagulation clinic 100 tablet 1     Chart documentation with Dragon Voice recognition Software. Although reviewed after completion, some words and grammatical errors may remain. The longitudinal plan of care for the  diagnosis(es)/condition(s) as documented were addressed during this visit. Due to the added complexity in care, I will continue to support Bebe in the subsequent management and with ongoing continuity of care.    Diana Ely MSN,FNP-[facility]  [id number] [place] [id number]  [phone]                      Subjective   Bebe is a 79 year old, presenting for the following health issues:  Sinus Problem (Follow up )    Sinus Problem     History of Present Illness       Reason for visit:  Followup sinus    She eats 2-3 servings of fruits and vegetables daily.She consumes 0 sweetened beverage(s) daily.She exercises with enough effort to increase her heart rate 9 or less minutes per day.  She exercises with enough effort to increase her heart rate 3 or less days per week.   She is taking medications regularly.   Recheck- sinus much better     EXAM: CT CHEST W/O CONTRAST  LOCATION: [facility]  DATE: 1/16/2025     INDICATION: Pulmonary nodules.  COMPARISON: 12/6/2023.  TECHNIQUE: CT chest without IV contrast. Multiplanar reformats were obtained. Dose reduction techniques were used.  CONTRAST: None.     FINDINGS:   LUNGS AND PLEURA: Minimal apical fibrosis appear similar to previous. Scattered atelectasis and/or fibrosis. Scattered diminutive nodules are stable. Previously seen groundglass nodule appears more solid today in the central left upper lobe measuring 9   mm. There is increased consolidation in the periphery of the left upper lobe since comparison. Some of this may be dilated impacted airways.     MEDIASTINUM/AXILLAE: No adenopathy demonstrated in the absence of contrast. There are extensive atherosclerotic changes of the visualized aorta and its branches. There is no evidence of aortic aneurysm.     CORONARY ARTERY CALCIFICATION: Moderate.     UPPER ABDOMEN: No acute findings.     MUSCULOSKELETAL: No frankly destructive bony  lesions.                                                                      IMPRESSION:   1.  The previously seen groundglass nodule appears more solid today with more peripheral opacities in the left upper lobe, this has an appearance compatible with infectious or inflammatory change which likely includes dilated impacted airways.  2.  The nodules are stable.    Wt Readings from Last 5 Encounters:   02/06/25 52.6 kg (116 lb)   12/19/24 49.4 kg (109 lb)   10/03/24 52.2 kg (115 lb)   09/16/24 52.6 kg (116 lb)   08/06/24 53.5 kg (118 lb)          Cough persists  Finished iron infusions x 3   Feeling better from sinus perspective.     Hyperlipidemia Follow-Up    Are you regularly taking any medication or supplement to lower your cholesterol?   No  Are you having muscle aches or other side effects that you think could be caused by your cholesterol lowering medication?  No    Hypertension Follow-up    Do you check your blood pressure regularly outside of the clinic? Yes    Are you following a low salt diet? No  Are your blood pressures ever more than 140 on the top number (systolic) OR more   than 90 on the bottom number (diastolic), for example 140/90? Yes 130/60     Depression   How are you doing with your depression since your last visit? No change  Are you having other symptoms that might be associated with depression? No  Have you had a significant life event?  No   Are you feeling anxious or having panic attacks?   No  Do you have any concerns with your use of alcohol or other drugs? No    Social History     Tobacco Use    Smoking status: Every Day     Current packs/day: 1.00     Average packs/day: 1 pack/day for 50.5 years (50.5 ttl pk-yrs)     Types: Cigarettes     Start date: 1/1/2016    Smokeless tobacco: Never   Vaping Use    Vaping status: Never Used   Substance Use Topics    Alcohol use: No    Drug use: Never         3/22/2019    11:03 AM 12/12/2019     9:24 AM 3/18/2020     8:19 AM   PHQ   PHQ-9 Total  Score 0 0 0   Q9: Thoughts of better off dead/self-harm past 2 weeks Not at all Not at all Not at all         9/7/2018    10:45 AM 3/22/2019    11:03 AM 3/18/2020     8:19 AM   LA-7 SCORE   Total Score 4 0 0         3/18/2020     8:19 AM   Last PHQ-9   1.  Little interest or pleasure in doing things 0   2.  Feeling down, depressed, or hopeless 0   3.  Trouble falling or staying asleep, or sleeping too much 0   4.  Feeling tired or having little energy 0   5.  Poor appetite or overeating 0   6.  Feeling bad about yourself 0   7.  Trouble concentrating 0   8.  Moving slowly or restless 0   Q9: Thoughts of better off dead/self-harm past 2 weeks 0   PHQ-9 Total Score 0         3/18/2020     8:19 AM   LA-7    1. Feeling nervous, anxious, or on edge 0   2. Not being able to stop or control worrying 0   3. Worrying too much about different things 0   4. Trouble relaxing 0   5. Being so restless that it is hard to sit still 0   6. Becoming easily annoyed or irritable 0   7. Feeling afraid, as if something awful might happen 0   LA-7 Total Score 0       Suicide Assessment Five-step Evaluation and Treatment (SAFE-T)          Review of Systems  Constitutional, neuro, ENT, endocrine, pulmonary, cardiac, gastrointestinal, genitourinary, musculoskeletal, integument and psychiatric systems are negative, except as otherwise noted.      Objective    /72   Pulse 78   Temp 97  F (36.1  C) (Tympanic)   Resp 18   Wt 52.6 kg (116 lb)   LMP 09/15/1998   SpO2 100%   BMI 19.01 kg/m    Body mass index is 19.01 kg/m .  Physical Exam   GENERAL: alert and no distress  EYES: Eyes grossly normal to inspection, PERRL and conjunctivae and sclerae normal  HENT: ear canals and TM's normal, nose and mouth without ulcers or lesions  NECK: no adenopathy, no asymmetry, masses, or scars  RESP: rhonchi L upper anterior and prolonged expiratory phase  CV: regular rates and rhythm, no peripheral edema, and bruit heard carotid bilateral    ABDOMEN: soft, nontender, no hepatosplenomegaly, no masses and bowel sounds normal  MS: no gross musculoskeletal defects noted, no edema  SKIN: no suspicious lesions or rashes  NEURO: abnormal speech aphasia present   PSYCH: mentation appears normal, affect normal/bright    Lab on 02/06/2025   Component Date Value Ref Range Status    Hemoglobin 02/06/2025 11.5 (L)  11.7 - 15.7 g/dL Final    Hematocrit 02/06/2025 35.7  35.0 - 47.0 % Final           Signed Electronically by: SELWYN Dennis CNP

## 2025-02-06 NOTE — TELEPHONE ENCOUNTER
"ANTICOAGULATION  MANAGEMENT     Interacting Medication Review    Interacting medication(s): Azithromycin (Zithromax, Z-nima) with warfarin.    Duration: 5 days (2/6/25 to 2/11/25)    Indication:  Chronic Cough    New medication?: Yes, interaction may increase INR and risk of bleeding. Closer INR monitoring recommended.        PLAN     Continue your current warfarin dose with next INR in 5 days        Summary  As of 2/6/2025      Full warfarin instructions:  2.5 mg every Sun, Thu; 5 mg all other days   Next INR check:  2/10/2025               Telephone call with Bebe who verbalizes understanding and agrees to plan    New recheck date updated on anticoagulation calendar     [facility] priority set/moved to \"high\" for new major drug interaction    Plan made per [facility] anticoagulation protocol    Vandana Garcia RN  2/6/2025  Anticoagulation Clinic  [facility] for routing messages: vic [facility] patient phone line: [phone]    "
Admission

## 2025-02-10 ENCOUNTER — LAB (OUTPATIENT)
Dept: LAB | Facility: CLINIC | Age: 80
End: 2025-02-10
Payer: COMMERCIAL

## 2025-02-10 ENCOUNTER — ANTICOAGULATION THERAPY VISIT (OUTPATIENT)
Dept: ANTICOAGULATION | Facility: CLINIC | Age: 80
End: 2025-02-10

## 2025-02-10 DIAGNOSIS — Z79.01 ANTICOAGULATION MONITORING, INR RANGE 2-3: Chronic | ICD-10-CM

## 2025-02-10 DIAGNOSIS — I63.20 CEREBROVASCULAR ACCIDENT (CVA) DUE TO OCCLUSION OF PRECEREBRAL ARTERY (H): Chronic | ICD-10-CM

## 2025-02-10 DIAGNOSIS — Z86.73 HISTORY OF CVA (CEREBROVASCULAR ACCIDENT): ICD-10-CM

## 2025-02-10 DIAGNOSIS — Z79.01 LONG TERM CURRENT USE OF ANTICOAGULANT THERAPY: Primary | ICD-10-CM

## 2025-02-10 LAB — INR BLD: 1.4 (ref 0.9–1.1)

## 2025-02-10 PROCEDURE — 36416 COLLJ CAPILLARY BLOOD SPEC: CPT

## 2025-02-10 PROCEDURE — 85610 PROTHROMBIN TIME: CPT

## 2025-02-10 NOTE — PROGRESS NOTES
ANTICOAGULATION MANAGEMENT     Bebe Alfonso 79 year old female is on warfarin with subtherapeutic INR result. (Goal INR 2.0-3.0)    Recent labs: (last 7 days)     02/10/25  1531   INR 1.4*       ASSESSMENT     Source(s): Chart Review and Patient/Caregiver Call     Warfarin doses taken: Warfarin taken as instructed  Diet: Increased greens/vitamin K in diet; plans to resume previous intake  Medication/supplement changes:  azithromycin 5 day course (dates: 2/6/25-2/11/25) subsequent INRs may be increased. Closer INR monitoring recommended.  New illness, injury, or hospitalization: No- chronic cough  Signs or symptoms of bleeding or clotting: No  Previous result: Therapeutic last visit; previously outside of goal range  Additional findings: None       PLAN     Recommended plan for temporary change(s) affecting INR     Dosing Instructions: booster dose then continue your current warfarin dose with next INR in 3 days       Summary  As of 2/10/2025      Full warfarin instructions:  2/10: 7.5 mg; Otherwise 2.5 mg every Sun, Thu; 5 mg all other days   Next INR check:  2/13/2025               Telephone call with Bebe who verbalizes understanding and agrees to plan and who agrees to plan and repeated back plan correctly    Lab visit scheduled    Education provided: Symptom monitoring: monitoring for bleeding signs and symptoms, monitoring for clotting signs and symptoms, monitoring for stroke signs and symptoms, and when to seek medical attention/emergency care  Contact [phone] with any changes, questions or concerns.     Plan made with [facility] Pharmacist Tamia Adames RN  2/10/2025  Anticoagulation Clinic  [facility] for routing messages: vic [facility]ADD[facility] patient phone line: [phone]        _______________________________________________________________________     Anticoagulation Episode Summary       Current INR goal:  2.0-3.0   TTR:  60.0% (1 y)   Target end date:  Indefinite    Send INR reminders to:  [facility]    Indications    History of CVA (cerebrovascular accident) (Resolved) [Z86.73]  Long term current use of anticoagulant therapy [Z79.01]  Cerebrovascular accident (CVA) due to occlusion of precerebral artery (H) [I63.20]  History of CVA (cerebrovascular accident) [Z86.73]  Anticoagulation monitoring  INR range 2-3 [Z79.01]             Comments:  --             Anticoagulation Care Providers       Provider Role Specialty Phone number    Diana Ely APRN [facility] Referring Family Medicine [phone]

## 2025-02-11 NOTE — TELEPHONE ENCOUNTER
RECORDS STATUS - ALL OTHER DIAGNOSIS      RECORDS RECEIVED FROM: [facility]   NOTES STATUS DETAILS   OFFICE NOTE from referring provider Epic SELWYN Gibson CNP   OFFICE NOTE from pulmonology [facility] 12/27/2023 - Dr. Gallo Jurado   MEDICATION LIST [facility]    LABS     PATHOLOGY REPORTS     ANYTHING RELATED TO DIAGNOSIS Epic 2/10/2025   IMAGING (NEED IMAGES & REPORT)     CT SCANS PACS CT Chest: 1/16/2025   XRAYS PACS Xray Chest: 12/19/2024

## 2025-02-12 ENCOUNTER — HOSPITAL ENCOUNTER (OUTPATIENT)
Dept: RESPIRATORY THERAPY | Facility: CLINIC | Age: 80
Discharge: HOME | End: 2025-02-12
Attending: INTERNAL MEDICINE
Payer: COMMERCIAL

## 2025-02-12 DIAGNOSIS — R91.8 PULMONARY NODULES: ICD-10-CM

## 2025-02-12 LAB
DLCOUNC-%PRED-PRE: 55 %
DLCOUNC-PRE: 10.76 ML/MIN/MMHG
DLCOUNC-PRED: 19.36 ML/MIN/MMHG
ERV-%PRED-PRE: 81 %
ERV-PRE: 0.79 L
ERV-PRED: 0.97 L
EXPTIME-PRE: 8.59 SEC
FEF2575-%PRED-PRE: 30 %
FEF2575-PRE: 0.5 L/SEC
FEF2575-PRED: 1.67 L/SEC
FEFMAX-%PRED-PRE: 66 %
FEFMAX-PRE: 3.48 L/SEC
FEFMAX-PRED: 5.27 L/SEC
FEV1-%PRED-PRE: 60 %
FEV1-PRE: 1.27 L
FEV1FEV6-PRE: 60 %
FEV1FEV6-PRED: 78 %
FEV1FVC-PRE: 57 %
FEV1FVC-PRED: 77 %
FEV1SVC-PRE: 59 %
FEV1SVC-PRED: 66 %
FIFMAX-PRE: 3.21 L/SEC
FRCPLETH-%PRED-PRE: 144 %
FRCPLETH-PRE: 4.46 L
FRCPLETH-PRED: 3.1 L
FVC-%PRED-PRE: 79 %
FVC-PRE: 2.21 L
FVC-PRED: 2.78 L
IC-%PRED-PRE: 70 %
IC-PRE: 1.36 L
IC-PRED: 1.93 L
RVPLETH-%PRED-PRE: 160 %
RVPLETH-PRE: 3.67 L
RVPLETH-PRED: 2.29 L
TLCPLETH-%PRED-PRE: 110 %
TLCPLETH-PRE: 5.82 L
TLCPLETH-PRED: 5.27 L
VA-%PRED-PRE: 86 %
VA-PRE: 4.13 L
VC-%PRED-PRE: 67 %
VC-PRE: 2.15 L
VC-PRED: 3.2 L

## 2025-02-12 PROCEDURE — 94010 BREATHING CAPACITY TEST: CPT

## 2025-02-12 PROCEDURE — 94726 PLETHYSMOGRAPHY LUNG VOLUMES: CPT

## 2025-02-12 PROCEDURE — 94729 DIFFUSING CAPACITY: CPT

## 2025-02-13 ENCOUNTER — ANTICOAGULATION THERAPY VISIT (OUTPATIENT)
Dept: ANTICOAGULATION | Facility: CLINIC | Age: 80
End: 2025-02-13

## 2025-02-13 ENCOUNTER — LAB (OUTPATIENT)
Dept: LAB | Facility: CLINIC | Age: 80
End: 2025-02-13
Payer: COMMERCIAL

## 2025-02-13 DIAGNOSIS — Z86.73 HISTORY OF CVA (CEREBROVASCULAR ACCIDENT): ICD-10-CM

## 2025-02-13 DIAGNOSIS — Z79.01 LONG TERM CURRENT USE OF ANTICOAGULANT THERAPY: Primary | ICD-10-CM

## 2025-02-13 DIAGNOSIS — I63.20 CEREBROVASCULAR ACCIDENT (CVA) DUE TO OCCLUSION OF PRECEREBRAL ARTERY (H): Chronic | ICD-10-CM

## 2025-02-13 DIAGNOSIS — Z79.01 ANTICOAGULATION MONITORING, INR RANGE 2-3: Chronic | ICD-10-CM

## 2025-02-13 LAB — INR BLD: 1.6 (ref 0.9–1.1)

## 2025-02-13 NOTE — PROGRESS NOTES
ANTICOAGULATION MANAGEMENT     Bebe SHELDON Kaden 79 year old female is on warfarin with subtherapeutic INR result. (Goal INR 2.0-3.0)    Recent labs: (last 7 days)     02/13/25  0825   INR 1.6*       ASSESSMENT     Source(s): Chart Review and Patient/Caregiver Call     Warfarin doses taken: Booster dose(s) recently taken which may be affecting INR  Diet: No new diet changes identified (previously noted increase in vitamin K)  Pt is resuming normal intake of vitamin K  Medication/supplement changes:  pt did finish azithromycin and is feeling better  New illness, injury, or hospitalization: No  Signs or symptoms of bleeding or clotting: No  Previous result: Subtherapeutic  Additional findings: None     PLAN     Recommended plan for temporary change(s) affecting INR     Dosing Instructions: booster dose then Increase your warfarin dose (16.7% change) with next INR in 1 week       Summary  As of 2/13/2025      Full warfarin instructions:  2/13: 7.5 mg; Otherwise 5 mg every day   Next INR check:  2/20/2025               Telephone call with Bebe who verbalizes understanding and agrees to plan    Lab visit scheduled    Education provided: Please call back if any changes to your diet, medications or how you've been taking warfarin  Symptom monitoring: monitoring for clotting signs and symptoms and monitoring for stroke signs and symptoms    Plan made with [facility] Pharmacist Diana Pretty RN  2/13/2025  Anticoagulation Clinic  [facility] for routing messages: p [facility]  [facility] patient phone line: [phone]        _______________________________________________________________________     Anticoagulation Episode Summary       Current INR goal:  2.0-3.0   TTR:  59.3% (1 y)   Target end date:  Indefinite   Send INR reminders to:  [facility]    Indications    History of CVA (cerebrovascular accident) (Resolved) [Z86.73]  Long term current use of anticoagulant therapy  [Z79.01]  Cerebrovascular accident (CVA) due to occlusion of precerebral artery (H) [I63.20]  History of CVA (cerebrovascular accident) [Z86.73]  Anticoagulation monitoring  INR range 2-3 [Z79.01]             Comments:  --             Anticoagulation Care Providers       Provider Role Specialty Phone number    Diana Ely APRN [facility] Referring Family Medicine [phone]

## 2025-02-18 ENCOUNTER — VIRTUAL VISIT (OUTPATIENT)
Dept: PULMONOLOGY | Facility: CLINIC | Age: 80
End: 2025-02-18
Attending: NURSE PRACTITIONER
Payer: COMMERCIAL

## 2025-02-18 ENCOUNTER — TELEPHONE (OUTPATIENT)
Dept: PULMONOLOGY | Facility: CLINIC | Age: 80
End: 2025-02-18

## 2025-02-18 VITALS — HEIGHT: 66 IN | BODY MASS INDEX: 19.01 KG/M2

## 2025-02-18 DIAGNOSIS — J44.1 COPD EXACERBATION (H): ICD-10-CM

## 2025-02-18 ASSESSMENT — PAIN SCALES - GENERAL: PAINLEVEL_OUTOF10: NO PAIN (0)

## 2025-02-18 NOTE — PROGRESS NOTES
No show  Follow-up with Dr Adrien pinzon for MICKEY nodule    Kevin Henry MD, MHA  Associate Professor of Medicine  Section of Interventional Pulmonology   Division of Pulmonary, Allergy, Critical Care and Sleep Medicine   [facility], [place]  Pager: [phone]   Office: [phone]  Email: [email or web address]

## 2025-02-18 NOTE — LETTER
2/18/2025       RE: Bebe Alfonso  [address] [place] [address]     Dear Colleague,    Thank you for referring your patient, [facility]be Alfonso[facility]. Please see a copy of my visit note below.    No show    No show  Follow-up with Dr Adrien pinzon for MICKEY nodule    Kevin Henry MD, A  Associate Professor of Medicine  Section of Interventional Pulmonology   Division of Pulmonary, Allergy, Critical Care and Sleep Medicine   [facility]  Pager: [phone]   Office: [phone]  Email: [email or web address]          Again, thank you for allowing me to participate in the care of your patient.      Sincerely,    Kevin Henry MD

## 2025-02-18 NOTE — TELEPHONE ENCOUNTER
Patient needs to be rescheduled for their virtual visit due to Reason for Reschedule: Pt had difficulty joining visit.     Dr. Henry would like patient to see Dr. Jurado as soon as possible.     Scheduling team, please refer to service line late cancellation/no-show policies and reach out to patient at a later date for rescheduling.    Appointment mode: Video  Provider: Dr Henry

## 2025-02-18 NOTE — NURSING NOTE
Current patient location: [address] [place] [address] [place] [address]    Is the patient currently in the state of [place]? YES    Visit mode: VIDEO    If the visit is dropped, the patient can be reconnected by:VIDEO VISIT: Text to cell phone:   Telephone Information:   Mobile [phone]       Will anyone else be joining the visit? NO  (If patient encounters technical issues they should call [phone]    Are changes needed to the allergy or medication list?  Yes  Pt takes Tums prn.     Are refills needed on medications prescribed by this physician? NO    Rooming Documentation:  Questionnaire(s) not done per department protocol    Reason for visit: Consult (New Oncology)    Stephanie DU

## 2025-02-19 DIAGNOSIS — N18.32 STAGE 3B CHRONIC KIDNEY DISEASE (H): Primary | ICD-10-CM

## 2025-02-20 ENCOUNTER — ANTICOAGULATION THERAPY VISIT (OUTPATIENT)
Dept: ANTICOAGULATION | Facility: CLINIC | Age: 80
End: 2025-02-20

## 2025-02-20 ENCOUNTER — LAB (OUTPATIENT)
Dept: LAB | Facility: CLINIC | Age: 80
End: 2025-02-20
Payer: COMMERCIAL

## 2025-02-20 DIAGNOSIS — Z79.01 LONG TERM CURRENT USE OF ANTICOAGULANT THERAPY: Primary | ICD-10-CM

## 2025-02-20 DIAGNOSIS — I63.20 CEREBROVASCULAR ACCIDENT (CVA) DUE TO OCCLUSION OF PRECEREBRAL ARTERY (H): Chronic | ICD-10-CM

## 2025-02-20 DIAGNOSIS — Z79.01 ANTICOAGULATION MONITORING, INR RANGE 2-3: Chronic | ICD-10-CM

## 2025-02-20 DIAGNOSIS — Z86.73 HISTORY OF CVA (CEREBROVASCULAR ACCIDENT): ICD-10-CM

## 2025-02-20 LAB — INR BLD: 1.8 (ref 0.9–1.1)

## 2025-02-20 NOTE — PROGRESS NOTES
ANTICOAGULATION MANAGEMENT     Bebe Alfonso 79 year old female is on warfarin with subtherapeutic INR result. (Goal INR 2.0-3.0)    Recent labs: (last 7 days)     02/20/25  0845   INR 1.8*       ASSESSMENT     Source(s): Chart Review and Patient/Caregiver Call     Warfarin doses taken: Booster dose(s) recently taken which may be affecting INR  Diet: No new diet changes identified  Medication/supplement changes: None noted  New illness, injury, or hospitalization: No  Signs or symptoms of bleeding or clotting: No  Previous result: Subtherapeutic - remains subtherapeutic   Additional findings: None     PLAN     Recommended plan for temporary change(s) and ongoing change(s) affecting INR     Dosing Instructions: Increase your warfarin dose (7.1% change) with next INR in 1 week       Summary  As of 2/20/2025      Full warfarin instructions:  7.5 mg every Thu; 5 mg all other days   Next INR check:  2/27/2025               Telephone call with Bebe who verbalizes understanding and agrees to plan    Lab visit scheduled    Education provided: Please call back if any changes to your diet, medications or how you've been taking warfarin  Symptom monitoring: monitoring for clotting signs and symptoms, monitoring for stroke signs and symptoms, and when to seek medical attention/emergency care    Plan made per [facility] anticoagulation protocol    Deneen Pretty RN  2/20/2025  Anticoagulation Clinic  [employer] for routing messages: p [facility] patient phone line: [phone]        _______________________________________________________________________     Anticoagulation Episode Summary       Current INR goal:  2.0-3.0   TTR:  59.2% (1 y)   Target end date:  Indefinite   Send INR reminders to:  [facility]    Indications    History of CVA (cerebrovascular accident) (Resolved) [Z86.73]  Long term current use of anticoagulant therapy [Z79.01]  Cerebrovascular accident (CVA) due to occlusion of  precerebral artery (H) [I63.20]  History of CVA (cerebrovascular accident) [Z86.73]  Anticoagulation monitoring  INR range 2-3 [Z79.01]             Comments:  --             Anticoagulation Care Providers       Provider Role Specialty Phone number    Diana Ely APRN [facility] [phone]

## 2025-02-24 ENCOUNTER — TELEPHONE (OUTPATIENT)
Dept: NEPHROLOGY | Facility: CLINIC | Age: 80
End: 2025-02-24
Payer: COMMERCIAL

## 2025-02-24 NOTE — TELEPHONE ENCOUNTER
Called patient with a appointment reminder for Fri. 2/28/25 video with Diana Abel and a lab draw on Thurs. 2/27/25 at 8:45 am    Julio Murillo on 2/24/2025 at 4:20 PM     06-May-2022 21:18

## 2025-02-27 ENCOUNTER — LAB (OUTPATIENT)
Dept: LAB | Facility: CLINIC | Age: 80
End: 2025-02-27
Payer: COMMERCIAL

## 2025-02-27 ENCOUNTER — ANTICOAGULATION THERAPY VISIT (OUTPATIENT)
Dept: ANTICOAGULATION | Facility: CLINIC | Age: 80
End: 2025-02-27

## 2025-02-27 DIAGNOSIS — Z79.01 LONG TERM CURRENT USE OF ANTICOAGULANT THERAPY: Primary | ICD-10-CM

## 2025-02-27 DIAGNOSIS — N18.4 CKD (CHRONIC KIDNEY DISEASE) STAGE 4, GFR 15-29 ML/MIN (H): ICD-10-CM

## 2025-02-27 DIAGNOSIS — I63.20 CEREBROVASCULAR ACCIDENT (CVA) DUE TO OCCLUSION OF PRECEREBRAL ARTERY (H): Chronic | ICD-10-CM

## 2025-02-27 DIAGNOSIS — Z86.73 HISTORY OF CVA (CEREBROVASCULAR ACCIDENT): ICD-10-CM

## 2025-02-27 DIAGNOSIS — Z79.01 ANTICOAGULATION MONITORING, INR RANGE 2-3: Chronic | ICD-10-CM

## 2025-02-27 LAB
INR BLD: 2.6 (ref 0.9–1.1)
PTH-INTACT SERPL-MCNC: 69 PG/ML (ref 15–65)
VIT D+METAB SERPL-MCNC: 48 NG/ML (ref 20–50)

## 2025-02-27 NOTE — PROGRESS NOTES
ANTICOAGULATION MANAGEMENT     Bebe Alfonso 79 year old female is on warfarin with therapeutic INR result. (Goal INR 2.0-3.0)    Recent labs: (last 7 days)     02/27/25  0839   INR 2.6*       ASSESSMENT     Source(s): Chart Review  Previous INR was Subtherapeutic  Medication, diet, health changes since last INR chart reviewed; none identified         PLAN     Recommended plan for no diet, medication or health factor changes affecting INR     Dosing Instructions: Continue your current warfarin dose with next INR in 2 weeks       Summary  As of 2/27/2025      Full warfarin instructions:  7.5 mg every Thu; 5 mg all other days   Next INR check:  3/13/2025               Detailed voice message left for Bebe with dosing instructions and follow up date.     Contact [phone] to schedule and with any changes, questions or concerns.     Education provided: Please call back if any changes to your diet, medications or how you've been taking warfarin    Plan made per [facility] anticoagulation protocol    Annie Adames RN  2/27/2025  Anticoagulation Clinic  [facility] for routing messages: [facility] patient phone line: [phone]        _______________________________________________________________________     Anticoagulation Episode Summary       Current INR goal:  2.0-3.0   TTR:  60.6% (1 y)   Target end date:  Indefinite   Send INR reminders to:  [facility]    Indications    History of CVA (cerebrovascular accident) (Resolved) [Z86.73]  Long term current use of anticoagulant therapy [Z79.01]  Cerebrovascular accident (CVA) due to occlusion of precerebral artery (H) [I63.20]  History of CVA (cerebrovascular accident) [Z86.73]  Anticoagulation monitoring  INR range 2-3 [Z79.01]             Comments:  --             Anticoagulation Care Providers       Provider Role Specialty Phone number    Diana Ely APRN [facility] Referring Family Medicine [phone]

## 2025-03-04 ENCOUNTER — VIRTUAL VISIT (OUTPATIENT)
Dept: PULMONOLOGY | Facility: CLINIC | Age: 80
End: 2025-03-04
Attending: INTERNAL MEDICINE
Payer: COMMERCIAL

## 2025-03-04 VITALS — BODY MASS INDEX: 18.32 KG/M2 | HEIGHT: 66 IN | WEIGHT: 114 LBS

## 2025-03-04 DIAGNOSIS — R91.8 PULMONARY NODULES: Primary | ICD-10-CM

## 2025-03-04 PROCEDURE — 98007 SYNCH AUDIO-VIDEO EST HI 40: CPT | Performed by: INTERNAL MEDICINE

## 2025-03-04 PROCEDURE — 1126F AMNT PAIN NOTED NONE PRSNT: CPT | Performed by: INTERNAL MEDICINE

## 2025-03-04 RX ORDER — CEFAZOLIN SODIUM 2 G/50ML
2 SOLUTION INTRAVENOUS
OUTPATIENT
Start: 2025-03-04

## 2025-03-04 RX ORDER — CEFAZOLIN SODIUM 2 G/50ML
2 SOLUTION INTRAVENOUS SEE ADMIN INSTRUCTIONS
OUTPATIENT
Start: 2025-03-04

## 2025-03-04 ASSESSMENT — PAIN SCALES - GENERAL: PAINLEVEL_OUTOF10: NO PAIN (0)

## 2025-03-04 NOTE — PROGRESS NOTES
"    LUNG NODULE & INTERVENTIONAL PULMONARY CLINIC  CLINICS & SURGERY CENTER, [facility], [facility] CANCER [address] [address]  Phone: [phone]  Fax: [phone]    Patient:  Bebe Alfonso, Age 79 year old, Date of birth 1945, MRN# [id number]  Date of Visit:  03/04/2025  Referring Provider Referred Self    Reason for Consultation: Lung Nodule     The patient has been notified of following:   \"This video visit will be conducted via a call between you and your physician/provider. We have found that certain health care needs can be provided without the need for an in-person physical exam.  This service lets us provide the care you need with a video conversation.  If a prescription is necessary we can send it directly to your pharmacy.  If lab work is needed we can place an order for that and you can then stop by our lab to have the test done at a later time.  Video visits are billed at different rates depending on your insurance coverage.  Please reach out to your insurance provider with any questions.  If during the course of the call the physician/provider feels a video visit is not appropriate, you will not be charged for this service.\"  Patient has given verbal consent for Video visit? Yes  How would you like to obtain your AVS? Please refer to rooming staff note  Patient would like the video invitation sent by: Please refer to rooming staff note   Will anyone else be joining your video visit? Please refer to rooming staff note    Video-Visit Details     Type of service:  Video Visit  Video Start Time: 235  Video End Time: 245  Provider Name: Kevin Henry MD, MHA   Originating Location (pt. Location): Home  Provider Location: Off campus   Distant Location (provider location): Home/Clinic  Platform used for Video Visit: AmWell/Doximity    Assessment and Plan:    1. Established multiple pulmonary lung " nodule(s). Given the characteristics on current/previous imaging and risk factors; I would classify this to be High (>65%) risk for cancer. MICKEY nodule (see photo below) has solidified from a ggo in 2023. This should be the primary target for biopsy. There are new nodular opacities peripherally on recent CT which could be biopsied as secondary target but will leave this to the judgement of the . Will plan ION+EBUS.     2. Tobacco use. Has tried everything with no success. Will try to quit now given potential for lung cancer.         Billing: I spent a total of 45min spent on date of encounter which includes prep time, visit with the patient and post visit work including documentation and nursing communication      Complexity Add-on Statement: The longitudinal plan of care for the diagnosis(es)/condition(s) as documented were addressed during this visit. Due to the added complexity in care, I will continue to support Bebe in the subsequent management and with ongoing continuity of care.    Kevin Henry MD, MHA  Associate Professor of Medicine  Section of Interventional Pulmonology   Division of Pulmonary, Allergy, Critical Care and Sleep Medicine   [facility]  Pager: [phone]   Office: [phone]  Email: [email or web address]    Yolis Fung RN   Interventional Pulmonary Care Coordinator   Office: [phone]  Email: [email or web address]     Yovanny Wilson  Interventional Pulmonary Surgery Scheduler   Office: [phone]  Email: [email or web address]         History:     Bebe Alfonso is a 79 year old female with sig h/o for COPD, tobacco abuse, CVA, CKD who is here for evaluation/followup of Lung Nodule.    - No new resp sx or complaints. Denies dyspnea or cough.   - Here for eval of lung nodules. Previously seen by Dr Jurado in 2023.   - Personal hx of cancer: no  - Family hx of cancer: no  - Exposure hx: Denies asbestos or radon exposure   -  Tobacco hx: Current Smoker, 1ppd, >30pkyr.   - My interpretation of the images relevant for this visit includes: nodule progression   - My interpretation of the PFT's relevant for this visit includes: Obstructive pattern     Culprit Nodule(s):   The previously seen groundglass nodule appears more solid today with more peripheral opacities in the left upper lobe, this has an appearance compatible with infectious or inflammatory change which likely includes dilated impacted airways.     Other active medical problems include:   - has h/o CVA and carotid bruit. On coumadin   - has COPD. Stable. Baseline dyspnea on inhalers. No supplemental O2. Daily smoker.           Past Medical History:      Past Medical History:   Diagnosis Date    Anticoagulation monitoring, INR range 2-3 10/22/2015    Benign essential hypertension 9/15/2016    Carotid bruit 11/12/2012    Overview:  12/6/10 Carotid US  Elevated velocities throughout the carotid arteries bilaterally. There is a focal area of increased velocity in the mid left internal carotid artery with a plaque in this region on the color flow images. This corresponds to 50 - 70 % diameter reduction. There is an area of elevated velocity in the left external carotid artery consistent with stenosis. There is no focal area of significant stenosis in the right carotid arteries in the neck. Plaque in the carotid arteries bilaterally.     Chronic obstructive pulmonary disease (H) 1/19/2016    The FVC, FEV1 and FEV1/FVC ratio are reduced.  The inspiratory flow rates are within normal limits.  The FVC is reduced relative to the SVC indicating air trapping.  While the TLC is within normal limits, the FRC, RV and RV/TLC ratio are increased.  The  diffusing capacity is mildly reduced. IMPRESSION: Mild-moderate Airflow Obstruction with air trapping ____________________________________________Clarke Iverson  April 2018    Esophageal reflux 4/8/2008    Overview:  Omeprazole PRN, occasional  symptoms such as chest burning and knife twisted to stomach.     Heart disease born with it    History of blood transfusion 70 years ago    History of CVA (cerebrovascular accident) 10/20/2015    Long term current use of anticoagulant therapy 3/20/2018    Major depressive disorder, recurrent episode, moderate (H) 10/29/2008    Overview:  She is not taking any medication at this time.    Osteoarthrosis, hip 10/19/2010    Overview:  Pt scheduled for right  total hip arthroplasty on 6/14/13 with Dr. Addison HEREDIA hip xray (11/2012) Severe degenerative changes seen of the right hip with near bone-on-bone appearance of the joint and several subchondral cysts forming.    Thyroid nodule 5/22/2013    Overview:  Thyroid US (2012) Stable nodule left lobe of thyroid TSH- (2/19/12) normal (2.26)             Past Surgical History:      Past Surgical History:   Procedure Laterality Date    BIOPSY  appox in 1980's    BREAST SURGERY  last time in the 1990's    non cancer    COLONOSCOPY N/A 9/7/2022    Procedure: COLONOSCOPY, WITH POLYPECTOMY AND BIOPSY;  Surgeon: Alfredito Gomez DO;  Location: [facility]    ESOPHAGOSCOPY, GASTROSCOPY, DUODENOSCOPY (EGD), COMBINED N/A 5/25/2022    Procedure: ESOPHAGOGASTRODUODENOSCOPY, WITH BIOPSY;  Surgeon: Alfredito Gomez DO;  Location: [facility]            Social History:     Social History     Tobacco Use    Smoking status: Every Day     Current packs/day: 1.00     Average packs/day: 1 pack/day for 50.6 years (50.6 ttl pk-yrs)     Types: Cigarettes     Start date: 1/1/2016    Smokeless tobacco: Never   Substance Use Topics    Alcohol use: No            Family History:     Family History   Problem Relation Age of Onset    Diabetes Maternal Grandfather     Diabetes Sister         developed after cancer treatment    Breast Cancer Sister     Obesity Sister     Hypertension Mother     Hyperlipidemia Mother     Osteoporosis Mother     Breast Cancer Sister     Osteoporosis Sister     Breast Cancer  Daughter     Other Cancer Sister         throught out body    Substance Abuse Sister         acol    Obesity Sister     Substance Abuse Sister         acol    Obesity Sister              Allergies:      Allergies   Allergen Reactions    Losartan Swelling, Nausea, Shortness Of Breath and Palpitations    Gabapentin GI Disturbance and Unknown     Double vision  flatulence    Lisinopril Cough    Oxycodone Other (See Comments)     But has tolerated hydrocodone    Tramadol Other (See Comments)    Augmentin [Amoxicillin-Pot Clavulanate] Rash    Bacitracin Rash     blisters    Bupropion Rash     Allergic to brand not generic            Medications:     Current Outpatient Medications   Medication Sig Dispense Refill    amLODIPine (NORVASC) 5 MG tablet Take 1 tablet (5 mg) by mouth daily. 90 tablet 3    Aspirin (VAZALORE) 81 MG CAPS Please choose reason not prescribed from choices below.      atorvastatin (LIPITOR) 80 MG tablet Take 1 tablet by mouth once daily 90 tablet 2    enalapril (VASOTEC) 20 MG tablet Take 1 tablet (20 mg) by mouth daily. Take in the morning 90 tablet 1    famotidine (PEPCID) 40 MG tablet Take 1 tablet by mouth once daily 90 tablet 1    FLUoxetine (PROZAC) 20 MG capsule Take 1 capsule (20 mg) by mouth daily. 90 capsule 0    fluticasone (FLONASE) 50 MCG/ACT nasal spray Spray 1 spray into both nostrils daily 16 g 1    warfarin ANTICOAGULANT (COUMADIN) 5 MG tablet Take 7.5 mg every Mon; 5 mg all other days or As directed by Anticoagulation clinic 100 tablet 1     No current facility-administered medications for this visit.            Review of Systems:     12-point ROS reviewed and abnormalities stated in the history.         Physical Exam:     Constitutional - looks well, in no apparent distress  Eyes - no redness or discharge  Respiratory -breathing appears comfortable.  No cough.  Skin - No appreciable discoloration or lesions (very limited exam)  Neurological - No apparent tremors. Speech fluent and  articlate  Psychiatric - no signs of delirium or anxiety     Exam limited to that easily identified on a virtual visit. The rest of a comprehensive physical examination is deferred due to PHE (public health emergency) video visit restrictions.         Current Laboratory Data:   All laboratory and imaging data reviewed.          Latest Ref Rng & Units 2/12/2025     8:10 AM   PFT   FVC L 2.21    FEV1 L 1.27    FVC% % 79    FEV1% % 60

## 2025-03-04 NOTE — LETTER
"3/4/2025       RE: Bebe Alfonso  [address] [place] [address]     Dear Colleague,    Thank you for referring your patient, Bebe Alfonso, to the [facility] at [facility]. Please see a copy of my visit note below.    Virtual Visit Details    Type of service:  Video Visit   See note        LUNG NODULE & INTERVENTIONAL PULMONARY CLINIC  CLINICS & SURGERY CENTER, [facility], [facility] CANCER [address] [address]  Phone: [phone]  Fax: [phone]    Patient:  Bebe Alfonso, Age 79 year old, Date of birth 1945, MRN# [id number]  Date of Visit:  03/04/2025  Referring Provider Referred Self    Reason for Consultation: Lung Nodule     The patient has been notified of following:   \"This video visit will be conducted via a call between you and your physician/provider. We have found that certain health care needs can be provided without the need for an in-person physical exam.  This service lets us provide the care you need with a video conversation.  If a prescription is necessary we can send it directly to your pharmacy.  If lab work is needed we can place an order for that and you can then stop by our lab to have the test done at a later time.  Video visits are billed at different rates depending on your insurance coverage.  Please reach out to your insurance provider with any questions.  If during the course of the call the physician/provider feels a video visit is not appropriate, you will not be charged for this service.\"  Patient has given verbal consent for Video visit? Yes  How would you like to obtain your AVS? Please refer to rooming staff note  Patient would like the video invitation sent by: Please refer to rooming staff note   Will anyone else be joining your video visit? Please refer to rooming staff " note    Video-Visit Details     Type of service:  Video Visit  Video Start Time: 235  Video End Time: 245  Provider Name: Kevin Henry MD, MHA   Originating Location (pt. Location): Home  Provider Location: Off campus   Distant Location (provider location): Home/Clinic  Platform used for Video Visit: [facility]    Assessment and Plan:    1. Established multiple pulmonary lung nodule(s). Given the characteristics on current/previous imaging and risk factors; I would classify this to be High (>65%) risk for cancer. MICKEY nodule (see photo below) has solidified from a ggo in 2023. This should be the primary target for biopsy. There are new nodular opacities peripherally on recent CT which could be biopsied as secondary target but will leave this to the judgement of the . Will plan ION+EBUS.     2. Tobacco use. Has tried everything with no success. Will try to quit now given potential for lung cancer.         Billing: I spent a total of 45min spent on date of encounter which includes prep time, visit with the patient and post visit work including documentation and nursing communication      Complexity Add-on Statement: The longitudinal plan of care for the diagnosis(es)/condition(s) as documented were addressed during this visit. Due to the added complexity in care, I will continue to support Bebe in the subsequent management and with ongoing continuity of care.    Kevin Henry MD, MHA  Associate Professor of Medicine  Section of Interventional Pulmonology   Division of Pulmonary, Allergy, Critical Care and Sleep Medicine   [facility]  Pager: [phone]   Office: [phone]  Email: [email or web address]    Yolis Fung RN   Interventional Pulmonary Care Coordinator   Office: [phone]  Email: [email or web address]     Yovanny Wilson  Interventional Pulmonary Surgery Scheduler   Office: [phone]  Email: [email or web address]         History:      Bebe Alfonso is a 79 year old female with sig h/o for COPD, tobacco abuse, CVA, CKD who is here for evaluation/followup of Lung Nodule.    - No new resp sx or complaints. Denies dyspnea or cough.   - Here for eval of lung nodules. Previously seen by Dr Jurado in 2023.   - Personal hx of cancer: no  - Family hx of cancer: no  - Exposure hx: Denies asbestos or radon exposure   - Tobacco hx: Current Smoker, 1ppd, >30pkyr.   - My interpretation of the images relevant for this visit includes: nodule progression   - My interpretation of the PFT's relevant for this visit includes: Obstructive pattern     Culprit Nodule(s):   The previously seen groundglass nodule appears more solid today with more peripheral opacities in the left upper lobe, this has an appearance compatible with infectious or inflammatory change which likely includes dilated impacted airways.     Other active medical problems include:   - has h/o CVA and carotid bruit. On coumadin   - has COPD. Stable. Baseline dyspnea on inhalers. No supplemental O2. Daily smoker.           Past Medical History:      Past Medical History:   Diagnosis Date     Anticoagulation monitoring, INR range 2-3 10/22/2015     Benign essential hypertension 9/15/2016     Carotid bruit 11/12/2012    Overview:  12/6/10 Carotid US  Elevated velocities throughout the carotid arteries bilaterally. There is a focal area of increased velocity in the mid left internal carotid artery with a plaque in this region on the color flow images. This corresponds to 50 - 70 % diameter reduction. There is an area of elevated velocity in the left external carotid artery consistent with stenosis. There is no focal area of significant stenosis in the right carotid arteries in the neck. Plaque in the carotid arteries bilaterally.      Chronic obstructive pulmonary disease (H) 1/19/2016    The FVC, FEV1 and FEV1/FVC ratio are reduced.  The inspiratory flow rates are within normal limits.  The FVC  is reduced relative to the SVC indicating air trapping.  While the TLC is within normal limits, the FRC, RV and RV/TLC ratio are increased.  The  diffusing capacity is mildly reduced. IMPRESSION: Mild-moderate Airflow Obstruction with air trapping ____________________________________________M.D. Jelly Clarke  April 2018     Esophageal reflux 4/8/2008    Overview:  Omeprazole PRN, occasional symptoms such as chest burning and knife twisted to stomach.      Heart disease born with it     History of blood transfusion 70 years ago     History of CVA (cerebrovascular accident) 10/20/2015     Long term current use of anticoagulant therapy 3/20/2018     Major depressive disorder, recurrent episode, moderate (H) 10/29/2008    Overview:  She is not taking any medication at this time.     Osteoarthrosis, hip 10/19/2010    Overview:  Pt scheduled for right  total hip arthroplasty on 6/14/13 with Dr. Addison HEREDIA hip xray (11/2012) Severe degenerative changes seen of the right hip with near bone-on-bone appearance of the joint and several subchondral cysts forming.     Thyroid nodule 5/22/2013    Overview:  Thyroid US (2012) Stable nodule left lobe of thyroid TSH- (2/19/12) normal (2.26)             Past Surgical History:      Past Surgical History:   Procedure Laterality Date     BIOPSY  appox in 1980's     BREAST SURGERY  last time in the 1990's    non cancer     COLONOSCOPY N/A 9/7/2022    Procedure: COLONOSCOPY, WITH POLYPECTOMY AND BIOPSY;  Surgeon: Alfredito Gomez DO;  Location: [facility]     ESOPHAGOSCOPY, GASTROSCOPY, DUODENOSCOPY (EGD), COMBINED N/A 5/25/2022    Procedure: ESOPHAGOGASTRODUODENOSCOPY, WITH BIOPSY;  Surgeon: Alfredito Gomez DO;  Location: [facility]            Social History:     Social History     Tobacco Use     Smoking status: Every Day     Current packs/day: 1.00     Average packs/day: 1 pack/day for 50.6 years (50.6 ttl pk-yrs)     Types: Cigarettes     Start date: 1/1/2016     Smokeless tobacco:  Never   Substance Use Topics     Alcohol use: No            Family History:     Family History   Problem Relation Age of Onset     Diabetes Maternal Grandfather      Diabetes Sister         developed after cancer treatment     Breast Cancer Sister      Obesity Sister      Hypertension Mother      Hyperlipidemia Mother      Osteoporosis Mother      Breast Cancer Sister      Osteoporosis Sister      Breast Cancer Daughter      Other Cancer Sister         throught out body     Substance Abuse Sister         acol     Obesity Sister      Substance Abuse Sister         acol     Obesity Sister              Allergies:      Allergies   Allergen Reactions     Losartan Swelling, Nausea, Shortness Of Breath and Palpitations     Gabapentin GI Disturbance and Unknown     Double vision  flatulence     Lisinopril Cough     Oxycodone Other (See Comments)     But has tolerated hydrocodone     Tramadol Other (See Comments)     Augmentin [Amoxicillin-Pot Clavulanate] Rash     Bacitracin Rash     blisters     Bupropion Rash     Allergic to brand not generic            Medications:     Current Outpatient Medications   Medication Sig Dispense Refill     amLODIPine (NORVASC) 5 MG tablet Take 1 tablet (5 mg) by mouth daily. 90 tablet 3     Aspirin (VAZALORE) 81 MG CAPS Please choose reason not prescribed from choices below.       atorvastatin (LIPITOR) 80 MG tablet Take 1 tablet by mouth once daily 90 tablet 2     enalapril (VASOTEC) 20 MG tablet Take 1 tablet (20 mg) by mouth daily. Take in the morning 90 tablet 1     famotidine (PEPCID) 40 MG tablet Take 1 tablet by mouth once daily 90 tablet 1     FLUoxetine (PROZAC) 20 MG capsule Take 1 capsule (20 mg) by mouth daily. 90 capsule 0     fluticasone (FLONASE) 50 MCG/ACT nasal spray Spray 1 spray into both nostrils daily 16 g 1     warfarin ANTICOAGULANT (COUMADIN) 5 MG tablet Take 7.5 mg every Mon; 5 mg all other days or As directed by Anticoagulation clinic 100 tablet 1     No current  facility-administered medications for this visit.            Review of Systems:     12-point ROS reviewed and abnormalities stated in the history.         Physical Exam:     Constitutional - looks well, in no apparent distress  Eyes - no redness or discharge  Respiratory -breathing appears comfortable.  No cough.  Skin - No appreciable discoloration or lesions (very limited exam)  Neurological - No apparent tremors. Speech fluent and articlate  Psychiatric - no signs of delirium or anxiety     Exam limited to that easily identified on a virtual visit. The rest of a comprehensive physical examination is deferred due to PHE (public health emergency) video visit restrictions.         Current Laboratory Data:   All laboratory and imaging data reviewed.          Latest Ref Rng & Units 2/12/2025     8:10 AM   PFT   FVC L 2.21    FEV1 L 1.27    FVC% % 79    FEV1% % 60                        Again, thank you for allowing me to participate in the care of your patient.      Sincerely,    Kevin Henry MD

## 2025-03-04 NOTE — NURSING NOTE
Patient confirms medications and allergies are accurate via patients echeck in completion, and or denies any changes since last reviewed/verified.       Current patient location: [address] [place] [address] [place] [address]    Is the patient currently in the state of [place]? YES    Visit mode: VIDEO    If the visit is dropped, the patient can be reconnected by:VIDEO VISIT: Text to cell phone:   Telephone Information:   Mobile [phone]       Will anyone else be joining the visit? NO  (If patient encounters technical issues they should call [phone]    Are changes needed to the allergy or medication list? No    Are refills needed on medications prescribed by this physician? NO    Rooming Documentation:  Questionnaire(s) completed    Reason for visit: RECHECK    Anastasia DU

## 2025-03-06 ENCOUNTER — TELEPHONE (OUTPATIENT)
Dept: PULMONOLOGY | Facility: CLINIC | Age: 80
End: 2025-03-06
Payer: COMMERCIAL

## 2025-03-06 NOTE — TELEPHONE ENCOUNTER
Patient called to schedule IP procedure. Scheduled 03/20 with Dr. Jurado. Patient declined PAC visit and will schedule H&P with her PCP. Writer advised her to call back if unable to schedule with PCP. Packet information sent via [employer] per patient request.    Yovanny Wilson on 3/6/2025 at 10:41 AM     Dr. Ra Ham

## 2025-03-10 ENCOUNTER — PATIENT OUTREACH (OUTPATIENT)
Dept: CARE COORDINATION | Facility: CLINIC | Age: 80
End: 2025-03-10
Payer: COMMERCIAL

## 2025-03-14 PROBLEM — N18.4 CKD (CHRONIC KIDNEY DISEASE) STAGE 4, GFR 15-29 ML/MIN (H): Status: ACTIVE | Noted: 2025-03-14

## 2025-03-17 ENCOUNTER — DOCUMENTATION ONLY (OUTPATIENT)
Dept: PULMONOLOGY | Facility: CLINIC | Age: 80
End: 2025-03-17
Payer: COMMERCIAL

## 2025-03-17 NOTE — NURSING NOTE
Interventional Pulmonology: Pre-Flight Planning    Procedure date: March 20th, 2025    Scheduled procedure: BRONCHOSCOPY, ROBOT-ASSISTED ULTRASOUND, WITH BIOPSY    Location: [facility]    Anesthesia: General    H&P plan: Completed on 3/14/2025 by Dr. Isidro (pcp).    Medication hold(s): Warfarin - patient reached out to anticoagulation clinic to discuss hold parameters/bridging needed (if any).    CT scheduled: CT scheduled for DOS.    Preoperative Instructions: Sent to patient via [employer] (not read as of 3/17 - will follow-up).    Pathology results follow up: Dr. Kevin Henry

## 2025-03-20 ENCOUNTER — APPOINTMENT (OUTPATIENT)
Dept: GENERAL RADIOLOGY | Facility: CLINIC | Age: 80
End: 2025-03-20
Attending: INTERNAL MEDICINE
Payer: COMMERCIAL

## 2025-03-20 ENCOUNTER — HOSPITAL ENCOUNTER (OUTPATIENT)
Dept: CT IMAGING | Facility: CLINIC | Age: 80
Discharge: HOME | End: 2025-03-20
Attending: INTERNAL MEDICINE
Payer: COMMERCIAL

## 2025-03-20 ENCOUNTER — ANESTHESIA (OUTPATIENT)
Dept: SURGERY | Facility: CLINIC | Age: 80
End: 2025-03-20
Payer: COMMERCIAL

## 2025-03-20 ENCOUNTER — APPOINTMENT (OUTPATIENT)
Dept: GENERAL RADIOLOGY | Facility: CLINIC | Age: 80
End: 2025-03-20
Attending: STUDENT IN AN ORGANIZED HEALTH CARE EDUCATION/TRAINING PROGRAM
Payer: COMMERCIAL

## 2025-03-20 ENCOUNTER — HOSPITAL ENCOUNTER (OUTPATIENT)
Facility: CLINIC | Age: 80
Discharge: HOME | End: 2025-03-20
Attending: INTERNAL MEDICINE | Admitting: INTERNAL MEDICINE
Payer: COMMERCIAL

## 2025-03-20 ENCOUNTER — DOCUMENTATION ONLY (OUTPATIENT)
Dept: ANTICOAGULATION | Facility: CLINIC | Age: 80
End: 2025-03-20

## 2025-03-20 ENCOUNTER — ANESTHESIA EVENT (OUTPATIENT)
Dept: SURGERY | Facility: CLINIC | Age: 80
End: 2025-03-20
Payer: COMMERCIAL

## 2025-03-20 VITALS
RESPIRATION RATE: 16 BRPM | TEMPERATURE: 97.6 F | HEART RATE: 60 BPM | WEIGHT: 113.76 LBS | SYSTOLIC BLOOD PRESSURE: 168 MMHG | DIASTOLIC BLOOD PRESSURE: 51 MMHG | OXYGEN SATURATION: 100 % | BODY MASS INDEX: 18.28 KG/M2 | HEIGHT: 66 IN

## 2025-03-20 DIAGNOSIS — R91.8 PULMONARY NODULES: ICD-10-CM

## 2025-03-20 PROCEDURE — 88342 IMHCHEM/IMCYTCHM 1ST ANTB: CPT | Mod: TC | Performed by: INTERNAL MEDICINE

## 2025-03-20 PROCEDURE — 272N000001 HC OR GENERAL SUPPLY STERILE: Performed by: INTERNAL MEDICINE

## 2025-03-20 PROCEDURE — 31627 NAVIGATIONAL BRONCHOSCOPY: CPT | Mod: GC | Performed by: INTERNAL MEDICINE

## 2025-03-20 PROCEDURE — 710N000012 HC RECOVERY PHASE 2, PER MINUTE: Performed by: INTERNAL MEDICINE

## 2025-03-20 PROCEDURE — 999N000179 XR SURGERY CARM FLUORO LESS THAN 5 MIN W STILLS

## 2025-03-20 PROCEDURE — 710N000010 HC RECOVERY PHASE 1, LEVEL 2, PER MIN: Performed by: INTERNAL MEDICINE

## 2025-03-20 PROCEDURE — 31652 BRONCH EBUS SAMPLNG 1/2 NODE: CPT | Mod: GC | Performed by: INTERNAL MEDICINE

## 2025-03-20 PROCEDURE — 360N000087 HC SURGERY LEVEL 7 W/ FLUORO, PER MIN: Performed by: INTERNAL MEDICINE

## 2025-03-20 PROCEDURE — 999N000065 XR CHEST PORT 1 VIEW

## 2025-03-20 PROCEDURE — 71250 CT THORAX DX C-: CPT

## 2025-03-20 PROCEDURE — 88305 TISSUE EXAM BY PATHOLOGIST: CPT | Mod: 26 | Performed by: STUDENT IN AN ORGANIZED HEALTH CARE EDUCATION/TRAINING PROGRAM

## 2025-03-20 PROCEDURE — 88173 CYTOPATH EVAL FNA REPORT: CPT | Mod: TC | Performed by: INTERNAL MEDICINE

## 2025-03-20 PROCEDURE — 88342 IMHCHEM/IMCYTCHM 1ST ANTB: CPT | Mod: 26 | Performed by: STUDENT IN AN ORGANIZED HEALTH CARE EDUCATION/TRAINING PROGRAM

## 2025-03-20 PROCEDURE — 31654 BRONCH EBUS IVNTJ PERPH LES: CPT | Mod: GC | Performed by: INTERNAL MEDICINE

## 2025-03-20 PROCEDURE — 88360 TUMOR IMMUNOHISTOCHEM/MANUAL: CPT | Mod: 26 | Performed by: STUDENT IN AN ORGANIZED HEALTH CARE EDUCATION/TRAINING PROGRAM

## 2025-03-20 PROCEDURE — 88305 TISSUE EXAM BY PATHOLOGIST: CPT | Mod: 26 | Performed by: PATHOLOGY

## 2025-03-20 PROCEDURE — 88173 CYTOPATH EVAL FNA REPORT: CPT | Mod: 26 | Performed by: PATHOLOGY

## 2025-03-20 PROCEDURE — 88172 CYTP DX EVAL FNA 1ST EA SITE: CPT | Mod: 26 | Performed by: PATHOLOGY

## 2025-03-20 PROCEDURE — 71045 X-RAY EXAM CHEST 1 VIEW: CPT | Mod: 26 | Performed by: RADIOLOGY

## 2025-03-20 PROCEDURE — 999N000141 HC STATISTIC PRE-PROCEDURE NURSING ASSESSMENT: Performed by: INTERNAL MEDICINE

## 2025-03-20 PROCEDURE — 31629 BRONCHOSCOPY/NEEDLE BX EACH: CPT | Mod: GC | Performed by: INTERNAL MEDICINE

## 2025-03-20 PROCEDURE — 370N000017 HC ANESTHESIA TECHNICAL FEE, PER MIN: Performed by: INTERNAL MEDICINE

## 2025-03-20 PROCEDURE — 88305 TISSUE EXAM BY PATHOLOGIST: CPT | Mod: TC | Performed by: INTERNAL MEDICINE

## 2025-03-20 RX ORDER — LIDOCAINE HYDROCHLORIDE 20 MG/ML
INJECTION, SOLUTION INFILTRATION; PERINEURAL PRN
OUTPATIENT
Start: 2025-03-20

## 2025-03-20 RX ORDER — NALOXONE HYDROCHLORIDE 0.4 MG/ML
0.1 INJECTION, SOLUTION INTRAMUSCULAR; INTRAVENOUS; SUBCUTANEOUS
Status: DISCONTINUED | OUTPATIENT
Start: 2025-03-20 | End: 2025-03-20 | Stop reason: HOSPADM

## 2025-03-20 RX ORDER — FENTANYL CITRATE 50 UG/ML
50 INJECTION, SOLUTION INTRAMUSCULAR; INTRAVENOUS EVERY 5 MIN PRN
Status: DISCONTINUED | OUTPATIENT
Start: 2025-03-20 | End: 2025-03-20 | Stop reason: HOSPADM

## 2025-03-20 RX ORDER — DEXAMETHASONE SODIUM PHOSPHATE 4 MG/ML
INJECTION, SOLUTION INTRA-ARTICULAR; INTRALESIONAL; INTRAMUSCULAR; INTRAVENOUS; SOFT TISSUE PRN
OUTPATIENT
Start: 2025-03-20

## 2025-03-20 RX ORDER — FENTANYL CITRATE 50 UG/ML
INJECTION, SOLUTION INTRAMUSCULAR; INTRAVENOUS PRN
OUTPATIENT
Start: 2025-03-20

## 2025-03-20 RX ORDER — ONDANSETRON 2 MG/ML
4 INJECTION INTRAMUSCULAR; INTRAVENOUS EVERY 30 MIN PRN
Status: DISCONTINUED | OUTPATIENT
Start: 2025-03-20 | End: 2025-03-20 | Stop reason: HOSPADM

## 2025-03-20 RX ORDER — ONDANSETRON 2 MG/ML
INJECTION INTRAMUSCULAR; INTRAVENOUS PRN
OUTPATIENT
Start: 2025-03-20

## 2025-03-20 RX ORDER — PROPOFOL 10 MG/ML
INJECTION, EMULSION INTRAVENOUS PRN
OUTPATIENT
Start: 2025-03-20

## 2025-03-20 RX ORDER — SODIUM CHLORIDE, SODIUM LACTATE, POTASSIUM CHLORIDE, CALCIUM CHLORIDE 600; 310; 30; 20 MG/100ML; MG/100ML; MG/100ML; MG/100ML
INJECTION, SOLUTION INTRAVENOUS CONTINUOUS
Status: DISCONTINUED | OUTPATIENT
Start: 2025-03-20 | End: 2025-03-20 | Stop reason: HOSPADM

## 2025-03-20 RX ORDER — ONDANSETRON 4 MG/1
4 TABLET, ORALLY DISINTEGRATING ORAL EVERY 30 MIN PRN
Status: DISCONTINUED | OUTPATIENT
Start: 2025-03-20 | End: 2025-03-20 | Stop reason: HOSPADM

## 2025-03-20 RX ORDER — CEFAZOLIN SODIUM/WATER 2 G/20 ML
2 SYRINGE (ML) INTRAVENOUS SEE ADMIN INSTRUCTIONS
Status: DISCONTINUED | OUTPATIENT
Start: 2025-03-20 | End: 2025-03-20 | Stop reason: HOSPADM

## 2025-03-20 RX ORDER — SODIUM CHLORIDE, SODIUM LACTATE, POTASSIUM CHLORIDE, CALCIUM CHLORIDE 600; 310; 30; 20 MG/100ML; MG/100ML; MG/100ML; MG/100ML
INJECTION, SOLUTION INTRAVENOUS CONTINUOUS PRN
OUTPATIENT
Start: 2025-03-20

## 2025-03-20 RX ORDER — CEFAZOLIN SODIUM/WATER 2 G/20 ML
2 SYRINGE (ML) INTRAVENOUS
Status: DISCONTINUED | OUTPATIENT
Start: 2025-03-20 | End: 2025-03-20 | Stop reason: HOSPADM

## 2025-03-20 RX ORDER — OXYCODONE HYDROCHLORIDE 5 MG/1
5 TABLET ORAL
Status: DISCONTINUED | OUTPATIENT
Start: 2025-03-20 | End: 2025-03-20 | Stop reason: HOSPADM

## 2025-03-20 RX ORDER — OXYCODONE HYDROCHLORIDE 10 MG/1
10 TABLET ORAL
Status: DISCONTINUED | OUTPATIENT
Start: 2025-03-20 | End: 2025-03-20 | Stop reason: HOSPADM

## 2025-03-20 RX ORDER — HYDROMORPHONE HCL IN WATER/PF 6 MG/30 ML
0.4 PATIENT CONTROLLED ANALGESIA SYRINGE INTRAVENOUS EVERY 5 MIN PRN
Status: DISCONTINUED | OUTPATIENT
Start: 2025-03-20 | End: 2025-03-20 | Stop reason: HOSPADM

## 2025-03-20 RX ORDER — HYDROMORPHONE HCL IN WATER/PF 6 MG/30 ML
0.2 PATIENT CONTROLLED ANALGESIA SYRINGE INTRAVENOUS EVERY 5 MIN PRN
Status: DISCONTINUED | OUTPATIENT
Start: 2025-03-20 | End: 2025-03-20 | Stop reason: HOSPADM

## 2025-03-20 RX ORDER — FENTANYL CITRATE 50 UG/ML
25 INJECTION, SOLUTION INTRAMUSCULAR; INTRAVENOUS EVERY 5 MIN PRN
Status: DISCONTINUED | OUTPATIENT
Start: 2025-03-20 | End: 2025-03-20 | Stop reason: HOSPADM

## 2025-03-20 RX ORDER — PROPOFOL 10 MG/ML
INJECTION, EMULSION INTRAVENOUS CONTINUOUS PRN
OUTPATIENT
Start: 2025-03-20

## 2025-03-20 RX ADMIN — PROPOFOL 30 MG: 10 INJECTION, EMULSION INTRAVENOUS at 14:09

## 2025-03-20 RX ADMIN — Medication 100 MCG: at 14:16

## 2025-03-20 RX ADMIN — Medication 10 MG: at 14:05

## 2025-03-20 RX ADMIN — Medication 40 MG: at 13:44

## 2025-03-20 RX ADMIN — ONDANSETRON 4 MG: 2 INJECTION INTRAMUSCULAR; INTRAVENOUS at 14:34

## 2025-03-20 RX ADMIN — LIDOCAINE HYDROCHLORIDE 60 MG: 20 INJECTION, SOLUTION INFILTRATION; PERINEURAL at 13:41

## 2025-03-20 RX ADMIN — DEXAMETHASONE SODIUM PHOSPHATE 10 MG: 4 INJECTION, SOLUTION INTRA-ARTICULAR; INTRALESIONAL; INTRAMUSCULAR; INTRAVENOUS; SOFT TISSUE at 13:48

## 2025-03-20 RX ADMIN — Medication 100 MCG: at 14:11

## 2025-03-20 RX ADMIN — PROPOFOL 100 MG: 10 INJECTION, EMULSION INTRAVENOUS at 13:44

## 2025-03-20 RX ADMIN — SODIUM CHLORIDE, SODIUM LACTATE, POTASSIUM CHLORIDE, CALCIUM CHLORIDE: 600; 310; 30; 20 INJECTION, SOLUTION INTRAVENOUS at 13:36

## 2025-03-20 RX ADMIN — FENTANYL CITRATE 100 MCG: 50 INJECTION, SOLUTION INTRAMUSCULAR; INTRAVENOUS at 13:41

## 2025-03-20 RX ADMIN — Medication 100 MCG: at 14:04

## 2025-03-20 RX ADMIN — PROPOFOL 100 MCG/KG/MIN: 10 INJECTION, EMULSION INTRAVENOUS at 13:48

## 2025-03-20 RX ADMIN — FENTANYL CITRATE 50 MCG: 50 INJECTION, SOLUTION INTRAMUSCULAR; INTRAVENOUS at 13:58

## 2025-03-20 ASSESSMENT — ACTIVITIES OF DAILY LIVING (ADL)
ADLS_ACUITY_SCORE: 37
ADLS_ACUITY_SCORE: 35
ADLS_ACUITY_SCORE: 37

## 2025-03-20 ASSESSMENT — LIFESTYLE VARIABLES: TOBACCO_USE: 1

## 2025-03-20 ASSESSMENT — COPD QUESTIONNAIRES: COPD: 1

## 2025-03-20 NOTE — ANESTHESIA PROCEDURE NOTES
Airway       Patient location during procedure: OR       Procedure Start/Stop Times: 3/20/2025 1:46 PM  Staff -        Anesthesiologist:  Britney Braga MD       CRNA: Mary Rios APRN CRNA       Other Anesthesia Staff: Colin Julian       Performed By: with CRNAs       Procedure performed by resident/fellow/CRNA in presence of a teaching physician.    Consent for Airway        Urgency: elective  Indications and Patient Condition       Indications for airway management: bridgett-procedural         Mask difficulty assessment: 1 - vent by mask    Final Airway Details       Final airway type: endotracheal airway       Successful airway: ETT - single  Endotracheal Airway Details        ETT size (mm): 8.5       Cuffed: yes       Successful intubation technique: direct laryngoscopy       DL Blade Type: Quintero 2       Grade View of Cords: 2       Adjucts: stylet       Position: Right       Measured from: lips       Secured at (cm): 21       Bite block used: None    Post intubation assessment        Placement verified by: capnometry, equal breath sounds and chest rise        Number of attempts at approach: 1       Number of other approaches attempted: 0       Secured with: tape       Ease of procedure: easy       Dentition: Intact and Unchanged    Medication(s) Administered   Medication Administration Time: 3/20/2025 1:46 PM

## 2025-03-20 NOTE — ANESTHESIA CARE TRANSFER NOTE
Patient: Bebe Alfonso    Procedure: Procedure(s):  BRONCHOSCOPY, ROBOT-ASSISTED ULTASOUND, WITH BIOPSY, ION. Flexible bronchoscopy, with endobronchial ultrasound and transbronchial biopsies       Diagnosis: Pulmonary nodules [R91.8]  Diagnosis Additional Information: No value filed.    Anesthesia Type:   General     Note:    Oropharynx: oropharynx clear of all foreign objects  Level of Consciousness: drowsy  Oxygen Supplementation: face mask  Level of Supplemental Oxygen (L/min / FiO2): 6  Independent Airway: airway patency satisfactory and stable  Dentition: dentition unchanged  Vital Signs Stable: post-procedure vital signs reviewed and stable  Report to RN Given: handoff report given  Patient transferred to: PACU    Handoff Report: Identifed the Patient, Identified the Reponsible Provider, Reviewed the pertinent medical history, Discussed the surgical course, Reviewed Intra-OP anesthesia mangement and issues during anesthesia, Set expectations for post-procedure period and Allowed opportunity for questions and acknowledgement of understanding      Vitals:  Vitals Value Taken Time   /82    Temp     Pulse 68    Resp 12    SpO2 95%        Electronically Signed By: SELWYN Hawthorne CRNA  March 20, 2025  3:08 PM

## 2025-03-20 NOTE — ANESTHESIA PREPROCEDURE EVALUATION
Anesthesia Pre-Procedure Evaluation    Patient: Bebe Alfonso   MRN: [id number] : 1945        Procedure : Procedure(s):  BRONCHOSCOPY, ROBOT-ASSISTED ULTASOUND, WITH BIOPSY, ION. Flexible bronchoscopy, with endobronchial ultrasound and transbronchial biopsies          Past Medical History:   Diagnosis Date    Anticoagulation monitoring, INR range 2-3 10/22/2015    Benign essential hypertension 9/15/2016    Carotid bruit 2012    Overview:  12/6/10 Carotid US  Elevated velocities throughout the carotid arteries bilaterally. There is a focal area of increased velocity in the mid left internal carotid artery with a plaque in this region on the color flow images. This corresponds to 50 - 70 % diameter reduction. There is an area of elevated velocity in the left external carotid artery consistent with stenosis. There is no focal area of significant stenosis in the right carotid arteries in the neck. Plaque in the carotid arteries bilaterally.     Chronic obstructive pulmonary disease (H) 2016    The FVC, FEV1 and FEV1/FVC ratio are reduced.  The inspiratory flow rates are within normal limits.  The FVC is reduced relative to the SVC indicating air trapping.  While the TLC is within normal limits, the FRC, RV and RV/TLC ratio are increased.  The  diffusing capacity is mildly reduced. IMPRESSION: Mild-moderate Airflow Obstruction with air trapping ____________________________________________Clarke Iverson  2018    Esophageal reflux 2008    Overview:  Omeprazole PRN, occasional symptoms such as chest burning and knife twisted to stomach.     Heart disease born with it    History of blood transfusion 70 years ago    History of CVA (cerebrovascular accident) 10/20/2015    Long term current use of anticoagulant therapy 3/20/2018    Major depressive disorder, recurrent episode, moderate (H) 10/29/2008    Overview:  She is not taking any medication at this time.    Osteoarthrosis, hip 10/19/2010     Overview:  Pt scheduled for right  total hip arthroplasty on 6/14/13 with Dr. Addison HEREDIA hip xray (11/2012) Severe degenerative changes seen of the right hip with near bone-on-bone appearance of the joint and several subchondral cysts forming.    Thyroid nodule 5/22/2013    Overview:  Thyroid US (2012) Stable nodule left lobe of thyroid TSH- (2/19/12) normal (2.26)      Past Surgical History:   Procedure Laterality Date    BIOPSY  appox in 1980's    BREAST SURGERY  last time in the 1990's    non cancer    COLONOSCOPY N/A 9/7/2022    Procedure: COLONOSCOPY, WITH POLYPECTOMY AND BIOPSY;  Surgeon: Alfredito Gomez DO;  Location: [facility]    ESOPHAGOSCOPY, GASTROSCOPY, DUODENOSCOPY (EGD), COMBINED N/A 5/25/2022    Procedure: ESOPHAGOGASTRODUODENOSCOPY, WITH BIOPSY;  Surgeon: Alfredito Gomez DO;  Location: [facility]      Allergies   Allergen Reactions    Losartan Swelling, Nausea, Shortness Of Breath and Palpitations    Gabapentin GI Disturbance and Unknown     Double vision  flatulence    Lisinopril Cough    Oxycodone Other (See Comments)     But has tolerated hydrocodone    Tramadol Other (See Comments)    Augmentin [Amoxicillin-Pot Clavulanate] Rash    Bacitracin Rash     blisters    Bupropion Rash     Allergic to brand not generic      Social History     Tobacco Use    Smoking status: Every Day     Current packs/day: 1.00     Average packs/day: 1 pack/day for 50.6 years (50.6 ttl pk-yrs)     Types: Cigarettes     Start date: 1/1/2016    Smokeless tobacco: Never   Substance Use Topics    Alcohol use: No      Wt Readings from Last 1 Encounters:   03/20/25 51.6 kg (113 lb 12.1 oz)        Anesthesia Evaluation   Pt has had prior anesthetic.     No history of anesthetic complications       ROS/MED HX  ENT/Pulmonary:     (+)                tobacco use, Current use,         COPD,              Neurologic:     (+)          CVA,    TIA,               : >1 year.   Cardiovascular:     (+) Dyslipidemia hypertension- -   " -  - -   Taking blood thinners                                (-) murmur   METS/Exercise Tolerance: 3 - Able to walk 1-2 blocks without stopping    Hematologic: Comments: anticoagulated - neg hematologic  ROS     Musculoskeletal:  - neg musculoskeletal ROS     GI/Hepatic:     (+) GERD,                   Renal/Genitourinary:     (+) renal disease,             Endo:     (+)          thyroid problem,            Psychiatric/Substance Use:  - neg psychiatric ROS     Infectious Disease:  - neg infectious disease ROS     Malignancy:  - neg malignancy ROS (+) Malignancy, History of Lung.    Other:  - neg other ROS          Physical Exam    Airway        Mallampati: II   TM distance: > 3 FB   Neck ROM: full     Respiratory Devices and Support         Dental       (+) Modest Abnormalities - crowns, retainers, 1 or 2 missing teeth      Cardiovascular          Rhythm and rate: regular and normal (-) no murmur    Pulmonary           breath sounds clear to auscultation           OUTSIDE LABS:  CBC:   Lab Results   Component Value Date    WBC 7.4 09/12/2024    WBC 7.0 04/09/2024    HGB 10.6 (L) 03/14/2025    HGB 11.5 (L) 02/06/2025    HCT 32.6 (L) 03/14/2025    HCT 35.7 02/06/2025     09/12/2024     04/09/2024     BMP:   Lab Results   Component Value Date     03/14/2025     02/27/2025    POTASSIUM 4.8 03/14/2025    POTASSIUM 4.8 02/27/2025    CHLORIDE 101 03/14/2025    CHLORIDE 103 02/27/2025    CO2 24 03/14/2025    CO2 22 02/27/2025    BUN 42.0 (H) 03/14/2025    BUN 31.0 (H) 02/27/2025    CR 2.13 (H) 03/14/2025    CR 1.76 (H) 02/27/2025    GLC 90 03/14/2025    GLC 85 02/27/2025     COAGS:   Lab Results   Component Value Date    INR 2.3 (H) 03/14/2025     POC: No results found for: \"BGM\", \"HCG\", \"HCGS\"  HEPATIC:   Lab Results   Component Value Date    ALBUMIN 4.3 03/14/2025    PROTTOTAL 6.8 10/25/2023    ALT 11 10/25/2023    AST 18 10/25/2023    ALKPHOS 42 10/25/2023    BILITOTAL 0.2 10/25/2023 "     OTHER:   Lab Results   Component Value Date    A1C 6.1 (H) 03/22/2019    LEXUS 9.7 03/14/2025    PHOS 4.4 03/14/2025    MAG 2.4 (H) 12/16/2021    LIPASE 83 05/03/2022    AMYLASE 71 05/03/2022    TSH 1.86 12/16/2021       Anesthesia Plan    ASA Status:  3    NPO Status:  NPO Appropriate    Anesthesia Type: General.     - Airway: ETT   Induction: Intravenous, Propofol.   Maintenance: Balanced.        Consents    Anesthesia Plan(s) and associated risks, benefits, and realistic alternatives discussed. Questions answered and patient/representative(s) expressed understanding.     - Discussed: Risks, Benefits and Alternatives for BOTH SEDATION and the PROCEDURE were discussed     - Discussed with:  Patient      - Extended Intubation/Ventilatory Support Discussed: No.      - Patient is DNR/DNI Status: No     Use of blood products discussed: Yes.     - Discussed with: Patient.     Postoperative Care    Pain management: Oral pain medications, IV analgesics.   PONV prophylaxis: Ondansetron (or other 5HT-3), Dexamethasone or Solumedrol     Comments:               Alfredito Owusu MD    Clinically Significant Risk Factors Present on Admission                # Drug Induced Coagulation Defect: home medication list includes an anticoagulant medication  # Drug Induced Platelet Defect: home medication list includes an antiplatelet medication   # Hypertension: Noted on problem list

## 2025-03-20 NOTE — PROGRESS NOTES
ANTICOAGULATION  MANAGEMENT: Discharge Review    Bebe PITO Alfonso chart reviewed for anticoagulation continuity of care    Outpatient surgery/procedure on 3/20/25 for Bronchoscopy.    Discharge disposition: Home    Results:    Recent labs: (last 7 days)     03/14/25  0914   INR 2.3*     Anticoagulation inpatient management:     not applicable     Anticoagulation discharge instructions:     Warfarin dosing: home regimen continued   Bridging: No   INR goal change: No      Medication changes affecting anticoagulation: No    Additional factors affecting anticoagulation: No     PLAN     No adjustment to anticoagulation plan needed    Recommended follow up is scheduled  Patient not contacted    No adjustment to Anticoagulation Calendar was required    Vandana Garcia RN  3/20/2025  Anticoagulation Clinic  [facility] for routing messages: vic MADDEN [facility] patient phone line: [phone]

## 2025-03-20 NOTE — ANESTHESIA POSTPROCEDURE EVALUATION
Patient: Bebe Alfonso    Procedure: Procedure(s):  BRONCHOSCOPY, ROBOT-ASSISTED ULTASOUND, WITH BIOPSY, ION. Flexible bronchoscopy, with endobronchial ultrasound and transbronchial biopsies       Anesthesia Type:  General    Note:  Disposition: Outpatient   Postop Pain Control: Uneventful            Sign Out: Well controlled pain   PONV: No   Neuro/Psych: Uneventful            Sign Out: Acceptable/Baseline neuro status   Airway/Respiratory: Uneventful            Sign Out: Acceptable/Baseline resp. status   CV/Hemodynamics: Uneventful            Sign Out: Acceptable CV status; No obvious hypovolemia; No obvious fluid overload   Other NRE: NONE   DID A NON-ROUTINE EVENT OCCUR? No           Last vitals:  Vitals Value Taken Time   /44 03/20/25 1545   Temp 36.5  C (97.7  F) 03/20/25 1515   Pulse 65 03/20/25 1550   Resp 21 03/20/25 1550   SpO2 100 % 03/20/25 1550   Vitals shown include unfiled device data.    Electronically Signed By: KANDI LÓPEZ MD  March 20, 2025  3:51 PM

## 2025-03-20 NOTE — OR NURSING
The writer spoke with Dr. Nuñez regarding the warfarin. Per MD order okay to resume the warfarin tomorrow.

## 2025-03-20 NOTE — PROCEDURES
INTERVENTIONAL PULMONOLOGY       Procedure(s):    A flexible bronchoscopy  Airway exam  EBUS-TBNA (2 sites)  Fluoroscopic guidance   Radial EBUS Navigation (1 sites)  Therapeutic suctioning (2 sites)  ION Robotic Bronchoscopy   Transbronchial fine needle aspiration (1 site)   Transbronchial cryo biopsy 1 site     Indication:  MICKEY nodule    Attending of Record:  Gallo Jurado MD    Interventional Pulmonary Fellow   Twin Nuñez    Trainees Present:   None     Medications:    General Anesthesia - See anesthesia flowsheet for details    Sedation Time:   Per Anesthesia Care Provider    Time Out:  Performed    The patient's medical record has been reviewed.  The indication for the procedure was reviewed.  The necessary history and physical examination was performed and reviewed.  The risks, benefits and alternatives of the procedure were discussed with the the patient in detail and she had the opportunity to ask questions.  I discussed in particular the potential complications including risks of minor or life-threatening bleeding and/or infection, respiratory failure, vocal cord trauma / paralysis, pneumothorax, and discomfort. Sedation risks were also discussed including abnormal heart rhythms, low blood pressure, and respiratory failure. All questions were answered to the best of my ability.  Verbal and written informed consent was obtained.  The proposed procedure and the patient's identification were verified prior to the procedure by the physician and the nurse.    The patient was assessed for the adequacy for the procedure and to receive medications.   Mental Status:  Alert and oriented x 3  Airway examination:  Class I (Complete visualization of soft palate)  Pulmonary:  Non labored respirations  CV:  Regular rate  ASA Grade:  (II)  Mild systemic disease    After clinical evaluation and reviewing the indication, risks, alternatives and benefits of the procedure the patient was deemed to be in  satisfactory condition to undergo the procedure.      Immediately before administration of medications the patient was re-assessed for adequacy to receive sedatives including the heart rate, respiratory rate, mental status, oxygen saturation, blood pressure and adequacy of pulmonary ventilation. These same parameters were continuously monitored throughout the procedure.    A Tuberculosis risk assessment was performed:  The patient has no known RISK of Tuberculosis    The procedure was performed in a negative airflow room: The patient could not be moved to a negative airflow room because of needed OR for the procedure    Maneuvers / Procedure:      Airway Examination: A complete airway examination was performed from the distal trachea to the subsegmental level in each lobe of both lungs.  Pertinent findings include normal airways.         ION Robotic Bronchoscopy: Planning was performed on the laptop prior to the procedure. The ION successfully completed its pre-procedural check phase. The ION arm was oriented towards the ETT and docked successfully. The robotic catheter was inserted into the ETT and the guide was clamped down. Registration was then completed successfully. The catheter was advanced towards the Lingula Superior lesion/nodule using the vision probe. Local confirmation of the lesion utilized with ENB-Targeting, Radial EBUS, and Fluoroscopy. Radial EBUS navigation: The 1.8mm 20MHz radial probe was inserted into the bronchoscope and used to navigate to the lesion. The lesion was eccentric  by ultrasound and fluoroscopy was used to confirm placement of the probe The lesion was biopsied using 23G FNA Needle. A total of 8 passes were performed including the cryo biopsies with ERBE 1.1 probe. ESDRAS was present throughout the procedure. EBL was minimal. Epinephrine was not administered.  Guide catheter and vision probe was removed successfully. The ION platform was undocked without issues.       EBUS TBNA:    Station 2R; not evaluated .   Station 2L; not evaluated .   Station 4R; visualized and not biopsied. Reason: LN diameter was <5mm.   Station 4L; visualized and not biopsied. Reason: LN diameter was <5mm.   Station 7; visualized and biopsied. A total of 3 biopsies were performed using 22G FNA Needle. .   Station 10R; visualized and not biopsied. Reason: LN diameter was <5mm.   Station 10L; visualized and not biopsied. Reason: LN diameter was <5mm.   Station 11R; visualized and not biopsied. Reason: LN diameter was <5mm.   Station 11L; visualized and biopsied. A total of 3 biopsies were performed using 22G FNA Needle. .   Station 12R; visualized and not biopsied. Reason: LN diameter was <5mm.   Station 12L; visualized and not biopsied. Reason: LN diameter was <5mm.     Reyna was Absent      Any disposable equipment was visually inspected and deemed to be intact immediately post procedure.      Relevant Pictures      Assessment and Recommendations:   Successful completion of ION bronchoscopy with MICKEY nodule FNA and cryo biopsy. EBUS TBNA from stations 7 and 11L.   Ok to discharge once medically stable.   Follow up pathology results.              Twin Nuñez  Interventional pulmonary fellow  Pager: [phone]

## 2025-03-20 NOTE — DISCHARGE INSTRUCTIONS
Post Bronchoscopy Patient Instructions:    March 20, 2025  Bebe Alfonso    Your procedure completed (bronchoscopy with lung nodule and lymph node biopsies) without any immediate complications.  You may cough up scant amount of blood for the next 12-24 hours. If you have excessive cough with blood, chest pain, shortness of breath, please report to the closest emergency room.    You may experience low grade (less than 100.5 F) fever next 24 hours, if so, you can take Tylenol. If the fever persists more than 24 hours, please contact to our office or your primary care provider.    Our office will call you with the results of samples taken during the procedure. Please note that you may get a result notification through  My Chart  before us calling you, as the Laboratories are instructed to release the results as soon as they are available to the patients and providers at the same time. Please allow your us 24-48 hours call you to discuss the results.    You may resume your diet as it was prior to procedure.    You may resume your medications after the procedure unless you are instructed to do differently.     Please follow instructions from the nursing staff upon discharge in terms of activity. In general, you should avoid any attention or motor skill requiring activities (e.g., driving or operating any motorized vehicle) for 24 hours as you might be still under the effect of sedation medications. Please make sure an adult to accompany you next 24 hours.     Should you have any question, please do not hesitate to call our office [facility] [phone].     Please take a few moments to evaluate the care you received by me on this link: [email or web address]  Interventional Pulmonary Fellow        Contacting your Doctor -   To contact a doctor, call Dr. Gallo Jurado's clinic at [phone]  or:  [phone] and ask for the resident on call for Pulmonology / Surgery (answered 06  hours a day)   Emergency Department:  [place] [facility]: [phone]  [facility]: [phone] if you are in need of immediate or emergent help

## 2025-03-21 LAB
INTERPRETATION SERPL IEP-IMP: NORMAL
LAB TEST RESULTS REPORTED IN RPTPERIOD: NORMAL
SEQUENCING METHOD PNL BLD/T: NORMAL
SPECIMEN TYPE: NORMAL

## 2025-03-21 PROCEDURE — G0452 MOLECULAR PATHOLOGY INTERPR: HCPCS | Mod: 26 | Performed by: STUDENT IN AN ORGANIZED HEALTH CARE EDUCATION/TRAINING PROGRAM

## 2025-03-21 PROCEDURE — 81455 SO/HL 51/>GSAP DNA/DNA&RNA: CPT | Performed by: INTERNAL MEDICINE

## 2025-03-24 ENCOUNTER — TELEPHONE (OUTPATIENT)
Dept: PULMONOLOGY | Facility: CLINIC | Age: 80
End: 2025-03-24
Payer: COMMERCIAL

## 2025-03-24 ENCOUNTER — DOCUMENTATION ONLY (OUTPATIENT)
Dept: PULMONOLOGY | Facility: CLINIC | Age: 80
End: 2025-03-24
Payer: COMMERCIAL

## 2025-03-24 ENCOUNTER — PATIENT OUTREACH (OUTPATIENT)
Dept: ONCOLOGY | Facility: CLINIC | Age: 80
End: 2025-03-24
Payer: COMMERCIAL

## 2025-03-24 ENCOUNTER — TELEPHONE (OUTPATIENT)
Dept: FAMILY MEDICINE | Facility: CLINIC | Age: 80
End: 2025-03-24
Payer: COMMERCIAL

## 2025-03-24 DIAGNOSIS — C34.90 LUNG CANCER (H): Primary | ICD-10-CM

## 2025-03-24 DIAGNOSIS — C34.12 MALIGNANT NEOPLASM OF UPPER LOBE, LEFT BRONCHUS OR LUNG (H): ICD-10-CM

## 2025-03-24 LAB
PATH REPORT.COMMENTS IMP SPEC: ABNORMAL
PATH REPORT.COMMENTS IMP SPEC: YES
PATH REPORT.COMMENTS IMP SPEC: YES
PATH REPORT.FINAL DX SPEC: ABNORMAL
PATH REPORT.FINAL DX SPEC: ABNORMAL
PATH REPORT.GROSS SPEC: ABNORMAL
PATH REPORT.GROSS SPEC: ABNORMAL
PATH REPORT.MICROSCOPIC SPEC OTHER STN: ABNORMAL
PATH REPORT.MICROSCOPIC SPEC OTHER STN: ABNORMAL
PATH REPORT.RELEVANT HX SPEC: ABNORMAL
PATH REPORT.RELEVANT HX SPEC: ABNORMAL
PHOTO IMAGE: ABNORMAL

## 2025-03-24 NOTE — NURSING NOTE
Path results forwarded to Dr. Henry. Requesting follow-up plan/orders needed.     PET: ordered  Brain MRI: ordered  Thoracic Surgery: referral placed.   PFT 2/12    Fine Needle Aspirate Lung, Upper Lobe, Left: [id number]  Order: [id number]  Collected 3/20/2025  2:02 PM    0 Result Notes      Component  Ref Range & Units    Final Diagnosis   Specimen A  Lung, Upper Lobe, Left, left upper lobe nodule, Fine Needle Aspirate:                 Interpretation:                   Suspicious for malignancy (see comment)                 Adequacy:                 Satisfactory for evaluation, but limited by scant cellularity        Specimen B  Lymph Node(s), station 7, Fine Needle Aspirate:                 Interpretation:                  Negative for malignancy              Scant population of lymphocytes noted.                 Adequacy:                 Satisfactory for evaluation but limited by:, Scant cellularity        Specimen C  Lymph Node(s), station 11L, Fine Needle Aspirate:                 Interpretation:                  Nondiagnostic                 Adequacy:                 Unsatisfactory for evaluation, Scant cellularity          Surgical Pathology Exam: [id number]  Order: [id number]  Collected 3/20/2025  2:20 PM       Status: Final result       Visible to patient: Yes (not seen)    0 Result Notes       Component  Ref Range & Units  Resulting Agency   Case Report   Surgical Pathology Report                         Case: [id number]                                   Authorizing Provider:  Gallo Jurado MD       Collected:           03/20/2025 02:20 PM           Ordering Location:     [facility] OR                 Received:            03/20/2025 03:10 PM           Pathologist:           Lacey Jeffries MD                                                                           Specimen:    Lung, Upper Lobe, Left, left upper lobe nodule                                             Final Diagnosis   A. LUNG, LEFT UPPER LOBE NODULE, BIOPSY:  - ADENOCARCINOMA WITH LEPIDIC AND ACINAR FEATURES  - See comment

## 2025-03-24 NOTE — TELEPHONE ENCOUNTER
Reason for call: MR Device Safety Clearance    Please create a MR Device Safety order  Patient is reporting patient has Pacemaker  Type of MR exam: MRI Brain W/WO Contrast    Device is not active anymore and was done 7/15/15 at [facility]    Do you get your Device checked at [facility]?: No - Where do you get your device checked (clinic name and location): Device is , not longer active          3/24/2025:  Received request for MR device safety clearance for this patient.  Per EMR- Bebe had an implantable loop recorder (ILR) placed on 2015 by Dr Michael Vasquez at [facility] for reason of multiple stroke/CVA's.      MedIntrinsic-ID Reveal LINQ  (serial number: [id number])  According to notes in EMR- her ILR/device reached recommended replacement time on 2018 (see below).      Cannot find records of ILR being explanted but this device is safe for an MRI- whether or not it is still implanted in patient's chest.  Per report from chest xray on 3/20/2025; implantable loop recorder listed.      Janessa Gonzalez, Device RN

## 2025-03-24 NOTE — PROGRESS NOTES
THORACIC SURGERY - NEW PATIENT OFFICE VISIT      Dear EMILY Ely,    I saw Bebe Alfonso at Dr. Henry's request in consultation for the evaluation and treatment of a primary lung cancer of the Lingula.    HPI  Bebe Alfonso is a 79 year old female patient who presents with a newly diagnosed left upper lobe lung cancer. She naps a lot. She has been quite fatigued for a year. She has one functional kidney and her last creatinine was 2.13. She can't walk a block and shuffles, to walk. She is largely sedentary and has little stamina. She has been smoking since age 16 and while she has quit before, she is concerned about that challenge now.    ECOG performance status  3- Limited self care, <50% of the day up and about                    Previsit Tests   PFT (2/12/2025): FEV1 60% predicted, 1.27 L, DLCO 55%  Pathology (3/20/2025): Left upper lobe nodule suspicious for carcinoma; level 7 benign, level 11L nondiagnostic    CT scan (3/20/2025): Lingular 0.9 cm pulmonary Nodule, without mediastinal adenopathy, with no pleural effusion       CT scan (1/16/2025): Lingular 0.9 cm pulmonary Nodule more solid than prior, without mediastinal adenopathy, with no pleural effusion       CT scan (12/6/2023): Lingular 0.9 cm groundglass pulmonary Nodule, without mediastinal adenopathy, with no pleural effusion         PMH  Reviewed, as below    Past Medical History:   Diagnosis Date    Anticoagulation monitoring, INR range 2-3 10/22/2015    Benign essential hypertension 9/15/2016    Carotid bruit 11/12/2012    Overview:  12/6/10 Carotid US  Elevated velocities throughout the carotid arteries bilaterally. There is a focal area of increased velocity in the mid left internal carotid artery with a plaque in this region on the color flow images. This corresponds to 50 - 70 % diameter reduction. There is an area of elevated velocity in the left external carotid artery consistent with stenosis. There is no focal area of significant stenosis  in the right carotid arteries in the neck. Plaque in the carotid arteries bilaterally.     Chronic obstructive pulmonary disease (H) 1/19/2016    The FVC, FEV1 and FEV1/FVC ratio are reduced.  The inspiratory flow rates are within normal limits.  The FVC is reduced relative to the SVC indicating air trapping.  While the TLC is within normal limits, the FRC, RV and RV/TLC ratio are increased.  The  diffusing capacity is mildly reduced. IMPRESSION: Mild-moderate Airflow Obstruction with air trapping ____________________________________________M.DClarke Orellnaa  April 2018    Esophageal reflux 4/8/2008    Overview:  Omeprazole PRN, occasional symptoms such as chest burning and knife twisted to stomach.     Heart disease born with it    History of blood transfusion 70 years ago    History of CVA (cerebrovascular accident) 10/20/2015    Long term current use of anticoagulant therapy 3/20/2018    Major depressive disorder, recurrent episode, moderate (H) 10/29/2008    Overview:  She is not taking any medication at this time.    Osteoarthrosis, hip 10/19/2010    Overview:  Pt scheduled for right  total hip arthroplasty on 6/14/13 with Dr. Addison HEREDIA hip xray (11/2012) Severe degenerative changes seen of the right hip with near bone-on-bone appearance of the joint and several subchondral cysts forming.    Thyroid nodule 5/22/2013    Overview:  Thyroid US (2012) Stable nodule left lobe of thyroid TSH- (2/19/12) normal (2.26)        PSH  Reviewed, as below    Past Surgical History:   Procedure Laterality Date    BIOPSY  appox in 1980's    BREAST SURGERY  last time in the 1990's    non cancer    BRONCHOSCOPY, WITH BIOPSY, ROBOT ASSISTED Bilateral 3/20/2025    Procedure: BRONCHOSCOPY, ROBOT-ASSISTED ULTASOUND, WITH BIOPSY, ION. Flexible bronchoscopy, with endobronchial ultrasound and transbronchial biopsies;  Surgeon: Gallo Jurado MD;  Location: [facility] OR    COLONOSCOPY N/A 9/7/2022    Procedure: COLONOSCOPY, WITH POLYPECTOMY AND  "BIOPSY;  Surgeon: Alfredito Gomez DO;  Location: [facility]    ESOPHAGOSCOPY, GASTROSCOPY, DUODENOSCOPY (EGD), COMBINED N/A 5/25/2022    Procedure: ESOPHAGOGASTRODUODENOSCOPY, WITH BIOPSY;  Surgeon: Alfredito Gomez DO;  Location: [facility]        Allergies   Allergen Reactions    Losartan Swelling, Nausea, Shortness Of Breath and Palpitations    Gabapentin GI Disturbance and Unknown     Double vision  flatulence    Lisinopril Cough    Oxycodone Other (See Comments)     But has tolerated hydrocodone    Tramadol Other (See Comments)    Augmentin [Amoxicillin-Pot Clavulanate] Rash    Bacitracin Rash     blisters    Bupropion Rash     Allergic to brand not generic       Current Outpatient Medications   Medication Sig Dispense Refill    amLODIPine (NORVASC) 5 MG tablet Take 1 tablet (5 mg) by mouth daily. 90 tablet 3    Aspirin (VAZALORE) 81 MG CAPS Please choose reason not prescribed from choices below.      atorvastatin (LIPITOR) 80 MG tablet Take 1 tablet by mouth once daily 90 tablet 2    enalapril (VASOTEC) 20 MG tablet Take 1 tablet (20 mg) by mouth daily. Take in the morning 90 tablet 1    famotidine (PEPCID) 40 MG tablet Take 1 tablet by mouth once daily 90 tablet 1    FLUoxetine (PROZAC) 20 MG capsule Take 1 capsule (20 mg) by mouth daily. 90 capsule 0    fluticasone (FLONASE) 50 MCG/ACT nasal spray Spray 1 spray into both nostrils daily 16 g 1    warfarin ANTICOAGULANT (COUMADIN) 5 MG tablet Take 7.5 mg every Mon; 5 mg all other days or As directed by Anticoagulation clinic 100 tablet 1     No current facility-administered medications for this visit.       ETOH:  None  TOBACCO: age 20 - present,  61 years, 1 pack per day.  Has quit for 5 years total (2 years and 3 years)  OTHER DRUGS: None    Physical examination  BP (!) 164/62 (BP Location: Right arm, Patient Position: Sitting, Cuff Size: Adult Small)   Pulse 68   Resp 16   Ht 1.67 m (5' 5.75\")   Wt 52.7 kg (116 lb 3.2 oz)   LMP 09/15/1998   " SpO2 97%   BMI 18.90 kg/m     Physical Exam  Constitutional:       Appearance: Normal appearance.   Eyes:      Conjunctiva/sclera: Conjunctivae normal.   Cardiovascular:      Rate and Rhythm: Normal rate.   Pulmonary:      Effort: Pulmonary effort is normal.   Skin:     General: Skin is warm and dry.   Neurological:      Mental Status: She is alert and oriented to person, place, and time.   Psychiatric:         Mood and Affect: Mood normal.         Behavior: Behavior normal.         Thought Content: Thought content normal.         Judgment: Judgment normal.          From a personal perspective, she is here with her  and one daughter. Another daughter is on the phone. They have one other child. She worked as an  in [employer]. SHe stopped working around age 50. They have 7 grandchildren and 1 greatgrandchild.     Code Status: Full Code    IMPRESSION   79 year old female patient with Lingular primary lung cancer. She is a marginal surgical candidate due to her poor exercise tolerance combined with continued smoking. She could be a reasonable candidate with smoking cessation and exercise.     Lung Cancer Surgery Risk Calculator:         [email or web address]    Stage: Clinical stage IA1      PLAN  I spent 45 min on the date of the encounter in chart review, patient visit, review of tests, documentation and/or discussion with other providers about the issues documented above. I reviewed the plan as follows:  Procedure planned: Robotic-assisted left upper lobe lingulectomy, mediastinal lymph node dissection.  I discussed the risks and benefits of the operation, including obtaining a diagnosis, resecting and staging the cancer. The risks include bleeding, infection, arrhythmia requiring medication or anticoagulation, prolonged air leak, chylothorax, deep venous thrombosis and pulmonary embolism, and death.  There is also a risk of prolonged pain, which could require  further treatment.  Prolonged air leak could be treated with bronchoscopy and endobronchial valve insertion  or going home with a chest tube.  Postoperative bleeding (rare) could require a return to the OR.   Necessary Preop Tests & Appointments: Preoperative assessment clinic, PET scan, and MRI Brain. I will refer her to radiation oncology for consideration for stereotactic radiation.  Regional Anesthesia Plan: Intraoperative intercostal nerve block  Anticoagulation Plan: Prophylactic Lovenox  Smoking Cessation:   Ms. Alfonso was counseled on the importance of smoking cessation and its impact on the bridgett-operative course. The patient is strongly encouraged to quit smoking for 3 weeks prior to their procedure. If they feel they're absolutely unable to quit, we will proceed as planned given the malignant nature of their disease.  This guideline is in accordance with the World Health Organization (WHO) and has shown to lower the risk of both surgical and anesthesia complications as well as improve post-operative outcomes.     She will consider her options and will meet with radiation oncology to consider stereotactic radiation. Ideally, she would quit smoking and be more mobile (start walking regularly) for me to consider operating.     I appreciate the opportunity to participate in the care of your patient and will keep you updated.  Sincerely,    Pablito Wise MD

## 2025-03-24 NOTE — TELEPHONE ENCOUNTER
RECORDS STATUS - ALL OTHER DIAGNOSIS      RECORDS RECEIVED FROM: [facility]   NOTES STATUS DETAILS   OFFICE NOTE from referring provider [facility] Dr. Kevin Henry   OFFICE NOTE from medical oncologist Epic 12/27/2023 - Dr. Gallo Jurado   DISCHARGE SUMMARY from hospital [facility] 3/20/2025 - [facility]   OPERATIVE REPORT [facility] 3/20/2025 - BRONCHOSCOPY, ROBOT-ASSISTED ULTASOUND, WITH BIOPSY, ION. Flexible bronchoscopy, with endobronchial ultrasound and transbronchial biopsies    MEDICATION LIST [facility]    LABS     PATHOLOGY REPORTS [facility] 3/20/2025 - [id number]   3/20/2025 - [id number]    ANYTHING RELATED TO DIAGNOSIS Epic 3/14/2025   IMAGING (NEED IMAGES & REPORT)     CT SCANS PACS CT Chest: 3/20/2025-12/6/2023   XRAYS PACS Xray Chest: 3/20/2025-11/16/2023

## 2025-03-24 NOTE — TELEPHONE ENCOUNTER
Writer called patient per the request of Dr. Henry.  Explained that patient's biopsy results are positive for cancer.  Also explained to the patient that a thoracic surgery referral would be placed as well as PET scan and Brain MRI for further workup.  Explained to patient that a member from the thoracic surgery team would be reaching out to her shortly to discuss the plan further and assist her in scheduling an appointment with a thoracic surgeon to discuss surgical resection.      Writer asked if patient had any additional questions, and she denied any at this time.   Of note, writer went over the plan twice on the phone as patient seemed to be overwhelmed with all of the information. Thoracic surgery team notified that it is now okay to contact patient.     Polina Watt, RN BSN  RN Care Coordinator  Interventional Pulmonology

## 2025-03-24 NOTE — PROGRESS NOTES
New Patient Oncology Nurse Navigator Note     Referring provider: Dr. Kevin Henry    Referring Clinic/Organization: [facility]  Referred to: Thoracic Surgery  Requested provider (if applicable): Dr. Wise  Referral Received: 03/24/25       Evaluation for :   Diagnosis   C34.90 (ICD-10-CM) - Lung cancer (H)     Clinical History (per Nurse review of records provided):      02/12/2025 PFTs done (bookmarked)    03/20/2025 CT chest w/o contrast (bookmarked) showed:   IMPRESSION:   1. Grossly unchanged 9 mm solid nodule at the lingula with somewhat  spiculated margins.  2. Waxing and waning pulmonary nodularity with interval resolution of  several solid nodules and branching solid opacities. Differential  considerations include chronic aspiration versus  bronchitis/bronchiolitis. Increased dependent endobronchial debris  since 1/16/2025 CT raises the suspicion for aspiration.    03/20/2025 Fine Needle Aspirate (bookmarked) showed:   Final Diagnosis   Specimen A  Lung, Upper Lobe, Left, left upper lobe nodule, Fine Needle Aspirate:                 Interpretation:                   Suspicious for malignancy (see comment)                 Adequacy:                 Satisfactory for evaluation, but limited by scant cellularity        Specimen B  Lymph Node(s), station 7, Fine Needle Aspirate:                 Interpretation:                  Negative for malignancy              Scant population of lymphocytes noted.                 Adequacy:                 Satisfactory for evaluation but limited by:, Scant cellularity        Specimen C  Lymph Node(s), station 11L, Fine Needle Aspirate:                 Interpretation:                  Nondiagnostic                 Adequacy:                 Unsatisfactory for evaluation, Scant cellularity         Electronically signed by Anurag Kwon III, MD on 3/24/2025 at 10:41 AM   Comment    Left upper lobe lung nodule (part A): There are rare cell clusters with cytologic atypia and  high N:C ratio that are suspicious for malignancy. However, these suspicious cells are very scant in cellularity, precluding further work up. Please correlate with concurrent surgical pathology case, [id number].   Clinical Information    79-year-old woman with multiple pulmonary lung nodules       Clinical Assessment / Barriers to Care (Per Nurse):    Records Location: [facility]   Records Needed: None  Additional testing needed prior to consult: PET, brain MRI  Referral updates and Plan:     3/24: Writer called Bebe to discuss the referral. Writer discussed the referral with her and the scans ordered. Per Dr. Wise's LPNMary, Dr. Wise would like to see Bebe tomorrow. Clinic address was given to Bebe. All questions were answered. Her call was transferred to NPS to schedule.     JAVIER CoatesN, RN, OCN  [facility] Oncology Nurse Navigator  [phone] / [phone]

## 2025-03-25 ENCOUNTER — PRE VISIT (OUTPATIENT)
Dept: SURGERY | Facility: CLINIC | Age: 80
End: 2025-03-25
Payer: COMMERCIAL

## 2025-03-25 ENCOUNTER — PREP FOR PROCEDURE (OUTPATIENT)
Dept: SURGERY | Facility: CLINIC | Age: 80
End: 2025-03-25

## 2025-03-25 ENCOUNTER — ONCOLOGY VISIT (OUTPATIENT)
Dept: SURGERY | Facility: CLINIC | Age: 80
End: 2025-03-25
Attending: INTERNAL MEDICINE
Payer: COMMERCIAL

## 2025-03-25 VITALS
HEART RATE: 68 BPM | DIASTOLIC BLOOD PRESSURE: 62 MMHG | HEIGHT: 66 IN | BODY MASS INDEX: 18.68 KG/M2 | RESPIRATION RATE: 16 BRPM | OXYGEN SATURATION: 97 % | SYSTOLIC BLOOD PRESSURE: 164 MMHG | WEIGHT: 116.2 LBS

## 2025-03-25 DIAGNOSIS — C34.90 LUNG CANCER (H): ICD-10-CM

## 2025-03-25 DIAGNOSIS — C34.12 MALIGNANT NEOPLASM OF UPPER LOBE OF LEFT LUNG (H): Primary | ICD-10-CM

## 2025-03-25 PROCEDURE — G0463 HOSPITAL OUTPT CLINIC VISIT: HCPCS | Performed by: THORACIC SURGERY (CARDIOTHORACIC VASCULAR SURGERY)

## 2025-03-25 RX ORDER — ENOXAPARIN SODIUM 100 MG/ML
40 INJECTION SUBCUTANEOUS
OUTPATIENT
Start: 2025-03-25

## 2025-03-25 ASSESSMENT — PAIN SCALES - GENERAL: PAINLEVEL_OUTOF10: NO PAIN (0)

## 2025-03-25 NOTE — NURSING NOTE
"Oncology Rooming Note    March 25, 2025 3:55 PM   Bebe Alfonso is a 79 year old female who presents for:    Chief Complaint   Patient presents with    Oncology Clinic Visit     lung cancer     Initial Vitals: BP (!) 164/62 (BP Location: Right arm, Patient Position: Sitting, Cuff Size: Adult Small)   Pulse 68   Resp 16   Ht 1.67 m (5' 5.75\")   Wt 52.7 kg (116 lb 3.2 oz)   LMP 09/15/1998   SpO2 97%   BMI 18.90 kg/m   Estimated body mass index is 18.9 kg/m  as calculated from the following:    Height as of this encounter: 1.67 m (5' 5.75\").    Weight as of this encounter: 52.7 kg (116 lb 3.2 oz). Body surface area is 1.56 meters squared.  No Pain (0) Comment: Data Unavailable   Patient's last menstrual period was 09/15/1998.  Allergies reviewed: Yes  Medications reviewed: Yes    Medications: Medication refills not needed today.  Pharmacy name entered into [facility]: [facility] [address] [place] [address]    Frailty Screening:   Is the patient here for a new oncology consult visit in cancer care? 1. Yes. Over the past month, have you experienced difficulty or required a caregiver to assist with:   1. Balance, walking or general mobility (including any falls)? NO  2. Completion of self-care tasks such as bathing, dressing, toileting, grooming/hygiene?  NO  3. Concentration or memory that affects your daily life?  YES     PHQ9:  Did this patient require a PHQ9?: No      Clinical concerns: none.      Arben Hinson"

## 2025-03-25 NOTE — LETTER
3/25/2025      Bebe Alfonso  [address] [place] [address]      Dear Colleague,    Thank you for referring your patient, Bebe Alfonso, to the [facility]. Please see a copy of my visit note below.    THORACIC SURGERY - NEW PATIENT OFFICE VISIT      Dear EMILY Ely,    I saw Bebe Alfonso at Dr. Henry's request in consultation for the evaluation and treatment of a primary lung cancer of the Lingula.    HPI  Bebe Alfonso is a 79 year old female patient who presents with a newly diagnosed left upper lobe lung cancer. She naps a lot. She has been quite fatigued for a year. She has one functional kidney and her last creatinine was 2.13. She can't walk a block and shuffles, to walk. She is largely sedentary and has little stamina. She has been smoking since age 16 and while she has quit before, she is concerned about that challenge now.    ECOG performance status  3- Limited self care, <50% of the day up and about                    Previsit Tests   PFT (2/12/2025): FEV1 60% predicted, 1.27 L, DLCO 55%  Pathology (3/20/2025): Left upper lobe nodule suspicious for carcinoma; level 7 benign, level 11L nondiagnostic    CT scan (3/20/2025): Lingular 0.9 cm pulmonary Nodule, without mediastinal adenopathy, with no pleural effusion       CT scan (1/16/2025): Lingular 0.9 cm pulmonary Nodule more solid than prior, without mediastinal adenopathy, with no pleural effusion       CT scan (12/6/2023): Lingular 0.9 cm groundglass pulmonary Nodule, without mediastinal adenopathy, with no pleural effusion         PMH  Reviewed, as below    Past Medical History:   Diagnosis Date     Anticoagulation monitoring, INR range 2-3 10/22/2015     Benign essential hypertension 9/15/2016     Carotid bruit 11/12/2012    Overview:  12/6/10 Carotid US  Elevated velocities throughout the carotid arteries bilaterally. There is a focal area of increased velocity in the mid left internal carotid artery with  a plaque in this region on the color flow images. This corresponds to 50 - 70 % diameter reduction. There is an area of elevated velocity in the left external carotid artery consistent with stenosis. There is no focal area of significant stenosis in the right carotid arteries in the neck. Plaque in the carotid arteries bilaterally.      Chronic obstructive pulmonary disease (H) 1/19/2016    The FVC, FEV1 and FEV1/FVC ratio are reduced.  The inspiratory flow rates are within normal limits.  The FVC is reduced relative to the SVC indicating air trapping.  While the TLC is within normal limits, the FRC, RV and RV/TLC ratio are increased.  The  diffusing capacity is mildly reduced. IMPRESSION: Mild-moderate Airflow Obstruction with air trapping ____________________________________________M.DClarke Orellana  April 2018     Esophageal reflux 4/8/2008    Overview:  Omeprazole PRN, occasional symptoms such as chest burning and knife twisted to stomach.      Heart disease born with it     History of blood transfusion 70 years ago     History of CVA (cerebrovascular accident) 10/20/2015     Long term current use of anticoagulant therapy 3/20/2018     Major depressive disorder, recurrent episode, moderate (H) 10/29/2008    Overview:  She is not taking any medication at this time.     Osteoarthrosis, hip 10/19/2010    Overview:  Pt scheduled for right  total hip arthroplasty on 6/14/13 with Dr. Addison HEREDIA hip xray (11/2012) Severe degenerative changes seen of the right hip with near bone-on-bone appearance of the joint and several subchondral cysts forming.     Thyroid nodule 5/22/2013    Overview:  Thyroid US (2012) Stable nodule left lobe of thyroid TSH- (2/19/12) normal (2.26)        PSH  Reviewed, as below    Past Surgical History:   Procedure Laterality Date     BIOPSY  appox in 1980's     BREAST SURGERY  last time in the 1990's    non cancer     BRONCHOSCOPY, WITH BIOPSY, ROBOT ASSISTED Bilateral 3/20/2025    Procedure:  BRONCHOSCOPY, ROBOT-ASSISTED ULTASOUND, WITH BIOPSY, ION. Flexible bronchoscopy, with endobronchial ultrasound and transbronchial biopsies;  Surgeon: Gallo Jurado MD;  Location: [facility] OR     COLONOSCOPY N/A 9/7/2022    Procedure: COLONOSCOPY, WITH POLYPECTOMY AND BIOPSY;  Surgeon: Alfredito Gomez DO;  Location: [facility]     ESOPHAGOSCOPY, GASTROSCOPY, DUODENOSCOPY (EGD), COMBINED N/A 5/25/2022    Procedure: ESOPHAGOGASTRODUODENOSCOPY, WITH BIOPSY;  Surgeon: Alfredito Gomez, ;  Location: [facility]        Allergies   Allergen Reactions     Losartan Swelling, Nausea, Shortness Of Breath and Palpitations     Gabapentin GI Disturbance and Unknown     Double vision  flatulence     Lisinopril Cough     Oxycodone Other (See Comments)     But has tolerated hydrocodone     Tramadol Other (See Comments)     Augmentin [Amoxicillin-Pot Clavulanate] Rash     Bacitracin Rash     blisters     Bupropion Rash     Allergic to brand not generic       Current Outpatient Medications   Medication Sig Dispense Refill     amLODIPine (NORVASC) 5 MG tablet Take 1 tablet (5 mg) by mouth daily. 90 tablet 3     Aspirin (VAZALORE) 81 MG CAPS Please choose reason not prescribed from choices below.       atorvastatin (LIPITOR) 80 MG tablet Take 1 tablet by mouth once daily 90 tablet 2     enalapril (VASOTEC) 20 MG tablet Take 1 tablet (20 mg) by mouth daily. Take in the morning 90 tablet 1     famotidine (PEPCID) 40 MG tablet Take 1 tablet by mouth once daily 90 tablet 1     FLUoxetine (PROZAC) 20 MG capsule Take 1 capsule (20 mg) by mouth daily. 90 capsule 0     fluticasone (FLONASE) 50 MCG/ACT nasal spray Spray 1 spray into both nostrils daily 16 g 1     warfarin ANTICOAGULANT (COUMADIN) 5 MG tablet Take 7.5 mg every Mon; 5 mg all other days or As directed by Anticoagulation clinic 100 tablet 1     No current facility-administered medications for this visit.       ETOH:  None  TOBACCO: age 20 - present,  61 years, 1 pack per day.   "Has quit for 5 years total (2 years and 3 years)  OTHER DRUGS: None    Physical examination  BP (!) 164/62 (BP Location: Right arm, Patient Position: Sitting, Cuff Size: Adult Small)   Pulse 68   Resp 16   Ht 1.67 m (5' 5.75\")   Wt 52.7 kg (116 lb 3.2 oz)   LMP 09/15/1998   SpO2 97%   BMI 18.90 kg/m     Physical Exam  Constitutional:       Appearance: Normal appearance.   Eyes:      Conjunctiva/sclera: Conjunctivae normal.   Cardiovascular:      Rate and Rhythm: Normal rate.   Pulmonary:      Effort: Pulmonary effort is normal.   Skin:     General: Skin is warm and dry.   Neurological:      Mental Status: She is alert and oriented to person, place, and time.   Psychiatric:         Mood and Affect: Mood normal.         Behavior: Behavior normal.         Thought Content: Thought content normal.         Judgment: Judgment normal.          From a personal perspective, she is here with her  and one daughter. Another daughter is on the phone. They have one other child. She worked as an  in [employer]. SHe stopped working around age 50. They have 7 grandchildren and 1 greatgrandchild.     Code Status: Full Code    IMPRESSION   79 year old female patient with Lingular primary lung cancer. She is a marginal surgical candidate due to her poor exercise tolerance combined with continued smoking. She could be a reasonable candidate with smoking cessation and exercise.     Lung Cancer Surgery Risk Calculator:         [email or web address]://[email or web address].research.sts.org/    Stage: Clinical stage IA1      PLAN  I spent 45 min on the date of the encounter in chart review, patient visit, review of tests, documentation and/or discussion with other providers about the issues documented above. I reviewed the plan as follows:  Procedure planned: Robotic-assisted left upper lobe lingulectomy, mediastinal lymph node dissection.  I discussed the risks and benefits of the operation, including obtaining a diagnosis, " resecting and staging the cancer. The risks include bleeding, infection, arrhythmia requiring medication or anticoagulation, prolonged air leak, chylothorax, deep venous thrombosis and pulmonary embolism, and death.  There is also a risk of prolonged pain, which could require further treatment.  Prolonged air leak could be treated with bronchoscopy and endobronchial valve insertion  or going home with a chest tube.  Postoperative bleeding (rare) could require a return to the OR.   Necessary Preop Tests & Appointments: Preoperative assessment clinic, PET scan, and MRI Brain. I will refer her to radiation oncology for consideration for stereotactic radiation.  Regional Anesthesia Plan: Intraoperative intercostal nerve block  Anticoagulation Plan: Prophylactic Lovenox  Smoking Cessation:   Ms. Alfonso was counseled on the importance of smoking cessation and its impact on the bridgett-operative course. The patient is strongly encouraged to quit smoking for 3 weeks prior to their procedure. If they feel they're absolutely unable to quit, we will proceed as planned given the malignant nature of their disease.  This guideline is in accordance with the World Health Organization (WHO) and has shown to lower the risk of both surgical and anesthesia complications as well as improve post-operative outcomes.     She will consider her options and will meet with radiation oncology to consider stereotactic radiation. Ideally, she would quit smoking and be more mobile (start walking regularly) for me to consider operating.     I appreciate the opportunity to participate in the care of your patient and will keep you updated.  Sincerely,    Pablito Wise MD            Again, thank you for allowing me to participate in the care of your patient.        Sincerely,        Pablito Wise MD    Electronically signed

## 2025-03-26 ENCOUNTER — HOSPITAL ENCOUNTER (OUTPATIENT)
Dept: PET IMAGING | Facility: HOSPITAL | Age: 80
Discharge: HOME | End: 2025-03-26
Attending: INTERNAL MEDICINE
Payer: COMMERCIAL

## 2025-03-26 ENCOUNTER — PATIENT OUTREACH (OUTPATIENT)
Dept: ONCOLOGY | Facility: CLINIC | Age: 80
End: 2025-03-26

## 2025-03-26 ENCOUNTER — ANCILLARY ORDERS (OUTPATIENT)
Dept: PULMONOLOGY | Facility: CLINIC | Age: 80
End: 2025-03-26

## 2025-03-26 DIAGNOSIS — C34.90 LUNG CANCER (H): Primary | ICD-10-CM

## 2025-03-26 DIAGNOSIS — C34.12 MALIGNANT NEOPLASM OF UPPER LOBE, LEFT BRONCHUS OR LUNG (H): ICD-10-CM

## 2025-03-26 DIAGNOSIS — C34.90 LUNG CANCER (H): ICD-10-CM

## 2025-03-26 LAB
ABO + RH BLD: NORMAL
BLD GP AB SCN SERPL QL: NEGATIVE
CREAT BLD-MCNC: 1.8 MG/DL (ref 0.5–1)
EGFRCR SERPLBLD CKD-EPI 2021: 28 ML/MIN/1.73M2
GLUCOSE BLDC GLUCOMTR-MCNC: 103 MG/DL (ref 70–99)
SPECIMEN EXP DATE BLD: NORMAL

## 2025-03-26 PROCEDURE — 71250 CT THORAX DX C-: CPT

## 2025-03-26 PROCEDURE — 82962 GLUCOSE BLOOD TEST: CPT

## 2025-03-26 PROCEDURE — 343N000001 HC RX 343 MED OP 636: Performed by: INTERNAL MEDICINE

## 2025-03-26 PROCEDURE — A9552 F18 FDG: HCPCS | Performed by: INTERNAL MEDICINE

## 2025-03-26 PROCEDURE — 78816 PET IMAGE W/CT FULL BODY: CPT | Mod: PI

## 2025-03-26 PROCEDURE — 82565 ASSAY OF CREATININE: CPT

## 2025-03-26 RX ORDER — FLUDEOXYGLUCOSE F 18 200 MCI/ML
7-18 INJECTION, SOLUTION INTRAVENOUS ONCE
Status: COMPLETED | OUTPATIENT
Start: 2025-03-26 | End: 2025-03-26

## 2025-03-26 RX ADMIN — FLUDEOXYGLUCOSE F 18 10.18 MILLICURIE: 200 INJECTION, SOLUTION INTRAVENOUS at 12:13

## 2025-03-26 NOTE — PROGRESS NOTES
New Patient Oncology Nurse Navigator Note     Referring provider: Dr. Pablito Wise    Referring Clinic/Organization: [facility]  Referred to: Radiation Oncology [facility] - [place]: [phone]   Requested provider (if applicable): First available - did not specify   Referral Received: 25       Evaluation for :   Diagnosis   C34.90 (ICD-10-CM) - Lung cancer (H)      Additional Information:  Lung cancer, marginal surgical candidate at best. To discuss option of SBRT    Clinical History (per Nurse review of records provided):      2025 CT Chest w/o contrast (bookmarked) showed:   IMPRESSION:   1. Grossly unchanged 9 mm solid nodule at the lingula with somewhat  spiculated margins.  2. Waxing and waning pulmonary nodularity with interval resolution of  several solid nodules and branching solid opacities. Differential  considerations include chronic aspiration versus  bronchitis/bronchiolitis. Increased dependent endobronchial debris  since 2025 CT raises the suspicion for aspiration.    2025 Surgical Pathology (bookmarked) showed:   Final Diagnosis   A. LUNG, LEFT UPPER LOBE NODULE, BIOPSY:  - ADENOCARCINOMA WITH LEPIDIC AND ACINAR FEATURES  - See comment   Electronically signed by Lacey Jeffries MD on 3/24/2025 at  9:55 AM   Comment  [facility]   Immunohistochemical stains performed with adequate control for TTF-1 is positive in the tumor cells.     RESULT FOR IMMUNOHISTOCHEMICAL VENTANA CLONE  PD-L1 ASSAY  TUMOR PROPORTION SCORE (TPS): 1 %  INTERPRETATION: LOW PD-L1 EXPRESSION (TPS >/=1-49%)      Clinical Information  [facility]   79 year old female with a CT without contrast showin. Grossly unchanged 9 mm solid nodule at the lingula with somewhat spiculated margins. 2. Waxing and waning pulmonary nodularity with interval resolution of several solid nodules and branching solid opacities.        2025 Fine Needle Aspirate (bookmarked) showed:  Final Diagnosis    Specimen A  Lung, Upper Lobe, Left, left upper lobe nodule, Fine Needle Aspirate:                 Interpretation:                   Suspicious for malignancy (see comment)                 Adequacy:                 Satisfactory for evaluation, but limited by scant cellularity        Specimen B  Lymph Node(s), station 7, Fine Needle Aspirate:                 Interpretation:                  Negative for malignancy              Scant population of lymphocytes noted.                 Adequacy:                 Satisfactory for evaluation but limited by:, Scant cellularity        Specimen C  Lymph Node(s), station 11L, Fine Needle Aspirate:                 Interpretation:                  Nondiagnostic                 Adequacy:                 Unsatisfactory for evaluation, Scant cellularity         Electronically signed by Anurag Kwon III, MD on 3/24/2025 at 10:41 AM   Comment    Left upper lobe lung nodule (part A): There are rare cell clusters with cytologic atypia and high N:C ratio that are suspicious for malignancy. However, these suspicious cells are very scant in cellularity, precluding further work up. Please correlate with concurrent surgical pathology case, [id number].   Clinical Information    79-year-old woman with multiple pulmonary lung nodules       Clinical Assessment / Barriers to Care (Per Nurse):  Tobacco History    Smoking Status  Every Day Smoking Start Date  1/1/2016 Current Packs/Day  1 pack/day Average Packs/Day  1 pack/day for 50.6 years (50.6 ttl pk-yrs) Smoking Tobacco Type  Cigarettes since 1/1/2016     Preivous Radiation Therapy: No  Records Location: [facility]   Records Needed: None  Additional testing needed prior to consult: None  Referral updates and Plan:     3/26: Writer called Bebe to discuss the referral. She confirmed she is aware of the referral and has not had any previous radiation therapy in the past. Her preferred location is [place]. Her call was transferred to [facility].      Janessa Jauregui, JAVIERN, RN, OCN  [facility] Oncology Nurse Navigator  [phone] / [phone]

## 2025-03-26 NOTE — TELEPHONE ENCOUNTER
MEDICAL RECORDS REQUEST   Radiation Oncology  [address] [place] [address]  Fax: [phone]          FUTURE VISIT INFORMATION                                                   Bebe Alfonso, : 1945 scheduled for future visit at [facility] Radiation Oncology    RECORDS REQUESTED FOR VISIT                                                     LUNG     OFFICE NOTE from PCP [facility] 3/14/2025 - Dr. Abdirahman Isidro   OFFICE NOTE from pulmonologist [facility] 3/4/2025 - Dr. Kevin Henry  2023 - Dr. Gallo Jurado    OFFICE NOTE from thoracic surgeon [facility] 3/25/2025 - Dr. Pablito Wise   BRONCHOSCOPY/EUS/CT guided BX [facility] 3/20/2025 - BRONCHOSCOPY, ROBOT-ASSISTED ULTASOUND, WITH BIOPSY, ION. Flexible bronchoscopy, with endobronchial ultrasound and transbronchial biopsies    PFT Epic 2025   MEDICATION LIST [facility]    LABS     PATHOLOGY REPORTS [facility] 3/20/2025 - [id number]   3/20/2025 - [id number]    ANYTHING RELATED TO DIAGNOSIS Epic 3/26/2025   IMAGING (NEED IMAGES & REPORT)     CT SCANS PACS CT CAP: 3/26/2025  CT Chest: 3/20/2025-2023    XRAYS PACS Xray Chest: 3/20/2025-2023    PET PACS PET Whole Body: 3/26/2025

## 2025-03-27 ENCOUNTER — LAB (OUTPATIENT)
Dept: LAB | Facility: CLINIC | Age: 80
End: 2025-03-27
Payer: COMMERCIAL

## 2025-03-27 ENCOUNTER — ANTICOAGULATION THERAPY VISIT (OUTPATIENT)
Dept: ANTICOAGULATION | Facility: CLINIC | Age: 80
End: 2025-03-27

## 2025-03-27 DIAGNOSIS — I63.20 CEREBROVASCULAR ACCIDENT (CVA) DUE TO OCCLUSION OF PRECEREBRAL ARTERY (H): Chronic | ICD-10-CM

## 2025-03-27 DIAGNOSIS — N18.4 CKD (CHRONIC KIDNEY DISEASE) STAGE 4, GFR 15-29 ML/MIN (H): Primary | ICD-10-CM

## 2025-03-27 DIAGNOSIS — C34.12 MALIGNANT NEOPLASM OF UPPER LOBE OF LEFT LUNG (H): ICD-10-CM

## 2025-03-27 DIAGNOSIS — Z86.73 HISTORY OF CVA (CEREBROVASCULAR ACCIDENT): ICD-10-CM

## 2025-03-27 DIAGNOSIS — N18.32 STAGE 3B CHRONIC KIDNEY DISEASE (H): ICD-10-CM

## 2025-03-27 DIAGNOSIS — Z79.01 ANTICOAGULATION MONITORING, INR RANGE 2-3: Chronic | ICD-10-CM

## 2025-03-27 DIAGNOSIS — Z79.01 LONG TERM CURRENT USE OF ANTICOAGULANT THERAPY: Primary | ICD-10-CM

## 2025-03-27 LAB
ALBUMIN MFR UR ELPH: 54.3 MG/DL
CREAT UR-MCNC: 115.7 MG/DL
CREAT UR-MCNC: 116.1 MG/DL
INR BLD: 1.5 (ref 0.9–1.1)
MICROALBUMIN UR-MCNC: 323.5 MG/L
MICROALBUMIN/CREAT UR: 278.64 MG/G CR (ref 0–25)
PROT/CREAT 24H UR: 0.47 MG/MG CR (ref 0–0.2)

## 2025-03-27 NOTE — PROGRESS NOTES
ANTICOAGULATION MANAGEMENT     Bebe Alfonso 79 year old female is on warfarin with subtherapeutic INR result. (Goal INR 2.0-3.0)    Recent labs: (last 7 days)     03/27/25  0856   INR 1.5*       ASSESSMENT     Source(s): Chart Review and Patient/Caregiver Call     Warfarin doses taken: Held for procedure  recently which may be affecting INR  Diet: No new diet changes identified  Medication/supplement changes: None noted  New illness, injury, or hospitalization: No  Signs or symptoms of bleeding or clotting: No  Previous result: Therapeutic last 2(+) visits  Additional findings: None       PLAN     Recommended plan for temporary change(s) affecting INR     Dosing Instructions: booster dose then continue your current warfarin dose with next INR in 1 week       Summary  As of 3/27/2025      Full warfarin instructions:  3/27: 10 mg; Otherwise 7.5 mg every Thu; 5 mg all other days   Next INR check:  4/3/2025               Telephone call with Bebe who verbalizes understanding and agrees to plan and who agrees to plan and repeated back plan correctly    Lab visit scheduled    Education provided: Contact [phone] with any changes, questions or concerns.     Plan made per [facility] anticoagulation protocol    Chirag Ely, RN  3/27/2025  Anticoagulation Clinic  [employer] for routing messages: vic [facility] patient phone line: [phone]        _______________________________________________________________________     Anticoagulation Episode Summary       Current INR goal:  2.0-3.0   TTR:  58.9% (1 y)   Target end date:  Indefinite   Send INR reminders to:  [facility]    Indications    History of CVA (cerebrovascular accident) (Resolved) [Z86.73]  Long term current use of anticoagulant therapy [Z79.01]  Cerebrovascular accident (CVA) due to occlusion of precerebral artery (H) [I63.20]  History of CVA (cerebrovascular accident) [Z86.73]  Anticoagulation monitoring  INR range 2-3  [Z79.01]             Comments:  --             Anticoagulation Care Providers       Provider Role Specialty Phone number    Diana Ely APRN [facility] Referring Family Medicine [phone]

## 2025-03-29 NOTE — PROGRESS NOTES
Department of Radiation Oncology  [address] [address]  [phone]       Consultation Note    Name: Bebe Alfonso MRN: [id number]   : 1945   Date of Service: 4/3/2025  Referring: Dr. Wise     Diagnosis: Non-small cell lung cancer, adenocarcinoma with lipidic features  Stage: T1b N0 M0    History of Present Illness   Ms. Alfonso, a 79-year-old female with T1b(1.2) N0 adenocarcinoma with lipidic features of the left upper lobe (TPS 1%), has a past medical history of COPD exacerbation, left lung nodules, chronic cough, generalized anxiety disorder, CKD stage III, carotid bruits, sinus disorder, CVA, renal artery stenosis, HTN, possible current smoker, and thyroid disease. A 2025 CT chest showed a stable 9 mm groundglass nodule in the left upper lobe, previously seen on 2023.     PFTs on 2025 showing FEV1 60% and DLCO 55%. Follow-up with a interventional pulmonology, Dr. Henry, took place with bronchoscopy and EBUS on 3/20/2025 and revealed suspicious cells in the left upper lobe, confirmed as adenocarcinoma with lipidic and acinar features (TPS 1%) on IHC. NGS panel TMB Score: 5.846 mut/Mb, KRAS G12C.  Station 7 lymph node was negative, while station 11L was nondiagnostic. On 3/25/2025, she consulted with thoracic surgery (Dr. Wise), who recommended lingulectomy and lymph node dissection, along with a PET scan and MRI of the brain. Referral for stereotactic radiation was also made.    A subsequent 3/26 PET/CT showed no evidence for metastasis, but confirmed 1.2 x 1.2 cm, max SUV 6.0.     The patient is a presents with her .  She reports that she has a history of a chronic dry cough, that is sometimes productive, with a thin clear sputum.  She denies hemoptysis, unintentional weight loss, chest wall pain, dysphagia, back pain.  Is been a 1 pack/day smoker since the age of 18, and has attempted to quit twice in  the past.  She has however quit 3 days ago.     The patient has no history of prior radiation, no prior history of cancer, no history of pacemaker, nor of autoimmune disease/connective tissue disease.    CHEMOTHERAPY HISTORY: none  PACEMAKER: none  PREGNANCY STATUS: none  PAST RADIATION THERAPY HISTORY: none    PAST MEDICAL HISTORY:  Past Medical History:   Diagnosis Date    Anticoagulation monitoring, INR range 2-3 10/22/2015    Benign essential hypertension 09/15/2016    Carotid bruit 11/12/2012    Overview:  12/6/10 Carotid US  Elevated velocities throughout the carotid arteries bilaterally. There is a focal area of increased velocity in the mid left internal carotid artery with a plaque in this region on the color flow images. This corresponds to 50 - 70 % diameter reduction. There is an area of elevated velocity in the left external carotid artery consistent with stenosis. There is no focal area of significant stenosis in the right carotid arteries in the neck. Plaque in the carotid arteries bilaterally.     Chronic obstructive pulmonary disease (H) 01/19/2016    The FVC, FEV1 and FEV1/FVC ratio are reduced.  The inspiratory flow rates are within normal limits.  The FVC is reduced relative to the SVC indicating air trapping.  While the TLC is within normal limits, the FRC, RV and RV/TLC ratio are increased.  The  diffusing capacity is mildly reduced. IMPRESSION: Mild-moderate Airflow Obstruction with air trapping ____________________________________________Clarke Iverson  April 2018    Esophageal reflux 04/08/2008    Overview:  Omeprazole PRN, occasional symptoms such as chest burning and knife twisted to stomach.     Heart disease born with it    History of blood transfusion 70 years ago    History of CVA (cerebrovascular accident) 10/20/2015    Long term current use of anticoagulant therapy 03/20/2018    Major depressive disorder, recurrent episode, moderate (H) 10/29/2008    Overview:  She is not taking any  medication at this time.    Malignant neoplasm of upper lobe of left lung (H)     Osteoarthrosis, hip 10/19/2010    Overview:  Pt scheduled for right  total hip arthroplasty on 6/14/13 with Dr. Addison HEREDIA hip xray (11/2012) Severe degenerative changes seen of the right hip with near bone-on-bone appearance of the joint and several subchondral cysts forming.    Thyroid nodule 05/22/2013    Overview:  Thyroid US (2012) Stable nodule left lobe of thyroid TSH- (2/19/12) normal (2.26)       PAST SURGICAL HISTORY:  Past Surgical History:   Procedure Laterality Date    BIOPSY  appox in 1980's    BREAST SURGERY  last time in the 1990's    non cancer    BRONCHOSCOPY, WITH BIOPSY, ROBOT ASSISTED Bilateral 3/20/2025    Procedure: BRONCHOSCOPY, ROBOT-ASSISTED ULTASOUND, WITH BIOPSY, ION. Flexible bronchoscopy, with endobronchial ultrasound and transbronchial biopsies;  Surgeon: Gallo Jurado MD;  Location: [facility]    COLONOSCOPY N/A 9/7/2022    Procedure: COLONOSCOPY, WITH POLYPECTOMY AND BIOPSY;  Surgeon: Alfredito Gomez DO;  Location: [facility]    ESOPHAGOSCOPY, GASTROSCOPY, DUODENOSCOPY (EGD), COMBINED N/A 5/25/2022    Procedure: ESOPHAGOGASTRODUODENOSCOPY, WITH BIOPSY;  Surgeon: Alfredito Gomez DO;  Location: [facility]       ALLERGIES:  Allergies as of 04/03/2025 - Reviewed 03/26/2025   Allergen Reaction Noted    Losartan Swelling, Nausea, Shortness Of Breath, and Palpitations 12/22/2011    Gabapentin GI Disturbance and Unknown 04/08/2008    Lisinopril Cough 12/22/2011    Oxycodone Other (See Comments) 07/28/2015    Tramadol Other (See Comments) 10/17/2007    Augmentin [amoxicillin-pot clavulanate] Rash 03/27/2018    Bacitracin Rash 10/17/2007    Bupropion Rash        MEDICATIONS:  Current Outpatient Medications   Medication Sig Dispense Refill    amLODIPine (NORVASC) 5 MG tablet Take 1 tablet (5 mg) by mouth daily. 90 tablet 3    Aspirin (VAZALORE) 81 MG CAPS Please choose reason not prescribed from choices  below.      atorvastatin (LIPITOR) 80 MG tablet Take 1 tablet by mouth once daily 90 tablet 2    enalapril (VASOTEC) 20 MG tablet Take 1 tablet (20 mg) by mouth daily. Take in the morning 90 tablet 1    famotidine (PEPCID) 40 MG tablet Take 1 tablet by mouth once daily 90 tablet 1    FLUoxetine (PROZAC) 20 MG capsule Take 1 capsule (20 mg) by mouth daily. 90 capsule 0    fluticasone (FLONASE) 50 MCG/ACT nasal spray Spray 1 spray into both nostrils daily 16 g 1    warfarin ANTICOAGULANT (COUMADIN) 5 MG tablet Take 7.5 mg every Thu; 5 mg all other days or As directed by Anticoagulation clinic 100 tablet 1        FAMILY HISTORY:  Family History   Problem Relation Age of Onset    Diabetes Maternal Grandfather     Diabetes Sister         developed after cancer treatment    Breast Cancer Sister     Obesity Sister     Hypertension Mother     Hyperlipidemia Mother     Osteoporosis Mother     Breast Cancer Sister     Osteoporosis Sister     Breast Cancer Daughter     Other Cancer Sister         throught out body    Substance Abuse Sister         acol    Obesity Sister     Substance Abuse Sister         acol    Obesity Sister        SOCIAL HISTORY:  Social History     Socioeconomic History    Marital status:      Spouse name: Not on file    Number of children: Not on file    Years of education: Not on file    Highest education level: Not on file   Occupational History    Not on file   Tobacco Use    Smoking status: Former     Current packs/day: 0.00     Average packs/day: 1 pack/day for 59.2 years (59.2 ttl pk-yrs)     Types: Cigarettes     Start date: 1966     Quit date: 4/1/2025    Smokeless tobacco: Never    Tobacco comments:     Patient has smoked since age 20 until 4/1/25. States she had quit for 2 years and then for 3 years during that time. - updated 4/3/25.   Vaping Use    Vaping status: Never Used   Substance and Sexual Activity    Alcohol use: No    Drug use: Never    Sexual activity: Not Currently      Partners: Male     Birth control/protection: Post-menopausal, None     Comment: I only have sex with my  of 54 years   Other Topics Concern    Parent/sibling w/ CABG, MI or angioplasty before 65F 55M? No   Social History Narrative    Not on file     Social Drivers of Health     Financial Resource Strain: Low Risk  (12/19/2024)    Financial Resource Strain     Within the past 12 months, have you or your family members you live with been unable to get utilities (heat, electricity) when it was really needed?: No   Food Insecurity: Low Risk  (12/19/2024)    Food Insecurity     Within the past 12 months, did you worry that your food would run out before you got money to buy more?: No     Within the past 12 months, did the food you bought just not last and you didn t have money to get more?: No   Transportation Needs: Low Risk  (12/19/2024)    Transportation Needs     Within the past 12 months, has lack of transportation kept you from medical appointments, getting your medicines, non-medical meetings or appointments, work, or from getting things that you need?: No   Physical Activity: Inactive (12/19/2024)    Exercise Vital Sign     Days of Exercise per Week: 0 days     Minutes of Exercise per Session: 0 min   Stress: No Stress Concern Present (12/19/2024)    [employer] - Occupational Stress Questionnaire     Feeling of Stress : Not at all   Social Connections: Unknown (12/19/2024)    Social Connection and Isolation Panel [NHANES]     Frequency of Communication with Friends and Family: Not on file     Frequency of Social Gatherings with Friends and Family: Once a week     Attends Holiness Services: Not on file     Active Member of Clubs or Organizations: Not on file     Attends Club or Organization Meetings: Not on file     Marital Status: Not on file   Interpersonal Safety: Low Risk  (3/20/2025)    Interpersonal Safety     Do you feel physically and emotionally safe where you  currently live?: Yes     Within the past 12 months, have you been hit, slapped, kicked or otherwise physically hurt by someone?: No     Within the past 12 months, have you been humiliated or emotionally abused in other ways by your partner or ex-partner?: No   Housing Stability: Low Risk  (12/19/2024)    Housing Stability     Do you have housing? : Yes     Are you worried about losing your housing?: No         Review of Systems   A 12-point review of systems was performed. Pertinent findings are noted in the HPI.    Physical Exam   ECOG Status: 1    VITALS: BP (!) 162/63 (BP Location: Left arm, Patient Position: Sitting, Cuff Size: Adult Regular)   Pulse 68   Wt 53.3 kg (117 lb 9.6 oz)   LMP 09/15/1998   SpO2 97%   BMI 19.13 kg/m    GEN: Appears well, alert, oriented, and in NAD  HEENT: EOMI, normal conjunctiva, MMM  CV: Warm and well-perfused  RESP: Breathing comfortably on room air, decreased breath sounds. Clear to auscultation. Decreased breath sounds. No wheeze.   SKIN: Normal color and turgor  NEURO: No focal deficits, CN 3-12 grossly intact  PSYCH: Appropriate mood and affect    Imaging/Path/Labs   Imaging: per hpi     3/20      3/26      Path: per hpi     Labs: reviewed    Assessment    Ms. Alfonso is a 79 year old female with T1b N0 adenocarcinoma with lipidic features of the left upper lobe, TPS 1%.  Patient would be a candidate for surgery versus SBRT, however SBRT is increasingly being offered for people whom are not ideal surgical candidates.  In phase II trials the 5 year local control rates for early stage non small cell lung cancer treated with SBRT approximate 90 to 95%. This is a distinct difference from patterns of failure such as the possibility of distant metastases or regional bert spread, ie, the possibility that cancer could have spread widely or to the nearby nodes before or despite radiation, which can be as high as 12% or 25% respectively in one retrospective study( Peggy unger Al,  Cancers 2023 PMID: [id number]). The standard of care continues to be lobectomy for these cancers.  There continues also to be a lack of standardized trials comparing lobectomy versus SBRT although currently large randomized trials are underway for comparing the two.  It is reasonable given the data from local control and safety for SBRT is more than adequate.  There have been more recent data that also show alternate fractionations including 15 fractions as outlined in the LUSTRE trial. I recommend a fractionation schedule of as few as 5, 3 or even 1 fraction of radiation. We reviewed the requirements for SBRT which require motion management of the respiratory cycle with immobilization and compression. Treatment takes place every other day. There are some toxicities associated with high doses of radiation which can be toxic including hemorrhage, myelitis, lung reaction, fistula, which are rare. A full list of the toxicities are provided in a copy of the patients copy of the consent form. The patient is amenable to proceeding with radiation and informed consent was obtained. The patient is a long term smoker. It is strongly recommended that she continue to not smoke and I congratulated her on her smoking cessation for 3 days.  There is evidence that patients to discontinue smoking even immediately prior to radiotherapy have fewer radiation associated toxicities then patients to continue smoking throughout radiotherapy.     Plan      Return to clinic CT simulation.    Patient was seen and discussed with Dr. Munoz.    Marcio Perez MD  PGY-4  Radiation Oncology        I was present with the resident during the visit. I discussed the case with the resident and agree with the note as documented by the resident.    60 minutes spent on the date of the encounter doing chart review, review of outside records, review of test results, interpretation of tests, patient visit, documentation, discussion, and plan.      Alex Munoz,  M.D.  Department of Radiation Oncology  [facility]

## 2025-03-31 ENCOUNTER — TELEPHONE (OUTPATIENT)
Dept: ONCOLOGY | Facility: CLINIC | Age: 80
End: 2025-03-31
Payer: COMMERCIAL

## 2025-03-31 NOTE — TELEPHONE ENCOUNTER
Spoke with patient to schedule procedure with Dr. Wise   Procedure was scheduled on 5/30 at [facility] OR  Patient will have H&P with pac    Informed pt of surgery details:  Date:    Friday, May 30, 2025  Arrival Time:  5:30 AM    Pt is aware surgery start time may change, and someone will reach out with the new arrival time, if so.    Patient is aware a COVID-19 test is needed before their procedure ONLY IF symptomatic.   (Patient is aware Thoracic is no longer requiring COVID-19 test)       Patient is aware a / is needed day of surgery.   Surgery Letter was sent via [facility],     Patient has my direct contact information for any further questions.        May 13, 2025 11:15 AM  (Arrive by 11:00 AM)  PAC EVALUATION with Renate Gill PA-C  [facility] Preoperative Assessment Center [place] ([facility] ) [phone]     May 13, 2025 12:30 PM  LAB with UC LAB  [facility] Lab [place] ([facility] ) [phone]     May 13, 2025 1:40 PM  (Arrive by 1:25 PM)  CT CHEST W CONTRAST with [email or web address]  [facility] Imaging Center CT Clinic [place] ([facility] ) [phone]     Jun 27, 2025 10:40 AM  (Arrive by 10:25 AM)  Xray General with [email or web address]  [facility] Imaging Center Xray [place] ([facility] ) [phone]     Jun 27, 2025 11:15 AM  (Arrive by 11:00 AM)  Return Patient with SELWYN Mejia  [facility] ([facility] ) [phone]

## 2025-04-01 ENCOUNTER — MYC MEDICAL ADVICE (OUTPATIENT)
Dept: NEPHROLOGY | Facility: CLINIC | Age: 80
End: 2025-04-01
Payer: COMMERCIAL

## 2025-04-02 NOTE — CONFIDENTIAL NOTE
Left vm to explain the result note. Sending a My Chart message.  YAW Pollock Care Coordinator  Nephrology

## 2025-04-03 ENCOUNTER — ANTICOAGULATION THERAPY VISIT (OUTPATIENT)
Dept: ANTICOAGULATION | Facility: CLINIC | Age: 80
End: 2025-04-03

## 2025-04-03 ENCOUNTER — OFFICE VISIT (OUTPATIENT)
Dept: RADIATION THERAPY | Facility: OUTPATIENT CENTER | Age: 80
End: 2025-04-03
Attending: THORACIC SURGERY (CARDIOTHORACIC VASCULAR SURGERY)
Payer: COMMERCIAL

## 2025-04-03 ENCOUNTER — PRE VISIT (OUTPATIENT)
Dept: RADIATION THERAPY | Facility: OUTPATIENT CENTER | Age: 80
End: 2025-04-03

## 2025-04-03 ENCOUNTER — LAB (OUTPATIENT)
Dept: LAB | Facility: CLINIC | Age: 80
End: 2025-04-03
Payer: COMMERCIAL

## 2025-04-03 VITALS
BODY MASS INDEX: 19.13 KG/M2 | WEIGHT: 117.6 LBS | HEART RATE: 68 BPM | SYSTOLIC BLOOD PRESSURE: 162 MMHG | OXYGEN SATURATION: 97 % | DIASTOLIC BLOOD PRESSURE: 63 MMHG

## 2025-04-03 DIAGNOSIS — I63.20 CEREBROVASCULAR ACCIDENT (CVA) DUE TO OCCLUSION OF PRECEREBRAL ARTERY (H): Chronic | ICD-10-CM

## 2025-04-03 DIAGNOSIS — Z79.01 ANTICOAGULATION MONITORING, INR RANGE 2-3: Chronic | ICD-10-CM

## 2025-04-03 DIAGNOSIS — Z86.73 HISTORY OF CVA (CEREBROVASCULAR ACCIDENT): ICD-10-CM

## 2025-04-03 DIAGNOSIS — C34.90 LUNG CANCER (H): ICD-10-CM

## 2025-04-03 DIAGNOSIS — Z79.01 LONG TERM CURRENT USE OF ANTICOAGULANT THERAPY: Primary | ICD-10-CM

## 2025-04-03 LAB — INR BLD: 1.6 (ref 0.9–1.1)

## 2025-04-03 ASSESSMENT — PAIN SCALES - GENERAL: PAINLEVEL_OUTOF10: NO PAIN (0)

## 2025-04-03 NOTE — PROGRESS NOTES
ANTICOAGULATION MANAGEMENT     Bebe Alfonso 79 year old female is on warfarin with subtherapeutic INR result. (Goal INR 2.0-3.0)    Recent labs: (last 7 days)     04/03/25  1020   INR 1.6*       ASSESSMENT     Source(s): Chart Review  Previous INR was Subtherapeutic  Medication, diet, health changes since last INR scheduled for surgery on 5/30/25, will send TE closer to that date.   Still bridging         PLAN     Unable to reach Bebe today.    Left message to take booster dose 15 mg tonight and continue to bridge with lovenox    Follow up required to confirm warfarin dose taken, assess for changes , discuss out of range result , and discuss dosing instructions and confirm understanding of instructions    Anitra Ricketts, RN  4/3/2025  Anticoagulation Clinic  [facility] for routing messages: vic MADDEN [facility] patient phone line: [phone]

## 2025-04-03 NOTE — NURSING NOTE
"Oncology Rooming Note    April 3, 2025 11:33 AM   Bebe Alfonso is a 79 year old female who presents for:    Chief Complaint   Patient presents with    Radiation Therapy     Consult with Dr. uMnoz     Initial Vitals: BP (!) 162/63 (BP Location: Left arm, Patient Position: Sitting, Cuff Size: Adult Regular)   Pulse 68   Wt 53.3 kg (117 lb 9.6 oz)   LMP 09/15/1998   SpO2 97%   BMI 19.13 kg/m   Estimated body mass index is 19.13 kg/m  as calculated from the following:    Height as of 3/25/25: 1.67 m (5' 5.75\").    Weight as of this encounter: 53.3 kg (117 lb 9.6 oz). Body surface area is 1.57 meters squared.  No Pain (0) Comment: Data Unavailable   Patient's last menstrual period was 09/15/1998.  Allergies reviewed: Yes  Medications reviewed: Yes    Medications: Medication refills not needed today.  Pharmacy name entered into [employer]: [facility] [address] [place] [address]    Frailty Screening:   Is the patient here for a new oncology consult visit in cancer care? 1. Yes. Over the past month, have you experienced difficulty or required a caregiver to assist with:   1. Balance, walking or general mobility (including any falls)? NO  2. Completion of self-care tasks such as bathing, dressing, toileting, grooming/hygiene?  NO  3. Concentration or memory that affects your daily life?  NO     PHQ9:  Did this patient require a PHQ9?: No      Clinical concerns: New Radiation Oncology Consult  Dr. Munoz  was notified.    Considerations for radiation treatment   Pregnancy status: Female with documented history of menopause    Implanted Cardiac Devices: No   Any previous radiation therapy: No     Radiation Therapy Patient Education    Person involved with teaching: Patient and     Patient educational needs for self management of treatment-related side effects assessment completed.  [facility] Patient Ed tab contains Patient Learning Assessment    Education Materials Given  Radiation Therapy and You, " Skin Care During Radiation Treatment, Coping with Fatigue, and Esophagitis    Educational Topics Discussed  Side effects expected- see above    Response To Teaching  More review necessary    GYN Only  Vaginal Dilator-given and educated: N/A    Referrals sent: None    Chemotherapy?  No    Alyssa Josue RN

## 2025-04-03 NOTE — LETTER
4/3/2025      Bebe Alfonso  [address] [place] [address]      Dear Colleague,    Thank you for referring your patient, Bebe Alfonso, to the [facility] RADIATION THERAPY CLINIC. Please see a copy of my visit note below.       Department of Radiation Oncology  [facility]  [address] [place] [address]  [phone]       Consultation Note    Name: Bebe Alfonso MRN: [id number]   : 1945   Date of Service: 4/3/2025  Referring: Dr. Wise     Diagnosis: Non-small cell lung cancer, adenocarcinoma with lipidic features  Stage: T1b N0 M0    History of Present Illness   Ms. Alfonso, a 79-year-old female with T1b(1.2) N0 adenocarcinoma with lipidic features of the left upper lobe (TPS 1%), has a past medical history of COPD exacerbation, left lung nodules, chronic cough, generalized anxiety disorder, CKD stage III, carotid bruits, sinus disorder, CVA, renal artery stenosis, HTN, possible current smoker, and thyroid disease. A 2025 CT chest showed a stable 9 mm groundglass nodule in the left upper lobe, previously seen on 2023.     PFTs on 2025 showing FEV1 60% and DLCO 55%. Follow-up with a interventional pulmonology, Dr. Henry, took place with bronchoscopy and EBUS on 3/20/2025 and revealed suspicious cells in the left upper lobe, confirmed as adenocarcinoma with lipidic and acinar features (TPS 1%) on IHC. NGS panel TMB Score: 5.846 mut/Mb, KRAS G12C.  Station 7 lymph node was negative, while station 11L was nondiagnostic. On 3/25/2025, she consulted with thoracic surgery (Dr. Wise), who recommended lingulectomy and lymph node dissection, along with a PET scan and MRI of the brain. Referral for stereotactic radiation was also made.    A subsequent 3/26 PET/CT showed no evidence for metastasis, but confirmed 1.2 x 1.2 cm, max SUV 6.0.     The patient is a presents with her .  She reports that she has a history of a chronic  dry cough, that is sometimes productive, with a thin clear sputum.  She denies hemoptysis, unintentional weight loss, chest wall pain, dysphagia, back pain.  Is been a 1 pack/day smoker since the age of 18, and has attempted to quit twice in the past.  She has however quit 3 days ago.     The patient has no history of prior radiation, no prior history of cancer, no history of pacemaker, nor of autoimmune disease/connective tissue disease.    CHEMOTHERAPY HISTORY: none  PACEMAKER: none  PREGNANCY STATUS: none  PAST RADIATION THERAPY HISTORY: none    PAST MEDICAL HISTORY:  Past Medical History:   Diagnosis Date     Anticoagulation monitoring, INR range 2-3 10/22/2015     Benign essential hypertension 09/15/2016     Carotid bruit 11/12/2012    Overview:  12/6/10 Carotid US  Elevated velocities throughout the carotid arteries bilaterally. There is a focal area of increased velocity in the mid left internal carotid artery with a plaque in this region on the color flow images. This corresponds to 50 - 70 % diameter reduction. There is an area of elevated velocity in the left external carotid artery consistent with stenosis. There is no focal area of significant stenosis in the right carotid arteries in the neck. Plaque in the carotid arteries bilaterally.      Chronic obstructive pulmonary disease (H) 01/19/2016    The FVC, FEV1 and FEV1/FVC ratio are reduced.  The inspiratory flow rates are within normal limits.  The FVC is reduced relative to the SVC indicating air trapping.  While the TLC is within normal limits, the FRC, RV and RV/TLC ratio are increased.  The  diffusing capacity is mildly reduced. IMPRESSION: Mild-moderate Airflow Obstruction with air trapping ____________________________________________Clarke Iverson  April 2018     Esophageal reflux 04/08/2008    Overview:  Omeprazole PRN, occasional symptoms such as chest burning and knife twisted to stomach.      Heart disease born with it     History of blood  transfusion 70 years ago     History of CVA (cerebrovascular accident) 10/20/2015     Long term current use of anticoagulant therapy 03/20/2018     Major depressive disorder, recurrent episode, moderate (H) 10/29/2008    Overview:  She is not taking any medication at this time.     Malignant neoplasm of upper lobe of left lung (H)      Osteoarthrosis, hip 10/19/2010    Overview:  Pt scheduled for right  total hip arthroplasty on 6/14/13 with Dr. Addison HEREDIA hip xray (11/2012) Severe degenerative changes seen of the right hip with near bone-on-bone appearance of the joint and several subchondral cysts forming.     Thyroid nodule 05/22/2013    Overview:  Thyroid US (2012) Stable nodule left lobe of thyroid TSH- (2/19/12) normal (2.26)       PAST SURGICAL HISTORY:  Past Surgical History:   Procedure Laterality Date     BIOPSY  appox in 1980's     BREAST SURGERY  last time in the 1990's    non cancer     BRONCHOSCOPY, WITH BIOPSY, ROBOT ASSISTED Bilateral 3/20/2025    Procedure: BRONCHOSCOPY, ROBOT-ASSISTED ULTASOUND, WITH BIOPSY, ION. Flexible bronchoscopy, with endobronchial ultrasound and transbronchial biopsies;  Surgeon: Gallo Jurado MD;  Location: [facility]     COLONOSCOPY N/A 9/7/2022    Procedure: COLONOSCOPY, WITH POLYPECTOMY AND BIOPSY;  Surgeon: Alfredito Gomez DO;  Location: [facility]     ESOPHAGOSCOPY, GASTROSCOPY, DUODENOSCOPY (EGD), COMBINED N/A 5/25/2022    Procedure: ESOPHAGOGASTRODUODENOSCOPY, WITH BIOPSY;  Surgeon: Alfredito Gomez DO;  Location: [facility]       ALLERGIES:  Allergies as of 04/03/2025 - Reviewed 03/26/2025   Allergen Reaction Noted     Losartan Swelling, Nausea, Shortness Of Breath, and Palpitations 12/22/2011     Gabapentin GI Disturbance and Unknown 04/08/2008     Lisinopril Cough 12/22/2011     Oxycodone Other (See Comments) 07/28/2015     Tramadol Other (See Comments) 10/17/2007     Augmentin [amoxicillin-pot clavulanate] Rash 03/27/2018     Bacitracin Rash 10/17/2007      Bupropion Rash        MEDICATIONS:  Current Outpatient Medications   Medication Sig Dispense Refill     amLODIPine (NORVASC) 5 MG tablet Take 1 tablet (5 mg) by mouth daily. 90 tablet 3     Aspirin (VAZALORE) 81 MG CAPS Please choose reason not prescribed from choices below.       atorvastatin (LIPITOR) 80 MG tablet Take 1 tablet by mouth once daily 90 tablet 2     enalapril (VASOTEC) 20 MG tablet Take 1 tablet (20 mg) by mouth daily. Take in the morning 90 tablet 1     famotidine (PEPCID) 40 MG tablet Take 1 tablet by mouth once daily 90 tablet 1     FLUoxetine (PROZAC) 20 MG capsule Take 1 capsule (20 mg) by mouth daily. 90 capsule 0     fluticasone (FLONASE) 50 MCG/ACT nasal spray Spray 1 spray into both nostrils daily 16 g 1     warfarin ANTICOAGULANT (COUMADIN) 5 MG tablet Take 7.5 mg every Thu; 5 mg all other days or As directed by Anticoagulation clinic 100 tablet 1        FAMILY HISTORY:  Family History   Problem Relation Age of Onset     Diabetes Maternal Grandfather      Diabetes Sister         developed after cancer treatment     Breast Cancer Sister      Obesity Sister      Hypertension Mother      Hyperlipidemia Mother      Osteoporosis Mother      Breast Cancer Sister      Osteoporosis Sister      Breast Cancer Daughter      Other Cancer Sister         throught out body     Substance Abuse Sister         acol     Obesity Sister      Substance Abuse Sister         acol     Obesity Sister        SOCIAL HISTORY:  Social History     Socioeconomic History     Marital status:      Spouse name: Not on file     Number of children: Not on file     Years of education: Not on file     Highest education level: Not on file   Occupational History     Not on file   Tobacco Use     Smoking status: Former     Current packs/day: 0.00     Average packs/day: 1 pack/day for 59.2 years (59.2 ttl pk-yrs)     Types: Cigarettes     Start date: 1966     Quit date: 4/1/2025     Smokeless tobacco: Never     Tobacco  comments:     Patient has smoked since age 20 until 4/1/25. States she had quit for 2 years and then for 3 years during that time. - updated 4/3/25.   Vaping Use     Vaping status: Never Used   Substance and Sexual Activity     Alcohol use: No     Drug use: Never     Sexual activity: Not Currently     Partners: Male     Birth control/protection: Post-menopausal, None     Comment: I only have sex with my  of 54 years   Other Topics Concern     Parent/sibling w/ CABG, MI or angioplasty before 65F 55M? No   Social History Narrative     Not on file     Social Drivers of Health     Financial Resource Strain: Low Risk  (12/19/2024)    Financial Resource Strain      Within the past 12 months, have you or your family members you live with been unable to get utilities (heat, electricity) when it was really needed?: No   Food Insecurity: Low Risk  (12/19/2024)    Food Insecurity      Within the past 12 months, did you worry that your food would run out before you got money to buy more?: No      Within the past 12 months, did the food you bought just not last and you didn t have money to get more?: No   Transportation Needs: Low Risk  (12/19/2024)    Transportation Needs      Within the past 12 months, has lack of transportation kept you from medical appointments, getting your medicines, non-medical meetings or appointments, work, or from getting things that you need?: No   Physical Activity: Inactive (12/19/2024)    Exercise Vital Sign      Days of Exercise per Week: 0 days      Minutes of Exercise per Session: 0 min   Stress: No Stress Concern Present (12/19/2024)    [employer] [place] [employer] - Occupational Stress Questionnaire      Feeling of Stress : Not at all   Social Connections: Unknown (12/19/2024)    Social Connection and Isolation Panel [NHANES]      Frequency of Communication with Friends and Family: Not on file      Frequency of Social Gatherings with Friends and Family: Once a week       Attends Christianity Services: Not on file      Active Member of Clubs or Organizations: Not on file      Attends Club or Organization Meetings: Not on file      Marital Status: Not on file   Interpersonal Safety: Low Risk  (3/20/2025)    Interpersonal Safety      Do you feel physically and emotionally safe where you currently live?: Yes      Within the past 12 months, have you been hit, slapped, kicked or otherwise physically hurt by someone?: No      Within the past 12 months, have you been humiliated or emotionally abused in other ways by your partner or ex-partner?: No   Housing Stability: Low Risk  (12/19/2024)    Housing Stability      Do you have housing? : Yes      Are you worried about losing your housing?: No         Review of Systems   A 12-point review of systems was performed. Pertinent findings are noted in the HPI.    Physical Exam   ECOG Status: 1    VITALS: BP (!) 162/63 (BP Location: Left arm, Patient Position: Sitting, Cuff Size: Adult Regular)   Pulse 68   Wt 53.3 kg (117 lb 9.6 oz)   LMP 09/15/1998   SpO2 97%   BMI 19.13 kg/m    GEN: Appears well, alert, oriented, and in NAD  HEENT: EOMI, normal conjunctiva, MMM  CV: Warm and well-perfused  RESP: Breathing comfortably on room air, decreased breath sounds. Clear to auscultation. Decreased breath sounds. No wheeze.   SKIN: Normal color and turgor  NEURO: No focal deficits, CN 3-12 grossly intact  PSYCH: Appropriate mood and affect    Imaging/Path/Labs   Imaging: per hpi     3/20      3/26      Path: per hpi     Labs: reviewed    Assessment    Ms. Alfonso is a 79 year old female with T1b N0 adenocarcinoma with lipidic features of the left upper lobe, TPS 1%.  Patient would be a candidate for surgery versus SBRT, however SBRT is increasingly being offered for people whom are not ideal surgical candidates.  In phase II trials the 5 year local control rates for early stage non small cell lung cancer treated with SBRT approximate 90 to 95%. This is  a distinct difference from patterns of failure such as the possibility of distant metastases or regional bert spread, ie, the possibility that cancer could have spread widely or to the nearby nodes before or despite radiation, which can be as high as 12% or 25% respectively in one retrospective study( Peggy eta Al, Cancers 2023 PMID: [id number]). The standard of care continues to be lobectomy for these cancers.  There continues also to be a lack of standardized trials comparing lobectomy versus SBRT although currently large randomized trials are underway for comparing the two.  It is reasonable given the data from local control and safety for SBRT is more than adequate.  There have been more recent data that also show alternate fractionations including 15 fractions as outlined in the LUSTRE trial. I recommend a fractionation schedule of as few as 5, 3 or even 1 fraction of radiation. We reviewed the requirements for SBRT which require motion management of the respiratory cycle with immobilization and compression. Treatment takes place every other day. There are some toxicities associated with high doses of radiation which can be toxic including hemorrhage, myelitis, lung reaction, fistula, which are rare. A full list of the toxicities are provided in a copy of the patients copy of the consent form. The patient is amenable to proceeding with radiation and informed consent was obtained. The patient is a long term smoker. It is strongly recommended that she continue to not smoke and I congratulated her on her smoking cessation for 3 days.  There is evidence that patients to discontinue smoking even immediately prior to radiotherapy have fewer radiation associated toxicities then patients to continue smoking throughout radiotherapy.     Plan      Return to clinic CT simulation.    Patient was seen and discussed with Dr. Munoz.    Marcio Perez MD  PGY-4  Radiation Oncology        I was present with the resident during the  visit. I discussed the case with the resident and agree with the note as documented by the resident.    60 minutes spent on the date of the encounter doing chart review, review of outside records, review of test results, interpretation of tests, patient visit, documentation, discussion, and plan.      Alex Munoz M.D.  Department of Radiation Oncology  [facility]         Again, thank you for allowing me to participate in the care of your patient.        Sincerely,        Alex Munoz MD    Electronically signed

## 2025-04-04 NOTE — PROGRESS NOTES
Attempted to call bebe again today. No answer at [facility] #, left dosing for the weekend as below:    ANTICOAGULATION MANAGEMENT          PLAN     Unable to reach Bebe today.    Left message to take 5 mg on Friday, 5 mg on Saturday and 7.5 mg on Sunday. Continue bridging with lovenox every 12 hours this weekend. Request call back for assessment.    Follow up required to confirm enoxaparin (Lovenox) doses taken, assess for changes , discuss out of range result , and discuss dosing instructions and confirm understanding of instructions    Anitra Ricketts, RN  4/4/2025  Anticoagulation Clinic  [facility] for routing messages: vic [facility] patient phone line: [phone]

## 2025-04-07 ENCOUNTER — TELEPHONE (OUTPATIENT)
Dept: FAMILY MEDICINE | Facility: CLINIC | Age: 80
End: 2025-04-07
Payer: COMMERCIAL

## 2025-04-07 NOTE — TELEPHONE ENCOUNTER
General Call    Contacts       Contact Date/Time Type Contact Phone/Fax    04/07/2025 04:41 PM CDT Phone (Incoming) Bebe Alfonso (Self) [phone] (H)     Ok to leave a detailed message          Reason for Call:  Patient returning call - file shows results per nurses (INR) but nothing has been done with it at this point (my understanding?) - Please call and advise    What are your questions or concerns:  Patient returning call - file shows results per nurses (INR) but nothing has been done with it at this point (my understanding?) - Please call and advise    Date of last appointment with provider: [id number]    Could we send this information to you in [place] or would you prefer to receive a phone call?:   Patient would prefer a phone call   Okay to leave a detailed message?: Yes at Home number on file [phone] (home)

## 2025-04-08 NOTE — TELEPHONE ENCOUNTER
FUTURE VISIT INFORMATION      SURGERY INFORMATION:  Date: 25  Location: [facility] OR  Surgeon:  Pablito Wise MD   Anesthesia Type:  General   Procedure: Robot-assisted thoracoscopic left upper lobe segmentectomy, mediastinal lymph node dissection  Consult: 3/25/25    RECORDS REQUESTED FROM:       Primary Care Provider: Diana Ely APRN [facility]     Pertinent Medical History: Benign essential hypertension; COPD; Anticoagulant therapy; hx CVA; Carotid bruit; Hyperlipidemia; Cerebrovascular disease; renal artery stenosis; anemia; CKD4    Most recent EKG+ Tracin24    Most recent ECHO: 24    Most recent Cardiac Stress Test: 22 - [facility]     Most recent PFT's: 25    Most recent Sleep Study: 3/11/24

## 2025-04-08 NOTE — PROGRESS NOTES
My chart not read as of just now. Phone message left requesting call back  Nathalie Bray Rn  [facility]

## 2025-04-09 NOTE — PROGRESS NOTES
ANTICOAGULATION MANAGEMENT     Bebe Alfonso 79 year old female is on warfarin with subtherapeutic INR result. (Goal INR 2.0-3.0)    Recent labs: (last 7 days)     04/03/25  1020   INR 1.6*       ASSESSMENT     Source(s): Chart Review and Patient/Caregiver Call     Warfarin doses taken: Patient states she is having phone issues, has been doing 5 mg daily. Is not doing the shots anymore. Will come in Friday for an INR appointment.  Diet: No new diet changes identified  Medication/supplement changes: None noted  New illness, injury, or hospitalization: No  Signs or symptoms of bleeding or clotting: No  Previous result: Subtherapeutic  Additional findings: None       PLAN     Recommended plan for no diet, medication or health factor changes affecting INR     Dosing Instructions: Increase your warfarin dose (13.3% change) with next INR in 1 week did not get the message to give a bolus dose last week. Since it's been close to a week from her result will not have her bolus and come in tomorrow or Friday for an INR to dose based off of that result. Patient also has not increased her MD yet so will have her start that now       Summary  As of 4/3/2025      Full warfarin instructions:  4/3: 5 mg; 4/6: 5 mg; 4/8: 5 mg; Otherwise 7.5 mg every Sun, Tue, Thu; 5 mg all other days   Next INR check:  4/17/2025               Telephone call with Bebe who verbalizes understanding and agrees to plan    Lab visit scheduled    Education provided: Goal range and lab monitoring: goal range and significance of current result    Plan made per [facility] anticoagulation protocol    Vandana Garcia RN  4/9/2025  Anticoagulation Clinic  [facility] for routing messages: vic [facility] patient phone line: [phone]        _______________________________________________________________________     Anticoagulation Episode Summary       Current INR goal:  2.0-3.0   TTR:  56.3% (12 mo)   Target end date:  Indefinite   [facility]    Indications    History of CVA (cerebrovascular accident) (Resolved) [Z86.73]  Long term current use of anticoagulant therapy [Z79.01]  Cerebrovascular accident (CVA) due to occlusion of precerebral artery (H) [I63.20]  History of CVA (cerebrovascular accident) [Z86.73]  Anticoagulation monitoring  INR range 2-3 [Z79.01]             Comments:  --             Anticoagulation Care Providers       Provider Role Specialty Phone number    Diana Ely APRN [facility] Referring Family Medicine [phone]

## 2025-04-10 ENCOUNTER — OFFICE VISIT (OUTPATIENT)
Dept: RADIATION THERAPY | Facility: OUTPATIENT CENTER | Age: 80
End: 2025-04-10
Payer: COMMERCIAL

## 2025-04-10 DIAGNOSIS — C34.12 MALIGNANT NEOPLASM OF UPPER LOBE OF LEFT LUNG (H): Primary | ICD-10-CM

## 2025-04-10 NOTE — PROGRESS NOTES
The patient presents for CT simulation.    Alex Munoz M.D.  Department of Radiation Oncology  [facility]

## 2025-04-10 NOTE — LETTER
4/10/2025      Bebe PITO Kaden  [address] [place] [address]      Dear Colleague,    Thank you for referring your patient, Bebe Alfonso, to the [facility] RADIATION THERAPY CLINIC. Please see a copy of my visit note below.    The patient presents for CT simulation.    Alex Munoz M.D.  Department of Radiation Oncology  [facility]       Again, thank you for allowing me to participate in the care of your patient.        Sincerely,        Alex Munoz MD    Electronically signed

## 2025-04-14 ENCOUNTER — PATIENT OUTREACH (OUTPATIENT)
Dept: CARE COORDINATION | Facility: CLINIC | Age: 80
End: 2025-04-14
Payer: COMMERCIAL

## 2025-04-14 DIAGNOSIS — N18.32 ANEMIA OF CHRONIC RENAL FAILURE, STAGE 3B (H): Primary | ICD-10-CM

## 2025-04-14 DIAGNOSIS — D63.1 ANEMIA OF CHRONIC RENAL FAILURE, STAGE 3B (H): Primary | ICD-10-CM

## 2025-04-14 NOTE — PROGRESS NOTES
Anemia Management Note - Follow Up      SUBJECTIVE/OBJECTIVE:    Referred by Dr. Rafael Oswald on 2024  Primary Diagnosis: Anemia in Chronic Kidney Disease (N18.3, D63.1)   3b  Secondary Diagnosis: Chronic Kidney Disease, Stage 3 (N18.3)  3b     Hgb goal range: 9-10    Epo/Darbo: TBD. Hgb >9.0  Iron regimen: Treat with IV Iron.   *Venofer completed on 109  *completed Venofer on 24.      Labs : 2026  RX/TX plans : TBD     *[facility]; [phone], opt 2 for infusion appt     Recent ALMA use, transfusion, IV iron: NA  No MI and blood clots or cancers. Hx of CVA, HTN, and Anticoagulated.      Contact:No Consent to Communicate on File.         Latest Ref Rng & Units 10/28/2024 2024 1/3/2025 2025 2025 2025 3/14/2025   Anemia   Hemoglobin 11.7 - 15.7 g/dL  8.9     11.5  10.6    IV Iron Dose  300mg  300mg 300mg 300mg     TSAT 15 - 46 %  16     28  18    Ferritin 11 - 328 ng/mL  242     362  230      BP Readings from Last 3 Encounters:   25 (!) 162/63   25 (!) 164/62   25 (!) 168/51     Wt Readings from Last 2 Encounters:   25 53.3 kg (117 lb 9.6 oz)   25 52.7 kg (116 lb 3.2 oz)           ASSESSMENT:    Hgb:at goal - continue to monitor  TSat: not at goal of >30% Ferritin: At goal (>100ng/mL)        PLAN:  RTC for Anemia Labs in one month.  Labs are scheduled for 25.     Orders needed to be renewed (for next follow-up date) in EPIC: None    Iron labs due:  Mid May 2025    Plan discussed with:  No call, chart review       NEXT FOLLOW-UP DATE:  25    Jennifer Weber RN   Anemia Services  [facility]  [email or web address]   Office : [phone]  Fax: [phone]

## 2025-04-28 ENCOUNTER — TELEPHONE (OUTPATIENT)
Dept: RADIATION THERAPY | Facility: OUTPATIENT CENTER | Age: 80
End: 2025-04-28

## 2025-04-28 NOTE — TELEPHONE ENCOUNTER
Call made to cardiology to review loop recorder in place and current radiation.  Patient did not report loop recorder at time of consult or sim, noticed on imaging.  After reviewing finding with patient she noted it was not in use and there had been talk of having it removed but patient had never scheduled.    Call to cardiology to review that radiation to MICKEY has started (SBRT 5 fxns). Want to alert cardiology that due to radiation that Loop recorder would no longer be reliable if at some point they would need to utilize.   Voicemail left.    Call back from RN cardiology, Loop placed in 2015. It is recommended it be removed. After reviewing above info, nurse from cardiology they will reach out to patient to talk about scheduling removal.

## 2025-04-30 ENCOUNTER — OFFICE VISIT (OUTPATIENT)
Dept: RADIATION THERAPY | Facility: OUTPATIENT CENTER | Age: 80
End: 2025-04-30
Payer: COMMERCIAL

## 2025-04-30 DIAGNOSIS — C34.12 MALIGNANT NEOPLASM OF UPPER LOBE OF LEFT LUNG (H): Primary | ICD-10-CM

## 2025-04-30 NOTE — PROGRESS NOTES
[facility]  SPECIALIZING IN BREAKTHROUGHS  Radiation Oncology    On Treatment Visit Note      Bebe Alfonso      Date: 2025   MRN: [id number]   : 1945         Reason for Visit:  On Radiation Treatment Visit     Treatment Summary to Date  Left upper lobe SBRT 4000 cGy/5000 cGy            Subjective:   Doing well.  No acute complaints.  Mild fatigue.  No change in respiration.    Nursing ROS:                                  Objective:   No apparent distress  No respiratory distress      Labs:  CBC RESULTS:   Recent Labs   Lab Test 25  0912 10/03/24  0903 24  0921   WBC  --   --  7.4   RBC  --   --  3.23*   HGB 10.6*   < > 9.9*   HCT 32.6*   < > 31.0*   MCV  --   --  96   MCH  --   --  30.7   MCHC  --   --  31.9   RDW  --   --  14.7   PLT  --   --  258    < > = values in this interval not displayed.     ELECTROLYTES:  Recent Labs   Lab Test 25  1214 25  1208 25  0912   NA  --   --  137   POTASSIUM  --   --  4.8   CHLORIDE  --   --  101   LEXUS  --   --  9.7   CO2  --   --  24   BUN  --   --  42.0*   CR 1.8*  --  2.13*   GLC  --  103* 90       Assessment:  Ms. 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  She is undergoing SBRT.    Tolerating radiation therapy well.  All questions and concerns addressed.    Plan:   Continue current therapy.  EOT on Friday return to clinic in 3 months with CT scan chest, will see ALIN Morgan chart and setup information reviewed  Ports checked                Alex Munoz MD

## 2025-04-30 NOTE — LETTER
2025      Bebe Alfonso  [address] [place] [address]      Dear Colleague,    Thank you for referring your patient, Bebe Alfonso, to the  [facility]. Please see a copy of my visit note below.    [facility]  SPECIALIZING IN BREAKTHROUGHS  Radiation Oncology    On Treatment Visit Note      Bebe Alfonso      Date: 2025   MRN: [id number]   : 1945         Reason for Visit:  On Radiation Treatment Visit     Treatment Summary to Date  Left upper lobe SBRT 4000 cGy/5000 cGy            Subjective:   Doing well.  No acute complaints.  Mild fatigue.  No change in respiration.    Nursing ROS:                                  Objective:   No apparent distress  No respiratory distress      Labs:  CBC RESULTS:   Recent Labs   Lab Test 25  0912 10/03/24  0903 24  0921   WBC  --   --  7.4   RBC  --   --  3.23*   HGB 10.6*   < > 9.9*   HCT 32.6*   < > 31.0*   MCV  --   --  96   MCH  --   --  30.7   MCHC  --   --  31.9   RDW  --   --  14.7   PLT  --   --  258    < > = values in this interval not displayed.     ELECTROLYTES:  Recent Labs   Lab Test 25  1214 25  1208 25  0912   NA  --   --  137   POTASSIUM  --   --  4.8   CHLORIDE  --   --  101   LEXUS  --   --  9.7   CO2  --   --  24   BUN  --   --  42.0*   CR 1.8*  --  2.13*   GLC  --  103* 90       Assessment:  Ms. 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  She is undergoing SBRT.    Tolerating radiation therapy well.  All questions and concerns addressed.    Plan:   Continue current therapy.  EOT on Friday return to clinic in 3 months with CT scan chest, will see PA.      Mosaiq chart and setup information reviewed  Ports checked                Alex Munoz MD          Again, thank you for allowing me to participate in the care of your patient.        Sincerely,        Alex Munoz MD    Electronically signed

## 2025-05-01 ENCOUNTER — ANTICOAGULATION THERAPY VISIT (OUTPATIENT)
Dept: ANTICOAGULATION | Facility: CLINIC | Age: 80
End: 2025-05-01

## 2025-05-01 ENCOUNTER — LAB (OUTPATIENT)
Dept: LAB | Facility: CLINIC | Age: 80
End: 2025-05-01
Payer: COMMERCIAL

## 2025-05-01 DIAGNOSIS — I63.20 CEREBROVASCULAR ACCIDENT (CVA) DUE TO OCCLUSION OF PRECEREBRAL ARTERY (H): Chronic | ICD-10-CM

## 2025-05-01 DIAGNOSIS — Z79.01 LONG TERM CURRENT USE OF ANTICOAGULANT THERAPY: Primary | ICD-10-CM

## 2025-05-01 DIAGNOSIS — Z86.73 HISTORY OF CVA (CEREBROVASCULAR ACCIDENT): ICD-10-CM

## 2025-05-01 DIAGNOSIS — Z79.01 ANTICOAGULATION MONITORING, INR RANGE 2-3: Chronic | ICD-10-CM

## 2025-05-01 LAB — INR BLD: 3.1 (ref 0.9–1.1)

## 2025-05-01 NOTE — PROGRESS NOTES
ANTICOAGULATION MANAGEMENT     Bebe PITO Kaden 79 year old female is on warfarin with supratherapeutic INR result. (Goal INR 2.0-3.0)    Recent labs: (last 7 days)     05/01/25  0938   INR 3.1*       ASSESSMENT     Source(s): Chart Review and Patient/Caregiver Call     Warfarin doses taken: Warfarin taken as instructed  Diet: No new diet changes identified  Medication/supplement changes:  had 4th radiation appointment yesterday  New illness, injury, or hospitalization: No  Signs or symptoms of bleeding or clotting: No  Previous result: Therapeutic last visit; previously outside of goal range  Additional findings: None       PLAN     Recommended plan for no diet, medication or health factor changes affecting INR     Dosing Instructions: Continue your current warfarin dose with next INR in 2 weeks       Summary  As of 5/1/2025      Full warfarin instructions:  7.5 mg every Thu; 5 mg all other days   Next INR check:  5/13/2025               Telephone call with Bebe who verbalizes understanding and agrees to plan    Lab visit scheduled    Education provided: Goal range and lab monitoring: goal range and significance of current result    Plan made per [facility] anticoagulation protocol    Vandana Garcia RN  5/1/2025  Anticoagulation Clinic  [employer] for routing messages: vic [facility] patient phone line: [phone]        _______________________________________________________________________     Anticoagulation Episode Summary       Current INR goal:  2.0-3.0   TTR:  55.1% (1 y)   Target end date:  Indefinite   Send INR reminders to:  [facility]    Indications    History of CVA (cerebrovascular accident) (Resolved) [Z86.73]  Long term current use of anticoagulant therapy [Z79.01]  Cerebrovascular accident (CVA) due to occlusion of precerebral artery (H) [I63.20]  History of CVA (cerebrovascular accident) [Z86.73]  Anticoagulation monitoring  INR range 2-3 [Z79.01]             Comments:  --              Anticoagulation Care Providers       Provider Role Specialty Phone number    Diana Ely, SELWYN CNP Referring Family Medicine [phone]

## 2025-05-06 ENCOUNTER — DOCUMENTATION ONLY (OUTPATIENT)
Dept: RADIATION THERAPY | Facility: OUTPATIENT CENTER | Age: 80
End: 2025-05-06

## 2025-05-06 NOTE — PROGRESS NOTES
Radiotherapy Treatment Summary              PATIENT: Bebe Alfonso  MEDICAL RECORD NO: [id number]   : 1945    DIAGNOSIS:  Non-small cell lung cancer   PATHOLOGY:    Bronchoscopy and EBUS on 3/20/2025 and revealed suspicious cells in the left upper lobe, confirmed as adenocarcinoma with lipidic and acinar features (TPS 1%) on IHC. NGS panel TMB Score: 5.846 mut/Mb, KRAS G12C.  Station 7 lymph node was negative, while station 11L was nondiagnostic.                                STAGE:  T1b N0 M0   CONCURRENT SYSTEMIC THERAPY:   No      ONCOLOGIC HISTORY:          Ms. 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 A 2025 CT chest showed a stable 9 mm groundglass nodule in the left upper lobe, previously seen on 2023.      PFTs on 2025 showing FEV1 60% and DLCO 55%. Follow-up with a interventional pulmonology, Dr. Henry, took place with bronchoscopy and EBUS on 3/20/2025 and revealed suspicious cells in the left upper lobe, confirmed as adenocarcinoma with lipidic and acinar features (TPS 1%) on IHC. NGS panel TMB Score: 5.846 mut/Mb, KRAS G12C.  Station 7 lymph node was negative, while station 11L was nondiagnostic. On 3/25/2025, she consulted with thoracic surgery (Dr. Wise), who recommended lingulectomy and lymph node dissection, along with a PET scan and MRI of the brain. Referral for stereotactic radiation was also made.     A subsequent 3/26 PET/CT showed no evidence for metastasis, but confirmed 1.2 x 1.2 cm, max SUV 6.0.      The patient is a presented with her  to consult on 25.  She reports that she has a history of a chronic dry cough, that is sometimes productive,  with a thin clear sputum.  She denies hemoptysis, unintentional weight loss, chest wall pain, dysphagia, back pain.  Is been a 1 pack/day smoker since the age of 18, and has attempted to quit twice in the past.  She has however quit 3 days ago.     SITE OF TREATMENT: Left upper lobe of lung    DATES  OF TREATMENT: 4/22/25 to 5/2/25     TOTAL DOSE OF TREATMENT: 5, 000 cGy    DOSE PER FRACTION OF TREATMENT: 1000 cGy x 5 fractions       COMMENT/TOXICITY:  None                  PAIN MANAGEMENT:  None                           FOLLOW UP PLAN: Return in 3 months with CT Chest    CC  Patient Care Team:  Diana Ely APRN CNP as PCP - General (Family Practice)  Gregoria Mcneal MD as MD (Nephrology)  Diana Ely APRN CNP as Assigned PCP  Caitlin Jesus MD as Assigned Neuroscience Provider  Rafael Oswald MD as MD (Internal Medicine)  Erika Landa MD as MD (Cardiovascular Disease)  Saurabh Harrell MD as MD (Cardiology)  Jennifer Weber, RN as Specialty Care Coordinator (Pharmacy)  Marcio Blanca PA-C as Assigned Sleep Provider  Diana Abel PA-C as Physician Assistant (Nephrology)  Tri Jacinto PA-C as Physician Assistant (Urology)  Diana Abel PA-C as Referring Physician (Nephrology)  Maris Terrazas, RN as Specialty Care Coordinator (Nephrology)  Diana Abel PA-C as Assigned Nephrology Provider  Leo Alvares MD as Assigned Surgical Provider  Kevin Henry MD as Assigned Pulmonology Provider  Saurabh Harrell MD as Assigned Heart and Vascular Provider  Pablito Wise MD as MD (Cardiovascular & Thoracic Surgery)  Alex Munoz MD as MD (Radiation Oncology)  Pablito Wise MD as Assigned Heart and Vascular Surgical Provider  Alex Munoz MD as Assigned Cancer Care Provider       LAUREANO Albarran  Department of Radiation Oncology  [facility]

## 2025-05-09 ENCOUNTER — TELEPHONE (OUTPATIENT)
Dept: ANTICOAGULATION | Facility: CLINIC | Age: 80
End: 2025-05-09
Payer: COMMERCIAL

## 2025-05-09 NOTE — TELEPHONE ENCOUNTER
JULIETH-PROCEDURAL ANTICOAGULATION  MANAGEMENT    Bebe requesting pre-procedure hold orders for warfarin and review for bridging      Procedure date: 5/30/25       Procedure: Robot-assisted thoracoscopic left upper lobe segmentectomy, mediastinal lymph node dissection       Procedure location and phone number (if external): [facility]     Number of warfarin hold days requested and/or target INR: unknown    Pre-op date: 5/13/25      Routing to Anticoagulation Pharmacist for review.     ACC pool: vic [facility] [place]     Vandana Garcia RN

## 2025-05-12 NOTE — TELEPHONE ENCOUNTER
LU-PROCEDURAL ANTICOAGULATION  MANAGEMENT    ASSESSMENT     Warfarin interruption plan for thoracoscopic left upper lobe segmentectomy, mediastinal lymph node dissection cancelled    Indication for Anticoagulation: Stroke    CVA 2015   HX CVA 3x prior ,   Afib ruled out  Lung cancer DX 2025    Past procedure management  Warfarin held, Not bridged for colonoscopy 2022  Warfarin held, Not bridged for EGD 2022  Warfarin held, Not bridged for bronchoscopy 2025    Lu-Procedure Risk stratification for thromboembolism: moderate ( Chest guidelines)    HIGH RISK:  CHEST Perioperative Management guidelines suggests bridging patients at high risk for thromboembolism when interrupting warfarin for an elective surgery/procedure  There are currently no guidelines addressing lu-procedural bridging in this indication. The decision to bridging is based on the risk of bleeding weighed against the risk of clotting.      RECOMMENDATION     Pre-Procedure:  Hold warfarin for 5 days, until after procedure startin2025   Enoxaparin (Lovenox) 50 mg subq Q 24 hrs (1 mg/kg Q 24 hrs for CrCl 15-29 ml/min)   Start enoxaparin: 2025 AM  Last dose of enoxaparin prior to procedure: 1/2 syringe only 2025 AM  (~24 hours prior to procedure)    Post-Procedure:  Resume warfarin dose if okay with provider doing procedure on night of procedure, 2025 PM: 15mg (or first day cleared for restart   Resume enoxaparin (Lovenox) ~ 24-72 hrs post procedure when okay with provider doing procedure. Continue until INR >= 2.0  Recheck INR ~5 days after resuming warfarin or 3-4 days post discharge   ?     Plan routed to referring provider for approval  ?   Diana Murillo [facility]    SUBJECTIVE/OBJECTIVE     Bebe Alfonso, a 79 year old female    Goal Range: 2.0-3.0     Wt Readings from Last 3 Encounters:   25 53.3 kg (117 lb 9.6 oz)   25 52.7 kg (116 lb 3.2 oz)   25 51.6 kg (113 lb  "12.1 oz)      Ideal body weight: 58.7 kg (129 lb 7.4 oz)     Estimated body mass index is 19.13 kg/m  as calculated from the following:    Height as of 3/25/25: 1.67 m (5' 5.75\").    Weight as of 4/3/25: 53.3 kg (117 lb 9.6 oz).    Lab Results   Component Value Date    INR 3.1 (H) 05/01/2025    INR 2.4 (H) 04/11/2025    INR 1.6 (H) 04/03/2025     Lab Results   Component Value Date    HGB 10.6 (L) 03/14/2025     09/12/2024     Lab Results   Component Value Date    CR 1.8 (H) 03/26/2025    CR 2.13 (H) 03/14/2025    CR 1.76 (H) 02/27/2025     Estimated Creatinine Clearance: 21.7 mL/min (A) (based on SCr of 1.8 mg/dL (H)).    "

## 2025-05-12 NOTE — TELEPHONE ENCOUNTER
Hi all,     I just received notice from the surgery team that this patient will not be proceeding with surgery. She is completing radiation therapy instead.     Thanks,  Renate Gill PA-C  [facility]

## 2025-05-12 NOTE — TELEPHONE ENCOUNTER
Noted. Patient contacted and INR scheduled as she had cancelled it.     Veronika Weber, BSN, RN

## 2025-05-13 ENCOUNTER — PRE VISIT (OUTPATIENT)
Dept: SURGERY | Facility: CLINIC | Age: 80
End: 2025-05-13

## 2025-05-15 ENCOUNTER — ANTICOAGULATION THERAPY VISIT (OUTPATIENT)
Dept: ANTICOAGULATION | Facility: CLINIC | Age: 80
End: 2025-05-15

## 2025-05-15 ENCOUNTER — LAB (OUTPATIENT)
Dept: LAB | Facility: CLINIC | Age: 80
End: 2025-05-15
Payer: COMMERCIAL

## 2025-05-15 DIAGNOSIS — I63.20 CEREBROVASCULAR ACCIDENT (CVA) DUE TO OCCLUSION OF PRECEREBRAL ARTERY (H): Chronic | ICD-10-CM

## 2025-05-15 DIAGNOSIS — Z79.01 LONG TERM CURRENT USE OF ANTICOAGULANT THERAPY: Primary | ICD-10-CM

## 2025-05-15 DIAGNOSIS — Z86.73 HISTORY OF CVA (CEREBROVASCULAR ACCIDENT): ICD-10-CM

## 2025-05-15 DIAGNOSIS — Z79.01 ANTICOAGULATION MONITORING, INR RANGE 2-3: Chronic | ICD-10-CM

## 2025-05-15 LAB — INR BLD: 3.6 (ref 0.9–1.1)

## 2025-05-15 NOTE — PROGRESS NOTES
ANTICOAGULATION MANAGEMENT     Bebe Alfonso 79 year old female is on warfarin with  INR result. (Goal INR 2.0-3.0)    Recent labs: (last 7 daystherapeutic)     05/15/25  1348   INR 3.6*       ASSESSMENT     Source(s): Chart Review and Patient/Caregiver Call     Warfarin doses taken: Warfarin taken as instructed  Diet: No new diet changes identified  Medication/supplement changes: None noted  New illness, injury, or hospitalization: No, has finished radiation therapy  Signs or symptoms of bleeding or clotting: No  Previous result: Therapeutic last 2(+) visits  Additional findings: None       PLAN     Recommended plan for no diet, medication or health factor changes affecting INR     Dosing Instructions: Continue your current warfarin dose with next INR in 2 weeks       Summary  As of 5/15/2025      Full warfarin instructions:  5/15: 2.5 mg; Otherwise 5 mg every day   Next INR check:  5/27/2025               Telephone call with Bebe who verbalizes understanding and agrees to plan    Check at provider office visit 5/27    Education provided: Please call back if any changes to your diet, medications or how you've been taking warfarin    Plan made per [facility] anticoagulation protocol    Luis Armando Rivera RN  5/15/2025  Anticoagulation Clinic  [employer] for routing messages: vic [facility] patient phone line: [phone]        _______________________________________________________________________     Anticoagulation Episode Summary       Current INR goal:  2.0-3.0   TTR:  54.4% (1 y)   Target end date:  Indefinite   Send INR reminders to:  [facility]    Indications    History of CVA (cerebrovascular accident) (Resolved) [Z86.73]  Long term current use of anticoagulant therapy [Z79.01]  Cerebrovascular accident (CVA) due to occlusion of precerebral artery (H) [I63.20]  History of CVA (cerebrovascular accident) [Z86.73]  Anticoagulation monitoring  INR range 2-3 [Z79.01]             Comments:  --              Anticoagulation Care Providers       Provider Role Specialty Phone number    Diana Ely, SELWYN CNP Referring Family Medicine [phone]

## 2025-05-19 ENCOUNTER — PATIENT OUTREACH (OUTPATIENT)
Dept: CARE COORDINATION | Facility: CLINIC | Age: 80
End: 2025-05-19
Payer: COMMERCIAL

## 2025-05-19 NOTE — PROGRESS NOTES
Anemia Management Note - Reminder     Follow-up with anemia management service:    Anemia labs were not drawn at her 5/15/25 lab appt, Unable to add-on.  Anemia labs have not been drawn since March 2025.  Will follow up in one month. If no labs, close Anemia services.         Latest Ref Rng & Units 10/28/2024 11/29/2024 1/3/2025 1/6/2025 1/9/2025 2/6/2025 3/14/2025   Anemia   Hemoglobin 11.7 - 15.7 g/dL  8.9     11.5  10.6    IV Iron Dose  300mg  300mg 300mg 300mg     TSAT 15 - 46 %  16     28  18    Ferritin 11 - 328 ng/mL  242     362  230            Follow-up call date: 6/16/25    Jennifer Weber RN   Anemia Services  [facility]  [email or web address]   Office : [phone]  Fax: [phone]

## 2025-05-21 DIAGNOSIS — F41.1 GENERALIZED ANXIETY DISORDER: ICD-10-CM

## 2025-05-27 ENCOUNTER — RESULTS FOLLOW-UP (OUTPATIENT)
Dept: NEPHROLOGY | Facility: CLINIC | Age: 80
End: 2025-05-27

## 2025-05-27 ENCOUNTER — HOSPITAL ENCOUNTER (INPATIENT)
Facility: CLINIC | Age: 80
DRG: 377 | End: 2025-05-27
Attending: FAMILY MEDICINE | Admitting: STUDENT IN AN ORGANIZED HEALTH CARE EDUCATION/TRAINING PROGRAM
Payer: COMMERCIAL

## 2025-05-27 ENCOUNTER — OFFICE VISIT (OUTPATIENT)
Dept: FAMILY MEDICINE | Facility: CLINIC | Age: 80
End: 2025-05-27
Payer: COMMERCIAL

## 2025-05-27 VITALS
WEIGHT: 114.6 LBS | SYSTOLIC BLOOD PRESSURE: 132 MMHG | DIASTOLIC BLOOD PRESSURE: 40 MMHG | HEIGHT: 65 IN | BODY MASS INDEX: 19.09 KG/M2 | HEART RATE: 79 BPM | RESPIRATION RATE: 18 BRPM | OXYGEN SATURATION: 99 % | TEMPERATURE: 97.2 F

## 2025-05-27 DIAGNOSIS — N18.4 CKD (CHRONIC KIDNEY DISEASE) STAGE 4, GFR 15-29 ML/MIN (H): Primary | ICD-10-CM

## 2025-05-27 DIAGNOSIS — Z71.89 OTHER SPECIFIED COUNSELING: Chronic | ICD-10-CM

## 2025-05-27 DIAGNOSIS — Z79.01 ANTICOAGULATION MONITORING, INR RANGE 2-3: ICD-10-CM

## 2025-05-27 DIAGNOSIS — I63.20 CEREBROVASCULAR ACCIDENT (CVA) DUE TO OCCLUSION OF PRECEREBRAL ARTERY (H): ICD-10-CM

## 2025-05-27 DIAGNOSIS — D64.9 ACUTE ANEMIA: ICD-10-CM

## 2025-05-27 DIAGNOSIS — C34.92 METASTATIC PRIMARY LUNG CANCER, LEFT (H): ICD-10-CM

## 2025-05-27 DIAGNOSIS — D64.9 ANEMIA, UNSPECIFIED TYPE: ICD-10-CM

## 2025-05-27 DIAGNOSIS — D63.1 ANEMIA OF CHRONIC RENAL FAILURE, STAGE 3B (H): ICD-10-CM

## 2025-05-27 DIAGNOSIS — N18.32 ANEMIA OF CHRONIC RENAL FAILURE, STAGE 3B (H): ICD-10-CM

## 2025-05-27 DIAGNOSIS — K92.2 UPPER GI BLEED: Primary | ICD-10-CM

## 2025-05-27 DIAGNOSIS — I63.9 RECURRENT CEREBROVASCULAR ACCIDENTS (CVAS) (H): ICD-10-CM

## 2025-05-27 DIAGNOSIS — I05.0 SEVERE MITRAL STENOSIS BY PRIOR ECHOCARDIOGRAM: ICD-10-CM

## 2025-05-27 DIAGNOSIS — E78.5 HYPERLIPIDEMIA LDL GOAL <70: ICD-10-CM

## 2025-05-27 DIAGNOSIS — Z79.01 ANTICOAGULATED: ICD-10-CM

## 2025-05-27 DIAGNOSIS — Z86.73 HISTORY OF CVA (CEREBROVASCULAR ACCIDENT): ICD-10-CM

## 2025-05-27 DIAGNOSIS — Z79.01 LONG TERM CURRENT USE OF ANTICOAGULANT THERAPY: ICD-10-CM

## 2025-05-27 LAB
ALBUMIN SERPL BCG-MCNC: 3.8 G/DL (ref 3.5–5.2)
ALBUMIN SERPL BCG-MCNC: 3.9 G/DL (ref 3.5–5.2)
ALP SERPL-CCNC: 43 U/L (ref 40–150)
ALP SERPL-CCNC: 44 U/L (ref 40–150)
ALT SERPL W P-5'-P-CCNC: 10 U/L (ref 0–50)
ALT SERPL W P-5'-P-CCNC: 9 U/L (ref 0–50)
ANION GAP SERPL CALCULATED.3IONS-SCNC: 11 MMOL/L (ref 7–15)
ANION GAP SERPL CALCULATED.3IONS-SCNC: 13 MMOL/L (ref 7–15)
APTT PPP: 39 SECONDS (ref 22–38)
AST SERPL W P-5'-P-CCNC: 17 U/L (ref 0–45)
AST SERPL W P-5'-P-CCNC: 19 U/L (ref 0–45)
BASOPHILS # BLD AUTO: 0 10E3/UL (ref 0–0.2)
BASOPHILS NFR BLD AUTO: 1 %
BILIRUB SERPL-MCNC: <0.2 MG/DL
BILIRUB SERPL-MCNC: <0.2 MG/DL
BLD PROD TYP BPU: NORMAL
BLD PROD TYP BPU: NORMAL
BLOOD COMPONENT TYPE: NORMAL
BLOOD COMPONENT TYPE: NORMAL
BUN SERPL-MCNC: 31.2 MG/DL (ref 8–23)
BUN SERPL-MCNC: 31.2 MG/DL (ref 8–23)
CALCIUM SERPL-MCNC: 9.2 MG/DL (ref 8.8–10.4)
CALCIUM SERPL-MCNC: 9.2 MG/DL (ref 8.8–10.4)
CHLORIDE SERPL-SCNC: 101 MMOL/L (ref 98–107)
CHLORIDE SERPL-SCNC: 104 MMOL/L (ref 98–107)
CODING SYSTEM: NORMAL
CODING SYSTEM: NORMAL
CREAT SERPL-MCNC: 1.92 MG/DL (ref 0.51–0.95)
CREAT SERPL-MCNC: 1.97 MG/DL (ref 0.51–0.95)
CROSSMATCH: NORMAL
CROSSMATCH: NORMAL
EGFRCR SERPLBLD CKD-EPI 2021: 25 ML/MIN/1.73M2
EGFRCR SERPLBLD CKD-EPI 2021: 26 ML/MIN/1.73M2
EOSINOPHIL # BLD AUTO: 0.1 10E3/UL (ref 0–0.7)
EOSINOPHIL NFR BLD AUTO: 2 %
ERYTHROCYTE [DISTWIDTH] IN BLOOD BY AUTOMATED COUNT: 15 % (ref 10–15)
ERYTHROCYTE [DISTWIDTH] IN BLOOD BY AUTOMATED COUNT: 15.2 % (ref 10–15)
FERRITIN SERPL-MCNC: 41 NG/ML (ref 11–328)
GLUCOSE SERPL-MCNC: 111 MG/DL (ref 70–99)
GLUCOSE SERPL-MCNC: 94 MG/DL (ref 70–99)
HCO3 SERPL-SCNC: 22 MMOL/L (ref 22–29)
HCO3 SERPL-SCNC: 23 MMOL/L (ref 22–29)
HCT VFR BLD AUTO: 16.3 % (ref 35–47)
HCT VFR BLD AUTO: 16.3 % (ref 35–47)
HCT VFR BLD AUTO: 18.9 % (ref 35–47)
HGB BLD-MCNC: 4.9 G/DL (ref 11.7–15.7)
HGB BLD-MCNC: 4.9 G/DL (ref 11.7–15.7)
HGB BLD-MCNC: 5.6 G/DL (ref 11.7–15.7)
HOLD SPECIMEN: NORMAL
IMM GRANULOCYTES # BLD: 0 10E3/UL
IMM GRANULOCYTES NFR BLD: 0 %
INR PPP: 2.84 (ref 0.85–1.15)
INR PPP: 2.87 (ref 0.85–1.15)
IRON BINDING CAPACITY (ROCHE): 353 UG/DL (ref 240–430)
IRON SATN MFR SERPL: 7 % (ref 15–46)
IRON SERPL-MCNC: 23 UG/DL (ref 37–145)
ISSUE DATE AND TIME: NORMAL
ISSUE DATE AND TIME: NORMAL
LYMPHOCYTES # BLD AUTO: 0.8 10E3/UL (ref 0.8–5.3)
LYMPHOCYTES NFR BLD AUTO: 16 %
MCH RBC QN AUTO: 28.3 PG (ref 26.5–33)
MCH RBC QN AUTO: 28.7 PG (ref 26.5–33)
MCHC RBC AUTO-ENTMCNC: 30.1 G/DL (ref 31.5–36.5)
MCHC RBC AUTO-ENTMCNC: 30.1 G/DL (ref 31.5–36.5)
MCV RBC AUTO: 94 FL (ref 78–100)
MCV RBC AUTO: 95 FL (ref 78–100)
MCV RBC AUTO: 97 FL (ref 78–100)
MONOCYTES # BLD AUTO: 0.4 10E3/UL (ref 0–1.3)
MONOCYTES NFR BLD AUTO: 9 %
NEUTROPHILS # BLD AUTO: 3.5 10E3/UL (ref 1.6–8.3)
NEUTROPHILS NFR BLD AUTO: 72 %
NRBC # BLD AUTO: 0 10E3/UL
NRBC BLD AUTO-RTO: 0 /100
PLATELET # BLD AUTO: 226 10E3/UL (ref 150–450)
PLATELET # BLD AUTO: 262 10E3/UL (ref 150–450)
POTASSIUM SERPL-SCNC: 4.9 MMOL/L (ref 3.4–5.3)
POTASSIUM SERPL-SCNC: 5.5 MMOL/L (ref 3.4–5.3)
PROT SERPL-MCNC: 5.7 G/DL (ref 6.4–8.3)
PROT SERPL-MCNC: 6 G/DL (ref 6.4–8.3)
PROTHROMBIN TIME: 28.9 SECONDS (ref 11.8–14.8)
PROTHROMBIN TIME: 29.2 SECONDS (ref 11.8–14.8)
RBC # BLD AUTO: 1.71 10E6/UL (ref 3.8–5.2)
RBC # BLD AUTO: 1.73 10E6/UL (ref 3.8–5.2)
SODIUM SERPL-SCNC: 137 MMOL/L (ref 135–145)
SODIUM SERPL-SCNC: 137 MMOL/L (ref 135–145)
UNIT ABO/RH: NORMAL
UNIT ABO/RH: NORMAL
UNIT NUMBER: NORMAL
UNIT NUMBER: NORMAL
UNIT STATUS: NORMAL
UNIT STATUS: NORMAL
UNIT TYPE ISBT: 6200
UNIT TYPE ISBT: 6200
VIT B12 SERPL-MCNC: 289 PG/ML (ref 232–1245)
WBC # BLD AUTO: 4.8 10E3/UL (ref 4–11)
WBC # BLD AUTO: 4.8 10E3/UL (ref 4–11)

## 2025-05-27 PROCEDURE — 85610 PROTHROMBIN TIME: CPT | Performed by: NURSE PRACTITIONER

## 2025-05-27 PROCEDURE — 86923 COMPATIBILITY TEST ELECTRIC: CPT | Performed by: FAMILY MEDICINE

## 2025-05-27 PROCEDURE — 3075F SYST BP GE 130 - 139MM HG: CPT | Performed by: NURSE PRACTITIONER

## 2025-05-27 PROCEDURE — 3078F DIAST BP <80 MM HG: CPT | Performed by: NURSE PRACTITIONER

## 2025-05-27 PROCEDURE — G0378 HOSPITAL OBSERVATION PER HR: HCPCS

## 2025-05-27 PROCEDURE — 93005 ELECTROCARDIOGRAM TRACING: CPT

## 2025-05-27 PROCEDURE — 85730 THROMBOPLASTIN TIME PARTIAL: CPT

## 2025-05-27 PROCEDURE — 99285 EMERGENCY DEPT VISIT HI MDM: CPT | Mod: 25

## 2025-05-27 PROCEDURE — P9016 RBC LEUKOCYTES REDUCED: HCPCS | Performed by: FAMILY MEDICINE

## 2025-05-27 PROCEDURE — 96375 TX/PRO/DX INJ NEW DRUG ADDON: CPT

## 2025-05-27 PROCEDURE — G2211 COMPLEX E/M VISIT ADD ON: HCPCS | Performed by: NURSE PRACTITIONER

## 2025-05-27 PROCEDURE — 93010 ELECTROCARDIOGRAM REPORT: CPT | Performed by: FAMILY MEDICINE

## 2025-05-27 PROCEDURE — 84155 ASSAY OF PROTEIN SERUM: CPT | Performed by: NURSE PRACTITIONER

## 2025-05-27 PROCEDURE — 84450 TRANSFERASE (AST) (SGOT): CPT | Performed by: NURSE PRACTITIONER

## 2025-05-27 PROCEDURE — 84460 ALANINE AMINO (ALT) (SGPT): CPT | Performed by: NURSE PRACTITIONER

## 2025-05-27 PROCEDURE — 96374 THER/PROPH/DIAG INJ IV PUSH: CPT

## 2025-05-27 PROCEDURE — 99223 1ST HOSP IP/OBS HIGH 75: CPT | Mod: AI

## 2025-05-27 PROCEDURE — 82728 ASSAY OF FERRITIN: CPT | Performed by: NURSE PRACTITIONER

## 2025-05-27 PROCEDURE — 80048 BASIC METABOLIC PNL TOTAL CA: CPT | Performed by: NURSE PRACTITIONER

## 2025-05-27 PROCEDURE — 36415 COLL VENOUS BLD VENIPUNCTURE: CPT | Performed by: FAMILY MEDICINE

## 2025-05-27 PROCEDURE — 36430 TRANSFUSION BLD/BLD COMPNT: CPT

## 2025-05-27 PROCEDURE — 250N000013 HC RX MED GY IP 250 OP 250 PS 637

## 2025-05-27 PROCEDURE — 250N000011 HC RX IP 250 OP 636

## 2025-05-27 PROCEDURE — 85025 COMPLETE CBC W/AUTO DIFF WBC: CPT | Performed by: NURSE PRACTITIONER

## 2025-05-27 PROCEDURE — 1126F AMNT PAIN NOTED NONE PRSNT: CPT | Performed by: NURSE PRACTITIONER

## 2025-05-27 PROCEDURE — 85014 HEMATOCRIT: CPT

## 2025-05-27 PROCEDURE — 83550 IRON BINDING TEST: CPT | Performed by: NURSE PRACTITIONER

## 2025-05-27 PROCEDURE — 96361 HYDRATE IV INFUSION ADD-ON: CPT

## 2025-05-27 PROCEDURE — 82607 VITAMIN B-12: CPT | Performed by: FAMILY MEDICINE

## 2025-05-27 PROCEDURE — 258N000003 HC RX IP 258 OP 636

## 2025-05-27 PROCEDURE — 85610 PROTHROMBIN TIME: CPT

## 2025-05-27 PROCEDURE — 99214 OFFICE O/P EST MOD 30 MIN: CPT | Performed by: NURSE PRACTITIONER

## 2025-05-27 PROCEDURE — 82247 BILIRUBIN TOTAL: CPT

## 2025-05-27 PROCEDURE — 99285 EMERGENCY DEPT VISIT HI MDM: CPT | Mod: 25 | Performed by: FAMILY MEDICINE

## 2025-05-27 PROCEDURE — 84075 ASSAY ALKALINE PHOSPHATASE: CPT | Performed by: NURSE PRACTITIONER

## 2025-05-27 PROCEDURE — 83540 ASSAY OF IRON: CPT | Performed by: NURSE PRACTITIONER

## 2025-05-27 PROCEDURE — 86900 BLOOD TYPING SEROLOGIC ABO: CPT | Performed by: STUDENT IN AN ORGANIZED HEALTH CARE EDUCATION/TRAINING PROGRAM

## 2025-05-27 PROCEDURE — 36415 COLL VENOUS BLD VENIPUNCTURE: CPT | Performed by: NURSE PRACTITIONER

## 2025-05-27 PROCEDURE — 36415 COLL VENOUS BLD VENIPUNCTURE: CPT

## 2025-05-27 RX ORDER — SODIUM CHLORIDE, SODIUM LACTATE, POTASSIUM CHLORIDE, CALCIUM CHLORIDE 600; 310; 30; 20 MG/100ML; MG/100ML; MG/100ML; MG/100ML
INJECTION, SOLUTION INTRAVENOUS CONTINUOUS
Status: DISCONTINUED | OUTPATIENT
Start: 2025-05-27 | End: 2025-05-30

## 2025-05-27 RX ORDER — PROCHLORPERAZINE MALEATE 5 MG/1
5 TABLET ORAL EVERY 6 HOURS PRN
Status: DISCONTINUED | OUTPATIENT
Start: 2025-05-27 | End: 2025-05-30 | Stop reason: HOSPADM

## 2025-05-27 RX ORDER — ASPIRIN 81 MG/1
81 TABLET ORAL DAILY
Status: DISCONTINUED | OUTPATIENT
Start: 2025-05-27 | End: 2025-05-28

## 2025-05-27 RX ORDER — WARFARIN SODIUM 5 MG/1
5 TABLET ORAL
Status: DISCONTINUED | OUTPATIENT
Start: 2025-05-27 | End: 2025-05-27

## 2025-05-27 RX ORDER — ASPIRIN 81 MG/1
81 TABLET ORAL DAILY
COMMUNITY

## 2025-05-27 RX ORDER — ENALAPRIL MALEATE 10 MG/1
20 TABLET ORAL DAILY
Status: DISCONTINUED | OUTPATIENT
Start: 2025-05-28 | End: 2025-05-28

## 2025-05-27 RX ORDER — CALCIUM CARBONATE 500 MG/1
1000 TABLET, CHEWABLE ORAL 4 TIMES DAILY PRN
Status: DISCONTINUED | OUTPATIENT
Start: 2025-05-27 | End: 2025-05-30 | Stop reason: HOSPADM

## 2025-05-27 RX ORDER — ACETAMINOPHEN 325 MG/1
650 TABLET ORAL EVERY 4 HOURS PRN
Status: DISCONTINUED | OUTPATIENT
Start: 2025-05-27 | End: 2025-05-30 | Stop reason: HOSPADM

## 2025-05-27 RX ORDER — SUCRALFATE ORAL 1 G/10ML
1 SUSPENSION ORAL
Status: DISCONTINUED | OUTPATIENT
Start: 2025-05-27 | End: 2025-05-29

## 2025-05-27 RX ORDER — ONDANSETRON 2 MG/ML
4 INJECTION INTRAMUSCULAR; INTRAVENOUS EVERY 6 HOURS PRN
Status: DISCONTINUED | OUTPATIENT
Start: 2025-05-27 | End: 2025-05-30 | Stop reason: HOSPADM

## 2025-05-27 RX ORDER — ATORVASTATIN CALCIUM 80 MG/1
80 TABLET, FILM COATED ORAL DAILY
Status: DISCONTINUED | OUTPATIENT
Start: 2025-05-28 | End: 2025-05-30 | Stop reason: HOSPADM

## 2025-05-27 RX ORDER — NICOTINE 21 MG/24HR
1 PATCH, TRANSDERMAL 24 HOURS TRANSDERMAL DAILY PRN
Status: DISCONTINUED | OUTPATIENT
Start: 2025-05-27 | End: 2025-05-30 | Stop reason: HOSPADM

## 2025-05-27 RX ORDER — ONDANSETRON 4 MG/1
4 TABLET, ORALLY DISINTEGRATING ORAL EVERY 6 HOURS PRN
Status: DISCONTINUED | OUTPATIENT
Start: 2025-05-27 | End: 2025-05-30 | Stop reason: HOSPADM

## 2025-05-27 RX ADMIN — FAMOTIDINE 20 MG: 10 INJECTION, SOLUTION INTRAVENOUS at 17:08

## 2025-05-27 RX ADMIN — SODIUM CHLORIDE, SODIUM LACTATE, POTASSIUM CHLORIDE, AND CALCIUM CHLORIDE: .6; .31; .03; .02 INJECTION, SOLUTION INTRAVENOUS at 22:35

## 2025-05-27 RX ADMIN — PANTOPRAZOLE SODIUM 40 MG: 40 INJECTION, POWDER, FOR SOLUTION INTRAVENOUS at 17:10

## 2025-05-27 RX ADMIN — SUCRALFATE 1 G: 1 SUSPENSION ORAL at 21:25

## 2025-05-27 RX ADMIN — PHYTONADIONE 2 MG: 2 INJECTION, EMULSION INTRAMUSCULAR; INTRAVENOUS; SUBCUTANEOUS at 18:45

## 2025-05-27 RX ADMIN — SUCRALFATE 1 G: 1 SUSPENSION ORAL at 17:17

## 2025-05-27 ASSESSMENT — COLUMBIA-SUICIDE SEVERITY RATING SCALE - C-SSRS
2. HAVE YOU ACTUALLY HAD ANY THOUGHTS OF KILLING YOURSELF IN THE PAST MONTH?: NO
1. IN THE PAST MONTH, HAVE YOU WISHED YOU WERE DEAD OR WISHED YOU COULD GO TO SLEEP AND NOT WAKE UP?: NO
6. HAVE YOU EVER DONE ANYTHING, STARTED TO DO ANYTHING, OR PREPARED TO DO ANYTHING TO END YOUR LIFE?: NO

## 2025-05-27 ASSESSMENT — ACTIVITIES OF DAILY LIVING (ADL)
ADLS_ACUITY_SCORE: 41
ADLS_ACUITY_SCORE: 19
ADLS_ACUITY_SCORE: 19
ADLS_ACUITY_SCORE: 41
ADLS_ACUITY_SCORE: 41
ADLS_ACUITY_SCORE: 19
ADLS_ACUITY_SCORE: 41
ADLS_ACUITY_SCORE: 19
ADLS_ACUITY_SCORE: 19
ADLS_ACUITY_SCORE: 41

## 2025-05-27 ASSESSMENT — PAIN SCALES - GENERAL: PAINLEVEL_OUTOF10: NO PAIN (0)

## 2025-05-27 NOTE — PROGRESS NOTES
Assessment & Plan     Acute anemia  HGB 5.6 in clinic today  To ER for further evaluation and treatment     Metastatic primary lung cancer, left (H)  Follow up with oncology as planned.     CKD (chronic kidney disease) stage 4, GFR 15-29 ml/min (H)  Labs done today  Follow up with nephrology overdue.   - Albumin Random Urine Quantitative with Creat Ratio    Anemia of chronic renal failure, stage 3b (H)  - Hemoglobin and hematocrit  - Iron & Iron Binding Capacity  - Ferritin    Cerebrovascular accident (CVA) due to occlusion of precerebral artery (H)  Long term current use of anticoagulant therapy  Anticoagulation monitoring, INR range 2-3  Follow up labs done today   - INR      Hyperlipidemia LDL goal <70  On statin  Labs for medication monitoring done today     - Comprehensive metabolic panel (BMP + Alb, Alk Phos, ALT, AST, Total. Bili, TP)          Call or return to the clinic with any worsening of symptoms or no resolution. Patient/Parent verbalized understanding and is in agreement. Medication side effects reviewed.   Current Outpatient Medications   Medication Sig Dispense Refill    amLODIPine (NORVASC) 5 MG tablet Take 1 tablet (5 mg) by mouth daily. 90 tablet 3    Aspirin (VAZALORE) 81 MG CAPS Please choose reason not prescribed from choices below.      atorvastatin (LIPITOR) 80 MG tablet Take 1 tablet by mouth once daily 90 tablet 2    enalapril (VASOTEC) 20 MG tablet Take 1 tablet (20 mg) by mouth daily. Take in the morning 90 tablet 1    famotidine (PEPCID) 40 MG tablet Take 1 tablet by mouth once daily 90 tablet 1    FLUoxetine (PROZAC) 20 MG capsule Take 1 capsule by mouth once daily 90 capsule 0    fluticasone (FLONASE) 50 MCG/ACT nasal spray Spray 1 spray into both nostrils daily 16 g 1    warfarin ANTICOAGULANT (COUMADIN) 5 MG tablet Take 7.5 mg every Thu; 5 mg all other days or As directed by Anticoagulation clinic 100 tablet 1     Chart documentation with Dragon Voice recognition Software.  Although reviewed after completion, some words and grammatical errors may remain.  Diana Ely MSN,FNP-BC  [facility]  [id number] [place] [id number]  [phone]            Jayla Spence is a 79 year old, presenting for the following health issues:  Anemia (Patient is here today to complete blood work for her anemia. Patient states she is very fatigued. )        5/27/2025    12:15 PM   Additional Questions   Roomed by Marcia Elaine CMA     Anemia      Chief Complaint   Patient presents with    Anemia     Patient is here today to complete blood work for her anemia. Patient states she is very fatigued.    Anemia of chronic renal failure stage 3 b  Due for follow up labs today  Fatigue all day every day   Malignant neoplasm of upper lobe of left lung (H)    Finished Radiation April  No blood in urine or stool  Wt Readings from Last 5 Encounters:   05/27/25 52 kg (114 lb 9.6 oz)   04/03/25 53.3 kg (117 lb 9.6 oz)   03/25/25 52.7 kg (116 lb 3.2 oz)   03/20/25 51.6 kg (113 lb 12.1 oz)   03/14/25 52.2 kg (115 lb)   Siva has been stable.         CVA   chronic, area of infarct in the left frontal and parietal lobes. Stable chronic right MCA stroke   Carotid ultrasound completed last fall showed 50-69% stenosis on the Left, <50% of the Right ICA. MRA showed some luminal irregularities of the left carotid artery.   Neurology consultation last 6/6/2024  memory impairment and some expressive aphasia   Her medical history significant for severe atherosclerotic disease and she has sustained five strokes in addition to several minor ones. On anticoagulation     CKD   Last seen by nephrology 2/2025  kidney disease was thought to be secondary to atherosclerotic vascular disease and decreased right kidney function   She saw vascular surgery at [facility] and they decided not to intervene on her right renal artery stenosis           Review of Systems  Constitutional, neuro, ENT,  "endocrine, pulmonary, cardiac, gastrointestinal, genitourinary, musculoskeletal, integument and psychiatric systems are negative, except as otherwise noted.      Objective    /40   Pulse 79   Temp 97.2  F (36.2  C) (Tympanic)   Resp 18   Ht 1.651 m (5' 5\")   Wt 52 kg (114 lb 9.6 oz)   LMP 09/15/1998   SpO2 99%   BMI 19.07 kg/m    Body mass index is 19.07 kg/m .  Physical Exam   GENERAL: pale and fatigued  EYES: Eyes grossly normal to inspection,  NECK: no adenopathy, no asymmetry, masses, or scars  RESP: lungs clear to auscultation - no rales, rhonchi or wheezes  CV: regular rate and rhythm, normal S1 S2, no S3 or S4, no murmur, click or rub, no peripheral edema  MS: no gross musculoskeletal defects noted, no edema  SKIN: no suspicious lesions or rashes  NEURO: Normal strength and tone, mentation intact and speech abnormal  PSYCH: mentation appears normal, affect normal/bright    Results for orders placed or performed in visit on 05/27/25   Hemoglobin and hematocrit     Status: Abnormal   Result Value Ref Range    Hemoglobin 5.6 (LL) 11.7 - 15.7 g/dL    Hematocrit 18.9 (L) 35.0 - 47.0 %    MCV 97 78 - 100 fL             Signed Electronically by: SELWYN Dennis CNP    "

## 2025-05-27 NOTE — ED PROVIDER NOTES
History     Chief Complaint   Patient presents with    Anemia     Clinic visit today, hbg found to be 5.3 and referred to ED.      HPI  Bebe Alfonso is a 79 year old female who presents on chronic anticoagulation with warfarin prior CVA.  Renal artery stenosis.  Chronic kidney disease.  She had outpatient labs demonstrating a drop in hemoglobin from 2 months ago 10 to now 5.3 generally weak and tired.  No associated recent falls or injuries.  Difficult historian.  Noted to be pale.  Unaware of any recent intestinal bleeding.      Allergies:  Allergies   Allergen Reactions    Losartan Swelling, Nausea, Shortness Of Breath and Palpitations    Gabapentin GI Disturbance and Unknown     Double vision  flatulence    Lisinopril Cough    Oxycodone Other (See Comments)     But has tolerated hydrocodone    Tramadol Other (See Comments)    Augmentin [Amoxicillin-Pot Clavulanate] Rash    Bacitracin Rash     blisters    Bupropion Rash     Allergic to brand not generic       Problem List:    Patient Active Problem List    Diagnosis Date Noted    CKD (chronic kidney disease) stage 4, GFR 15-29 ml/min (H) 03/14/2025     Priority: Medium    Anemia of chronic renal failure, stage 3b (H) 04/11/2024     Priority: Medium    History of CVA (cerebrovascular accident) 02/10/2022     Priority: Medium    History of cardiac monitoring 09/21/2021     Priority: Medium    Renal artery stenosis 04/18/2019     Priority: Medium    Cerebrovascular accident (CVA) due to occlusion of precerebral artery (H) 12/18/2018     Priority: Medium    Hyperlipidemia LDL goal <70 09/07/2018     Priority: Medium    Long term current use of anticoagulant therapy 03/20/2018     Priority: Medium    Benign essential hypertension 09/15/2016     Priority: Medium    Anticoagulation monitoring, INR range 2-3 10/22/2015     Priority: Medium    Cerebrovascular disease 10/20/2015     Priority: Medium    ACP (advance care planning) 06/15/2013     Priority: Medium      Overview:   Formatting of this note may be different from the original.  Patient has identified Health Care Agent(s): Yes  Add Health Care Agents: Yes    Health Care Agent(s):  Primary Health Care Agent: Rodriguez lemons  Relationship: spouse Phone: [phone]   Secondary Health Care Agent: Beckie Kearney  Relationship: Dtr Phone: [phone]   Conservator:  Relationship:  Phone:    Guardian: Relationship:  Phone:      Patient has Advance Care Plan Documents (Health Care Directive, POLST): Yes    Advance Care Plan Documents:  Health Care Directive    Patient has identified Specific Treatment Preferences: Yes   Specific Treatment Preferences: per Chart  Full Code , please refer to pts document for tx details      Thyroid nodule 05/22/2013     Priority: Medium     Overview:   Thyroid US (2012) Stable nodule left lobe of thyroid  TSH- (2/19/12) normal (2.26)      Pain medication agreement 03/13/2013     Priority: Medium     Overview:   Per verbal order of provider, controlled substance agreement for Vicodin signed this date by Valentina Burrows LPN by Dr. Avilez's office.      Carotid bruit 11/12/2012     Priority: Medium     Overview:   12/6/10 Carotid US   Elevated velocities throughout the carotid arteries bilaterally. There is a focal area of increased velocity in the mid left internal carotid artery with a plaque in this region on the color flow images. This corresponds to 50 - 70 % diameter reduction. There is an area of elevated velocity in the left external carotid artery consistent with stenosis. There is no focal area of significant stenosis in the right carotid arteries in the neck. Plaque in the carotid arteries bilaterally.       Tobacco dependence 11/01/2010     Priority: Medium     Overview:   1 PPD for x 33 years, she attempted quitting twice in the past. She states trial of wellbutrin in the past. She failed trial of nicotine and chantix.        Osteoarthrosis, hip 10/19/2010      Priority: Medium     Overview:   Pt scheduled for right  total hip arthroplasty on 6/14/13 with Dr. Addison HEREDIA hip xray (11/2012) Severe degenerative changes seen of the right hip with near bone-on-bone appearance of the joint and several subchondral cysts forming.      Esophageal reflux 04/08/2008     Priority: Medium     Overview:   Omeprazole PRN, occasional symptoms such as chest burning and knife twisted to stomach.           Past Medical History:    Past Medical History:   Diagnosis Date    Anticoagulation monitoring, INR range 2-3 10/22/2015    Benign essential hypertension 09/15/2016    Carotid bruit 11/12/2012    Chronic obstructive pulmonary disease (H) 01/19/2016    Esophageal reflux 04/08/2008    Heart disease born with it    History of blood transfusion 70 years ago    History of CVA (cerebrovascular accident) 10/20/2015    Long term current use of anticoagulant therapy 03/20/2018    Major depressive disorder, recurrent episode, moderate (H) 10/29/2008    Malignant neoplasm of upper lobe of left lung (H)     Osteoarthrosis, hip 10/19/2010    Thyroid nodule 05/22/2013       Past Surgical History:    Past Surgical History:   Procedure Laterality Date    BIOPSY  appox in 1980's    BREAST SURGERY  last time in the 1990's    non cancer    BRONCHOSCOPY, WITH BIOPSY, ROBOT ASSISTED Bilateral 3/20/2025    Procedure: BRONCHOSCOPY, ROBOT-ASSISTED ULTASOUND, WITH BIOPSY, ION. Flexible bronchoscopy, with endobronchial ultrasound and transbronchial biopsies;  Surgeon: Gallo Jurado MD;  Location: [facility]    COLONOSCOPY N/A 9/7/2022    Procedure: COLONOSCOPY, WITH POLYPECTOMY AND BIOPSY;  Surgeon: Alfredito Gomez DO;  Location: [facility]    ESOPHAGOSCOPY, GASTROSCOPY, DUODENOSCOPY (EGD), COMBINED N/A 5/25/2022    Procedure: ESOPHAGOGASTRODUODENOSCOPY, WITH BIOPSY;  Surgeon: Alfredito Gomez DO;  Location: [facility]       Family History:    Family History   Problem Relation Age of Onset    Diabetes Maternal  Grandfather     Diabetes Sister         developed after cancer treatment    Breast Cancer Sister     Obesity Sister     Hypertension Mother     Hyperlipidemia Mother     Osteoporosis Mother     Breast Cancer Sister     Osteoporosis Sister     Breast Cancer Daughter     Other Cancer Sister         throught out body    Substance Abuse Sister         acol    Obesity Sister     Substance Abuse Sister         acol    Obesity Sister        Social History:  Marital Status:   [2]  Social History     Tobacco Use    Smoking status: Some Days     Current packs/day: 0.00     Average packs/day: 1 pack/day for 59.2 years (59.2 ttl pk-yrs)     Types: Cigarettes     Start date:      Last attempt to quit: 2025     Years since quittin.1    Smokeless tobacco: Never    Tobacco comments:     Patient has smoked since age 20 until 26. States she had quit for 2 years and then for 3 years during that time. - updated 4/3/25.   Vaping Use    Vaping status: Never Used   Substance Use Topics    Alcohol use: No    Drug use: Never        Medications:    amLODIPine (NORVASC) 5 MG tablet  Aspirin (VAZALORE) 81 MG CAPS  atorvastatin (LIPITOR) 80 MG tablet  enalapril (VASOTEC) 20 MG tablet  famotidine (PEPCID) 40 MG tablet  FLUoxetine (PROZAC) 20 MG capsule  fluticasone (FLONASE) 50 MCG/ACT nasal spray  warfarin ANTICOAGULANT (COUMADIN) 5 MG tablet          Review of Systems  ROS:  5 point ROS negative except as noted above in HPI, including Gen., Resp., CV, GI &  system review.      Physical Exam   BP: (!) 129/35  Pulse: 77  Temp: 98.3  F (36.8  C)  Resp: 18  SpO2: 99 %      Physical Exam  Constitutional:       General: She is in acute distress.      Appearance: She is not diaphoretic.   Eyes:      Conjunctiva/sclera: Conjunctivae normal.   Cardiovascular:      Rate and Rhythm: Normal rate and regular rhythm.      Heart sounds: No murmur heard.  Pulmonary:      Effort: Pulmonary effort is normal. No respiratory distress.       Breath sounds: Normal breath sounds. No stridor. No wheezing or rhonchi.   Abdominal:      General: Abdomen is flat. There is no distension.      Palpations: Abdomen is soft. There is no mass.      Tenderness: There is no abdominal tenderness. There is no guarding.   Musculoskeletal:      Cervical back: Neck supple.      Right lower leg: No edema.      Left lower leg: No edema.   Skin:     Coloration: Skin is pale.      Findings: No rash.   Neurological:      Mental Status: She is alert.     Rectal exam is without blood grossly on exam no melena.  No maroon stool.      ED Course        Procedures                 EKG Interpretation:      Interpreted by Josh Robledo MD  EKG done at 1529 hrs. demonstrates a sinus rhythm 74 bpm normal axis.  There is no ST change.  No T wave changes.  Normal R progression and no Q waves.  Normal intervals.  Normal conduction.  No ectopy.  Impression sinus rhythm 74 bpm no acute change.    Critical Care time:  none     None         Results for orders placed or performed during the hospital encounter of 05/27/25 (from the past 24 hours)   Prepare red blood cells (unit)   Result Value Ref Range    Blood Component Type Red Blood Cells     Product Code S8376I28     Unit Status Issued     Unit Number [id number]     CROSSMATCH Compatible     CODING SYSTEM [id number]     ISSUE DATE AND TIME 5/27/2025  3:56:00 PM CDT     UNIT ABO/RH A+     UNIT TYPE ISBT 6200    Prepare red blood cells (unit)   Result Value Ref Range    Blood Component Type Red Blood Cells     Product Code P5005D58     Unit Status Issued     Unit Number [id number]     CROSSMATCH Compatible     CODING SYSTEM [id number]     ISSUE DATE AND TIME 5/27/2025  7:53:00 PM CDT     UNIT ABO/RH A+     UNIT TYPE ISBT 6200    [place] Draw    Narrative    The following orders were created for panel order [place] Draw.  Procedure                               Abnormality         Status                     ---------                                -----------         ------                     Extra Blue Top Tube[[id number]]                             Final result               Extra Green Top (Lithiu...[[id number]]                      Final result               Extra Blood Bank Purple...[[id number]]                      Final result                 Please view results for these tests on the individual orders.   ABO/Rh type and screen    Narrative    The following orders were created for panel order ABO/Rh type and screen.  Procedure                               Abnormality         Status                     ---------                               -----------         ------                     Adult Type and Screen[[id number]]                           In process                   Please view results for these tests on the individual orders.   Extra Blue Top Tube   Result Value Ref Range    Hold Specimen JIC    Extra Green Top (Lithium Heparin) Tube   Result Value Ref Range    Hold Specimen JIC    Extra Blood Bank Purple Top Tube   Result Value Ref Range    Hold Specimen JIC    Vitamin B12   Result Value Ref Range    Vitamin B12 289 232 - 1,245 pg/mL   Comprehensive metabolic panel   Result Value Ref Range    Sodium 137 135 - 145 mmol/L    Potassium 5.5 (H) 3.4 - 5.3 mmol/L    Carbon Dioxide (CO2) 22 22 - 29 mmol/L    Anion Gap 11 7 - 15 mmol/L    Urea Nitrogen 31.2 (H) 8.0 - 23.0 mg/dL    Creatinine 1.97 (H) 0.51 - 0.95 mg/dL    GFR Estimate 25 (L) >60 mL/min/1.73m2    Calcium 9.2 8.8 - 10.4 mg/dL    Chloride 104 98 - 107 mmol/L    Glucose 94 70 - 99 mg/dL    Alkaline Phosphatase 43 40 - 150 U/L    AST 17 0 - 45 U/L    ALT 9 0 - 50 U/L    Protein Total 5.7 (L) 6.4 - 8.3 g/dL    Albumin 3.8 3.5 - 5.2 g/dL    Bilirubin Total <0.2 <=1.2 mg/dL   INR   Result Value Ref Range    INR 2.87 (H) 0.85 - 1.15    PT 29.2 (H) 11.8 - 14.8 Seconds   EKG 12 lead   Result Value Ref Range    Systolic Blood Pressure  mmHg    Diastolic Blood Pressure  mmHg    Ventricular  Rate 74 BPM    Atrial Rate 74 BPM    WA Interval 188 ms    QRS Duration 92 ms     ms    QTc 448 ms    P Axis 79 degrees    R AXIS 84 degrees    T Axis 69 degrees    Interpretation ECG       Sinus rhythm  Possible Left atrial enlargement  Borderline ECG  No previous ECGs available     CBC with platelets   Result Value Ref Range    WBC Count 4.8 4.0 - 11.0 10e3/uL    RBC Count 1.71 (L) 3.80 - 5.20 10e6/uL    Hemoglobin 4.9 (LL) 11.7 - 15.7 g/dL    Hematocrit 16.3 (L) 35.0 - 47.0 %    MCV 95 78 - 100 fL    MCH 28.7 26.5 - 33.0 pg    MCHC 30.1 (L) 31.5 - 36.5 g/dL    RDW 15.0 10.0 - 15.0 %    Platelet Count 226 150 - 450 10e3/uL   CBC with Platelets & Differential    Narrative    The following orders were created for panel order CBC with Platelets & Differential.  Procedure                               Abnormality         Status                     ---------                               -----------         ------                     CBC with platelets and ...[[id number]]  Abnormal            Final result                 Please view results for these tests on the individual orders.   CBC with platelets and differential   Result Value Ref Range    WBC Count 4.8 4.0 - 11.0 10e3/uL    RBC Count 1.73 (L) 3.80 - 5.20 10e6/uL    Hemoglobin 4.9 (LL) 11.7 - 15.7 g/dL    Hematocrit 16.3 (L) 35.0 - 47.0 %    MCV 94 78 - 100 fL    MCH 28.3 26.5 - 33.0 pg    MCHC 30.1 (L) 31.5 - 36.5 g/dL    RDW 15.2 (H) 10.0 - 15.0 %    Platelet Count 262 150 - 450 10e3/uL    % Neutrophils 72 %    % Lymphocytes 16 %    % Monocytes 9 %    % Eosinophils 2 %    % Basophils 1 %    % Immature Granulocytes 0 %    NRBCs per 100 WBC 0 <1 /100    Absolute Neutrophils 3.5 1.6 - 8.3 10e3/uL    Absolute Lymphocytes 0.8 0.8 - 5.3 10e3/uL    Absolute Monocytes 0.4 0.0 - 1.3 10e3/uL    Absolute Eosinophils 0.1 0.0 - 0.7 10e3/uL    Absolute Basophils 0.0 0.0 - 0.2 10e3/uL    Absolute Immature Granulocytes 0.0 <=0.4 10e3/uL    Absolute NRBCs 0.0 10e3/uL    Partial thromboplastin time   Result Value Ref Range    aPTT 39 (H) 22 - 38 Seconds       Medications - No data to display    Assessments & Plan (with Medical Decision Making)     MDM; Bebe Alfonso is a 79 year old female who presents with hemoglobin of 5.3 overall pallor.  No recent bleeding noted.  On warfarin.  Plan for transfusion of a couple units and then monitoring.  Will ask for an observation stay given underlying comorbidities on anticoagulation.  No obvious bleeding on exam.    Labs reviewed from clinic and several are still pending.  These should be completed in the next couple of hours.  Hemoglobin however was back and will transfuse based on this results.  She is currently 5.6 on hemoglobin.  And this is down 5 g from where she was 2 months ago on warfarin.  Would recommend that we both transfuse and confirm stability before discharge especially in elderly female who may have some reduced decision-making capacity.  Therefore recommend observation stay for both replacement and monitoring.  At this point she has no significant active bleeding.      I have reviewed the nursing notes.    I have reviewed the findings, diagnosis, plan and need for follow up with the patient.       ED to Inpatient Handoff:    Discussed with Marleen   Patient accepted for Observation Stay  Pending studies include labs from clinic  Code Status: Not Addressed             Medical Decision Making  The patient's presentation was of moderate complexity (an acute illness with systemic symptoms).    The patient's evaluation involved:  ordering and/or review of 3+ test(s) in this encounter (see separate area of note for details)    The patient's management necessitated high risk (a decision regarding hospitalization).        New Prescriptions    No medications on file       Final diagnoses:   Anticoagulated   Recurrent cerebrovascular accidents (CVAs) (H)   Anemia, unspecified type       5/27/2025   [facility]  EMERGENCY DEPT       Josh Robledo MD  05/27/25 [id number]

## 2025-05-27 NOTE — ED NOTES
[facility]   Admission Handoff    The patient is Bebe Alfonso, 79 year old who arrived in the ED by WALKED from clinic with a complaint of Anemia (Clinic visit today, hbg found to be 5.3 and referred to ED. )  . The patient's current symptoms are a recurrence of a past episode and during this time the symptoms have remained the same. In the ED, patient was diagnosed with   Final diagnoses:   Anticoagulated   Recurrent cerebrovascular accidents (CVAs) (H)   Anemia, unspecified type         Needed?: No    Allergies:    Allergies   Allergen Reactions    Losartan Swelling, Nausea, Shortness Of Breath and Palpitations    Gabapentin GI Disturbance and Unknown     Double vision  flatulence    Lisinopril Cough    Oxycodone Other (See Comments)     But has tolerated hydrocodone    Tramadol Other (See Comments)    Augmentin [Amoxicillin-Pot Clavulanate] Rash    Bacitracin Rash     blisters    Bupropion Rash     Allergic to brand not generic       Past Medical Hx:   Past Medical History:   Diagnosis Date    Anticoagulation monitoring, INR range 2-3 10/22/2015    Benign essential hypertension 09/15/2016    Carotid bruit 11/12/2012    Overview:  12/6/10 Carotid US  Elevated velocities throughout the carotid arteries bilaterally. There is a focal area of increased velocity in the mid left internal carotid artery with a plaque in this region on the color flow images. This corresponds to 50 - 70 % diameter reduction. There is an area of elevated velocity in the left external carotid artery consistent with stenosis. There is no focal area of significant stenosis in the right carotid arteries in the neck. Plaque in the carotid arteries bilaterally.     Chronic obstructive pulmonary disease (H) 01/19/2016    The FVC, FEV1 and FEV1/FVC ratio are reduced.  The inspiratory flow rates are within normal limits.  The FVC is reduced relative to the SVC indicating air trapping.  While the TLC is  "within normal limits, the FRC, RV and RV/TLC ratio are increased.  The  diffusing capacity is mildly reduced. IMPRESSION: Mild-moderate Airflow Obstruction with air trapping ____________________________________________Clarke Iverson  April 2018    Esophageal reflux 04/08/2008    Overview:  Omeprazole PRN, occasional symptoms such as chest burning and knife twisted to stomach.     Heart disease born with it    History of blood transfusion 70 years ago    History of CVA (cerebrovascular accident) 10/20/2015    Long term current use of anticoagulant therapy 03/20/2018    Major depressive disorder, recurrent episode, moderate (H) 10/29/2008    Overview:  She is not taking any medication at this time.    Malignant neoplasm of upper lobe of left lung (H)     Osteoarthrosis, hip 10/19/2010    Overview:  Pt scheduled for right  total hip arthroplasty on 6/14/13 with Dr. Addison HEREDIA hip xray (11/2012) Severe degenerative changes seen of the right hip with near bone-on-bone appearance of the joint and several subchondral cysts forming.    Thyroid nodule 05/22/2013    Overview:  Thyroid US (2012) Stable nodule left lobe of thyroid TSH- (2/19/12) normal (2.26)       Initial vitals were: BP: (!) 129/35  Pulse: 77  Temp: 98.3  F (36.8  C)  Resp: 18  Height: 166.4 cm (5' 5.5\")  Weight: 51.7 kg (114 lb)  SpO2: 99 %   Recent vital Signs: BP (!) 143/51   Pulse 71   Temp 98.2  F (36.8  C) (Oral)   Resp 18   Ht 1.664 m (5' 5.5\")   Wt 51.7 kg (114 lb)   LMP 09/15/1998   SpO2 100%   BMI 18.68 kg/m      Elimination Status: Continent: Yes     Activity Level: Independent    Fall Status: Reason for falls risk: Other- Anemia  nonskid shoes/slippers when out of bed, arm band in place, and patient and family education    Baseline Mental status: WDL  Current Mental Status changes: at basesline    Infection present or suspected this encounter: no  Sepsis suspected: No    Isolation type: N/A    Bariatric equipment needed?: No    In the ED " these meds were given: Medications - No data to display    Drips running?  Yes    Home pump  No    Current LDAs: Peripheral IV: Site Right AC; Gauge 20 G  normal saline     Results:   Labs/Imaging  Ordered and Resulted from Time of ED Arrival Up to the Time of Departure from the ED  Results for orders placed or performed during the hospital encounter of 05/27/25 (from the past 24 hours)   Prepare red blood cells (unit)   Result Value Ref Range    Blood Component Type Red Blood Cells     Product Code X9520I67     Unit Status Issued     Unit Number [id number]     CROSSMATCH Compatible     CODING SYSTEM [id number]     ISSUE DATE AND TIME 5/27/2025  3:56:00 PM CDT     UNIT ABO/RH A+     UNIT TYPE ISBT 6200    Prepare red blood cells (unit)   Result Value Ref Range    Blood Component Type Red Blood Cells     Product Code P6751K25     Unit Status Ready for issue     Unit Number [id number]     CROSSMATCH Compatible     CODING SYSTEM [id number]    [place] Draw    Narrative    The following orders were created for panel order [place] Draw.  Procedure                               Abnormality         Status                     ---------                               -----------         ------                     Extra Blue Top Tube[[id number]]                             Final result               Extra Green Top (Lithiu...[[id number]]                      Final result               Extra Blood Bank Purple...[[id number]]                      Final result                 Please view results for these tests on the individual orders.   ABO/Rh type and screen    Narrative    The following orders were created for panel order ABO/Rh type and screen.  Procedure                               Abnormality         Status                     ---------                               -----------         ------                     Adult Type and Screen[[id number]]                           In process                   Please view results for  these tests on the individual orders.   Extra Blue Top Tube   Result Value Ref Range    Hold Specimen JIC    Extra Green Top (Lithium Heparin) Tube   Result Value Ref Range    Hold Specimen JIC    Extra Blood Bank Purple Top Tube   Result Value Ref Range    Hold Specimen JIC    EKG 12 lead   Result Value Ref Range    Systolic Blood Pressure  mmHg    Diastolic Blood Pressure  mmHg    Ventricular Rate 74 BPM    Atrial Rate 74 BPM    IL Interval 188 ms    QRS Duration 92 ms     ms    QTc 448 ms    P Axis 79 degrees    R AXIS 84 degrees    T Axis 69 degrees    Interpretation ECG       Sinus rhythm  Possible Left atrial enlargement  Borderline ECG  No previous ECGs available     CBC with platelets   Result Value Ref Range    WBC Count 4.8 4.0 - 11.0 10e3/uL    RBC Count 1.71 (L) 3.80 - 5.20 10e6/uL    Hemoglobin 4.9 (LL) 11.7 - 15.7 g/dL    Hematocrit 16.3 (L) 35.0 - 47.0 %    MCV 95 78 - 100 fL    MCH 28.7 26.5 - 33.0 pg    MCHC 30.1 (L) 31.5 - 36.5 g/dL    RDW 15.0 10.0 - 15.0 %    Platelet Count 226 150 - 450 10e3/uL       For the majority of the shift this patient's behavior was Green     Cardiac Rhythm: N/A  Pt needs tele? No  Skin/wound Issues: None    Code Status: DNR / DNI    Pain control: pt had none    Nausea control: pt had none    Abnormal labs/tests/findings requiring intervention: Anemia, Hgb 4.9, currently has blood product infusing.    Patient tested for COVID 19 prior to admission: NO     OBS brochure/video discussed/provided to patient/family: Yes     Family present during ED course? Yes     Family Comments/Social Situation comments: Spouse, Rodriguez, at bedside    Tasks needing completion: None    Mickey Keys, RN

## 2025-05-27 NOTE — ED NOTES
Observation Brochure and Video    Patient and group home informed of observation status based on provider's order.  Observation brochure was given . Patient/[facility] staff stated understanding. Questions answered.  Sharlene Harry RN

## 2025-05-27 NOTE — MEDICATION SCRIBE - ADMISSION MEDICATION HISTORY
Medication Scribe Admission Medication History    Admission medication history is complete. The information provided in this note is only as accurate as the sources available at the time of the update.    Information Source(s): Patient and CareEverywhere/SureScripts via in-person    Pertinent Information: PTA med list reviewed with patient in room and finished at desk.  She stopped taking Amlodipine as it was making her dizzy.  She has been taking Aspirin 81 mg daily.  Taking Warfarin 5 mg daily at 4 PM.    Changes made to PTA medication list:  Added:   Aspirin 81 mg EC  Vitamin D    Deleted:   [place] 81 mg  (Flagged for removal) Amlodipine    Changed: None    Allergies reviewed with patient and updates made in EHR: yes, no change.    Medication History Completed By: Florecita Mavrin 5/27/2025 5:16 PM    PTA Med List   Medication Sig Last Dose/Taking    aspirin 81 MG EC tablet Take 81 mg by mouth daily. 5/27/2025 Morning    atorvastatin (LIPITOR) 80 MG tablet Take 1 tablet by mouth once daily 5/27/2025 Morning    Cholecalciferol (VITAMIN D3 PO) Take 1 tablet by mouth daily. 5/27/2025 Morning    enalapril (VASOTEC) 20 MG tablet Take 1 tablet (20 mg) by mouth daily. Take in the morning 5/27/2025 Morning    famotidine (PEPCID) 40 MG tablet Take 1 tablet by mouth once daily 5/27/2025 Morning    FLUoxetine (PROZAC) 20 MG capsule Take 1 capsule by mouth once daily 5/27/2025 Morning    fluticasone (FLONASE) 50 MCG/ACT nasal spray Spray 1 spray into both nostrils daily More than a month    warfarin ANTICOAGULANT (COUMADIN) 5 MG tablet Take 7.5 mg every Thu; 5 mg all other days or As directed by Anticoagulation clinic 5/26/2025 at  4:00 PM

## 2025-05-27 NOTE — PROGRESS NOTES
"[facility] [place]    Patient admitted to room 2308 at approximately 1800 via cart from emergency room. Patient was accompanied by spouse and daughter.     Verbal SBAR report received from Er RN Mickey prior to patient arrival.     Patient ambulated to bed with stand-by assist. Patient alert and oriented X 3. The patient is not having any pain.  . Admission vital signs: Blood pressure (!) 187/59, pulse 71, temperature 98.3  F (36.8  C), temperature source Oral, resp. rate 18, height 1.664 m (5' 5.5\"), weight 51.7 kg (114 lb), last menstrual period 09/15/1998, SpO2 100%, not currently breastfeeding. Patient was oriented to plan of care, call light, bed controls, tv, telephone, bathroom, and visiting hours.     Risk Assessment    The following safety risks were identified during admission: fall. Yellow risk band applied: YES.     Skin Initial Assessment    This writer admitted this patient and completed a full skin assessment and Mart score in the Adult PCS flowsheet.   Photo documentation of skin problem and/or wound competed via [employer] application (located under Media):  N/A    Appropriate interventions initiated as needed.     Secondary skin check completed by Spike Estrada. No skin issues noted         Education    Patient has a Allison to Observation order: Yes  Observation education completed and documented: Yes per the ER RN Handoff Note      Rafael Simmons RN      "

## 2025-05-27 NOTE — H&P
[facility]    History and Physical  Hospital Medicine       Date of Admission:  5/27/2025  Date of Service: 5/27/2025     Assessment & Plan   Bebe Alfonso is a 79 year old female with past medical history of renal artery stenosis, multiple CVA's now on chronic anticoagulation, renal artery stenosis, hypertension, thyroid nodule, tobacco use, CKD4 with chronic anemia now presents on 5/27/2025 with fatigue and acute anemia on clinic lab check. She is being admitted for management, monitoring, & ongoing workup of acute on chronic anemia.     Anemia, acute presumed blood loss superimposed on chronic anemia of renal disease  Probable GI bleeding    History of GI polyps: Colonoscopy 2022 with multiple polyps removed & one 15mm rectal polyp not removed. EGD 2022 with removal of multiple gastric polyps, variable Z-line, & probable hiatal hernia.    Presents from clinic with hemoglobin 5.6. Endorses fatigue, coffee ground emesis on 5/25 & melena 5/26. Baseline hemoglobin 10.0 - 11.0. Normocytic (MCV 97). Iron studies WNL in March 2025.     Hemodynamically stable. Presume GI bleeding as source of acute on chronic anemia. Patient is anticoagulated on warfarin. I personally discussed with general surgery who recommends EGD in AM for further eval.   - General surgery consult, appreciate recommendations & assistance  - 2u pRBC ordered to transfuse in ER   - Recheck Hgb ~2hrs post-transfusion     - Additional 1u pRBC available conditionally for persistent hemoglobin <7  - Pantoprazole 40mg IV BID   - Sucralfate TID with meals & at bedtime   - Famotidine 20mg IV Q12 hrs   - NPO except meds  - Lactated ringer 75/hr continuous while NPO   - Occult blood pending  - Goal INR pre-op <1.6 (per surgery). Reversal as per anticoagulation, below.     Chronic anticoagulation  On warfarin for history of multiple CVA's. INR 2.87 on presentation.   Goal INR pre-op is <1.6 per surgery team.   - HOLD PTA warfarin  for possible bleeding, above  - Vitamin K 2mg IV (per warfarin reversal protocol)  - INR Q8hrs     Recurrent cerebrovascular accidents (CVAs)    History of severe atherosclerosis with at least 5 strokes in addition to several minor strokes.   - HOLD PTA aspirin for possible bleeding & pre-op    Adenocarcinoma of lung, left upper lobe    PET / CT 3/26/2025 no evidence of metastasis. Completed radiation therapy late April 2025. Working with Dr. Wise to schedule lingulectomy & lymph node dissection.   Endorses decreased energy since radiation therapy. No hemoptysis.   - Continue regular outpatient follow up with oncology     Mild KATHERYN  CKD stage 4  Renal artery stenosis, right, moderate    Baseline creatinine 1.65 - 1.75, creatinine 1.97 on presentation. BUN chronically elevated & at baseline (~30 - 32).     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 Is not a candidate for surgical intervention for renal artery stenosis.   - HOLD PTA enalapril, below  - IVF as per GI bleeding, above    Hyperkalemia  Mild, K 5.5.   - IVF as per bleeding, above  - Trend with BMP    Bilateral carotid artery stenosis  Carotid US July 2023 shows 50 - 69% bilateral carotid artery stenosis.   Has chronic bruits on exam.     Hypertension  Mildly hypertensive on presentation, preferable in setting of GI bleeding.   - HOLD PTA enalapril for mild KATHERYN, above    COPD  Following with lung nodule clinic, Dr. Henry. Noted to have baseline dyspnea on inhalers, NOT on supplemental home O2.   - Holding PTA fluticasone during admission     Generalized anxiety  - Continue PTA fluoxetine 20mg daily     Hyperlipidemia  - Continue PTA atorvastatin 80mg daily     Tobacco dependence  Encourage cessation.   - Nicotine patch available PRN    Clinically Significant Risk Factors Present on Admission                # Drug Induced Coagulation Defect: home medication list includes an anticoagulant medication  # Drug Induced Platelet Defect: home  medication list includes an antiplatelet medication   # Hypertension: Noted on problem list      # Anemia: based on hgb <11                   Diet: Regular Diet Adult    DVT Prophylaxis: holding PTA warfarin for possible acute bleeding  Hernández Catheter: Not present  Code Status:  full code  Lines: PIV    Disposition Plan     Medically Ready for Discharge: Anticipated in 2-4 Days     The patient's care was discussed with the Attending Physician, Dr. Domo Russell, bedside RN, and the patient.    Lisa Espinal PA-C        Primary Care Physician   Diana Ely [phone]    History is obtained from the patient, who is an ok historian, patient's  who is at the bedside, handoff from ER provider, and review of old records via the EMR.    History of Present Illness   Bebe Alfonso is a 79 year old female with past medical history of renal artery stenosis, multiple CVA's now on chronic anticoagulation, renal artery stenosis, hypertension, thyroid nodule, tobacco use, CKD4 with chronic anemia now presents on 5/27/2025 with fatigue and acute anemia on clinic lab check.     Currently undergoing workup & treatment for lung cancer. She reports chronic fatigue which is slightly worse since undergoing radiation April 2025. She reports chronic dyspnea on exertion which has not changed from baseline. Denies chronic cough.   Reports 5/26 her upper abdomen felt like it was twisted & had severe pain. Pain has since resolved & now only has mild lingering discomfort in upper quadrants. Endorses intermittent constipation, followed by recent episode of dark black diarrhea on 5/26. She also endorses a single episode of coffee ground emesis on 5/25. She denies any history of melena or hematemesis prior to this.   Endorses intermittent chest discomfort, twice over the past few weeks, as a heaviness over her central chest that resolves quickly. No neck or back radiation. She has not noticed this pain since onset of  abdominal pain, emesis, or diarrhea. Denies fevers, always feels chilled.   Was in clinic today for lab draws and was sent to ER. She otherwise says aside from fatigue she's feeling generally well today & has no other acute complaints.     Upon arrival to ER  patient was consented for blood & 2u pRBC were ordered.     I personally discussed case with on call general surgeon Dr. Stewart. A plan was made for patient to have EGD in AM with Dr. Gomez.     Review of Systems    ROS: 10 point ROS neg other than the symptoms noted above in the HPI.    Past Medical History    Past Medical History:   Diagnosis Date    Anticoagulation monitoring, INR range 2-3 10/22/2015    Benign essential hypertension 09/15/2016    Carotid bruit 11/12/2012    Overview:  12/6/10 Carotid US  Elevated velocities throughout the carotid arteries bilaterally. There is a focal area of increased velocity in the mid left internal carotid artery with a plaque in this region on the color flow images. This corresponds to 50 - 70 % diameter reduction. There is an area of elevated velocity in the left external carotid artery consistent with stenosis. There is no focal area of significant stenosis in the right carotid arteries in the neck. Plaque in the carotid arteries bilaterally.     Chronic obstructive pulmonary disease (H) 01/19/2016    The FVC, FEV1 and FEV1/FVC ratio are reduced.  The inspiratory flow rates are within normal limits.  The FVC is reduced relative to the SVC indicating air trapping.  While the TLC is within normal limits, the FRC, RV and RV/TLC ratio are increased.  The  diffusing capacity is mildly reduced. IMPRESSION: Mild-moderate Airflow Obstruction with air trapping ____________________________________________Clarke Iverson  April 2018    Esophageal reflux 04/08/2008    Overview:  Omeprazole PRN, occasional symptoms such as chest burning and knife twisted to stomach.     Heart disease born with it    History of blood  transfusion 70 years ago    History of CVA (cerebrovascular accident) 10/20/2015    Long term current use of anticoagulant therapy 03/20/2018    Major depressive disorder, recurrent episode, moderate (H) 10/29/2008    Overview:  She is not taking any medication at this time.    Malignant neoplasm of upper lobe of left lung (H)     Osteoarthrosis, hip 10/19/2010    Overview:  Pt scheduled for right  total hip arthroplasty on 6/14/13 with Dr. Addison HEREDIA hip xray (11/2012) Severe degenerative changes seen of the right hip with near bone-on-bone appearance of the joint and several subchondral cysts forming.    Thyroid nodule 05/22/2013    Overview:  Thyroid US (2012) Stable nodule left lobe of thyroid TSH- (2/19/12) normal (2.26)     Past Surgical History   Past Surgical History:   Procedure Laterality Date    BIOPSY  appox in 1980's    BREAST SURGERY  last time in the 1990's    non cancer    BRONCHOSCOPY, WITH BIOPSY, ROBOT ASSISTED Bilateral 3/20/2025    Procedure: BRONCHOSCOPY, ROBOT-ASSISTED ULTASOUND, WITH BIOPSY, ION. Flexible bronchoscopy, with endobronchial ultrasound and transbronchial biopsies;  Surgeon: Gallo Jurado MD;  Location: [facility] OR    COLONOSCOPY N/A 9/7/2022    Procedure: COLONOSCOPY, WITH POLYPECTOMY AND BIOPSY;  Surgeon: Alfredito Gomez DO;  Location: [facility]    ESOPHAGOSCOPY, GASTROSCOPY, DUODENOSCOPY (EGD), COMBINED N/A 5/25/2022    Procedure: ESOPHAGOGASTRODUODENOSCOPY, WITH BIOPSY;  Surgeon: Alfredito Gomez DO;  Location: [facility]      Prior to Admission Medications   Prior to Admission Medications   Prescriptions Last Dose Informant Patient Reported? Taking?   Aspirin (VAZALORE) 81 MG CAPS   Yes No   Sig: Please choose reason not prescribed from choices below.   FLUoxetine (PROZAC) 20 MG capsule   No No   Sig: Take 1 capsule by mouth once daily   amLODIPine (NORVASC) 5 MG tablet   No No   Sig: Take 1 tablet (5 mg) by mouth daily.   atorvastatin (LIPITOR) 80 MG tablet   No No    Sig: Take 1 tablet by mouth once daily   enalapril (VASOTEC) 20 MG tablet   No No   Sig: Take 1 tablet (20 mg) by mouth daily. Take in the morning   famotidine (PEPCID) 40 MG tablet   No No   Sig: Take 1 tablet by mouth once daily   fluticasone (FLONASE) 50 MCG/ACT nasal spray   No No   Sig: Spray 1 spray into both nostrils daily   warfarin ANTICOAGULANT (COUMADIN) 5 MG tablet   No No   Sig: Take 7.5 mg every Thu; 5 mg all other days or As directed by Anticoagulation clinic      Facility-Administered Medications: None     Allergies   Allergies   Allergen Reactions    Losartan Swelling, Nausea, Shortness Of Breath and Palpitations    Gabapentin GI Disturbance and Unknown     Double vision  flatulence    Lisinopril Cough    Oxycodone Other (See Comments)     But has tolerated hydrocodone    Tramadol Other (See Comments)    Augmentin [Amoxicillin-Pot Clavulanate] Rash    Bacitracin Rash     blisters    Bupropion Rash     Allergic to brand not generic     Family History    Family History   Problem Relation Age of Onset    Diabetes Maternal Grandfather     Diabetes Sister         developed after cancer treatment    Breast Cancer Sister     Obesity Sister     Hypertension Mother     Hyperlipidemia Mother     Osteoporosis Mother     Breast Cancer Sister     Osteoporosis Sister     Breast Cancer Daughter     Other Cancer Sister         throught out body    Substance Abuse Sister         acol    Obesity Sister     Substance Abuse Sister         acol    Obesity Sister      Social History   Social History     Socioeconomic History    Marital status:      Physical Exam   BP (!) 150/44   Pulse 74   Temp 98.3  F (36.8  C) (Oral)   Resp 18   LMP 09/15/1998   SpO2 96%      Weight: 114 lbs 0 oz Body mass index is 18.68 kg/m .     Constitutional: Alert, cooperative, no apparent distress, appears nontoxic  Eyes: Eyes are clear, pupils are equal, round. EOMs intact.   HENT: Oropharynx is clear and moist. Tongue midline  with some fasciculations. No evidence of cranial trauma.  Cardiovascular: Regular rate and rhythm, normal S1 and S2, and systolic murmur LUSB, RUSB. Radial pulse 2+. NO pitting lower extremity edema.  Respiratory: Clear to auscultation bilaterally. Unlabored on room air.   GI: Soft, mildly tender to deeper palpation in epigastric & LUQ. Normal bowel sounds. Non-distended.   Genitourinary: Deferred  Musculoskeletal: Normal muscle bulk, no obvious joint deformities.   Skin: Warm and dry, no rashes. Mild pallor.   Neurologic: Alert, mild speech enunciation trouble is minimal. No facial droop.  is symmetric.     Data   Data reviewed today:     I have personally reviewed the following data over the past 24 hrs:    4.8; 4.8  \   4.9 (LL); 4.9 (LL)   / 226; 262     137 104 31.2 (H) /  94   5.5 (H) 22 1.97 (H) \     ALT: 9 AST: 17 AP: 43 TBILI: <0.2   ALB: 3.8 TOT PROTEIN: 5.7 (L) LIPASE: N/A     INR:  2.87 (H) PTT:  39 (H)   D-dimer:  N/A Fibrinogen:  N/A     Ferritin:  41 % Retic:  N/A LDH:  N/A

## 2025-05-27 NOTE — LETTER
Transition Communication Hand-off for Care Transitions to Next Level of Care Provider    Name: Bebe Alfonso  : 1945  MRN #: [id number]  Primary Care Provider: Diana Ely     Primary Clinic: [address] [place] [id number]     Reason for Hospitalization:  Upper GI bleed [K92.2]  Anticoagulated [Z79.01]  Anemia, unspecified type [D64.9]  Recurrent cerebrovascular accidents (CVAs) (H) [I63.9]  Admit Date/Time: 2025  1:49 PM  Discharge Date: 25  Payor Source: Payor: [facility] / Plan: [facility] MEDICARE / Product Type: HMO /     Readmission Assessment Measure (BRYAN) Risk Score/category: 20%           Reason for Communication Hand-off Referral: Fragility    Discharge Plan:  Discharging to [facility] at 1330. Family to transport.     Concern for non-adherence with plan of care:   No  Discharge Needs Assessment:  Needs      Flowsheet Row Most Recent Value   Equipment Currently Used at Home shower chair, walker, rolling   # of Referrals Placed by CM Post Acute Facilities, Homecare            Already enrolled in Tele-monitoring program and name of program:  No  Follow-up specialty is recommended: Yes    Follow-up plan:    Future Appointments   Date Time Provider Department Center   2025 10:20 AM [facility]   2025  9:00 AM [facility]   2025  9:50 AM [facility]   2025 10:30 AM Gogo Jimenez APRN CNP [facility]   2025  9:00 AM Isi Seth PA-C [facility]   2025  7:00 AM [facility]   2025  9:00 AM Tri Jacinto PA-C [facility]   2026 11:00 AM Diana Ely APRN CNP [facility]       Any outstanding tests or procedures:        Referrals       Future Labs/Procedures    Primary Care - Care Coordination Referral     Process Instructions:    Services are provided by a Care Coordinator for people with complex needs such as: medical, social, or financial troubles.   The Care Coordinator works with the patient and their Primary Care Provider to determine health goals, obtain resources, achieve outcomes, and develop care plans that help coordinate the patient's care.     Comments:                   Key Recommendations:      KWABENA Rose    AVS/Discharge Summary is the source of truth; this is a helpful guide for improved communication of patient story

## 2025-05-28 ENCOUNTER — ANESTHESIA EVENT (OUTPATIENT)
Dept: GASTROENTEROLOGY | Facility: CLINIC | Age: 80
End: 2025-05-28
Payer: COMMERCIAL

## 2025-05-28 ENCOUNTER — RESULTS FOLLOW-UP (OUTPATIENT)
Dept: ANTICOAGULATION | Facility: CLINIC | Age: 80
End: 2025-05-28

## 2025-05-28 ENCOUNTER — ANESTHESIA (OUTPATIENT)
Dept: GASTROENTEROLOGY | Facility: CLINIC | Age: 80
End: 2025-05-28
Payer: COMMERCIAL

## 2025-05-28 PROBLEM — K92.2 UPPER GI BLEED: Status: ACTIVE | Noted: 2025-05-28

## 2025-05-28 LAB
ABO + RH BLD: NORMAL
ALBUMIN SERPL BCG-MCNC: 3.5 G/DL (ref 3.5–5.2)
ALP SERPL-CCNC: 42 U/L (ref 40–150)
ALT SERPL W P-5'-P-CCNC: 10 U/L (ref 0–50)
ANION GAP SERPL CALCULATED.3IONS-SCNC: 10 MMOL/L (ref 7–15)
AST SERPL W P-5'-P-CCNC: 15 U/L (ref 0–45)
BASOPHILS # BLD AUTO: 0.1 10E3/UL (ref 0–0.2)
BASOPHILS NFR BLD AUTO: 1 %
BILIRUB SERPL-MCNC: 0.5 MG/DL
BLD GP AB SCN SERPL QL: NEGATIVE
BUN SERPL-MCNC: 26.8 MG/DL (ref 8–23)
CALCIUM SERPL-MCNC: 8.8 MG/DL (ref 8.8–10.4)
CHLORIDE SERPL-SCNC: 107 MMOL/L (ref 98–107)
CREAT SERPL-MCNC: 1.74 MG/DL (ref 0.51–0.95)
EGFRCR SERPLBLD CKD-EPI 2021: 30 ML/MIN/1.73M2
EOSINOPHIL # BLD AUTO: 0.1 10E3/UL (ref 0–0.7)
EOSINOPHIL NFR BLD AUTO: 2 %
ERYTHROCYTE [DISTWIDTH] IN BLOOD BY AUTOMATED COUNT: 16.8 % (ref 10–15)
FLEXIBLE SIGMOIDOSCOPY: NORMAL
GLUCOSE SERPL-MCNC: 89 MG/DL (ref 70–99)
HCO3 SERPL-SCNC: 21 MMOL/L (ref 22–29)
HCT VFR BLD AUTO: 23.6 % (ref 35–47)
HCT VFR BLD AUTO: 24.3 % (ref 35–47)
HCT VFR BLD AUTO: 25 % (ref 35–47)
HGB BLD-MCNC: 7.7 G/DL (ref 11.7–15.7)
HGB BLD-MCNC: 7.8 G/DL (ref 11.7–15.7)
HGB BLD-MCNC: 7.9 G/DL (ref 11.7–15.7)
HGB BLD-MCNC: 8 G/DL (ref 11.7–15.7)
IMM GRANULOCYTES # BLD: 0 10E3/UL
IMM GRANULOCYTES NFR BLD: 1 %
INR PPP: 1.4 (ref 0.85–1.15)
INR PPP: 1.86 (ref 0.85–1.15)
LVEF ECHO: NORMAL
LYMPHOCYTES # BLD AUTO: 0.8 10E3/UL (ref 0.8–5.3)
LYMPHOCYTES NFR BLD AUTO: 15 %
MCH RBC QN AUTO: 29.4 PG (ref 26.5–33)
MCHC RBC AUTO-ENTMCNC: 32.5 G/DL (ref 31.5–36.5)
MCV RBC AUTO: 89 FL (ref 78–100)
MCV RBC AUTO: 90 FL (ref 78–100)
MCV RBC AUTO: 90 FL (ref 78–100)
MCV RBC AUTO: 91 FL (ref 78–100)
MONOCYTES # BLD AUTO: 0.5 10E3/UL (ref 0–1.3)
MONOCYTES NFR BLD AUTO: 10 %
NEUTROPHILS # BLD AUTO: 3.8 10E3/UL (ref 1.6–8.3)
NEUTROPHILS NFR BLD AUTO: 72 %
NRBC # BLD AUTO: 0 10E3/UL
NRBC BLD AUTO-RTO: 0 /100
PHOSPHATE SERPL-MCNC: 2.9 MG/DL (ref 2.5–4.5)
PLATELET # BLD AUTO: 222 10E3/UL (ref 150–450)
POTASSIUM SERPL-SCNC: 4.7 MMOL/L (ref 3.4–5.3)
PROT SERPL-MCNC: 5.4 G/DL (ref 6.4–8.3)
PROTHROMBIN TIME: 16.9 SECONDS (ref 11.8–14.8)
PROTHROMBIN TIME: 21 SECONDS (ref 11.8–14.8)
RBC # BLD AUTO: 2.69 10E6/UL (ref 3.8–5.2)
SODIUM SERPL-SCNC: 138 MMOL/L (ref 135–145)
SPECIMEN EXP DATE BLD: NORMAL
UPPER GI ENDOSCOPY: NORMAL
WBC # BLD AUTO: 5.3 10E3/UL (ref 4–11)

## 2025-05-28 PROCEDURE — 250N000011 HC RX IP 250 OP 636: Performed by: NURSE ANESTHETIST, CERTIFIED REGISTERED

## 2025-05-28 PROCEDURE — 120N000001 HC R&B MED SURG/OB

## 2025-05-28 PROCEDURE — 45330 DIAGNOSTIC SIGMOIDOSCOPY: CPT | Performed by: SURGERY

## 2025-05-28 PROCEDURE — 96376 TX/PRO/DX INJ SAME DRUG ADON: CPT

## 2025-05-28 PROCEDURE — 96374 THER/PROPH/DIAG INJ IV PUSH: CPT

## 2025-05-28 PROCEDURE — 370N000017 HC ANESTHESIA TECHNICAL FEE, PER MIN: Performed by: SURGERY

## 2025-05-28 PROCEDURE — 85610 PROTHROMBIN TIME: CPT

## 2025-05-28 PROCEDURE — G0378 HOSPITAL OBSERVATION PER HR: HCPCS

## 2025-05-28 PROCEDURE — 85610 PROTHROMBIN TIME: CPT | Performed by: STUDENT IN AN ORGANIZED HEALTH CARE EDUCATION/TRAINING PROGRAM

## 2025-05-28 PROCEDURE — 43235 EGD DIAGNOSTIC BRUSH WASH: CPT | Performed by: SURGERY

## 2025-05-28 PROCEDURE — 0DB78ZX EXCISION OF STOMACH, PYLORUS, VIA NATURAL OR ARTIFICIAL OPENING ENDOSCOPIC, DIAGNOSTIC: ICD-10-PCS | Performed by: SURGERY

## 2025-05-28 PROCEDURE — 36415 COLL VENOUS BLD VENIPUNCTURE: CPT | Performed by: FAMILY MEDICINE

## 2025-05-28 PROCEDURE — 250N000013 HC RX MED GY IP 250 OP 250 PS 637: Performed by: STUDENT IN AN ORGANIZED HEALTH CARE EDUCATION/TRAINING PROGRAM

## 2025-05-28 PROCEDURE — 250N000011 HC RX IP 250 OP 636

## 2025-05-28 PROCEDURE — 85018 HEMOGLOBIN: CPT

## 2025-05-28 PROCEDURE — 250N000013 HC RX MED GY IP 250 OP 250 PS 637

## 2025-05-28 PROCEDURE — 85014 HEMATOCRIT: CPT | Performed by: STUDENT IN AN ORGANIZED HEALTH CARE EDUCATION/TRAINING PROGRAM

## 2025-05-28 PROCEDURE — 0DBP8ZZ EXCISION OF RECTUM, VIA NATURAL OR ARTIFICIAL OPENING ENDOSCOPIC: ICD-10-PCS | Performed by: SURGERY

## 2025-05-28 PROCEDURE — 0W3P8ZZ CONTROL BLEEDING IN GASTROINTESTINAL TRACT, VIA NATURAL OR ARTIFICIAL OPENING ENDOSCOPIC: ICD-10-PCS | Performed by: SURGERY

## 2025-05-28 PROCEDURE — 84100 ASSAY OF PHOSPHORUS: CPT | Performed by: STUDENT IN AN ORGANIZED HEALTH CARE EDUCATION/TRAINING PROGRAM

## 2025-05-28 PROCEDURE — 88305 TISSUE EXAM BY PATHOLOGIST: CPT | Mod: TC | Performed by: SURGERY

## 2025-05-28 PROCEDURE — 36415 COLL VENOUS BLD VENIPUNCTURE: CPT

## 2025-05-28 PROCEDURE — 99232 SBSQ HOSP IP/OBS MODERATE 35: CPT | Performed by: STUDENT IN AN ORGANIZED HEALTH CARE EDUCATION/TRAINING PROGRAM

## 2025-05-28 PROCEDURE — 85014 HEMATOCRIT: CPT | Performed by: FAMILY MEDICINE

## 2025-05-28 PROCEDURE — 82247 BILIRUBIN TOTAL: CPT | Performed by: FAMILY MEDICINE

## 2025-05-28 PROCEDURE — 36415 COLL VENOUS BLD VENIPUNCTURE: CPT | Performed by: STUDENT IN AN ORGANIZED HEALTH CARE EDUCATION/TRAINING PROGRAM

## 2025-05-28 PROCEDURE — 96361 HYDRATE IV INFUSION ADD-ON: CPT

## 2025-05-28 RX ORDER — PROPOFOL 10 MG/ML
INJECTION, EMULSION INTRAVENOUS PRN
Status: DISCONTINUED | OUTPATIENT
Start: 2025-05-28 | End: 2025-05-28

## 2025-05-28 RX ORDER — ALBUTEROL SULFATE 0.83 MG/ML
2.5 SOLUTION RESPIRATORY (INHALATION)
OUTPATIENT
Start: 2025-05-28

## 2025-05-28 RX ORDER — DIPHENHYDRAMINE HYDROCHLORIDE 50 MG/ML
50 INJECTION, SOLUTION INTRAMUSCULAR; INTRAVENOUS
OUTPATIENT
Start: 2025-05-28

## 2025-05-28 RX ORDER — ENALAPRIL MALEATE 10 MG/1
20 TABLET ORAL DAILY
Status: DISCONTINUED | OUTPATIENT
Start: 2025-05-29 | End: 2025-05-30 | Stop reason: HOSPADM

## 2025-05-28 RX ORDER — MEPERIDINE HYDROCHLORIDE 25 MG/ML
25 INJECTION INTRAMUSCULAR; INTRAVENOUS; SUBCUTANEOUS
OUTPATIENT
Start: 2025-05-28

## 2025-05-28 RX ORDER — ALBUTEROL SULFATE 90 UG/1
1-2 INHALANT RESPIRATORY (INHALATION)
OUTPATIENT
Start: 2025-05-28

## 2025-05-28 RX ORDER — DIPHENHYDRAMINE HYDROCHLORIDE 50 MG/ML
25 INJECTION, SOLUTION INTRAMUSCULAR; INTRAVENOUS
OUTPATIENT
Start: 2025-05-28

## 2025-05-28 RX ORDER — SODIUM CHLORIDE, SODIUM LACTATE, POTASSIUM CHLORIDE, CALCIUM CHLORIDE 600; 310; 30; 20 MG/100ML; MG/100ML; MG/100ML; MG/100ML
INJECTION, SOLUTION INTRAVENOUS CONTINUOUS
Status: DISCONTINUED | OUTPATIENT
Start: 2025-05-28 | End: 2025-05-28 | Stop reason: HOSPADM

## 2025-05-28 RX ORDER — LIDOCAINE 40 MG/G
CREAM TOPICAL
Status: DISCONTINUED | OUTPATIENT
Start: 2025-05-28 | End: 2025-05-28 | Stop reason: HOSPADM

## 2025-05-28 RX ORDER — ASPIRIN 81 MG/1
81 TABLET ORAL DAILY
Status: DISCONTINUED | OUTPATIENT
Start: 2025-05-28 | End: 2025-05-30 | Stop reason: HOSPADM

## 2025-05-28 RX ORDER — METHYLPREDNISOLONE SODIUM SUCCINATE 40 MG/ML
40 INJECTION INTRAMUSCULAR; INTRAVENOUS
OUTPATIENT
Start: 2025-05-28

## 2025-05-28 RX ADMIN — PROPOFOL 50 MG: 10 INJECTION, EMULSION INTRAVENOUS at 13:04

## 2025-05-28 RX ADMIN — FAMOTIDINE 20 MG: 10 INJECTION, SOLUTION INTRAVENOUS at 05:04

## 2025-05-28 RX ADMIN — SUCRALFATE 1 G: 1 SUSPENSION ORAL at 06:33

## 2025-05-28 RX ADMIN — PROPOFOL 20 MG: 10 INJECTION, EMULSION INTRAVENOUS at 13:07

## 2025-05-28 RX ADMIN — PANTOPRAZOLE SODIUM 40 MG: 40 INJECTION, POWDER, FOR SOLUTION INTRAVENOUS at 08:00

## 2025-05-28 RX ADMIN — FAMOTIDINE 20 MG: 10 INJECTION, SOLUTION INTRAVENOUS at 16:39

## 2025-05-28 RX ADMIN — PROPOFOL 20 MG: 10 INJECTION, EMULSION INTRAVENOUS at 13:28

## 2025-05-28 RX ADMIN — SUCRALFATE 1 G: 1 SUSPENSION ORAL at 22:54

## 2025-05-28 RX ADMIN — PROPOFOL 20 MG: 10 INJECTION, EMULSION INTRAVENOUS at 13:12

## 2025-05-28 RX ADMIN — PROPOFOL 20 MG: 10 INJECTION, EMULSION INTRAVENOUS at 13:16

## 2025-05-28 RX ADMIN — SUCRALFATE 1 G: 1 SUSPENSION ORAL at 16:39

## 2025-05-28 RX ADMIN — ASPIRIN 81 MG: 81 TABLET, COATED ORAL at 16:39

## 2025-05-28 RX ADMIN — PROPOFOL 20 MG: 10 INJECTION, EMULSION INTRAVENOUS at 13:09

## 2025-05-28 RX ADMIN — PROPOFOL 20 MG: 10 INJECTION, EMULSION INTRAVENOUS at 13:18

## 2025-05-28 RX ADMIN — PROPOFOL 40 MG: 10 INJECTION, EMULSION INTRAVENOUS at 13:22

## 2025-05-28 RX ADMIN — PROPOFOL 20 MG: 10 INJECTION, EMULSION INTRAVENOUS at 13:14

## 2025-05-28 RX ADMIN — PANTOPRAZOLE SODIUM 40 MG: 40 INJECTION, POWDER, FOR SOLUTION INTRAVENOUS at 18:23

## 2025-05-28 RX ADMIN — PROPOFOL 20 MG: 10 INJECTION, EMULSION INTRAVENOUS at 13:06

## 2025-05-28 ASSESSMENT — ACTIVITIES OF DAILY LIVING (ADL)
ADLS_ACUITY_SCORE: 28
ADLS_ACUITY_SCORE: 25
ADLS_ACUITY_SCORE: 28
ADLS_ACUITY_SCORE: 21
ADLS_ACUITY_SCORE: 28
ADLS_ACUITY_SCORE: 21
ADLS_ACUITY_SCORE: 28
ADLS_ACUITY_SCORE: 25
ADLS_ACUITY_SCORE: 25
ADLS_ACUITY_SCORE: 28
ADLS_ACUITY_SCORE: 21
ADLS_ACUITY_SCORE: 25
ADLS_ACUITY_SCORE: 28
ADLS_ACUITY_SCORE: 28
ADLS_ACUITY_SCORE: 25
ADLS_ACUITY_SCORE: 21

## 2025-05-28 ASSESSMENT — LIFESTYLE VARIABLES: TOBACCO_USE: 1

## 2025-05-28 ASSESSMENT — COPD QUESTIONNAIRES
COPD: 1
CAT_SEVERITY: MILD

## 2025-05-28 NOTE — PROGRESS NOTES
[facility]    Medicine Progress Note - Hospitalist Service    Date of Admission:  5/27/2025    Assessment & Plan   Bebe Alfonso is a 79 year old female with past medical history of renal artery stenosis, multiple CVA's now on chronic anticoagulation, renal artery stenosis, hypertension, thyroid nodule, tobacco use, CKD4 with chronic anemia now presents on 5/27/2025 with fatigue and acute anemia on clinic lab check. She is being admitted for management, monitoring, & ongoing workup of acute on chronic anemia.     Update: No further BM. No further emesis/hematemesis. Bidirectional endoscopy today (egd +flex sig) to eval for etiology of anemia.    Addendum: single bleeding 'angiodysplastic lesion' s/p clips in duodenum on upper endoscopy. Have reached out to endoscopist to see if can specify if this is AVM or angioectasia (or a diuleafoy lesion). If angiectasia, would recommend getting TTE to eval for aortic stenosis (heyde syndrome) while hospitalized. If avm or diulefoy lesion, can be done as outpatient if discharging tomorrow.        Anemia, acute presumed blood loss superimposed on chronic anemia of renal disease  Probable GI bleeding    History of GI polyps: Colonoscopy 2022 with multiple polyps removed & one 15mm rectal polyp not removed. EGD 2022 with removal of multiple gastric polyps, variable Z-line, & probable hiatal hernia.    Presents from clinic with hemoglobin 5.6. Endorses fatigue, coffee ground emesis on 5/25 & melena 5/26. Baseline hemoglobin 10.0 - 11.0. Normocytic (MCV 97). Iron studies WNL in March 2025.     Hemodynamically stable. Presume GI bleeding as source of acute on chronic anemia. Patient is anticoagulated on warfarin. I personally discussed with general surgery who recommends EGD in AM for further eval.   5/27: received 2u pRBC with hgb 5 -> 8    5/28: No further BM. No further emesis/hematemesis. Bidirectional endoscopy today (egd +flex sig) to eval for  etiology of anemia.  Iron deficit (Ganzoni): 1100mg (received 2u pRBC = 500mg )    - Venofer 300mg q24hr x2 then transition to po iron every other day on discharge  - General surgery consult, appreciate recommendations & assistance  - 2u pRBC ordered to transfuse in ER   - Recheck Hgb ~2hrs post-transfusion     - Additional 1u pRBC available conditionally for persistent hemoglobin <7  - Pantoprazole 40mg IV BID   - Sucralfate QID with meals & at bedtime   - Famotidine 20mg IV Q12 hrs   - NPO except meds  - Lactated ringer 50/hr continuous while NPO   - Goal INR pre-op <1.6 (per surgery). Reversal as per anticoagulation, below.    Cardiac Murmur  Presumable aortic stenosis murmur but not classic cresc-decresc. 3/6 ejection (or late diastolic?) murmur best heard at RUSB and LUSB with bilateral carotid radiation and slight delayed pulses.     - TTE ordered, pending endoscopy results and discharge plan, may be reasonable to do as an outpatient    Chronic anticoagulation  On warfarin for history of multiple CVA's. INR 2.87 on presentation.   Goal INR pre-op is <1.6 per surgery team.     - HOLD PTA warfarin for possible bleeding, above  - Vitamin K 2mg IV (per warfarin reversal protocol)  - resume when able with surgery, would prioritize asa 81mg before resuming AC      Recurrent cerebrovascular accidents (CVAs)  History of severe atherosclerosis with at least 5 strokes in addition to several minor strokes.     - HOLD PTA aspirin for possible bleeding & pre-op   - Resume when able per surgery    Adenocarcinoma of lung, left upper lobe    PET / CT 3/26/2025 no evidence of metastasis. Completed radiation therapy late April 2025. Working with Dr. Wise to schedule lingulectomy & lymph node dissection.   Endorses decreased energy since radiation therapy. No hemoptysis.   - Continue regular outpatient follow up with oncology     Mild KATHERYN, resolved  CKD stage 4  Renal artery stenosis, right, moderate    Baseline creatinine  1.65 - 1.75, creatinine 1.97 on presentation. BUN chronically elevated & at baseline (~30 - 32).     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 Is not a candidate for surgical intervention for renal artery stenosis.   - okay to resume enalapril 5/29  - IVF as per GI bleeding, above    Hyperkalemia  Mild, K 5.5.   - IVF as per bleeding, above  - Trend with BMP    Bilateral carotid artery stenosis  Carotid US July 2023 shows 50 - 69% bilateral carotid artery stenosis.   Has chronic bruits on exam.     Hypertension  Mildly hypertensive on presentation, preferable in setting of GI bleeding.   - HOLD PTA enalapril for mild KATHERYN, above    COPD  Following with lung nodule clinic, Dr. Henry. Noted to have baseline dyspnea on inhalers, NOT on supplemental home O2.   - Holding PTA fluticasone during admission     Generalized anxiety  - Continue PTA fluoxetine 20mg daily     Hyperlipidemia  - Continue PTA atorvastatin 80mg daily     Tobacco dependence  Encourage cessation.   - Nicotine patch available PRN          Diet: NPO for Procedure/Surgery per Anesthesia Guidelines    DVT Prophylaxis: on hold-Warfarin  Hernández Catheter: Not present  Lines: None     Cardiac Monitoring: None  Code Status: Full Code      Clinically Significant Risk Factors Present on Admission        # Hyperkalemia: Highest K = 5.5 mmol/L in last 2 days, will monitor as appropriate           # Drug Induced Coagulation Defect: home medication list includes an anticoagulant medication  # Drug Induced Platelet Defect: home medication list includes an antiplatelet medication   # Hypertension: Noted on problem list      # Anemia: based on hgb <11                  Social Drivers of Health    Tobacco Use: High Risk (5/28/2025)    Patient History     Smoking Tobacco Use: Some Days     Smokeless Tobacco Use: Never   Physical Activity: Inactive (12/19/2024)    Exercise Vital Sign     Days of Exercise per Week: 0 days     Minutes of Exercise per Session:  0 min   Social Connections: Unknown (12/19/2024)    Social Connection and Isolation Panel [NHANES]     Frequency of Social Gatherings with Friends and Family: Once a week          Disposition Plan     Medically Ready for Discharge: Anticipated Tomorrow             Dmitry Tapia DO  Hospitalist Service  [facility]  Securely message with [employer] (more info)  Text page via [employer] Paging/Directory   ______________________________________________________________________    Interval History   No acute events.    Physical Exam   Vital Signs: Temp: 98.5  F (36.9  C) Temp src: Oral BP: (!) 161/56 Pulse: 75   Resp: 20 SpO2: 97 % O2 Device: None (Room air)    Weight: 114 lbs 0 oz    Physical Exam  Constitutional:       General: Pt is not in acute distress.  HENT:      Head: Normocephalic and atraumatic.      Nose: Nose normal.   Eyes:      Conjunctiva/sclera: Conjunctivae normal.   CardioPulmonary:      Effort: Pulmonary effort is normal. 3/6 ejection murmur with carotid radiation.  Abdominal:      General: Abdomen is flat.   Skin:     Findings: No rash.   Neurological:      General: No focal deficit present.      Mental Status: Pt is alert.   Psychiatric:         Mood and Affect: Mood normal.       Medical Decision Making       45 MINUTES SPENT BY ME on the date of service doing chart review, history, exam, documentation & further activities per the note.      Data     I have personally reviewed the following data over the past 24 hrs:    5.3  \   8.0 (L)   / 222     138 107 26.8 (H) /  89   4.7 21 (L) 1.74 (H) \     ALT: 10 AST: 15 AP: 42 TBILI: 0.5   ALB: 3.5 TOT PROTEIN: 5.4 (L) LIPASE: N/A     INR:  1.40 (H) PTT:  39 (H)   D-dimer:  N/A Fibrinogen:  N/A     Ferritin:  N/A % Retic:  N/A LDH:  N/A       Imaging results reviewed over the past 24 hrs:   No results found for this or any previous visit (from the past 24 hours).

## 2025-05-28 NOTE — ANESTHESIA CARE TRANSFER NOTE
Patient: Bebe Alfonso    Procedure: Procedure(s):  ESOPHAGOGASTRODUODENOSCOPY  Sigmoidoscopy flexible       Diagnosis: Upper GI bleed [K92.2]  Diagnosis Additional Information: No value filed.    Anesthesia Type:   General     Note:    Oropharynx: oropharynx clear of all foreign objects  Level of Consciousness: drowsy  Oxygen Supplementation: room air    Independent Airway: airway patency satisfactory and stable  Dentition: dentition unchanged  Vital Signs Stable: post-procedure vital signs reviewed and stable  Report to RN Given: handoff report given  Patient transferred to: Phase II    Handoff Report: Identifed the Patient, Identified the Reponsible Provider, Reviewed the pertinent medical history, Discussed the surgical course, Reviewed Intra-OP anesthesia mangement and issues during anesthesia, Set expectations for post-procedure period and Allowed opportunity for questions and acknowledgement of understanding      Vitals:  Vitals Value Taken Time   /48 05/28/25 13:45   Temp     Pulse 66 05/28/25 13:45   Resp     SpO2 95 % 05/28/25 13:46   Vitals shown include unfiled device data.    Electronically Signed By: SELWYN Whitehead CRNA  May 28, 2025  1:48 PM

## 2025-05-28 NOTE — PLAN OF CARE
Goal Outcome Evaluation:      Plan of Care Reviewed With: patient    Overall Patient Progress: improvingOverall Patient Progress: improving             Patient is alert and oriented.  Up with stand by assist.  Denying any pain or nausea.  IV infusing LR @ 50. Patient had an EGD today.  Hemoglobin has remained stable with a hgb of 7.9 and 8.0, next recheck is 2200.    Time cared for patient 0700 to 1930

## 2025-05-28 NOTE — PHARMACY-ANTICOAGULATION SERVICE
Clinical Pharmacy - Warfarin Dosing Consult     Pharmacy has been consulted to manage this patient s warfarin therapy.  Indication:  (hx CVA)  Therapy Goal: INR 2-3  Provider/Team: Diana Ely APRN CNP  [facility]  Warfarin Prior to Admission: Yes  Warfarin PTA Regimen: 5 mg every day ([facility] has PTA warfarin as 7.5 mg Thur & 5 mg ROW but their note says 5 mg daily. Chart review with [facility] 5/15 states 5 mg daily. Going with 5 mg daily as PTA regimen.)  Significant drug interactions: Vit K 2 mg IV given on 5/27 evening  Recent documented change in oral intake/nutrition: Unknown  Dose Comments: Next check 5/27/25    INR   Date Value Ref Range Status   05/28/2025 1.40 (H) 0.85 - 1.15 Final   05/28/2025 1.86 (H) 0.85 - 1.15 Final       Recommend warfarin NO DOSE today since consult is placed to have therapy start 5/29/25.  Pharmacy will monitor Bebe Alfonso daily and order warfarin doses to achieve specified goal.      Please contact pharmacy as soon as possible if the warfarin needs to be held for a procedure or if the warfarin goals change.      Thanks,     Sudha Bowman RP on 5/28/2025 at 4:27 PM

## 2025-05-28 NOTE — PROGRESS NOTES
Skin affirmation note    Admitting nurse completed full skin assessment, Mart score and Mart interventions. This writer agrees with the initial skin assessment findings.

## 2025-05-28 NOTE — ANESTHESIA PREPROCEDURE EVALUATION
Anesthesia Pre-Procedure Evaluation    Patient: Bebe Alfonso   MRN: [id number] : 1945          Procedure : Procedure(s):  ESOPHAGOGASTRODUODENOSCOPY         Past Medical History:   Diagnosis Date     Anticoagulation monitoring, INR range 2-3 10/22/2015     Benign essential hypertension 09/15/2016     Carotid bruit 2012    Overview:  12/6/10 Carotid US  Elevated velocities throughout the carotid arteries bilaterally. There is a focal area of increased velocity in the mid left internal carotid artery with a plaque in this region on the color flow images. This corresponds to 50 - 70 % diameter reduction. There is an area of elevated velocity in the left external carotid artery consistent with stenosis. There is no focal area of significant stenosis in the right carotid arteries in the neck. Plaque in the carotid arteries bilaterally.      Chronic obstructive pulmonary disease (H) 2016    The FVC, FEV1 and FEV1/FVC ratio are reduced.  The inspiratory flow rates are within normal limits.  The FVC is reduced relative to the SVC indicating air trapping.  While the TLC is within normal limits, the FRC, RV and RV/TLC ratio are increased.  The  diffusing capacity is mildly reduced. IMPRESSION: Mild-moderate Airflow Obstruction with air trapping ____________________________________________Clarke Iverson  2018     Esophageal reflux 2008    Overview:  Omeprazole PRN, occasional symptoms such as chest burning and knife twisted to stomach.      Heart disease born with it     History of blood transfusion 70 years ago     History of CVA (cerebrovascular accident) 10/20/2015     Long term current use of anticoagulant therapy 2018     Major depressive disorder, recurrent episode, moderate (H) 10/29/2008    Overview:  She is not taking any medication at this time.     Malignant neoplasm of upper lobe of left lung (H)      Osteoarthrosis, hip 10/19/2010    Overview:  Pt scheduled for right   total hip arthroplasty on 13 with Dr. Addison HEREDIA hip xray (2012) Severe degenerative changes seen of the right hip with near bone-on-bone appearance of the joint and several subchondral cysts forming.     Thyroid nodule 2013    Overview:  Thyroid US (2012) Stable nodule left lobe of thyroid TSH- (12) normal (2.26)      Past Surgical History:   Procedure Laterality Date     BIOPSY  appox in      BREAST SURGERY  last time in the     non cancer     BRONCHOSCOPY, WITH BIOPSY, ROBOT ASSISTED Bilateral 3/20/2025    Procedure: BRONCHOSCOPY, ROBOT-ASSISTED ULTASOUND, WITH BIOPSY, ION. Flexible bronchoscopy, with endobronchial ultrasound and transbronchial biopsies;  Surgeon: Gallo Jurado MD;  Location: [facility] OR     COLONOSCOPY N/A 2022    Procedure: COLONOSCOPY, WITH POLYPECTOMY AND BIOPSY;  Surgeon: Alfredito Gomez DO;  Location: [facility]     ESOPHAGOSCOPY, GASTROSCOPY, DUODENOSCOPY (EGD), COMBINED N/A 2022    Procedure: ESOPHAGOGASTRODUODENOSCOPY, WITH BIOPSY;  Surgeon: Alfredito Gomez DO;  Location: [facility]      Allergies   Allergen Reactions     Losartan Swelling, Nausea, Shortness Of Breath and Palpitations     Gabapentin Other (See Comments) and GI Disturbance     Double vision, flatulence     Lisinopril Cough     Oxycodone Other (See Comments)     But has tolerated hydrocodone     Tramadol Other (See Comments)     Augmentin [Amoxicillin-Pot Clavulanate] Rash     Bacitracin Rash     blisters     Bupropion Rash     Allergic to brand not generic      Social History     Tobacco Use     Smoking status: Some Days     Current packs/day: 0.00     Average packs/day: 1 pack/day for 59.2 years (59.2 ttl pk-yrs)     Types: Cigarettes     Start date:      Last attempt to quit: 2025     Years since quittin.1     Smokeless tobacco: Never     Tobacco comments:     Patient has smoked since age 20 until 26. States she had quit for 2 years and then for 3 years during  that time. - updated 4/3/25.   Substance Use Topics     Alcohol use: No      Wt Readings from Last 1 Encounters:   05/27/25 51.7 kg (114 lb)        Anesthesia Evaluation   Pt has had prior anesthetic. Type: MAC and General.        ROS/MED HX  ENT/Pulmonary:     (+)                tobacco use, Past use,        mild,  COPD,              Neurologic:     (+)          CVA,    TIA,                  Cardiovascular: Comment: Carotid bruit    (+) Dyslipidemia hypertension- Peripheral Vascular Disease (Carotid US July 2023 shows 50 - 69% bilateral carotid artery stenosis.)-- Carotid Stenosis.   -  - -   Taking blood thinners Pt has not received instructions:                                  METS/Exercise Tolerance:     Hematologic:     (+)      anemia (r/t kidney disease), history of blood transfusion,         Musculoskeletal:   (+)  arthritis,             GI/Hepatic:     (+) GERD, Asymptomatic on medication,                  Renal/Genitourinary:     (+) renal disease (stage 4), type: CRI,            Endo:     (+)          thyroid problem, hypothyroidism,           Psychiatric/Substance Use:     (+) psychiatric history anxiety       Infectious Disease:  - neg infectious disease ROS     Malignancy:  - neg malignancy ROS     Other:  - neg other ROS            Physical Exam  Airway  Mallampati: II  TM distance: >3 FB  Neck ROM: full  Mouth opening: >= 4 cm    Cardiovascular - normal exam  Rhythm: regular  Rate: normal rate   Comments: Carotid bruit  Dental   (+) Modest Abnormalities - crowns, retainers, 1 or 2 missing teeth        Pulmonary - normal examBreath sounds clear to auscultation        Neurological - normal exam  She appears awake, alert and oriented x3.    Other Findings       OUTSIDE LABS:  CBC:   Lab Results   Component Value Date    WBC 5.3 05/28/2025    WBC 4.8 05/27/2025    WBC 4.8 05/27/2025    HGB 7.9 (L) 05/28/2025    HGB 7.7 (L) 05/28/2025    HCT 24.3 (L) 05/28/2025    HCT 16.3 (L) 05/27/2025    HCT 16.3 (L)  "05/27/2025     05/28/2025     05/27/2025     05/27/2025     BMP:   Lab Results   Component Value Date     05/27/2025     05/27/2025    POTASSIUM 5.5 (H) 05/27/2025    POTASSIUM 4.9 05/27/2025    CHLORIDE 104 05/27/2025    CHLORIDE 101 05/27/2025    CO2 22 05/27/2025    CO2 23 05/27/2025    BUN 31.2 (H) 05/27/2025    BUN 31.2 (H) 05/27/2025    CR 1.97 (H) 05/27/2025    CR 1.92 (H) 05/27/2025    GLC 94 05/27/2025     (H) 05/27/2025     COAGS:   Lab Results   Component Value Date    PTT 39 (H) 05/27/2025    INR 1.86 (H) 05/28/2025     POC: No results found for: \"BGM\", \"HCG\", \"HCGS\"  HEPATIC:   Lab Results   Component Value Date    ALBUMIN 3.8 05/27/2025    PROTTOTAL 5.7 (L) 05/27/2025    ALT 9 05/27/2025    AST 17 05/27/2025    ALKPHOS 43 05/27/2025    BILITOTAL <0.2 05/27/2025     OTHER:   Lab Results   Component Value Date    A1C 6.1 (H) 03/22/2019    LEXUS 9.2 05/27/2025    PHOS 4.4 03/14/2025    MAG 2.4 (H) 12/16/2021    LIPASE 83 05/03/2022    AMYLASE 71 05/03/2022    TSH 1.86 12/16/2021       Anesthesia Plan    ASA Status:  4      NPO Status: ELEVATED Aspiration Risk/Unknown   Anesthesia Type: General.  Airway: oral.  Induction: intravenous.  Maintenance: Balanced.   Techniques and Equipment:       - Monitoring Plan: standard ASA monitoring, train of four monitoring     Consents    Anesthesia Plan(s) and associated risks, benefits, and realistic alternatives discussed. Questions answered and patient/representative(s) expressed understanding.     - Discussed:     - Discussed with:  Patient        - Pt is DNR/DNI Status: no DNR          Postoperative Care    Pain management: multimodal analgesia.     Comments:                 Evangelista Rooney, APRN CRNA    I have reviewed the pertinent notes and labs in the chart from the past 30 days and (re)examined the patient.  Any updates or changes from those notes are reflected in this note.    Clinically Significant Risk Factors Present on " Admission        # Hyperkalemia: Highest K = 5.5 mmol/L in last 2 days, will monitor as appropriate         # Drug Induced Coagulation Defect: home medication list includes an anticoagulant medication  # Drug Induced Platelet Defect: home medication list includes an antiplatelet medication   # Hypertension: Noted on problem list      # Anemia: based on hgb <11

## 2025-05-28 NOTE — CONSULTS
General Surgery Consultation    Bebe Alfonso MRN# [id number]   Age: 79 year old YOB: 1945     Date of Admission:  5/27/2025    Reason for consult:            Anemia        Requesting physician:            Dr. Tapia                Assessment and Plan:   Assessment:   Bebe Alfonso is a 79 year old female with acute on chronic anemia who presented with Hgb 5.6    Comorbidities:   has a past medical history of Anticoagulation monitoring, INR range 2-3 (10/22/2015), Benign essential hypertension (09/15/2016), Carotid bruit (11/12/2012), Chronic obstructive pulmonary disease (H) (01/19/2016), Esophageal reflux (04/08/2008), Heart disease (born with it), History of blood transfusion (70 years ago), History of CVA (cerebrovascular accident) (10/20/2015), Long term current use of anticoagulant therapy (03/20/2018), Major depressive disorder, recurrent episode, moderate (H) (10/29/2008), Malignant neoplasm of upper lobe of left lung (H), Osteoarthrosis, hip (10/19/2010), and Thyroid nodule (05/22/2013).      Plan:   Defer to medicine to medical management of acute on chronic anemia  Surgery team will perform EGD and flex sig at 12:30 on 5/28/2025  Pending results of EGD, surgery will provide recommendations                  Chief Complaint:   anemia    History is obtained from the patient, patient's daughter          History of Present Illness:   Bebe Alfonso is a 79 year old thin female who presented from clinic blood draw and was found to have severe anemia with Hgb 5.6. She was referred to emergency department. Per daughter, patient has been intermittently complaining of being dizzy or lightheaded, but has not had an syncope or falls. Per daughter, patient has had dark black stools. Patient is not taking iron.   Patient denies any abdominal pain, nausea or vomiting. She states she has not had any change to her stool but her daughter states that is not true and she has had black stools.    Patient is being actively treated for lung cancer, and had radiation earlier this month.   She has had EGD previously, and it demonstrated gastric polyps. She had prior colonoscopy with polyp that was 15mm at rectum but was not removed. Patient states that she was going to see specialist to have it removed but that did not occur.   She is on warfarin, but this has been held. Her last INR at midnight was 1.86.   She received 2units of blood yesterday in ED.          Past Medical History:    has a past medical history of Anticoagulation monitoring, INR range 2-3 (10/22/2015), Benign essential hypertension (09/15/2016), Carotid bruit (11/12/2012), Chronic obstructive pulmonary disease (H) (01/19/2016), Esophageal reflux (04/08/2008), Heart disease (born with it), History of blood transfusion (70 years ago), History of CVA (cerebrovascular accident) (10/20/2015), Long term current use of anticoagulant therapy (03/20/2018), Major depressive disorder, recurrent episode, moderate (H) (10/29/2008), Malignant neoplasm of upper lobe of left lung (H), Osteoarthrosis, hip (10/19/2010), and Thyroid nodule (05/22/2013).          Past Surgical History:     Past Surgical History:   Procedure Laterality Date    BIOPSY  appox in 1980's    BREAST SURGERY  last time in the 1990's    non cancer    BRONCHOSCOPY, WITH BIOPSY, ROBOT ASSISTED Bilateral 3/20/2025    Procedure: BRONCHOSCOPY, ROBOT-ASSISTED ULTASOUND, WITH BIOPSY, ION. Flexible bronchoscopy, with endobronchial ultrasound and transbronchial biopsies;  Surgeon: Gallo Jurado MD;  Location: [facility]    COLONOSCOPY N/A 9/7/2022    Procedure: COLONOSCOPY, WITH POLYPECTOMY AND BIOPSY;  Surgeon: Alfredito Gomez DO;  Location: [facility]    ESOPHAGOSCOPY, GASTROSCOPY, DUODENOSCOPY (EGD), COMBINED N/A 5/25/2022    Procedure: ESOPHAGOGASTRODUODENOSCOPY, WITH BIOPSY;  Surgeon: Alfredito Gomez DO;  Location: [facility]             Social History:     Social History     Tobacco  Use    Smoking status: Some Days     Current packs/day: 0.00     Average packs/day: 1 pack/day for 59.2 years (59.2 ttl pk-yrs)     Types: Cigarettes     Start date:      Last attempt to quit: 2025     Years since quittin.1    Smokeless tobacco: Never    Tobacco comments:     Patient has smoked since age 20 until 26. States she had quit for 2 years and then for 3 years during that time. - updated 4/3/25.   Substance Use Topics    Alcohol use: No             Family History:   Family history reviewed and is not pertinent.         Allergies:     Allergies   Allergen Reactions    Losartan Swelling, Nausea, Shortness Of Breath and Palpitations    Gabapentin Other (See Comments) and GI Disturbance     Double vision, flatulence    Lisinopril Cough    Oxycodone Other (See Comments)     But has tolerated hydrocodone    Tramadol Other (See Comments)    Augmentin [Amoxicillin-Pot Clavulanate] Rash    Bacitracin Rash     blisters    Bupropion Rash     Allergic to brand not generic             Medications:     Current Facility-Administered Medications   Medication Dose Route Frequency Provider Last Rate Last Admin    acetaminophen (TYLENOL) tablet 650 mg  650 mg Oral Q4H PRN Lisa Espinal PA-C        [Held by provider] aspirin EC tablet 81 mg  81 mg Oral Daily Lisa Espinal PA-C        atorvastatin (LIPITOR) tablet 80 mg  80 mg Oral Daily Lisa Espinal PA-C        calcium carbonate (TUMS) chewable tablet 1,000 mg  1,000 mg Oral 4x Daily PRN Josh Robledo MD        [Held by provider] enalapril (VASOTEC) tablet 20 mg  20 mg Oral Daily Lisa Espinal PA-C        famotidine (PEPCID) injection 20 mg  20 mg Intravenous Q12H Lisa Espinal PA-C   20 mg at 25 0504    FLUoxetine (PROzac) capsule 20 mg  20 mg Oral Daily Lisa Espinal PA-C        HOLD: warfarin (COUMADIN) therapy   Does not apply HOLD Lisa Espinal PA-C        lactated ringers infusion   Intravenous  Continuous Moghe, Dmitry, DO 50 mL/hr at 05/28/25 0806 Rate Change at 05/28/25 0806    nicotine (NICODERM CQ) 14 MG/24HR 24 hr patch 1 patch  1 patch Transdermal Daily PRN Lisa Espinal PA-C        ondansetron (ZOFRAN ODT) ODT tab 4 mg  4 mg Oral Q6H PRN Lisa Espinal PA-C        Or    ondansetron (ZOFRAN) injection 4 mg  4 mg Intravenous Q6H PRN Lisa Espinal PA-C        pantoprazole (PROTONIX) IV push injection 40 mg  40 mg Intravenous BID Lisa Espinal PA-C   40 mg at 05/28/25 0800    prochlorperazine (COMPAZINE) injection 5 mg  5 mg Intravenous Q6H PRN Lisa Espinal PA-C        Or    prochlorperazine (COMPAZINE) tablet 5 mg  5 mg Oral Q6H PRN Lisa Espinal PA-C        sodium chloride 0.9% BOLUS 1-250 mL  1-250 mL Intravenous Q1H PRN Lisa Espinal PA-C        sucralfate (CARAFATE) suspension 1 g  1 g Oral 4x Daily AC & HS Lisa Espinal PA-C   1 g at 05/28/25 0633     Current Facility-Administered Medications   Medication Dose Route Frequency Provider Last Rate Last Admin    [Held by provider] aspirin EC tablet 81 mg  81 mg Oral Daily Lisa Espinal PA-C        atorvastatin (LIPITOR) tablet 80 mg  80 mg Oral Daily Lisa Espinal PA-C        [Held by provider] enalapril (VASOTEC) tablet 20 mg  20 mg Oral Daily Lisa Espinal PA-C        famotidine (PEPCID) injection 20 mg  20 mg Intravenous Q12H Lisa Espinal PA-C   20 mg at 05/28/25 0504    FLUoxetine (PROzac) capsule 20 mg  20 mg Oral Daily Lisa Espinal PA-C        pantoprazole (PROTONIX) IV push injection 40 mg  40 mg Intravenous BID Lisa Espinal PA-C   40 mg at 05/28/25 0800    sucralfate (CARAFATE) suspension 1 g  1 g Oral 4x Daily AC & HS Lisa Espinal PA-C   1 g at 05/28/25 0633            Review of Systems:   The 10 point review of systems is negative other than noted in the HPI.          Physical Exam:   BP (!) 170/61   Pulse 73   Temp 97.6  F (36.4  C)  "(Oral)   Resp 18   Ht 1.664 m (5' 5.5\")   Wt 51.7 kg (114 lb)   LMP 09/15/1998   SpO2 98%   BMI 18.68 kg/m    General - Well developed, well nourished female in no apparent distress  HEENT:  Head normocephalic and atraumatic, pupils equal and round, conjunctivae clear, no scleral icterus, mucous membranes moist, external ears and nose normal  Neck: Supple without thyromegaly or masses  Lymphatic: No cervical, or supraclavicular lymphadenopathy  Lungs: Clear to auscultation bilaterally  Heart: regular rate and rhythm, no murmurs  Abdomen:   soft, flat, non-distended with no tenderness noted diffusely.  No rebound or guarding.    Extremities: Warm without edema  Neurologic: nonfocal  Psychiatric: Mood and affect appropriate  Skin: Without lesions, rashes, or juandice         Data:     Lab Results   Component Value Date    WBC 4.8 05/27/2025    WBC 4.8 05/27/2025    WBC 10.1 12/12/2019     Lab Results   Component Value Date    HGB 7.7 05/28/2025    HGB 12.4 12/12/2019     Lab Results   Component Value Date     05/27/2025     05/27/2025     12/12/2019     Last Basic Metabolic Panel:  Lab Results   Component Value Date     05/27/2025     06/10/2021      Lab Results   Component Value Date    POTASSIUM 5.5 05/27/2025    POTASSIUM 4.9 05/03/2022    POTASSIUM 3.9 06/10/2021     Lab Results   Component Value Date    CHLORIDE 104 05/27/2025    CHLORIDE 103 05/03/2022    CHLORIDE 103 06/10/2021     Lab Results   Component Value Date    LEXUS 9.2 05/27/2025    LEXUS 8.6 06/10/2021     Lab Results   Component Value Date    CO2 22 05/27/2025    CO2 27 05/03/2022    CO2 25 06/10/2021     Lab Results   Component Value Date    BUN 31.2 05/27/2025    BUN 23 05/03/2022    BUN 25 06/10/2021     Lab Results   Component Value Date    CR 1.97 05/27/2025    CR 1.49 06/10/2021     Lab Results   Component Value Date    GLC 94 05/27/2025     03/26/2025    GLC 89 05/03/2022    GLC 91 06/10/2021 "         Imaging:  All imaging studies reviewed by me.    Results for orders placed or performed during the hospital encounter of 03/26/25   CT Chest Abdomen Pelvis w/o Contrast    Narrative    EXAM: PET ONCOLOGY WHOLE BODY, CT CHEST ABDOMEN PELVIS W/O CONTRAST  LOCATION: [facility]  DATE: 3/26/2025    INDICATION: Initial treatment planning and staging for malignant neoplasm of upper lobe, left bronchus or lung  COMPARISON: Thoracic CT dated 1/16/2025  CONTRAST: None  TECHNIQUE: Serum glucose level 103 mg/dL. One hour post intravenous administration of 10.18mCi F18 FDG, PET imaging was performed from the skull vertex to feet, utilizing attenuation correction with concurrent axial CT and PET/CT image fusion. Separate   diagnostic CT of the chest, abdomen, and pelvis was performed. Dose reduction techniques were used.    PET/CT FINDINGS: FDG avid lesion in the left upper lobe measuring 1.2 x 1.2 cm (Max SUV 6.0) suspicious for biopsy-proven lung adenocarcinoma without metastasis.    Degenerative shoulder and hip synovitis. Diffuse esophagitis.    CT FINDINGS: Areas of encephalomalacia are seen in the right frontal and left parietal lobes which may represent sequelae of prior infarcts. Severe carotid artery bifurcation calcification. Emphysema. Atrophic right kidney. Fibroid uterus. Pelvic   phleboliths. Bilateral total hip arthroplasties, otherwise lower extremities are unremarkable. Multilevel degenerative changes of spine      Impression    IMPRESSION:    Findings suspicious for left upper lobe lung adenocarcinoma without metastasis       This note was created using voice recognition software. Undetected word substitutions or other errors may have occurred.     JAMIL MENDOZA PA-C    Time spent with the patient, reviewing the EMR, reviewing laboratory and imaging studies, more than 50% of which was counseling and coordinating care:  30 minutes.

## 2025-05-28 NOTE — ANESTHESIA POSTPROCEDURE EVALUATION
Patient: Bebe Alfonso    Procedure: Procedure(s):  ESOPHAGOGASTRODUODENOSCOPY  Sigmoidoscopy flexible       Anesthesia Type:  General    Note:  Disposition: Inpatient   Postop Pain Control: Uneventful            Sign Out: Well controlled pain   PONV: No   Neuro/Psych: Uneventful            Sign Out: Acceptable/Baseline neuro status   Airway/Respiratory: Uneventful            Sign Out: Acceptable/Baseline resp. status   CV/Hemodynamics: Uneventful            Sign Out: Acceptable CV status; No obvious hypovolemia; No obvious fluid overload   Other NRE: NONE   DID A NON-ROUTINE EVENT OCCUR? No           Last vitals:  Vitals Value Taken Time   /59 05/28/25 14:15   Temp     Pulse 63 05/28/25 14:15   Resp 10 05/28/25 14:15   SpO2 100 % 05/28/25 14:17   Vitals shown include unfiled device data.    Electronically Signed By: SELWYN Whitehead CRNA  May 28, 2025  2:30 PM

## 2025-05-28 NOTE — PROGRESS NOTES
Dr. Whyte notified of pt's BP slowly creeping up t/o night (145/49, 156/51, 174/54) & pt is asymptomatic. She recommends to continue to monitor for now, no new orders.

## 2025-05-28 NOTE — PLAN OF CARE
Problem: Adult Inpatient Plan of Care  Goal: Plan of Care Review  Description: The Plan of Care Review/Shift note should be completed every shift.  The Outcome Evaluation is a brief statement about your assessment that the patient is improving, declining, or no change.  This information will be displayed automatically on your shift  note.  Outcome: Progressing  Flowsheets (Taken 5/28/2025 0222)  Plan of Care Reviewed With: patient  Overall Patient Progress: improving   Goal Outcome Evaluation:      Plan of Care Reviewed With: patient    Overall Patient Progress: improving     Pt up w/ SBA & walker. Denies discomfort. Hgb 7.7 after 2nd unit of RBCs.

## 2025-05-28 NOTE — PROGRESS NOTES
"Pt was given printed education sheet \"Learning About Blood Transfusions\". Pt aware of transfusion risks and reviewed w/ pt list of possible transfusion reaction symptoms.  "

## 2025-05-29 ENCOUNTER — APPOINTMENT (OUTPATIENT)
Dept: PHYSICAL THERAPY | Facility: CLINIC | Age: 80
DRG: 377 | End: 2025-05-29
Payer: COMMERCIAL

## 2025-05-29 VITALS
OXYGEN SATURATION: 97 % | HEIGHT: 66 IN | WEIGHT: 114 LBS | TEMPERATURE: 98.3 F | BODY MASS INDEX: 18.32 KG/M2 | HEART RATE: 69 BPM | RESPIRATION RATE: 16 BRPM | SYSTOLIC BLOOD PRESSURE: 169 MMHG | DIASTOLIC BLOOD PRESSURE: 47 MMHG

## 2025-05-29 LAB
ALBUMIN SERPL BCG-MCNC: 3.2 G/DL (ref 3.5–5.2)
ALBUMIN SERPL BCG-MCNC: 3.2 G/DL (ref 3.5–5.2)
ALP SERPL-CCNC: 42 U/L (ref 40–150)
ALT SERPL W P-5'-P-CCNC: 8 U/L (ref 0–50)
ANION GAP SERPL CALCULATED.3IONS-SCNC: 13 MMOL/L (ref 7–15)
ANION GAP SERPL CALCULATED.3IONS-SCNC: 14 MMOL/L (ref 7–15)
AST SERPL W P-5'-P-CCNC: 19 U/L (ref 0–45)
ATRIAL RATE - MUSE: 74 BPM
BILIRUB SERPL-MCNC: 0.5 MG/DL
BUN SERPL-MCNC: 19.2 MG/DL (ref 8–23)
BUN SERPL-MCNC: 19.4 MG/DL (ref 8–23)
CALCIUM SERPL-MCNC: 8.3 MG/DL (ref 8.8–10.4)
CALCIUM SERPL-MCNC: 8.5 MG/DL (ref 8.8–10.4)
CHLORIDE SERPL-SCNC: 103 MMOL/L (ref 98–107)
CHLORIDE SERPL-SCNC: 107 MMOL/L (ref 98–107)
CREAT SERPL-MCNC: 1.54 MG/DL (ref 0.51–0.95)
CREAT SERPL-MCNC: 1.58 MG/DL (ref 0.51–0.95)
DIASTOLIC BLOOD PRESSURE - MUSE: NORMAL MMHG
EGFRCR SERPLBLD CKD-EPI 2021: 33 ML/MIN/1.73M2
EGFRCR SERPLBLD CKD-EPI 2021: 34 ML/MIN/1.73M2
ERYTHROCYTE [DISTWIDTH] IN BLOOD BY AUTOMATED COUNT: 16.1 % (ref 10–15)
GLUCOSE SERPL-MCNC: 104 MG/DL (ref 70–99)
GLUCOSE SERPL-MCNC: 77 MG/DL (ref 70–99)
HCO3 SERPL-SCNC: 16 MMOL/L (ref 22–29)
HCO3 SERPL-SCNC: 18 MMOL/L (ref 22–29)
HCT VFR BLD AUTO: 22.5 % (ref 35–47)
HGB BLD-MCNC: 7.1 G/DL (ref 11.7–15.7)
HGB BLD-MCNC: 7.6 G/DL (ref 11.7–15.7)
INR PPP: 1.24 (ref 0.85–1.15)
INTERPRETATION ECG - MUSE: NORMAL
MCH RBC QN AUTO: 29.3 PG (ref 26.5–33)
MCHC RBC AUTO-ENTMCNC: 31.6 G/DL (ref 31.5–36.5)
MCV RBC AUTO: 93 FL (ref 78–100)
MCV RBC AUTO: 93 FL (ref 78–100)
P AXIS - MUSE: 79 DEGREES
PHOSPHATE SERPL-MCNC: 2.5 MG/DL (ref 2.5–4.5)
PLATELET # BLD AUTO: 194 10E3/UL (ref 150–450)
POTASSIUM SERPL-SCNC: 4.2 MMOL/L (ref 3.4–5.3)
POTASSIUM SERPL-SCNC: 4.6 MMOL/L (ref 3.4–5.3)
PR INTERVAL - MUSE: 188 MS
PROT SERPL-MCNC: 4.8 G/DL (ref 6.4–8.3)
PROTHROMBIN TIME: 15.4 SECONDS (ref 11.8–14.8)
QRS DURATION - MUSE: 92 MS
QT - MUSE: 404 MS
QTC - MUSE: 448 MS
R AXIS - MUSE: 84 DEGREES
RBC # BLD AUTO: 2.42 10E6/UL (ref 3.8–5.2)
SODIUM SERPL-SCNC: 134 MMOL/L (ref 135–145)
SODIUM SERPL-SCNC: 137 MMOL/L (ref 135–145)
SYSTOLIC BLOOD PRESSURE - MUSE: NORMAL MMHG
T AXIS - MUSE: 69 DEGREES
VENTRICULAR RATE- MUSE: 74 BPM
WBC # BLD AUTO: 4.9 10E3/UL (ref 4–11)

## 2025-05-29 PROCEDURE — 999N000111 HC STATISTIC OT IP EVAL DEFER

## 2025-05-29 PROCEDURE — 82310 ASSAY OF CALCIUM: CPT | Performed by: STUDENT IN AN ORGANIZED HEALTH CARE EDUCATION/TRAINING PROGRAM

## 2025-05-29 PROCEDURE — 85610 PROTHROMBIN TIME: CPT | Performed by: STUDENT IN AN ORGANIZED HEALTH CARE EDUCATION/TRAINING PROGRAM

## 2025-05-29 PROCEDURE — 97161 PT EVAL LOW COMPLEX 20 MIN: CPT | Mod: GP

## 2025-05-29 PROCEDURE — 120N000001 HC R&B MED SURG/OB

## 2025-05-29 PROCEDURE — 250N000013 HC RX MED GY IP 250 OP 250 PS 637

## 2025-05-29 PROCEDURE — 85027 COMPLETE CBC AUTOMATED: CPT | Performed by: STUDENT IN AN ORGANIZED HEALTH CARE EDUCATION/TRAINING PROGRAM

## 2025-05-29 PROCEDURE — 250N000013 HC RX MED GY IP 250 OP 250 PS 637: Performed by: STUDENT IN AN ORGANIZED HEALTH CARE EDUCATION/TRAINING PROGRAM

## 2025-05-29 PROCEDURE — 99233 SBSQ HOSP IP/OBS HIGH 50: CPT | Performed by: STUDENT IN AN ORGANIZED HEALTH CARE EDUCATION/TRAINING PROGRAM

## 2025-05-29 PROCEDURE — 84100 ASSAY OF PHOSPHORUS: CPT | Performed by: STUDENT IN AN ORGANIZED HEALTH CARE EDUCATION/TRAINING PROGRAM

## 2025-05-29 PROCEDURE — 97116 GAIT TRAINING THERAPY: CPT | Mod: GP

## 2025-05-29 PROCEDURE — 36415 COLL VENOUS BLD VENIPUNCTURE: CPT | Performed by: STUDENT IN AN ORGANIZED HEALTH CARE EDUCATION/TRAINING PROGRAM

## 2025-05-29 PROCEDURE — 250N000011 HC RX IP 250 OP 636

## 2025-05-29 PROCEDURE — 250N000013 HC RX MED GY IP 250 OP 250 PS 637: Performed by: INTERNAL MEDICINE

## 2025-05-29 PROCEDURE — 250N000011 HC RX IP 250 OP 636: Performed by: STUDENT IN AN ORGANIZED HEALTH CARE EDUCATION/TRAINING PROGRAM

## 2025-05-29 PROCEDURE — 85018 HEMOGLOBIN: CPT | Performed by: STUDENT IN AN ORGANIZED HEALTH CARE EDUCATION/TRAINING PROGRAM

## 2025-05-29 PROCEDURE — 258N000003 HC RX IP 258 OP 636: Performed by: STUDENT IN AN ORGANIZED HEALTH CARE EDUCATION/TRAINING PROGRAM

## 2025-05-29 RX ORDER — CLONIDINE HYDROCHLORIDE 0.2 MG/1
0.2 TABLET ORAL ONCE
Status: COMPLETED | OUTPATIENT
Start: 2025-05-29 | End: 2025-05-29

## 2025-05-29 RX ORDER — WARFARIN SODIUM 5 MG/1
5 TABLET ORAL
Status: DISCONTINUED | OUTPATIENT
Start: 2025-05-29 | End: 2025-05-29

## 2025-05-29 RX ORDER — ENOXAPARIN SODIUM 100 MG/ML
30 INJECTION SUBCUTANEOUS EVERY 24 HOURS
Status: DISCONTINUED | OUTPATIENT
Start: 2025-05-29 | End: 2025-05-30 | Stop reason: HOSPADM

## 2025-05-29 RX ADMIN — FAMOTIDINE 20 MG: 10 INJECTION, SOLUTION INTRAVENOUS at 04:54

## 2025-05-29 RX ADMIN — ATORVASTATIN CALCIUM 80 MG: 80 TABLET, FILM COATED ORAL at 08:56

## 2025-05-29 RX ADMIN — SUCRALFATE 1 G: 1 SUSPENSION ORAL at 06:59

## 2025-05-29 RX ADMIN — CLONIDINE HYDROCHLORIDE 0.2 MG: 0.2 TABLET ORAL at 00:16

## 2025-05-29 RX ADMIN — ENALAPRIL MALEATE 20 MG: 10 TABLET ORAL at 17:10

## 2025-05-29 RX ADMIN — SODIUM CHLORIDE, SODIUM LACTATE, POTASSIUM CHLORIDE, AND CALCIUM CHLORIDE: .6; .31; .03; .02 INJECTION, SOLUTION INTRAVENOUS at 13:42

## 2025-05-29 RX ADMIN — PANTOPRAZOLE SODIUM 40 MG: 40 INJECTION, POWDER, FOR SOLUTION INTRAVENOUS at 08:57

## 2025-05-29 RX ADMIN — PANTOPRAZOLE SODIUM 40 MG: 40 INJECTION, POWDER, FOR SOLUTION INTRAVENOUS at 17:10

## 2025-05-29 RX ADMIN — ENOXAPARIN SODIUM 30 MG: 40 INJECTION SUBCUTANEOUS at 15:03

## 2025-05-29 RX ADMIN — ASPIRIN 81 MG: 81 TABLET, COATED ORAL at 08:56

## 2025-05-29 RX ADMIN — FLUOXETINE HYDROCHLORIDE 20 MG: 20 CAPSULE ORAL at 08:56

## 2025-05-29 ASSESSMENT — ACTIVITIES OF DAILY LIVING (ADL)
ADLS_ACUITY_SCORE: 25
DEPENDENT_IADLS:: INDEPENDENT
ADLS_ACUITY_SCORE: 25

## 2025-05-29 NOTE — PROGRESS NOTES
05/29/25 1402   Appointment Info   Signing Clinician's Name / Credentials (PT) Lolly Rudolph, PT   Living Environment   People in Home significant other   Current Living Arrangements house   Home Accessibility stairs to enter home;stairs within home   Number of Stairs, Main Entrance 6   Stair Railings, Main Entrance railings safe and in good condition   Number of Stairs, Within Home, Primary ten   Stair Railings, Within Home, Primary railings safe and in good condition   Living Environment Comments cat located in basement, pt prefers to complete stairs inside home   Self-Care   Equipment Currently Used at Home shower chair;walker, rolling   Fall history within last six months yes   Number of times patient has fallen within last six months 1   Activity/Exercise/Self-Care Comment indep with mobility without device, has walker if needed. indep with ADL's. spouse and pt share IADL's.   General Information   Onset of Illness/Injury or Date of Surgery 05/27/25   Referring Physician Dmitry Tapia,    Patient/Family Therapy Goals Statement (PT) goal is TCU due to weakness and deconditioning   Pertinent History of Current Problem (include personal factors and/or comorbidities that impact the POC) Bebe Alfonso is a 79 year old female with past medical history of renal artery stenosis, multiple CVA's now on chronic anticoagulation, renal artery stenosis, hypertension, thyroid nodule, tobacco use, CKD4 with chronic anemia now presents on 5/27/2025 with fatigue and acute anemia on clinic lab check. She is being admitted for management, monitoring, & ongoing workup of acute on chronic anemia.   Existing Precautions/Restrictions fall   Cognition   Follows Commands (Cognition) WFL   Pain Assessment   Patient Currently in Pain No   Range of Motion (ROM)   Range of Motion ROM is WFL   Strength (Manual Muscle Testing)   Strength (Manual Muscle Testing) Deficits observed during functional mobility   Strength Comments general LE  weakness/deconditioning from reduced mobility   Bed Mobility   Comment, (Bed Mobility) supine<>sit with SBA   Transfers   Comment, (Transfers) sit>stand from EOB with 2WW and SBA, good standing balance   Gait/Stairs (Locomotion)   Nicholas Level (Gait) supervision   Assistive Device (Gait) walker, front-wheeled   Distance in Feet (Gait) 100   Pattern (Gait) step-through   Deviations/Abnormal Patterns (Gait) gait speed decreased   Negotiation (Stairs) stairs independence;handrail location;number of steps;ascending technique;descending technique   Nicholas Level (Stairs) supervision   Handrail Location (Stairs) both sides   Number of Steps (Stairs) 5   Ascending Technique (Stairs) step-over-step   Descending Technique (Stairs) step-over-step   Balance   Balance Comments hx of 1 fall   Clinical Impression   Criteria for Skilled Therapeutic Intervention Yes, treatment indicated   PT Diagnosis (PT) impaired functional mobility   Influenced by the following impairments LE weakness, reduced activity tolerance   Functional limitations due to impairments impaired gait, stairs   Clinical Presentation (PT Evaluation Complexity) stable   Clinical Presentation Rationale clinical reasoning   Clinical Decision Making (Complexity) low complexity   Planned Therapy Interventions (PT) gait training;home exercise program;patient/family education;stair training;strengthening;transfer training;progressive activity/exercise;risk factor education;home program guidelines   Risk & Benefits of therapy have been explained evaluation/treatment results reviewed;care plan/treatment goals reviewed;risks/benefits reviewed;current/potential barriers reviewed;participants voiced agreement with care plan;participants included;patient;spouse/significant other;daughter   PT Total Evaluation Time   PT Eval, Low Complexity Minutes (69419) 10   Physical Therapy Goals   PT Frequency 5x/week   PT Predicted Duration/Target Date for Goal Attainment  "06/05/25   PT Goals Gait;Stairs   PT: Gait Independent;150 feet   PT: Stairs Modified independent;6 stairs;Rail on both sides   Interventions   Interventions Quick Adds Gait Training   Gait Training   Gait Training Minutes (24874) 10   Symptoms Noted During/After Treatment (Gait Training) fatigue  (weakness)   Treatment Detail/Skilled Intervention additional x100 feet with 2WW and SBA, pt reports feeling \"tired\" in quads after walking to/from therapy room with 2WW. up/down 5 stairs with SBA and reciprocal pattern, denies weakness with stairs. pt and family stating pt needs TCU due to weakness and deconditioning, discussed would benefit from continued PT given reduced activity tolerance and does not typically use walker at home. would benefit from TCU, if unable then would rec home with home care PT. remains in bed, bed alarm on and call light in reach.   Distance in Feet 100'x2   Austin Level (Gait Training) stand-by assist   Physical Assistance Level (Gait Training) verbal cues;supervision   Assistive Device (Gait Training) standard walker   Gait Analysis Deviations decreased vicky   Impairments (Gait Analysis/Training) strength decreased   Stair Railings present at both sides   Physical Assist/Nonphysical Assist (Stairs) supervision;verbal cues   Level of [place] (Stairs) stand-by assist   PT Discharge Planning   PT Plan Thurs 1/5; gait with 2WW v. no device, 6 stairs, LE strengthening   PT Discharge Recommendation (DC Rec) Transitional Care Facility;home with assist;home with home care physical therapy   PT Rationale for DC Rec rec TCU due to weakness and deconditioning from reduced mobility and needing to use walker, if unable rec home with home care PT to increase indep and ease in own environment   PT Brief overview of current status amb 100 feet x2 with 2WW and SBA, up/down 5 stairs   PT Total Distance Amb During Session (feet) 200   PT Equipment Needed at Discharge   (has 2WW if needed) "   Physical Therapy Time and Intention   Timed Code Treatment Minutes 10   Total Session Time (sum of timed and untimed services) 20

## 2025-05-29 NOTE — CONSULTS
Care Management Initial Consult    General Information  Assessment completed with: Patient, Children, Spouse or significant other,    Type of CM/SW Visit: Initial Assessment    Primary Care Provider verified and updated as needed: Yes   Readmission within the last 30 days: no previous admission in last 30 days   Reason for Consult: discharge planning  Advance Care Planning: Advance Care Planning Reviewed: no concerns identified        Communication Assessment  Patient's communication style: spoken language (English or Bilingual)    Hearing Difficulty or Deaf: no   Wear Glasses or Blind: no    Cognitive  Cognitive/Neuro/Behavioral: WDL                      Living Environment:   People in home: spouse     Current living Arrangements: house      Able to return to prior arrangements: other (see comments) (possible TCU prior to home)     Family/Social Support:  Care provided by: self  Provides care for: no one  Marital Status:   Support system: , Children          Description of Support System: Supportive, Involved    Support Assessment: Adequate family and caregiver support, Adequate social supports    Current Resources:   Patient receiving home care services: No  Community Resources: None  Equipment currently used at home: shower chair, walker, rolling  Supplies currently used at home:      Employment/Financial:  Employment Status: retired     Financial Concerns: none   Does the patient's insurance plan have a 3 day qualifying hospital stay waiver?  Yes     Which insurance plan 3 day waiver is available? Alternative insurance waiver  Will the waiver be used for post-acute placement? Yes    Lifestyle & Psychosocial Needs:  Social Drivers of Health     Food Insecurity: Low Risk  (5/27/2025)    Food Insecurity     Within the past 12 months, did you worry that your food would run out before you got money to buy more?: No     Within the past 12 months, did the food you bought just not last and you didn t have  money to get more?: No   Depression: Not at risk (2/28/2025)    PHQ-2     PHQ-2 Score: 0   Housing Stability: Low Risk  (5/27/2025)    Housing Stability     Do you have housing? : Yes     Are you worried about losing your housing?: No   Tobacco Use: High Risk (5/28/2025)    Patient History     Smoking Tobacco Use: Some Days     Smokeless Tobacco Use: Never     Passive Exposure: Not on file   Financial Resource Strain: Low Risk  (5/27/2025)    Financial Resource Strain     Within the past 12 months, have you or your family members you live with been unable to get utilities (heat, electricity) when it was really needed?: No   Alcohol Use: Not on file   Transportation Needs: Low Risk  (5/27/2025)    Transportation Needs     Within the past 12 months, has lack of transportation kept you from medical appointments, getting your medicines, non-medical meetings or appointments, work, or from getting things that you need?: No   Physical Activity: Inactive (12/19/2024)    Exercise Vital Sign     Days of Exercise per Week: 0 days     Minutes of Exercise per Session: 0 min   Interpersonal Safety: Low Risk  (5/28/2025)    Interpersonal Safety     Do you feel physically and emotionally safe where you currently live?: Yes     Within the past 12 months, have you been hit, slapped, kicked or otherwise physically hurt by someone?: No     Within the past 12 months, have you been humiliated or emotionally abused in other ways by your partner or ex-partner?: No   Stress: No Stress Concern Present (12/19/2024)    [employer] [place] [employer] - Occupational Stress Questionnaire     Feeling of Stress : Not at all   Social Connections: Unknown (12/19/2024)    Social Connection and Isolation Panel [NHANES]     Frequency of Communication with Friends and Family: Not on file     Frequency of Social Gatherings with Friends and Family: Once a week     Attends Restorationist Services: Not on file     Active Member of Clubs or  Organizations: Not on file     Attends Club or Organization Meetings: Not on file     Marital Status: Not on file   Health Literacy: Not on file     Functional Status:  Prior to admission patient needed assistance:   Dependent ADLs:: Independent  Dependent IADLs:: Independent     Mental Health Status:  Mental Health Status: No Current Concerns       Chemical Dependency Status:  Chemical Dependency Status: No Current Concerns           Values/Beliefs:  Spiritual, Cultural Beliefs, Mandaeism Practices, Values that affect care: no             Discussed  Partnership in Safe Discharge Planning  document with patient/family: Yes    Additional Information:    Referral received for discharge planning.     Met with patient, spouse, and daughter at bedside and introduced self/role. Patient lives in a private home with spouse independently in the community. At baseline, pt is independent with ADLs and shares IADL tasks with spouse. She typically ambulates without assistive devices, though has a walker if needed.     Discharge options discussed. Pt and family request a PT eval due to weakness and the potential need for TCU otherwise they are in agreement with home care.     Per PT eval- rec TCU due to weakness and deconditioning from reduced mobility and needing to use walker, if unable rec home with home care PT to increase indep and ease in own environment.    Patient would like a referral sent to [facility], and [facility] in [place] (daughter works in admissions at [facility]).     PLAN: TCU vs home care- referrals pending for both    ANIBAL BALTAZAR RN

## 2025-05-29 NOTE — PLAN OF CARE
Problem: Adult Inpatient Plan of Care  Goal: Plan of Care Review  Description: The Plan of Care Review/Shift note should be completed every shift.  The Outcome Evaluation is a brief statement about your assessment that the patient is improving, declining, or no change.  This information will be displayed automatically on your shift  note.  Outcome: Progressing  Flowsheets (Taken 5/29/2025 0408)  Plan of Care Reviewed With: patient  Overall Patient Progress: improving   Goal Outcome Evaluation:      Plan of Care Reviewed With: patient    Overall Patient Progress: improving    Pt reports able to sleep better tonight. Denies discomfort. BP running high again tonight. Dr Whyte notified, order obtained for clonidine 0.2mg x1. BP recheck 119/40.

## 2025-05-29 NOTE — PLAN OF CARE
OT: Per discussion in rounds. No IP OT needs identified. Will discontinue therapy orders at this time.

## 2025-05-29 NOTE — PLAN OF CARE
"Goal Outcome Evaluation:      Plan of Care Reviewed With: patient, child    Overall Patient Progress: improvingOverall Patient Progress: improving    Outcome Evaluation: Pt A&O. Able to make needs known. Denies pain. Up AUD4FCC.  Ambulated in hallway and room today. Tolerated liquid diet.      Visit Vitals  /43   Pulse 61   Temp 97.9  F (36.6  C) (Oral)   Resp 18       Problem: Adult Inpatient Plan of Care  Goal: Plan of Care Review  Description: The Plan of Care Review/Shift note should be completed every shift.  The Outcome Evaluation is a brief statement about your assessment that the patient is improving, declining, or no change.  This information will be displayed automatically on your shift  note.  Outcome: Progressing  Flowsheets (Taken 5/29/2025 1533)  Outcome Evaluation: Pt A&O. Able to make needs known. Denies pain. Up CMV5OPT.  Ambulated in hallway and room today. Tolerated liquid diet.  Plan of Care Reviewed With:   patient   child  Overall Patient Progress: improving  Goal: Patient-Specific Goal (Individualized)  Description: You can add care plan individualizations to a care plan. Examples of Individualization might be:  \"Parent requests to be called daily at 9am for status\", \"I have a hard time hearing out of my right ear\", or \"Do not touch me to wake me up as it startles  me\".  Outcome: Progressing  Goal: Absence of Hospital-Acquired Illness or Injury  Outcome: Progressing  Intervention: Identify and Manage Fall Risk  Recent Flowsheet Documentation  Taken 5/29/2025 0856 by Sandra Borrego RN  Safety Promotion/Fall Prevention:   activity supervised   assistive device/personal items within reach   safety round/check completed   supervised activity   mobility aid in reach   nonskid shoes/slippers when out of bed   clutter free environment maintained  Intervention: Prevent and Manage VTE (Venous Thromboembolism) Risk  Recent Flowsheet Documentation  Taken 5/29/2025 0856 by Maurizio Pitts, " Sandra JORGE RN  VTE Prevention/Management: (anticoagulation therapy; pt refusal)   SCDs off (sequential compression devices)   other (see comments)   patient refused intervention  Goal: Optimal Comfort and Wellbeing  Outcome: Progressing  Goal: Readiness for Transition of Care  Outcome: Progressing

## 2025-05-29 NOTE — CONSULTS
"Asked by Dr. Tapia to advise on what to do with pt's antithrombotic plan given new admission for GI bleed. Did chart review and discussed with my attending Dr. Lew Caceres. Pt was on aspirin and warfarin prior to this admission. It seems like pt was started on warfarin in 2015 for stroke by Dr. Meyer who has since retired, and then saw Dr. Darlyn Kwon too, but I do not see a clear explanation of why this choice was made in 2015. There is no documented afib or other obvious reason to anticoagulate. Given new GIB, Dr. Caceres thinks it's reasonable to keep patient on just aspirin for now until new patient visit with vascular neurologist Dr. Walter Jauregui on 6/10/25 at noon (virtual). Difficult to assess fully as we have no brain imaging in PACS. Clinic will reach out to her tomorrow to formally schedule her into that slot which has been held. Images to be obtained from [facility] for that appt.     Mili Isabel PA-C  Vascular Neurology  05/29/2025 2:37 PM  Securely message with the Vocera Web Console (learn more here)  To page me or covering stroke neurology team member, click here: AMCOM  Choose \"On Call\" tab at top, then search dropdown box for \"Neurology\" & press Enter, look for Neuro ICU/Stroke    "

## 2025-05-29 NOTE — PROGRESS NOTES
[facility]    Medicine Progress Note - Hospitalist Service    Date of Admission:  5/27/2025    Assessment & Plan   Bebe Alfonso is a 79 year old female with past medical history of renal artery stenosis, multiple CVA's now on chronic anticoagulation, renal artery stenosis, hypertension, thyroid nodule, tobacco use, CKD4 with chronic anemia now presents on 5/27/2025 with fatigue and acute anemia on clinic lab check. She is being admitted for management, monitoring, & ongoing workup of acute on chronic anemia.     Update 5/29: 1 more day, PT/OT eval pending to see if needs TCU. Cards referral on discharge needed as well as stroke neurology referral.    EGD+Flx Sig: endoscopy showing single bleeding 'angiodysplastic lesion' s/p clips in duodenum on upper endoscopy. Polyp that was bx on lower, no bleeding. 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 Hemoglobin stable since. No known bleeding since procedure.     - Okay to resume asa (resumed 5/28 post procedure) and coumadin per surgery  - Discussing with stroke neurology re: AC and antiplatelet  - Continue IV PPI while hospitalized, transition to PO PPI BID on discharge x 2 weeks then daily x 2 weeks then stop.       Anemia, acute presumed blood loss superimposed on chronic anemia of renal disease  Non-variceal upper GI bleeding    History of GI polyps: Colonoscopy 2022 with multiple polyps removed & one 15mm rectal polyp not removed. EGD 2022 with removal of multiple gastric polyps, variable Z-line, & probable hiatal hernia.    Presents from clinic with hemoglobin 5.6. Endorses fatigue, coffee ground emesis on 5/25 & melena 5/26. Baseline hemoglobin 10.0 - 11.0. Normocytic (MCV 97). Iron studies WNL in March 2025.     Hemodynamically stable. Presume GI bleeding as source of acute on chronic anemia. Patient is anticoagulated on warfarin. I personally discussed with general  surgery who recommends EGD in AM for further eval.   EGD+Flx Sig (5/28/2025): endoscopy showing single bleeding 'angiodysplastic lesion' s/p clips in duodenum on upper endoscopy. Polyp that was bx on lower, no bleeding.  5/27: received 2u pRBC with hgb 5 -> 8    5/28: No further BM. No further emesis/hematemesis. Bidirectional endoscopy today (egd +flex sig) to eval for etiology of anemia.  Iron deficit (Ganzoni): 1100mg (received 2u pRBC = 500mg ) (600mg IV venofer given = 1100 total)  5/29: hgb stable 7.1 -> 7.6. no bleeds.    - discharge with PO iron every other day with vitamin C given PPI use  - Reg diet  - Pantoprazole 40mg IV BID until discharge    Moderate to Severe Mitral Stenosis (mean pg 11mmHg w/ hr 64)  Moderate Mitral Annular Calcification  Left Atrial Enlargement (LA volume >45)  Presumable aortic stenosis murmur but not classic cresc-decresc. 3/6 ejection (or late diastolic?) murmur best heard at RUSB and LUSB with bilateral carotid radiation and slight delayed pulses.   TTE: LV EF 60-65%, RV fxn normal. Mod to severe mitral stenosis. Moderate MR. Mean gradients 11mmHg @ hr 64. RVSP 38mmHg. Moderate MAC.     - #cards referral   - LAE places patient at increased risk of atrial cardiopathy related cva but has many different risk factors for cva, ascvd      Recurrent cerebrovascular accidents (CVAs)  ? Cryptogenic/ESUS vs ICAD?  Chronic anticoagulation  On warfarin for history of multiple CVA's. INR 2.87 on presentation.   History of severe atherosclerosis with at least 5 strokes in addition to several minor strokes. First CVA 2011. Had tried plavix previously, possibly 2015, but stopped due to bruising (denies clinically significant bleeding). Has had recurrent CVAs, sometimes while on coumadin, other times while holding bridgett-procedurally. At some point in the last year she was started on aspirin 81mg in addition to coumadin. She denies any recurrent strokes since then.  5/29: discussing with stroke  neurology    - Question if switching to brillinta (or increasing dose of aspirin?) from 81mg asa could be beneficial and stopping coumadin   --addendum: stroke neuro recommended 81mg ASA monotherapy, stop coumadin. Re-establish with neuro (6/10 appointment with Dr. Jauregui   - Discontinued coumadin  - continue PTA 81mg ASA    Bilateral carotid artery stenosis  Carotid US July 2023 shows 50 - 69% bilateral carotid artery stenosis.   Has chronic bruits on exam.     - Contributes to CVA risk    Adenocarcinoma of lung, left upper lobe    PET / CT 3/26/2025 no evidence of metastasis. Completed radiation therapy late April 2025. Working with Dr. Wise to schedule lingulectomy & lymph node dissection.   Endorses decreased energy since radiation therapy. No hemoptysis.   - Continue regular outpatient follow up with oncology     Mild KATHERYN, resolved  CKD stage 4  Renal artery stenosis, right, moderate  Peripheral Artery Disease    Baseline creatinine 1.65 - 1.75, creatinine 1.97 on presentation. BUN chronically elevated & at baseline (~30 - 32).     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 Is not a candidate for surgical intervention for renal artery stenosis.     - okay to resume enalapril 5/29    Hyperkalemia  Mild, K 5.5.     resolved    Hypertension  Mildly hypertensive on presentation, preferable in setting of GI bleeding.     COPD  Following with lung nodule clinic, Dr. Henry. Noted to have baseline dyspnea on inhalers, NOT on supplemental home O2.     - Holding PTA fluticasone during admission     Generalized anxiety  - Continue PTA fluoxetine 20mg daily     Hyperlipidemia  - Continue PTA atorvastatin 80mg daily     Tobacco dependence  Encourage cessation.   - Nicotine patch available PRN          Diet: Regular Diet Adult    DVT Prophylaxis: lovenox 40mg subcutaneous qd  Hernández Catheter: Not present  Lines: None     Cardiac Monitoring: None  Code Status: Full Code      Clinically Significant  Risk Factors         # Hyponatremia: Lowest Na = 134 mmol/L in last 2 days, will monitor as appropriate   # Hypocalcemia: Lowest Ca = 8.3 mg/dL in last 2 days, will monitor and replace as appropriate     # Hypoalbuminemia: Lowest albumin = 3.2 g/dL at 5/29/2025 12:07 PM, will monitor as appropriate    # Coagulation Defect: INR = 1.24 (Ref range: 0.85 - 1.15) and/or PTT = 39 Seconds (Ref range: 22 - 38 Seconds), will monitor for bleeding    # Hypertension: Noted on problem list                       Social Drivers of Health    Tobacco Use: High Risk (5/28/2025)    Patient History     Smoking Tobacco Use: Some Days     Smokeless Tobacco Use: Never   Physical Activity: Inactive (12/19/2024)    Exercise Vital Sign     Days of Exercise per Week: 0 days     Minutes of Exercise per Session: 0 min   Social Connections: Unknown (12/19/2024)    Social Connection and Isolation Panel [NHANES]     Frequency of Social Gatherings with Friends and Family: Once a week          Disposition Plan     Medically Ready for Discharge: Anticipated Tomorrow             Dmitry Tapia DO  Hospitalist Service  [facility]  Securely message with [employer] (more info)  Text page via [employer] Paging/Directory   ______________________________________________________________________    Interval History   No acute events. PT/OT evals    Physical Exam   Vital Signs: Temp: 97.9  F (36.6  C) Temp src: Oral BP: 105/43 Pulse: 61   Resp: 18 SpO2: 96 % O2 Device: None (Room air)    Weight: 114 lbs 0 oz    Physical Exam  Constitutional:       General: Pt is not in acute distress.  HENT:      Head: Normocephalic and atraumatic.      Nose: Nose normal.   Eyes:      Conjunctiva/sclera: Conjunctivae normal.   Pulmonary:      Effort: Pulmonary effort is normal. Systolic murmur  Abdominal:      General: Abdomen is flat.   Skin:     Findings: No rash.   Neurological:      General: No focal deficit present.      Mental Status: Pt is alert.    Psychiatric:         Mood and Affect: Mood normal.       Medical Decision Making       60 MINUTES SPENT BY ME on the date of service doing chart review, history, exam, documentation & further activities per the note.      Data     I have personally reviewed the following data over the past 24 hrs:    4.9  \   7.6 (L)   / 194     134 (L) 103 19.2 /  104 (H)   4.2 18 (L) 1.58 (H) \     ALT: 8 AST: 19 AP: 42 TBILI: 0.5   ALB: 3.2 (L) TOT PROTEIN: 4.8 (L) LIPASE: N/A     INR:  1.24 (H) PTT:  N/A   D-dimer:  N/A Fibrinogen:  N/A       Imaging results reviewed over the past 24 hrs:   Recent Results (from the past 24 hours)   Echocardiogram Complete   Result Value    LVEF  60-65%    Narrative    [id number]  [id number]  [id number]  [id number]^SATHISH^SILAS     [facility]  Echocardiography Laboratory  [address] [place] [address]     Name: SOHAM ALMENDAREZ  MRN: [id number]  : 1945  Study Date: 2025 02:56 PM  Age: 79 yrs  Gender: Female  Patient Location: [facility]  Reason For Study: Cardiac Murmur  Ordering Physician: SILAS BOWER  Referring Physician: Diana Ely  Performed By: Kellie Rizo RDCS     BSA: 1.6 m2  Height: 65 in  Weight: 114 lb  HR: 62  BP: 162/59 mmHg  ______________________________________________________________________________  Procedure  Echocardiogram with two-dimensional, color and spectral Doppler.  ______________________________________________________________________________  Interpretation Summary     Left ventricular systolic function is normal.The visual ejection fraction is  60-65%.  The right ventricular systolic function is normal.  There is moderate to severe mitral stenosis-Mean gradients 11 mm hg across  mitral valve @ HR 64 bpm.  There is moderate (2+) mitral regurgitation.  Pulmonary hypertension- RVSP 38 mm hg +RA.  ______________________________________________________________________________  Left Ventricle  The left ventricle is normal in size. There  is normal left ventricular wall  thickness. Left ventricular systolic function is normal. The visual ejection  fraction is 60-65%. Diastolic function not assessed due to significant mitral  annular calcification. No regional wall motion abnormalities noted.     Right Ventricle  The right ventricle is normal size. The right ventricular systolic function is  normal.     Atria  The left atrium is mildly dilated. Right atrial size is normal. There is no  color Doppler evidence of an atrial shunt.     Mitral Valve  There is moderate mitral annular calcification. Thickened mitral valve  leaflets. There is moderate (2+) mitral regurgitation. There is moderate to  severe mitral stenosis. Mean gradients 11 mm hg across mitral valve @ HR 64  bpm.     Tricuspid Valve  There is trace tricuspid regurgitation. The right ventricular systolic  pressure is approximated at 38.3 mmHg plus the right atrial pressure.  Pulmonary hypertension.     Aortic Valve  The aortic valve is not well visualized. Aortic valve sclerosis. No aortic  regurgitation is present. No aortic stenosis is present.     Pulmonic Valve  There is trace pulmonic valvular regurgitation. There is no pulmonic valvular  stenosis.     Vessels  Normal size ascending aorta. IVC diameter and respiratory changes fall into an  intermediate range suggesting an RA pressure of 8 mmHg.     Pericardium  Trivial pericardial effusion.     Rhythm  Sinus rhythm was noted.  ______________________________________________________________________________  MMode/2D Measurements & Calculations  IVSd: 0.84 cm     LVIDd: 3.9 cm  LVIDs: 2.4 cm  LVPWd: 1.1 cm  FS: 39.3 %  LV mass(C)d: 116.5 grams  LV mass(C)dI: 74.8 grams/m2  LA dimension: 4.0 cm  asc Aorta Diam: 2.7 cm  LVOT diam: 1.9 cm  LVOT area: 2.9 cm2  Asc Ao diam index BSA (cm/m2): 1.8  Asc Ao diam index Ht(cm/m): 1.7  LA Volume (BP): 45.6 ml  LA Volume Index (BP): 29.2 ml/m2     RWT: 0.54     Doppler Measurements & Calculations  MV E  max geraldine: 224.8 cm/sec  MV A max geraldine: 201.2 cm/sec  MV E/A: 1.1  MV max P.9 mmHg  MV mean PG: 10.6 mmHg  MV V2 VTI: 72.7 cm  MV P1/2t max geraldine: 222.8 cm/sec  MV dec time: 0.28 sec  PA acc time: 0.15 sec  TR max geraldine: 309.3 cm/sec  TR max P.3 mmHg  E/E' av.2  Lateral E/e': 51.9  Medial E/e': 40.4     ______________________________________________________________________________  Report approved by: Rui Jones MD on 2025 03:59 PM

## 2025-05-30 VITALS
HEART RATE: 67 BPM | DIASTOLIC BLOOD PRESSURE: 55 MMHG | BODY MASS INDEX: 18.32 KG/M2 | TEMPERATURE: 97.9 F | HEIGHT: 66 IN | RESPIRATION RATE: 16 BRPM | SYSTOLIC BLOOD PRESSURE: 154 MMHG | OXYGEN SATURATION: 96 % | WEIGHT: 114 LBS

## 2025-05-30 LAB
ALBUMIN SERPL BCG-MCNC: 3 G/DL (ref 3.5–5.2)
ALP SERPL-CCNC: 44 U/L (ref 40–150)
ALT SERPL W P-5'-P-CCNC: 9 U/L (ref 0–50)
ANION GAP SERPL CALCULATED.3IONS-SCNC: 8 MMOL/L (ref 7–15)
AST SERPL W P-5'-P-CCNC: 18 U/L (ref 0–45)
BILIRUB SERPL-MCNC: 0.3 MG/DL
BUN SERPL-MCNC: 20.7 MG/DL (ref 8–23)
CALCIUM SERPL-MCNC: 8.4 MG/DL (ref 8.8–10.4)
CHLORIDE SERPL-SCNC: 106 MMOL/L (ref 98–107)
CREAT SERPL-MCNC: 1.76 MG/DL (ref 0.51–0.95)
EGFRCR SERPLBLD CKD-EPI 2021: 29 ML/MIN/1.73M2
ERYTHROCYTE [DISTWIDTH] IN BLOOD BY AUTOMATED COUNT: 15.9 % (ref 10–15)
GLUCOSE SERPL-MCNC: 94 MG/DL (ref 70–99)
HCO3 SERPL-SCNC: 23 MMOL/L (ref 22–29)
HCT VFR BLD AUTO: 23.6 % (ref 35–47)
HGB BLD-MCNC: 7.3 G/DL (ref 11.7–15.7)
INR PPP: 1.22 (ref 0.85–1.15)
MCH RBC QN AUTO: 28.7 PG (ref 26.5–33)
MCHC RBC AUTO-ENTMCNC: 30.9 G/DL (ref 31.5–36.5)
MCV RBC AUTO: 93 FL (ref 78–100)
PLATELET # BLD AUTO: 194 10E3/UL (ref 150–450)
POTASSIUM SERPL-SCNC: 4.7 MMOL/L (ref 3.4–5.3)
PROT SERPL-MCNC: 5 G/DL (ref 6.4–8.3)
PROTHROMBIN TIME: 15.2 SECONDS (ref 11.8–14.8)
RBC # BLD AUTO: 2.54 10E6/UL (ref 3.8–5.2)
SODIUM SERPL-SCNC: 137 MMOL/L (ref 135–145)
WBC # BLD AUTO: 5 10E3/UL (ref 4–11)

## 2025-05-30 PROCEDURE — 250N000011 HC RX IP 250 OP 636: Performed by: STUDENT IN AN ORGANIZED HEALTH CARE EDUCATION/TRAINING PROGRAM

## 2025-05-30 PROCEDURE — 85048 AUTOMATED LEUKOCYTE COUNT: CPT | Performed by: STUDENT IN AN ORGANIZED HEALTH CARE EDUCATION/TRAINING PROGRAM

## 2025-05-30 PROCEDURE — 250N000013 HC RX MED GY IP 250 OP 250 PS 637

## 2025-05-30 PROCEDURE — 99239 HOSP IP/OBS DSCHRG MGMT >30: CPT | Performed by: STUDENT IN AN ORGANIZED HEALTH CARE EDUCATION/TRAINING PROGRAM

## 2025-05-30 PROCEDURE — 85610 PROTHROMBIN TIME: CPT | Performed by: STUDENT IN AN ORGANIZED HEALTH CARE EDUCATION/TRAINING PROGRAM

## 2025-05-30 PROCEDURE — 36415 COLL VENOUS BLD VENIPUNCTURE: CPT | Performed by: STUDENT IN AN ORGANIZED HEALTH CARE EDUCATION/TRAINING PROGRAM

## 2025-05-30 PROCEDURE — 80053 COMPREHEN METABOLIC PANEL: CPT | Performed by: STUDENT IN AN ORGANIZED HEALTH CARE EDUCATION/TRAINING PROGRAM

## 2025-05-30 PROCEDURE — 250N000013 HC RX MED GY IP 250 OP 250 PS 637: Performed by: STUDENT IN AN ORGANIZED HEALTH CARE EDUCATION/TRAINING PROGRAM

## 2025-05-30 RX ORDER — PANTOPRAZOLE SODIUM 40 MG/1
TABLET, DELAYED RELEASE ORAL
DISCHARGE
Start: 2025-05-30 | End: 2025-06-27

## 2025-05-30 RX ADMIN — ASPIRIN 81 MG: 81 TABLET, COATED ORAL at 09:31

## 2025-05-30 RX ADMIN — FLUOXETINE HYDROCHLORIDE 20 MG: 20 CAPSULE ORAL at 09:32

## 2025-05-30 RX ADMIN — ATORVASTATIN CALCIUM 80 MG: 80 TABLET, FILM COATED ORAL at 09:31

## 2025-05-30 RX ADMIN — PANTOPRAZOLE SODIUM 40 MG: 40 INJECTION, POWDER, FOR SOLUTION INTRAVENOUS at 09:41

## 2025-05-30 ASSESSMENT — ACTIVITIES OF DAILY LIVING (ADL)
ADLS_ACUITY_SCORE: 23

## 2025-05-30 NOTE — PLAN OF CARE
Physical Therapy Discharge Summary    Reason for therapy discharge:    Discharged to transitional care facility.    Progress towards therapy goal(s). See goals on Care Plan in [facility] electronic health record for goal details.  Goals partially met.  Barriers to achieving goals:   discharge from facility.    Therapy recommendation(s):    Continued therapy is recommended.  Rationale/Recommendations:  Recommend continued PT at TCU to maximize safe and indep mobility.

## 2025-05-30 NOTE — PLAN OF CARE
[facility] DISCHARGE NOTE    Patient discharged to transitional care unit at 2:01 PM via wheel chair. Accompanied by daughter and staff. Discharge instructions reviewed with patient, daughter, and caregiver; report called to Daniel hernandez at [facility] [phone], opportunity offered to ask questions. Prescriptions sent with patient to fill . All belongings sent with patient.    Nayely Thompson RN    Goal Outcome Evaluation:      Plan of Care Reviewed With: patient, caregiver, other (see comments)    Overall Patient Progress: improvingOverall Patient Progress: improving

## 2025-05-30 NOTE — PROGRESS NOTES
Care Management Discharge Note    Discharge Date: 05/30/2025       Discharge Disposition: Transitional Care, Home Care    Discharge Services: None    Discharge DME: None    Discharge Transportation: family or friend will provide    Private pay costs discussed: Not applicable    Does the patient's insurance plan have a 3 day qualifying hospital stay waiver?  Yes     Which insurance plan 3 day waiver is available? Alternative insurance waiver    Will the waiver be used for post-acute placement? No    PAS Confirmation Code: [id number]  Patient/family educated on Medicare website which has current facility and service quality ratings: Yes    Education Provided on the Discharge Plan: Yes  Persons Notified of Discharge Plans: Yes  Patient/Family in Agreement with the Plan: yes    Handoff Referral Completed: Yes, [facility] PCP: Internal handoff referral completed    Additional Information:    Met with pt and family. Completed AIDET and discussed discharge plan to TCU today. Provided pt and family information on TCU bed availability. [facility] does not have a bed availability today. Pt and family are agreeable to go to pt's 2nd choice TCU.      Plan: Writer called, faxed and confirmed arrangements. Pt is discharging to [facility]CU (Tele: [phone] / Fax:[phone]) at 1330 today. Family to provide transport.         Lurdes Cedeño   Care Transitions   [facility]  Tele:[phone]

## 2025-05-30 NOTE — PLAN OF CARE
Goal Outcome Evaluation:      Plan of Care Reviewed With: patient    Overall Patient Progress: improvingOverall Patient Progress: improving     Assumed care of patient at 2300. Alert and oriented. Uses call light. Up to bathroom with stand by assist. No complaints of nausea or emesis.

## 2025-05-30 NOTE — PLAN OF CARE
Problem: Adult Inpatient Plan of Care  Goal: Plan of Care Review  Description: The Plan of Care Review/Shift note should be completed every shift.  The Outcome Evaluation is a brief statement about your assessment that the patient is improving, declining, or no change.  This information will be displayed automatically on your shift  note.  Outcome: Not Progressing  Flowsheets (Taken 5/29/2025 2016)  Plan of Care Reviewed With: patient  Overall Patient Progress: no change   Goal Outcome Evaluation:      Plan of Care Reviewed With: patient    Overall Patient Progress: no changeOverall Patient Progress: no change    Patient A/Ox4 [place], able to make needs known. Denies pain/discomfort. Assist of sba to bathroom. 02 stable on ra. PIV infusing @ 50/hr. Tolerated regular diet well, no nausea/discomfort.

## 2025-05-30 NOTE — DISCHARGE INSTRUCTIONS
Foods requiring minimal preparation  Canned fruits and 100% fruit juice  Fresh and dried fruits and veggies  Cereals, granola bars, trail mix  Crackers with peanut butter, cheese, or cottage cheese  Hard boiled eggs  Milk, yogurt, ice cream, smoothies, shakes  Sandwiches  Canned tuna, salmon, oysters, sardines  Rotisserie chicken     Nutrition supplement  options tried on 5/30-Ensure enlive, magic cup, and ensure clear. Please discuss with INR team if deciding to continue with supplements.

## 2025-05-30 NOTE — DISCHARGE SUMMARY
[facility]  Hospitalist Discharge Summary      Date of Admission:  5/27/2025  Date of Discharge:  5/30/2025  2:02 PM  Discharging Provider: Dmitry Tapia DO  Discharge Service: Hospitalist Service    Discharge Diagnoses   Bebe Alfonso is a 79 year old female with past medical history of renal artery stenosis, multiple CVA's now on chronic anticoagulation, renal artery stenosis, hypertension, thyroid nodule, tobacco use, CKD4 with chronic anemia now presents on 5/27/2025 with fatigue and acute anemia on clinic lab check. She is being admitted for management, monitoring, & ongoing workup of acute on chronic anemia. Etiology of anemia was confirmed to be 2/2 UGIB with vascular lesion that was endoscopically clipped with hemostasis. Hemoglobins stabilized and didn't change with aspirin resumption. Stroke neuro consulted and recommended asa montherapy without coumadin and stroke neuro follow up 6/10. Patient discharged to tcu      Anemia, acute presumed blood loss superimposed on chronic anemia of renal disease  Non-variceal upper GI bleeding  Moderate to Severe Mitral Stenosis (mean pg 11mmHg w/ hr 64)  Moderate Mitral Annular Calcification  Left Atrial Enlargement (LA volume >45)  Recurrent cerebrovascular accidents (CVAs)  ? Cryptogenic/ESUS vs ICAD?  Chronic anticoagulation  Bilateral carotid artery stenosis  Adenocarcinoma of lung, left upper lobe  Mild KATHERYN, resolved  CKD stage 4  Renal artery stenosis, right, moderate  Peripheral Artery Disease  Hyperkalemia  Hypertension  COPD  Generalized anxiety  Hyperlipidemia  Tobacco dependence    Clinically Significant Risk Factors     # Severe Malnutrition: based on nutrition assessment and treatment provided per dietitian's recommendations.      Follow-ups Needed After Discharge   Follow-up Appointments       Follow Up and recommended labs and tests      Follow up with [facility] physician.  The following labs/tests are recommended:  Reticulocyte count 1 week WITH Reticulocyte-hemoglobin if available (assess for response to iv iron given). CBC and CMP in 1-2 weeks.  Follow up with primary care provider in 1-3 months. Recommend repeating a CBC, Ferritin, and Iron study in 3 months to assess if iron stores are replete.     Follow up with specialist(s):  Cardiology - referred  Neurology- referred, appointment 6/10 mid day (virtual)  Oncology/Radiation- as directed by them                Unresulted Labs Ordered in the Past 30 Days of this Admission       Date and Time Order Name Status Description    5/28/2025  1:18 PM Surgical Pathology Exam In process         These results will be followed up by surg    Discharge Disposition   Discharged to short-term care facility  Condition at discharge: Fair    Hospital Course      Bebe Alfonso is a 79 year old female with past medical history of renal artery stenosis, multiple CVA's now on chronic anticoagulation, renal artery stenosis, hypertension, thyroid nodule, tobacco use, CKD4 with chronic anemia now presents on 5/27/2025 with fatigue and acute anemia on clinic lab check. She is being admitted for management, monitoring, & ongoing workup of acute on chronic anemia.     Update 5/29: 1 more day, PT/OT eval pending to see if needs TCU. Cards referral on discharge needed as well as stroke neurology referral.    EGD+Flx Sig: endoscopy showing single bleeding 'angiodysplastic lesion' s/p clips in duodenum on upper endoscopy. Polyp that was bx on lower, no bleeding. 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 Hemoglobin stable since. No known bleeding since procedure.     - Okay to resume asa (resumed 5/28 post procedure) and coumadin per surgery  - Discussing with stroke neurology re: AC and antiplatelet  - Continue IV PPI while hospitalized, transition to PO PPI BID on discharge x 2 weeks then daily x 2 weeks then stop.       Anemia, acute  presumed blood loss superimposed on chronic anemia of renal disease  Non-variceal upper GI bleeding    History of GI polyps: Colonoscopy 2022 with multiple polyps removed & one 15mm rectal polyp not removed. EGD 2022 with removal of multiple gastric polyps, variable Z-line, & probable hiatal hernia.    Presents from clinic with hemoglobin 5.6. Endorses fatigue, coffee ground emesis on 5/25 & melena 5/26. Baseline hemoglobin 10.0 - 11.0. Normocytic (MCV 97). Iron studies WNL in March 2025.     Hemodynamically stable. Presume GI bleeding as source of acute on chronic anemia. Patient is anticoagulated on warfarin. I personally discussed with general surgery who recommends EGD in AM for further eval.   EGD+Flx Sig (5/28/2025): endoscopy showing single bleeding 'angiodysplastic lesion' s/p clips in duodenum on upper endoscopy. Polyp that was bx on lower, no bleeding.  5/27: received 2u pRBC with hgb 5 -> 8    5/28: No further BM. No further emesis/hematemesis. Bidirectional endoscopy today (egd +flex sig) to eval for etiology of anemia.  Iron deficit ([place]): 1100mg (received 2u pRBC = 500mg ) (600mg IV venofer given = 1100 total)  5/29: hgb stable 7.1 -> 7.6. no bleeds.    - discharge with PO iron every other day with vitamin C given PPI use  - Reg diet  - Pantoprazole 40mg IV BID until discharge    Moderate to Severe Mitral Stenosis (mean pg 11mmHg w/ hr 64)  Moderate Mitral Annular Calcification  Left Atrial Enlargement (LA volume >45)  Presumable aortic stenosis murmur but not classic cresc-decresc. 3/6 ejection (or late diastolic?) murmur best heard at RUSB and LUSB with bilateral carotid radiation and slight delayed pulses.   TTE: LV EF 60-65%, RV fxn normal. Mod to severe mitral stenosis. Moderate MR. Mean gradients 11mmHg @ hr 64. RVSP 38mmHg. Moderate MAC.     - #cards referral   - LAE places patient at increased risk of atrial cardiopathy related cva but has many different risk factors for cva,  ascvd      Recurrent cerebrovascular accidents (CVAs)  ? Cryptogenic/ESUS vs ICAD?  Chronic anticoagulation  On warfarin for history of multiple CVA's. INR 2.87 on presentation.   History of severe atherosclerosis with at least 5 strokes in addition to several minor strokes. First CVA 2011. Had tried plavix previously, possibly 2015, but stopped due to bruising (denies clinically significant bleeding). Has had recurrent CVAs, sometimes while on coumadin, other times while holding bridgett-procedurally. At some point in the last year she was started on aspirin 81mg in addition to coumadin. She denies any recurrent strokes since then.  5/29: discussing with stroke neurology    - Question if switching to brillinta (or increasing dose of aspirin?) from 81mg asa could be beneficial and stopping coumadin   --addendum: stroke neuro recommended 81mg ASA monotherapy, stop coumadin. Re-establish with neuro (6/10 appointment with Dr. Jauregui   - Discontinued coumadin  - continue PTA 81mg ASA    Bilateral carotid artery stenosis  Carotid US July 2023 shows 50 - 69% bilateral carotid artery stenosis.   Has chronic bruits on exam.     - Contributes to CVA risk    Adenocarcinoma of lung, left upper lobe    PET / CT 3/26/2025 no evidence of metastasis. Completed radiation therapy late April 2025. Working with Dr. Wise to schedule lingulectomy & lymph node dissection.   Endorses decreased energy since radiation therapy. No hemoptysis.   - Continue regular outpatient follow up with oncology     Mild KATHERYN, resolved  CKD stage 4  Renal artery stenosis, right, moderate  Peripheral Artery Disease    Baseline creatinine 1.65 - 1.75, creatinine 1.97 on presentation. BUN chronically elevated & at baseline (~30 - 32).     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 Is not a candidate for surgical intervention for renal artery stenosis.     - okay to resume enalapril 5/29    Hyperkalemia  Mild, K 5.5.      resolved    Hypertension  Mildly hypertensive on presentation, preferable in setting of GI bleeding.     COPD  Following with lung nodule clinic, Dr. Henry. Noted to have baseline dyspnea on inhalers, NOT on supplemental home O2.     - Holding PTA fluticasone during admission     Generalized anxiety  - Continue PTA fluoxetine 20mg daily     Hyperlipidemia  - Continue PTA atorvastatin 80mg daily     Tobacco dependence  Encourage cessation.   - Nicotine patch available PRN  Consultations This Hospital Stay   SURGERY GENERAL IP CONSULT  PHARMACY TO DOSE WARFARIN  PHYSICAL THERAPY ADULT IP CONSULT  OCCUPATIONAL THERAPY ADULT IP CONSULT  CARE MANAGEMENT / SOCIAL WORK IP CONSULT  PHYSICAL THERAPY ADULT IP CONSULT  OCCUPATIONAL THERAPY ADULT IP CONSULT  CARE MANAGEMENT / SOCIAL WORK IP CONSULT  NUTRITION SERVICES ADULT IP CONSULT  PHYSICAL THERAPY ADULT IP CONSULT  OCCUPATIONAL THERAPY ADULT IP CONSULT    Code Status   Full Code    Time Spent on this Encounter   Dmitry GARCIA DO, personally saw the patient today and spent greater than 30 minutes discharging this patient.       Dmitry Tapia DO  [facility] SURGICAL  [address] [address]  Phone: [phone]  Fax: [phone]  ______________________________________________________________________    Physical Exam   Vital Signs:                    Weight: 114 lbs 0 oz  Physical Exam  Constitutional:       General: Pt is not in acute distress.  HENT:      Head: Normocephalic and atraumatic.      Nose: Nose normal.   Eyes:      Conjunctiva/sclera: Conjunctivae normal.   Pulmonary:      Effort: Pulmonary effort is normal.   Abdominal:      General: Abdomen is flat.   Skin:     Findings: No rash.   Neurological:      General: No focal deficit present.      Mental Status: Pt is alert.   Psychiatric:         Mood and Affect: Mood normal.          Primary Care Physician   Diana Ely    Discharge Orders      Primary Care - Care  Coordination Referral      Adult Neurology  Referral      Adult Cardiology Eval  Referral      General info for SNF    Length of Stay Estimate: Short Term Care: Estimated # of Days <30  Condition at Discharge: Improving  Level of care:skilled   Rehabilitation Potential: Fair  Admission H&P remains valid and up-to-date: Yes  Recent Chemotherapy: not yet started. Okay to hold while at tcu.                    Use Nursing Home Standing Orders: Yes     Follow Up and recommended labs and tests    Follow up with assisted physician.  The following labs/tests are recommended: Reticulocyte count 1 week WITH Reticulocyte-hemoglobin if available (assess for response to iv iron given). CBC and CMP in 1-2 weeks.  Follow up with primary care provider in 1-3 months. Recommend repeating a CBC, Ferritin, and Iron study in 3 months to assess if iron stores are replete.     Follow up with specialist(s):  Cardiology - referred  Neurology- referred, appointment 6/10 mid day (virtual)  Oncology/Radiation- as directed by them     Reason for your hospital stay    You were hospitalized for a gastrointestinal bleed which was treated endoscopically. After adjusting your medications (including STOPPING COUMADIN) you were discharged to a TCU to rehab.     Daily weights    Call Provider for weight gain of more than 2 pounds per day or 5 pounds per week.     Intake and output    Every shift     Activity - Up with nursing assistance     Encourage PO fluids     Physical Therapy Adult Consult    Evaluate and treat as clinically indicated.    Reason:  tcu     Occupational Therapy Adult Consult    Evaluate and treat as clinically indicated.    Reason:  tcu     Diet    Follow this diet upon discharge: Current Diet:Orders Placed This Encounter      Regular Diet Adult       Significant Results and Procedures   Results for orders placed or performed during the hospital encounter of 05/27/25   Echocardiogram Complete     Value    LVEF   60-65%    [facility]    [id number]  [id number]  [id number]  [id number]^SATHISH^SILAS     [facility]  Echocardiography Laboratory  [address]  [place] [id number]     Name: SOHAM ALMENDAREZ  MRN: [id number]  : 1945  Study Date: 2025 02:56 PM  Age: 79 yrs  Gender: Female  Patient Location: [facility]  Reason For Study: Cardiac Murmur  Ordering Physician: SILAS BOWER  Referring Physician: Diana Ely  Performed By: Kellie Rizo RDCS     BSA: 1.6 m2  Height: 65 in  Weight: 114 lb  HR: 62  BP: 162/59 mmHg  ______________________________________________________________________________  Procedure  Echocardiogram with two-dimensional, color and spectral Doppler.  ______________________________________________________________________________  Interpretation Summary     Left ventricular systolic function is normal.The visual ejection fraction is  60-65%.  The right ventricular systolic function is normal.  There is moderate to severe mitral stenosis-Mean gradients 11 mm hg across  mitral valve @ HR 64 bpm.  There is moderate (2+) mitral regurgitation.  Pulmonary hypertension- RVSP 38 mm hg +RA.  ______________________________________________________________________________  Left Ventricle  The left ventricle is normal in size. There is normal left ventricular wall  thickness. Left ventricular systolic function is normal. The visual ejection  fraction is 60-65%. Diastolic function not assessed due to significant mitral  annular calcification. No regional wall motion abnormalities noted.     Right Ventricle  The right ventricle is normal size. The right ventricular systolic function is  normal.     Atria  The left atrium is mildly dilated. Right atrial size is normal. There is no  color Doppler evidence of an atrial shunt.     Mitral Valve  There is moderate mitral annular calcification. Thickened mitral valve  leaflets. There is moderate (2+) mitral regurgitation. There is moderate to  severe  mitral stenosis. Mean gradients 11 mm hg across mitral valve @ HR 64  bpm.     Tricuspid Valve  There is trace tricuspid regurgitation. The right ventricular systolic  pressure is approximated at 38.3 mmHg plus the right atrial pressure.  Pulmonary hypertension.     Aortic Valve  The aortic valve is not well visualized. Aortic valve sclerosis. No aortic  regurgitation is present. No aortic stenosis is present.     Pulmonic Valve  There is trace pulmonic valvular regurgitation. There is no pulmonic valvular  stenosis.     Vessels  Normal size ascending aorta. IVC diameter and respiratory changes fall into an  intermediate range suggesting an RA pressure of 8 mmHg.     Pericardium  Trivial pericardial effusion.     Rhythm  Sinus rhythm was noted.  ______________________________________________________________________________  MMode/2D Measurements & Calculations  IVSd: 0.84 cm     LVIDd: 3.9 cm  LVIDs: 2.4 cm  LVPWd: 1.1 cm  FS: 39.3 %  LV mass(C)d: 116.5 grams  LV mass(C)dI: 74.8 grams/m2  LA dimension: 4.0 cm  asc Aorta Diam: 2.7 cm  LVOT diam: 1.9 cm  LVOT area: 2.9 cm2  Asc Ao diam index BSA (cm/m2): 1.8  Asc Ao diam index Ht(cm/m): 1.7  LA Volume (BP): 45.6 ml  LA Volume Index (BP): 29.2 ml/m2     RWT: 0.54     Doppler Measurements & Calculations  MV E max geraldine: 224.8 cm/sec  MV A max geraldine: 201.2 cm/sec  MV E/A: 1.1  MV max P.9 mmHg  MV mean PG: 10.6 mmHg  MV V2 VTI: 72.7 cm  MV P1/2t max geraldine: 222.8 cm/sec  MV dec time: 0.28 sec  PA acc time: 0.15 sec  TR max geraldine: 309.3 cm/sec  TR max P.3 mmHg  E/E' av.2  Lateral E/e': 51.9  Medial E/e': 40.4     ______________________________________________________________________________  Report approved by: Rui Jones MD on 2025 03:59 PM             Discharge Medications   Current Discharge Medication List        START taking these medications    Details   pantoprazole (PROTONIX) 40 MG EC tablet Take 1 tablet (40 mg) by mouth 2 times daily for 14 days,  THEN 1 tablet (40 mg) daily for 14 days. Then stop. Discuss with your family doctor about restarting pepcid when done with protonix.    Associated Diagnoses: Upper GI bleed           CONTINUE these medications which have NOT CHANGED    Details   aspirin 81 MG EC tablet Take 81 mg by mouth daily.      atorvastatin (LIPITOR) 80 MG tablet Take 1 tablet by mouth once daily  Qty: 90 tablet, Refills: 2    Associated Diagnoses: Hyperlipidemia LDL goal <70      Cholecalciferol (VITAMIN D3 PO) Take 1 tablet by mouth daily.      enalapril (VASOTEC) 20 MG tablet Take 1 tablet (20 mg) by mouth daily. Take in the morning  Qty: 90 tablet, Refills: 1    Associated Diagnoses: Benign essential hypertension      famotidine (PEPCID) 40 MG tablet Take 1 tablet by mouth once daily  Qty: 90 tablet, Refills: 1    Associated Diagnoses: Gastroesophageal reflux disease with esophagitis without hemorrhage      FLUoxetine (PROZAC) 20 MG capsule Take 1 capsule by mouth once daily  Qty: 90 capsule, Refills: 0    Associated Diagnoses: Generalized anxiety disorder      fluticasone (FLONASE) 50 MCG/ACT nasal spray Spray 1 spray into both nostrils daily  Qty: 16 g, Refills: 1    Associated Diagnoses: Chronic rhinitis           STOP taking these medications       amLODIPine (NORVASC) 5 MG tablet Comments:   Reason for Stopping:         Aspirin (VAZALORE) 81 MG CAPS Comments:   Reason for Stopping:         warfarin ANTICOAGULANT (COUMADIN) 5 MG tablet Comments:   Reason for Stopping:             Allergies   Allergies   Allergen Reactions    Losartan Swelling, Nausea, Shortness Of Breath and Palpitations    Gabapentin Other (See Comments) and GI Disturbance     Double vision, flatulence    Lisinopril Cough    Oxycodone Other (See Comments)     But has tolerated hydrocodone    Tramadol Other (See Comments)    Augmentin [Amoxicillin-Pot Clavulanate] Rash    Bacitracin Rash     blisters    Bupropion Rash     Allergic to brand not generic

## 2025-05-30 NOTE — PROGRESS NOTES
CLINICAL NUTRITION SERVICES - ASSESSMENT NOTE    RECOMMENDATIONS FOR MDs/PROVIDERS TO ORDER:  None at this time    Registered Dietitian Interventions:  Send patent trial of ensure enlive, ensure clear, and magic cup prior to discharge  Educate patient on consistent vitamin K intakes and discussion with INR with anticoagulation.    Future/Additional Recommendations:  Monitor patient weight, intakes, meds/labs and GI/BM  Monitor patient tolerance to supplements/ place standing order after preferences     REASON FOR ASSESSMENT  Nutrition christoph < 3    INFORMATION OBTAINED  Assessed patient in room.    NUTRITION HISTORY  Bebe Alfonso is a 79 year old female who presents with anemia with presumed blood loss superimposed on chronic anemia of renal disease, non variceal upper GI bleeding, moderate to severe mitral stenosis, moderate mitral annular calcification, left atrial enlargement, recurrent CVA, chronic anticoagulation, bilateral carotid artery stenosis, adenocarcinoma of lung, mild KATHERYN, CKD stage 4, PAD, hyperkalemia, HTN, COPD, generalized anxiety, and HLD    RD met with patient and family this afternoon. Patient/family reports a decreased PO intake over the last few months. They note progressive weight loss over the last few years and stated a typical weight used to be around 130 lbs. Pt denied any chewing or swallowing difficulties and denied using nutritional supplements d/t being on anticoagulation. RD went over balancing protein intakes and the role of nutrition supplements. RD also discussed the importance of consistent vitamin K intakes and discussion with INR team with medication. RD also went over ways to increase calories throughout the day and the importance of small frequent meals. Patient was agreeable to trailing a few of the supplements today. She is hopeful to discharge soon.     CURRENT NUTRITION ORDERS  Diet: Orders Placed This Encounter      Regular Diet Adult      Snacks/Supplements: None  "currently ordered      CURRENT INTAKE/TOLERANCE  % of recorded meal intakes noted in patient chart.    LABS  Nutrition-relevant labs: Reviewed  Albumin decreased-3.0 g/dL  Protein total decreased-5.0 g/dL    MEDICATIONS  Nutrition-relevant medications: Reviewed  Scheduled: Lipitor, warfarin (discontinued)  Continuous: None  PRN: None pertinent    ANTHROPOMETRICS  Height: 166.4 cm (5' 5.5\")  Admission Weight: 51.7 kg (114 lb) (05/27/25 1633)   Most Recent Weight: 51.7 kg (114 lb) (05/28/25 1127)  IBW: 57.9 kg  BMI: Body mass index is 18.68 kg/m .   Weight History:   Wt Readings from Last 15 Encounters:   05/28/25 51.7 kg (114 lb)   05/27/25 52 kg (114 lb 9.6 oz)   04/03/25 53.3 kg (117 lb 9.6 oz)   03/25/25 52.7 kg (116 lb 3.2 oz)   03/20/25 51.6 kg (113 lb 12.1 oz)   03/14/25 52.2 kg (115 lb)   03/04/25 51.7 kg (114 lb)   02/06/25 52.6 kg (116 lb)   12/19/24 49.4 kg (109 lb)   10/03/24 52.2 kg (115 lb)   09/16/24 52.6 kg (116 lb)   08/06/24 53.5 kg (118 lb)   06/26/24 53.5 kg (118 lb)   04/09/24 54.4 kg (120 lb)   04/08/24 54.4 kg (120 lb)   Patient weight loss does not meet criteria for significance.     Dosing Weight: 51.7 kg, based on actual wt    ASSESSED NUTRITION NEEDS  Estimated Energy Needs: 1,292-1,551 kcals/day (25 - 30 kcals/kg)  Justification: Maintenance  Estimated Protein Needs: 62-78 grams protein/day (1.2 - 1.5 grams of pro/kg)  Justification: Increased needs  Estimated Fluid Needs: 1,292-1,551 mL/day (1 mL/kcal)  Justification: Per provider pending fluid status    SYSTEM AND PHYSICAL FINDINGS    GI symptoms: Reviewed; last BM noted on 5/30.  Skin/wounds: Reviewed; No PI/wounds noted.    MALNUTRITION  % Intake: </=75% for >/= 1 month (severe)  % Weight Loss: Weight loss does not meet criteria   Subcutaneous Fat Loss: Buccal: Severe  Muscle Loss: Clavicles (pectoralis and deltoids): Severe  Fluid Accumulation/Edema: None noted  Malnutrition Diagnosis: Severe malnutrition in the context of " "chronic illness  Malnutrition Present on Admission: Yes    NUTRITION DIAGNOSIS  Malnutrition (undernutrition) related to poor appetite vs increase needs as evidenced by intakes </=75% for >/= 1 month and severe muscle and fat loss.     INTERVENTIONS  Collaboration by nutrition professional with other providers  Medical food supplement therapy-send variety of supplements with meals today 5/30 for patient to determine supplement preferences.     GOALS  Patient to consume % of nutritionally adequate meal trays TID, or the equivalent with supplements/snacks.     MONITORING/EVALUATION  Progress toward goals will be monitored and evaluated per policy.    Nano Sofia RDN, LD  Clinical Dietitian  Office: [phone]  Hours: M-F 8-3pm   Weekend/Holiday YVAN Castillo - \"Weekend Clinical Dietitian\" (No longer using pager)    "

## 2025-06-02 ENCOUNTER — RESULTS FOLLOW-UP (OUTPATIENT)
Dept: SURGERY | Facility: CLINIC | Age: 80
End: 2025-06-02

## 2025-06-02 ENCOUNTER — TELEPHONE (OUTPATIENT)
Dept: NEUROLOGY | Facility: CLINIC | Age: 80
End: 2025-06-02
Payer: COMMERCIAL

## 2025-06-02 ENCOUNTER — PATIENT OUTREACH (OUTPATIENT)
Dept: CARE COORDINATION | Facility: CLINIC | Age: 80
End: 2025-06-02
Payer: COMMERCIAL

## 2025-06-02 ENCOUNTER — PATIENT OUTREACH (OUTPATIENT)
Dept: CARE COORDINATION | Facility: CLINIC | Age: 80
End: 2025-06-02

## 2025-06-02 DIAGNOSIS — D12.6 SERRATED ADENOMA OF COLON: Primary | ICD-10-CM

## 2025-06-02 PROCEDURE — 88305 TISSUE EXAM BY PATHOLOGIST: CPT | Mod: 26 | Performed by: PATHOLOGY

## 2025-06-02 PROCEDURE — 88342 IMHCHEM/IMCYTCHM 1ST ANTB: CPT | Mod: 26 | Performed by: PATHOLOGY

## 2025-06-02 ASSESSMENT — ACTIVITIES OF DAILY LIVING (ADL): DEPENDENT_IADLS:: INDEPENDENT

## 2025-06-02 NOTE — LETTER
To:             Please give to facility    From:   Janelle Velázquez RN, Care Coordinator   [facility]   Janelle Velázquez RN, Care Coordinator   [facility]'s   E-mail [email or web address]   [phone]   Patient Name:  Bebe Alfonso YOB: 1945   Admit date: 5/30/2025      *Information Needed:  Please contact me when the patient will discharge (or if they will move to long term care)- include the discharge date, disposition, and main diagnosis   If the patient is discharged with home care services, please provide the name of the agency    Also- Please inform me if a care conference is being held.   [facility]   Janelle Velázquez RN, Care Coordinator   [facility]'s   E-mail [email or web address]   [phone]                             Thank you         Electronically signed

## 2025-06-02 NOTE — TELEPHONE ENCOUNTER
"No VM, Sent Mychart (1st Attempt) for the patient to call back and schedule the following:    Appointment type: New Stroke  Provider: Dr. Jauregui  Return date: 6/10/25 12:00 virtual  Specialty phone number: [phone]  Additional appointment(s) needed: NA  Additonal Notes:     Per Maximino (Neurology) please manually  schedule the pt as mentioned above, please put \"Ok per Maximino\" in the appt notes.  "

## 2025-06-02 NOTE — PROGRESS NOTES
Clinic Care Coordination Contact  Anemia, acute presumed blood loss superimposed on chronic anemia of renal disease  Non-variceal upper GI bleeding  Moderate to Severe Mitral Stenosis (mean pg 11mmHg w/ hr 64)  Moderate Mitral Annular Calcification  Left Atrial Enlargement (LA volume >45)  Recurrent cerebrovascular accidents (CVAs)  ? Cryptogenic/ESUS vs ICAD?  Chronic anticoagulation  Bilateral carotid artery stenosis  Adenocarcinoma of lung, left upper lobe  Mild KATHERYN, resolved  CKD stage 4  Renal artery stenosis, right, moderate  Peripheral Artery Disease  Hyperkalemia  Hypertension  COPD  Generalized anxiety  Hyperlipidemia  Tobacco dependence     Care Coordination Transition Communication    Clinical Data:     [facility]  Hospitalist Discharge Summary       Date of Admission:  5/27/2025  Date of Discharge:  5/30/2025  2:02 PM.     Assessment: Patient has transitioned to TCU/ARU for short term rehabilitation:    Facility Name: [facility]   Transition Communication:  Notified facility of Primary Care- Care Coordination support via Epic fax.    Plan: Care Coordinator will await notification from facility staff informing of patient's discharge plans/needs. Care Coordinator will review chart and outreach to facility staff every 4 weeks and as needed.     [facility]   Janelle Velázquez RN, Care Coordinator   [facility]'s   E-mail [email or web address]   [phone]

## 2025-06-03 ENCOUNTER — TELEPHONE (OUTPATIENT)
Dept: NEUROLOGY | Facility: CLINIC | Age: 80
End: 2025-06-03
Payer: COMMERCIAL

## 2025-06-03 NOTE — TELEPHONE ENCOUNTER
Called  Rodriguez for second time. Stated he was busy and can't talk. Will attempt later on.    Alin Mercer on 6/3/2025 at 1:42 PM

## 2025-06-03 NOTE — TELEPHONE ENCOUNTER
NO VM for Mobile or Home phone for the patient to call back and schedule the following:    Appointment type: New Stroke Video Visit  Provider: Juventino  Return date: 6/10/2025 @ 12:00 PM (See below)  Specialty phone number: [phone]  Additional appointment(s) needed: na  Additonal Notes:     Okay to schedule JUAN JOSE per Maximino NAGY Will need to schedule manually.    Alin Mercer on 6/3/2025 at 9:57 AM

## 2025-06-04 ENCOUNTER — PATIENT OUTREACH (OUTPATIENT)
Dept: CARE COORDINATION | Facility: CLINIC | Age: 80
End: 2025-06-04
Payer: COMMERCIAL

## 2025-06-04 NOTE — TELEPHONE ENCOUNTER
Records Requested   June 4, 2025 9:55 AM   [id number]   Facility  [facility]   Outcome 9:57 am Sent request for imaging to be pushed to PACS. -CAROL Nathan on 6/4/2025 at 12:54 PM Imaging resolved into PCS. -CAROL     REASON FOR VISIT: Stroke   PROVIDER: Walter Jauregui MD   DATE OF APPT: 6/10/25    NOTES (FOR ALL VISITS) STATUS DETAILS   OFFICE NOTE from referring provider Internal Hosp Referral    OFFICE NOTE from other specialist Care Everywhere 5/27/25, 11/16/23, 9/28/22 Diana Ely, SELWYN CNP @[facility]    3/14/25 Abdirahman Isidro MD @[facility]    6/26/24, 6/21/23 Caitlin Jesus MD @[facility] Neuro    2/9/24 Saurabh Harrell MD @[facility]    6/7/23 Alfredito Harman PA-C @[facility]    4/19/23 Chiquita Vang, APRN CNP @[facility]    10/14/22 Butch Radford MD  @[facility] Heart [facility]    9/14/22 Mel Meyer MD @[facility]    See Additional Encounters   HOSPITAL ENCOUNTERS Care Everywhere 5/27/25-5/30/25 Dmitry Tapia DO @[facility]    7/28/15-7/30/15 Tavon Sanchez MD  @[facility]    7/2/11-7/3/11 Jazmin Benavides MD @[facility]     MEDICATION LIST Internal    IMAGING  (FOR ALL VISITS)     ULTRASOUND (CAROTID BILAT) *VASCULAR* PACS [facility]  7/18/23 US Carotid Bilat  6/28/22 US Carotid Bilat  3/19/18 US Carotid Bilat    [place]  10/7/22 US Carotid Bilat     MRI (HEAD, NECK, SPINE) PACS [place]  10/24/22 MRA Neck (Carotid)  9/23/22 MR Brain  7/29/15 MR Soft Tissue Neck     CT (HEAD, NECK, SPINE) PACS [facility]  3/1/24 CT Head  1/29/20 CT Head  1/29/20 CT Sinus    [place]  7/28/15 CT Head Stroke Protocol  12/11/12 CTA Head & Neck Carotid

## 2025-06-09 ENCOUNTER — PATIENT OUTREACH (OUTPATIENT)
Dept: CARE COORDINATION | Facility: CLINIC | Age: 80
End: 2025-06-09
Payer: COMMERCIAL

## 2025-06-09 ASSESSMENT — ACTIVITIES OF DAILY LIVING (ADL): DEPENDENT_IADLS:: INDEPENDENT

## 2025-06-09 NOTE — PROGRESS NOTES
Clinic Care Coordination Contact    Situation: Patient chart reviewed by care coordinator.    Background: 6/2/2025  [facility] - Surgical Specialties Service Line   Serrated adenoma of colon    Assessment:     Patient discharged to transitional care unit at 2:01 PM via wheel chair. Accompanied by daughter and staff. Discharge instructions reviewed with patient, daughter, and caregiver; report called to Daniel hernandez at [facility] [phone], opportunity offered to ask questions. Prescriptions sent with patient to fill . All belongings     Plan/Recommendations: CC RN will follow up when the patient is discharged from TCU    [facility]   Janelle Velázquez RN, Care Coordinator   [facility]'s   E-mail [email or web address]   [phone]

## 2025-06-10 ENCOUNTER — PRE VISIT (OUTPATIENT)
Dept: NEUROLOGY | Facility: CLINIC | Age: 80
End: 2025-06-10

## 2025-06-12 ENCOUNTER — PATIENT OUTREACH (OUTPATIENT)
Dept: CARE COORDINATION | Facility: CLINIC | Age: 80
End: 2025-06-12
Payer: COMMERCIAL

## 2025-06-16 ENCOUNTER — PATIENT OUTREACH (OUTPATIENT)
Dept: CARE COORDINATION | Facility: CLINIC | Age: 80
End: 2025-06-16
Payer: COMMERCIAL

## 2025-06-16 ASSESSMENT — ACTIVITIES OF DAILY LIVING (ADL): DEPENDENT_IADLS:: INDEPENDENT

## 2025-06-16 NOTE — PROGRESS NOTES
Clinic Care Coordination Contact    Situation: Patient chart reviewed by care coordinator.    Background:   06/07/2025 8:04 AM CDT - 06/12/2025 1:00 PM CDT  [facility]   [address] [place] [address]   [phone]    Assessment:   Stress-induced cardiomyopathy (Primary Dx);  Cardiovascular symptoms;  Hypertensive urgency;  Takotsubo cardiomyopathy  Discharge Disposition: Skilled Nursing Facility     Plan/Recommendations: CC RN will follow [facility] the [phone],     [facility]   Janelle Velázquez RN, Care Coordinator   [facility]'s   E-mail [email or web address]   [phone]

## 2025-06-24 ENCOUNTER — DOCUMENTATION ONLY (OUTPATIENT)
Dept: ANTICOAGULATION | Facility: CLINIC | Age: 80
End: 2025-06-24
Payer: COMMERCIAL

## 2025-06-24 ENCOUNTER — PATIENT OUTREACH (OUTPATIENT)
Dept: CARE COORDINATION | Facility: CLINIC | Age: 80
End: 2025-06-24
Payer: COMMERCIAL

## 2025-06-24 DIAGNOSIS — I63.20 CEREBROVASCULAR ACCIDENT (CVA) DUE TO OCCLUSION OF PRECEREBRAL ARTERY (H): Chronic | ICD-10-CM

## 2025-06-24 DIAGNOSIS — Z79.01 LONG TERM CURRENT USE OF ANTICOAGULANT THERAPY: Primary | ICD-10-CM

## 2025-06-24 DIAGNOSIS — Z79.01 ANTICOAGULATION MONITORING, INR RANGE 2-3: Chronic | ICD-10-CM

## 2025-06-24 DIAGNOSIS — Z86.73 HISTORY OF CVA (CEREBROVASCULAR ACCIDENT): ICD-10-CM

## 2025-06-24 ASSESSMENT — ACTIVITIES OF DAILY LIVING (ADL): DEPENDENT_IADLS:: INDEPENDENT

## 2025-06-24 NOTE — PROGRESS NOTES
ANTICOAGULATION  MANAGEMENT    Bebe Alofnso is being discharged from the [facility] ([facility]).    Reason for discharge: warfarin therapy completed. Noted in hospital discharge summary following GI bleed 5/30/25: Stroke neuro consulted and recommended asa montherapy without coumadin     Anticoagulation episode resolved, ACC referral closed, Standing order discontinued, and Warfarin discontinued from medication list (course completed)    If patient needs warfarin management in the future, please send a new referral    Annie Adames RN

## 2025-06-24 NOTE — PROGRESS NOTES
Clinic Care Coordination Contact  Clinic Care Coordination Contact  OUTREACH    Referral Information:  Referral Source: IP Handoff    Primary Diagnosis: COPD    Chief Complaint   Patient presents with    Clinic Care Coordination - Post Hospital     Clinic Care Coordination RN         Universal Utilization:   06/14/2025 3:08 PM CDT - 06/23/2025 2:30 PM CDT     [facility]   [address] [place] [address]   [phone]     Flash pulmonary edema (HC) (Primary Dx);  Stress-induced cardiomyopathy;  CKD (chronic kidney disease) stage 4, GFR 15-29 ml/min (HC);  History of lung cancer (s/p radiation therapy);  KATHERYN (acute kidney injury);  Acute systolic congestive heart failure (HC);  Hypertensive urgency;  Takotsubo cardiomyopathy  Discharge Disposition: Home Health   Clinic Utilization  Difficulty keeping appointments:: No  Compliance Concerns: No  No-Show Concerns: No  No PCP office visit in Past Year: No  Utilization      No Show Count (past year)  1             ED Visits  1             Hospital Admissions  2                    Current as of: 6/23/2025  6:16 PM                Clinical Concerns:  Current Medical Concerns:  Brief conversation with patient   . Poor historian with answers to CC RN questions.  Patient reports her  sets up her medications,  Patient denies any increased SOB episodes or cough . States much better than when she went into the hospital    Current Behavioral Concerns: No    Education Provided to patient: CC role introduced    Pain  Pain (GOAL):: No  Health Maintenance Reviewed: Not assessed  Clinical Pathway: Clinic Care Coordination - COPD Clinical Pathway     COPD Symptoms:    Shortness of breath:: No  Cough: No  Wheezing: No  Anxiety: No  Chills: No  Fever: No  Fatigue:: Yes, improving  Overall your COPD symptoms are (GOAL):: Stable    COPD Management:   Taking COPD medications as prescribed?: Yes  Are your medications effective in controlling your symptoms?:  Yes  Taking steroids for COPD?: No  Do you have a pulse oximeter?: No  Are you using oxygen?: No    Medication Management:  Medication review status:    sets up her medications      Functional Status:  Dependent ADLs:: Independent  Dependent IADLs:: Independent  Bed or wheelchair confined:: No  Mobility Status: Independent  Fallen 2 or more times in the past year?: No  Any fall with injury in the past year?: Yes    Living Situation:  Current living arrangement:: I live in a private home with spouse    Lifestyle & Psychosocial Needs:    Social Drivers of Health     Food Insecurity: No Food Insecurity (6/16/2025)    Received from [employer]    Food Insecurity     Do you worry your food will run out before you are able to buy more?: 1   Depression: Not at risk (2/28/2025)    PHQ-2     PHQ-2 Score: 0   Housing Stability: Low Risk  (6/16/2025)    Received from [employer]    Housing Stability     What is your housing situation today?: 1   Tobacco Use: High Risk (6/7/2025)    [facility]    Patient History     Smoking Tobacco Use: Every Day     Smokeless Tobacco Use: Never     Passive Exposure: Not on file   Financial Resource Strain: Low Risk  (6/7/2025)    Received from [employer]    Financial Resource Strain     Difficulty of Paying Living Expenses: 3     Difficulty of Paying Living Expenses: Not on file   Alcohol Use: Not on file   Transportation Needs: No Transportation Needs (6/16/2025)    Received from [employer]    Transportation Needs     Does lack of transportation keep you from medical appointments?: 1     Does lack of transportation keep you from work, meetings or getting things that you need?: 1   Physical Activity: Inactive (12/19/2024)    Exercise Vital Sign     Days of Exercise per Week: 0 days     Minutes of Exercise  per Session: 0 min   Interpersonal Safety: Low Risk  (5/28/2025)    Interpersonal Safety     Do you feel physically and emotionally safe where you currently live?: Yes     Within the past 12 months, have you been hit, slapped, kicked or otherwise physically hurt by someone?: No     Within the past 12 months, have you been humiliated or emotionally abused in other ways by your partner or ex-partner?: No   Stress: No Stress Concern Present (12/19/2024)    [employer] - Occupational Stress Questionnaire     Feeling of Stress : Not at all   Social Connections: Socially Integrated (6/16/2025)    [facility]    Social Connections     Do you often feel lonely or isolated from those around you?: 0   Health Literacy: Not on file     Inadequate nutrition (GOAL):: No  Tube Feeding: No  Inadequate activity/exercise (GOAL):: No  Significant changes in sleep pattern (GOAL): No  Transportation means:: Family     Christianity or spiritual beliefs that impact treatment:: No  Mental health DX:: No  Mental health management concern (GOAL):: No  Chemical Dependency Status: No Current Concerns  Informal Support system:: Spouse           Resources and Interventions:  Current Resources:   Skilled Home Care Services: Skilled Nursing, Home Health Aid, Physical Therapy  Community Resources: Home Care  Supplies Currently Used at Home: None  Equipment Currently Used at Home: walker, rolling, shower chair  Employment Status: retired         Advance Care Plan/Directive  Advanced Care Plans/Directives on file:: Yes  Type Advanced Care Plans/Directives: POLST    Referrals Placed: None    Patient/Caregiver understanding: reviewed discharge plan with patient     Outreach Frequency: weekly, more frequently as needed  Future Appointments                In 2 days Diana Ely APRN [facility]    In 2 weeks Walter Jauregui MD  [facility] [place], [facility]    In 3 weeks W[place]CT1 [facility], [place] [facility]    In 1 month [place]US3 [facility], [place] [facility]    In 1 month [place]US2 [facility], [place] [facility]    In 1 month Gogo Jimenez APRN CNP [facility] [place], [facility]    In 1 month Isi Seth PA-C [facility], [facility] RAD THER    In 4 months [place] LAB [facility] Laboratory, [facility]    In 4 months Tri Jacinto PA-C [facility]    In 6 months Diana Ely APRN CNP [facility]            Plan:   Patient will keep hospital follow up with PCP 6/26/2025  CC RN will follow up in 3-5 business days     [facility]   Janelle Velázquez RN, Care Coordinator   [facility]'s   E-mail [email or web address]   [phone]

## 2025-06-25 ENCOUNTER — PATIENT OUTREACH (OUTPATIENT)
Dept: CARE COORDINATION | Facility: CLINIC | Age: 80
End: 2025-06-25
Payer: COMMERCIAL

## 2025-06-25 NOTE — PROGRESS NOTES
Anemia Management Note - Follow Up      SUBJECTIVE/OBJECTIVE:    Referred by Dr. Rafael Oswald on 2024  Primary Diagnosis: Anemia in Chronic Kidney Disease (N18.3, D63.1)   3b  Secondary Diagnosis: Chronic Kidney Disease, Stage 3 (N18.3)  3b     Hgb goal range: 9-10     Epo/Darbo: TBD. Hgb >9.0  Iron regimen: Treat with IV Iron.   *Venofer completed on 109  *completed Venofer on 24.      Labs : 2026  RX/TX plans : TBD     *[facility]; [phone], opt 2 for infusion appt     Recent ALMA use, transfusion, IV iron: NA  No MI and blood clots or cancers. Hx of CVA, HTN, and Anticoagulated.      Contact:No Consent to Communicate on File.         Latest Ref Rng & Units 2025 2025 3/14/2025 2025 2025 2025 2025   Anemia   Hemoglobin 11.7 - 15.7 g/dL  11.5  10.6  4.9     4.9     5.6  7.8     8.0     7.9     7.7  7.6     7.1  7.3    IV Iron Dose  300mg         TSAT 15 - 46 %  28  18  7       Ferritin 11 - 328 ng/mL  362  230  41           Multiple values from one day are sorted in reverse-chronological order     BP Readings from Last 3 Encounters:   25 (!) 154/55   25 132/40   25 (!) 162/63     Wt Readings from Last 2 Encounters:   25 51.7 kg (114 lb)   25 52 kg (114 lb 9.6 oz)           ASSESSMENT:    Hgb:at goal - continue to monitor  TSat: not at goal of >30% Ferritin: Not at goal of (>100ng/mL)        PLAN:  Bebe was admitted to [place] from -.  Refused TCU, discharged home. Last Hgb was 9.0.   She completed a course of Venofer in 2025. Iron studies from 25 are low.  Sent Bebe a [employer] message to check in.       Orders needed to be renewed (for next follow-up date) in EPIC: None    Iron labs due:  Needs IV Iron    Plan discussed with:  Bebe via [employer]       NEXT FOLLOW-UP DATE:  25    Jennifer Weber RN   Anemia Services  [facility]  [email or web address]   Office : [phone]  Fax:  [phone]

## 2025-06-26 ENCOUNTER — OFFICE VISIT (OUTPATIENT)
Dept: FAMILY MEDICINE | Facility: CLINIC | Age: 80
End: 2025-06-26
Payer: COMMERCIAL

## 2025-06-26 VITALS
RESPIRATION RATE: 18 BRPM | OXYGEN SATURATION: 99 % | DIASTOLIC BLOOD PRESSURE: 48 MMHG | WEIGHT: 112 LBS | SYSTOLIC BLOOD PRESSURE: 134 MMHG | TEMPERATURE: 97.3 F | BODY MASS INDEX: 18.35 KG/M2 | HEART RATE: 65 BPM

## 2025-06-26 DIAGNOSIS — J44.1 COPD EXACERBATION (H): ICD-10-CM

## 2025-06-26 DIAGNOSIS — K92.2 UPPER GI BLEED: ICD-10-CM

## 2025-06-26 DIAGNOSIS — C34.92 METASTATIC PRIMARY LUNG CANCER, LEFT (H): ICD-10-CM

## 2025-06-26 DIAGNOSIS — J81.0 FLASH PULMONARY EDEMA (H): ICD-10-CM

## 2025-06-26 DIAGNOSIS — R09.89 BILATERAL CAROTID BRUITS: ICD-10-CM

## 2025-06-26 DIAGNOSIS — I63.20 CEREBROVASCULAR ACCIDENT (CVA) DUE TO OCCLUSION OF PRECEREBRAL ARTERY (H): ICD-10-CM

## 2025-06-26 DIAGNOSIS — I10 BENIGN ESSENTIAL HYPERTENSION: Chronic | ICD-10-CM

## 2025-06-26 DIAGNOSIS — N18.4 CKD (CHRONIC KIDNEY DISEASE) STAGE 4, GFR 15-29 ML/MIN (H): ICD-10-CM

## 2025-06-26 DIAGNOSIS — Z09 HOSPITAL DISCHARGE FOLLOW-UP: Primary | ICD-10-CM

## 2025-06-26 DIAGNOSIS — I50.21 ACUTE SYSTOLIC HEART FAILURE (H): ICD-10-CM

## 2025-06-26 DIAGNOSIS — N18.32 ANEMIA OF CHRONIC RENAL FAILURE, STAGE 3B (H): ICD-10-CM

## 2025-06-26 DIAGNOSIS — D63.1 ANEMIA OF CHRONIC RENAL FAILURE, STAGE 3B (H): ICD-10-CM

## 2025-06-26 LAB
ALBUMIN SERPL BCG-MCNC: 3.8 G/DL (ref 3.5–5.2)
ANION GAP SERPL CALCULATED.3IONS-SCNC: 14 MMOL/L (ref 7–15)
BASOPHILS # BLD AUTO: 0.1 10E3/UL (ref 0–0.2)
BASOPHILS NFR BLD AUTO: 1 %
BUN SERPL-MCNC: 47 MG/DL (ref 8–23)
CALCIUM SERPL-MCNC: 9.9 MG/DL (ref 8.8–10.4)
CHLORIDE SERPL-SCNC: 102 MMOL/L (ref 98–107)
CREAT SERPL-MCNC: 2.08 MG/DL (ref 0.51–0.95)
EGFRCR SERPLBLD CKD-EPI 2021: 24 ML/MIN/1.73M2
EOSINOPHIL # BLD AUTO: 0.4 10E3/UL (ref 0–0.7)
EOSINOPHIL NFR BLD AUTO: 6 %
ERYTHROCYTE [DISTWIDTH] IN BLOOD BY AUTOMATED COUNT: 17 % (ref 10–15)
GLUCOSE SERPL-MCNC: 114 MG/DL (ref 70–99)
HCO3 SERPL-SCNC: 21 MMOL/L (ref 22–29)
HCT VFR BLD AUTO: 30.5 % (ref 35–47)
HGB BLD-MCNC: 9.5 G/DL (ref 11.7–15.7)
IMM GRANULOCYTES # BLD: 0 10E3/UL
IMM GRANULOCYTES NFR BLD: 0 %
LYMPHOCYTES # BLD AUTO: 0.8 10E3/UL (ref 0.8–5.3)
LYMPHOCYTES NFR BLD AUTO: 13 %
MCH RBC QN AUTO: 28.2 PG (ref 26.5–33)
MCHC RBC AUTO-ENTMCNC: 31.1 G/DL (ref 31.5–36.5)
MCV RBC AUTO: 91 FL (ref 78–100)
MONOCYTES # BLD AUTO: 0.8 10E3/UL (ref 0–1.3)
MONOCYTES NFR BLD AUTO: 13 %
NEUTROPHILS # BLD AUTO: 4.4 10E3/UL (ref 1.6–8.3)
NEUTROPHILS NFR BLD AUTO: 67 %
PHOSPHATE SERPL-MCNC: 3.7 MG/DL (ref 2.5–4.5)
PLATELET # BLD AUTO: 236 10E3/UL (ref 150–450)
POTASSIUM SERPL-SCNC: 4.3 MMOL/L (ref 3.4–5.3)
RBC # BLD AUTO: 3.37 10E6/UL (ref 3.8–5.2)
SODIUM SERPL-SCNC: 137 MMOL/L (ref 135–145)
WBC # BLD AUTO: 6.6 10E3/UL (ref 4–11)

## 2025-06-26 RX ORDER — METOPROLOL TARTRATE 25 MG/1
25 TABLET, FILM COATED ORAL
COMMUNITY
Start: 2025-06-23

## 2025-06-26 RX ORDER — ACETAMINOPHEN 325 MG/1
650 TABLET ORAL
COMMUNITY

## 2025-06-26 RX ORDER — ALBUTEROL SULFATE 0.83 MG/ML
2.5 SOLUTION RESPIRATORY (INHALATION)
COMMUNITY

## 2025-06-26 RX ORDER — FUROSEMIDE 40 MG/1
40 TABLET ORAL DAILY
COMMUNITY
Start: 2025-06-23

## 2025-06-26 RX ORDER — PANTOPRAZOLE SODIUM 40 MG/1
40 TABLET, DELAYED RELEASE ORAL DAILY
Qty: 30 TABLET | Refills: 1 | Status: SHIPPED | OUTPATIENT
Start: 2025-06-26 | End: 2025-08-25

## 2025-06-26 ASSESSMENT — PAIN SCALES - GENERAL: PAINLEVEL_OUTOF10: NO PAIN (0)

## 2025-06-26 NOTE — PROGRESS NOTES
"  {PROVIDER CHARTING PREFERENCE:636109}    Jayla Spence is a 79 year old, presenting for the following health issues:  HOSP D/C        6/26/2025     8:52 AM   Additional Questions   Roomed by Zuly OVERTON        Hospital Follow-up Visit:    Hospital/Nursing Home/IP Rehab Facility: [facility]   Most Recent Admission Date: 5/27/2025   Most Recent Admission Diagnosis: Upper GI bleed - K92.2  Anticoagulated - Z79.01  Anemia, unspecified type - D64.9  Recurrent cerebrovascular accidents (CVAs) (H) - I63.9     Most Recent Discharge Date: 5/30/2025   Most Recent Discharge Diagnosis: Anticoagulated - Z79.01  Recurrent cerebrovascular accidents (CVAs) (H) - I63.9  Anemia, unspecified type - D64.9  Upper GI bleed - K92.2  Other specified counseling - Z71.89  History of CVA (cerebrovascular accident) - Z86.73  Severe mitral stenosis by prior echocardiogram - I05.0   Do you have any other stressors you would like to discuss with your provider? No    Problems taking medications regularly:  None  Medication changes since discharge: None  Problems adhering to non-medication therapy:  None    Summary of hospitalization:  {:280537}  Diagnostic Tests/Treatments reviewed.  Follow up needed: {:120250:}  Other Healthcare Providers Involved in Patient s Care:         {those currently involved after discharge:638939::\"None\"}  Update since discharge: {:061963} {TIP  Include information from family/caregivers, SNF, Care Coordination :715881}        Plan of care communicated with {:050515}     {Reference  Coding guidelines- Moderate Complexity F2F/Video within 7 - 14 days of discharge 9821502, High Complexity F2F/Video within 7 days 0447654 or enacqd61 days [id number] :151771}      {additonal problems for provider to add (Optional):538395}    {ROS Picklists (Optional):299542}      Objective    /48   Pulse 65   Temp 97.3  F (36.3  C) (Tympanic)   Resp 18   Wt 50.8 kg (112 lb)   LMP 09/15/1998   SpO2 99%   BMI 18.35 kg/m    Body " mass index is 18.35 kg/m .  Wt Readings from Last 5 Encounters:   06/26/25 50.8 kg (112 lb)   05/28/25 51.7 kg (114 lb)   05/27/25 52 kg (114 lb 9.6 oz)   04/03/25 53.3 kg (117 lb 9.6 oz)   03/25/25 52.7 kg (116 lb 3.2 oz)       Physical Exam   {Exam List (Optional):291031}    {Diagnostic Test Results (Optional):902967}        Signed Electronically by: SELWYN Dennis CNP  {Email feedback regarding this note to [email or web address]   :556800}

## 2025-06-27 ENCOUNTER — RESULTS FOLLOW-UP (OUTPATIENT)
Dept: FAMILY MEDICINE | Facility: CLINIC | Age: 80
End: 2025-06-27

## 2025-06-28 ENCOUNTER — TELEPHONE (OUTPATIENT)
Dept: FAMILY MEDICINE | Facility: CLINIC | Age: 80
End: 2025-06-28
Payer: COMMERCIAL

## 2025-06-28 NOTE — TELEPHONE ENCOUNTER
FYI - Status Update    Who is Calling: patient    Update: drake was 2 pounds heavier lasr nigh and shortness of breathe prn given    Does caller want a call/response back: No

## 2025-07-01 ENCOUNTER — PATIENT OUTREACH (OUTPATIENT)
Dept: CARE COORDINATION | Facility: CLINIC | Age: 80
End: 2025-07-01
Payer: COMMERCIAL

## 2025-07-01 ASSESSMENT — ACTIVITIES OF DAILY LIVING (ADL): DEPENDENT_IADLS:: INDEPENDENT

## 2025-07-01 NOTE — PROGRESS NOTES
Clinic Care Coordination Contact  Universal Utilization:   06/14/2025 3:08 PM CDT - 06/23/2025 2:30 PM CDT     [facility]   [address] [place] [address]   [phone]     Flash pulmonary edema (HC) (Primary Dx);  Stress-induced cardiomyopathy;  CKD (chronic kidney disease) stage 4, GFR 15-29 ml/min (HC);  History of lung cancer (s/p radiation therapy);  KATHERYN (acute kidney injury);  Acute systolic congestive heart failure (HC);  Hypertensive urgency;  Takotsubo cardiomyopathy  Discharge Disposition: Home Health   Follow Up Progress Note      Assessment:   Patient is a poor historian .     sets up her daily medications    Patient reports she is doing well and no increased SOB episodes.    Reminded patient of upcoming appointments     Care Gaps:    Health Maintenance Due   Topic Date Due    HF ACTION PLAN  Never done    COPD ACTION PLAN  Never done    COVID-19 VACCINE (3 - Moderna risk series) 05/27/2021    RSV VACCINE (1 - 1-dose 75+ series) Never done    ZOSTER VACCINE (2 of 2) 12/30/2021    MICROALBUMIN  06/27/2025    COLORECTAL CANCER SCREENING  05/29/2025       Not discussed         Intervention/Education provided during outreach: See above               Plan:   No unmet needs, no further care coordination is needed at this time     [facility]   Janelle Velázquez RN, Care Coordinator   [facility]'s   E-mail [email or web address]   [phone]

## 2025-07-08 ENCOUNTER — VIRTUAL VISIT (OUTPATIENT)
Dept: NEUROLOGY | Facility: CLINIC | Age: 80
End: 2025-07-08
Attending: STUDENT IN AN ORGANIZED HEALTH CARE EDUCATION/TRAINING PROGRAM
Payer: COMMERCIAL

## 2025-07-08 ENCOUNTER — DOCUMENTATION ONLY (OUTPATIENT)
Dept: NEUROLOGY | Facility: CLINIC | Age: 80
End: 2025-07-08

## 2025-07-08 VITALS — HEIGHT: 65 IN | WEIGHT: 112 LBS | BODY MASS INDEX: 18.66 KG/M2

## 2025-07-08 DIAGNOSIS — I63.9 RECURRENT CEREBROVASCULAR ACCIDENTS (CVAS) (H): ICD-10-CM

## 2025-07-08 DIAGNOSIS — I65.23 BILATERAL CAROTID ARTERY STENOSIS: ICD-10-CM

## 2025-07-08 DIAGNOSIS — Z86.73 HISTORY OF CVA (CEREBROVASCULAR ACCIDENT): Primary | ICD-10-CM

## 2025-07-08 PROCEDURE — 1126F AMNT PAIN NOTED NONE PRSNT: CPT | Mod: 95 | Performed by: PSYCHIATRY & NEUROLOGY

## 2025-07-08 PROCEDURE — 98007 SYNCH AUDIO-VIDEO EST HI 40: CPT | Performed by: PSYCHIATRY & NEUROLOGY

## 2025-07-08 ASSESSMENT — PAIN SCALES - GENERAL: PAINLEVEL_OUTOF10: NO PAIN (0)

## 2025-07-08 NOTE — LETTER
7/8/2025       RE: Bebe Alfonso  [address]  [place] [address]     Dear Colleague,    Thank you for referring your patient, Bebe Alfonso, to the [facility] [place] at [facility]. Please see a copy of my visit note below.          [facility] [place]  [address]  3RD FLOOR  [facility] [address] [phone]          July 8, 2025    Bebe Alfonso                                                                                                                     [address] [place] [address]    TOTAL 46  minutes  Telephone: 11:12-11:28 = 16   Documentation, review, coordination, reviewed scans going back to 2015: 30 minutes    Ms. Alfonso is a 79 year old lady with a prior history of stroke referred by Dr. Tapia. We tried to connect via Xyo and I sent [employer] links X 3 but could not connect. Hence, we completed visit via telephone.     Ms. Alfonso states that she has not had any recent strokes and none for a few years. This is confirmed by her . She does not currently feel dizzy. She is ambulating as normal and has not had any new weakness, numbness or speech difficulties. Vision is at baseline. Her  confirms this.     Imaging reviewed. Recent CUS in 6/2025 (abstracted below) shows R ICA stenosis in the 50-69% range and L ICA stenosis worse compared to 8/2024 scan. Patient was seen by Vascular surgery and they would like a CTA when her renal function can tolerate it. From her PCP Ms. Ely's note - it appears she has follow up with vascular surgery. Patient had been on warfarin in the past - but stopped due to GI Bleed. It appears she has an appointment for a LINQ to be scheduled with cardiology. I do not see any documented AFIB. Brain MRI and CT were reviewed. She has bi-hemispheric MCA infarcts involving R frontal and L parietal and temporal areas. Last  CT scan appears to be 3/2024 and last MRI in 2022. In MRI of 2022 and CT of 2024 - strokes are already chronic. I reviewed scans going back to 2015 - L MCA stroke was acute in 2015 MRI and R MCA stroke was already chronic then.     ASSESSMENT / PLAN  Encounter Diagnoses   Name Primary?     History of CVA (cerebrovascular accident) Yes     Bilateral carotid artery stenosis      Recurrent cerebrovascular accidents (CVAs) (H)      This 79 year old lady has had multiple CVA involving R frontal and L parietal/temporal regions. I reviewed scans going back to 2015. Her R MCA infarct was already chronic then. The L MCA stroke was acute on a scan of 7/28/2015. There is no documented h/o AFIB. I agree with the LINQ. If she has AFIB and is unable to tolerate anticoagulation due to GI bleed - LAAO device can be considered. I will send this note to PCP.   BP goal < 140/90. She has very low diastolics.   LDL < 70 mg / dl. She is on a statin and last LDL was 65 and is at goal.  No h/o DM documented.  Follow up with vascular surgery and cardiology as planned. No follow up with me is needed.      3/1/24  FINDINGS: Mild presumed ex vacuo dilatation of the bilateral lateral  ventricles and third ventricle, unchanged. Unchanged moderate sized  chronic infarct involving the right middle and inferior frontal gyri  and right anterior insular/subinsular region, in the right middle  cerebral artery territory. Likewise, unchanged chronic moderate size  infarct involving the left inferior parietal lobule and posterior  superior left temporal lobe and the left middle cerebral artery  territory. There is mild to moderate generalized brain parenchymal  volume loss. Small areas of presumed chronic gliosis/chronic infarct  involving the left perirolandic region, as before. Mild patchy  nonspecific hypoattenuation elsewhere in the cerebral white matter,  likely due to chronic small vessel ischemic disease. No CT findings of  large transcortical  acute or subacute infarct, acute intracranial  hemorrhage, extra axial fluid collection, or mass effect. Scattered  intracranial atherosclerotic calcifications.       Impression    1. Bilateral carotid artery atherosclerosis.  2. Estimated stenosis in the right internal carotid artery is 50-69% by SRU 2002 Consensus Panel Criteria.  3. Estimated stenosis in the left internal carotid artery is greater than 70% by SRU 2002 Consensus Panel Criteria    EXAM    Limited by telephone  Patient alert and keenly responsive  Language comprehension and production normal  Reports normal ambulation and use of arms and lungs  No vision difficulty  Also confirmed by patient's           Walter Jauregui MD      Virtual Visit Details    Type of service:  Video Visit   Video Start Time: 11:12  Video End Time:11:28    Originating Location (pt. Location): Home    Distant Location (provider location):  Off-site  Platform used for Video Visit: Vijaya      Again, thank you for allowing me to participate in the care of your patient.      Sincerely,    Walter Jauregui MD

## 2025-07-08 NOTE — PROGRESS NOTES
[facility] [address]  3RD FLOOR  [facility] [address] [phone]          July 8, 2025    Bebeileana Alfonso                                                                                                                     [address] [place] [address]    TOTAL 46  minutes  Telephone: 11:12-11:28 = 16   Documentation, review, coordination, reviewed scans going back to 2015: 30 minutes    Ms. Alfonso is a 79 year old lady with a prior history of stroke referred by Dr. Tapia. We tried to connect via [employer] and I sent [employer] links X 3 but could not connect. Hence, we completed visit via telephone.     Ms. Alfonso states that she has not had any recent strokes and none for a few years. This is confirmed by her . She does not currently feel dizzy. She is ambulating as normal and has not had any new weakness, numbness or speech difficulties. Vision is at baseline. Her  confirms this.     Imaging reviewed. Recent CUS in 6/2025 (abstracted below) shows R ICA stenosis in the 50-69% range and L ICA stenosis worse compared to 8/2024 scan. Patient was seen by Vascular surgery and they would like a CTA when her renal function can tolerate it. From her PCP Ms. Ely's note - it appears she has follow up with vascular surgery. Patient had been on warfarin in the past - but stopped due to GI Bleed. It appears she has an appointment for a LINQ to be scheduled with cardiology. I do not see any documented AFIB. Brain MRI and CT were reviewed. She has bi-hemispheric MCA infarcts involving R frontal and L parietal and temporal areas. Last CT scan appears to be 3/2024 and last MRI in 2022. In MRI of 2022 and CT of 2024 - strokes are already chronic. I reviewed scans going back to 2015 - L MCA stroke was acute in 2015 MRI and R MCA stroke was already chronic then.     ASSESSMENT / PLAN  Encounter Diagnoses   Name Primary?    History of CVA  (cerebrovascular accident) Yes    Bilateral carotid artery stenosis     Recurrent cerebrovascular accidents (CVAs) (H)      This 79 year old lady has had multiple CVA involving R frontal and L parietal/temporal regions. I reviewed scans going back to 2015. Her R MCA infarct was already chronic then. The L MCA stroke was acute on a scan of 7/28/2015. There is no documented h/o AFIB. I agree with the LINQ. If she has AFIB and is unable to tolerate anticoagulation due to GI bleed - LAAO device can be considered. I will send this note to PCP.   BP goal < 140/90. She has very low diastolics.   LDL < 70 mg / dl. She is on a statin and last LDL was 65 and is at goal.  No h/o DM documented.  Follow up with vascular surgery and cardiology as planned. No follow up with me is needed.      3/1/24  FINDINGS: Mild presumed ex vacuo dilatation of the bilateral lateral  ventricles and third ventricle, unchanged. Unchanged moderate sized  chronic infarct involving the right middle and inferior frontal gyri  and right anterior insular/subinsular region, in the right middle  cerebral artery territory. Likewise, unchanged chronic moderate size  infarct involving the left inferior parietal lobule and posterior  superior left temporal lobe and the left middle cerebral artery  territory. There is mild to moderate generalized brain parenchymal  volume loss. Small areas of presumed chronic gliosis/chronic infarct  involving the left perirolandic region, as before. Mild patchy  nonspecific hypoattenuation elsewhere in the cerebral white matter,  likely due to chronic small vessel ischemic disease. No CT findings of  large transcortical acute or subacute infarct, acute intracranial  hemorrhage, extra axial fluid collection, or mass effect. Scattered  intracranial atherosclerotic calcifications.       Impression    1. Bilateral carotid artery atherosclerosis.  2. Estimated stenosis in the right internal carotid artery is 50-69% by SRU 2002  Consensus Panel Criteria.  3. Estimated stenosis in the left internal carotid artery is greater than 70% by SRU 2002 Consensus Panel Criteria    EXAM    Limited by telephone  Patient alert and keenly responsive  Language comprehension and production normal  Reports normal ambulation and use of arms and lungs  No vision difficulty  Also confirmed by patient's           Walter Jauregui MD

## 2025-07-08 NOTE — NURSING NOTE
Current patient location: [address] [place] [address] [place] [address]    Is the patient currently in the state of [place]? YES    Visit mode: CONVERT TO TELEPHONE    If the visit is dropped, the patient can be reconnected by:TELEPHONE VISIT: Phone number: [phone]    Will anyone else be joining the visit? NO  (If patient encounters technical issues they should call [phone]    Are changes needed to the allergy or medication list? No    Are refills needed on medications prescribed by this physician? NO    Rooming Documentation:  Questionnaire(s) not done per department protocol    Reason for visit: Consult    Judy DU

## 2025-07-08 NOTE — PROGRESS NOTES
Virtual Visit Details    Type of service:  Video Visit   Video Start Time: 11:12  Video End Time:11:28    Originating Location (pt. Location): Home    Distant Location (provider location):  Off-site  Platform used for Video Visit: Vijaya

## 2025-07-08 NOTE — PATIENT INSTRUCTIONS
No follow up with me  Please send my note when done to PCP and also to Dr. Tapia referring provider    Stroke & Endovascular RN Care Coordinators:    Silva Dunn, RN, BSN  Avelina Leonard, RN, CNRN, SCRN    If you have any questions please contact the RN Care Coordinators at [phone], option 1.     Thank you for choosing [facility] for your health care needs.

## 2025-07-08 NOTE — PROGRESS NOTES
Faxed Dr Jauregui progress note DOS 7/8/25 to PCP Dr Solis Ortiz at fax# [phone] per Dr Jauregui request.

## 2025-07-10 ENCOUNTER — MYC MEDICAL ADVICE (OUTPATIENT)
Dept: FAMILY MEDICINE | Facility: CLINIC | Age: 80
End: 2025-07-10
Payer: COMMERCIAL

## 2025-07-10 NOTE — TELEPHONE ENCOUNTER
Routing [employer] message to provider to please advise. Current furosemide order is 40 mg every day PRN, no documentation of orders to schedule this, did see a weight gain and PRN was given 6/28/25.    Julie Behrendt RN

## 2025-07-13 ENCOUNTER — HOSPITAL ENCOUNTER (EMERGENCY)
Facility: CLINIC | Age: 80
Discharge: HOME | End: 2025-07-13
Attending: EMERGENCY MEDICINE | Admitting: EMERGENCY MEDICINE
Payer: COMMERCIAL

## 2025-07-13 ENCOUNTER — APPOINTMENT (OUTPATIENT)
Dept: GENERAL RADIOLOGY | Facility: CLINIC | Age: 80
End: 2025-07-13
Attending: EMERGENCY MEDICINE
Payer: COMMERCIAL

## 2025-07-13 VITALS
HEIGHT: 66 IN | HEART RATE: 90 BPM | WEIGHT: 109 LBS | BODY MASS INDEX: 17.52 KG/M2 | SYSTOLIC BLOOD PRESSURE: 142 MMHG | TEMPERATURE: 98.5 F | RESPIRATION RATE: 16 BRPM | DIASTOLIC BLOOD PRESSURE: 64 MMHG | OXYGEN SATURATION: 92 %

## 2025-07-13 DIAGNOSIS — R06.02 SOB (SHORTNESS OF BREATH): ICD-10-CM

## 2025-07-13 DIAGNOSIS — Z86.73 HISTORY OF CVA (CEREBROVASCULAR ACCIDENT): ICD-10-CM

## 2025-07-13 DIAGNOSIS — N18.4 CKD (CHRONIC KIDNEY DISEASE) STAGE 4, GFR 15-29 ML/MIN (H): ICD-10-CM

## 2025-07-13 LAB
ANION GAP SERPL CALCULATED.3IONS-SCNC: 16 MMOL/L (ref 7–15)
BASE EXCESS BLDV CALC-SCNC: 1.7 MMOL/L (ref -3–3)
BASOPHILS # BLD AUTO: 0.1 10E3/UL (ref 0–0.2)
BASOPHILS NFR BLD AUTO: 1 %
BUN SERPL-MCNC: 24.1 MG/DL (ref 8–23)
CALCIUM SERPL-MCNC: 9.4 MG/DL (ref 8.8–10.4)
CHLORIDE SERPL-SCNC: 97 MMOL/L (ref 98–107)
CREAT SERPL-MCNC: 1.59 MG/DL (ref 0.51–0.95)
EGFRCR SERPLBLD CKD-EPI 2021: 33 ML/MIN/1.73M2
EOSINOPHIL # BLD AUTO: 0.1 10E3/UL (ref 0–0.7)
EOSINOPHIL NFR BLD AUTO: 1 %
ERYTHROCYTE [DISTWIDTH] IN BLOOD BY AUTOMATED COUNT: 15.9 % (ref 10–15)
FLUAV RNA SPEC QL NAA+PROBE: NEGATIVE
FLUBV RNA RESP QL NAA+PROBE: NEGATIVE
GLUCOSE SERPL-MCNC: 128 MG/DL (ref 70–99)
HCO3 BLDV-SCNC: 26 MMOL/L (ref 21–28)
HCO3 SERPL-SCNC: 21 MMOL/L (ref 22–29)
HCT VFR BLD AUTO: 30.3 % (ref 35–47)
HGB BLD-MCNC: 9.7 G/DL (ref 11.7–15.7)
HOLD SPECIMEN: NORMAL
HOLD SPECIMEN: NORMAL
IMM GRANULOCYTES # BLD: 0 10E3/UL
IMM GRANULOCYTES NFR BLD: 0 %
INR PPP: 1.12 (ref 0.85–1.15)
LACTATE SERPL-SCNC: 1.1 MMOL/L (ref 0.7–2)
LYMPHOCYTES # BLD AUTO: 0.5 10E3/UL (ref 0.8–5.3)
LYMPHOCYTES NFR BLD AUTO: 5 %
MCH RBC QN AUTO: 27.6 PG (ref 26.5–33)
MCHC RBC AUTO-ENTMCNC: 32 G/DL (ref 31.5–36.5)
MCV RBC AUTO: 86 FL (ref 78–100)
MONOCYTES # BLD AUTO: 0.8 10E3/UL (ref 0–1.3)
MONOCYTES NFR BLD AUTO: 8 %
NEUTROPHILS # BLD AUTO: 8.2 10E3/UL (ref 1.6–8.3)
NEUTROPHILS NFR BLD AUTO: 85 %
NRBC # BLD AUTO: 0 10E3/UL
NRBC BLD AUTO-RTO: 0 /100
NT-PROBNP SERPL-MCNC: 9355 PG/ML (ref 0–624)
O2/TOTAL GAS SETTING VFR VENT: 21 %
OXYHGB MFR BLDV: 82 % (ref 70–75)
PCO2 BLDV: 36 MM HG (ref 40–50)
PH BLDV: 7.46 [PH] (ref 7.32–7.43)
PLATELET # BLD AUTO: 335 10E3/UL (ref 150–450)
PO2 BLDV: 47 MM HG (ref 25–47)
POTASSIUM SERPL-SCNC: 4.4 MMOL/L (ref 3.4–5.3)
PROTHROMBIN TIME: 14.3 SECONDS (ref 11.8–14.8)
RBC # BLD AUTO: 3.51 10E6/UL (ref 3.8–5.2)
RSV RNA SPEC NAA+PROBE: NEGATIVE
SAO2 % BLDV: 83.3 % (ref 70–75)
SARS-COV-2 RNA RESP QL NAA+PROBE: NEGATIVE
SODIUM SERPL-SCNC: 134 MMOL/L (ref 135–145)
WBC # BLD AUTO: 9.8 10E3/UL (ref 4–11)

## 2025-07-13 PROCEDURE — 85004 AUTOMATED DIFF WBC COUNT: CPT | Performed by: EMERGENCY MEDICINE

## 2025-07-13 PROCEDURE — 250N000009 HC RX 250: Performed by: EMERGENCY MEDICINE

## 2025-07-13 PROCEDURE — 83880 ASSAY OF NATRIURETIC PEPTIDE: CPT | Performed by: EMERGENCY MEDICINE

## 2025-07-13 PROCEDURE — 85610 PROTHROMBIN TIME: CPT | Performed by: EMERGENCY MEDICINE

## 2025-07-13 PROCEDURE — 36415 COLL VENOUS BLD VENIPUNCTURE: CPT | Performed by: EMERGENCY MEDICINE

## 2025-07-13 PROCEDURE — 94640 AIRWAY INHALATION TREATMENT: CPT

## 2025-07-13 PROCEDURE — 99284 EMERGENCY DEPT VISIT MOD MDM: CPT | Mod: 25 | Performed by: EMERGENCY MEDICINE

## 2025-07-13 PROCEDURE — 87637 SARSCOV2&INF A&B&RSV AMP PRB: CPT | Performed by: EMERGENCY MEDICINE

## 2025-07-13 PROCEDURE — 71046 X-RAY EXAM CHEST 2 VIEWS: CPT

## 2025-07-13 PROCEDURE — 83605 ASSAY OF LACTIC ACID: CPT | Performed by: EMERGENCY MEDICINE

## 2025-07-13 PROCEDURE — 82805 BLOOD GASES W/O2 SATURATION: CPT | Performed by: EMERGENCY MEDICINE

## 2025-07-13 PROCEDURE — 80048 BASIC METABOLIC PNL TOTAL CA: CPT | Performed by: EMERGENCY MEDICINE

## 2025-07-13 RX ORDER — IPRATROPIUM BROMIDE AND ALBUTEROL SULFATE 2.5; .5 MG/3ML; MG/3ML
3 SOLUTION RESPIRATORY (INHALATION) ONCE
Status: COMPLETED | OUTPATIENT
Start: 2025-07-13 | End: 2025-07-13

## 2025-07-13 RX ADMIN — IPRATROPIUM BROMIDE AND ALBUTEROL SULFATE 3 ML: .5; 3 SOLUTION RESPIRATORY (INHALATION) at 04:39

## 2025-07-13 ASSESSMENT — ACTIVITIES OF DAILY LIVING (ADL)
ADLS_ACUITY_SCORE: 49
ADLS_ACUITY_SCORE: 49

## 2025-07-13 NOTE — ED NOTES
Daughter Carina updated that patient was discharged home with .    Arcelia Osman RN on 7/13/2025 at 5:34 AM
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
## 2025-07-13 NOTE — ED NOTES
Bed: ED11  Expected date: 7/13/25  Expected time: 3:08 AM  Means of arrival: Ambulance ([id number])  Comments:

## 2025-07-13 NOTE — DISCHARGE INSTRUCTIONS
Your testing is reassuring today.    Continue home medications.    Follow-up in clinic for routine cares.    Return to be seen if new or worsening symptoms develop.

## 2025-07-13 NOTE — ED PROVIDER NOTES
ED Provider Note  [facility]      History     Chief Complaint   Patient presents with    Shortness of Breath     HPI  Bebe Alfonso is a 79 year old female who presents to the emergency department with concerns regarding shortness of breath.  Patient providing history information, however  is also present in the room.  They arrive via EMS.  Patient reports with the [place] wildfires she began experiencing increased amounts of shortness of breath tonight.  Not on home oxygen.  Is on diuretics, and no recent changes of medications.  Denies any fever at home, however EMS reported temperature of 100.3 degrees.  Patient denies chest pain.  No significant abdominal pain.  Not on bronchodilator medication.        Independent Historian:        Review of External Notes:  I reviewed [facility] admission and discharge summary from June 14.  Patient with history of hypertension, hyperlipidemia, stress cardiomyopathy, history of prior multiple CVA, stage III-IV chronic kidney disease, COPD, lung cancer status post radiation, cognitive impairment, who had been admitted June 7 through 12 for acute respiratory failure with flash pulmonary edema, and stress cardiomyopathy with EF of 25% requiring IV diuresis.  Ultimately admitted the second time at [facility], and had diuresis performed, however slightly elevated creatinine, and therefore ultimately discharged home, and was ultimately continued on furosemide.  Was high risk for readmission secondary to fragile state, and they had recommended TCU placement, however patient desired to go home.      Allergies:  Allergies   Allergen Reactions    Losartan Swelling, Nausea, Shortness Of Breath and Palpitations    Gabapentin Other (See Comments) and GI Disturbance     Double vision, flatulence    Lisinopril Cough    Oxycodone Other (See Comments)     But has tolerated hydrocodone    Tramadol Other (See Comments)    Augmentin  [Amoxicillin-Pot Clavulanate] Rash    Bacitracin Rash     blisters    Bupropion Rash     Allergic to brand not generic       Problem List:    Patient Active Problem List    Diagnosis Date Noted    Upper GI bleed 05/28/2025     Priority: Medium    Anticoagulated 05/27/2025     Priority: Medium    Anemia, unspecified type 05/27/2025     Priority: Medium    Recurrent cerebrovascular accidents (CVAs) (H) 05/27/2025     Priority: Medium    CKD (chronic kidney disease) stage 4, GFR 15-29 ml/min (H) 03/14/2025     Priority: Medium    Anemia of chronic renal failure, stage 3b (H) 04/11/2024     Priority: Medium    History of CVA (cerebrovascular accident) 02/10/2022     Priority: Medium    History of cardiac monitoring 09/21/2021     Priority: Medium    Renal artery stenosis 04/18/2019     Priority: Medium    Cerebrovascular accident (CVA) due to occlusion of precerebral artery (H) 12/18/2018     Priority: Medium    Hyperlipidemia LDL goal <70 09/07/2018     Priority: Medium    Long term current use of anticoagulant therapy 03/20/2018     Priority: Medium    Benign essential hypertension 09/15/2016     Priority: Medium    Anticoagulation monitoring, INR range 2-3 10/22/2015     Priority: Medium    Cerebrovascular disease 10/20/2015     Priority: Medium    ACP (advance care planning) 06/15/2013     Priority: Medium     Overview:   Formatting of this note may be different from the original.  Patient has identified Health Care Agent(s): Yes  Add Health Care Agents: Yes    Health Care Agent(s):  Primary Health Care Agent: Rodriguez lemons  Relationship: spouse Phone: [phone]   Secondary Health Care Agent: Beckie Kearney  Relationship: Dtr Phone: [phone] [phone]   Conservator:  Relationship:  Phone:    Guardian: Relationship:  Phone:      Patient has Advance Care Plan Documents (Health Care Directive, POLST): Yes    Advance Care Plan Documents:  Health Care Directive    Patient has identified Specific  Treatment Preferences: Yes   Specific Treatment Preferences: per Chart  Full Code , please refer to pts document for tx details      Thyroid nodule 05/22/2013     Priority: Medium     Overview:   Thyroid US (2012) Stable nodule left lobe of thyroid  TSH- (2/19/12) normal (2.26)      Pain medication agreement 03/13/2013     Priority: Medium     Overview:   Per verbal order of provider, controlled substance agreement for Vicodin signed this date by Valentina Burrows LPN by Dr. Avilez's office.      Carotid bruit 11/12/2012     Priority: Medium     Overview:   12/6/10 Carotid US   Elevated velocities throughout the carotid arteries bilaterally. There is a focal area of increased velocity in the mid left internal carotid artery with a plaque in this region on the color flow images. This corresponds to 50 - 70 % diameter reduction. There is an area of elevated velocity in the left external carotid artery consistent with stenosis. There is no focal area of significant stenosis in the right carotid arteries in the neck. Plaque in the carotid arteries bilaterally.       Tobacco dependence 11/01/2010     Priority: Medium     Overview:   1 PPD for x 33 years, she attempted quitting twice in the past. She states trial of wellbutrin in the past. She failed trial of nicotine and chantix.        Osteoarthrosis, hip 10/19/2010     Priority: Medium     Overview:   Pt scheduled for right  total hip arthroplasty on 6/14/13 with Dr. Addison HEREDIA hip xray (11/2012) Severe degenerative changes seen of the right hip with near bone-on-bone appearance of the joint and several subchondral cysts forming.      Esophageal reflux 04/08/2008     Priority: Medium     Overview:   Omeprazole PRN, occasional symptoms such as chest burning and knife twisted to stomach.           Past Medical History:    Past Medical History:   Diagnosis Date    Anticoagulation monitoring, INR range 2-3 10/22/2015    Benign essential hypertension 09/15/2016    Carotid  bruit 11/12/2012    Chronic obstructive pulmonary disease (H) 01/19/2016    Esophageal reflux 04/08/2008    Heart disease born with it    History of blood transfusion 70 years ago    History of CVA (cerebrovascular accident) 10/20/2015    Long term current use of anticoagulant therapy 03/20/2018    Major depressive disorder, recurrent episode, moderate (H) 10/29/2008    Malignant neoplasm of upper lobe of left lung (H)     Osteoarthrosis, hip 10/19/2010    Thyroid nodule 05/22/2013       Past Surgical History:    Past Surgical History:   Procedure Laterality Date    BIOPSY  appox in 1980's    BREAST SURGERY  last time in the 1990's    non cancer    BRONCHOSCOPY, WITH BIOPSY, ROBOT ASSISTED Bilateral 3/20/2025    Procedure: BRONCHOSCOPY, ROBOT-ASSISTED ULTASOUND, WITH BIOPSY, ION. Flexible bronchoscopy, with endobronchial ultrasound and transbronchial biopsies;  Surgeon: Gallo Jurado MD;  Location: [facility]    COLONOSCOPY N/A 9/7/2022    Procedure: COLONOSCOPY, WITH POLYPECTOMY AND BIOPSY;  Surgeon: Alfredito Gomez DO;  Location: [facility]    ESOPHAGOSCOPY, GASTROSCOPY, DUODENOSCOPY (EGD), COMBINED N/A 5/25/2022    Procedure: ESOPHAGOGASTRODUODENOSCOPY, WITH BIOPSY;  Surgeon: Alfredito Gomez DO;  Location: [facility]    ESOPHAGOSCOPY, GASTROSCOPY, DUODENOSCOPY (EGD), COMBINED N/A 5/28/2025    Procedure: ESOPHAGOGASTRODUODENOSCOPY;  Surgeon: Alfredito Gomez DO;  Location: [facility]    SIGMOIDOSCOPY FLEXIBLE N/A 5/28/2025    Procedure: Sigmoidoscopy flexible;  Surgeon: Alfredito Gomez DO;  Location: [facility]       Family History:    Family History   Problem Relation Age of Onset    Diabetes Maternal Grandfather     Diabetes Sister         developed after cancer treatment    Breast Cancer Sister     Obesity Sister     Hypertension Mother     Hyperlipidemia Mother     Osteoporosis Mother     Breast Cancer Sister     Osteoporosis Sister     Breast Cancer Daughter     Other Cancer Sister          "throught out body    Substance Abuse Sister         acol    Obesity Sister     Substance Abuse Sister         acol    Obesity Sister        Social History:  Marital Status:   [2]  Social History     Tobacco Use    Smoking status: Some Days     Current packs/day: 0.00     Average packs/day: 1 pack/day for 59.2 years (59.2 ttl pk-yrs)     Types: Cigarettes     Start date:      Last attempt to quit: 2025     Years since quittin.2    Smokeless tobacco: Never    Tobacco comments:     Patient has smoked since age 20 until 26. States she had quit for 2 years and then for 3 years during that time. - updated 4/3/25.   Vaping Use    Vaping status: Never Used   Substance Use Topics    Alcohol use: No    Drug use: Never        Medications:    acetaminophen (TYLENOL) 325 MG tablet  albuterol (PROVENTIL) (2.5 MG/3ML) 0.083% neb solution  aspirin 81 MG EC tablet  atorvastatin (LIPITOR) 80 MG tablet  Cholecalciferol (VITAMIN D3 PO)  FLUoxetine (PROZAC) 20 MG capsule  fluticasone (FLONASE) 50 MCG/ACT nasal spray  furosemide (LASIX) 40 MG tablet  metoprolol tartrate (LOPRESSOR) 25 MG tablet  pantoprazole (PROTONIX) 40 MG EC tablet          Review of Systems  A medically appropriate review of systems was performed with pertinent positives and negatives noted in the HPI, and all other systems negative.    Physical Exam   Patient Vitals for the past 24 hrs:   BP Temp Pulse Resp SpO2 Height Weight   25 0439 -- -- -- -- (!) 91 % -- --   25 0318 (!) 158/73 98.5  F (36.9  C) 94 16 (!) 91 % 1.664 m (5' 5.5\") 49.4 kg (109 lb)          Physical Exam  General: alert and in no acute respiratory distress on arrival  Head: atraumatic, normocephalic  Lungs:  nonlabored.  Slightly coarse.  No appreciable wheezing.  CV:  extremities warm and perfused  Abd: nondistended  Skin: no rashes, no diaphoresis and skin color normal  Neuro: Patient awake, alert, speech is fluent,   Psychiatric: affect/mood normal,        ED " Course                 Procedures                 None         Recent Results (from the past 24 hours)   CBC with platelets differential    Narrative    The following orders were created for panel order CBC with platelets differential.  Procedure                               Abnormality         Status                     ---------                               -----------         ------                     CBC with platelets and ...[[id number]]  Abnormal            Final result                 Please view results for these tests on the individual orders.   INR   Result Value Ref Range    INR 1.12 0.85 - 1.15    PT 14.3 11.8 - 14.8 Seconds   Basic metabolic panel   Result Value Ref Range    Sodium 134 (L) 135 - 145 mmol/L    Potassium 4.4 3.4 - 5.3 mmol/L    Chloride 97 (L) 98 - 107 mmol/L    Carbon Dioxide (CO2) 21 (L) 22 - 29 mmol/L    Anion Gap 16 (H) 7 - 15 mmol/L    Urea Nitrogen 24.1 (H) 8.0 - 23.0 mg/dL    Creatinine 1.59 (H) 0.51 - 0.95 mg/dL    GFR Estimate 33 (L) >60 mL/min/1.73m2    Calcium 9.4 8.8 - 10.4 mg/dL    Glucose 128 (H) 70 - 99 mg/dL   NT-proBNP   Result Value Ref Range    NT-proBNP 9,355 (H) 0 - 624 pg/mL   CBC with platelets and differential   Result Value Ref Range    WBC Count 9.8 4.0 - 11.0 10e3/uL    RBC Count 3.51 (L) 3.80 - 5.20 10e6/uL    Hemoglobin 9.7 (L) 11.7 - 15.7 g/dL    Hematocrit 30.3 (L) 35.0 - 47.0 %    MCV 86 78 - 100 fL    MCH 27.6 26.5 - 33.0 pg    MCHC 32.0 31.5 - 36.5 g/dL    RDW 15.9 (H) 10.0 - 15.0 %    Platelet Count 335 150 - 450 10e3/uL    % Neutrophils 85 %    % Lymphocytes 5 %    % Monocytes 8 %    % Eosinophils 1 %    % Basophils 1 %    % Immature Granulocytes 0 %    NRBCs per 100 WBC 0 <1 /100    Absolute Neutrophils 8.2 1.6 - 8.3 10e3/uL    Absolute Lymphocytes 0.5 (L) 0.8 - 5.3 10e3/uL    Absolute Monocytes 0.8 0.0 - 1.3 10e3/uL    Absolute Eosinophils 0.1 0.0 - 0.7 10e3/uL    Absolute Basophils 0.1 0.0 - 0.2 10e3/uL    Absolute Immature Granulocytes 0.0 <=0.4  10e3/uL    Absolute NRBCs 0.0 10e3/uL   [facility] [place] Draw    Narrative    The following orders were created for panel order [facility] [place] Draw.  Procedure                               Abnormality         Status                     ---------                               -----------         ------                     Extra Blood Culture Bottle[[id number]]                      Final result               Extra Green Top (Lithiu...[[id number]]                      Final result                 Please view results for these tests on the individual orders.   Extra Blood Culture Bottle   Result Value Ref Range    Hold Specimen JIC    Extra Green Top (Lithium Heparin) ON ICE   Result Value Ref Range    Hold Specimen     Lactic Acid Whole Blood with 1X Repeat in 2 HR when >2   Result Value Ref Range    Lactic Acid, Initial 1.1 0.7 - 2.0 mmol/L   Blood gas venous   Result Value Ref Range    pH Venous 7.46 (H) 7.32 - 7.43    pCO2 Venous 36 (L) 40 - 50 mm Hg    pO2 Venous 47 25 - 47 mm Hg    Bicarbonate Venous 26 21 - 28 mmol/L    Base Excess/Deficit Venous 1.7 -3.0 - 3.0 mmol/L    FIO2 21     Oxyhemoglobin Venous 82 (H) 70 - 75 %    O2 Sat, Venous 83.3 (H) 70.0 - 75.0 %    Narrative    In healthy individuals, oxyhemoglobin (O2Hb) and oxygen saturation (SO2) are approximately equal. In the presence of dyshemoglobins, oxyhemoglobin can be considerably lower than oxygen saturation.   Influenza A/B, RSV and SARS-CoV2 PCR (COVID-19) Nasopharyngeal    Specimen: Nasopharyngeal; Swab   Result Value Ref Range    Influenza A PCR Negative Negative    Influenza B PCR Negative Negative    RSV PCR Negative Negative    SARS CoV2 PCR Negative Negative    Narrative    Testing was performed using the Xpert Xpress CoV2/Flu/RSV Assay on the [employer] GeneXpert Instrument. This test should be ordered for the detection of SARS-CoV2, influenza, and RSV viruses in individuals with signs and symptoms of respiratory tract infection. This test is for in vitro  diagnostic use under the [place] FDA for laboratories certified under CLIA to perform high or moderate complexity testing. This test has been [place] FDA cleared. A negative result does not rule out the presence of PCR inhibitors in the specimen or target RNA in concentration below the limit of detection for the assay. If only one viral target is positive but coinfection with multiple targets is suspected, the sample should be re-tested with another FDA cleared, approved, or authorized test, if coninfection would change clinical management. This test was validated by the [facility]. These laboratories are certified under the Clinical Laboratory Improvement Amendments of 1988 (CLIA-88) as qualified to perfom high complexity laboratory testing.   Chest XR,  PA & LAT    Narrative    EXAM: XR CHEST 2 VIEWS  LOCATION: [facility]  DATE: 7/13/2025    INDICATION: SOB.  h o lung cancer and h o flash edema  COMPARISON: 06/14/2025      Impression    IMPRESSION: Stable cardiomediastinal silhouette. Loop recorder left chest. Slight interstitial prominence. Atherosclerotic aorta. Trace right effusion. Mild atelectasis left base. Patient's known lung malignancy not well demonstrated radiographically.       MEDICATIONS GIVEN IN THE EMERGENCY DEPARTMENT:  Medications   ipratropium - albuterol 0.5 mg/2.5 mg (3mg)/3 mL (DUONEB) neb solution 3 mL (3 mLs Nebulization $Given 7/13/25 0439)           Independent Interpretation (X-rays, CTs, rhythm strip):  Chest x-ray images personally reviewed.  No lobar infiltrate.  Slight interstitial prominence with no significant effusions.  Radiology mentions trace right pleural effusion    Consultations/Discussion of Management or Tests:  None       Social Determinants of Health affecting care:         Assessments & Plan (with Medical Decision Making)  79 year old female who presents to the Emergency Department for evaluation of shortness of breath.  Patient  with borderline fever for EMS.  Arrives afebrile currently.  Does have history of flash pulmonary edema, with decreased ejection fraction, however that improved to 42% upon her most recent hospitalization from June 14.  Patient with oxygen saturations at her 91 to 93% on room air upon arrival.  Not on home oxygen, and not requiring additional oxygen currently.  Afebrile in the emergency department, and x-ray shows no evidence of pneumonia.  Laboratory workup with venous blood gas which is normal.  Lactate normal at 1.1.  CBC with normal white blood cell count.  Creatinine approximately at baseline at 1.59.  Remainder of electrolytes unremarkable.  Viral testing is negative.    Patient was given duo nebulizer to see if She got any assistance or aid from the duo nebulizer, however no significant improvement, and therefore will not give additional bronchodilators.  I considered hospitalization given the patient's tenuous nature given her multiple comorbid medical conditions, with history of prior strokes, and recent hospitalizations.  However, given the reassuring workup, I feel it is reasonable for discharge home, close monitoring, and return precautions which are discussed.   is comfortable with this plan.     I have reviewed the nursing notes.    I have reviewed the findings, diagnosis, plan and need for follow up with the patient.         Medical Decision Making  The patient's presentation was of high complexity (a chronic illness severe exacerbation, progression, or side effect of treatment).  Patient with multiple chronic conditions including history of prior strokes, chronic kidney disease, in addition to recent flash pulmonary edema hospitalizations.  High risk for severe complex issue.    The patient's evaluation involved:  See note above for review of data elements    The patient's management necessitated high risk (a decision regarding hospitalization).  Considered hospitalization.        NEW  PRESCRIPTIONS STARTED AT TODAY'S ER VISIT  New Prescriptions    No medications on file       Final diagnoses:   SOB (shortness of breath)   CKD (chronic kidney disease) stage 4, GFR 15-29 ml/min (H)   History of CVA (cerebrovascular accident)       7/13/2025   [facility] EMERGENCY DEPT       Butch Lorenzo MD  07/13/25 0555

## 2025-07-14 ENCOUNTER — PATIENT OUTREACH (OUTPATIENT)
Dept: CARE COORDINATION | Facility: CLINIC | Age: 80
End: 2025-07-14
Payer: COMMERCIAL

## 2025-07-14 NOTE — PROGRESS NOTES
Clinic Care Coordination Contact  Community Health Worker Initial Outreach    CHW Initial Information Gathering:  Referral Source: ED Follow-Up  Current living arrangement:: Not Assessed  No PCP office visit in Past Year: No  CHW Additional Questions  If ED/Hospital discharge, follow-up appointment scheduled as recommended?: No  Patient agreeable to assistance with scheduling?: No  Patient declined (specify): Patient expressed that she will do so at a later time.  Medication changes made following ED/Hospital discharge?: No  MyChart active?: Yes  Patient sent Social Drivers of Health questionnaire?: No    Patient accepts CC: No, Patient expressed no additional support is needed at this time. Patient will be sent Care Coordination introduction letter for future reference.     Jaylyn Huerta  Community Health Worker    Connected Care Resources   [facility]     *Connected Care Resources Team does NOT   follow patient ongoing. Referrals are identified   based on internal discharge report and the outreach is to ensure   Patient has understanding of their discharge instructions.

## 2025-07-14 NOTE — LETTER
Bebe Alfonso  [address] [place] [address] [place] [address]    Dear Bebe Alfonso,      I am a team member within the [facility] with [facility]. I recently tried to reach you to ensure you were doing well following a recent visit within our health system. I also wanted to take this chance to introduce Clinic Care Coordination.     Below is a description of Clinic Care Coordination and how this team can further assist you:       The Clinic Care Coordination team is made up of a Registered Nurse, , Financial Resource Worker, and a Community Health Worker who understand and can help navigate the health care system. The goal of clinic care coordination is to help you manage your health, improve access to care, and achieve optimal health outcomes. They work alongside your provider to assist you in determining your health and social needs, obtain health care and community resources, and provide you with necessary information and education. Clinic Care Coordination can work with you through any barriers and develop a care plan that helps coordinate and strengthen the relationship between you and your care team.    If you wish to connect with the Clinic Care Coordination Team, please let your [facility] Primary Care Provider or Clinic Care Team know and they can place a referral. The Clinic Care Coordination team will then reach out by phone to further support you.    We are focused on providing you with the highest-quality healthcare experience possible.    Sincerely,   Your care team with [facility]'s [address] [place] [address] ([phone]).

## 2025-07-15 NOTE — PROGRESS NOTES
Anemia Management Note - Reminder     Follow-up with anemia management service:    Admitted to [facility] on 6/7/25 with cardiomyopathy.         Latest Ref Rng & Units 1/9/2025 2/6/2025 3/14/2025 5/27/2025 5/28/2025 5/29/2025 5/30/2025   Anemia   Hemoglobin 11.7 - 15.7 g/dL  11.5  10.6  4.9     4.9     5.6  7.8     8.0     7.9     7.7  7.6     7.1  7.3    IV Iron Dose  300mg         TSAT 15 - 46 %  28  18  7       Ferritin 11 - 328 ng/mL  362  230  41           Multiple values from one day are sorted in reverse-chronological order           Follow-up call date: 6/23/25    Jennifer Weber RN   Anemia Services  [facility]  [email or web address]   Office : [phone]  Fax: [phone]       What Type Of Note Output Would You Prefer (Optional)?: Bullet Format How Severe Are Your Spot(S)?: mild Have Your Spot(S) Been Treated In The Past?: has not been treated Hpi Title: Evaluation of Skin Lesions DC instructions

## 2025-07-17 ENCOUNTER — TELEPHONE (OUTPATIENT)
Dept: FAMILY MEDICINE | Facility: CLINIC | Age: 80
End: 2025-07-17

## 2025-07-17 NOTE — TELEPHONE ENCOUNTER
Home Care is calling regarding an established patient with [facility].  Requesting orders from: Diana Ely RN APPROVED: RN able to provide verbal orders.  Home Care will send orders for signature.  RN will close encounter.  Is this a request for a temporary pause in the home care episode?  No    Orders Requested    Physical Therapy  Request for initial certification (first set of orders)   Frequency: 1x/week for 5 weeks  RN gave verbal order: Yes        Phone number Home Care can be reached at: N/A  Okay to leave a detailed message?: No - N/A    Contacts       Contact Date/Time Type Contact Phone/Fax    07/17/2025 05:00 PM CDT Phone (Incoming) [facility] (Home Care) [phone]          Marie Salgado RN

## 2025-07-24 DIAGNOSIS — E78.5 HYPERLIPIDEMIA LDL GOAL <70: Chronic | ICD-10-CM

## 2025-07-24 RX ORDER — ATORVASTATIN CALCIUM 80 MG/1
80 TABLET, FILM COATED ORAL DAILY
Qty: 90 TABLET | Refills: 0 | Status: SHIPPED | OUTPATIENT
Start: 2025-07-24

## 2025-07-29 ENCOUNTER — OFFICE VISIT (OUTPATIENT)
Dept: FAMILY MEDICINE | Facility: CLINIC | Age: 80
End: 2025-07-29
Payer: COMMERCIAL

## 2025-07-29 ENCOUNTER — PATIENT OUTREACH (OUTPATIENT)
Dept: CARE COORDINATION | Facility: CLINIC | Age: 80
End: 2025-07-29

## 2025-07-29 VITALS
TEMPERATURE: 97.3 F | SYSTOLIC BLOOD PRESSURE: 138 MMHG | OXYGEN SATURATION: 99 % | WEIGHT: 108 LBS | RESPIRATION RATE: 18 BRPM | HEART RATE: 57 BPM | BODY MASS INDEX: 17.7 KG/M2 | DIASTOLIC BLOOD PRESSURE: 58 MMHG

## 2025-07-29 DIAGNOSIS — D63.1 ANEMIA OF CHRONIC RENAL FAILURE, STAGE 3B (H): ICD-10-CM

## 2025-07-29 DIAGNOSIS — N18.32 ANEMIA OF CHRONIC RENAL FAILURE, STAGE 3B (H): ICD-10-CM

## 2025-07-29 DIAGNOSIS — E78.5 HYPERLIPIDEMIA LDL GOAL <70: Chronic | ICD-10-CM

## 2025-07-29 DIAGNOSIS — K92.2 UPPER GI BLEED: ICD-10-CM

## 2025-07-29 DIAGNOSIS — N18.4 CKD (CHRONIC KIDNEY DISEASE) STAGE 4, GFR 15-29 ML/MIN (H): ICD-10-CM

## 2025-07-29 DIAGNOSIS — J44.1 COPD EXACERBATION (H): ICD-10-CM

## 2025-07-29 DIAGNOSIS — Z12.11 SCREEN FOR COLON CANCER: ICD-10-CM

## 2025-07-29 DIAGNOSIS — F41.1 GENERALIZED ANXIETY DISORDER: ICD-10-CM

## 2025-07-29 DIAGNOSIS — Z12.11 SCREEN FOR COLON CANCER: Primary | ICD-10-CM

## 2025-07-29 DIAGNOSIS — C34.92 METASTATIC PRIMARY LUNG CANCER, LEFT (H): ICD-10-CM

## 2025-07-29 DIAGNOSIS — I10 BENIGN ESSENTIAL HYPERTENSION: ICD-10-CM

## 2025-07-29 DIAGNOSIS — C34.92 METASTATIC PRIMARY LUNG CANCER, LEFT (H): Primary | ICD-10-CM

## 2025-07-29 DIAGNOSIS — D62 ANEMIA DUE TO BLOOD LOSS, ACUTE: ICD-10-CM

## 2025-07-29 LAB
ERYTHROCYTE [DISTWIDTH] IN BLOOD BY AUTOMATED COUNT: 16.3 % (ref 10–15)
FERRITIN SERPL-MCNC: 386 NG/ML (ref 11–328)
HCT VFR BLD AUTO: 29.6 % (ref 35–47)
HGB BLD-MCNC: 9.3 G/DL (ref 11.7–15.7)
IRON BINDING CAPACITY (ROCHE): 249 UG/DL (ref 240–430)
IRON SATN MFR SERPL: 22 % (ref 15–46)
IRON SERPL-MCNC: 56 UG/DL (ref 37–145)
MCH RBC QN AUTO: 27.4 PG (ref 26.5–33)
MCHC RBC AUTO-ENTMCNC: 31.4 G/DL (ref 31.5–36.5)
MCV RBC AUTO: 87 FL (ref 78–100)
PLATELET # BLD AUTO: 362 10E3/UL (ref 150–450)
RBC # BLD AUTO: 3.39 10E6/UL (ref 3.8–5.2)
WBC # BLD AUTO: 6.7 10E3/UL (ref 4–11)

## 2025-07-29 PROCEDURE — 82728 ASSAY OF FERRITIN: CPT | Performed by: NURSE PRACTITIONER

## 2025-07-29 PROCEDURE — G2211 COMPLEX E/M VISIT ADD ON: HCPCS | Performed by: NURSE PRACTITIONER

## 2025-07-29 PROCEDURE — 3075F SYST BP GE 130 - 139MM HG: CPT | Performed by: NURSE PRACTITIONER

## 2025-07-29 PROCEDURE — 82043 UR ALBUMIN QUANTITATIVE: CPT | Performed by: NURSE PRACTITIONER

## 2025-07-29 PROCEDURE — 85027 COMPLETE CBC AUTOMATED: CPT | Performed by: NURSE PRACTITIONER

## 2025-07-29 PROCEDURE — 3078F DIAST BP <80 MM HG: CPT | Performed by: NURSE PRACTITIONER

## 2025-07-29 PROCEDURE — 83550 IRON BINDING TEST: CPT | Performed by: NURSE PRACTITIONER

## 2025-07-29 PROCEDURE — 83540 ASSAY OF IRON: CPT | Performed by: NURSE PRACTITIONER

## 2025-07-29 PROCEDURE — 36415 COLL VENOUS BLD VENIPUNCTURE: CPT | Performed by: NURSE PRACTITIONER

## 2025-07-29 PROCEDURE — 82570 ASSAY OF URINE CREATININE: CPT | Performed by: NURSE PRACTITIONER

## 2025-07-29 PROCEDURE — 1126F AMNT PAIN NOTED NONE PRSNT: CPT | Performed by: NURSE PRACTITIONER

## 2025-07-29 PROCEDURE — 99215 OFFICE O/P EST HI 40 MIN: CPT | Performed by: NURSE PRACTITIONER

## 2025-07-29 RX ORDER — METOPROLOL TARTRATE 25 MG/1
25 TABLET, FILM COATED ORAL 2 TIMES DAILY
Qty: 180 TABLET | Refills: 3 | Status: SHIPPED | OUTPATIENT
Start: 2025-07-29

## 2025-07-29 RX ORDER — ATORVASTATIN CALCIUM 80 MG/1
80 TABLET, FILM COATED ORAL DAILY
Qty: 90 TABLET | Refills: 0 | Status: SHIPPED | OUTPATIENT
Start: 2025-07-29

## 2025-07-29 RX ORDER — FAMOTIDINE 40 MG/1
40 TABLET, FILM COATED ORAL DAILY
Qty: 90 TABLET | Refills: 1 | Status: SHIPPED | OUTPATIENT
Start: 2025-07-29

## 2025-07-29 ASSESSMENT — PAIN SCALES - GENERAL: PAINLEVEL_OUTOF10: NO PAIN (0)

## 2025-07-29 NOTE — PROGRESS NOTES
"  {PROVIDER CHARTING PREFERENCE:204167}    Jayla Spence is a 79 year old, presenting for the following health issues:  Hypertension and ER F/U        7/29/2025     9:19 AM   Additional Questions   Roomed by Zuly Bales with daughter- bryce     History of Present Illness       Heart Failure:  She presents for follow up of heart failure. She is experiencing shortness of breath with activity only, which is same as usual. She is not experiencing any lower extremity edema.   She denies orthopenea and is not coughing at night. Patient is checking weight daily. She has recently had a None.  She has no side effects from medications.  She has has a medical visit for heart failure 1 time since the last visit.    Hypertension: She presents for follow up of hypertension.  She does check blood pressure  regularly outside of the clinic. Outside blood pressures have been over 140/90. She does not follow a low salt diet.     Headaches:   Since the patient's last clinic visit, headaches are: no change  The patient is getting headaches:  Once a day  She is able to do normal daily activities when she has a migraine.  The patient is taking the following rescue/relief medications:  Tylenol   Patient states \"I get only a small amount of relief\" from the rescue/relief medications.   The patient is taking the following medications to prevent migraines:  No medications to prevent migraines  In the past 4 weeks, the patient has gone to an Urgent Care or Emergency Room 0 times times due to headaches.    She eats 0-1 servings of fruits and vegetables daily.She consumes 0 sweetened beverage(s) daily.She exercises with enough effort to increase her heart rate 9 or less minutes per day.  She exercises with enough effort to increase her heart rate 3 or less days per week.   She is taking medications regularly.      Last Echo:   Echo result w/o MOPS: Interpretation Summary Left ventricular systolic function is normal.The visual ejection " fraction is60-65%.The right ventricular systolic function is normal.There is moderate to severe mitral stenosis-Mean gradients 11 mm hg acrossmitral valve @ HR 64 bpm.There is moderate (2+) mitral regurgitation.Pulmonary hypertension- RVSP 38 mm hg +RA.        {Provider  Link to Heart Failure SmartSet :618029}  {MA/LPN/RN Pre-Provider Visit Orders- hCG/UA/Strep (Optional):899393}  ED/UC Followup:    Facility:  [facility]   Date of visit: 07/13  Reason for visit: SOB   Current Status: Better       {additonal problems for provider to add (Optional):307006}    {ROS Picklists (Optional):720973}      Objective    BP (!) 161/53   Pulse 57   Temp 97.3  F (36.3  C) (Tympanic)   Resp 18   Wt 49 kg (108 lb)   LMP 09/15/1998   SpO2 99%   BMI 17.70 kg/m    Body mass index is 17.7 kg/m .  Physical Exam   Wt Readings from Last 5 Encounters:   07/29/25 49 kg (108 lb)   07/13/25 49.4 kg (109 lb)   07/08/25 50.8 kg (112 lb)   06/26/25 50.8 kg (112 lb)   05/28/25 51.7 kg (114 lb)       {Exam List (Optional):837754}    {Diagnostic Test Results (Optional):525480}        Signed Electronically by: SELWYN Dennis CNP  {Email feedback regarding this note to [email or web address]   :113553}   "low salt diet.     Headaches:   Since the patient's last clinic visit, headaches are: no change  The patient is getting headaches:  Once a day  She is able to do normal daily activities when she has a migraine.  The patient is taking the following rescue/relief medications:  Tylenol   Patient states \"I get only a small amount of relief\" from the rescue/relief medications.   The patient is taking the following medications to prevent migraines:  No medications to prevent migraines  In the past 4 weeks, the patient has gone to an Urgent Care or Emergency Room 0 times times due to headaches.    She eats 0-1 servings of fruits and vegetables daily.She consumes 0 sweetened beverage(s) daily.She exercises with enough effort to increase her heart rate 9 or less minutes per day.  She exercises with enough effort to increase her heart rate 3 or less days per week.   She is taking medications regularly.      Last Echo:   Echo result w/o MOPS: Interpretation Summary Left ventricular systolic function is normal.The visual ejection fraction is60-65%.The right ventricular systolic function is normal.There is moderate to severe mitral stenosis-Mean gradients 11 mm hg acrossmitral valve @ HR 64 bpm.There is moderate (2+) mitral regurgitation.Pulmonary hypertension- RVSP 38 mm hg +RA.      ED/UC Followup:    Facility:  [facility]   Date of visit: 07/13  Reason for visit: SOB   Current Status: Better        has a past medical history of Anticoagulation monitoring, INR range 2-3 (10/22/2015), Benign essential hypertension (09/15/2016), Carotid bruit (11/12/2012), Chronic obstructive pulmonary disease (H) (01/19/2016), Esophageal reflux (04/08/2008), Heart disease (born with it), History of blood transfusion (70 years ago), History of CVA (cerebrovascular accident) (10/20/2015), Long term current use of anticoagulant therapy (03/20/2018), Major depressive disorder, recurrent episode, moderate (H) (10/29/2008), Malignant neoplasm of upper " lobe of left lung (H), Osteoarthrosis, hip (10/19/2010), and Thyroid nodule (05/22/2013).    She has no past medical history of Cancer (H), Congestive heart failure (H), Depressive disorder, Diabetes (H), or Uncomplicated asthma.  Past Surgical History:   Procedure Laterality Date    BIOPSY  appox in 1980's    BREAST SURGERY  last time in the 1990's    non cancer    BRONCHOSCOPY, WITH BIOPSY, ROBOT ASSISTED Bilateral 3/20/2025    Procedure: BRONCHOSCOPY, ROBOT-ASSISTED ULTASOUND, WITH BIOPSY, ION. Flexible bronchoscopy, with endobronchial ultrasound and transbronchial biopsies;  Surgeon: Gallo Jurado MD;  Location: [facility]    COLONOSCOPY N/A 9/7/2022    Procedure: COLONOSCOPY, WITH POLYPECTOMY AND BIOPSY;  Surgeon: Alfredito Gomez DO;  Location: [facility]    ESOPHAGOSCOPY, GASTROSCOPY, DUODENOSCOPY (EGD), COMBINED N/A 5/25/2022    Procedure: ESOPHAGOGASTRODUODENOSCOPY, WITH BIOPSY;  Surgeon: Alfredito Gomez DO;  Location: [facility]    ESOPHAGOSCOPY, GASTROSCOPY, DUODENOSCOPY (EGD), COMBINED N/A 5/28/2025    Procedure: ESOPHAGOGASTRODUODENOSCOPY;  Surgeon: Alfredito Gomez DO;  Location: [facility]    SIGMOIDOSCOPY FLEXIBLE N/A 5/28/2025    Procedure: Sigmoidoscopy flexible;  Surgeon: Alfredito Gomez DO;  Location: [facility]        Allergies   Allergen Reactions    Losartan Swelling, Nausea, Shortness Of Breath and Palpitations    Gabapentin Other (See Comments) and GI Disturbance     Double vision, flatulence    Lisinopril Cough    Oxycodone Other (See Comments)     But has tolerated hydrocodone    Tramadol Other (See Comments)    Augmentin [Amoxicillin-Pot Clavulanate] Rash    Bacitracin Rash     blisters    Bupropion Rash     Allergic to brand not generic           Review of Systems  Constitutional, neuro, ENT, endocrine, pulmonary, cardiac, gastrointestinal, genitourinary, musculoskeletal, integument and psychiatric systems are negative, except as otherwise noted.      Objective    BP (!)  161/53   Pulse 57   Temp 97.3  F (36.3  C) (Tympanic)   Resp 18   Wt 49 kg (108 lb)   LMP 09/15/1998   SpO2 99%   BMI 17.70 kg/m    Body mass index is 17.7 kg/m .  Physical Exam   Wt Readings from Last 5 Encounters:   07/29/25 49 kg (108 lb)   07/13/25 49.4 kg (109 lb)   07/08/25 50.8 kg (112 lb)   06/26/25 50.8 kg (112 lb)   05/28/25 51.7 kg (114 lb)       GENERAL: alert, pale, and fatigued  EYES: Eyes grossly normal to inspection, PERRL and conjunctivae and sclerae normal  HENT: ear canals and TM's normal, nose and mouth without ulcers or lesions  NECK: no adenopathy, no asymmetry, masses, or scars  RESP: lungs clear to auscultation - no rales, rhonchi or wheezes, prolonged expiratory phase, and decreased breath sounds throughout  CV: regular rate and rhythm, normal S1 S2, no S3 or S4, no murmur, click or rub, no peripheral edema  ABDOMEN: soft, nontender, no hepatosplenomegaly, no masses and bowel sounds normal  MS: no gross musculoskeletal defects noted, no edema  SKIN: no suspicious lesions or rashes  NEURO: Normal strength and tone, mentation intact and speech abnormal  PSYCH: mentation appears normal, affect normal/bright    Admission on 07/13/2025, Discharged on 07/13/2025   Component Date Value Ref Range Status    INR 07/13/2025 1.12  0.85 - 1.15 Final    PT 07/13/2025 14.3  11.8 - 14.8 Seconds Final    Sodium 07/13/2025 134 (L)  135 - 145 mmol/L Final    Potassium 07/13/2025 4.4  3.4 - 5.3 mmol/L Final    Chloride 07/13/2025 97 (L)  98 - 107 mmol/L Final    Carbon Dioxide (CO2) 07/13/2025 21 (L)  22 - 29 mmol/L Final    Anion Gap 07/13/2025 16 (H)  7 - 15 mmol/L Final    Urea Nitrogen 07/13/2025 24.1 (H)  8.0 - 23.0 mg/dL Final    Creatinine 07/13/2025 1.59 (H)  0.51 - 0.95 mg/dL Final    GFR Estimate 07/13/2025 33 (L)  >60 mL/min/1.73m2 Final    eGFR calculated using 2021 CKD-EPI equation.    Calcium 07/13/2025 9.4  8.8 - 10.4 mg/dL Final    Glucose 07/13/2025 128 (H)  70 - 99 mg/dL Final    pH  Venous 07/13/2025 7.46 (H)  7.32 - 7.43 Final    pCO2 Venous 07/13/2025 36 (L)  40 - 50 mm Hg Final    pO2 Venous 07/13/2025 47  25 - 47 mm Hg Final    Bicarbonate Venous 07/13/2025 26  21 - 28 mmol/L Final    Base Excess/Deficit Venous 07/13/2025 1.7  -3.0 - 3.0 mmol/L Final    FIO2 07/13/2025 21   Final    Oxyhemoglobin Venous 07/13/2025 82 (H)  70 - 75 % Final    O2 Sat, Venous 07/13/2025 83.3 (H)  70.0 - 75.0 % Final    Influenza A PCR 07/13/2025 Negative  Negative Final    Influenza B PCR 07/13/2025 Negative  Negative Final    RSV PCR 07/13/2025 Negative  Negative Final    SARS CoV2 PCR 07/13/2025 Negative  Negative Final    NEGATIVE: SARS-CoV-2 (COVID-19) RNA not detected, presumed negative.    NT-proBNP 07/13/2025 9,355 (H)  0 - 624 pg/mL Final    Comment: Starting on 05/07/2025, [facility] laboratory began flagging abnormal values for plasma NT-proBNP results for adults using age-specific reference ranges instead of clinical cut-points/thresholds (see interpretation comment below for clinical threshold values) as part of a test standardization effort.     Pediatric abnormal values were already previously flagged using age-specific reference intervals, which are not currently changing. The test methodology remains unchanged, and previous results performed with this methodology can be interpreted with the updated reference intervals.       [facility]'s Pediatric (boys and girls) Reference Ranges in pg/mL *  0 up to 3 days:  0 - 23718  3 days up to 1 month:  0 - 6500  1 month up to 1 year:  0 - 1000  2 up to 6 years:  0 -  330  6 up to 18 years:  0 - 240    Male Reference Ranges in pg/mL  18-44 years:  0 - 93  45-54 years:  0 - 138  55-64 years:  0 - 177  65-74 years:  0 - 229  75 years or older:  0 - 852    Female Reference Ranges in                            pg/mL  8-44 years:  0 - 178  45-54 years:  0 - 192  55-64 years:  0 - 226  65-74 years:  0 - 353  75 years or older:  0 - 624    Reference ranges in  adults reflect 95th percentiles for NT-pro-BNP levels in patients without congestive heart failure (CHF). Knowledge of each individual patient's NT-proBNP range may be more useful than using similar cut-points for every patient.    For adult chronic CHF patients according to New York Heart Association (NYHA) Functional Class, the mean NT-proBNP concentration is as following (5th and 95th percentile values respectively displayed in parentheses):     Class I: 1016 pg/mL ()  Class II: 1666 pg/mL (103-6567)  Class III: 3029 pg/mL (126-89237)  Class IV: 3465 pg/mL (148-82626)     Clinical thresholds for acute (emergency department) settings:  < 300 pg/mL effectively rules out acute decompensated heart failure (ADHF), with 99% negative predictive value.    The following values may rule in potential acute decompensated heart failure (ADHF)                            in individuals (with a PPV of 50-60%):  Under 50 years:  >450 pg/mL  Between 50-75 years: >900 pg/mL  Over 75 years: >1800 pg/mL    Among patients with dyspnea, NT-proBNP is highly sensitive for detection of acute CHF.  Elevations in NT-proBNP levels may be observed in states other than left ventricular congestive failure including: acute coronary syndromes, right heart strain/failure (including pulmonary embolism and cor pulmonale), critical illness, and renal failure.  Falsely low NT-proBNP in CHF patients may be observed in increased body mass index.  * References: (1) Justin MCKEON et al.  Pediatr Cardiol 30:3-8, 2009.      WBC Count 07/13/2025 9.8  4.0 - 11.0 10e3/uL Final    RBC Count 07/13/2025 3.51 (L)  3.80 - 5.20 10e6/uL Final    Hemoglobin 07/13/2025 9.7 (L)  11.7 - 15.7 g/dL Final    Hematocrit 07/13/2025 30.3 (L)  35.0 - 47.0 % Final    MCV 07/13/2025 86  78 - 100 fL Final    MCH 07/13/2025 27.6  26.5 - 33.0 pg Final    MCHC 07/13/2025 32.0  31.5 - 36.5 g/dL Final    RDW 07/13/2025 15.9 (H)  10.0 - 15.0 % Final    Platelet Count 07/13/2025 945 380  - 450 10e3/uL Final    % Neutrophils 07/13/2025 85  % Final    % Lymphocytes 07/13/2025 5  % Final    % Monocytes 07/13/2025 8  % Final    % Eosinophils 07/13/2025 1  % Final    % Basophils 07/13/2025 1  % Final    % Immature Granulocytes 07/13/2025 0  % Final    NRBCs per 100 WBC 07/13/2025 0  <1 /100 Final    Absolute Neutrophils 07/13/2025 8.2  1.6 - 8.3 10e3/uL Final    Absolute Lymphocytes 07/13/2025 0.5 (L)  0.8 - 5.3 10e3/uL Final    Absolute Monocytes 07/13/2025 0.8  0.0 - 1.3 10e3/uL Final    Absolute Eosinophils 07/13/2025 0.1  0.0 - 0.7 10e3/uL Final    Absolute Basophils 07/13/2025 0.1  0.0 - 0.2 10e3/uL Final    Absolute Immature Granulocytes 07/13/2025 0.0  <=0.4 10e3/uL Final    Absolute NRBCs 07/13/2025 0.0  10e3/uL Final    Hold Specimen 07/13/2025 [facility]   Final    Lactic Acid, Initial 07/13/2025 1.1  0.7 - 2.0 mmol/L Final           Signed Electronically by: SELWYN Dennis CNP

## 2025-07-29 NOTE — PROGRESS NOTES
Anemia Management Note - Reminder     Follow-up with anemia management service:    Elif is overdue to have her Anemia Labs drawn. Sent a 2nd reminder via [employer].         Latest Ref Rng & Units 3/14/2025 5/27/2025 5/28/2025 5/29/2025 5/30/2025 6/26/2025 7/13/2025   Anemia   Hemoglobin 11.7 - 15.7 g/dL 10.6  4.9     4.9     5.6  7.8     8.0     7.9     7.7  7.6     7.1  7.3  9.5  9.7    TSAT 15 - 46 % 18  7         Ferritin 11 - 328 ng/mL 230  41             Multiple values from one day are sorted in reverse-chronological order         Follow-up call date: 8/12/25    Jennifer Weber RN   Anemia Services  [facility]  [email or web address]   Office : [phone]  Fax: [phone]

## 2025-07-29 NOTE — PATIENT INSTRUCTIONS
Monthly CBC - standing order placed         Thank you for coming to see me today! Here are a couple of pieces of information about messages and lab communication practices.     If lab work was done today as part of your evaluation you will generally be contacted via [employer], mail, or phone with the results within 7-10 days.  If there is an alarming/concerning result we will contact you by phone. Lab results come back at varying times, I generally wait until all labs are resulted before making comments on results. Please note, labs are automatically released to [employer] once available, but it may take a couple of days for my interpretation note to appear.     I try very hard to respond to medical messages with 2 business days of receiving them. Occasionally it takes me longer if I am trying to figure out the best way to respond and need to seek guidance, do some research or dig deeper into your medical history to come up with a helpful response.     If you need refills please contact your pharmacist. They will send a refill request to me to review. Please allow 3-5 business days for us to respond to all refill requests.     Please call or send a medical message with any questions or concerns. Thank you for trusting me to be part of your healthcare team!

## 2025-07-30 LAB
CREAT UR-MCNC: 112.4 MG/DL
MICROALBUMIN UR-MCNC: 225.2 MG/L
MICROALBUMIN/CREAT UR: 200.36 MG/G CR (ref 0–25)

## 2025-08-04 ENCOUNTER — HOSPITAL ENCOUNTER (OUTPATIENT)
Dept: ULTRASOUND IMAGING | Facility: CLINIC | Age: 80
Discharge: HOME | End: 2025-08-04
Attending: PHYSICIAN ASSISTANT
Payer: COMMERCIAL

## 2025-08-04 DIAGNOSIS — I73.9 PAD (PERIPHERAL ARTERY DISEASE): ICD-10-CM

## 2025-08-04 PROCEDURE — 93922 UPR/L XTREMITY ART 2 LEVELS: CPT

## 2025-08-05 ENCOUNTER — MYC MEDICAL ADVICE (OUTPATIENT)
Dept: FAMILY MEDICINE | Facility: CLINIC | Age: 80
End: 2025-08-05
Payer: COMMERCIAL

## 2025-08-05 DIAGNOSIS — C34.12 MALIGNANT NEOPLASM OF UPPER LOBE OF LEFT LUNG (H): Primary | ICD-10-CM

## 2025-08-07 ENCOUNTER — PATIENT OUTREACH (OUTPATIENT)
Dept: CARE COORDINATION | Facility: CLINIC | Age: 80
End: 2025-08-07
Payer: COMMERCIAL

## 2025-08-19 ENCOUNTER — HOSPITAL ENCOUNTER (OUTPATIENT)
Dept: CT IMAGING | Facility: CLINIC | Age: 80
Discharge: HOME | End: 2025-08-19
Attending: RADIOLOGY
Payer: COMMERCIAL

## 2025-08-19 ENCOUNTER — OFFICE VISIT (OUTPATIENT)
Dept: VASCULAR SURGERY | Facility: CLINIC | Age: 80
End: 2025-08-19
Attending: PHYSICIAN ASSISTANT
Payer: COMMERCIAL

## 2025-08-19 ENCOUNTER — LAB (OUTPATIENT)
Dept: LAB | Facility: CLINIC | Age: 80
End: 2025-08-19
Payer: COMMERCIAL

## 2025-08-19 VITALS — OXYGEN SATURATION: 95 % | HEART RATE: 56 BPM | DIASTOLIC BLOOD PRESSURE: 66 MMHG | SYSTOLIC BLOOD PRESSURE: 154 MMHG

## 2025-08-19 DIAGNOSIS — N18.32 ANEMIA OF CHRONIC RENAL FAILURE, STAGE 3B (H): ICD-10-CM

## 2025-08-19 DIAGNOSIS — K92.2 UPPER GI BLEED: ICD-10-CM

## 2025-08-19 DIAGNOSIS — I73.9 PAD (PERIPHERAL ARTERY DISEASE): Primary | ICD-10-CM

## 2025-08-19 DIAGNOSIS — D62 ANEMIA DUE TO BLOOD LOSS, ACUTE: ICD-10-CM

## 2025-08-19 DIAGNOSIS — R09.89 CAROTID BRUIT, UNSPECIFIED LATERALITY: ICD-10-CM

## 2025-08-19 DIAGNOSIS — D63.1 ANEMIA OF CHRONIC RENAL FAILURE, STAGE 3B (H): ICD-10-CM

## 2025-08-19 DIAGNOSIS — N18.4 CKD (CHRONIC KIDNEY DISEASE) STAGE 4, GFR 15-29 ML/MIN (H): ICD-10-CM

## 2025-08-19 DIAGNOSIS — C34.12 MALIGNANT NEOPLASM OF UPPER LOBE OF LEFT LUNG (H): ICD-10-CM

## 2025-08-19 LAB
ERYTHROCYTE [DISTWIDTH] IN BLOOD BY AUTOMATED COUNT: 16.9 % (ref 10–15)
HCT VFR BLD AUTO: 32.4 % (ref 35–47)
HGB BLD-MCNC: 10.2 G/DL (ref 11.7–15.7)
MCH RBC QN AUTO: 28 PG (ref 26.5–33)
MCHC RBC AUTO-ENTMCNC: 31.5 G/DL (ref 31.5–36.5)
MCV RBC AUTO: 89 FL (ref 78–100)
PLATELET # BLD AUTO: 211 10E3/UL (ref 150–450)
RBC # BLD AUTO: 3.64 10E6/UL (ref 3.8–5.2)
WBC # BLD AUTO: 5.75 10E3/UL (ref 4–11)

## 2025-08-19 PROCEDURE — 36415 COLL VENOUS BLD VENIPUNCTURE: CPT

## 2025-08-19 PROCEDURE — 85027 COMPLETE CBC AUTOMATED: CPT

## 2025-08-19 PROCEDURE — 83540 ASSAY OF IRON: CPT

## 2025-08-19 PROCEDURE — 1126F AMNT PAIN NOTED NONE PRSNT: CPT | Performed by: PHYSICIAN ASSISTANT

## 2025-08-19 PROCEDURE — 83550 IRON BINDING TEST: CPT

## 2025-08-19 PROCEDURE — 3078F DIAST BP <80 MM HG: CPT | Performed by: PHYSICIAN ASSISTANT

## 2025-08-19 PROCEDURE — 3077F SYST BP >= 140 MM HG: CPT | Performed by: PHYSICIAN ASSISTANT

## 2025-08-19 PROCEDURE — 82728 ASSAY OF FERRITIN: CPT

## 2025-08-19 PROCEDURE — 71250 CT THORAX DX C-: CPT

## 2025-08-19 PROCEDURE — 99214 OFFICE O/P EST MOD 30 MIN: CPT | Performed by: PHYSICIAN ASSISTANT

## 2025-08-19 RX ORDER — METOPROLOL SUCCINATE 50 MG/1
50 TABLET, EXTENDED RELEASE ORAL DAILY
COMMUNITY
Start: 2025-06-13 | End: 2025-08-19

## 2025-08-19 ASSESSMENT — PAIN SCALES - GENERAL: PAINLEVEL_OUTOF10: NO PAIN (0)

## 2025-08-20 LAB
FERRITIN SERPL-MCNC: 152 NG/ML (ref 11–328)
IRON BINDING CAPACITY (ROCHE): 262 UG/DL (ref 240–430)
IRON SATN MFR SERPL: 21 % (ref 15–46)
IRON SERPL-MCNC: 54 UG/DL (ref 37–145)

## 2025-09-02 ENCOUNTER — OFFICE VISIT (OUTPATIENT)
Dept: FAMILY MEDICINE | Facility: CLINIC | Age: 80
End: 2025-09-02
Payer: COMMERCIAL

## 2025-09-02 VITALS
WEIGHT: 110 LBS | DIASTOLIC BLOOD PRESSURE: 84 MMHG | BODY MASS INDEX: 18.33 KG/M2 | RESPIRATION RATE: 18 BRPM | HEART RATE: 71 BPM | OXYGEN SATURATION: 96 % | TEMPERATURE: 97 F | SYSTOLIC BLOOD PRESSURE: 120 MMHG | HEIGHT: 65 IN

## 2025-09-02 DIAGNOSIS — D63.1 ANEMIA OF CHRONIC RENAL FAILURE, STAGE 3B (H): Primary | ICD-10-CM

## 2025-09-02 DIAGNOSIS — N18.32 STAGE 3B CHRONIC KIDNEY DISEASE (H): ICD-10-CM

## 2025-09-02 DIAGNOSIS — N18.32 ANEMIA OF CHRONIC RENAL FAILURE, STAGE 3B (H): Primary | ICD-10-CM

## 2025-09-02 DIAGNOSIS — J43.9 PULMONARY EMPHYSEMA, UNSPECIFIED EMPHYSEMA TYPE (H): ICD-10-CM

## 2025-09-02 DIAGNOSIS — C34.92 METASTATIC PRIMARY LUNG CANCER, LEFT (H): ICD-10-CM

## 2025-09-02 LAB
ANION GAP SERPL CALCULATED.3IONS-SCNC: 11 MMOL/L (ref 7–15)
BUN SERPL-MCNC: 51.4 MG/DL (ref 8–23)
CALCIUM SERPL-MCNC: 10.1 MG/DL (ref 8.8–10.4)
CHLORIDE SERPL-SCNC: 102 MMOL/L (ref 98–107)
CREAT SERPL-MCNC: 2.1 MG/DL (ref 0.51–0.95)
EGFRCR SERPLBLD CKD-EPI 2021: 24 ML/MIN/1.73M2
ERYTHROCYTE [DISTWIDTH] IN BLOOD BY AUTOMATED COUNT: 15.7 % (ref 10–15)
GLUCOSE SERPL-MCNC: 101 MG/DL (ref 70–99)
HCO3 SERPL-SCNC: 24 MMOL/L (ref 22–29)
HCT VFR BLD AUTO: 35.7 % (ref 35–47)
HGB BLD-MCNC: 11.3 G/DL (ref 11.7–15.7)
MCH RBC QN AUTO: 27.9 PG (ref 26.5–33)
MCHC RBC AUTO-ENTMCNC: 31.7 G/DL (ref 31.5–36.5)
MCV RBC AUTO: 88.1 FL (ref 78–100)
PLATELET # BLD AUTO: 206 10E3/UL (ref 150–450)
POTASSIUM SERPL-SCNC: 5.3 MMOL/L (ref 3.4–5.3)
RBC # BLD AUTO: 4.05 10E6/UL (ref 3.8–5.2)
SODIUM SERPL-SCNC: 137 MMOL/L (ref 135–145)
WBC # BLD AUTO: 6.57 10E3/UL (ref 4–11)

## 2025-09-02 PROCEDURE — 36415 COLL VENOUS BLD VENIPUNCTURE: CPT | Performed by: NURSE PRACTITIONER

## 2025-09-02 PROCEDURE — 3078F DIAST BP <80 MM HG: CPT | Performed by: NURSE PRACTITIONER

## 2025-09-02 PROCEDURE — 3074F SYST BP LT 130 MM HG: CPT | Performed by: NURSE PRACTITIONER

## 2025-09-02 PROCEDURE — 80048 BASIC METABOLIC PNL TOTAL CA: CPT | Performed by: NURSE PRACTITIONER

## 2025-09-02 PROCEDURE — 99214 OFFICE O/P EST MOD 30 MIN: CPT | Performed by: NURSE PRACTITIONER

## 2025-09-02 PROCEDURE — 85027 COMPLETE CBC AUTOMATED: CPT | Performed by: NURSE PRACTITIONER

## 2025-09-02 PROCEDURE — 1126F AMNT PAIN NOTED NONE PRSNT: CPT | Performed by: NURSE PRACTITIONER

## 2025-09-02 ASSESSMENT — PAIN SCALES - GENERAL: PAINLEVEL_OUTOF10: NO PAIN (0)

## 2025-09-03 ENCOUNTER — PATIENT OUTREACH (OUTPATIENT)
Dept: CARE COORDINATION | Facility: CLINIC | Age: 80
End: 2025-09-03
Payer: COMMERCIAL

## (undated) DEVICE — SYR 30ML SLIP TIP W/O NDL 302833

## (undated) DEVICE — CONNECTOR SWIVEL 7.0MM ION 490108

## (undated) DEVICE — PITCHER STERILE 1000ML  SSK9004A

## (undated) DEVICE — LUBRICANT INST KIT ENDO-LUBE 220-90

## (undated) DEVICE — ENDO VALVE BX EVIS MAJ-210

## (undated) DEVICE — BAG INSTRUMENT IV VISION ION 490127

## (undated) DEVICE — NDL BIOPSY 23G FLEXISION ION 490102

## (undated) DEVICE — ENDO SNARE EXACTO COLD 9MM LOOP 2.4MMX230CM 00711115

## (undated) DEVICE — LABEL MEDICATION SYSTEM 3303-P

## (undated) DEVICE — ENDO VALVE SUCTION ULTRASOUND BRONCH MAJ-1414

## (undated) DEVICE — ENDO FORCEP ENDOJAW BIOPSY 2.8MMX230CM FB-220U

## (undated) DEVICE — SOL NACL 0.9% IRRIG 1000ML BOTTLE 2F7124

## (undated) DEVICE — DEVICE ENDO CLIP INSTINCT PLUS INSC-P-7-230-S  G58010

## (undated) DEVICE — DRSG TELFA 3X8" 1238

## (undated) DEVICE — SYR 10ML SLIP TIP W/O NDL 303134

## (undated) DEVICE — ANTIFOG SOLUTION W/FOAM PAD 31142527

## (undated) DEVICE — ADAPTER PROBE/SUCTION VISION ION 490101

## (undated) DEVICE — ENDO CATHETER ION 490105

## (undated) DEVICE — LINEN TOWEL PACK X5 5464

## (undated) DEVICE — PROBE PERIPHERAL VISION ION 490206

## (undated) DEVICE — TUBING SUCTION 10'X3/16" N510

## (undated) DEVICE — SNARE LARIAT MULTI OVAL- 55X30MM HEX- 28X10MM DMD- 15X6MM

## (undated) DEVICE — NDL ASPIRATION EBUS-VIZISHOT 22G NA-U401SX-4022-A

## (undated) DEVICE — ENDO VALVE SUCTION BRONCH EVIS MAJ-209

## (undated) DEVICE — KIT ENDO FIRST STEP DISINFECTANT 200ML W/POUCH EP-4

## (undated) DEVICE — ESU PROBE ERBE FLEX 1.1MMX15MR 20402-401

## (undated) DEVICE — NDL BLUNT 18GA 1" W/O FILTER 305181

## (undated) RX ORDER — PROPOFOL 10 MG/ML
INJECTION, EMULSION INTRAVENOUS
Status: DISPENSED
Start: 2025-05-28

## (undated) RX ORDER — LIDOCAINE HYDROCHLORIDE 10 MG/ML
INJECTION, SOLUTION INFILTRATION; PERINEURAL
Status: DISPENSED
Start: 2022-09-07

## (undated) RX ORDER — GLYCOPYRROLATE 0.2 MG/ML
INJECTION, SOLUTION INTRAMUSCULAR; INTRAVENOUS
Status: DISPENSED
Start: 2022-09-07

## (undated) RX ORDER — FENTANYL CITRATE 50 UG/ML
INJECTION, SOLUTION INTRAMUSCULAR; INTRAVENOUS
Status: DISPENSED
Start: 2025-03-20

## (undated) RX ORDER — PROPOFOL 10 MG/ML
INJECTION, EMULSION INTRAVENOUS
Status: DISPENSED
Start: 2022-05-25

## (undated) RX ORDER — PROPOFOL 10 MG/ML
INJECTION, EMULSION INTRAVENOUS
Status: DISPENSED
Start: 2022-09-07